# Patient Record
Sex: MALE | Race: WHITE | NOT HISPANIC OR LATINO | Employment: FULL TIME | URBAN - METROPOLITAN AREA
[De-identification: names, ages, dates, MRNs, and addresses within clinical notes are randomized per-mention and may not be internally consistent; named-entity substitution may affect disease eponyms.]

---

## 2017-01-27 ENCOUNTER — ALLSCRIPTS OFFICE VISIT (OUTPATIENT)
Dept: OTHER | Facility: OTHER | Age: 52
End: 2017-01-27

## 2017-03-03 ENCOUNTER — ALLSCRIPTS OFFICE VISIT (OUTPATIENT)
Dept: OTHER | Facility: OTHER | Age: 52
End: 2017-03-03

## 2017-03-30 ENCOUNTER — TRANSCRIBE ORDERS (OUTPATIENT)
Dept: LAB | Facility: CLINIC | Age: 52
End: 2017-03-30

## 2017-03-30 ENCOUNTER — APPOINTMENT (OUTPATIENT)
Dept: LAB | Facility: CLINIC | Age: 52
End: 2017-03-30
Payer: COMMERCIAL

## 2017-03-30 DIAGNOSIS — E03.9 HYPOTHYROIDISM: ICD-10-CM

## 2017-03-30 DIAGNOSIS — E66.9 OBESITY: ICD-10-CM

## 2017-03-30 DIAGNOSIS — G47.30 SLEEP APNEA: ICD-10-CM

## 2017-03-30 DIAGNOSIS — E78.5 HYPERLIPIDEMIA: ICD-10-CM

## 2017-03-30 DIAGNOSIS — E10.65 TYPE 1 DIABETES MELLITUS WITH HYPERGLYCEMIA (HCC): ICD-10-CM

## 2017-03-30 DIAGNOSIS — K21.9 GASTRO-ESOPHAGEAL REFLUX DISEASE WITHOUT ESOPHAGITIS: ICD-10-CM

## 2017-03-30 DIAGNOSIS — Z86.39 PERSONAL HISTORY OF OTHER ENDOCRINE, NUTRITIONAL AND METABOLIC DISEASE: ICD-10-CM

## 2017-03-30 DIAGNOSIS — E55.9 VITAMIN D DEFICIENCY: ICD-10-CM

## 2017-03-30 LAB
25(OH)D3 SERPL-MCNC: 39.9 NG/ML (ref 30–100)
ALBUMIN SERPL BCP-MCNC: 4 G/DL (ref 3.5–5)
ANION GAP SERPL CALCULATED.3IONS-SCNC: 10 MMOL/L (ref 4–13)
BUN SERPL-MCNC: 15 MG/DL (ref 5–25)
CALCIUM SERPL-MCNC: 9.2 MG/DL (ref 8.3–10.1)
CHLORIDE SERPL-SCNC: 99 MMOL/L (ref 100–108)
CO2 SERPL-SCNC: 28 MMOL/L (ref 21–32)
CREAT SERPL-MCNC: 1.16 MG/DL (ref 0.6–1.3)
EST. AVERAGE GLUCOSE BLD GHB EST-MCNC: 166 MG/DL
GFR SERPL CREATININE-BSD FRML MDRD: >60 ML/MIN/1.73SQ M
GLUCOSE SERPL-MCNC: 235 MG/DL (ref 65–140)
HBA1C MFR BLD: 7.4 % (ref 4.2–6.3)
PHOSPHATE SERPL-MCNC: 2.8 MG/DL (ref 2.7–4.5)
POTASSIUM SERPL-SCNC: 4.1 MMOL/L (ref 3.5–5.3)
PTH-INTACT SERPL-MCNC: 35.3 PG/ML (ref 14–72)
SODIUM SERPL-SCNC: 137 MMOL/L (ref 136–145)
T4 FREE SERPL-MCNC: 1.17 NG/DL (ref 0.76–1.46)
TSH SERPL DL<=0.05 MIU/L-ACNC: 4.75 UIU/ML (ref 0.36–3.74)

## 2017-03-30 PROCEDURE — 80069 RENAL FUNCTION PANEL: CPT

## 2017-03-30 PROCEDURE — 83036 HEMOGLOBIN GLYCOSYLATED A1C: CPT

## 2017-03-30 PROCEDURE — 82306 VITAMIN D 25 HYDROXY: CPT

## 2017-03-30 PROCEDURE — 83970 ASSAY OF PARATHORMONE: CPT

## 2017-03-30 PROCEDURE — 84439 ASSAY OF FREE THYROXINE: CPT

## 2017-03-30 PROCEDURE — 84443 ASSAY THYROID STIM HORMONE: CPT

## 2017-03-30 PROCEDURE — 36415 COLL VENOUS BLD VENIPUNCTURE: CPT

## 2017-03-31 ENCOUNTER — ALLSCRIPTS OFFICE VISIT (OUTPATIENT)
Dept: OTHER | Facility: OTHER | Age: 52
End: 2017-03-31

## 2017-04-28 DIAGNOSIS — E03.9 HYPOTHYROIDISM: ICD-10-CM

## 2017-04-28 DIAGNOSIS — Z96.41 PRESENCE OF INSULIN PUMP: ICD-10-CM

## 2017-04-28 DIAGNOSIS — E55.9 VITAMIN D DEFICIENCY: ICD-10-CM

## 2017-04-28 DIAGNOSIS — E10.65 TYPE 1 DIABETES MELLITUS WITH HYPERGLYCEMIA (HCC): ICD-10-CM

## 2017-07-01 DIAGNOSIS — E10.65 TYPE 1 DIABETES MELLITUS WITH HYPERGLYCEMIA (HCC): ICD-10-CM

## 2017-07-01 DIAGNOSIS — E03.9 HYPOTHYROIDISM: ICD-10-CM

## 2017-07-01 DIAGNOSIS — E55.9 VITAMIN D DEFICIENCY: ICD-10-CM

## 2017-07-01 DIAGNOSIS — Z96.41 PRESENCE OF INSULIN PUMP: ICD-10-CM

## 2017-07-04 ENCOUNTER — GENERIC CONVERSION - ENCOUNTER (OUTPATIENT)
Dept: OTHER | Facility: OTHER | Age: 52
End: 2017-07-04

## 2017-07-21 ENCOUNTER — TRANSCRIBE ORDERS (OUTPATIENT)
Dept: LAB | Facility: CLINIC | Age: 52
End: 2017-07-21

## 2017-07-21 ENCOUNTER — APPOINTMENT (OUTPATIENT)
Dept: LAB | Facility: CLINIC | Age: 52
End: 2017-07-21
Payer: COMMERCIAL

## 2017-07-21 DIAGNOSIS — E03.9 HYPOTHYROIDISM: ICD-10-CM

## 2017-07-21 DIAGNOSIS — E55.9 VITAMIN D DEFICIENCY: ICD-10-CM

## 2017-07-21 DIAGNOSIS — E10.65 TYPE 1 DIABETES MELLITUS WITH HYPERGLYCEMIA (HCC): ICD-10-CM

## 2017-07-21 DIAGNOSIS — Z96.41 PRESENCE OF INSULIN PUMP: ICD-10-CM

## 2017-07-21 LAB
ALBUMIN SERPL BCP-MCNC: 3.7 G/DL (ref 3.5–5)
ANION GAP SERPL CALCULATED.3IONS-SCNC: 7 MMOL/L (ref 4–13)
BUN SERPL-MCNC: 15 MG/DL (ref 5–25)
CALCIUM SERPL-MCNC: 9.2 MG/DL (ref 8.3–10.1)
CHLORIDE SERPL-SCNC: 101 MMOL/L (ref 100–108)
CO2 SERPL-SCNC: 29 MMOL/L (ref 21–32)
CREAT SERPL-MCNC: 1.03 MG/DL (ref 0.6–1.3)
EST. AVERAGE GLUCOSE BLD GHB EST-MCNC: 183 MG/DL
GFR SERPL CREATININE-BSD FRML MDRD: >60 ML/MIN/1.73SQ M
GLUCOSE SERPL-MCNC: 225 MG/DL (ref 65–140)
HBA1C MFR BLD: 8 % (ref 4.2–6.3)
PHOSPHATE SERPL-MCNC: 2.7 MG/DL (ref 2.7–4.5)
POTASSIUM SERPL-SCNC: 4.7 MMOL/L (ref 3.5–5.3)
PTH-INTACT SERPL-MCNC: 37.6 PG/ML (ref 14–72)
SODIUM SERPL-SCNC: 137 MMOL/L (ref 136–145)

## 2017-07-21 PROCEDURE — 83970 ASSAY OF PARATHORMONE: CPT

## 2017-07-21 PROCEDURE — 36415 COLL VENOUS BLD VENIPUNCTURE: CPT

## 2017-07-21 PROCEDURE — 80069 RENAL FUNCTION PANEL: CPT

## 2017-07-21 PROCEDURE — 83036 HEMOGLOBIN GLYCOSYLATED A1C: CPT

## 2017-07-24 ENCOUNTER — ALLSCRIPTS OFFICE VISIT (OUTPATIENT)
Dept: OTHER | Facility: OTHER | Age: 52
End: 2017-07-24

## 2017-08-07 ENCOUNTER — ALLSCRIPTS OFFICE VISIT (OUTPATIENT)
Dept: OTHER | Facility: OTHER | Age: 52
End: 2017-08-07

## 2017-09-18 ENCOUNTER — ALLSCRIPTS OFFICE VISIT (OUTPATIENT)
Dept: OTHER | Facility: OTHER | Age: 52
End: 2017-09-18

## 2017-10-24 DIAGNOSIS — E10.65 TYPE 1 DIABETES MELLITUS WITH HYPERGLYCEMIA (HCC): ICD-10-CM

## 2017-10-24 DIAGNOSIS — E78.5 HYPERLIPIDEMIA: ICD-10-CM

## 2017-10-24 DIAGNOSIS — Z96.41 PRESENCE OF INSULIN PUMP: ICD-10-CM

## 2017-10-24 DIAGNOSIS — E55.9 VITAMIN D DEFICIENCY: ICD-10-CM

## 2017-10-24 DIAGNOSIS — E03.9 HYPOTHYROIDISM: ICD-10-CM

## 2017-10-26 NOTE — PROGRESS NOTES
Assessment  1  Type 1 diabetes mellitus with hyperglycemia, with long-term current use of insulin   (250 01,790 29) (E10 65)   2  Hyperlipidemia (272 4) (E78 5)   3  Insulin pump in place (V45 85) (Z96 41)   4  Hypothyroidism (244 9) (E03 9)   5  Vitamin D deficiency (268 9) (E55 9)   6  Obesity, Class II, BMI 35 0-39 9, with comorbidity (see actual BMI) (278 00) (E66 9)    Plan  Hypothyroidism, Type 1 diabetes mellitus with hyperglycemia, with long-term current use  of insulin    · Follow-up visit in 3 months Evaluation and Treatment  Follow-up  Status: Complete   Done: 27VPR7202   Ordered; For: Hypothyroidism, Type 1 diabetes mellitus with hyperglycemia, with long-term current use of insulin; Ordered By: Domitila Prather Performed:  Due: 83Exe9599; Last Updated By: Drew Altman; 9/18/2017 8:52:03 AM    Discussion/Summary  Discussion Summary:   #1 type 1 diabetes mellitus with long-term insulin use with hyperglycemia- has insulin resistance, he is currently on T slim pump and dex com G5A1c worsened to 8%and insulin pump download data reviewed, blood sugar interpretation discussedNo major hypoglycemia requiring medical attention or 3rd party assistancechange insulin to carb ratio to 1:4 before dinner, continue insulin to carb ratio of 1: 5 for rest of the medschange the correction factor to 25 at 9:00 p m , midnight and 4:00 a m metformin 1 g twice a day,He is UTD on eye and foot exams  Insulin pump in place-discussed with patient that he is on intensive insulin regimen, important to calibrate C GMS twice a day and wear C GMS all the time  Hypothyroidism-no recent blood work, asked to do blood work now  He has slips from previous appointmentto take Synthroid 4 hours apart from vitamin-D    Vitamin-D deficiency-currently on vitamin-D 21003 International Units once a weekcurrent management  Obesity: BMI 38weight Management noteis willing to try saxenda for weight loss(GLP analogue) discussed with patient, that saxenda is not approved by FDA to be used with NovoLog insulin as well as in type 1 diabetes, however it is used off-label  is agreeable to use Saxenda at this time   the side effects with the patient  start with 0 6 mg subcu injection daily dose, discuss if he has severe nausea or vomiting he should call office since stopped the medication  after starting saxenda, he may need adjustment in his insulin regimen, he should download C GMS and call office in 2 weeks  to call if blood sugar less than 70 or more than 250 more than twice a week  Counseling Documentation With Imm: The patient was counseled regarding diagnostic results,-- instructions for management,-- risk factor reductions,-- prognosis,-- impressions,-- risks and benefits of treatment options,-- importance of compliance with treatment  Chief Complaint  Chief Complaint Free Text Note Form: Follow up of type 1 diabetes, hypothyroidism, weight gain, hyperlipidemia, hypertension   Chief Complaint Chronic Condition [facility]: Patient is here today for follow up of chronic conditions described in HPI  History of Present Illness  HPI: 52-year-old gentleman with type 1 diabetes(howard antibody positive, with undetectable C-peptide) for 7 years, on insulin pump for 7 years coming for follow-up uses T slim insulin pump, he uses NovoLog via insulin pump  pump settings reviewed, rate-midnight-2 8 units/hour, 4:00 a m  2 units/hour, 6:00 a m  2 units/hour, 8:00 a m  2 8 units/hour, 10:00 a m  to 2 5 units/hour, 12 noon- 2 4 units/hour, 3:00 p m  2 5 unit/hour, 6:00 p m  2 5 unit/hour, 9:00 p m  2 9 unit/hour  basal rate -60 7 unitshave also reviewed and interpreted the CGMSglucose-168 mg/dL with standard deviation of 57blood sugars are usually at nighttime after dinner as well as early in the morning  blood sugar-180-220bedtime-182 to 230 hypoglycemia events    severe hypoglycemic episodes causing intervention, such as ER admission on hospitalization  Obesity (Follow-Up): The patient is being seen for follow-up of obesity  The patient reports no change in the condition  He has had no significant interval events  Interval symptoms:  denies poor eating habits,-- denies anxiety,-- denies fatigue,-- denies back pain-- and-- denies joint pain  The patient is not currently on medication for this problem  Diet: He consumes a diverse and healthy diet  Additional history: Was seen at weight loss management center, was given weight loss diet program     Hyperlipidemia (Initial): The patient is being seen for a routine clinic follow-up of hyperlipidemia  Recent measurements for this patient were taken on dec 2016  Recent measurements: total cholesterol 152 mg/dL, LDL 92 mg/dL and triglycerides 125 mg/dL  No associated symptoms are reported  Current treatment includes statins  By report, there is good compliance with treatment  Hypothyroidism (Follow-Up): The patient is being seen for follow-up of hypothyroidism of undetermined etiology  The patient reports no change in the condition  He has had no significant interval events  Interval symptoms:  new onset of weight gain  The patient is currently asymptomatic  Medications include levothyroxine (Synthroid) and 200 mcg po daily  Additional history: last tsh was 4 5 in march 2017   Vitamin D Deficiency: The patient is being seen for follow-up of vitamin D deficiency  Disease type: vitamin D deficiency  Recent laboratory results: date march 2017, 25-hydroxyvitamin D 39 ng/mL  Past treatment has included vitamin D3 (cholecalciferol)  The patient is currently asymptomatic  Review of Systems  Endo Adult ROS Male Established v2 - [facility]:   Constitutional/General: recent weight gain,-- no recent weight loss,-- no poor energy/fatigue,-- no increased energy level,-- insomnia/sleep problems,-- no fever-- and-- no feeling weak     Heart: no high blood pressure,-- no chest pain/tightness,-- no rapid/racing heart rate-- and-- no palpitations  Genitourinary - Urinary no frequent urination,-- no excess urination-- and-- no urinating during the night  Eyes: excessive tearing, but-- no blurred vision,-- no double vision,-- no bulging eyes-- and-- no gritty/scratchy eyes  Mouth / Throat: no hoarseness-- and-- no difficulty swallowing  Neck: no lumps,-- no swollen glands,-- no neck pain,-- no neck stiffness-- and-- no enlarged thyroid  Respiratory: no wheezing,-- no asthma-- and-- no persistent cough  Musculoskeletal: no muscle aches/pain,-- no joint aches/pain-- and-- no muscle weakness  Skin & Hair: no dry skin,-- no acne,-- the hair texture was not oily,-- no hair loss-- and-- no excessive hair growth  Gastrointestinal: no constipation,-- no diarrhea,-- no waking at night to drink-- and-- no stomach ache  Neurological: no blackouts,-- no weakness-- and-- no tremors  Genital: no testicular pain,-- no testicular lumps/bumps/mass-- and-- does not perform monthly testicular exam    Endocrine: feeling hot frequently,-- no feeling cold frequently,-- no shifts between feeling hot and cold,-- no cold hands or feet,-- no excessive sweating,-- no thyroid problems,-- no blood sugar problems,-- no excessive thirst,-- no excessive hunger,-- no change in shoe size,-- no nausea or vomiting-- and-- no shaky hands  Active Problems  1  Abdominal bloating (787 3) (R14 0)   2  Anemia, deficiency (281 9) (D53 9)   3  Bilateral lower extremity edema (782 3) (R60 0)   4  Chronic back pain (724 5,338 29) (M54 9,G89 29)   5  Chronic maxillary sinusitis (473 0) (J32 0)   6  Collagenous colitis (558 9) (K52 831)   7  Depression screen (V79 0) (Z13 89)   8  GERD (gastroesophageal reflux disease) (530 81) (K21 9)   9  History of colon polyps (V12 72) (Z86 010)   10  Hyperlipidemia (272 4) (E78 5)   11  Hypothyroidism (244 9) (E03 9)   12  Insomnia (780 52) (G47 00)   13  Insulin pump in place (V45 85) (Z96 41)   14   Iron deficiency anemia (280 9) (D50 9)   15  Obesity, Class II, BMI 35 0-39 9, with comorbidity (see actual BMI) (278 00) (E66 9)   16  Severe obesity with body mass index (BMI) of 35 0 to 39 9 (278 01) (E66 01)   17  Shortness of breath (786 05) (R06 02)   18  Sleep apnea (780 57) (G47 30)   19  Type 1 diabetes mellitus with hyperglycemia, with long-term current use of insulin    (250 01,790 29) (E10 65)   20  Type I diabetes mellitus, uncontrolled (250 03) (E10 65)   21  Vitamin D deficiency (268 9) (E55 9)    Past Medical History  1  History of Acute maxillary sinusitis (461 0) (J01 00)   2  History of Arm pain (729 5) (M79 603)   3  History of Colon cancer screening (V76 51) (Z12 11)   4  History of diabetes mellitus (V12 29) (Z86 39)   5  History of insomnia (V13 89) (Z87 898)   6  History of Influenza vaccine needed (V04 81) (Z23)   7  History of Need for prophylactic vaccination and inoculation against influenza (V04 81)   (Z23)   8  History of Night sweats (780 8) (R61)   9  History of Palpitations (785 1) (R00 2)   10  History of Primary hypothyroidism (244 9) (E03 9)   11  History of Screening for prostate cancer (V76 44) (Z12 5)  Active Problems And Past Medical History Reviewed: The active problems and past medical history were reviewed and updated today  Surgical History  1  History of Corneal LASIK Bilateral   2  History of Rhinoplasty   3  History of Rotator Cuff Repair   4  History of Sinus Surgery   5  History of Wrist Surgery  Surgical History Reviewed: The surgical history was reviewed and updated today  Family History  Mother    1  Denied: Family history of Colon cancer   2  Denied: Family history of Crohn's disease without complication, unspecified   gastrointestinal tract location   3  Denied: Family history of liver disease   4  Family history of Heart Disease (V17 49)   5  Family history of Thyroid Disorder (V18 19)  Father    6  Family history of Cancer   7  Family history of Colon cancer   8  Denied: Family history of Crohn's disease without complication, unspecified   gastrointestinal tract location   9  Denied: Family history of liver disease   10  Family history of malignant neoplasm of prostate (V16 42) (Z80 42)   11  Family history of Heart Disease (V17 49)  Sibling    12  Family history of Colon cancer  Sister    15  Family history of Diabetes Mellitus (V18 0)   14  Family history of Thyroid Disorder (V18 19)  Brother    13  Family history of Cancer   16  Family history of Diabetes Mellitus (V18 0)   17  Family history of Diabetes Mellitus (V18 0)   18  Family history of Diabetes Mellitus (V18 0)   19  Family history of Heart Disease (V17 49)   20  Family history of Heart Disease (V17 49)   21  Family history of Heart Disease (V17 49)  Family History Reviewed: The family history was reviewed and updated today  Social History   · Denied: History of Current Every Day Smoker   · Denied: History of Current Smoker   · Denied: History of Drug Use   · Former smoker (V15 82) (T56 461)   · No drug use   · Social alcohol use (Z78 9)  Social History Reviewed: The social history was reviewed and updated today  Current Meds   1  Azelastine HCl - 0 1 % Nasal Solution; USE 1 SPRAY IN EACH NOSTRIL TWICE DAILY; Therapy: 60HVS8029 to (Last Rx:13Lxu5143)  Requested for: 97Cmk2662 Ordered   2  CeleBREX 200 MG Oral Capsule; TAKE 1 CAPSULE TWICE DAILY AS NEEDED; Therapy: 91NXY7877 to (Evaluate:30Hqk4338)  Requested for: 07Jyq8535; Last   Rx:07Xjs1037 Ordered   3  Crestor 10 MG Oral Tablet; TAKE 1 TABLET AT BEDTIME; Therapy: 35YOH3666 to (96 570773)  Requested for: 28Apr2017; Last   Rx:86Ylt8631 Ordered   4  Esomeprazole Magnesium 40 MG Oral Capsule Delayed Release; TAKE 1 CAPSULE   DAILY  Requested for: 16Snq1650; Last Rx:04Hli5060 Ordered   5  Ketostix In Vitro Strip; USE AS DIRECTED; Therapy: 14BOJ1535 to (Last Rx:64Urc2614)  Requested for: 20Sfj7488 Ordered   6   MetFORMIN HCl - 1000 MG Oral Tablet; TAKE 1 TABLET EVERY 12 HOURS DAILY; Therapy: 93Dwr4994 to (Evaluate:05Wgb9928)  Requested for: 35RPH2611; Last   Rx:31Mar2017 Ordered   7  Multiple Vitamins Oral Tablet Recorded   8  Naproxen 500 MG Oral Tablet; take 1 tablet by mouth twice daily; Therapy: (Recorded:63Ony2166) to Recorded   9  NovoLOG 100 UNIT/ML Subcutaneous Solution; use 120 in pump daily  Requested for:   81Oqu4442; Last Rx:08Wxn0634 Ordered   10  OneTouch Ultra 2 w/Device Kit; USE AS DIRECTED; Therapy: 19Muz0963 to (Last Rx:13Fua0727)  Requested for: 71Tdj1033 Ordered   11  OneTouch Ultra Blue In Vitro Strip; USE TO TEST 6 TIMES DAILY; Therapy: 94RMS9858 to (Evaluate:24Jun2017)  Requested for: 35LKZ3762; Last    Rx:68Hxg6512 Ordered   12  Synthroid 200 MCG Oral Tablet; take 1 tablet every day; Therapy: 20Mar2012 to (Evaluate:17Pwx5710)  Requested for: 53Qix3840; Last    Rx:31Mar2017; Status: ACTIVE - Renewal Denied Ordered   13  Vitamin D3 76378 UNIT Oral Capsule; take 1 tab once weekly; Therapy: 33HEB7818 to (Last Rx:59Uof4894)  Requested for: 33UDF0502 Ordered   14  Zolpidem Tartrate 10 MG Oral Tablet; TAKE 1 TABLET AT BEDTIME; Therapy: 10Tui4287 to (Evaluate:05Nov2017); Last Rx:17Jzd9090 Ordered  Medication List Reviewed: The medication list was reviewed and updated today  Allergies  1  Ibuprofen TABS  2  Seasonal    Vitals  Vital Signs    Recorded: 59Iib2867 08:05AM Recorded: 51Mpp1349 08:01AM   Heart Rate 92    Systolic 189    Diastolic 84    Height  5 ft 8 8 in   Weight  259 lb    BMI Calculated  38 47   BSA Calculated  2 3     Physical Exam    Constitutional   General appearance: Abnormal  -- obese, no cushingoid features, no moon faces  Eyes   Conjunctiva and lids: No swelling, erythema, or discharge  Pupils: Equal, round and reactive to light  The sclera are anicteric  Extraocular movements are intact      Ears, Nose, Mouth, and Throat   Oropharynx: Normal with no erythema, edema, exudate or lesions  Exam of Head: The head is atraumatic and normocephalic  Neck: The neck is supple  The thyroid is normal in size with no palpable nodules  Pulmonary   Auscultation of lungs: Clear to auscultation bilaterally with normal chest expansion  Cardiovascular   Auscultation of heart: Normal rate and rhythm with no murmurs, gallops or rubs  Abdomen   Abdomen: Abdomen is soft, non-tender with normal bowel sounds  Musculoskeletal   Gait and station: Normal     Digits and nails: Normal without clubbing or cyanosis  Skin   Skin and subcutaneous tissue: Normal skin temperature and color  Neurologic   Reflexes: 2+ and symmetric  Sensation: No sensory loss  Motor Strength: Strength is 5/5 bilaterally  Psychiatric   Orientation to person, place and time: Normal     Mood and affect: Affect and attention span are normal        Health Management  Depression screen   *VB-Depression Screening; every 1 year; Last 20Jun2016; Next Due: 56Djp9647; Active  History of colon polyps   COLONOSCOPY; every 5 years; Last 87GHA9127; Next Due: 67JEL4367;  Active    Future Appointments    Date/Time Provider Specialty Site   12/18/2017 07:45 AM Mikal Greenberg, [address]  Endocrinology [facility]     Signatures   Electronically signed by : KODAK Mccauley ; Sep 18 2017 12:15PM EST                       (Author)

## 2017-10-30 ENCOUNTER — APPOINTMENT (OUTPATIENT)
Dept: LAB | Facility: CLINIC | Age: 52
End: 2017-10-30
Payer: COMMERCIAL

## 2017-10-30 ENCOUNTER — TRANSCRIBE ORDERS (OUTPATIENT)
Dept: LAB | Facility: CLINIC | Age: 52
End: 2017-10-30

## 2017-10-30 DIAGNOSIS — E78.5 HYPERLIPIDEMIA: ICD-10-CM

## 2017-10-30 DIAGNOSIS — E10.65 TYPE 1 DIABETES MELLITUS WITH HYPERGLYCEMIA (HCC): ICD-10-CM

## 2017-10-30 DIAGNOSIS — E03.9 HYPOTHYROIDISM: ICD-10-CM

## 2017-10-30 DIAGNOSIS — E55.9 VITAMIN D DEFICIENCY: ICD-10-CM

## 2017-10-30 DIAGNOSIS — Z96.41 PRESENCE OF INSULIN PUMP: ICD-10-CM

## 2017-10-30 LAB
25(OH)D3 SERPL-MCNC: 55.6 NG/ML (ref 30–100)
ALBUMIN SERPL BCP-MCNC: 3.7 G/DL (ref 3.5–5)
ANION GAP SERPL CALCULATED.3IONS-SCNC: 8 MMOL/L (ref 4–13)
BUN SERPL-MCNC: 16 MG/DL (ref 5–25)
CALCIUM SERPL-MCNC: 8.7 MG/DL (ref 8.3–10.1)
CHLORIDE SERPL-SCNC: 104 MMOL/L (ref 100–108)
CO2 SERPL-SCNC: 28 MMOL/L (ref 21–32)
CREAT SERPL-MCNC: 0.98 MG/DL (ref 0.6–1.3)
CREAT UR-MCNC: 49.9 MG/DL
EST. AVERAGE GLUCOSE BLD GHB EST-MCNC: 154 MG/DL
GFR SERPL CREATININE-BSD FRML MDRD: 89 ML/MIN/1.73SQ M
GLUCOSE P FAST SERPL-MCNC: 115 MG/DL (ref 65–99)
HBA1C MFR BLD: 7 % (ref 4.2–6.3)
MICROALBUMIN UR-MCNC: <5 MG/L (ref 0–20)
MICROALBUMIN/CREAT 24H UR: <10 MG/G CREATININE (ref 0–30)
PHOSPHATE SERPL-MCNC: 3 MG/DL (ref 2.7–4.5)
POTASSIUM SERPL-SCNC: 4 MMOL/L (ref 3.5–5.3)
PTH-INTACT SERPL-MCNC: 58.2 PG/ML (ref 14–72)
SODIUM SERPL-SCNC: 140 MMOL/L (ref 136–145)

## 2017-10-30 PROCEDURE — 36415 COLL VENOUS BLD VENIPUNCTURE: CPT

## 2017-10-30 PROCEDURE — 83036 HEMOGLOBIN GLYCOSYLATED A1C: CPT

## 2017-10-30 PROCEDURE — 82570 ASSAY OF URINE CREATININE: CPT

## 2017-10-30 PROCEDURE — 80069 RENAL FUNCTION PANEL: CPT

## 2017-10-30 PROCEDURE — 82043 UR ALBUMIN QUANTITATIVE: CPT

## 2017-10-30 PROCEDURE — 83970 ASSAY OF PARATHORMONE: CPT

## 2017-10-30 PROCEDURE — 82306 VITAMIN D 25 HYDROXY: CPT

## 2017-11-07 ENCOUNTER — GENERIC CONVERSION - ENCOUNTER (OUTPATIENT)
Dept: OTHER | Facility: OTHER | Age: 52
End: 2017-11-07

## 2017-11-08 ENCOUNTER — GENERIC CONVERSION - ENCOUNTER (OUTPATIENT)
Dept: OTHER | Facility: OTHER | Age: 52
End: 2017-11-08

## 2017-11-09 ENCOUNTER — APPOINTMENT (EMERGENCY)
Dept: CT IMAGING | Facility: HOSPITAL | Age: 52
End: 2017-11-09
Payer: COMMERCIAL

## 2017-11-09 ENCOUNTER — HOSPITAL ENCOUNTER (EMERGENCY)
Facility: HOSPITAL | Age: 52
Discharge: HOME | End: 2017-11-09
Attending: EMERGENCY MEDICINE | Admitting: EMERGENCY MEDICINE
Payer: COMMERCIAL

## 2017-11-09 VITALS
HEIGHT: 69 IN | RESPIRATION RATE: 16 BRPM | DIASTOLIC BLOOD PRESSURE: 86 MMHG | HEART RATE: 72 BPM | OXYGEN SATURATION: 95 % | BODY MASS INDEX: 35.99 KG/M2 | TEMPERATURE: 98.1 F | WEIGHT: 243 LBS | SYSTOLIC BLOOD PRESSURE: 150 MMHG

## 2017-11-09 DIAGNOSIS — R10.9 LEFT FLANK PAIN: Primary | ICD-10-CM

## 2017-11-09 LAB
ANION GAP SERPL CALCULATED.3IONS-SCNC: 12 MMOL/L (ref 4–13)
BASOPHILS # BLD AUTO: 0.03 THOUSANDS/ΜL (ref 0–0.1)
BASOPHILS NFR BLD AUTO: 0 % (ref 0–1)
BUN SERPL-MCNC: 22 MG/DL (ref 5–25)
CALCIUM SERPL-MCNC: 9.3 MG/DL (ref 8.3–10.1)
CHLORIDE SERPL-SCNC: 100 MMOL/L (ref 100–108)
CLARITY, POC: CLEAR
CO2 SERPL-SCNC: 26 MMOL/L (ref 21–32)
COLOR, POC: YELLOW
CREAT SERPL-MCNC: 1.44 MG/DL (ref 0.6–1.3)
EOSINOPHIL # BLD AUTO: 0 THOUSAND/ΜL (ref 0–0.61)
EOSINOPHIL NFR BLD AUTO: 0 % (ref 0–6)
ERYTHROCYTE [DISTWIDTH] IN BLOOD BY AUTOMATED COUNT: 12.7 % (ref 11.6–15.1)
EXT BILIRUBIN, UA: NEGATIVE
EXT BLOOD URINE: ABNORMAL
EXT GLUCOSE, UA: ABNORMAL
EXT KETONES: ABNORMAL
EXT NITRITE, UA: NEGATIVE
EXT PH, UA: 5.5
EXT PROTEIN, UA: NEGATIVE
EXT SPECIFIC GRAVITY, UA: 1.02
EXT UROBILINOGEN: 0.2
GFR SERPL CREATININE-BSD FRML MDRD: 56 ML/MIN/1.73SQ M
GLUCOSE SERPL-MCNC: 277 MG/DL (ref 65–140)
HCT VFR BLD AUTO: 39.2 % (ref 36.5–49.3)
HGB BLD-MCNC: 13.6 G/DL (ref 12–17)
LYMPHOCYTES # BLD AUTO: 1.09 THOUSANDS/ΜL (ref 0.6–4.47)
LYMPHOCYTES NFR BLD AUTO: 7 % (ref 14–44)
MCH RBC QN AUTO: 29.4 PG (ref 26.8–34.3)
MCHC RBC AUTO-ENTMCNC: 34.7 G/DL (ref 31.4–37.4)
MCV RBC AUTO: 85 FL (ref 82–98)
MONOCYTES # BLD AUTO: 0.6 THOUSAND/ΜL (ref 0.17–1.22)
MONOCYTES NFR BLD AUTO: 4 % (ref 4–12)
NEUTROPHILS # BLD AUTO: 14.54 THOUSANDS/ΜL (ref 1.85–7.62)
NEUTS SEG NFR BLD AUTO: 89 % (ref 43–75)
PLATELET # BLD AUTO: 265 THOUSANDS/UL (ref 149–390)
PMV BLD AUTO: 9.8 FL (ref 8.9–12.7)
POTASSIUM SERPL-SCNC: 4.4 MMOL/L (ref 3.5–5.3)
RBC # BLD AUTO: 4.62 MILLION/UL (ref 3.88–5.62)
SODIUM SERPL-SCNC: 138 MMOL/L (ref 136–145)
WBC # BLD AUTO: 16.26 THOUSAND/UL (ref 4.31–10.16)
WBC # BLD EST: NEGATIVE 10*3/UL

## 2017-11-09 PROCEDURE — 80048 BASIC METABOLIC PNL TOTAL CA: CPT | Performed by: PHYSICIAN ASSISTANT

## 2017-11-09 PROCEDURE — 74176 CT ABD & PELVIS W/O CONTRAST: CPT

## 2017-11-09 PROCEDURE — 85025 COMPLETE CBC W/AUTO DIFF WBC: CPT | Performed by: PHYSICIAN ASSISTANT

## 2017-11-09 PROCEDURE — 96375 TX/PRO/DX INJ NEW DRUG ADDON: CPT

## 2017-11-09 PROCEDURE — 81002 URINALYSIS NONAUTO W/O SCOPE: CPT | Performed by: PHYSICIAN ASSISTANT

## 2017-11-09 PROCEDURE — 99284 EMERGENCY DEPT VISIT MOD MDM: CPT

## 2017-11-09 PROCEDURE — 96374 THER/PROPH/DIAG INJ IV PUSH: CPT

## 2017-11-09 PROCEDURE — 96376 TX/PRO/DX INJ SAME DRUG ADON: CPT

## 2017-11-09 PROCEDURE — 36415 COLL VENOUS BLD VENIPUNCTURE: CPT | Performed by: PHYSICIAN ASSISTANT

## 2017-11-09 RX ORDER — MORPHINE SULFATE 4 MG/ML
4 INJECTION, SOLUTION INTRAMUSCULAR; INTRAVENOUS ONCE
Status: COMPLETED | OUTPATIENT
Start: 2017-11-09 | End: 2017-11-09

## 2017-11-09 RX ORDER — MORPHINE SULFATE 10 MG/ML
6 INJECTION, SOLUTION INTRAMUSCULAR; INTRAVENOUS ONCE
Status: COMPLETED | OUTPATIENT
Start: 2017-11-09 | End: 2017-11-09

## 2017-11-09 RX ORDER — OXYCODONE HYDROCHLORIDE AND ACETAMINOPHEN 5; 325 MG/1; MG/1
1 TABLET ORAL EVERY 4 HOURS PRN
Qty: 18 TABLET | Refills: 0 | Status: SHIPPED | OUTPATIENT
Start: 2017-11-09 | End: 2017-11-27

## 2017-11-09 RX ORDER — ONDANSETRON 2 MG/ML
4 INJECTION INTRAMUSCULAR; INTRAVENOUS ONCE
Status: COMPLETED | OUTPATIENT
Start: 2017-11-09 | End: 2017-11-09

## 2017-11-09 RX ORDER — TAMSULOSIN HYDROCHLORIDE 0.4 MG/1
0.4 CAPSULE ORAL
Qty: 3 CAPSULE | Refills: 0 | Status: SHIPPED | OUTPATIENT
Start: 2017-11-09 | End: 2018-04-06 | Stop reason: ALTCHOICE

## 2017-11-09 RX ADMIN — MORPHINE SULFATE 6 MG: 10 INJECTION, SOLUTION INTRAMUSCULAR; INTRAVENOUS at 21:31

## 2017-11-09 RX ADMIN — ONDANSETRON 4 MG: 2 INJECTION INTRAMUSCULAR; INTRAVENOUS at 21:31

## 2017-11-09 RX ADMIN — MORPHINE SULFATE 4 MG: 4 INJECTION, SOLUTION INTRAMUSCULAR; INTRAVENOUS at 22:05

## 2017-11-10 NOTE — ED PROVIDER NOTES
History  Chief Complaint   Patient presents with    Flank Pain     Pt reports to ED with c/o L side flank pain  Pt states that after a scan last last year the doctor told him that there was kidney stones  History provided by:  Patient  Flank Pain   Pain location:  L flank  Pain quality: sharp    Pain radiates to:  Groin  Pain severity:  Moderate  Onset quality:  Sudden  Duration:  12 hours  Timing:  Intermittent  Chronicity:  New  Context: not alcohol use, not awakening from sleep, not diet changes, not eating, not laxative use, not medication withdrawal, not previous surgeries, not recent illness, not recent sexual activity, not recent travel, not sick contacts, not suspicious food intake and not trauma    Relieved by:  None tried  Worsened by:  Nothing  Ineffective treatments:  None tried  Associated symptoms: anorexia, nausea and vomiting    Associated symptoms: no belching, no chest pain, no chills, no constipation, no cough, no diarrhea, no dysuria, no fatigue, no fever, no flatus, no hematemesis, no hematochezia, no hematuria, no melena, no shortness of breath and no sore throat    Risk factors: no alcohol abuse, no aspirin use, not elderly, has not had multiple surgeries, no NSAID use and no recent hospitalization        None       Past Medical History:   Diagnosis Date    Diabetes mellitus ([facility] [address] )     Disease of thyroid gland        Past Surgical History:   Procedure Laterality Date    ROTATOR CUFF REPAIR Right        History reviewed  No pertinent family history  I have reviewed and agree with the history as documented  Social History   Substance Use Topics    Smoking status: Former Smoker    Smokeless tobacco: Never Used      Comment: quit in 2015    Alcohol use 1 2 oz/week     2 Glasses of wine per week        Review of Systems   Constitutional: Positive for activity change and appetite change  Negative for chills, fatigue and fever     HENT: Negative for congestion, dental problem, ear discharge, ear pain, mouth sores, rhinorrhea and sore throat  Eyes: Negative for pain, discharge, redness and itching  Respiratory: Negative for cough and shortness of breath  Cardiovascular: Negative for chest pain, palpitations and leg swelling  Gastrointestinal: Positive for abdominal pain, anorexia, nausea and vomiting  Negative for blood in stool, constipation, diarrhea, flatus, hematemesis, hematochezia and melena  Endocrine: Negative for cold intolerance, heat intolerance, polydipsia, polyphagia and polyuria  Genitourinary: Positive for flank pain  Negative for discharge, dysuria, frequency, hematuria, penile pain, penile swelling, scrotal swelling, testicular pain and urgency  Musculoskeletal: Negative for arthralgias, back pain, gait problem, myalgias and neck pain  Skin: Negative for color change, pallor, rash and wound  Neurological: Negative for weakness  Hematological: Negative for adenopathy  Does not bruise/bleed easily  Psychiatric/Behavioral: Negative for confusion  All other systems reviewed and are negative  Physical Exam  ED Triage Vitals   Temperature Pulse Respirations Blood Pressure SpO2   11/09/17 2053 11/09/17 2050 11/09/17 2050 11/09/17 2050 11/09/17 2050   98 1 °F (36 7 °C) 79 18 156/89 97 %      Temp Source Heart Rate Source Patient Position - Orthostatic VS BP Location FiO2 (%)   11/09/17 2053 11/09/17 2050 11/09/17 2050 11/09/17 2050 --   Oral Monitor Sitting Right arm       Pain Score       11/09/17 2050       5           Orthostatic Vital Signs  Vitals:    11/09/17 2050 11/09/17 2210 11/09/17 2328   BP: 156/89 168/90 150/86   Pulse: 79 72 72   Patient Position - Orthostatic VS: Sitting Sitting Sitting       Physical Exam   Constitutional: He is oriented to person, place, and time  He appears well-developed and well-nourished  No distress  HENT:   Head: Normocephalic     Right Ear: External ear normal    Left Ear: External ear normal    Nose: Nose normal    Mouth/Throat: Oropharynx is clear and moist    Eyes: Conjunctivae are normal  Pupils are equal, round, and reactive to light  Right eye exhibits no discharge  Left eye exhibits no discharge  No scleral icterus  Neck: Neck supple  No thyromegaly present  Cardiovascular: Normal rate, regular rhythm and normal heart sounds  Exam reveals no friction rub  No murmur heard  Pulmonary/Chest: Effort normal and breath sounds normal  No respiratory distress  He has no wheezes  He has no rales  Abdominal: Soft  He exhibits no distension and no mass  There is no tenderness  There is no rebound and no guarding  No hernia  Lymphadenopathy:     He has no cervical adenopathy  Neurological: He is alert and oriented to person, place, and time  Skin: Skin is warm  Capillary refill takes less than 2 seconds  He is not diaphoretic  Psychiatric: He has a normal mood and affect  His behavior is normal  Judgment and thought content normal    Nursing note and vitals reviewed        ED Medications  Medications   morphine (PF) 10 mg/mL injection 6 mg (6 mg Intravenous Given 11/9/17 2131)   ondansetron (ZOFRAN) injection 4 mg (4 mg Intravenous Given 11/9/17 2131)   morphine (PF) 4 mg/mL injection 4 mg (4 mg Intravenous Given 11/9/17 2205)       Diagnostic Studies  Results Reviewed     Procedure Component Value Units Date/Time    Basic metabolic panel [[id number]]  (Abnormal) Collected:  11/09/17 2130    Lab Status:  Final result Specimen:  Blood from Arm, Right Updated:  11/09/17 2205     Sodium 138 mmol/L      Potassium 4 4 mmol/L      Chloride 100 mmol/L      CO2 26 mmol/L      Anion Gap 12 mmol/L      BUN 22 mg/dL      Creatinine 1 44 (H) mg/dL      Glucose 277 (H) mg/dL      Calcium 9 3 mg/dL      eGFR 56 ml/min/1 73sq m     Narrative:         National Kidney Disease Education Program recommendations are as follows:  GFR calculation is accurate only with a steady state creatinine  Chronic Kidney disease less than 60 ml/min/1 73 sq  meters  Kidney failure less than 15 ml/min/1 73 sq  meters  CBC and differential [[id number]]  (Abnormal) Collected:  11/09/17 2130    Lab Status:  Final result Specimen:  Blood from Arm, Right Updated:  11/09/17 2142     WBC 16 26 (H) Thousand/uL      RBC 4 62 Million/uL      Hemoglobin 13 6 g/dL      Hematocrit 39 2 %      MCV 85 fL      MCH 29 4 pg      MCHC 34 7 g/dL      RDW 12 7 %      MPV 9 8 fL      Platelets 791 Thousands/uL      Neutrophils Relative 89 (H) %      Lymphocytes Relative 7 (L) %      Monocytes Relative 4 %      Eosinophils Relative 0 %      Basophils Relative 0 %      Neutrophils Absolute 14 54 (H) Thousands/µL      Lymphocytes Absolute 1 09 Thousands/µL      Monocytes Absolute 0 60 Thousand/µL      Eosinophils Absolute 0 00 Thousand/µL      Basophils Absolute 0 03 Thousands/µL     POCT urinalysis dipstick [[id number]]  (Abnormal) Resulted:  11/09/17 2139    Lab Status:  Final result Specimen:  Urine Updated:  11/09/17 2140     Color, UA yellow     Clarity, UA clear     EXT Glucose, UA moderate     EXT Bilirubin, UA (Ref: Negative) negative     EXT Ketones, UA (Ref: Negative) large     EXT Spec Grav, UA 1 02     EXT Blood, UA (Ref: Negative) moderate     EXT pH, UA 5 5     EXT Protein, UA (Ref: Negative) negative     EXT Urobilinogen, UA (Ref: 0 2- 1 0) 0 2     EXT Leukocytes, UA (Ref: Negative) negative     EXT Nitrite, UA (Ref: Negative) negative                 CT renal stone study abdomen pelvis wo contrast   ED Interpretation by Delmis Fields PA-C (11/09 2152)      Left ureterovesical junction calculus, 3 mm in size, resulting in left hydronephrosis with perinephric and periureteral stranding  Nonobstructing intrarenal calculi are noted of 1 to 3 mm in size noted bilaterally            Workstation performed: [id number]         Final Result by Carlotta Morrison MD (11/09 2128)      Left ureterovesical junction calculus, 3 mm in size, resulting in left hydronephrosis with perinephric and periureteral stranding  Nonobstructing intrarenal calculi are noted of 1 to 3 mm in size noted bilaterally  Workstation performed: [id number]                    Procedures  Procedures       Phone Contacts  ED Phone Contact    ED Course  ED Course                                MDM  Number of Diagnoses or Management Options  Left flank pain: new and requires workup     Amount and/or Complexity of Data Reviewed  Clinical lab tests: ordered and reviewed  Tests in the radiology section of CPT®: ordered and reviewed  Tests in the medicine section of CPT®: ordered and reviewed    Risk of Complications, Morbidity, and/or Mortality  Presenting problems: high  Diagnostic procedures: high  Management options: high  General comments: Patient presents emergency room with acute left flank pain  He has a history of having a renal stones that were found on a previous MRI for GI workup  He has never had any problems with them until today  He denies any fever chills  He denies any hematuria  A CT scan was obtained which revealed a 3 mm stone at the left ureterovesical junction  Laboratory studies were also obtained which were within normal limits  The patient was medicated for pain in the emergency room today  He was discharged home with a prescription for Percocet, 3 day supply, and he was also given a prescription for Flomax  He will follow up with his primary care provider in 2 days for repeat evaluation      Patient Progress  Patient progress: stable    CritCare Time    Disposition  Final diagnoses:   Left flank pain - Ureteral lithiasis     Time reflects when diagnosis was documented in both MDM as applicable and the Disposition within this note     Time User Action Codes Description Comment    11/9/2017 11:18 PM Ama Diaz [R10 [id number]] Left flank pain     11/9/2017 11:18 PM Ashley Ozuna [R10 9] Left flank pain Ureteral lithiasis      ED Disposition ED Disposition Condition Comment    Discharge  Osmel Trevino discharge to home/self care  Condition at discharge: Good        Follow-up Information     Follow up With Specialties Details Why [address] [address], MD Internal Medicine In 2 days  [address]  [phone]          Discharge Medication List as of 11/9/2017 11:21 PM      START taking these medications    Details   oxyCODONE-acetaminophen (PERCOCET) 5-325 mg per tablet Take 1 tablet by mouth every 4 (four) hours as needed for moderate pain for up to 18 days Max Daily Amount: 6 tablets, Starting u 11/9/2017, Until Mon 11/27/2017, Print      tamsulosin (FLOMAX) 0 4 mg Take 1 capsule by mouth daily with dinner for 3 doses, Starting u 11/9/2017, Until Sun 11/12/2017, Print           No discharge procedures on file      ED Provider  Electronically Signed by           Freddy Ferraro PA-C  11/09/17 0116

## 2017-11-10 NOTE — DISCHARGE INSTRUCTIONS
Kidney Stones   WHAT YOU NEED TO KNOW:   Kidney stones form in the urinary system when the water and waste in your urine are out of balance  When this happens, certain types of waste crystals separate from the urine  The crystals build up and form kidney stones  You may have 1 or more kidney stones  DISCHARGE INSTRUCTIONS:   Return to the emergency department if:   · You have vomiting that is not relieved by medicine  Contact your healthcare provider if:   · You have a fever  · You have trouble passing urine  · You see blood in your urine  · You have severe pain  · You have any questions or concerns about your condition or care  Medicines:   · NSAIDs , such as ibuprofen, help decrease swelling, pain, and fever  This medicine is available with or without a doctor's order  NSAIDs can cause stomach bleeding or kidney problems in certain people  If you take blood thinner medicine, always ask your healthcare provider if NSAIDs are safe for you  Always read the medicine label and follow directions  · Prescription medicine  may be given  Ask how to take this medicine safely  · Medicines  to balance your electrolytes may be needed  · Take your medicine as directed  Contact your healthcare provider if you think your medicine is not helping or if you have side effects  Tell him or her if you are allergic to any medicine  Keep a list of the medicines, vitamins, and herbs you take  Include the amounts, and when and why you take them  Bring the list or the pill bottles to follow-up visits  Carry your medicine list with you in case of an emergency  Follow up with your healthcare provider as directed: You may need to return for more tests  Write down your questions so you remember to ask them during your visits  Self-care:   · Drink plenty of liquids  Your healthcare provider may tell you to drink at least 8 to 12 (eight-ounce) cups of liquids each day   This helps flush out the kidney stones when you urinate  Water is the best liquid to drink  · Strain your urine every time you go to the bathroom  Urinate through a strainer or a piece of thin cloth to catch the stones  Take the stones to your healthcare provider so they can be sent to the lab for tests  This will help your healthcare providers plan the best treatment for you  · Eat a variety of healthy foods  Healthy foods include fruits, vegetables, whole-grain breads, low-fat dairy products, beans, and fish  You may need to limit how much sodium (salt) or protein you eat  Ask for information about the best foods for you  · Stay active  Your stones may pass more easily by if you stay active  Ask about the best activities for you  After you pass your kidney stones:  Once you have passed your kidney stones, your healthcare provider may  order a 24-hour urine test  Results from a 24-hour urine test will help your healthcare provider plan ways to prevent more stones from forming  If you are told to do a 24-hour test, your healthcare provider will give you more instructions  © 2017 [facility] Information is for End User's use only and may not be sold, redistributed or otherwise used for commercial purposes  All illustrations and images included in CareNotes® are the copyrighted property of [employer]  or Lyndon Pascual  The above information is an  only  It is not intended as medical advice for individual conditions or treatments  Talk to your doctor, nurse or pharmacist before following any medical regimen to see if it is safe and effective for you

## 2017-12-22 ENCOUNTER — GENERIC CONVERSION - ENCOUNTER (OUTPATIENT)
Dept: OTHER | Facility: OTHER | Age: 52
End: 2017-12-22

## 2018-01-10 NOTE — PROGRESS NOTES
Assessment    1  Chronic maxillary sinusitis (473 0) (J32 0)   2  Type I diabetes mellitus, uncontrolled (250 03) (E10 65)   3  Collagenous colitis (558 9) (K52 89)   4  Diabetic gastroparesis (250 60,536 3) (T26 58,W75 03)    Plan  Chronic maxillary sinusitis    · Amoxicillin 500 MG Oral Tablet; TAKE 1 TABLET EVERY 8 HOURS DAILY  Hypothyroidism    · (1) T4, FREE; Status:Canceled;    · (1) TSH; Status:Canceled; Hypothyroidism, Type I diabetes mellitus, uncontrolled    · (1) HEMOGLOBIN A1C; Status:Canceled;    · (1) VITAMIN D 25-HYDROXY; Status:Canceled; Insomnia    · Zolpidem Tartrate 10 MG Oral Tablet; TAKE 1 TABLET DAILY AT BEDTIME  Type I diabetes mellitus, uncontrolled    · *VB - Eye Exam; Status:Active; Requested ZDM:15YNS8394;     Discussion/Summary  Discussion Summary:   Patient will be prescribed amoxicillin 500 mg 3 times a day for 10 days  In addition we will renew zolpidem  He has followup scheduled with endocrinology regarding his diabetes  His bowels are relatively stable at the present time I do not believe any additional treatment would be warranted unless she should become symptomatic  Chief Complaint  Chief Complaint Free Text Note Form: Pt IS due for the A1C  Patient did not have blood work done  Patient needs medication refill  Chief Complaint Chronic Condition [facility]: Patient is here today for follow up of chronic conditions described in HPI  History of Present Illness  HPI: Patient is type I diabetic who is complaining of some chronic sinus discomfort both maxillary and frontal predominantly on the left side  There is no associated fever only facial discomfort and sensitivity to touch  Discomfort is worsened by bending over or tilting his head forward  He denies any earaches or sore throat  Is also requesting refills for his sleep medications as his insomnia is persistent  He has not had any blood work done recently   He is poorly controlled type 1 diabetes by JIMMY and undetectable C-peptide levels  He is also on metformin for insulin resistance  Is also having some issues with diabetic gastroparesis manifested by postprandial bloating no vomiting  His gastric emptying study did show some significant delayed emptying initially and still remained delayed after 4 hours but not as significant as during the first 2 hours  l His bowels have been relatively stable  A recent colonoscopy disclosed changes consistent with collagenous colitis  Review of Systems  Complete-Male:   Constitutional: No fever or chills, feels well, no tiredness, no recent weight gain or weight loss  Eyes: No complaints of eye pain, no red eyes, no discharge from eyes, no itchy eyes  Cardiovascular: No complaints of slow heart rate, no fast heart rate, no chest pain, no palpitations, no leg claudication, no lower extremity  Respiratory: No complaints of shortness of breath, no wheezing, no cough, no SOB on exertion, no orthopnea or PND  Gastrointestinal: as noted in HPI  Genitourinary: No complaints of dysuria, no incontinence, no hesitancy, no nocturia, no genital lesion, no testicular pain  Musculoskeletal: No complaints of arthralgia, no myalgias, no joint swelling or stiffness, no limb pain or swelling  Integumentary: No complaints of skin rash or skin lesions, no itching, no skin wound, no dry skin  Neurological: No compliants of headache, no confusion, no convulsions, no numbness or tingling, no dizziness or fainting, no limb weakness, no difficulty walking  Endocrine: No complaints of proptosis, no hot flashes, no muscle weakness, no erectile dysfunction, no deepening of the voice, no feelings of weakness  Hematologic/Lymphatic: No complaints of swollen glands, no swollen glands in the neck, does not bleed easily, no easy bruising  Active Problems    1  Collagenous colitis (558 9) (K52 89)   2  Diabetic gastroparesis (250 60,536 3) (G53 35,Q87 27)   3   GERD (gastroesophageal reflux disease) (530 81) (K21 9)   4  History of colon polyps (V12 72) (Z86 010)   5  Hyperlipidemia (272 4) (E78 5)   6  Hypothyroidism (244 9) (E03 9)   7  Insomnia (780 52) (G47 00)   8  Sleep apnea (780 57) (G47 30)   9  Type I diabetes mellitus, uncontrolled (250 03) (E10 65)   10  Vitamin D deficiency (268 9) (E55 9)    Past Medical History    1  History of Arm pain (729 5) (M79 603)   2  History of Colon cancer screening (V76 51) (Z12 11)   3  History of diabetes mellitus (V12 29) (Z86 39)   4  History of insomnia (V13 89) (Z87 898)   5  History of Influenza vaccine needed (V04 81) (Z23)   6  History of Need for prophylactic vaccination and inoculation against influenza (V04 81) (Z23)   7  History of Night sweats (780 8) (R61)   8  History of Palpitations (785 1) (R00 2)   9  History of Primary hypothyroidism (244 9) (E03 9)   10  History of Screening for prostate cancer (V76 44) (Z12 5)  Active Problems And Past Medical History Reviewed: The active problems and past medical history were reviewed and updated today  Surgical History    1  History of Corneal LASIK Bilateral   2  History of Rhinoplasty   3  History of Sinus Surgery   4  History of Wrist Surgery    Family History    1  Denied: Family history of Colon cancer   2  Denied: Family history of Crohn's disease without complication, unspecified gastrointestinal tract   location   3  Denied: Family history of liver disease   4  Family history of Heart Disease (V17 49)   5  Family history of Thyroid Disorder (V18 19)    6  Family history of Cancer   7  Family history of Colon cancer   8  Denied: Family history of Crohn's disease without complication, unspecified gastrointestinal tract   location   9  Denied: Family history of liver disease   10  Family history of malignant neoplasm of prostate (V16 42) (Z80 42)   11  Family history of Heart Disease (V17 49)    12  Family history of Colon cancer    15   Family history of Diabetes Mellitus (V18 0) 15  Family history of Thyroid Disorder (V18 19)    15  Family history of Cancer   16  Family history of Diabetes Mellitus (V18 0)   17  Family history of Diabetes Mellitus (V18 0)   18  Family history of Diabetes Mellitus (V18 0)   19  Family history of Heart Disease (V17 49)   20  Family history of Heart Disease (V17 49)   21  Family history of Heart Disease (V17 49)    Social History    · Denied: History of Current Every Day Smoker   · Denied: History of Current Smoker   · Denied: History of Drug Use   · Former smoker (V15 82) (O50 893)   · Social alcohol use (F10 99)  Social History Reviewed: The social history was reviewed and updated today  The social history was reviewed and is unchanged  Current Meds   1  Azelastine HCl - 0 1 % Nasal Solution; USE 1 SPRAY IN EACH NOSTRIL TWICE DAILY; Therapy: 89XDD3519 to (Evaluate:14Jan2016)  Requested for: 80PEU0352; Last Rx:00Prp1976   Ordered   2  CeleBREX 200 MG Oral Capsule; Therapy: 08EOD9690 to (Last Rx:14May2011)  Requested for: 30IUD6804 Ordered   3  Lialda 1 2 GM Oral Tablet Delayed Release; take 2 tablet twice daily; Therapy: 18SYK3026 to (Last TO:95WPF3006)  Requested for: 51KJJ4786 Ordered   4  MetFORMIN HCl - 1000 MG Oral Tablet; TAKE 1 TABLET EVERY 12 HOURS DAILY; Therapy: 40Cqr2144 to (Evaluate:57Ovu7137)  Requested for: 09DSI5253; Last Rx:04Jan2016   Ordered   5  Metoclopramide HCl - 5 MG Oral Tablet; 1 or 2 tabs 30 minutes before meals; Therapy: 23HNN5422 to (Last Rx:28Zsy9241)  Requested for: 33Nfi8318 Ordered   6  Multiple Vitamins Oral Tablet Recorded   7  NexIUM 40 MG Oral Capsule Delayed Release Recorded   8  NovoLOG SOLN; uses 120 in pump daily; Therapy: (Recorded:18Ogc3804) to Recorded   9  OneTouch Ultra Blue In Vitro Strip; USE TO TEST 6 TIMES DAILY; Therapy: 89FJF4922 to (Shon Cason)  Requested for: 28PIZ9450; Last Rx:80Uwq4099   Ordered   10  Simvastatin 40 MG Oral Tablet; TAKE 1 TABLET DAILY;     Therapy: 08HOT5049 to (Evaluate:22Ded7262)  Requested for: 23FZV9601; Last Rx:06Hrl2812    Ordered   11  Synthroid 200 MCG Oral Tablet; take 1 tablet every day; Therapy: 92QZY9850 to (Tory Rick)  Requested for: 45HZW7968; Last Rx:52Riy4416    Ordered   12  Vitamin D (Ergocalciferol) 24323 UNIT Oral Capsule; TAKE 1 CAPSULE WEEKLY; Therapy: 49MNH1424 to (Last Rx:07Wtp1396)  Requested for: 87FRF5625 Ordered   13  Zolpidem Tartrate 10 MG Oral Tablet; TAKE 1 TABLET DAILY AT BEDTIME; Therapy: 46Vki5286 to (Evaluate:14Jan2016); Last Rx:63Evh2749 Ordered  Medication List Reviewed: The medication list was reviewed and updated today  Allergies    1  Ibuprofen TABS    2  No Known Environmental Allergies   3  No Known Food Allergies    Vitals  Vital Signs [Data Includes: Current Encounter]    Recorded: 30FJA5867 02:21PM   Temperature 97 9 F, Oral   Heart Rate 70   Systolic 893, LUE, Sitting   Diastolic 76, LUE, Sitting   BP Cuff Size Large   Height 5 ft 9 in   Weight 250 lb 8 oz   BMI Calculated 36 99   BSA Calculated 2 27   O2 Saturation 97     Physical Exam    Constitutional   General appearance: No acute distress, well appearing and well nourished  Eyes   Conjunctiva and lids: No swelling, erythema, or discharge  Pupils and irises: Equal, round and reactive to light  Ears, Nose, Mouth, and Throat   External inspection of ears and nose: Normal     Otoscopic examination: Tympanic membrance translucent with normal light reflex  Canals patent without erythema  Nasal mucosa, septum, and turbinates: Abnormal   Hyperemic and moderately swollen nasal mucosa slightly worse on the left than the right  Evidence of some purulent material posteriorly in the nasal passages  Oropharynx: Normal with no erythema, edema, exudate or lesions  Pulmonary   Respiratory effort: No increased work of breathing or signs of respiratory distress      Auscultation of lungs: Clear to auscultation, equal breath sounds bilaterally, no wheezes, no rales, no rhonci  Cardiovascular   Auscultation of heart: Normal rate and rhythm, normal S1 and S2, without murmurs  Examination of extremities for edema and/or varicosities: Normal     Abdomen   Abdomen: Non-tender, no masses  Musculoskeletal   Gait and station: Normal     Inspection/palpation of joints, bones, and muscles: Normal     Neurologic Grossly no focal deficits  Psychiatric   Orientation to person, place and time: Normal     Mood and affect: Normal          Health Management  History of colon polyps   COLONOSCOPY; every 5 years; Last 28JRL8949; Next Due: 79QLR9951; Active    Future Appointments    Date/Time Provider Specialty Site   02/19/2016 08:50 AM KODAK Razo   Endocrinology [facility]     Signatures   Electronically signed by : Nola Menon MD; Jan 31 2016  9:49PM EST                       (Author)

## 2018-01-10 NOTE — CONSULTS
Chief Complaint  Chief Complaint Free Text Note Form: New patient here for MWM consult; Sydney Russ 5/8;      Past Medical History    1  History of Acute maxillary sinusitis (461 0) (J01 00)   2  History of Arm pain (729 5) (M79 603)   3  History of Colon cancer screening (V76 51) (Z12 11)   4  History of diabetes mellitus (V12 29) (Z86 39)   5  History of insomnia (V13 89) (Z87 898)   6  History of Influenza vaccine needed (V04 81) (Z23)   7  History of Need for prophylactic vaccination and inoculation against influenza (V04 81)   (Z23)   8  History of Night sweats (780 8) (R61)   9  History of Palpitations (785 1) (R00 2)   10  History of Primary hypothyroidism (244 9) (E03 9)   11  History of Screening for prostate cancer (V76 44) (Z12 5)  Active Problems And Past Medical History Reviewed: The active problems and past medical history were reviewed and updated today  Assessment    1  Severe obesity with body mass index (BMI) of 35 0 to 39 9 (278 01) (E66 01)   2  Type I diabetes mellitus, uncontrolled (250 03) (E10 65)   3  Hyperlipidemia (272 4) (E78 5)   4  Hypothyroidism (244 9) (E03 9)   5  GERD (gastroesophageal reflux disease) (530 81) (K21 9)   6  Insulin pump in place (V45 85) (Z96 41)    Plan   1  *1 - [address] Physician Referral  +STOPBANG, insulin resistance  Status:   Hold For - Scheduling  Requested for: 78JRY3833  Care Summary provided  : Yes    Discussion/Summary  Discussion Summary:   64-year-old male with type 1 diabetes on insulin pump, hyperlipidemia, hypothyroidism and morbid obesity here to pursue medical weight management to improve weight and health      Obesity class 2:  -discussed options of conservative vs FUENTES program +/- meal replacement vs bariatric surgery(completed informational session online)  -initial focus of 5-10% weight loss over 3-6 mos for improved health    Diabetes type 1/insulin resistance:  -On insulin pump and metformin  -Follows with endocrinology-  Anabella  -A1c 7 4%    Hyperlipidemia:  -On Crestor    Hypothyroidism:  -Continue with T4 as per prescribing provider  -Managed by endocrinology    +STOPBANG:  -sleep med referral    Patient unsure on how he would like to proceed  He will call the office when he decides and schedule appointment accordingly  Goals:  1  Food log [email or web address]  [email or web address]  2  No sugary beverages  At least 64oz of water daily  3  Increase physical activity by 10 minutes daily  4  1956-4662 calories, see sample menu  Follow up w/ Dr Thiago Cervantes if sugars dropping too low  Patient's Capacity to Self-Care: Patient is able to Self-Care  Bariatric Medicine Diagnostic [place]ellation:   Patient Discussion: 60 minute visit, greater than half of the time was spent on counseling  Counseling spent on benefits of modest 5-10% weight loss with regards to cardiometabolic risk  Described the role bariatric surgery has in insulin resistance, particularly with the RYGB  Although patient is a type I diabetic, discussed the results of the STAMPEDE trial-11year-old outcomes  Discussed other health benefits patient would have by undergoing RYGB particularly as it relates to his reflux, gastroparesis and diabetes improvement(not remission given he is a type I diabetic)  Counseled patient on nonsurgical interventions and possible use of weight loss meds  Understands and agrees with treatment plan: The treatment plan was reviewed with the patient/guardian  The patient/guardian understands and agrees with the treatment plan   Self Referrals:   Self Referrals: No      Active Problems    1  Abdominal bloating (787 3) (R14 0)   2  Anemia, deficiency (281 9) (D53 9)   3  Bilateral lower extremity edema (782 3) (R60 0)   4  Chronic maxillary sinusitis (473 0) (J32 0)   5  Collagenous colitis (558 9) (K52 831)   6  Depression screen (V79 0) (Z13 89)   7  GERD (gastroesophageal reflux disease) (530 81) (K21 9)   8  History of colon polyps (V12 72) (Z86 010)   9  Hyperlipidemia (272 4) (E78 5)   10  Hypothyroidism (244 9) (E03 9)   11  Insomnia (780 52) (G47 00)   12  Insulin pump in place (V45 85) (Z96 41)   13  Iron deficiency anemia (280 9) (D50 9)   14  Severe obesity with body mass index (BMI) of 35 0 to 39 9 (278 01) (E66 01)   15  Shortness of breath (786 05) (R06 02)   16  Sleep apnea (780 57) (G47 30)   17  Type I diabetes mellitus, uncontrolled (250 03) (E10 65)   18  Vitamin D deficiency (268 9) (E55 9)    Surgical History    1  History of Corneal LASIK Bilateral   2  History of Rhinoplasty   3  History of Sinus Surgery   4  History of Wrist Surgery  Surgical History Reviewed: The surgical history was reviewed and updated today  Family History  Mother    1  Denied: Family history of Colon cancer   2  Denied: Family history of Crohn's disease without complication, unspecified   gastrointestinal tract location   3  Denied: Family history of liver disease   4  Family history of Heart Disease (V17 49)   5  Family history of Thyroid Disorder (V18 19)  Father    6  Family history of Cancer   7  Family history of Colon cancer   8  Denied: Family history of Crohn's disease without complication, unspecified   gastrointestinal tract location   9  Denied: Family history of liver disease   10  Family history of malignant neoplasm of prostate (V16 42) (Z80 42)   11  Family history of Heart Disease (V17 49)  Sibling    12  Family history of Colon cancer  Sister    15  Family history of Diabetes Mellitus (V18 0)   14  Family history of Thyroid Disorder (V18 19)  Brother    13  Family history of Cancer   16  Family history of Diabetes Mellitus (V18 0)   17  Family history of Diabetes Mellitus (V18 0)   18  Family history of Diabetes Mellitus (V18 0)   19  Family history of Heart Disease (V17 49)   20  Family history of Heart Disease (V17 49)   21  Family history of Heart Disease (V17 49)  Family History Reviewed: The family history was reviewed and updated today  Social History    · Denied: History of Current Every Day Smoker   · Denied: History of Current Smoker   · Denied: History of Drug Use   · Former smoker (V15 82) (N77 510)   · No drug use   · Social alcohol use (Z78 9)  Social History Reviewed: The social history was reviewed and updated today  Current Meds   1  Azelastine HCl - 0 1 % Nasal Solution; USE 1 SPRAY IN EACH NOSTRIL TWICE DAILY; Therapy: 97LWH3650 to (Last US:55FGK3887)  Requested for: 76Amr7619 Ordered   2  CeleBREX 200 MG Oral Capsule; Therapy: 19XIK9352 to (Last Rx:32Bjq1976)  Requested for: 09BVT1399 Ordered   3  Crestor 10 MG Oral Tablet; TAKE 1 TABLET AT BEDTIME; Therapy: 72DKF4840 to (Evaluate:34Qma9277)  Requested for: 77LBT0228; Last   Rx:24Kyv1520 Ordered   4  Esomeprazole Magnesium 40 MG Oral Capsule Delayed Release; TAKE 1 CAPSULE   DAILY  Requested for: 49QQV6500; Last Rx:43Ndi6215 Ordered   5  MetFORMIN HCl - 1000 MG Oral Tablet; TAKE 1 TABLET EVERY 12 HOURS DAILY; Therapy: 78Khw5851 to (Evaluate:46Zdx2551)  Requested for: 02Zxm9509; Last   Rx:12Fse6451 Ordered   6  Multiple Vitamins Oral Tablet Recorded   7  Naproxen 500 MG Oral Tablet; take 1 tablet by mouth twice daily; Therapy: (Recorded:84Noo4931) to Recorded   8  NovoLOG 100 UNIT/ML Subcutaneous Solution; use 120 in pump daily  Requested for:   84AMR1145; Last Rx:83Loy9331 Ordered   9  OneTouch Ultra 2 w/Device Kit; USE AS DIRECTED; Therapy: 15Tqz5592 to (Last Rx:72Sjk9014)  Requested for: 94Fgn8051 Ordered   10  OneTouch Ultra Blue In Vitro Strip; USE TO TEST 6 TIMES DAILY; Therapy: 39DPZ1470 to (Evaluate:24Jun2017)  Requested for: 29UYI2647; Last    Rx:94Gyg6248 Ordered   11  Synthroid 200 MCG Oral Tablet; take 1 tablet every day; Therapy: 15Ufm4146 to (Evaluate:67Prz9015)  Requested for: 67IIP1610; Last    Rx:20Xon6256 Ordered   12  Vitamin D3 35068 UNIT Oral Capsule; take 1 tab once weekly;     Therapy: 07MXV4234 to (Last Rx:66Qst0337)  Requested for: 05EPA5209 Ordered   13  Zolpidem Tartrate 10 MG Oral Tablet; TAKE 1 TABLET AT BEDTIME; Therapy: 61Jji0324 to (Evaluate:27Apr2017); Last AN:42PVY7805 Ordered  Medication List Reviewed: The medication list was reviewed and updated today  Allergies    1   Ibuprofen TABS    2  Seasonal    Vitals  Vital Signs    Recorded: 47QLZ6148 02:00PM   Temperature 98 F    Heart Rate 76    Respiration 15    Systolic 501    Diastolic 84    Height 5 ft 8 5 in    Weight 254 lb 1 6 oz    BMI Calculated 38 07    BSA Calculated 2 27    Waist Circumference  46   Neck Circumference  17 75     Results/Data  STOP BANG Questionnaire 94VDQ7287 02:11PM User, Ahs     Test Name Result Flag Reference   STOP BANG Questionnaire Risk of FARZANA High Risk     Snoring: Yes  Tired: Yes  Observed: No  Blood Pressure: No  BMI: Yes  Age: No  Neck Circumference: Yes  Gender: Yes   STOP BANG Questionnaire FARZANA Total Score 5     Snoring: Yes  Tired: Yes  Observed: No  Blood Pressure: No  BMI: Yes  Age: No  Neck Circumference: Yes  Gender: Yes       Future Appointments    Date/Time Provider Specialty Site   03/31/2017 12:45 PM Ricardo Anne, [facility] Endocrinology [address]     Signatures   Electronically signed by : KODAK Michaels ; Mar  3 2017  4:03PM EST                       (Author)    Electronically signed by : KODAK Michaels ; Mar  3 2017  4:13PM EST                       (Author)

## 2018-01-12 VITALS
BODY MASS INDEX: 38.21 KG/M2 | SYSTOLIC BLOOD PRESSURE: 132 MMHG | DIASTOLIC BLOOD PRESSURE: 92 MMHG | WEIGHT: 258 LBS | HEART RATE: 72 BPM | HEIGHT: 69 IN

## 2018-01-12 NOTE — RESULT NOTES
Discussion/Summary   A1c 7 0 improved from 8 0  Kidney function normal  Vitamin D level normal, continue D supplements  Verified Results  (1) PTH N-TERMINAL (INTACT) 82ZVH8021 07:14AM [facility] Order Number: [id number]     Test Name Result Flag Reference   PARATHYROID HORMONE INTACT 58 2 pg/mL  14 0-72 0     (1) RENAL FUNCTION PANEL 47EGN5605 07:14AM [facility] Order Number: [id number]     Test Name Result Flag Reference   ANION GAP (CALC) 8 mmol/L  4-13   ALBUMIN 3 7 g/dL  3 5-5 0   BLOOD UREA NITROGEN 16 mg/dL  5-25   CALCIUM 8 7 mg/dL  8 3-10 1   CHLORIDE 104 mmol/L  100-108   CARBON DIOXIDE 28 mmol/L  21-32   CREATININE 0 98 mg/dL  0 60-1 30   Standardized to IDMS reference method   POTASSIUM 4 0 mmol/L  3 5-5 3   SODIUM 140 mmol/L  136-145   PHOSPHORUS 3 0 mg/dL  2 7-4 5   eGFR 89 ml/min/1 73sq m     GLUCOSE FASTING 115 mg/dL H 65-99   Specimen collection should occur prior to Sulfasalazine administration due to the potential for falsely depressed results  Specimen collection should occur prior to Sulfapyridine administration due to the potential for falsely elevated results  Specimen collection should occur prior to Sulfasalazine administration due to the potential for falsely depressed results  Specimen collection should occur prior to Sulfapyridine administration due to the potential for falsely elevated results  National Kidney Disease Education Program recommendations are as follows:  GFR calculation is accurate only with a steady state creatinine  Chronic Kidney disease less than 60 ml/min/1 73 sq  meters  Kidney failure less than 15 ml/min/1 73 sq  meters       (1) VITAMIN D 25-HYDROXY 30Oct2017 07:14AM [facility] Order Number: [id number]     Test Name Result Flag Reference   VIT D 25-HYDROX 55 6 ng/mL  30 0-100 0   This assay is a certified procedure of the CDC Vitamin D Standardization Certification Program (VDSCP)     Deficiency <20ng/ml Insufficiency 20-30ng/ml   Sufficient  ng/ml     *Patients undergoing fluorescein dye angiography may retain small amounts of fluorescein in the body for 48-72 hours post procedure  Samples containing fluorescein can produce falsely elevated Vitamin D values  If the patient had this procedure, a specimen should be resubmitted post fluorescein clearance       (1) MICROALBUMIN CREATININE RATIO, RANDOM URINE 30Oct2017 07:14AM [facility] Huff    Order Number: [id number]     Test Name Result Flag Reference   MICROALBUMIN/ CREAT R <10 mg/g creatinine  0-30   MICROALBUMIN,URINE <5 0 mg/L  0 0-20 0   CREATININE URINE 49 9 mg/dL

## 2018-01-12 NOTE — PROGRESS NOTES
Assessment    1  Chronic maxillary sinusitis (473 0) (J32 0)    Plan  Chronic maxillary sinusitis    · Amoxicillin 875 MG Oral Tablet; TAKE 1 TABLET EVERY 12 HOURS DAILY    Discussion/Summary    F/U with PCP tomorrow regarding high BS and sinus symptoms  Possible side effects of new medications were reviewed with the patient/guardian today  The treatment plan was reviewed with the patient/guardian  The patient/guardian understands and agrees with the treatment plan     Follow Up with your Primary Care Provider in 1 days  History of Present Illness  Pt presents with complain of sinus pressure L>R for 3 days  He is a type I diabetic and his BS have been high for the past two days which he states is typical when he has an infection  He does have an insulin pump and continuous glucose monitor  He denies allergy sx, cough, fever or chills  No Cp, sob, palpitations, dizziness, lightheadedness or syncope  He does have a hx of recurrent sinusitis      Review of Systems    Constitutional: no fever or chills, feels well, no tiredness, no recent weight loss or weight gain  ENT: as noted in HPI  Cardiovascular: no complaints of slow or fast heart rate, no chest pain, no palpitations, no leg claudication or lower extremity edema  Respiratory: no complaints of shortness of breath, no wheezing or cough, no dyspnea on exertion, no orthopnea or PND  Active Problems    1  Abdominal bloating (787 3) (R14 0)   2  Anemia, deficiency (281 9) (D53 9)   3  Bilateral lower extremity edema (782 3) (R60 0)   4  Chronic maxillary sinusitis (473 0) (J32 0)   5  Collagenous colitis (558 9) (K52 831)   6  Depression screen (V79 0) (Z13 89)   7  GERD (gastroesophageal reflux disease) (530 81) (K21 9)   8  History of colon polyps (V12 72) (Z86 010)   9  Hyperlipidemia (272 4) (E78 5)   10  Hypothyroidism (244 9) (E03 9)   11  Insomnia (780 52) (G47 00)   12  Insulin pump in place (V45 85) (Z96 41)   13   Iron deficiency anemia (280 9) (D50 9)   14  Severe obesity with body mass index (BMI) of 35 0 to 39 9 (278 01) (E66 01)   15  Shortness of breath (786 05) (R06 02)   16  Sleep apnea (780 57) (G47 30)   17  Type 1 diabetes mellitus with hyperglycemia, with long-term current use of insulin    (250 01,790 29) (E10 65)   18  Type I diabetes mellitus, uncontrolled (250 03) (E10 65)   19  Vitamin D deficiency (268 9) (E55 9)    Past Medical History    1  History of Acute maxillary sinusitis (461 0) (J01 00)   2  History of Arm pain (729 5) (M79 603)   3  History of Colon cancer screening (V76 51) (Z12 11)   4  History of diabetes mellitus (V12 29) (Z86 39)   5  History of insomnia (V13 89) (Z87 898)   6  History of Influenza vaccine needed (V04 81) (Z23)   7  History of Need for prophylactic vaccination and inoculation against influenza (V04 81)   (Z23)   8  History of Night sweats (780 8) (R61)   9  History of Palpitations (785 1) (R00 2)   10  History of Primary hypothyroidism (244 9) (E03 9)   11  History of Screening for prostate cancer (V76 44) (Z12 5)    Family History  Mother    1  Denied: Family history of Colon cancer   2  Denied: Family history of Crohn's disease without complication, unspecified   gastrointestinal tract location   3  Denied: Family history of liver disease   4  Family history of Heart Disease (V17 49)   5  Family history of Thyroid Disorder (V18 19)  Father    6  Family history of Cancer   7  Family history of Colon cancer   8  Denied: Family history of Crohn's disease without complication, unspecified   gastrointestinal tract location   9  Denied: Family history of liver disease   10  Family history of malignant neoplasm of prostate (V16 42) (Z80 42)   11  Family history of Heart Disease (V17 49)  Sibling    12  Family history of Colon cancer  Sister    15  Family history of Diabetes Mellitus (V18 0)   14  Family history of Thyroid Disorder (V18 19)  Brother    13  Family history of Cancer   16   Family history of Diabetes Mellitus (V18 0)   17  Family history of Diabetes Mellitus (V18 0)   18  Family history of Diabetes Mellitus (V18 0)   19  Family history of Heart Disease (V17 49)   20  Family history of Heart Disease (V17 49)   21  Family history of Heart Disease (V17 49)    Social History    · Denied: History of Current Every Day Smoker   · Denied: History of Current Smoker   · Denied: History of Drug Use   · Former smoker (V15 82) (E89 122)   · No drug use   · Social alcohol use (Z78 9)    Surgical History    1  History of Corneal LASIK Bilateral   2  History of Rhinoplasty   3  History of Rotator Cuff Repair   4  History of Sinus Surgery   5  History of Wrist Surgery    Current Meds   1  Azelastine HCl - 0 1 % Nasal Solution; USE 1 SPRAY IN EACH NOSTRIL TWICE DAILY; Therapy: 92FDA3819 to (Last IR:52PYR0244)  Requested for: 27Jan2017 Ordered   2  CeleBREX 200 MG Oral Capsule (Celecoxib); Therapy: 47PWL5858 to (Last Rx:44Edt5964)  Requested for: 50ZQO5156 Ordered   3  Crestor 10 MG Oral Tablet (Rosuvastatin Calcium); TAKE 1 TABLET AT BEDTIME; Therapy: 91QRM0257 to (96 386289)  Requested for: 81Lcp2108; Last   Rx:28Afd4567 Ordered   4  Esomeprazole Magnesium 40 MG Oral Capsule Delayed Release (NexIUM); TAKE 1   CAPSULE DAILY  Requested for: 46PDK5974; Last Rx:77Suf6003 Ordered   5  MetFORMIN HCl - 1000 MG Oral Tablet; TAKE 1 TABLET EVERY 12 HOURS DAILY; Therapy: 39Jnv0870 to (Evaluate:94Vcn7014)  Requested for: 26JFH0914; Last   Rx:57Tsg2302 Ordered   6  Multiple Vitamins Oral Tablet Recorded   7  Naproxen 500 MG Oral Tablet; take 1 tablet by mouth twice daily; Therapy: (Recorded:37Pyi4663) to Recorded   8  NovoLOG 100 UNIT/ML Subcutaneous Solution; use 120 in pump daily  Requested for:   44RHN7275; Last Rx:36Fog6204 Ordered   9  OneTouch Ultra 2 w/Device Kit; USE AS DIRECTED; Therapy: 29Qch0361 to (Last Rx:91Ebg0106)  Requested for: 45Vtu5023 Ordered   10   OneTouch Ultra Blue In [employer]; USE TO TEST 6 TIMES DAILY; Therapy: 71DQT9311 to (Evaluate:24Jun2017)  Requested for: 34FWU0712; Last    Rx:29Jun2016 Ordered   11  Synthroid 200 MCG Oral Tablet (Levothyroxine Sodium); take 1 tablet every day; Therapy: 20Mar2012 to (Evaluate:80Qpa3835)  Requested for: 23Cth0590; Last    Rx:31Mar2017; Status: ACTIVE - Renewal Denied Ordered   12  Vitamin D3 99979 UNIT Oral Capsule; take 1 tab once weekly; Therapy: 99JZZ6208 to (Last Rx:01Sxo6745)  Requested for: 60TWJ2767 Ordered   13  Zolpidem Tartrate 10 MG Oral Tablet; TAKE 1 TABLET AT BEDTIME; Therapy: 24Apr2015 to (Evaluate:27Apr2017); Last Rx:27Jan2017 Ordered    Allergies    1   Ibuprofen TABS    2  Seasonal    Observations Made  NAD , non-toxic appearing  TTP max and frontal sinuses L>R  pharynx appears clear  no self TTP A/C nodes      Future Appointments    Date/Time Provider Specialty Site   07/24/2017 07:45 AM Krystal Tan, [address] [facility]     Signatures   Electronically signed by : Ambar Maher DO; Jul 4 2017  9:24AM EST                       (Author)

## 2018-01-13 VITALS
OXYGEN SATURATION: 98 % | HEIGHT: 68 IN | RESPIRATION RATE: 16 BRPM | SYSTOLIC BLOOD PRESSURE: 132 MMHG | HEART RATE: 72 BPM | DIASTOLIC BLOOD PRESSURE: 80 MMHG | WEIGHT: 256.6 LBS | BODY MASS INDEX: 38.89 KG/M2 | TEMPERATURE: 97.9 F

## 2018-01-13 VITALS
SYSTOLIC BLOOD PRESSURE: 138 MMHG | BODY MASS INDEX: 38.36 KG/M2 | WEIGHT: 259 LBS | DIASTOLIC BLOOD PRESSURE: 84 MMHG | HEART RATE: 92 BPM | HEIGHT: 69 IN

## 2018-01-13 NOTE — RESULT NOTES
Discussion/Summary   please inform pt that a1c is 7 %, thanks      Verified Results  (1) HEMOGLOBIN A1C 30Oct2017 07:14AM Anette Bussin Order Number: [id number]     Test Name Result Flag Reference   HEMOGLOBIN A1C 7 0 % H 4 2-6 3   EST  AVG   GLUCOSE 154 mg/dl

## 2018-01-13 NOTE — RESULT NOTES
Message   Please inform patient that CAT scan was normal except for some mild diverticulosis  Small kidney stones were also seen, he should follow up with his family doctor for these  Please find out how he is doing on the current medication regimen  Keep his next office follow-up  Verified Results  * CT ABDOMEN PELVIS W CONTRAST [id number] 08:30AM Nevelyn Holstein TW Order Number: [id number]     Test Name Result Flag Reference   CT ABDOMEN PELVIS W CONTRAST (Report)     CT ABDOMEN AND PELVIS WITH IV CONTRAST     INDICATION: Upper abdominal pain  Abdominal gaseous distention  COMPARISON: None  TECHNIQUE: CT examination of the abdomen and pelvis was performed after the administration of intravenous contrast  This examination, like all CT scans performed in [facility], was performed utilizing techniques to minimize    radiation dose exposure, including the use of iterative reconstruction and automated exposure control  Axial, sagittal, and coronal reformatted images were submitted for interpretation  100 cc of intravenous Omnipaque 350 was administered for this    examination  Enteric contrast was given      FINDINGS:     ABDOMEN     LOWER CHEST: No significant abnormalities identified in the lower chest      LIVER/BILIARY TREE: Unremarkable  GALLBLADDER: No calcified gallstones  No pericholecystic inflammatory change  SPLEEN: Unremarkable  PANCREAS: Unremarkable  ADRENAL GLANDS: Unremarkable  KIDNEYS/URETERS: There are several right kidney upper pole punctate calculi, measuring less than 3 mm in size  There is a left lower pole 3 mm calculus  Subcentimeter hypodense nodule right kidney lateral cortex, too small fracture characterization  No hydronephrosis or perinephric inflammation     STOMACH AND BOWEL: Colonic diverticulosis  No acute inflammatory change  No evidence of bowel obstruction  The bowel is nondistended       APPENDIX: A normal appendix was visualized  ABDOMINOPELVIC CAVITY: No ascites or free intraperitoneal air  No lymphadenopathy  VESSELS: Unremarkable for patient's age  PELVIS     REPRODUCTIVE ORGANS: Unremarkable for patient's age  URINARY BLADDER: Unremarkable  ABDOMINAL WALL/INGUINAL REGIONS: Unremarkable  OSSEOUS STRUCTURES: No acute fracture or destructive osseous lesion  IMPRESSION:       1  Sigmoid diverticulosis without evidence of acute inflammatory change  There is no evidence of bowel distention  2  There are no acute inflammatory changes throughout the abdomen or pelvis  3  Small bilateral nonobstructing renal calculi  This patient received intravenous contrast for this examination and also takes metformin or metformin containing product  Research has shown (Ref: ACR manual of contrast media version 7, 2010) that in patients without renal dysfunction or comorbidities    (including liver dysfunction, alcohol abuse, or significant acute illness) the complication of lactic acidosis is virtually nonexistent  Because the patient's laboratory evaluation indicates the eGFR of greater than 45 and the patient denied the    comorbidities listed above, the patient was permitted to continue metformin therapy  Workstation performed: [id number]     Signed by:   Ruthy Marley MD   3/3/16   100     (1) BUN [id number] 12:29PM Deborah Velázquez Order Number: [id number]     Test Name Result Flag Reference   BLOOD UREA NITROGEN 14 mg/dL  5-25     (1) CREATININE 79MCJ3320 12:29PM Eren HANSEN Order Number: [id number]      National Kidney Disease Education Program recommendations are as follows:  GFR calculation is accurate only with a steady state creatinine  Chronic Kidney disease less than 60 ml/min/1 73 sq  meters  Kidney failure less than 15 ml/min/1 73 sq  meters       Test Name Result Flag Reference   CREATININE 0 94 mg/dL  0 60-1 30   Standardized to IDMS reference method   eGFR Non- American      >60 0 ml/min/1 73sq m       Signatures   Electronically signed by : KODAK Mccabe ; Mar  6 2016 10:24PM EST                       (Author)

## 2018-01-14 VITALS
TEMPERATURE: 98 F | BODY MASS INDEX: 37.64 KG/M2 | DIASTOLIC BLOOD PRESSURE: 84 MMHG | RESPIRATION RATE: 15 BRPM | SYSTOLIC BLOOD PRESSURE: 134 MMHG | WEIGHT: 254.1 LBS | HEART RATE: 76 BPM | HEIGHT: 69 IN

## 2018-01-14 VITALS
HEIGHT: 69 IN | DIASTOLIC BLOOD PRESSURE: 82 MMHG | SYSTOLIC BLOOD PRESSURE: 126 MMHG | HEART RATE: 76 BPM | WEIGHT: 254 LBS | BODY MASS INDEX: 37.62 KG/M2

## 2018-01-14 VITALS
BODY MASS INDEX: 37.83 KG/M2 | RESPIRATION RATE: 14 BRPM | OXYGEN SATURATION: 98 % | HEART RATE: 72 BPM | DIASTOLIC BLOOD PRESSURE: 84 MMHG | TEMPERATURE: 96.6 F | HEIGHT: 69 IN | WEIGHT: 255.4 LBS | SYSTOLIC BLOOD PRESSURE: 126 MMHG

## 2018-01-14 NOTE — PROGRESS NOTES
Assessment    1  Abdominal bloating (787 3) (R14 0)   2  Collagenous colitis (558 9) (K52 89)   3  Diabetic gastroparesis (250 60,536 3) (T11 99,G61 65)    Plan  Abdominal bloating    · * CT ABDOMEN PELVIS W CONTRAST; Status:Need Information - Financial Authorization; Requested for:62Bmj6628;    Perform:[facility]; RTJ:04QRN1586;SHYNXZB; For:Abdominal bloating; Ordered By:Gianni Xie;    Discussion/Summary  Discussion Summary: This is a pleasant 46-year-old, here for follow-up of his abdominal bloating  #1 abdominal bloating: Unclear if this is due to his gastroparesis, though he doesn't have typical nausea and vomiting  It may also be due to collagenous colitis though an atypical symptom  I recommended that he try to eat small frequent meals and he will not pack meals with him when he travels  He is continuing to take a probiotic   -We'll check a CAT scan to exclude any other significant pathology  -Recommended that he try the Lialda   -He was given a written prescription for domperidone, if in 4 weeks he is not better that he should fill this prescription    #2 gastroparesis: Recommended that he again try small frequent meals, he did not improve with metoclopramide   -As mentioned above we'll have him try domperidone in one month's time if not better    #3 collagenous colitis: Seen on multiple biopsies throughout his colon  Typically would be having diarrhea but he is not  -empirical trial of Lialda 2 tablets daily  Chief Complaint  Chief Complaint Free Text Note Form: Follow-up from recent endoscopy      History of Present Illness  HPI: As you know this is a pleasant 46-year-old gentleman with a history of diabetes, diagnosed with gastroparesis in November of this past year  Presents with abdominal bloating and some change in bowel movements, typified by constipation  He had an endoscopic evaluation, the biopsies clearly showed collagenous colitis however he does not have any diarrhea  His weight has been stable  He reports that his blood sugars are improving  He does have a very irregular diet due to frequent traveling  He continues to have significant abdominal bloating and distention  He's had difficulty eating small frequent meals and grazing as of this has affected his blood sugars so he ends up eating very irregular meals  He denies any melena, he has intermittent rectal bleeding which was related to hemorrhoids seen on his examination  Review of Systems  Complete-Male GI Adult: Other Symptoms: The remainder of the ten ROS was negative  ROS Reviewed:   ROS reviewed  Active Problems    1  Chronic maxillary sinusitis (473 0) (J32 0)   2  Collagenous colitis (558 9) (K52 89)   3  Diabetic gastroparesis (250 60,536 3) (P79 21,N60 31)   4  GERD (gastroesophageal reflux disease) (530 81) (K21 9)   5  History of colon polyps (V12 72) (Z86 010)   6  Hyperlipidemia (272 4) (E78 5)   7  Hypothyroidism (244 9) (E03 9)   8  Insomnia (780 52) (G47 00)   9  Sleep apnea (780 57) (G47 30)   10  Type I diabetes mellitus, uncontrolled (250 03) (E10 65)   11  Vitamin D deficiency (268 9) (E55 9)    Past Medical History    1  History of Arm pain (729 5) (M79 603)   2  History of Colon cancer screening (V76 51) (Z12 11)   3  History of diabetes mellitus (V12 29) (Z86 39)   4  History of insomnia (V13 89) (Z87 898)   5  History of Influenza vaccine needed (V04 81) (Z23)   6  History of Need for prophylactic vaccination and inoculation against influenza (V04 81)   (Z23)   7  History of Night sweats (780 8) (R61)   8  History of Palpitations (785 1) (R00 2)   9  History of Primary hypothyroidism (244 9) (E03 9)   10  History of Screening for prostate cancer (V76 44) (Z12 5)  Active Problems And Past Medical History Reviewed: The active problems and past medical history were reviewed and updated today  Surgical History    1  History of Corneal LASIK Bilateral   2  History of Rhinoplasty   3  History of Sinus Surgery   4  History of Wrist Surgery  Surgical History Reviewed: The surgical history was reviewed and updated today  Family History    1  Denied: Family history of Colon cancer   2  Denied: Family history of Crohn's disease without complication, unspecified   gastrointestinal tract location   3  Denied: Family history of liver disease   4  Family history of Heart Disease (V17 49)   5  Family history of Thyroid Disorder (V18 19)    6  Family history of Cancer   7  Family history of Colon cancer   8  Denied: Family history of Crohn's disease without complication, unspecified   gastrointestinal tract location   9  Denied: Family history of liver disease   10  Family history of malignant neoplasm of prostate (V16 42) (Z80 42)   11  Family history of Heart Disease (V17 49)    12  Family history of Colon cancer    15  Family history of Diabetes Mellitus (V18 0)   14  Family history of Thyroid Disorder (V18 19)    15  Family history of Cancer   16  Family history of Diabetes Mellitus (V18 0)   17  Family history of Diabetes Mellitus (V18 0)   18  Family history of Diabetes Mellitus (V18 0)   19  Family history of Heart Disease (V17 49)   20  Family history of Heart Disease (V17 49)   21  Family history of Heart Disease (V17 49)  Family History Reviewed: The family history was reviewed and updated today  Social History    · Denied: History of Current Every Day Smoker   · Denied: History of Current Smoker   · Denied: History of Drug Use   · Former smoker (V15 82) (L03 342)   · Social alcohol use (F10 99)  Social History Reviewed: The social history was reviewed and updated today  Current Meds   1  Amoxicillin 500 MG Oral Tablet; TAKE 1 TABLET EVERY 8 HOURS DAILY; Therapy: 53KIU0818 to (Evaluate:18Feb2016)  Requested for: 14FBS2345; Last   Rx:29Jan2016 Ordered   2  Azelastine HCl - 0 1 % Nasal Solution; USE 1 SPRAY IN EACH NOSTRIL TWICE DAILY;    Therapy: 54VTI8055 to (Evaluate:14Jan2016)  Requested for: 69THH3391; Last   Rx:85Ajl3405 Ordered   3  CeleBREX 200 MG Oral Capsule; Therapy: 58OZZ0307 to (Last Rx:42Puc0041)  Requested for: 96PFG4804 Ordered   4  Lialda 1 2 GM Oral Tablet Delayed Release; take 2 tablet twice daily; Therapy: 90QAO6449 to (Last JENNIFER:23MTB7535)  Requested for: 84WEP9570 Ordered   5  MetFORMIN HCl - 1000 MG Oral Tablet; TAKE 1 TABLET EVERY 12 HOURS DAILY; Therapy: 05Jqn2331 to (Evaluate:29Dtv1827)  Requested for: 41QLZ6981; Last   Rx:04Jan2016 Ordered   6  Metoclopramide HCl - 5 MG Oral Tablet; 1 or 2 tabs 30 minutes before meals; Therapy: 73AGF0824 to (Last Rx:16Ibw9690)  Requested for: 68Ylf6559 Ordered   7  Multiple Vitamins Oral Tablet Recorded   8  NexIUM 40 MG Oral Capsule Delayed Release Recorded   9  NovoLOG SOLN; uses 120 in pump daily; Therapy: (Recorded:66Abp0359) to Recorded   10  OneTouch Ultra Blue In Vitro Strip; USE TO TEST 6 TIMES DAILY; Therapy: 20CHF4657 to (Prashant Malhotra)  Requested for: 44FQV5787; Last    Rx:59Eat9833 Ordered   11  Simvastatin 40 MG Oral Tablet; TAKE 1 TABLET DAILY; Therapy: 44CGK9150 to (Evaluate:32Riz1914)  Requested for: 27Wct4326; Last    Rx:98Iyl9347 Ordered   12  Synthroid 200 MCG Oral Tablet; take 1 tablet every day; Therapy: 19HPG5042 to (Neftali Maguire)  Requested for: 60THD0437; Last    Rx:83Tru7905 Ordered   13  Vitamin D (Ergocalciferol) 16052 UNIT Oral Capsule; TAKE 1 CAPSULE WEEKLY; Therapy: 83VCT4301 to (Last Rx:53Wuq6328)  Requested for: 70GAS4040 Ordered   14  Zolpidem Tartrate 10 MG Oral Tablet; TAKE 1 TABLET DAILY AT BEDTIME; Therapy: 86Fqq9673 to (Evaluate:28Apr2016); Last Rx:29Jan2016 Ordered  Medication List Reviewed: The medication list was reviewed and updated today  Allergies    1  Ibuprofen TABS    2  No Known Environmental Allergies   3   No Known Food Allergies    Vitals  Vital Signs [Data Includes: Current Encounter]    Recorded: [id number] 08:37AM Heart Rate 72, R Radial    Pulse Quality Normal, R Radial    Respiration 18    Respiration Quality Normal    Blood Pressure 78 mm Hg, RUE, Supine 124 mm Hg, RUE, Supine   Height 5 ft 9 in    Weight 250 lb     BMI Calculated 36 92 kg/m2    BSA Calculated 2 27 m2    O2 Saturation 98      Physical Exam   Gen  : Well-nourished well-developed, no acute distress  HEENT: Anicteric, moist mucous membranes, no cervical or supraclavicular lymphadenopathy  Cardiovascular: Regular rate and rhythm, no murmurs rubs or gallops  Pulmonary: Clear to auscultation bilaterally  Abdomen: Soft, nontender, nondistended  No hepatosplenomegaly  Bowel sounds present and regular  Extremities: No cyanosis, clubbing, or edema,  Skin: No rashes  Neuro: Alert, awake, oriented x3         Health Management  History of colon polyps   COLONOSCOPY; every 5 years; Last 83GFE6502; Next Due: 60KRV0701; Active    Future Appointments    Date/Time Provider Specialty Site   02/19/2016 08:50 AM KODAK Carlos   Endocrinology [facility] ENDOCRINOLOGY [facility]     Signatures   Electronically signed by : KODAK Fowler ; Feb 2 2016  9:16AM EST                       (Author)

## 2018-01-15 NOTE — MISCELLANEOUS
Message   Recorded as Task   Date: 05/27/2016 01:43 PM, Created By: Kelli Dukes   Task Name: Call Back   Assigned To: [facility] pa,team   Regarding Patient: Priscilla Szymanski, Status: In Progress   Comment:    Diya Rosen - 27 May 2016 1:43 PM     TASK CREATED  Caller: Self; [phone] (Home); [phone] (Work)  Patient was advised by Dr Reddy Getting office to discuss lab results  Patient said his iron is low   Patty Pereira - 27 May 2016 3:43 PM     TASK IN PROGRESS   Patty Pereira - 27 May 2016 3:50 PM     TASK EDITED  Spoke to pt  Labs reviewed  Hgb 5/27/16 11 0, 5/24/16 10 9, 11/2014 15 7  Pt denies any blood or dark colored stool  Will check iron, ferritin, B12, folate, and heme occult  Hgb is stable at this time  Follow up after testing  Plan  Anemia, deficiency    · (1) FERRITIN; Status:Active; Requested for:20Aeo5725;    · (1) IRON; Status:Active; Requested for:54Ihj2185;    · (1) TIBC; Status:Active; Requested for:71Oaf5361;    · (Q) VITAMIN B12/FOLATE, SERUM PANEL; Status:Active; Requested for:46Jix1985;    · Hemoccult Screening - POC; Status:Active;  Requested for:13Fej4902;     Signatures   Electronically signed by : ANN Crum; May 27 2016  3:50PM EST                       (Author)

## 2018-01-16 NOTE — PROGRESS NOTES
Assessment   1  Anemia, deficiency (281 9) (D53 9)  2  Depression screen (V79 0) (Z13 89)    Plan  Anemia, deficiency    · (1) CBC/PLT/DIFF; Status:Active; Requested AQS:[id number];    · (1) IRON; Status:Active; Requested SHT:[id number];   Depression screen    · *VB-Depression Screening; Status:Complete;   Done: 44UWI1117 08:38AM   · *VB-Depression Screening ; every 1 year; Last 20Jun2016; Next 20Jun2017;  [address] Maintenance    · SNELLEN VISION- POC; Status:Complete;   Done: 20Jun2016 09:29AM  Need for TD vaccine    · Temporarily Stop: Tetanus-Diphtheria Toxoids Td 2-2 LF/0 5ML Intramuscular  Suspension  Need for Tdap vaccination    · Temporarily Stop: Adacel 5-2-15 5 LF-MCG/0 5 Intramuscular Suspension    Discussion/Summary  Impression: health maintenance visit  Currently, he eats a healthy diet, eats an adequate diet and eats a poor diet  Colorectal cancer screening: the risks and benefits of colorectal cancer screening were discussed, colorectal cancer screening is current and fecal occult blood testing supplies were given  He was advised to be evaluated by an ophthalmologist  Advice and education were given regarding nutrition, aerobic exercise, calcium supplements, vitamin D supplements, reproductive health, cardiovascular risk reduction, tobacco cessation, alcohol use, sunscreen use and self skin examination  Patient discussion: discussed with the patient  For anemia: Hemoccult cards given to patient, please bring to office to be tested  Recheck labs in mid July  If heme+ will need to see GI for possible re-scope  Continue iron twice per day, add Vitamin C 500mg twice per day  Physical done for boy , form filled out  Advise caution with exertional activities  Follow up in 3 months with Dr BAJWA for anemia and preventative care  Possible side effects of new medications were reviewed with the patient/guardian today     The patient was counseled regarding diagnostic results, instructions for management, risk factor reductions, prognosis, patient and family education, impressions, risks and benefits of treatment options, importance of compliance with treatment  Chief Complaint  Pt is here for a physical for boy [place]  Review stress and echo  Review labs  [place]: R 20/30, L 20/40, B 20/25  1        1 Amended By: Beatrice Man; Jun 20 2016 1:10 PM EST    History of Present Illness  HPI: Pt is here for physical for boy  camp  He still continues to have exertional SOB, none with walking but does get it with exertional activities like walking up a flight of stairs  No dizziness or syncope  Has exercise ST, no evidence of ischemia, was able to exercise to stage 3 and for 10 minutes on treadmill  Echo reviewed, LVEF 60%  Vascular eval of lower extremities for edema, showed incompetence of veins  Edema is resolved but sometimes get pain in legs with activities  Had anemia on recent labs, iron level 19, was started on PO iron BID, NO blood in stool  last colonoscopy 1/2016  Does follow with Dr Nisha Lee  DM1 managed by endo with insulin pump  Anemia (Follow-Up): The patient is being seen for follow-up of iron-deficiency anemia  The patient reports no change in the condition  There are no comorbid illnesses  He has had no significant interval events  Interval symptoms:  denies fatigue, denies weakness, stable shortness of breath, denies lightheadedness and denies pallor  Associated symptoms: no palpitations and no chest pain  Medications include oral iron supplement  Due for: hemoglobin  Review of Systems    Constitutional: no fever, not feeling poorly, no chills and not feeling tired  Eyes: no eye pain, no eyesight problems, no dryness of the eyes and no purulent discharge from the eyes  ENT: no earache, no nosebleeds, no sore throat and no nasal discharge  Cardiovascular: palpitations, but no chest pain and no intermittent leg claudication  Respiratory: shortness of breath during exertion, but no shortness of breath, no cough, no orthopnea, no wheezing and no PND  Gastrointestinal: no abdominal pain, no nausea, no vomiting, no diarrhea and no blood in stools  Genitourinary: no dysuria  Musculoskeletal: no arthralgias and no myalgias  Integumentary: no rashes  Neurological: no headache, no numbness, no dizziness and no fainting  Hematologic/Lymphatic: no swollen glands, no tendency for easy bleeding and no tendency for easy bruising  Over the past 2 weeks, how often have you been bothered by the following problems? 1 ) Little interest or pleasure in doing things? Not at all    2 ) Feeling down, depressed or hopeless? Not at all    3 ) Trouble falling asleep or sleeping too much? Several days  4 ) Feeling tired or having little energy? Several days  5 ) Poor appetite or overeating? Not at all    6 ) Feeling bad about yourself, or that you are a failure, or have let yourself or your family down? Not at all    7 ) Trouble concentrating on things, such as reading a newspaper or watching television? Not at all    8 ) Moving or speaking so slowly that other people could have noticed, or the opposite, moving or speaking faster than usual? Not at all  How difficult have these problems made it for you to do your work, take care of things at home, or get along with people? Not at all  Score 2      Active Problems   1  Abdominal bloating (787 3) (R14 0)  2  Anemia, deficiency (281 9) (D53 9)  3  Bilateral lower extremity edema (782 3) (R60 0)  4  Chronic maxillary sinusitis (473 0) (J32 0)  5  Collagenous colitis (558 9) (K52 89)  6  Diabetic gastroparesis (250 60,536 3) (H02 98,F86 91)  7  GERD (gastroesophageal reflux disease) (530 81) (K21 9)  8  History of colon polyps (V12 72) (Z86 010)  9  Hyperlipidemia (272 4) (E78 5)  10  Hypothyroidism (244 9) (E03 9)  11  Insomnia (780 52) (G47 00)  12   Iron deficiency anemia (280 9) (D50 9)  13  Shortness of breath (786 05) (R06 02)  14  Sleep apnea (780 57) (G47 30)  15  Type I diabetes mellitus, uncontrolled (250 03) (E10 65)  16   Vitamin D deficiency (268 9) (E55 9)    Past Medical History    · History of Arm pain (729 5) (M79 603)   · History of Colon cancer screening (V76 51) (Z12 11)   · History of diabetes mellitus (V12 29) (Z86 39)   · History of insomnia (V13 89) (D78 112)   · History of Influenza vaccine needed (V04 81) (Z23)   · History of Need for prophylactic vaccination and inoculation against influenza (V04 81)  (Z23)   · History of Night sweats (780 8) (R61)   · History of Palpitations (785 1) (R00 2)   · History of Primary hypothyroidism (244 9) (E03 9)   · History of Screening for prostate cancer (V76 44) (Z12 5)    Surgical History    · History of Corneal LASIK Bilateral   · History of Rhinoplasty   · History of Sinus Surgery   · History of Wrist Surgery    Family History  Mother    · Denied: Family history of Colon cancer   · Denied: Family history of Crohn's disease without complication, unspecified  gastrointestinal tract location   · Denied: Family history of liver disease   · Family history of Heart Disease (V17 49)   · Family history of Thyroid Disorder (V18 19)  Father    · Family history of Cancer   · Family history of Colon cancer   · Denied: Family history of Crohn's disease without complication, unspecified  gastrointestinal tract location   · Denied: Family history of liver disease   · Family history of malignant neoplasm of prostate (V16 42) (Z80 42)   · Family history of Heart Disease (V17 49)  Sibling    · Family history of Colon cancer  Sister    · Family history of Diabetes Mellitus (V18 0)   · Family history of Thyroid Disorder (V18 19)  Brother    · Family history of Cancer   · Family history of Diabetes Mellitus (V18 0)   · Family history of Diabetes Mellitus (V18 0)   · Family history of Diabetes Mellitus (V18 0)   · Family history of Heart Disease (V17 49)   · Family history of Heart Disease (V17 49)   · Family history of Heart Disease (V17 49)    Social History    · Denied: History of Current Every Day Smoker   · Denied: History of Current Smoker   · Denied: History of Drug Use   · Former smoker (V15 82) (M29 262)   · No drug use   · Social alcohol use (Z78 9)    Current Meds  1  Azelastine HCl - 0 1 % Nasal Solution; USE 1 SPRAY IN EACH NOSTRIL TWICE DAILY; Therapy: 25VYC4624 to (Last Rx:16May2016)  Requested for: 64FZZ0565 Ordered  2  CeleBREX 200 MG Oral Capsule; Therapy: 31XDX7806 to (Last Rx:14May2011)  Requested for: 73ICS6072 Ordered  3  Crestor 10 MG Oral Tablet; TAKE 1 TABLET AT BEDTIME; Therapy: 47BGF3923 to (Evaluate:17Aug2016)  Requested for: 78QLT5996; Last   Rx:98Xov7276 Ordered  4  Esomeprazole Magnesium 40 MG Oral Capsule Delayed Release; TAKE 1 CAPSULE   DAILY  Requested for: 47CNH9153; Last Rx:16May2016 Ordered  5  Iron 325 (65 Fe) MG Oral Tablet; TAKE 1 TABLET TWICE DAILY; Therapy: (Claudia Lechuga) to Recorded  6  Lialda 1 2 GM Oral Tablet Delayed Release; take 2 tablet twice daily; Therapy: 86ZQN4563 to (Last Rx:23Jan2016)  Requested for: 75WOT6624 Ordered  7  MetFORMIN HCl - 1000 MG Oral Tablet; TAKE 1 TABLET EVERY 12 HOURS DAILY; Therapy: 14Mcv3753 to (Adalberto Chi)  Requested for: 44GBY4387; Last   Rx:69Eya8144 Ordered  8  Multiple Vitamins Oral Tablet Recorded  9  NovoLOG SOLN; uses 120 in pump daily; Therapy: (Recorded:32Lxt7495) to Recorded  10  OneTouch Ultra Blue In Vitro Strip; USE TO TEST 6 TIMES DAILY; Therapy: 14UJF6534 to (Nidia Shrestha)  Requested for: 78BLH7583; Last    Rx:28May2015 Ordered  11  Synthroid 200 MCG Oral Tablet; take 1 tablet every day; Therapy: 55JVO5657 to (Skipper Pro)  Requested for: 28PKE0556; Last    Rx:13Nov2015 Ordered  12  Vitamin D CAPS; Therapy: (Recorded:20Jun2016) to Recorded  13   Zolpidem Tartrate 10 MG Oral Tablet; TAKE 1 TABLET AT BEDTIME; Therapy: 24Apr2015 to (Evaluate:71Spz6006); Last Rx:62Gsx8714 Ordered    Allergies   1  Ibuprofen TABS   2  Seasonal    Vitals   Recorded: 20Jun2016 08:48AM   Temperature 97 6 F, Oral   Heart Rate 64   Systolic 783, LUE, Sitting   Diastolic 72, LUE, Sitting   BP Cuff Size Large   Height 5 ft 8 5 in   Weight 251 lb 8 oz   BMI Calculated 37 68   BSA Calculated 2 26   O2 Saturation 97, RA     Physical Exam    Constitutional   General appearance: No acute distress, well appearing and well nourished  Head and Face   Head and face: Normal     Palpation of the face and sinuses: No sinus tenderness  Eyes   Conjunctiva and lids: No erythema, swelling or discharge  Ears, Nose, Mouth, and Throat   External inspection of ears and nose: Normal     Otoscopic examination: Tympanic membranes translucent with normal light reflex  Canals patent without erythema  Hearing: Normal   normal spoken word hearing intact  no pallor noted on MM  Oropharynx: Normal with no erythema, edema, exudate or lesions  MMM  Neck   Neck: Supple, symmetric, trachea midline, no masses  Pulmonary   Respiratory effort: No increased work of breathing or signs of respiratory distress  Percussion of chest: Normal     Auscultation of lungs: Clear to auscultation  Cardiovascular   Auscultation of heart: Normal rate and rhythm, normal S1 and S2, no murmurs  Carotid pulses: 2+ bilaterally  Examination of extremities for edema and/or varicosities: Normal     Abdomen   Abdomen: Non-tender, no masses  Lymphatic   Palpation of lymph nodes in neck: No lymphadenopathy  Musculoskeletal   Gait and station: Normal     Inspection/palpation of joints, bones, and muscles: Normal     Range of motion: Normal     Stability: Normal     Muscle strength/tone: Normal     Skin   Skin and subcutaneous tissue: Normal without rashes or lesions  Palpation of skin and subcutaneous tissue: Normal turgor      Neurologic no focal deficits noted on exam  Psychiatric   Judgment and insight: Normal     Orientation to person, place and time: Normal     Recent and remote memory: Intact  Mood and affect: Normal        Results/Data  SNELLEN VISION- POC 52HGJ2932 09:29AM Libby Real   Uncorrected - pt had lasix     Test Name Result Flag Reference   Right Eye 20/30     Left Eye 20/40     Bilateral Eyes 20/25       *VB-Depression Screening 20Jun2016 08:38AM Libby Real     Test Name Result Flag Reference   Depression Scale Result      Depression Screen - Negative For Symptoms       Health Management  History of colon polyps   COLONOSCOPY; every 5 years; Last 90UXI4471; Next Due: 53NKM8331; Active    Future Appointments    Date/Time Provider Specialty Site   08/26/2016 11:10 AM KODAK Wallace  Endocrinology [facility] ENDOCRINOLOGY [facility]   07/14/2016 08:00 AM KODAK Vidal  Gastroenterology Adult [facility] GASTROENTEROLOGY [facility]   10/21/2016 08:00 AM KODAK Vidal  Gastroenterology Adult [address]     Signatures   Electronically signed by : Nusrat Olson MD; Jun 20 2016 12:26PM EST                       (Author)    Electronically signed by :  ANN Sharpe; Jun 20 2016  2:11PM EST                       (Author)

## 2018-01-23 NOTE — PROGRESS NOTES
Assessment    1  Type I diabetes mellitus, uncontrolled (250 03) (E10 65)   2  Diabetic gastroparesis (250 60,536 3) (K25 25,Y97 84)   3  GERD (gastroesophageal reflux disease) (530 81) (K21 9)   4  Hyperlipidemia (272 4) (E78 5)   5  Hypothyroidism (244 9) (E03 9)   6  Vitamin D deficiency (268 9) (E55 9)   7  Obesity (BMI 30-39 9) (278 00) (E66 9)       #1 type 1 diabetes mellitus:   - Hemoglobin A1c improved to 7 3%, on Tslim pump and Dexcom G5  -CGM and insulin pump download data reviewed, blood sugar interpretation discussed  - No major hypoglycemia requiring medical attention or 3rd party assistance  -patient prefers to keep everything the same with no changes to pump settings today per his preference  -he is advised to bring the download again in 2-4 weeks for us to review  -continue metformin 1 g twice a day, will not use glitazones d/t risk of edema, CHF and bladder cancer  -He cannot meet the dietitian d/t his busy schedule issues  - He is UTD on eye and foot exams    #2 Anemia: resolved  - started iron and resolved    #3 BP is normal w/out meds:  - MALB and gfr normal    #3 hyperlipidemia:  -poorly controlled off statins  -pls restart the crestor    4  vitamin D deficiency:  continue the same regimen    5  hypothyroidism:  -Continue to take the same medication, TSH is at goal     #6 diabetic gastroparesis:  -improved, Follow with the gastroenterology    7  Obesity: BMI 37  - try Xenical 120 mg TID, explained a/e  - monitor LFT's     Plan  Diabetic gastroparesis, GERD (gastroesophageal reflux disease), Hyperlipidemia,  Hypothyroidism, Type I diabetes mellitus, uncontrolled, Vitamin D  deficiency    · Follow-up visit in 3 months Evaluation and Treatment  Follow-up  Status: Hold For -  Scheduling  Requested for: 90Pnt2474   Ordered; For: Diabetic gastroparesis, GERD (gastroesophageal reflux disease), Hyperlipidemia, Hypothyroidism, Type I diabetes mellitus, uncontrolled, Vitamin D deficiency;  Ordered By: Israel King Performed:  Due: 57Emd1954   · Follow-Up With Advanced Practitioner Evaluation and Treatment  Follow-up  Status: Hold  For - Scheduling  Requested for: 26Aug2016   Ordered; For: Diabetic gastroparesis, GERD (gastroesophageal reflux disease), Hyperlipidemia, Hypothyroidism, Type I diabetes mellitus, uncontrolled, Vitamin D deficiency; Ordered By: Israel King Performed:  Due: 15NEJ5177   · (1) HEMOGLOBIN A1C; Status:Active; Requested for:23Nov2016;    Perform:[facility]; IDB:[id number];LCMKARK; For:Diabetic gastroparesis, GERD (gastroesophageal reflux disease), Hyperlipidemia, Hypothyroidism, Type I diabetes mellitus, uncontrolled, Vitamin D deficiency; Ordered By:Kasi Kyle;   · (1) HEPATIC FUNCTION PANEL; Status:Active; Requested for:23Nov2016;    Perform:[facility]; MHZ:[id number];TGXNHIX; For:Diabetic gastroparesis, GERD (gastroesophageal reflux disease), Hyperlipidemia, Hypothyroidism, Type I diabetes mellitus, uncontrolled, Vitamin D deficiency; Ordered By:Kasi Kyle;   · (1) LIPID PANEL, FASTING; Status:Active; Requested for:23Nov2016;    Perform:[facility]; MGD:[id number];CVVVAKD; For:Diabetic gastroparesis, GERD (gastroesophageal reflux disease), Hyperlipidemia, Hypothyroidism, Type I diabetes mellitus, uncontrolled, Vitamin D deficiency; Ordered By:Kasi Kyle;   · (1) MICROALBUMIN CREATININE RATIO, RANDOM URINE; Status:Active; Requested  for:23Nov2016;    Perform:[facility]; PZN:[id number];QEDEICB; For:Diabetic gastroparesis, GERD (gastroesophageal reflux disease), Hyperlipidemia, Hypothyroidism, Type I diabetes mellitus, uncontrolled, Vitamin D deficiency; Ordered By:Kasi Kyle;   · (1) PTH N-TERMINAL (INTACT); Status:Active; Requested for:23Nov2016;    Perform:[facility]; CHH:40HNQ4481;AAVSWLR;   For:Diabetic gastroparesis, GERD (gastroesophageal reflux disease), Hyperlipidemia, Hypothyroidism, Type I diabetes mellitus, uncontrolled, Vitamin D deficiency; Ordered By:Kasi Kyle;   · (1) RENAL FUNCTION PANEL; Status:Active; Requested for:23Nov2016;    Perform:[facility]; NFO:[id number];NKBBJDY; For:Diabetic gastroparesis, GERD (gastroesophageal reflux disease), Hyperlipidemia, Hypothyroidism, Type I diabetes mellitus, uncontrolled, Vitamin D deficiency; Ordered By:Kasi Kyle;   · (1) T4, FREE; Status:Active; Requested for:23Nov2016;    Perform:[facility]; FEM:[id number];QHCWJQM; For:Diabetic gastroparesis, GERD (gastroesophageal reflux disease), Hyperlipidemia, Hypothyroidism, Type I diabetes mellitus, uncontrolled, Vitamin D deficiency; Ordered By:Kasi Kyle;   · (1) TSH; Status:Active; Requested for:23Nov2016;    Perform:[facility]; KVZ:[id number];HHGYQCY; For:Diabetic gastroparesis, GERD (gastroesophageal reflux disease), Hyperlipidemia, Hypothyroidism, Type I diabetes mellitus, uncontrolled, Vitamin D deficiency; Ordered By:Kasi Kyle;   · (1) VITAMIN D 25-HYDROXY; Status:Active; Requested for:23Nov2016;    Perform:[facility]; MDQ:61EDQ5208;EJWBIWP; For:Diabetic gastroparesis, GERD (gastroesophageal reflux disease), Hyperlipidemia, Hypothyroidism, Type I diabetes mellitus, uncontrolled, Vitamin D deficiency; Ordered By:Kasi Kyle;   · Begin a limited exercise program ; Status:Complete;   Done: 53Qxy7725   Ordered; For:Diabetic gastroparesis, GERD (gastroesophageal reflux disease), Hyperlipidemia, Hypothyroidism, Type I diabetes mellitus, uncontrolled, Vitamin D deficiency; Ordered By:Kasi Kyle;   · Eat a low fat and low cholesterol diet ; Status:Complete;   Done: 79WLT2234   Ordered; For:Diabetic gastroparesis, GERD (gastroesophageal reflux disease), Hyperlipidemia, Hypothyroidism, Type I diabetes mellitus, uncontrolled, Vitamin D deficiency;  Ordered By:Kasi Kyle;   · Keep a diary of when and what you eat ; Status:Complete;   Done: 01CMA3689   Ordered; For:Diabetic gastroparesis, GERD (gastroesophageal reflux disease), Hyperlipidemia, Hypothyroidism, Type I diabetes mellitus, uncontrolled, Vitamin D deficiency; Ordered By:Kasi Kyle;   · Some eating tips that can help you lose weight ; Status:Complete;   Done: 86HMA9814   Ordered; For:Diabetic gastroparesis, GERD (gastroesophageal reflux disease), Hyperlipidemia, Hypothyroidism, Type I diabetes mellitus, uncontrolled, Vitamin D deficiency; Ordered By:Kasi Kyle;   · We recommend that you bring your body mass index down to 26 ; Status:Complete;    Done: 62Usy2542   Ordered; For:Diabetic gastroparesis, GERD (gastroesophageal reflux disease), Hyperlipidemia, Hypothyroidism, Type I diabetes mellitus, uncontrolled, Vitamin D deficiency; Ordered By:Kasi Kyle;  Obesity (BMI 30-39  9)    · Xenical 120 MG Oral Capsule; TAKE 1 CAPSULE 3 TIMES DAILY WITH FOOD   Rx By: Bruce Parks; Dispense: 30 Days ; #:90 Capsule; Refill: 1; For: Obesity (BMI 30-39 9); GUILLERMO = N; Verified Transmission to [facility]Y #[id number] Last Updated By: System, [employer]; 8/26/2016 11:48:29 AM  Type I diabetes mellitus, uncontrolled    · Fingerstick - POC; Status:Active - Perform Order; Requested for:70Gfo7226;    Performed: In Office; NRO:08DWI2165;OFPRATN; For:Type I diabetes mellitus, uncontrolled; Ordered By:Yolanda Kyle; Discussion/Summary  Discussion Summary:   Try new medication for obesity  Counseling Documentation With Imm: The patient was counseled regarding diagnostic results, instructions for management, risk factor reductions, impressions, risks and benefits of treatment options, importance of compliance with treatment  Medication SE Review and Pt Understands Tx: Possible side effects of new medications were reviewed with the patient/guardian today  The treatment plan was reviewed with the patient/guardian   The patient/guardian understands and agrees with the treatment plan      Chief Complaint  Chief Complaint Free Text Note Form: Follow up      History of Present Illness  HPI: Blood sugar varies but is able to control them by using CGM and Prevent hypoglycemia  Has a travel job and erratic eating pattern  He is very happy with the current settings  Hemoglobin A1c 7 3%  Uses almost 100 units of insulin a day  He has a TSlim pump with 450 units reservoir  He continues to metformin without side effects  He is wondering if there are any other medications or insulin sensitizers  He was able to download the pump today  He is taking Synthroid and vitamin D but he stopped Crestor thinking about the weight gain  His lipid panels have worsened  Gastroparesis has improved  His anemia has improved on iron tablets  He is concerned about wt gain    Diabetes: The patient is being seen for routine follow-up of Diabetes Mellitus 1  The HbA1c was   Current treatment includes Metformin HCl  See Medication List for current medication(s)  Source of information reported by infromation from the patient's downloadable meter and indicates that the patient  No particular diet followed  His exercise regimen is composed of fewer than three times a week  Diabetes education performed   By report, there is fair compliance with treatment, fair tolerance of treatment and fair symptom control  Current pertinent lifestyle factors include obesity, disordered eating and inactivity  Disease Course and Complications: He has no diabetes complications   there have been no previous episodes of diabetic ketoacidosis  and He has no diabetes complications   there have been no previous hospitalizations  The patient reports hypoglycemia symptoms 1 times a day  Hypothyroidism (Follow-Up): The patient is being seen for follow-up of Hashimoto's thyroiditis  The patient reports doing well  He has had no significant interval events  The patient is currently asymptomatic  Medications:  the patient is adherent to his medication regimen, but he denies medication side effects  Disease management:  the patient is doing well with his goals  Hyperlipidemia (Follow-Up): The patient states his hyperlipidemia has been under good control since the last visit  Comorbid Illnesses: diabetes mellitus and hypertension  He has no significant interval events  Symptoms: The patient is currently asymptomatic  Medications: the patient is not adherent with his medication regimen  He denies medication side effects  The patient is not doing well with his hyperlipidemia goals  Vitamin D Deficiency: The patient is being seen for follow-up of vitamin D deficiency  Disease type: vitamin D deficiency  Recent laboratory results: date, 25-hydroxyvitamin D ng/mL  Current treatment includes vitamin D3 (cholecalciferol)  The patient is currently asymptomatic  Review of Systems  Endo Adult ROS Male Established v2 - [facility]:   Constitutional/General: recent weight gain, no recent weight loss, poor energy/fatigue, no increased energy level, insomnia/sleep problems, no fever and no feeling weak  Heart: no high blood pressure, no chest pain/tightness, no rapid/racing heart rate and no palpitations  Genitourinary - Urinary urinating during the night, but no frequent urination and no excess urination  Eyes: blurred vision, but no double vision, no bulging eyes, no gritty/scratchy eyes and no excessive tearing  Mouth / Throat: no hoarseness and no difficulty swallowing  Neck: no lumps, no swollen glands, no neck pain, neck stiffness and no enlarged thyroid  Respiratory: no wheezing, no asthma and no persistent cough  Musculoskeletal: no muscle aches/pain, no joint aches/pain and no muscle weakness  Skin & Hair: no dry skin, no acne, the hair texture was not oily, no hair loss and no excessive hair growth  Gastrointestinal: no constipation, no diarrhea, no waking at night to drink and no stomach ache  Neurological: no blackouts, no weakness and no tremors     Genital: no testicular pain, no testicular lumps/bumps/mass and does not perform monthly testicular exam    Endocrine: feeling hot frequently, no feeling cold frequently, no shifts between feeling hot and cold, no cold hands or feet, excessive sweating, thyroid problems, blood sugar problems, no excessive thirst, no excessive hunger, no change in shoe size, no nausea or vomiting and no shaky hands  ROS Reviewed:   ROS reviewed  Active Problems    1  Abdominal bloating (787 3) (R14 0)   2  Anemia, deficiency (281 9) (D53 9)   3  Bilateral lower extremity edema (782 3) (R60 0)   4  Chronic maxillary sinusitis (473 0) (J32 0)   5  Collagenous colitis (558 9) (K52 89)   6  Depression screen (V79 0) (Z13 89)   7  Diabetic gastroparesis (250 60,536 3) (F10 05,R72 63)   8  GERD (gastroesophageal reflux disease) (530 81) (K21 9)   9  History of colon polyps (V12 72) (Z86 010)   10  Hyperlipidemia (272 4) (E78 5)   11  Hypothyroidism (244 9) (E03 9)   12  Insomnia (780 52) (G47 00)   13  Iron deficiency anemia (280 9) (D50 9)   14  Need for TD vaccine (V06 5) (Z23)   15  Need for Tdap vaccination (V06 1) (Z23)   16  Shortness of breath (786 05) (R06 02)   17  Sleep apnea (780 57) (G47 30)   18  Type I diabetes mellitus, uncontrolled (250 03) (E10 65)   19  Vitamin D deficiency (268 9) (E55 9)    Past Medical History    1  History of Arm pain (729 5) (M79 603)   2  History of Colon cancer screening (V76 51) (Z12 11)   3  History of diabetes mellitus (V12 29) (Z86 39)   4  History of insomnia (V13 89) (Z87 898)   5  History of Influenza vaccine needed (V04 81) (Z23)   6  History of Need for prophylactic vaccination and inoculation against influenza (V04 81)   (Z23)   7  History of Night sweats (780 8) (R61)   8  History of Palpitations (785 1) (R00 2)   9  History of Primary hypothyroidism (244 9) (E03 9)   10  History of Screening for prostate cancer (V76 44) (Z12 5)  Active Problems And Past Medical History Reviewed:    The active problems and past medical history were reviewed and updated today  Surgical History    1  History of Corneal LASIK Bilateral   2  History of Rhinoplasty   3  History of Sinus Surgery   4  History of Wrist Surgery  Surgical History Reviewed: The surgical history was reviewed and updated today  Family History  Mother    1  Denied: Family history of Colon cancer   2  Denied: Family history of Crohn's disease without complication, unspecified   gastrointestinal tract location   3  Denied: Family history of liver disease   4  Family history of Heart Disease (V17 49)   5  Family history of Thyroid Disorder (V18 19)  Father    6  Family history of Cancer   7  Family history of Colon cancer   8  Denied: Family history of Crohn's disease without complication, unspecified   gastrointestinal tract location   9  Denied: Family history of liver disease   10  Family history of malignant neoplasm of prostate (V16 42) (Z80 42)   11  Family history of Heart Disease (V17 49)  Sibling    12  Family history of Colon cancer  Sister    15  Family history of Diabetes Mellitus (V18 0)   14  Family history of Thyroid Disorder (V18 19)  Brother    13  Family history of Cancer   16  Family history of Diabetes Mellitus (V18 0)   17  Family history of Diabetes Mellitus (V18 0)   18  Family history of Diabetes Mellitus (V18 0)   19  Family history of Heart Disease (V17 49)   20  Family history of Heart Disease (V17 49)   21  Family history of Heart Disease (V17 49)  Family History Reviewed: The family history was reviewed and updated today  Social History    · Denied: History of Current Every Day Smoker   · Denied: History of Current Smoker   · Denied: History of Drug Use   · Former smoker (V15 82) (W99 408)   · No drug use   · Social alcohol use (Z78 9)  Social History Reviewed: The social history was reviewed and updated today  Current Meds   1  Azelastine HCl - 0 1 % Nasal Solution; USE 1 SPRAY IN EACH NOSTRIL TWICE DAILY;    Therapy: 56TMS1585 to (Last Rx:16May2016)  Requested for: 23YQM7402 Ordered   2  CeleBREX 200 MG Oral Capsule; Therapy: 47KID8989 to (Last Rx:14May2011)  Requested for: 91GHN3383 Ordered   3  Crestor 10 MG Oral Tablet; TAKE 1 TABLET AT BEDTIME; Therapy: 04MQJ4354 to (Evaluate:17Aug2016)  Requested for: 94PIP6871; Last   Rx:84Xzn0021 Ordered   4  Esomeprazole Magnesium 40 MG Oral Capsule Delayed Release; TAKE 1 CAPSULE   DAILY  Requested for: 47SLB6704; Last Rx:16May2016 Ordered   5  Iron 325 (65 Fe) MG Oral Tablet; TAKE 1 TABLET TWICE DAILY; Therapy: (Scot Ragland) to Recorded   6  Lialda 1 2 GM Oral Tablet Delayed Release; take 2 tablet twice daily; Therapy: 41DMP7147 to (Last Rx:23Jan2016)  Requested for: 41UYF9156 Ordered   7  MetFORMIN HCl - 1000 MG Oral Tablet; TAKE 1 TABLET EVERY 12 HOURS DAILY; Therapy: 55Rfx3534 to (Evaluate:01Msk6202)  Requested for: 70Rwz4463; Last   Rx:66Apq6500 Ordered   8  Multiple Vitamins Oral Tablet Recorded   9  NovoLOG SOLN; uses 120 in pump daily; Therapy: (Recorded:76Amf9692) to Recorded   10  OneTouch Ultra Blue In Vitro Strip; USE TO TEST 6 TIMES DAILY; Therapy: 59LSX2343 to (Evaluate:24Jun2017)  Requested for: 84FIX5274; Last    Rx:29Jun2016 Ordered   11  Synthroid 200 MCG Oral Tablet; take 1 tablet every day; Therapy: 67KZP2645 to (Claudeen Rosales)  Requested for: 72ATH5480; Last    Rx:13Nov2015 Ordered   12  Vitamin D CAPS; Therapy: (Recorded:20Jun2016) to Recorded   13  Vitamin D3 CAPS; 1 Tab daily; Therapy: (Lee Morin) to Recorded   14  Zolpidem Tartrate 10 MG Oral Tablet; TAKE 1 TABLET AT BEDTIME; Therapy: 28Loy0636 to (Evaluate:43Pwi3902); Last Rx:19Tdc1703 Ordered  Medication List Reviewed: The medication list was reviewed and updated today  Allergies    1   Ibuprofen TABS    2  Seasonal    Vitals  Vital Signs    Recorded: 64Vmb1954 11:54AM Recorded: 95ATO0769 30:84RR   Systolic  373   Diastolic  76   Heart Rate  60   Height 5 ft 9 in    Weight 252 lb 6 oz    BMI Calculated 37 27    BSA Calculated 2 28      Physical Exam    Constitutional   General appearance: No acute distress, well appearing and well nourished  obese  Eyes   Conjunctiva and lids: No swelling, erythema, or discharge  Ears, Nose, Mouth, and Throat   Neck: Abnormal   acanthosis, thyroid 20 grams  Pulmonary   Auscultation of lungs: Clear to auscultation bilaterally with normal chest expansion  Cardiovascular   Auscultation of heart: Normal rate and rhythm with no murmurs, gallops or rubs  Examination of pulses: Dorsalis pedal pulses are +2 and equal bilaterally  Abdomen   Abdomen: Abdomen is soft, non-tender with normal bowel sounds  no lipo dystrophic areas  Musculoskeletal   Inspection/palpation of joints, bones, and muscles: Muscle bulk and tone is normal     Skin   Skin and subcutaneous tissue: Normal skin temperature and color  Neurologic   Reflexes: 2+ and symmetric  Motor Strength: Strength is 5/5 bilaterally  Psychiatric   Orientation to person, place and time: Normal     Mood and affect: Affect and attention span are normal     Additional Exam:  Pale skin color  Results/Data  Diagnostic Studies Reviewed: I personally reviewed the films/images/results in the office today  My interpretation follows  (1) HEPATIC FUNCTION PANEL 23Aug2016 06:42AM [facility] Order Number: [id number]     Test Name Result Flag Reference   ALBUMIN 3 6 g/dL  3 5-5 0   ALK PHOSPHATAS 83 U/L     ALT (SGPT) 24 U/L  12-78   AST(SGOT) 19 U/L  5-45   BILI, DIRECT 0 09 mg/dL  0 00-0 20   BILI, TOTAL 0 41 mg/dL  0 20-1 00   TOTAL PROTEIN 7 2 g/dL  6 4-8 2     (1) HEMOGLOBIN A1C 23Aug2016 06:42AM [facility]    Order Number: [id number]     Test Name Result Flag Reference   HEMOGLOBIN A1C 7 3 % H 4 2-6 3   EST  AVG   GLUCOSE 163 mg/dl       (1) LIPID PANEL, FASTING 23Aug2016 06:42AM [email or web address]    Order Number: [id number]     Test Name Result Flag Reference   CHOLESTEROL 237 mg/dL H    HDL,DIRECT 33 mg/dL L 40-60   Specimen collection should occur prior to Metamizole administration due to the potential for falsely depressed results  LDL CHOLESTEROL CALCULATED 169 mg/dL H 0-100   Triglyceride:         Normal              <150 mg/dl       Borderline High    150-199 mg/dl       High               200-499 mg/dl       Very High          >499 mg/dl  Cholesterol:         Desirable        <200 mg/dl      Borderline High  200-239 mg/dl      High             >239 mg/dl  HDL Cholesterol:        High    >59 mg/dL      Low     <41 mg/dL  LDL CALCULATED:    This screening LDL is a calculated result  It does not have the accuracy of the Direct Measured LDL in the monitoring of patients with hyperlipidemia and/or statin therapy  Direct Measure LDL (TIW294) must be ordered separately in these patients  TRIGLYCERIDES 176 mg/dL H <=150   Specimen collection should occur prior to N-Acetylcysteine or Metamizole administration due to the potential for falsely depressed results  (1) T4, FREE 23Aug2016 06:42AM Tuttle Hench Order Number: [id number]     Test Name Result Flag Reference   T4,FREE 1 19 ng/dL  0 76-1 46     (1) TSH 23Aug2016 06:42AM [facility] Number: [id number]     Test Name Result Flag Reference   TSH 2 680 uIU/mL  0 358-3 740   Patients undergoing fluorescein dye angiography may retain small amounts of fluorescein in the body for 48-72 hours post procedure  Samples containing fluorescein can produce falsely depressed TSH values  If the patient had this procedure,a specimen should be resubmitted post fluorescein clearance       (1) PTH N-TERMINAL (INTACT) 23Aug2016 06:42AM Val Figueroa    Order Number: [id number]     Test Name Result Flag Reference   PARATHYROID HORMONE INTACT 36 6 pg/mL  14 0-72 0     (1) RENAL FUNCTION PANEL 23Aug2016 06:42AM Bradley Garcia [facility] Order Number: [id number]     Test Name Result Flag Reference   ANION GAP (CALC) 8 mmol/L  4-13   BLOOD UREA NITROGEN 19 mg/dL  5-25   CALCIUM 8 8 mg/dL  8 3-10 1   CHLORIDE 101 mmol/L  100-108   CARBON DIOXIDE 28 mmol/L  21-32   CREATININE 0 97 mg/dL  0 60-1 30   Standardized to IDMS reference method   GLUCOSE,RANDM 164 mg/dL H    If the patient is fasting, the ADA then defines impaired fasting glucose as > 100 mg/dL and diabetes as > or equal to 123 mg/dL  POTASSIUM 4 1 mmol/L  3 5-5 3   eGFR Non-African American      >60 0 ml/min/1 73sq m   [facility] Disease Education Program recommendations are as follows:  GFR calculation is accurate only with a steady state creatinine  Chronic Kidney disease less than 60 ml/min/1 73 sq  meters  Kidney failure less than 15 ml/min/1 73 sq  meters  SODIUM 137 mmol/L  136-145   PHOSPHORUS 3 0 mg/dL  2 7-4 5     (1) VITAMIN D 25-HYDROXY 57Zjo0318 06:42AM Khloe Rayo   TW Order Number: [id number]     Test Name Result Flag Reference   VIT D 25-HYDROX 47 0 ng/mL  30 0-100 0   This assay is a certified procedure of the CDC Vitamin D Standardization Certification Program (VDSCP)     Deficiency <20ng/ml   Insufficiency 20-30ng/ml   Sufficient  ng/ml     *Patients undergoing fluorescein dye angiography may retain small amounts of fluorescein in the body for 48-72 hours post procedure  Samples containing fluorescein can produce falsely elevated Vitamin D values  If the patient had this procedure, a specimen should be resubmitted post fluorescein clearance       (1) CBC/PLT/DIFF 52Gjm4733 09:28AM Vielka Varghese   TW Order Number: [id number]  TW Order Number: [id number]     Test Name Result Flag Reference   WBC COUNT 9 63 Thousand/uL  4 31-10 16   RBC COUNT 5 14 Million/uL  3 88-5 62   HEMOGLOBIN 13 9 g/dL  12 0-17 0   HEMATOCRIT 41 8 %  36 5-49 3   MCV 81 fL L 82-98   MCH 27 0 pg  26 8-34 3   MCHC 33 3 g/dL 31 4-37 4   RDW 17 6 % H 11 6-15 1   MPV 9 7 fL  8 9-12 7   PLATELET COUNT 862 Thousands/uL  149-390   NEUTROPHILS RELATIVE PERCENT 64 %  43-75   LYMPHOCYTES RELATIVE PERCENT 24 %  14-44   MONOCYTES RELATIVE PERCENT 7 %  4-12   EOSINOPHILS RELATIVE PERCENT 4 %  0-6   BASOPHILS RELATIVE PERCENT 1 %  0-1   NEUTROPHILS ABSOLUTE COUNT 6 27 Thousands/?L  1 85-7 62   LYMPHOCYTES ABSOLUTE COUNT 2 32 Thousands/?L  0 60-4 47   MONOCYTES ABSOLUTE COUNT 0 65 Thousand/?L  0 17-1 22   EOSINOPHILS ABSOLUTE COUNT 0 34 Thousand/?L  0 00-0 61   BASOPHILS ABSOLUTE COUNT 0 05 Thousands/?L  0 00-0 10     (1) IRON [id number] 09:28AM Livia Bright   TW Order Number: [id number]  TW Order Number: [id number]     Test Name Result Flag Reference   IRON 92 ug/dL       Health Management  Depression screen   *VB-Depression Screening; every 1 year; Last 20Jun2016; Next Due: 20Jun2017; Active  History of colon polyps   COLONOSCOPY; every 5 years; Last [id number]; Next Due: [id number]; Active    Future Appointments    Date/Time Provider Specialty Site   12/02/2016 10:30 AM KODAK Lomas  Endocrinology [facility] ENDOCRINOLOGY [facility]   12/30/2016 08:00 AM KODAK Morin Res   Gastroenterology Adult [facility] [address] [place] [address] REHABILITATION     Signatures   Electronically signed by : KODAK Dutton ; Aug 26 2016  4:54PM EST                       (Author)

## 2018-01-24 VITALS
DIASTOLIC BLOOD PRESSURE: 68 MMHG | HEART RATE: 80 BPM | WEIGHT: 248 LBS | HEIGHT: 68 IN | SYSTOLIC BLOOD PRESSURE: 102 MMHG | BODY MASS INDEX: 37.59 KG/M2

## 2018-01-24 NOTE — PROGRESS NOTES
Assessment    1  Diabetic gastroparesis (250 60,536 3) (R94 16,Z83 36)   2  Hyperlipidemia (272 4) (E78 5)   3  Hypothyroidism (244 9) (E03 9)   4  Type I diabetes mellitus, uncontrolled (250 03) (E10 65)   5  Vitamin D deficiency (268 9) (E55 9)   6  Anemia, deficiency (281 9) (D53 9)        #1 type 1 diabetes mellitus: Hemoglobin A1c improved to 7 5%, on Tslim pump and Dexcom G5  -CGM and insulin pump download data reviewed for last 2 weeks, blood sugar was high and discussed  -hypoGlycemia is secondary to excessive amount of bolus and intermittent inappropriate carb counting  -patient prefers to keep everything the same with no changes to pump settings today  -he is advised to bring the download again in 2 weeks for us to make any changes   -continue metformin 1 g twice a day  -please meet the dietitian     #2 New anemia:  - have informed the PCP and gastroenterologist for the new changes  - also asked him to repeat a cbc and if it persists he may have to be further evaluated    #3 hypertension:  controlled on current medication    #3 hyperlipidemia:  -controlled On statins  would avoid aspirin until anemia problem resolved    4  vitamin D deficiency   continue the same regimen    5 hypothyroidism:  -Continue to take the same medication, TSH is at goal     #6 diabetic gastroparesis:  -Follow with the gastroenterology     Plan  Anemia, deficiency    · Follow-up visit in 3 months Evaluation and Treatment  Follow-up  Status: Complete   Done: 27CJM9868   Ordered; For: Anemia, deficiency; Ordered By: Curt Dang Performed:  Due: 44BWL9388; Last Updated By: Isai Rosado; 5/27/2016 11:45:12 AM   · (1) CBC/PLT/DIFF; Status:Resulted - Requires Verification;   Done: 98OTC9601 12:05PM   AHA:10BFW6534;FUYOWWC; Stat;  Cletis Marrow, deficiency; Ordered By:Kasi Kyle;   Anemia, deficiency, Diabetic gastroparesis, Hyperlipidemia, Hypothyroidism, Type I  diabetes mellitus, uncontrolled, Vitamin D deficiency    · (1) HEMOGLOBIN A1C; Status:Active; Requested for:23Aug2016;    Perform:[facility]; SJI:76HDU8181;AXNNFPS; Stat;  Kaylen Son, deficiency, Diabetic gastroparesis, Hyperlipidemia, Hypothyroidism, Type I diabetes mellitus, uncontrolled, Vitamin D deficiency; Ordered By:Kasi Kyle;   · (1) HEPATIC FUNCTION PANEL; Status:Active; Requested for:23Aug2016;    Perform:[facility]; SJL:79BMS9812;KNKWOOB; Stat;  Kaylen Son, deficiency, Diabetic gastroparesis, Hyperlipidemia, Hypothyroidism, Type I diabetes mellitus, uncontrolled, Vitamin D deficiency; Ordered By:Kasi Kyle;   · (1) LIPID PANEL, FASTING; Status:Active; Requested for:23Aug2016;    Perform:[facility]; MZD:69PUN3214;SOFSYFV; Stat;  Kaylen Son, deficiency, Diabetic gastroparesis, Hyperlipidemia, Hypothyroidism, Type I diabetes mellitus, uncontrolled, Vitamin D deficiency; Ordered By:Kasi Kyle;   · (1) PTH N-TERMINAL (INTACT); Status:Active; Requested for:23Aug2016;    Perform:[facility]; ZTV:21EON3621;ENQRAIS; Stat;  Kaylen Son, deficiency, Diabetic gastroparesis, Hyperlipidemia, Hypothyroidism, Type I diabetes mellitus, uncontrolled, Vitamin D deficiency; Ordered By:Kasi Kyle;   · (1) RENAL FUNCTION PANEL; Status:Active; Requested for:23Aug2016;    Perform:[facility]; KATELIN:53DMS5243;PLJMXRX; Stat;  Kaylen Son, deficiency, Diabetic gastroparesis, Hyperlipidemia, Hypothyroidism, Type I diabetes mellitus, uncontrolled, Vitamin D deficiency; Ordered By:Kasi Kyle;   · (1) T4, FREE; Status:Active; Requested for:23Aug2016;    Perform:[facility]; EPF:26HHQ8953;CZVGBGM; Stat;  Kaylen Son, deficiency, Diabetic gastroparesis, Hyperlipidemia, Hypothyroidism, Type I diabetes mellitus, uncontrolled, Vitamin D deficiency; Ordered By:Kasi Kyle;   · (1) TSH; Status:Active;  Requested for:23Aug2016;    Perform:[facility]; MLK:71IFR8936;IDYYKNENA; Stat;  Kaylen Son, deficiency, Diabetic gastroparesis, Hyperlipidemia, Hypothyroidism, Type I diabetes mellitus, uncontrolled, Vitamin D deficiency; Ordered By:Kasi Kyle;   · (1) VITAMIN D 25-HYDROXY; Status:Active; Requested for:68Erh8417;    Perform:[facility]; LKW:00INW5619;HAMWJXA; Stat;  Consuelo Brooms, deficiency, Diabetic gastroparesis, Hyperlipidemia, Hypothyroidism, Type I diabetes mellitus, uncontrolled, Vitamin D deficiency; Ordered By:Kasi Kyle;   · *1 - [facility] Physician Referral  Consult   Status: Need Information - Financial Authorization  Requested for: 87QHS5707   Ordered; For: Anemia, deficiency, Diabetic gastroparesis, Hyperlipidemia, Hypothyroidism, Type I diabetes mellitus, uncontrolled, Vitamin D deficiency; Ordered By: Xavi Bond Performed:  Due: 13UFC6742  Care Summary provided  : Yes  Type I diabetes mellitus, uncontrolled    · Fingerstick - POC; Status:Resulted - Requires Verification,Retrospective By Protocol  Authorization;   Done: 81SEM1540 11:22AM   Performed: In Office; QBW:44ITU3699; Last Updated Anastasia Reginaldos; 5/27/2016 11:23:44 AM;Ordered; For:Type I diabetes mellitus, uncontrolled; Ordered By:Bill Kyle; Discussion/Summary  Discussion Summary:   Pls bring your pump download every 2-4 weeks  pls call PCP and GI doctors for new anemia  pls repeat CBC now and make sure we have discussed  Counseling Documentation With Imm: The patient was counseled regarding diagnostic results, instructions for management, risk factor reductions, impressions, risks and benefits of treatment options, importance of compliance with treatment  total time of encounter was 36 minutes and 24 minutes was spent counseling  Medication SE Review and Pt Understands Tx: Possible side effects of new medications were reviewed with the patient/guardian today  The treatment plan was reviewed with the patient/guardian   The patient/guardian understands and agrees with the treatment plan Chief Complaint  Chief Complaint Free Text Note Form: FOLLOW UP   Chief Complaint Chronic Condition [facility]: Patient is here today for follow up of chronic conditions described in HPI  History of Present Illness  HPI: Has been having sinus infection for last 2 weeks and sugars have been high  Prior to that he reports having good blood sugar control on the current regimen  He does not have significant hypoglycemia except once in a while when he boluses too much for a meal  His hemoglobin A1c has improved and he feels well  He just visited his primary care physician for shortness of breath  He is undergoing stress test and had an echocardiogram  He follows with gastroenterology for gastroparesis  He is on metformin, has not mental diabetes educator nutritionist yet  He denied any dark stools or black stools or bleeding per rectum  He has some digestive issues  His weight has been steady and difficulty losing it  Diabetes: The patient is being seen for routine follow-up of Diabetes Mellitus 1  The HbA1c was   Current treatment includes Metformin HCl  See Medication List for current medication(s)  Source of information reported by infromation from the patient's downloadable meter and indicates that the patient  No particular diet followed  His exercise regimen is composed of fewer than three times a week  Diabetes education performed   By report, there is fair compliance with treatment, fair tolerance of treatment and fair symptom control  Current pertinent lifestyle factors include obesity, disordered eating and inactivity  Disease Course and Complications: He has no diabetes complications   there have been no previous episodes of diabetic ketoacidosis  and He has no diabetes complications   there have been no previous hospitalizations  The patient reports hypoglycemia symptoms 1 times a day  Hypothyroidism (Follow-Up): The patient is being seen for follow-up of Hashimoto's thyroiditis   The patient reports doing well  He has had no significant interval events  The patient is currently asymptomatic  Medications:  the patient is adherent to his medication regimen, but he denies medication side effects  Disease management:  the patient is doing well with his goals  Hyperlipidemia (Follow-Up): The patient states his hyperlipidemia has been under good control since the last visit  Comorbid Illnesses: diabetes mellitus and hypertension  He has no significant interval events  Symptoms: The patient is currently asymptomatic  Medications: the patient is adherent with his medication regimen  He denies medication side effects  Vitamin D Deficiency: The patient is being seen for follow-up of vitamin D deficiency  Disease type: vitamin D deficiency  Recent laboratory results: date, 25-hydroxyvitamin D ng/mL  Current treatment includes vitamin D3 (cholecalciferol)  The patient is currently asymptomatic  Review of Systems  Endo Adult ROS Male Established v2 - [facility]:   Constitutional/General: recent weight gain, no recent weight loss, no poor energy/fatigue, no increased energy level, insomnia/sleep problems, no fever and no feeling weak  Heart: no high blood pressure, no chest pain/tightness, no rapid/racing heart rate and no palpitations  Genitourinary - Urinary urinating during the night, but no frequent urination and no excess urination  Eyes: gritty/scratchy eyes, but no blurred vision, no double vision, no bulging eyes and no excessive tearing  Mouth / Throat: no hoarseness and no difficulty swallowing  Neck: no lumps, no swollen glands, no neck pain, no neck stiffness and no enlarged thyroid  Respiratory: no wheezing, no asthma and no persistent cough  Musculoskeletal: no muscle aches/pain, no joint aches/pain and no muscle weakness  Skin & Hair: no dry skin, no acne, the hair texture was not oily, no hair loss and no excessive hair growth     Gastrointestinal: no constipation, no diarrhea, no waking at night to drink and no stomach ache  Neurological: no blackouts, no weakness and no tremors  Genital: no testicular pain and no testicular lumps/bumps/mass  Endocrine: feeling hot frequently, no feeling cold frequently, no shifts between feeling hot and cold, no cold hands or feet, no excessive sweating, thyroid problems, blood sugar problems, no excessive thirst, no excessive hunger, no change in shoe size, no nausea or vomiting and no shaky hands  ROS Reviewed:   ROS reviewed  Active Problems    1  Abdominal bloating (787 3) (R14 0)   2  Bilateral lower extremity edema (782 3) (R60 0)   3  Chronic maxillary sinusitis (473 0) (J32 0)   4  Collagenous colitis (558 9) (K52 89)   5  Diabetic gastroparesis (250 60,536 3) (X29 24,Y18 81)   6  GERD (gastroesophageal reflux disease) (530 81) (K21 9)   7  History of colon polyps (V12 72) (Z86 010)   8  Hyperlipidemia (272 4) (E78 5)   9  Hypothyroidism (244 9) (E03 9)   10  Insomnia (780 52) (G47 00)   11  Shortness of breath (786 05) (R06 02)   12  Sleep apnea (780 57) (G47 30)   13  Type I diabetes mellitus, uncontrolled (250 03) (E10 65)   14  Vitamin D deficiency (268 9) (E55 9)    Past Medical History    1  History of Arm pain (729 5) (M79 603)   2  History of Colon cancer screening (V76 51) (Z12 11)   3  History of diabetes mellitus (V12 29) (Z86 39)   4  History of insomnia (V13 89) (Z87 898)   5  History of Influenza vaccine needed (V04 81) (Z23)   6  History of Need for prophylactic vaccination and inoculation against influenza (V04 81)   (Z23)   7  History of Night sweats (780 8) (R61)   8  History of Palpitations (785 1) (R00 2)   9  History of Primary hypothyroidism (244 9) (E03 9)   10  History of Screening for prostate cancer (V76 44) (Z12 5)  Active Problems And Past Medical History Reviewed: The active problems and past medical history were reviewed and updated today  Surgical History    1   History of Corneal LASIK Bilateral   2  History of Rhinoplasty   3  History of Sinus Surgery   4  History of Wrist Surgery  Surgical History Reviewed: The surgical history was reviewed and updated today  Family History  Mother    1  Denied: Family history of Colon cancer   2  Denied: Family history of Crohn's disease without complication, unspecified   gastrointestinal tract location   3  Denied: Family history of liver disease   4  Family history of Heart Disease (V17 49)   5  Family history of Thyroid Disorder (V18 19)  Father    6  Family history of Cancer   7  Family history of Colon cancer   8  Denied: Family history of Crohn's disease without complication, unspecified   gastrointestinal tract location   9  Denied: Family history of liver disease   10  Family history of malignant neoplasm of prostate (V16 42) (Z80 42)   11  Family history of Heart Disease (V17 49)  Sibling    12  Family history of Colon cancer  Sister    15  Family history of Diabetes Mellitus (V18 0)   14  Family history of Thyroid Disorder (V18 19)  Brother    13  Family history of Cancer   16  Family history of Diabetes Mellitus (V18 0)   17  Family history of Diabetes Mellitus (V18 0)   18  Family history of Diabetes Mellitus (V18 0)   19  Family history of Heart Disease (V17 49)   20  Family history of Heart Disease (V17 49)   21  Family history of Heart Disease (V17 49)  Family History Reviewed: The family history was reviewed and updated today  Social History    · Denied: History of Current Every Day Smoker   · Denied: History of Current Smoker   · Denied: History of Drug Use   · Former smoker (V15 82) (Z37 759)   · Social alcohol use (Z78 9)  Social History Reviewed: The social history was reviewed and updated today  Current Meds   1  Azelastine HCl - 0 1 % Nasal Solution; USE 1 SPRAY IN EACH NOSTRIL TWICE DAILY; Therapy: 38ASD9934 to (Last Rx:83Hlg9823)  Requested for: 39KEY4935 Ordered   2  CeleBREX 200 MG Oral Capsule;    Therapy: 47BGE1156 to (Last Rx:25Cpd0827)  Requested for: 99BQC2486 Ordered   3  Crestor 10 MG Oral Tablet; TAKE 1 TABLET AT BEDTIME; Therapy: 30CFH2068 to (Evaluate:24Tic5695)  Requested for: 86MDS1963; Last   Rx:68Pcc4382 Ordered   4  Esomeprazole Magnesium 40 MG Oral Capsule Delayed Release; TAKE 1 CAPSULE   DAILY  Requested for: 31YOK0738; Last Rx:16Yru9672 Ordered   5  Lialda 1 2 GM Oral Tablet Delayed Release; take 2 tablet twice daily; Therapy: 93JTI0375 to (Last Rx:05Qhp1400)  Requested for: 55VSD0968 Ordered   6  MetFORMIN HCl - 1000 MG Oral Tablet; TAKE 1 TABLET EVERY 12 HOURS DAILY; Therapy: 13Gpo0589 to (Ramone Hagen)  Requested for: 15ZDO7224; Last   Rx:05Cnd1339 Ordered   7  Multiple Vitamins Oral Tablet Recorded   8  NovoLOG SOLN; uses 120 in pump daily; Therapy: (Recorded:26Jec7416) to Recorded   9  OneTouch Ultra Blue In Vitro Strip; USE TO TEST 6 TIMES DAILY; Therapy: 90BOW4303 to (Layne Hargrove)  Requested for: 75IGV6185; Last   Rx:41Gpx2574 Ordered   10  Synthroid 200 MCG Oral Tablet; take 1 tablet every day; Therapy: 64FZX3655 to (Coty Dexter)  Requested for: 69KWR8964; Last    Rx:87Aey9108 Ordered   11  Vitamin D (Ergocalciferol) 59275 UNIT Oral Capsule; TAKE 1 CAPSULE WEEKLY; Therapy: 46NBZ8090 to (Last Rx:39Iov5014)  Requested for: 89LRB2220 Ordered   12  Zolpidem Tartrate 10 MG Oral Tablet; TAKE 1 TABLET AT BEDTIME; Therapy: 90Oek9236 to (Evaluate:73Xdi1534); Last Rx:09Oqt6461 Ordered  Medication List Reviewed: The medication list was reviewed and updated today  Allergies    1  Ibuprofen TABS    Vitals  Vital Signs [Data Includes: Current Encounter]    Recorded: [id number] 11:20AM Recorded: 04HXL3597 11:15AM   Heart Rate 56    Systolic 432    Diastolic 76    Height  5 ft 9 in   Weight  250 lb 8 0 oz   BMI Calculated  36 99   BSA Calculated  2 27     Physical Exam    Constitutional   General appearance: No acute distress, well appearing and well nourished  obese    Eyes   Conjunctiva and lids: No swelling, erythema, or discharge  Pupils: Equal, round and reactive to light  The sclera are anicteric  Extraocular movements are intact  Ears, Nose, Mouth, and Throat   External inspection of ears, nose and lips: Normal     Oropharynx: Normal with no erythema, edema, exudate or lesions  Exam of Head: The head is atraumatic and normocephalic  Neck: The neck is supple  The thyroid is normal in size with no palpable nodules  acanthosis, thyroid 20 grams  Pulmonary   Auscultation of lungs: Clear to auscultation bilaterally with normal chest expansion  Cardiovascular   Auscultation of heart: Normal rate and rhythm with no murmurs, gallops or rubs  Examination of pulses: Dorsalis pedal pulses are +2 and equal bilaterally  Abdomen   Abdomen: Abdomen is soft, non-tender with normal bowel sounds  no lipo dystrophic areas  Lymphatic   Palpation of lymph nodes: No supraclavicular or suboccipital lymphadenopathy  Musculoskeletal   Inspection/palpation of joints, bones, and muscles: Muscle bulk and tone is normal     Skin   Skin and subcutaneous tissue: Normal skin temperature and color  Neurologic   Reflexes: 2+ and symmetric  Motor Strength: Strength is 5/5 bilaterally  Psychiatric   Orientation to person, place and time: Normal     Mood and affect: Affect and attention span are normal     Additional Exam:  Pale skin color        Results/Data  Encounter Results   (1) CBC/PLT/DIFF 08WML0946 12:05PM Lukas Hanson    Order Number: [id number]   Order Number: [id number]     Test Name Result Flag Reference   WBC COUNT 8 98 Thousand/uL  4 31-10 16   RBC COUNT 4 58 Million/uL  3 88-5 62   HEMOGLOBIN 11 0 g/dL L 12 0-17 0   HEMATOCRIT 35 1 % L 36 5-49 3   MCV 77 fL L 82-98   MCH 24 0 pg L 26 8-34 3   MCHC 31 3 g/dL L 31 4-37 4   RDW 13 9 %  11 6-15 1   MPV 9 5 fL  8 9-12 7   PLATELET COUNT 457 Thousands/uL  149-390   NEUTROPHILS RELATIVE PERCENT 64 %  43-75   LYMPHOCYTES RELATIVE PERCENT 25 %  14-44   MONOCYTES RELATIVE PERCENT 7 %  4-12   EOSINOPHILS RELATIVE PERCENT 3 %  0-6   BASOPHILS RELATIVE PERCENT 1 %  0-1   NEUTROPHILS ABSOLUTE COUNT 5 77 Thousands/?L  1 85-7 62   LYMPHOCYTES ABSOLUTE COUNT 2 28 Thousands/?L  0 60-4 47   MONOCYTES ABSOLUTE COUNT 0 61 Thousand/?L  0 17-1 22   EOSINOPHILS ABSOLUTE COUNT 0 27 Thousand/?L  0 00-0 61   BASOPHILS ABSOLUTE COUNT 0 05 Thousands/?L  0 00-0 10     Fingerstick - POC 32SYV7396 11:22AM Christi Kern     Test Name Result Flag Reference   Glucose Finger Stick 187                   (1) CBC/PLT/DIFF 97UPP9236 06:17AM Brianne Lino   TW Order Number: [id number]  TW Order Number: [id number]     Test Name Result Flag Reference   WBC COUNT 8 85 Thousand/uL  4 31-10 16   RBC COUNT 4 50 Million/uL  3 88-5 62   HEMOGLOBIN 10 9 g/dL L 12 0-17 0   HEMATOCRIT 34 8 % L 36 5-49 3   MCV 77 fL L 82-98   MCH 24 2 pg L 26 8-34 3   MCHC 31 3 g/dL L 31 4-37 4   RDW 13 7 %  11 6-15 1   MPV 9 4 fL  8 9-12 7   PLATELET COUNT 323 Thousands/uL  149-390   NEUTROPHILS RELATIVE PERCENT 67 %  43-75   LYMPHOCYTES RELATIVE PERCENT 22 %  14-44   MONOCYTES RELATIVE PERCENT 7 %  4-12   EOSINOPHILS RELATIVE PERCENT 3 %  0-6   BASOPHILS RELATIVE PERCENT 1 %  0-1   NEUTROPHILS ABSOLUTE COUNT 6 05 Thousands/?L  1 85-7 62   LYMPHOCYTES ABSOLUTE COUNT 1 92 Thousands/?L  0 60-4 47   MONOCYTES ABSOLUTE COUNT 0 59 Thousand/?L  0 17-1 22   EOSINOPHILS ABSOLUTE COUNT 0 24 Thousand/?L  0 00-0 61   BASOPHILS ABSOLUTE COUNT 0 05 Thousands/?L  0 00-0 10     Diagnostic Studies Reviewed: I personally reviewed the films/images/results in the office today  My interpretation follows   Results   (1) RENAL FUNCTION PANEL 85HQF5637 06:17AM Christi Kern   TW Order Number: [id number]  TW Order Number: [id number]     Test Name Result Flag Reference   ANION GAP (CALC) 8 mmol/L  4-13   ALBUMIN 3 6 g/dL  3 5-5 0   BLOOD UREA NITROGEN 18 mg/dL  5-25 National Kidney Disease Education Program recommendations are as follows:  GFR calculation is accurate only with a steady state creatinine  Chronic Kidney disease less than 60 ml/min/1 73 sq  meters  Kidney failure less than 15 ml/min/1 73 sq  meters  CALCIUM 8 7 mg/dL  8 3-10 1   CHLORIDE 99 mmol/L L 100-108   CARBON DIOXIDE 29 mmol/L  21-32   CREATININE 1 00 mg/dL  0 60-1 30   Standardized to IDMS reference method   GLUCOSE,RANDM 219 mg/dL H    POTASSIUM 4 2 mmol/L  3 5-5 3   eGFR Non-African American      >60 0 ml/min/1 73sq m   SODIUM 136 mmol/L  136-145   PHOSPHORUS 2 8 mg/dL  2 7-4 5     (1) TSH WITH FT4 REFLEX 58KVE9881 06:17AM Lorri Aurelia   TW Order Number: [id number]  TW Order Number: [id number]     Test Name Result Flag Reference   TSH 1 909 uIU/mL  0 358-3 740     (1) CBC/PLT/DIFF 97WZQ5801 06:17AM Yesika Bland   TW Order Number: [id number]  TW Order Number: [id number]     Test Name Result Flag Reference   WBC COUNT 8 85 Thousand/uL  4 31-10 16   RBC COUNT 4 50 Million/uL  3 88-5 62   HEMOGLOBIN 10 9 g/dL L 12 0-17 0   HEMATOCRIT 34 8 % L 36 5-49 3   MCV 77 fL L 82-98   MCH 24 2 pg L 26 8-34 3   MCHC 31 3 g/dL L 31 4-37 4   RDW 13 7 %  11 6-15 1   MPV 9 4 fL  8 9-12 7   PLATELET COUNT 406 Thousands/uL  149-390   NEUTROPHILS RELATIVE PERCENT 67 %  43-75   LYMPHOCYTES RELATIVE PERCENT 22 %  14-44   MONOCYTES RELATIVE PERCENT 7 %  4-12   EOSINOPHILS RELATIVE PERCENT 3 %  0-6   BASOPHILS RELATIVE PERCENT 1 %  0-1   NEUTROPHILS ABSOLUTE COUNT 6 05 Thousands/?L  1 85-7 62   LYMPHOCYTES ABSOLUTE COUNT 1 92 Thousands/?L  0 60-4 47   MONOCYTES ABSOLUTE COUNT 0 59 Thousand/?L  0 17-1 22   EOSINOPHILS ABSOLUTE COUNT 0 24 Thousand/?L  0 00-0 61   BASOPHILS ABSOLUTE COUNT 0 05 Thousands/?L  0 00-0 10     (1) HEMOGLOBIN A1C 21Vfs4421 06:17AM Ten Ennis Order Number: [id number]  TW Order Number: [id number]     Test Name Result Flag Reference   HEMOGLOBIN A1C 7 5 % H 4 2-6 3   EST  AVG  GLUCOSE 169 mg/dl       (1) MICROALBUMIN CREATININE RATIO, RANDOM URINE 81JQE7576 06:17AM Almon Aschoff Order Number: [id number]   Order Number: [id number]     Test Name Result Flag Reference   MICROALBUMIN/ CREAT R 7 mg/g creatinine  0-30   MICROALBUMIN,URINE 5 2 mg/L  0 0-20 0   CREATININE URINE 78 6 mg/dL       (1) VITAMIN D 25-HYDROXY 07IFC4560 06:17A Almon Aschoff Order Number: [id number]  TW Order Number: [id number]     Test Name Result Flag Reference   VIT D 25-HYDROX 47 1 ng/mL  30 0-100 0     (1) PTH N-TERMINAL (INTACT) 24FAY8588 06:17AM Almon Aschoff Order Number: [id number]  TW Order Number: [id number]     Test Name Result Flag Reference   PARATHYROID HORMONE INTACT 37 9 pg/mL  14 0-72 0     Message   pls call him to inform that he still has anemia same as 3 days ago  he should call his PCP and GI docs to further address it  Health Management  History of colon polyps   COLONOSCOPY; every 5 years; Last 23DDI9386; Next Due: 60BAX0729; Active    Future Appointments    Date/Time Provider Specialty Site   06/20/2016 09:15 AM Chance Caicedo, [address] [place] [address] Internal Medicine [facility] PRIMARY CARE   08/26/2016 11:10 AM KODAK Westfall  Endocrinology [facility]   10/21/2016 08:00 AM KODAK Reeves   Gastroenterology Adult [address]     Verified Results  (1) CBC/PLT/DIFF 25CBT9698 12:05PM Xavi Bond   TW Order Number: [id number]  TW Order Number: [id number]     Test Name Result Flag Reference   WBC COUNT 8 98 Thousand/uL  4 31-10 16   RBC COUNT 4 58 Million/uL  3 88-5 62   HEMOGLOBIN 11 0 g/dL L 12 0-17 0   HEMATOCRIT 35 1 % L 36 5-49 3   MCV 77 fL L 82-98   MCH 24 0 pg L 26 8-34 3   MCHC 31 3 g/dL L 31 4-37 4   RDW 13 9 %  11 6-15 1   MPV 9 5 fL  8 9-12 7   PLATELET COUNT 425 Thousands/uL  149-390   NEUTROPHILS RELATIVE PERCENT 64 %  43-75   LYMPHOCYTES RELATIVE PERCENT 25 %  14-44   MONOCYTES RELATIVE PERCENT 7 %  4-12   EOSINOPHILS RELATIVE PERCENT 3 %  0-6   BASOPHILS RELATIVE PERCENT 1 %  0-1   NEUTROPHILS ABSOLUTE COUNT 5 77 Thousands/?L  1 85-7 62   LYMPHOCYTES ABSOLUTE COUNT 2 28 Thousands/?L  0 60-4 47   MONOCYTES ABSOLUTE COUNT 0 61 Thousand/?L  0 17-1 22   EOSINOPHILS ABSOLUTE COUNT 0 27 Thousand/?L  0 00-0 61   BASOPHILS ABSOLUTE COUNT 0 05 Thousands/?L  0 00-0 10       Signatures   Electronically signed by : KODAK Pleitez ; May 27 2016  1:00PM EST                       (Author)    Electronically signed by : KODAK Pleitez ; May 27 2016  1:03PM EST                       (Author)

## 2018-01-31 DIAGNOSIS — E66.9 OBESITY: ICD-10-CM

## 2018-01-31 DIAGNOSIS — Z96.41 PRESENCE OF INSULIN PUMP: ICD-10-CM

## 2018-01-31 DIAGNOSIS — E78.5 HYPERLIPIDEMIA: ICD-10-CM

## 2018-01-31 DIAGNOSIS — E03.9 HYPOTHYROIDISM: ICD-10-CM

## 2018-01-31 DIAGNOSIS — E10.65 TYPE 1 DIABETES MELLITUS WITH HYPERGLYCEMIA (HCC): ICD-10-CM

## 2018-02-20 DIAGNOSIS — E78.5 HYPERLIPIDEMIA, UNSPECIFIED HYPERLIPIDEMIA TYPE: Primary | ICD-10-CM

## 2018-02-20 RX ORDER — ROSUVASTATIN CALCIUM 10 MG/1
1 TABLET, COATED ORAL
COMMUNITY
Start: 2016-09-18 | End: 2018-02-20 | Stop reason: SDUPTHER

## 2018-02-20 RX ORDER — ROSUVASTATIN CALCIUM 10 MG/1
10 TABLET, COATED ORAL DAILY
Qty: 90 TABLET | Refills: 1 | Status: SHIPPED | OUTPATIENT
Start: 2018-02-20 | End: 2018-03-05 | Stop reason: SDUPTHER

## 2018-02-28 RX ORDER — BLOOD-GLUCOSE METER
EACH MISCELLANEOUS
COMMUNITY
Start: 2016-12-23

## 2018-02-28 RX ORDER — LEVOTHYROXINE SODIUM 0.2 MG/1
1 TABLET ORAL DAILY
COMMUNITY
Start: 2012-03-20 | End: 2018-04-17 | Stop reason: DRUGHIGH

## 2018-02-28 RX ORDER — AZELASTINE 1 MG/ML
1 SPRAY, METERED NASAL 2 TIMES DAILY
COMMUNITY
Start: 2011-05-02 | End: 2018-04-06 | Stop reason: SDUPTHER

## 2018-02-28 RX ORDER — ZOLPIDEM TARTRATE 10 MG/1
10 TABLET ORAL
COMMUNITY
Start: 2015-04-24 | End: 2018-04-06 | Stop reason: SDUPTHER

## 2018-02-28 RX ORDER — CELECOXIB 200 MG/1
1 CAPSULE ORAL 2 TIMES DAILY PRN
COMMUNITY
Start: 2011-05-14 | End: 2019-12-06

## 2018-02-28 RX ORDER — ESOMEPRAZOLE MAGNESIUM 40 MG/1
1 CAPSULE, DELAYED RELEASE ORAL DAILY
COMMUNITY
End: 2018-04-06 | Stop reason: SDUPTHER

## 2018-02-28 RX ORDER — NAPROXEN 500 MG/1
1 TABLET ORAL 2 TIMES DAILY PRN
COMMUNITY
End: 2018-08-06 | Stop reason: SDUPTHER

## 2018-02-28 RX ORDER — BIOTIN 1 MG
TABLET ORAL
COMMUNITY
Start: 2016-12-16 | End: 2018-03-05 | Stop reason: SDUPTHER

## 2018-03-02 ENCOUNTER — TRANSCRIBE ORDERS (OUTPATIENT)
Dept: LAB | Facility: CLINIC | Age: 53
End: 2018-03-02

## 2018-03-02 ENCOUNTER — LAB (OUTPATIENT)
Dept: LAB | Facility: CLINIC | Age: 53
End: 2018-03-02
Payer: COMMERCIAL

## 2018-03-02 DIAGNOSIS — E66.9 OBESITY: ICD-10-CM

## 2018-03-02 DIAGNOSIS — E03.9 HYPOTHYROIDISM: ICD-10-CM

## 2018-03-02 DIAGNOSIS — E78.5 HYPERLIPIDEMIA: ICD-10-CM

## 2018-03-02 DIAGNOSIS — Z96.41 PRESENCE OF INSULIN PUMP: ICD-10-CM

## 2018-03-02 DIAGNOSIS — E10.65 TYPE 1 DIABETES MELLITUS WITH HYPERGLYCEMIA (HCC): ICD-10-CM

## 2018-03-02 LAB
ALBUMIN SERPL BCP-MCNC: 3.7 G/DL (ref 3.5–5)
ALP SERPL-CCNC: 91 U/L (ref 46–116)
ALT SERPL W P-5'-P-CCNC: 28 U/L (ref 12–78)
ANION GAP SERPL CALCULATED.3IONS-SCNC: 8 MMOL/L (ref 4–13)
AST SERPL W P-5'-P-CCNC: 20 U/L (ref 5–45)
BILIRUB SERPL-MCNC: 0.4 MG/DL (ref 0.2–1)
BUN SERPL-MCNC: 19 MG/DL (ref 5–25)
CALCIUM SERPL-MCNC: 9.1 MG/DL (ref 8.3–10.1)
CHLORIDE SERPL-SCNC: 102 MMOL/L (ref 100–108)
CO2 SERPL-SCNC: 29 MMOL/L (ref 21–32)
CREAT SERPL-MCNC: 1 MG/DL (ref 0.6–1.3)
EST. AVERAGE GLUCOSE BLD GHB EST-MCNC: 174 MG/DL
GFR SERPL CREATININE-BSD FRML MDRD: 87 ML/MIN/1.73SQ M
GLUCOSE P FAST SERPL-MCNC: 159 MG/DL (ref 65–99)
HBA1C MFR BLD: 7.7 % (ref 4.2–6.3)
POTASSIUM SERPL-SCNC: 4.3 MMOL/L (ref 3.5–5.3)
PROT SERPL-MCNC: 7.4 G/DL (ref 6.4–8.2)
SODIUM SERPL-SCNC: 139 MMOL/L (ref 136–145)
T4 FREE SERPL-MCNC: 1.37 NG/DL (ref 0.76–1.46)
TSH SERPL DL<=0.05 MIU/L-ACNC: 0.43 UIU/ML (ref 0.36–3.74)

## 2018-03-02 PROCEDURE — 36415 COLL VENOUS BLD VENIPUNCTURE: CPT

## 2018-03-02 PROCEDURE — 84443 ASSAY THYROID STIM HORMONE: CPT

## 2018-03-02 PROCEDURE — 83036 HEMOGLOBIN GLYCOSYLATED A1C: CPT

## 2018-03-02 PROCEDURE — 80053 COMPREHEN METABOLIC PANEL: CPT

## 2018-03-02 PROCEDURE — 84439 ASSAY OF FREE THYROXINE: CPT

## 2018-03-02 NOTE — PROGRESS NOTES
This patient has upcoming appointment, no urgent lab results, will review with patient at that time

## 2018-03-05 ENCOUNTER — OFFICE VISIT (OUTPATIENT)
Dept: ENDOCRINOLOGY | Facility: CLINIC | Age: 53
End: 2018-03-05
Payer: COMMERCIAL

## 2018-03-05 VITALS
SYSTOLIC BLOOD PRESSURE: 118 MMHG | WEIGHT: 251.2 LBS | HEART RATE: 62 BPM | BODY MASS INDEX: 37.2 KG/M2 | DIASTOLIC BLOOD PRESSURE: 76 MMHG | HEIGHT: 69 IN

## 2018-03-05 DIAGNOSIS — E66.9 CLASS 2 OBESITY IN ADULT, UNSPECIFIED BMI, UNSPECIFIED OBESITY TYPE, UNSPECIFIED WHETHER SERIOUS COMORBIDITY PRESENT: ICD-10-CM

## 2018-03-05 DIAGNOSIS — E55.9 VITAMIN D DEFICIENCY: ICD-10-CM

## 2018-03-05 DIAGNOSIS — E10.8 TYPE 1 DIABETES MELLITUS WITH COMPLICATION (HCC): Primary | ICD-10-CM

## 2018-03-05 DIAGNOSIS — E03.9 HYPOTHYROIDISM, UNSPECIFIED TYPE: ICD-10-CM

## 2018-03-05 DIAGNOSIS — E78.5 HYPERLIPIDEMIA, UNSPECIFIED HYPERLIPIDEMIA TYPE: ICD-10-CM

## 2018-03-05 DIAGNOSIS — Z96.41 INSULIN PUMP IN PLACE: ICD-10-CM

## 2018-03-05 PROCEDURE — 99215 OFFICE O/P EST HI 40 MIN: CPT | Performed by: INTERNAL MEDICINE

## 2018-03-05 RX ORDER — ROSUVASTATIN CALCIUM 10 MG/1
10 TABLET, COATED ORAL DAILY
Qty: 90 TABLET | Refills: 1 | Status: SHIPPED | OUTPATIENT
Start: 2018-03-05 | End: 2018-09-24 | Stop reason: SDUPTHER

## 2018-03-05 RX ORDER — BLOOD-GLUCOSE METER
EACH MISCELLANEOUS
Refills: 0 | Status: CANCELLED | OUTPATIENT
Start: 2018-03-05

## 2018-03-05 RX ORDER — BIOTIN 1 MG
TABLET ORAL
Refills: 0
Start: 2018-03-05 | End: 2018-04-06 | Stop reason: ALTCHOICE

## 2018-03-05 NOTE — PROGRESS NOTES
New Patient Progress Note      No chief complaint on file  Referring Provider  Neva Fu Md  [facility], [address]     History of Present Illness:   Courtney Gitelman is a 47 y o  male with a history of type 1  diabetes with long term use of insulin since 31 yrs   Diabetes course has been stable  Reports No complications of diabetes   Denies recent illness or hospitalizations  Denies recent severe hypoglycemic or severe hyperglycemic episodes  Denies any issues with his current regimen  home glucose monitoring: are performed regularly  He has history of insulin resistance currently using T slim insulin pump and dex com G 5    Dex com C GMS interpretation done   He has pattern of nighttime high as between 10:00 p m  to 1 30 a m    Average C GM blood glucose 178 with standard deviation of 61  Average total daily dose of insulin is 91 units per day  HIS calibrate eating C GMS 3-4 times daily  Reviewed pump settings  Basal rates midnight 2 2 units/hour, 4:00 a m  2 2 units/hour, 6:00 a m  2 2 units/hour 8:00 a m  2 3 units/hour, 10:00 a m  2 3 units/hour known 2 0 units/hour 3:00 p m  2 2 units/hour 6:00 p m  2 2 units/hour 9:00 p m  2 2 units/hour total daily basal rate 52 6 units per 24 hour/    Correction factor-midnight 25, 4:00 a m  25, 6:00 a m  18, 8:00 a m  6:10 p m  a m  26, on known 18, 3:00 p m  18, 6:00 p m  18, 9:00 p m  25  Insulin to carb ratio-midnight 5, 4:00 a m  5 g 6:00 a m  5 g 8:00 a m  5 g 10:00 a m  5 g known 5 g 3:00 p m  5 g 6:00 p m  4 g 9:00 p m  4 g  Target 115 duration of insulin on board 4 hours  He was started on Victoza 6 months ago, currently taking Victoza 1 8 mg daily  He is also taking metformin 1000 mg twice a day    Home blood glucose readings:   Before breakfast:  200-300  Before lunch: 140-230   Before dinner: 100-140 mg/dl   Bedtime: 200-300 mg/dl     Changes insulin pump site every 3 days  Hypoglycemic episodes: No rare  H/o of hypoglycemia causing hospitalization or Intervention such as glucagon injection  or ambulance call :  No  Hypoglycemia symptoms: jitteriness and sweating  Treatment of hypoglycemia:  Glucose tablets     Medic alert tag: recommended: Yes    Diabetes education: Yes Date -   Diet: 2-3  meals per day, 1 snacks per day  Timing of meals is predictable  Yes  diabetic diet compliance:  compliant most of the time  Activity: Daily activity is predictable Yes   further diabetic ROS: no polyuria or polydipsia, no chest pain, dyspnea or TIAs, no numbness, tingling or pain in extremities, no unusual visual symptoms, no hypoglycemia, weight has increased      acute symptoms are weight gain     Opthamology: sees Ophthalmology regularly; no retinopathy, no macular edema  Podiatry: Follows with Podiatry      Has hyperlipidemia:  on statin - tolerating well, no myalgias  compliant most of the time  denies any side effects from medications    Thyroid disorders:  No history of thyroid nodules, he has hypothyroidism for which they taking levothyroxine 200 mcg daily  He takes it on empty stomach 1 hour before breakfast  He denies missing doses    History of pancreatitis:  No history    Patient had stress test done previously and it was normal (for CVD risk)    Component      Latest Ref Rng & Units 10/30/2017 3/2/2018   Vit D, 25-Hydroxy      30 0 - 100 0 ng/mL 55 6    Free T4      0 76 - 1 46 ng/dL  1 37   TSH 3RD GENERATON      0 358 - 3 740 uIU/mL  0 432     Component      Latest Ref Rng & Units 3/2/2018   Sodium      136 - 145 mmol/L 139   Potassium      3 5 - 5 3 mmol/L 4 3   Chloride      100 - 108 mmol/L 102   CO2      21 - 32 mmol/L 29   Anion Gap      4 - 13 mmol/L 8   BUN      5 - 25 mg/dL 19   Creatinine      0 60 - 1 30 mg/dL 1 00   GLUCOSE FASTING      65 - 99 mg/dL 159 (H)   Calcium      8 3 - 10 1 mg/dL 9 1   AST      5 - 45 U/L 20   ALT      12 - 78 U/L 28   Alkaline Phosphatase      46 - 116 U/L 91   Total Protein      6 4 - 8 2 g/dL 7 4   Albumin      3 5 - 5 0 g/dL 3 7   Total Bilirubin      0 20 - 1 00 mg/dL 0 40   eGFR      ml/min/1 73sq m 87   Hemoglobin A1C      4 2 - 6 3 % 7 7 (H)   EAG      mg/dl 174   Free T4      0 76 - 1 46 ng/dL 1 37   TSH 3RD GENERATON      0 358 - 3 740 uIU/mL 0 432       There is no problem list on file for this patient       Past Medical History:   Diagnosis Date    Diabetes mellitus ([address] )     Disease of thyroid gland       Past Surgical History:   Procedure Laterality Date    ROTATOR CUFF REPAIR Right       Family History   Problem Relation Age of Onset    Thyroid disease unspecified Mother     Osteoporosis Mother     Hypertension Father     Diabetes type II Sister     Hypertension Sister     Hyperlipidemia Sister     Thyroid disease unspecified Sister     Diabetes type II Brother     Hypertension Brother     Hyperlipidemia Brother      Social History   Substance Use Topics    Smoking status: Former Smoker    Smokeless tobacco: Never Used      Comment: quit in 2015    Alcohol use 1 2 oz/week     2 Glasses of wine per week     Allergies   Allergen Reactions    Ibuprofen GI Intolerance         Current Outpatient Prescriptions:     acetone, urine, test (KETOSTIX) strip, by In Vitro route, Disp: , Rfl:     azelastine (ASTELIN) 0 1 % nasal spray, 1 spray into each nostril 2 (two) times a day, Disp: , Rfl:     Blood Glucose Monitoring Suppl (ONE TOUCH ULTRA 2) w/Device KIT, by Does not apply route, Disp: , Rfl:     celecoxib (CELEBREX) 200 mg capsule, Take 1 capsule by mouth 2 (two) times a day as needed, Disp: , Rfl:     esomeprazole (NexIUM) 40 MG capsule, Take 1 capsule by mouth daily, Disp: , Rfl:     glucose blood (ONE TOUCH ULTRA TEST) test strip, by In Vitro route, Disp: , Rfl:     insulin aspart (NOVOLOG) 100 units/mL injection, Inject under the skin, Disp: , Rfl:     Insulin Pen Needle (BD PEN NEEDLE LYRIC U/F) 32G X 4 MM MISC, by Does not apply route daily, Disp: , Rfl:    levothyroxine (SYNTHROID) 200 mcg tablet, Take 1 tablet by mouth daily, Disp: , Rfl:     Liraglutide (VICTOZA) 18 MG/3ML SOPN, Inject 0 3 mL under the skin daily for 90 days, Disp: 27 mL, Rfl: 0    metFORMIN (GLUCOPHAGE) 1000 MG tablet, Take 1 tablet by mouth every 12 (twelve) hours, Disp: , Rfl:     MULTIPLE VITAMIN PO, Take by mouth, Disp: , Rfl:     naproxen (NAPROSYN) 500 mg tablet, Take 1 tablet by mouth 2 (two) times a day as needed  , Disp: , Rfl:     rosuvastatin (CRESTOR) 10 MG tablet, Take 1 tablet (10 mg total) by mouth daily, Disp: 90 tablet, Rfl: 1    zolpidem (AMBIEN) 10 mg tablet, Take 1 tablet by mouth, Disp: , Rfl:     Cholecalciferol (VITAMIN D3) 1000 units CAPS, Take one tablet daily, Disp: , Rfl: 0    glucagon (GLUCAGON EMERGENCY) 1 MG injection, Inject 1 mg under the skin once as needed for low blood sugar for up to 1 dose, Disp: 1 each, Rfl: 3    tamsulosin (FLOMAX) 0 4 mg, Take 1 capsule by mouth daily with dinner for 3 doses, Disp: 3 capsule, Rfl: 0  Review of Systems   Constitutional: Negative for activity change, appetite change, fatigue, fever and unexpected weight change  HENT: Negative  Eyes: Negative for visual disturbance  Respiratory: Negative  Cardiovascular: Negative  Gastrointestinal: Negative for abdominal pain, constipation, diarrhea and nausea  Endocrine: Negative for polydipsia, polyphagia and polyuria  Genitourinary: Negative  Musculoskeletal: Negative  Neurological: Negative  Psychiatric/Behavioral: Negative  Physical Exam:  Body mass index is 37 1 kg/m²  /76   Pulse 62   Ht 5' 9" (1 753 m)   Wt 114 kg (251 lb 3 2 oz)   BMI 37 10 kg/m²    Wt Readings from Last 3 Encounters:   03/05/18 114 kg (251 lb 3 2 oz)   12/22/17 112 kg (248 lb)   11/09/17 110 kg (243 lb)       Physical Exam   Constitutional: He is oriented to person, place, and time  He appears well-developed and well-nourished  No distress     HENT:   Head: Normocephalic and atraumatic  Eyes: Conjunctivae and EOM are normal  Pupils are equal, round, and reactive to light  Right eye exhibits no discharge  Left eye exhibits no discharge  Neck: Normal range of motion  Neck supple  No tracheal deviation present  No thyromegaly present  Cardiovascular: Normal rate, regular rhythm, normal heart sounds and intact distal pulses  Exam reveals no friction rub  No murmur heard  Pulmonary/Chest: Effort normal and breath sounds normal  No respiratory distress  Musculoskeletal: Normal range of motion  He exhibits no edema, tenderness or deformity  Lymphadenopathy:     He has no cervical adenopathy  Neurological: He is alert and oriented to person, place, and time  He has normal reflexes  He exhibits normal muscle tone  Coordination normal    Skin: Skin is warm and dry  No rash noted  He is not diaphoretic  No erythema  No pallor  Psychiatric: He has a normal mood and affect  His behavior is normal    Vitals reviewed  Diabetic Foot Exam    Labs:   No components found for: HA1C  No results found for: GLU    Lab Results   Component Value Date    CREATININE 1 00 03/02/2018    CREATININE 1 44 (H) 11/09/2017    CREATININE 0 98 10/30/2017    BUN 19 03/02/2018     03/02/2018    K 4 3 03/02/2018     03/02/2018    CO2 29 03/02/2018     eGFR   Date Value Ref Range Status   03/02/2018 87 ml/min/1 73sq m Final     No components found for: [facility]    Lab Results   Component Value Date    CHOL 152 12/12/2016    HDL 35 (L) 12/12/2016    TRIG 125 12/12/2016       Lab Results   Component Value Date    ALT 28 03/02/2018    AST 20 03/02/2018    ALKPHOS 91 03/02/2018    BILITOT 0 40 03/02/2018       Lab Results   Component Value Date    FREET4 1 37 03/02/2018       Impression:  1  Type 1 diabetes mellitus with complication (HCC)    2  Hypothyroidism, unspecified type    3  Hyperlipidemia, unspecified hyperlipidemia type    4  Insulin pump in place    5   Class 2 obesity in adult, unspecified BMI, unspecified obesity type, unspecified whether serious comorbidity present    6  Vitamin D deficiency         Problem List Items Addressed This Visit     None      Visit Diagnoses     Type 1 diabetes mellitus with complication ([facility] [address] )    -  Primary    Goal for Hba1c is  6 5%     , Hba1c at goal -no, HbA1c worsened   BGs have been labile ranging between 120 and 300  Check blood sugars  4  Times daily,  Changes made to regimen - yes, basal rate was increased to 2 4 at 3:00 p m , 2 4 at 6:00 p m  2 4 at 9:00 p m  2 4 at midnight and 2 6 units per hour at 4:00 a m  Change insulin to carb ratio to 1:3 at 3:00 p m  and 1:3 at 6:00 p m  Change ISF to 1-12 at 6:00 a m  and 8:00 a m  as well as at noon, 3:00 p m  6:00 p m  Raimelisa Nevilleton Change insulin correction factor  To  1:24 at 10:00 a m  Continue metformin and Victoza, at current doses  Patient understands that metformin and Victoza use in type 1 diabetes is off label, but he is agreeable to use it, as he has insulin resistance       Call office if blood sugar < 70 or >350  mg/dl   Goal for blood sugars  mg/dl   Discussed hypoglycemia symptoms and treatment   See diabetes instructions   counseled about the long term complications of uncontrolled diabetes, including, Nephropathy, Neuropathy, CVD, Retinopathy and importance  of adherence to diet, treatment plan and life style modifications   Follow up with Opthalmology and podiatry       Relevant Medications    metFORMIN (GLUCOPHAGE) 1000 MG tablet    insulin aspart (NOVOLOG) 100 units/mL injection    Liraglutide (VICTOZA) 18 MG/3ML SOPN    glucagon (GLUCAGON EMERGENCY) 1 MG injection    Other Relevant Orders    HEMOGLOBIN A1C W/ EAG ESTIMATION    Microalbumin / creatinine urine ratio    Comprehensive metabolic panel    Hypothyroidism, unspecified type      TSH is 0 4  Continue current dose of levothyroxine      Relevant Medications    levothyroxine (SYNTHROID) 200 mcg tablet    Hyperlipidemia, unspecified hyperlipidemia type        Lab Results   Component Value Date    LDLCALC 92 12/12/2016         Relevant Medications    rosuvastatin (CRESTOR) 10 MG tablet    Other Relevant Orders    Lipid panel    Insulin pump in place      See above     Class 2 obesity in adult, unspecified BMI, unspecified obesity type, unspecified whether serious comorbidity present        Vitamin D deficiency       Start vitamin D3 1000 International Units daily     Relevant Medications    Cholecalciferol (VITAMIN D3) 1000 units CAPS    Other Relevant Orders    Vitamin D 25 hydroxy        Discussed with the patient and all questioned fully answered  He will call me if any problems arise  Counseled patient on diagnostic results, prognosis, risk and benefit of treatment options, instruction for management, importance of treatment compliance, Risk  factor reduction and impressions    Patient Instructions   Diabetes instructions    1  Keep carbohydrate amounts consistent with meals  2  Increase physical activity and follow a healthy diet to lose weight  3  Try to get regular exercise most days of the week  Start slowly and increase as tolerated  Goal is 150 minutes per week spread out over at least 3 days a week  4  Blood pressure goal is less than 130/80 to help keep heart, kidneys and limbs healthy  5  Cholesterol goals are: LDL<70, Triglycerides< 150, HDL>40 (man) >50 (woman)  6  Treat low blood sugar with the rule of 15's   7  Carry rapid acting carbohydrate (glucose tablets, juice, or hard candy) at all times to treat low blood sugar  8  You should have Yearly urine microalbumin to test health of kidneys  9  Check feet every day to prevent sores and possible loss of limb  10  Wear a medic alert tag for diabetes  11  Obtain flu shot yearly  12  Obtain pneumovax through your PCP if not yet received  13  Test your blood sugar as directed during your visit   14  Call if blood sugar is less than 60 or greater than 400  15    Your target blood sugar is between 80- 160 mg/dl       Hypoglycemia instructions   Vivi Peoples  3/5/2018  [id number]    Low Blood Sugar    Steps to treat low blood sugar  1  Test blood sugar if you have symptoms of low blood sugar:   Low Blood Sugar Symptoms:  o Sweaty  o Dizzy  o Rapid heartbeat  o Shaky    o Bad mood  o Hungry      2  Treat blood sugar less than 70 with 15 grams of fast-acting carbohydrate:   Examples of 15 grams Fast-Acting Carbohydrate:  o 4 oz juice  o 4 oz regular soda  o 3-4 glucose tablets (chew)  o 3-4 hard candies (chew)              3    Wait 15 minutes and test your blood sugar again           4   If blood sugar is less than 100, repeat steps 2-3       5  When your blood sugar is 100 or more, eat a snack if it will be longer than one hour until your next meal  The snack should be 15 grams of carbohydrate and a protein:   Examples of snacks:  o ½ sandwich  o 6 crackers with cheese  o Piece of fruit with cheese or peanut butter  o 6 crackers with peanut butter          Jennifer Merritt MD

## 2018-03-05 NOTE — PATIENT INSTRUCTIONS
Diabetes instructions    1  Keep carbohydrate amounts consistent with meals  2  Increase physical activity and follow a healthy diet to lose weight  3  Try to get regular exercise most days of the week  Start slowly and increase as tolerated  Goal is 150 minutes per week spread out over at least 3 days a week  4  Blood pressure goal is less than 130/80 to help keep heart, kidneys and limbs healthy  5  Cholesterol goals are: LDL<70, Triglycerides< 150, HDL>40 (man) >50 (woman)  6  Treat low blood sugar with the rule of 15's   7  Carry rapid acting carbohydrate (glucose tablets, juice, or hard candy) at all times to treat low blood sugar  8  You should have Yearly urine microalbumin to test health of kidneys  9  Check feet every day to prevent sores and possible loss of limb  10  Wear a medic alert tag for diabetes  11  Obtain flu shot yearly  12  Obtain pneumovax through your PCP if not yet received  13  Test your blood sugar as directed during your visit   14  Call if blood sugar is less than 60 or greater than 400  15   Your target blood sugar is between 80- 160 mg/dl       Hypoglycemia instructions   German Goyal  3/5/2018  [id number]    Low Blood Sugar    Steps to treat low blood sugar  1  Test blood sugar if you have symptoms of low blood sugar:   Low Blood Sugar Symptoms:  o Sweaty  o Dizzy  o Rapid heartbeat  o Shaky    o Bad mood  o Hungry      2  Treat blood sugar less than 70 with 15 grams of fast-acting carbohydrate:   Examples of 15 grams Fast-Acting Carbohydrate:  o 4 oz juice  o 4 oz regular soda  o 3-4 glucose tablets (chew)  o 3-4 hard candies (chew)              3    Wait 15 minutes and test your blood sugar again           4   If blood sugar is less than 100, repeat steps 2-3       5  When your blood sugar is 100 or more, eat a snack if it will be longer than one hour until your next meal  The snack should be 15 grams of carbohydrate and a protein:   Examples of snacks:  o ½ sandwich  o 6 crackers with cheese  o Piece of fruit with cheese or peanut butter  o 6 crackers with peanut butter

## 2018-03-19 DIAGNOSIS — E10.8 TYPE 1 DIABETES MELLITUS WITH COMPLICATION (HCC): Primary | ICD-10-CM

## 2018-04-06 ENCOUNTER — OFFICE VISIT (OUTPATIENT)
Dept: INTERNAL MEDICINE CLINIC | Age: 53
End: 2018-04-06
Payer: COMMERCIAL

## 2018-04-06 VITALS
DIASTOLIC BLOOD PRESSURE: 70 MMHG | HEIGHT: 69 IN | HEART RATE: 75 BPM | WEIGHT: 251.8 LBS | TEMPERATURE: 97.4 F | OXYGEN SATURATION: 97 % | SYSTOLIC BLOOD PRESSURE: 112 MMHG | BODY MASS INDEX: 37.29 KG/M2

## 2018-04-06 DIAGNOSIS — M54.5 CHRONIC LOW BACK PAIN, UNSPECIFIED BACK PAIN LATERALITY, WITH SCIATICA PRESENCE UNSPECIFIED: ICD-10-CM

## 2018-04-06 DIAGNOSIS — L30.8 OTHER ECZEMA: ICD-10-CM

## 2018-04-06 DIAGNOSIS — K21.9 GASTROESOPHAGEAL REFLUX DISEASE, ESOPHAGITIS PRESENCE NOT SPECIFIED: ICD-10-CM

## 2018-04-06 DIAGNOSIS — G47.00 INSOMNIA, UNSPECIFIED TYPE: ICD-10-CM

## 2018-04-06 DIAGNOSIS — E55.9 VITAMIN D DEFICIENCY: ICD-10-CM

## 2018-04-06 DIAGNOSIS — J32.0 CHRONIC MAXILLARY SINUSITIS: Primary | ICD-10-CM

## 2018-04-06 DIAGNOSIS — G89.29 CHRONIC LOW BACK PAIN, UNSPECIFIED BACK PAIN LATERALITY, WITH SCIATICA PRESENCE UNSPECIFIED: ICD-10-CM

## 2018-04-06 DIAGNOSIS — J32.0 CHRONIC MAXILLARY SINUSITIS: ICD-10-CM

## 2018-04-06 PROBLEM — M54.9 CHRONIC BACK PAIN: Status: ACTIVE | Noted: 2017-08-07

## 2018-04-06 PROBLEM — IMO0001 OBESITY, CLASS II, BMI 35.0-39.9, WITH COMORBIDITY (SEE ACTUAL BMI): Status: ACTIVE | Noted: 2017-07-24

## 2018-04-06 PROBLEM — E10.65 TYPE 1 DIABETES MELLITUS WITH HYPERGLYCEMIA, WITH LONG-TERM CURRENT USE OF INSULIN (HCC): Status: ACTIVE | Noted: 2017-03-31

## 2018-04-06 PROCEDURE — 99213 OFFICE O/P EST LOW 20 MIN: CPT | Performed by: NURSE PRACTITIONER

## 2018-04-06 RX ORDER — AZELASTINE 1 MG/ML
1 SPRAY, METERED NASAL 2 TIMES DAILY
Qty: 30 ML | Refills: 3 | Status: SHIPPED | OUTPATIENT
Start: 2018-04-06 | End: 2018-04-06 | Stop reason: SDUPTHER

## 2018-04-06 RX ORDER — AZELASTINE 1 MG/ML
1 SPRAY, METERED NASAL 2 TIMES DAILY
Qty: 90 ML | Refills: 1 | Status: SHIPPED | OUTPATIENT
Start: 2018-04-06 | End: 2018-08-06 | Stop reason: SDUPTHER

## 2018-04-06 RX ORDER — ESOMEPRAZOLE MAGNESIUM 40 MG/1
40 CAPSULE, DELAYED RELEASE ORAL DAILY
Qty: 90 CAPSULE | Refills: 1 | Status: SHIPPED | OUTPATIENT
Start: 2018-04-06 | End: 2018-08-06 | Stop reason: SDUPTHER

## 2018-04-06 RX ORDER — ZOLPIDEM TARTRATE 10 MG/1
10 TABLET ORAL
Qty: 90 TABLET | Refills: 0 | Status: SHIPPED | OUTPATIENT
Start: 2018-04-06 | End: 2018-08-06 | Stop reason: SDUPTHER

## 2018-04-06 RX ORDER — AZELASTINE 1 MG/ML
1 SPRAY, METERED NASAL 2 TIMES DAILY
Qty: 90 ML | Refills: 1 | Status: SHIPPED | OUTPATIENT
Start: 2018-04-06 | End: 2018-04-06 | Stop reason: SDUPTHER

## 2018-04-06 RX ORDER — FLUTICASONE PROPIONATE 0.5 MG/ML
LOTION TOPICAL DAILY
Qty: 1 BOTTLE | Refills: 0 | Status: SHIPPED | OUTPATIENT
Start: 2018-04-06 | End: 2019-12-06 | Stop reason: ALTCHOICE

## 2018-04-06 NOTE — PROGRESS NOTES
Assessment/Plan:     Diagnoses and all orders for this visit:    Chronic maxillary sinusitis  Patient with chronic sinusitis  His WBCs were elevated on last CBC in November 2017  Will recheck CBC to check WBC and to keep an on on his H/H as he does have a past history of iron deficiency anemia   -     azelastine (ASTELIN) 0 1 % nasal spray; 1 spray into each nostril 2 (two) times a day  -     CBC and differential; Future    Gastroesophageal reflux disease, esophagitis presence not specified  Well controlled on current medication   -     esomeprazole (NexIUM) 40 MG capsule; Take 1 capsule (40 mg total) by mouth daily    Insomnia, unspecified type   Patient will continue on Ambien as needed for insomnia  Explained to patient that I do not feel this medication is contributing to his weight gain  I offered to switch to another agent Sujey Sung) but patient will continue on Ambien   -     zolpidem (AMBIEN) 10 mg tablet; Take 1 tablet (10 mg total) by mouth daily at bedtime as needed for sleep    Other eczema  Patient with use fluticasone for his eczema patch on left leg  Instructed patient to use for 4 weeks  If not improve, call our office  No other skin issues  -     fluticasone (CUTIVATE) 0 05 %; Apply topically daily    Vitamin D deficiency        Patient is scheduled for blood work in June 2018 which includes vitamin D level  Continue current supplementation  Chronic low back pain, unspecified back pain laterality, with sciatica presence unspecified        Well controlled at this time  Patient states he does not need to take NSAIDS on a daily basis  I did not refill his Celebrex at this time  Subjective:      Patient ID: Sarah Neff is a 47 y o  male  Patient returns for follow up of his chronic back pain, gastroesophageal reflux disease, vitamin D deficiency, and insomnia  He follows closely with endocrinology for his type 1 diabetes mellitus   He is on insulin and was started on Victoza 3 months ago which did help with weight loss and blood sugars initially  He states he has a very stressful job and isn't able to have consistency with his diet and exercise  He questions if Ambien could be causing weight gain as this was mentioned by endocrinology  He has routine blood work ordered by endocrinology with most recent being drawn in March 2018  His last lipid profile was in 2016 and I see that this is ordered to be drawn in June 2018  Patient states that endocrinology has been following his cholesterol/medication  Patient denies shortness of breath, fatigue, chest pain/pressure, leg claudication, vision changes, abdominal pain/bloating  Patient states his GERD is under good control  He sometimes misses taking his medication due to having to wait one hour after taking his thyroid medication  He denies abdominal pain, acid reflux, cough  His most recent vitamin D level was WNL  He states his back pain is relatively controlled with p r n  Celebrex or Aleve  He does not have to take any NSAIDS on a daily basis  For his insomnia, he states that Ambien does let him have a restful night sleep and he wakes up in the morning feeling refreshed  He does not have daytime somnolence and denies snoring  He did not have a sleep study performed as recommended on his last visit  The following portions of the patient's history were reviewed and updated as appropriate: allergies, current medications, past family history, past medical history, past social history, past surgical history and problem list     Review of Systems   Constitutional: Negative for activity change, appetite change, chills, fatigue, fever and unexpected weight change  HENT: Positive for tinnitus  Negative for congestion, ear discharge, ear pain, hearing loss, postnasal drip, rhinorrhea, sneezing and sore throat  Patient has chronic sinus issues and has increased "ringing" in his ears    He has followed with ENT in the past and is thinking of making a follow up appointment  Eyes: Negative  Respiratory: Negative for apnea, cough, chest tightness, shortness of breath and wheezing  Cardiovascular: Negative for chest pain, palpitations and leg swelling  Gastrointestinal: Negative for abdominal distention, abdominal pain, constipation, diarrhea, nausea and vomiting  Genitourinary: Negative for difficulty urinating, dysuria, frequency, testicular pain and urgency  Musculoskeletal: Positive for back pain  Skin: Negative for color change, pallor and rash  Patch of "eczema" on his left lower leg  Neurological: Negative for dizziness, syncope, light-headedness and headaches  Hematological: Negative for adenopathy  Does not bruise/bleed easily  Objective:  /70 (BP Location: Left arm, Patient Position: Sitting, Cuff Size: Standard)   Pulse 75   Temp (!) 97 4 °F (36 3 °C) (Tympanic)   Ht 5' 8 54" (1 741 m)   Wt 114 kg (251 lb 12 8 oz)   SpO2 97%   BMI 37 68 kg/m²      Physical Exam   Constitutional: He is oriented to person, place, and time  He appears well-developed and well-nourished  No distress  HENT:   Head: Normocephalic and atraumatic  Right Ear: External ear normal    Left Ear: External ear normal    Eyes: Conjunctivae and EOM are normal  Pupils are equal, round, and reactive to light  Neck: Normal range of motion  Neck supple  No JVD present  No thyromegaly present  Cardiovascular: Normal rate, regular rhythm and normal heart sounds  Pulmonary/Chest: Effort normal  He has no wheezes  Abdominal: Soft  Bowel sounds are normal  He exhibits no distension  There is no tenderness  Musculoskeletal: Normal range of motion  He exhibits no edema or tenderness  Lymphadenopathy:     He has no cervical adenopathy  Neurological: He is alert and oriented to person, place, and time  No cranial nerve deficit  Skin: Skin is warm and dry     Patch of dry, erythematous skin on left lower leg (dorsal side)   Psychiatric: He has a normal mood and affect   His behavior is normal  Judgment and thought content normal

## 2018-04-16 ENCOUNTER — APPOINTMENT (EMERGENCY)
Dept: RADIOLOGY | Facility: HOSPITAL | Age: 53
End: 2018-04-16
Payer: COMMERCIAL

## 2018-04-16 ENCOUNTER — HOSPITAL ENCOUNTER (EMERGENCY)
Facility: HOSPITAL | Age: 53
Discharge: HOME | End: 2018-04-16
Attending: EMERGENCY MEDICINE | Admitting: EMERGENCY MEDICINE
Payer: COMMERCIAL

## 2018-04-16 VITALS
OXYGEN SATURATION: 98 % | SYSTOLIC BLOOD PRESSURE: 130 MMHG | TEMPERATURE: 97.5 F | RESPIRATION RATE: 16 BRPM | HEART RATE: 70 BPM | DIASTOLIC BLOOD PRESSURE: 64 MMHG

## 2018-04-16 DIAGNOSIS — E03.9 HYPOTHYROID: ICD-10-CM

## 2018-04-16 DIAGNOSIS — R06.00 DYSPNEA: Primary | ICD-10-CM

## 2018-04-16 LAB
ALBUMIN SERPL BCP-MCNC: 3.9 G/DL (ref 3.5–5)
ALP SERPL-CCNC: 95 U/L (ref 46–116)
ALT SERPL W P-5'-P-CCNC: 35 U/L (ref 12–78)
ANION GAP SERPL CALCULATED.3IONS-SCNC: 12 MMOL/L (ref 4–13)
APTT PPP: 29 SECONDS (ref 23–35)
AST SERPL W P-5'-P-CCNC: 25 U/L (ref 5–45)
BASOPHILS # BLD AUTO: 0.04 THOUSANDS/ΜL (ref 0–0.1)
BASOPHILS NFR BLD AUTO: 0 % (ref 0–1)
BILIRUB SERPL-MCNC: 0.3 MG/DL (ref 0.2–1)
BUN SERPL-MCNC: 16 MG/DL (ref 5–25)
CALCIUM SERPL-MCNC: 8.7 MG/DL (ref 8.3–10.1)
CHLORIDE SERPL-SCNC: 103 MMOL/L (ref 100–108)
CO2 SERPL-SCNC: 26 MMOL/L (ref 21–32)
CREAT SERPL-MCNC: 1.09 MG/DL (ref 0.6–1.3)
EOSINOPHIL # BLD AUTO: 0.27 THOUSAND/ΜL (ref 0–0.61)
EOSINOPHIL NFR BLD AUTO: 3 % (ref 0–6)
ERYTHROCYTE [DISTWIDTH] IN BLOOD BY AUTOMATED COUNT: 13.2 % (ref 11.6–15.1)
GFR SERPL CREATININE-BSD FRML MDRD: 78 ML/MIN/1.73SQ M
GLUCOSE SERPL-MCNC: 93 MG/DL (ref 65–140)
HCT VFR BLD AUTO: 38.6 % (ref 36.5–49.3)
HGB BLD-MCNC: 12.8 G/DL (ref 12–17)
INR PPP: 0.94 (ref 0.86–1.16)
LYMPHOCYTES # BLD AUTO: 2.45 THOUSANDS/ΜL (ref 0.6–4.47)
LYMPHOCYTES NFR BLD AUTO: 23 % (ref 14–44)
MAGNESIUM SERPL-MCNC: 1.9 MG/DL (ref 1.6–2.6)
MCH RBC QN AUTO: 27.6 PG (ref 26.8–34.3)
MCHC RBC AUTO-ENTMCNC: 33.2 G/DL (ref 31.4–37.4)
MCV RBC AUTO: 83 FL (ref 82–98)
MONOCYTES # BLD AUTO: 0.62 THOUSAND/ΜL (ref 0.17–1.22)
MONOCYTES NFR BLD AUTO: 6 % (ref 4–12)
NEUTROPHILS # BLD AUTO: 7.29 THOUSANDS/ΜL (ref 1.85–7.62)
NEUTS SEG NFR BLD AUTO: 68 % (ref 43–75)
NT-PROBNP SERPL-MCNC: 28 PG/ML
PLATELET # BLD AUTO: 286 THOUSANDS/UL (ref 149–390)
PMV BLD AUTO: 9.6 FL (ref 8.9–12.7)
POTASSIUM SERPL-SCNC: 3.9 MMOL/L (ref 3.5–5.3)
PROT SERPL-MCNC: 7.6 G/DL (ref 6.4–8.2)
PROTHROMBIN TIME: 12.8 SECONDS (ref 12.1–14.4)
RBC # BLD AUTO: 4.64 MILLION/UL (ref 3.88–5.62)
SODIUM SERPL-SCNC: 141 MMOL/L (ref 136–145)
TROPONIN I SERPL-MCNC: <0.02 NG/ML
TSH SERPL DL<=0.05 MIU/L-ACNC: 0.24 UIU/ML (ref 0.36–3.74)
WBC # BLD AUTO: 10.67 THOUSAND/UL (ref 4.31–10.16)

## 2018-04-16 PROCEDURE — 83735 ASSAY OF MAGNESIUM: CPT | Performed by: EMERGENCY MEDICINE

## 2018-04-16 PROCEDURE — 93005 ELECTROCARDIOGRAM TRACING: CPT

## 2018-04-16 PROCEDURE — 83880 ASSAY OF NATRIURETIC PEPTIDE: CPT | Performed by: EMERGENCY MEDICINE

## 2018-04-16 PROCEDURE — 85730 THROMBOPLASTIN TIME PARTIAL: CPT | Performed by: EMERGENCY MEDICINE

## 2018-04-16 PROCEDURE — 84484 ASSAY OF TROPONIN QUANT: CPT | Performed by: EMERGENCY MEDICINE

## 2018-04-16 PROCEDURE — 80053 COMPREHEN METABOLIC PANEL: CPT | Performed by: EMERGENCY MEDICINE

## 2018-04-16 PROCEDURE — 71045 X-RAY EXAM CHEST 1 VIEW: CPT

## 2018-04-16 PROCEDURE — 85610 PROTHROMBIN TIME: CPT | Performed by: EMERGENCY MEDICINE

## 2018-04-16 PROCEDURE — 84443 ASSAY THYROID STIM HORMONE: CPT | Performed by: EMERGENCY MEDICINE

## 2018-04-16 PROCEDURE — 36415 COLL VENOUS BLD VENIPUNCTURE: CPT | Performed by: EMERGENCY MEDICINE

## 2018-04-16 PROCEDURE — 99285 EMERGENCY DEPT VISIT HI MDM: CPT

## 2018-04-16 PROCEDURE — 85025 COMPLETE CBC W/AUTO DIFF WBC: CPT | Performed by: EMERGENCY MEDICINE

## 2018-04-16 NOTE — DISCHARGE INSTRUCTIONS
Dyspnea   WHAT YOU NEED TO KNOW:   Dyspnea is breathing difficulty or discomfort  You may have labored, painful, or shallow breathing  You may feel breathless or short of breath  Dyspnea can occur during rest or with activity  You may have dyspnea for a short time, or it might become chronic  Dyspnea is often a symptom of a disease or condition  DISCHARGE INSTRUCTIONS:   Return to the emergency department if:   · Your signs and symptoms are the same or worse within 24 hours of treatment  · You have shaking chills or a fever over 102°F      · You have new pain, pressure, or tightness in your chest      · You have a new or worse cough or wheezing, or you cough up blood  · You feel like you cannot get enough air  · The skin over your ribs or on your neck sinks in when you breathe  · You have a severe headache with vomiting and abdominal pain  · You feel confused or dizzy  Contact your healthcare provider or specialist if:   · You have questions or concerns about your condition or care  Medicines:   · Medicines  may be used to treat the cause of your dyspnea  Medicines may reduce swelling in your airway or decrease extra fluid from around your heart or lungs  Other medicines may be used to decrease anxiety and help you feel calm and relaxed  · Take your medicine as directed  Contact your healthcare provider if you think your medicine is not helping or if you have side effects  Tell him or her if you are allergic to any medicine  Keep a list of the medicines, vitamins, and herbs you take  Include the amounts, and when and why you take them  Bring the list or the pill bottles to follow-up visits  Carry your medicine list with you in case of an emergency  Manage long-term dyspnea:   · Create an action plan  You and your healthcare provider can work together to create a plan for how to handle episodes of dyspnea   The plan can include daily activities, treatment changes, and what to do if you have severe breathing problems  · Lean forward on your elbows when you sit  This helps your lungs expand and may make it easier to breathe  · Use pursed-lip breathing any time you feel short of breath  Breathe in through your nose and then slowly breathe out through your mouth with your lips slightly puckered  It should take you twice as long to breathe out as it did to breathe in  · Do not smoke  Nicotine and other chemicals in cigarettes and cigars can cause lung damage and make it harder to breathe  Ask your healthcare provider for information if you currently smoke and need help to quit  E-cigarettes or smokeless tobacco still contain nicotine  Talk to your healthcare provider before you use these products  · Reach or maintain a healthy weight  Your healthcare provider can help you create a safe weight loss plan if you are overweight  · Exercise as directed  Exercise can help your lungs work more easily  Exercise can also help you lose weight if needed  Try to get at least 30 minutes of exercise most days of the week  Your healthcare provider can help you create an exercise plan that is safe for you  Follow up with your healthcare provider or specialist as directed:  Write down your questions so you remember to ask them during your visits  © 2017 [address] [employer]  Information is for End User's use only and may not be sold, redistributed or otherwise used for commercial purposes  All illustrations and images included in CareNotes® are the copyrighted property of [employer]  or Lyndon Pascual  The above information is an  only  It is not intended as medical advice for individual conditions or treatments  Talk to your doctor, nurse or pharmacist before following any medical regimen to see if it is safe and effective for you      Hypothyroidism   WHAT YOU NEED TO KNOW:   Hypothyroidism is a condition that develops when the thyroid gland does not make enough thyroid hormone  Thyroid hormones help control body temperature, heart rate, growth, and weight  DISCHARGE INSTRUCTIONS:   Call 911 for any of the following:   · You have sudden chest pain or shortness of breath  · You have a seizure  · You feel like you are going to faint  Return to the emergency department if:   · You have diarrhea, tremors, or trouble sleeping  · Your legs, ankles, or feet are swollen  Contact your healthcare provider if:   · You have a fever  · You have chills, a cough, or feel weak and achy  · You have pain and swelling in your muscles and joints  · Your skin is itchy, swollen, or you have a rash  · Your signs and symptoms return or get worse, even after treatment  · You have questions or concerns about your condition or care  Medicines:   · Thyroid hormone replacement medicine  helps bring your thyroid hormone level back to normal     · Take your medicine as directed  Contact your healthcare provider if you think your medicine is not helping or if you have side effects  Tell him or her if you are allergic to any medicine  Keep a list of the medicines, vitamins, and herbs you take  Include the amounts, and when and why you take them  Bring the list or the pill bottles to follow-up visits  Carry your medicine list with you in case of an emergency  Follow up with your healthcare provider as directed: You may need to return for more blood tests to check your thyroid hormone level  This will show if you are getting the right amount of thyroid medicine  Write down your questions so you remember to ask them during your visits  © 2017 [employer] [employer] [place] Information is for End User's use only and may not be sold, redistributed or otherwise used for commercial purposes  All illustrations and images included in CareNotes® are the copyrighted property of A D A M [employer]  or Lyndon Pasc[employer]  The above information is an  only   It is not intended as medical advice for individual conditions or treatments  Talk to your doctor, nurse or pharmacist before following any medical regimen to see if it is safe and effective for you

## 2018-04-17 DIAGNOSIS — E03.9 HYPOTHYROIDISM, UNSPECIFIED TYPE: Primary | ICD-10-CM

## 2018-04-17 DIAGNOSIS — E10.8 TYPE 1 DIABETES MELLITUS WITH COMPLICATION (HCC): ICD-10-CM

## 2018-04-17 LAB
ATRIAL RATE: 72 BPM
P AXIS: 57 DEGREES
PR INTERVAL: 168 MS
QRS AXIS: 13 DEGREES
QRSD INTERVAL: 86 MS
QT INTERVAL: 396 MS
QTC INTERVAL: 425 MS
T WAVE AXIS: 49 DEGREES
VENTRICULAR RATE: 69 BPM

## 2018-04-17 PROCEDURE — 93010 ELECTROCARDIOGRAM REPORT: CPT | Performed by: INTERNAL MEDICINE

## 2018-04-17 RX ORDER — LEVOTHYROXINE SODIUM 175 MCG
175 TABLET ORAL DAILY
Qty: 90 TABLET | Refills: 1 | Status: SHIPPED | OUTPATIENT
Start: 2018-04-17 | End: 2018-10-05 | Stop reason: SDUPTHER

## 2018-04-17 NOTE — TELEPHONE ENCOUNTER
----- Message from Cristi Collado, [address] sent at 4/17/2018  9:44 AM EDT -----  Pt called, was in ER yesterday  TSH was abnormal  Pt would like you to review labs and call with any adjustments  Per pt he is taking synthroid 200mcg daily

## 2018-04-17 NOTE — TELEPHONE ENCOUNTER
Please inform pt to reduce the dose of levothyroxine to 175 mcg po daily, thanks   Please put new RX in , thanks   He will also need repeat TSH and free t4 in 6 weeks for follow up     Ronda Archuleta MD

## 2018-04-18 DIAGNOSIS — E10.8 TYPE 1 DIABETES MELLITUS WITH COMPLICATION (HCC): ICD-10-CM

## 2018-04-23 DIAGNOSIS — IMO0001 INSULIN DEPENDENT DIABETES MELLITUS: Primary | ICD-10-CM

## 2018-04-30 ENCOUNTER — AMB VIDEO VISIT (OUTPATIENT)
Dept: OTHER | Facility: HOSPITAL | Age: 53
End: 2018-04-30

## 2018-05-10 DIAGNOSIS — E10.8 TYPE 1 DIABETES MELLITUS WITH COMPLICATION (HCC): ICD-10-CM

## 2018-05-29 DIAGNOSIS — IMO0002 TYPE I DIABETES MELLITUS WITH MANIFESTATIONS, UNCONTROLLED: Primary | ICD-10-CM

## 2018-06-01 NOTE — CARE ANYWHERE EVISITS
Visit Summary for ROYCE Freitas [facility] - Gender: Male - Date of Birth: [id number]  Date: [id number] - Duration: 4 minutes  Patient: ROYCE Freitas [facility]  Provider: Julienne Hopkins    Patient Contact Information  Address  [address] [facility] [address]   [facility]; [address]  [phone]    Visit Topics  Sinus Infection [Added By: Self - 2018-04-30]    Conversation Transcripts     [Notification] You are connected with Family [facility] Physician  [Notification] Senia Augustin is located in [place]  [Notification] Senia Augustin has shared health history         Diagnosis    Procedures  Value: [id number] Code: CPT-4 UNLISTED E&M SERVICE    Medications Prescribed    No prescriptions ordered    Electronically signed byJean-Paul River(NPI [id number])

## 2018-06-03 DIAGNOSIS — E03.9 HYPOTHYROIDISM, UNSPECIFIED TYPE: Primary | ICD-10-CM

## 2018-06-04 RX ORDER — LEVOTHYROXINE SODIUM 200 MCG
TABLET ORAL
Qty: 90 TABLET | Refills: 1 | Status: SHIPPED | OUTPATIENT
Start: 2018-06-04 | End: 2018-08-06 | Stop reason: ALTCHOICE

## 2018-06-08 ENCOUNTER — DOCUMENTATION (OUTPATIENT)
Dept: ENDOCRINOLOGY | Facility: CLINIC | Age: 53
End: 2018-06-08

## 2018-08-06 ENCOUNTER — OFFICE VISIT (OUTPATIENT)
Dept: INTERNAL MEDICINE CLINIC | Age: 53
End: 2018-08-06
Payer: COMMERCIAL

## 2018-08-06 VITALS
BODY MASS INDEX: 37.29 KG/M2 | TEMPERATURE: 97.9 F | DIASTOLIC BLOOD PRESSURE: 80 MMHG | WEIGHT: 251.8 LBS | SYSTOLIC BLOOD PRESSURE: 124 MMHG | HEART RATE: 66 BPM | HEIGHT: 69 IN | OXYGEN SATURATION: 99 %

## 2018-08-06 DIAGNOSIS — K21.9 GASTROESOPHAGEAL REFLUX DISEASE, ESOPHAGITIS PRESENCE NOT SPECIFIED: ICD-10-CM

## 2018-08-06 DIAGNOSIS — G44.019 EPISODIC CLUSTER HEADACHE, NOT INTRACTABLE: Primary | ICD-10-CM

## 2018-08-06 DIAGNOSIS — J32.0 CHRONIC MAXILLARY SINUSITIS: ICD-10-CM

## 2018-08-06 DIAGNOSIS — G47.00 INSOMNIA, UNSPECIFIED TYPE: ICD-10-CM

## 2018-08-06 PROBLEM — J30.1 SEASONAL ALLERGIC RHINITIS DUE TO POLLEN: Status: ACTIVE | Noted: 2018-08-06

## 2018-08-06 PROCEDURE — 99213 OFFICE O/P EST LOW 20 MIN: CPT | Performed by: NURSE PRACTITIONER

## 2018-08-06 RX ORDER — ZOLPIDEM TARTRATE 10 MG/1
10 TABLET ORAL
Qty: 90 TABLET | Refills: 1 | Status: SHIPPED | OUTPATIENT
Start: 2018-08-06 | End: 2019-06-07 | Stop reason: SDUPTHER

## 2018-08-06 RX ORDER — AZELASTINE 1 MG/ML
1 SPRAY, METERED NASAL 2 TIMES DAILY
Qty: 90 ML | Refills: 1 | Status: SHIPPED | OUTPATIENT
Start: 2018-08-06 | End: 2019-06-07 | Stop reason: SDUPTHER

## 2018-08-06 RX ORDER — NAPROXEN 500 MG/1
500 TABLET ORAL 2 TIMES DAILY WITH MEALS
Qty: 60 TABLET | Refills: 0 | Status: SHIPPED | OUTPATIENT
Start: 2018-08-06 | End: 2018-10-16 | Stop reason: SDUPTHER

## 2018-08-06 RX ORDER — TRAMADOL HYDROCHLORIDE 50 MG/1
50 TABLET ORAL EVERY 6 HOURS PRN
Qty: 30 TABLET | Refills: 0 | Status: SHIPPED | OUTPATIENT
Start: 2018-08-06 | End: 2019-04-12

## 2018-08-06 RX ORDER — ESOMEPRAZOLE MAGNESIUM 40 MG/1
40 CAPSULE, DELAYED RELEASE ORAL DAILY
Qty: 90 CAPSULE | Refills: 1 | Status: SHIPPED | OUTPATIENT
Start: 2018-08-06 | End: 2019-12-06 | Stop reason: SDUPTHER

## 2018-08-06 NOTE — PROGRESS NOTES
Assessment/Plan:     Diagnoses and all orders for this visit:    Episodic cluster headache, not intractable    I did review the patient's symptoms with Dr Balbuena Pain  Patient does not tolerate ibuprofen type drugs and therefore he did not want to chance and a Toradol injection causing any adverse reactions as he does need to leave for [facility] tomorrow morning  At he states that naproxen has helped in the past and therefore I did refill his naproxen 500 mg to take 1 tablet twice a day with meals as needed for headache  I also gave him a prescription for tramadol 50 mg tablets for which she can take if the dose of naproxen 500 mg does not help  I asked him to wait at least 4-6 hours before taking the tramadol and recommended that he only take tramadol for severe pain  He will be in a ball tomorrow the entire week and not return until Friday evening  I asked him to give me a call should his symptoms worsen or not improve with these medications  I also gave him some information on cluster headaches   -     naproxen (NAPROSYN) 500 mg tablet; Take 1 tablet (500 mg total) by mouth 2 (two) times a day with meals  -     traMADol (ULTRAM) 50 mg tablet; Take 1 tablet (50 mg total) by mouth every 6 (six) hours as needed for moderate pain    Gastroesophageal reflux disease, esophagitis presence not specified  Symptoms are stable on Nexium 40 mg daily  I have refilled this medication for him  -     esomeprazole (NexIUM) 40 MG capsule; Take 1 capsule (40 mg total) by mouth daily    Insomnia, unspecified type    I did refill patient's Ambien 10 mg tablets  As mentioned, patient does take Ambien half a tablet every other night as needed for insomnia  -     zolpidem (AMBIEN) 10 mg tablet; Take 1 tablet (10 mg total) by mouth daily at bedtime as needed for sleep    Chronic maxillary sinusitis and seasonal allergies  I have refilled the Astelin nasal spray        I have requested that he follow up the office in 6 months  He does know to call the office should his symptoms worsen or not improve  Subjective:      Patient ID: Rebecca Germain is a 47 y o  male  Patient is being seen today due to left-sided headache which she has had on and off the past 2 months  He states the most recent episode started yesterday  He states that he was unable to sleep last night due to the pain which she describes as a sharp pain behind his left eye which radiates to the back of his head  He states that Aleve use to help his headaches but no longer does  He is not able to take any form of acetaminophen due to interference with his insulin monitor  Has had tinnitus for years and it is getting louder  He has had headaches in the past which were similar to his present headache but never this often or as severe  He denies family history of headaches  He states he does get light sensitivity but does not have any other eye symptoms  The patient was seen back in April due to chronic sinusitis and for his chronic medical conditions including gastroesophageal reflux disease, insomnia, and allergies  He states that his acid reflux is controlled with the Nexium 40 mg daily  He states that he has tried in the past to decrease the Nexium 20 mg but his symptoms return  He states that he is still does use Ambien for his Insomnia  He states he seems to have to use it half tablet every other night  He travels a lot for employment and feels that sleeping in different hotels does not help his sleeping  The following portions of the patient's history were reviewed and updated as appropriate: allergies, current medications, past family history, past medical history, past social history, past surgical history and problem list     Review of Systems   Constitutional: Negative for activity change, appetite change, chills and fatigue  HENT: Positive for tinnitus (chronic)   Negative for congestion, sinus pain, sinus pressure and sore throat  Eyes: Negative  Respiratory: Negative for cough, chest tightness, shortness of breath and wheezing  Cardiovascular: Negative for chest pain, palpitations and leg swelling  Gastrointestinal: Negative  Genitourinary: Negative  Musculoskeletal: Negative  Skin: Negative  Neurological: Positive for light-headedness and headaches  Negative for dizziness and syncope  Hematological: Negative for adenopathy  Does not bruise/bleed easily  Objective:    /80 (BP Location: Left arm, Patient Position: Sitting, Cuff Size: Standard)   Pulse 66   Temp 97 9 °F (36 6 °C) (Tympanic)   Ht 5' 8 5" (1 74 m)   Wt 114 kg (251 lb 12 8 oz)   SpO2 99%   BMI 37 72 kg/m²      Physical Exam   Constitutional: He is oriented to person, place, and time  He appears well-developed and well-nourished  HENT:   Head: Normocephalic and atraumatic  Right Ear: External ear normal    Nose: Nose normal    Mouth/Throat: Oropharynx is clear and moist    Slight erythema of left ear  Eyes: Conjunctivae and EOM are normal  Pupils are equal, round, and reactive to light  Left eye exhibits no discharge  No scleral icterus  Neck: Normal range of motion  Neck supple  No JVD present  No thyromegaly present  Cardiovascular: Normal rate and regular rhythm  Pulmonary/Chest: Effort normal and breath sounds normal    Lymphadenopathy:     He has no cervical adenopathy  Neurological: He is alert and oriented to person, place, and time  No cranial nerve deficit  Coordination normal    Skin: Skin is warm and dry  Psychiatric: He has a normal mood and affect   His behavior is normal  Judgment and thought content normal

## 2018-08-06 NOTE — PATIENT INSTRUCTIONS
Take naprosyn today  If symptoms do not improve, then take Tramadol 1 tablet  Call the office if your symptoms do not improve or worsen  Cluster Headache   WHAT YOU NEED TO KNOW:   A cluster headache is a very painful headache that starts quickly, peaks within 15 minutes, and stops suddenly  The headache usually lasts 30 to 60 minutes but can last up to 3 hours  Cluster headaches follow patterns and often occur at the same time of the day or year  You may have cluster headaches once every other day, or up to 8 each day  A cluster period usually lasts for 2 to 12 weeks but can last longer than a year  Weeks or months may pass before a new cluster period begins  A cluster headache can be triggered by alcohol, medicine, stress, bright light, or heat  DISCHARGE INSTRUCTIONS:   You or someone close to you should call 911 if:   · Your pain is so bad you think about committing suicide  Return to the emergency department if:   · You feel tired or sleepy  · You cannot see clearly  · Your stomach is upset or you are vomiting  · You have a seizure  Contact your healthcare provider or neurologist if:   · You cannot get enough sleep because of your headaches  · Your headaches happen each time you are active  · Treatment does not help your symptoms  · You have questions or concerns about your condition or care  Medicines: You may need any of the following:  · Migraine medicine  may be given to relieve your pain quickly  · Steroids  may help reduce pain and swelling  They may also be used to prevent cluster headaches  · Seizure medicine  may be given to prevent cluster headaches  · Mood stabilizers  may be given to prevent cluster headaches  This medicine helps balance chemicals in your brain  · Take your medicine as directed  Contact your healthcare provider if you think your medicine is not helping or if you have side effects   Tell him of her if you are allergic to any medicine  Keep a list of the medicines, vitamins, and herbs you take  Include the amounts, and when and why you take them  Bring the list or the pill bottles to follow-up visits  Carry your medicine list with you in case of an emergency  Manage cluster headaches:   · Do not smoke  Cluster headaches are more common among smokers  Ask your healthcare provider for information if you currently smoke and need help to quit  E-cigarettes or smokeless tobacco still contain nicotine  Talk to your healthcare provider before you use these products  · Do not drink alcohol during a cluster period  Alcohol triggers more headaches during cluster periods  · Do not travel between altitudes  Altitude changes can trigger headaches  Do not fly on an airplane or travel between places with high and low altitudes  · Set a regular sleep schedule  Go to sleep and wake up at the same times each day  Changes in sleep patterns may trigger cluster headaches  · Manage stress  Stress, long hours at work, and emotional challenges can trigger cluster headaches  Find out what works for you to lower stress  · Keep a headache record  Write down when your headaches start and stop, and exactly what you were doing when they began  Record what you ate or drank and how much you slept in the 24 hours before the headache  Keep track of the things you did to treat your symptoms  Write down if they did or did not help  Do this to learn what triggers your headaches and how to make them go away  · Work with your healthcare provider to manage your pain  Both pain relievers and medicines used to treat other health conditions can trigger cluster headaches  Go over all your medicines with your healthcare provider  Work with your provider to manage your headache pain and other conditions  Follow up with your healthcare provider or neurologist as directed:  Write down your questions so you remember to ask them during your visits    © 2017 [address] [facility] Information is for End User's use only and may not be sold, redistributed or otherwise used for commercial purposes  All illustrations and images included in CareNotes® are the copyrighted property of A D A M [employer]  or Lyndon [employer]asc[employer]  The above information is an  only  It is not intended as medical advice for individual conditions or treatments  Talk to your doctor, nurse or pharmacist before following any medical regimen to see if it is safe and effective for you

## 2018-08-13 DIAGNOSIS — E10.8 TYPE 1 DIABETES MELLITUS WITH COMPLICATION (HCC): ICD-10-CM

## 2018-08-30 DIAGNOSIS — E10.8 TYPE 1 DIABETES MELLITUS WITH COMPLICATION (HCC): Primary | ICD-10-CM

## 2018-09-12 ENCOUNTER — TELEPHONE (OUTPATIENT)
Dept: ENDOCRINOLOGY | Facility: CLINIC | Age: 53
End: 2018-09-12

## 2018-09-12 NOTE — TELEPHONE ENCOUNTER
Patient has all the labs ordered from March 5, 2018   He can use same lab orders    Ronda Archuleta MD

## 2018-09-18 LAB — HBA1C MFR BLD HPLC: 7.3 %

## 2018-09-21 ENCOUNTER — TELEPHONE (OUTPATIENT)
Dept: ENDOCRINOLOGY | Facility: CLINIC | Age: 53
End: 2018-09-21

## 2018-09-21 NOTE — TELEPHONE ENCOUNTER
Patient had a change in DME suppliers and called here for his supples  Advised pt to contact new DME and have them fax over order  Later I had a call from [address] who had Mr Emilie Redman on the other line who was screaming at him  They are not the DME; they will contact [employer] for supplies for patient  Gave them our fax number for any orders that need to be signed

## 2018-09-24 ENCOUNTER — OFFICE VISIT (OUTPATIENT)
Dept: ENDOCRINOLOGY | Facility: CLINIC | Age: 53
End: 2018-09-24
Payer: COMMERCIAL

## 2018-09-24 VITALS
HEART RATE: 80 BPM | SYSTOLIC BLOOD PRESSURE: 118 MMHG | WEIGHT: 253 LBS | BODY MASS INDEX: 37.47 KG/M2 | DIASTOLIC BLOOD PRESSURE: 72 MMHG | HEIGHT: 69 IN

## 2018-09-24 DIAGNOSIS — E10.8 TYPE 1 DIABETES MELLITUS WITH COMPLICATION (HCC): Primary | ICD-10-CM

## 2018-09-24 DIAGNOSIS — E03.9 HYPOTHYROIDISM, UNSPECIFIED TYPE: ICD-10-CM

## 2018-09-24 DIAGNOSIS — E78.5 HYPERLIPIDEMIA, UNSPECIFIED HYPERLIPIDEMIA TYPE: ICD-10-CM

## 2018-09-24 DIAGNOSIS — Z96.41 INSULIN PUMP IN PLACE: ICD-10-CM

## 2018-09-24 DIAGNOSIS — E66.9 CLASS 2 OBESITY IN ADULT, UNSPECIFIED BMI, UNSPECIFIED OBESITY TYPE, UNSPECIFIED WHETHER SERIOUS COMORBIDITY PRESENT: ICD-10-CM

## 2018-09-24 DIAGNOSIS — E55.9 VITAMIN D DEFICIENCY: ICD-10-CM

## 2018-09-24 PROCEDURE — 99214 OFFICE O/P EST MOD 30 MIN: CPT | Performed by: INTERNAL MEDICINE

## 2018-09-24 PROCEDURE — 95251 CONT GLUC MNTR ANALYSIS I&R: CPT | Performed by: INTERNAL MEDICINE

## 2018-09-24 RX ORDER — ROSUVASTATIN CALCIUM 10 MG/1
10 TABLET, COATED ORAL DAILY
Qty: 90 TABLET | Refills: 1 | Status: SHIPPED | OUTPATIENT
Start: 2018-09-24 | End: 2019-09-23 | Stop reason: SDUPTHER

## 2018-09-24 NOTE — PROGRESS NOTES
Follow up  Patient Progress Note         Referring Provider  [facility] [address]         History of Present Illness:   Angle Cabrera is a 47 y o  male with a history of type 1  diabetes with long term use of insulin since 31 yrs   Diabetes course has been stable  Reports No complications of diabetes   Denies recent illness or hospitalizations  Denies recent severe hypoglycemic or severe hyperglycemic episodes  Denies any issues with his current regimen  home glucose monitoring: are performed regularly he checks blood sugars twice daily, to calibrate continues glucose sensor    He has history of insulin resistance currently using T slim insulin pump and dex com G 5  Lab Results   Component Value Date    HGBA1C 7 3 09/18/2018       Dex com C GMS interpretation done   Average glucose readings 170 mg/dL  Standard deviation of 62 mg/dL  Minimal risk of hypoglycemia  45% times blood sugars elevated above 180 mg/dL range  He has pattern of significant high blood sugars between 11:50 p m  and 6:00 a m , which could be related to insulin to carb ratio      Basal rates midnight 2 9 units/hour, 4:00 a m  2 8 units/hour, 6:00 a m  2 2 units/hour 8:00 a m  2 3 units/hour, 10:00 a m  2 3 units/hour noon-  2 0 units/hour 3:00 p m  2 2 units/hour 6:00 p m  2 2 units/hour 9:00 p m  2 4 units/hour total daily basal rate 57 2 units per 24 hour/    Correction factor-midnight 25, 4:00 a m  25, 6:00 a m  12,  8:00 a m - 12,  10  a m  24, noon- 12, 3:00 p m  12, 6:00 p m  12, 9:00 p m  25  Insulin to carb ratio-midnight 5, 4:00 a m  5 g 6:00 a m  5 g 8:00 a m  5 g 10:00 a m  5 g known 5 g 3:00 p m  3 g 6:00 p m  3 g 9:00 p m  3 g  Target 115 duration of insulin on board 4 hours  He was started on Victoza 6 months ago, currently taking Victoza 1 8 mg daily, patient is aware that it is not FDA approved to use for type 1 diabetes, but he still wants to continue to be on Victoza, as it is helping him for better glycemic control, as well as to keep weight stable  He is also taking metformin 1000 mg twice a day    Changes insulin pump site every 3 days  Denies Hypoglycemic episodes, less than 60 frequently  Denies H/o of hypoglycemia causing hospitalization or Intervention such as glucagon injection  or ambulance call :  No  Hypoglycemia symptoms: jitteriness and sweating  Treatment of hypoglycemia:  Glucose tablets     Medic alert tag: recommended: Yes  Opthamology: sees Ophthalmology regularly; no retinopathy, no macular edema  Podiatry: Follows with Podiatry      Has hyperlipidemia:  on statin , Crestor 10 mg daily- tolerating well, no myalgias  compliant most of the time  denies any side effects from medications    Thyroid disorders:  No history of thyroid nodules, he has hypothyroidism for which they taking levothyroxine 175 mcg daily  He takes it on empty stomach 1 hour before breakfast  No blood work prior to this visit, his dose was reduced to 175 from 200 2 months ago  He denies missing doses    Patient had stress test done previously and it was normal (for CVD risk)    Wt Readings from Last 3 Encounters:   09/24/18 115 kg (253 lb)   08/06/18 114 kg (251 lb 12 8 oz)   04/06/18 114 kg (251 lb 12 8 oz)       Component      Latest Ref Rng & Units 4/16/2018 9/18/2018   Sodium      136 - 145 mmol/L 141    Potassium      3 5 - 5 3 mmol/L 3 9    Chloride      100 - 108 mmol/L 103    CO2      21 - 32 mmol/L 26    Anion Gap      4 - 13 mmol/L 12    BUN      5 - 25 mg/dL 16    Creatinine      0 60 - 1 30 mg/dL 1 09    Glucose      65 - 140 mg/dL 93    Calcium      8 3 - 10 1 mg/dL 8 7    AST (SGOT)      5 - 45 U/L 25    ALT      12 - 78 U/L 35    ALK PHOS      46 - 116 U/L 95    Total Protein      6 4 - 8 2 g/dL 7 6    Albumin      3 5 - 5 0 g/dL 3 9    TOTAL BILIRUBIN      0 20 - 1 00 mg/dL 0 30    eGFR      ml/min/1 73sq m 78    Magnesium      1 6 - 2 6 mg/dL 1 9    Troponin I      <=0 04 ng/mL <0 02 NT-proBNP      <125 pg/mL 28    TSH 3RD GENERATON      0 358 - 3 740 uIU/mL 0 236 (L)    Hemoglobin A1C        7 3     Component      Latest Ref Rng & Units 10/30/2017 3/2/2018   Vit D, 25-Hydroxy      30 0 - 100 0 ng/mL 55 6    Free T4      0 76 - 1 46 ng/dL  1 37   TSH 3RD GENERATON      0 358 - 3 740 uIU/mL  0 432     Component      Latest Ref Rng & Units 3/2/2018   Sodium      136 - 145 mmol/L 139   Potassium      3 5 - 5 3 mmol/L 4 3   Chloride      100 - 108 mmol/L 102   CO2      21 - 32 mmol/L 29   Anion Gap      4 - 13 mmol/L 8   BUN      5 - 25 mg/dL 19   Creatinine      0 60 - 1 30 mg/dL 1 00   GLUCOSE FASTING      65 - 99 mg/dL 159 (H)   Calcium      8 3 - 10 1 mg/dL 9 1   AST      5 - 45 U/L 20   ALT      12 - 78 U/L 28   Alkaline Phosphatase      46 - 116 U/L 91   Total Protein      6 4 - 8 2 g/dL 7 4   Albumin      3 5 - 5 0 g/dL 3 7   Total Bilirubin      0 20 - 1 00 mg/dL 0 40   eGFR      ml/min/1 73sq m 87   Hemoglobin A1C      4 2 - 6 3 % 7 7 (H)   EAG      mg/dl 174   Free T4      0 76 - 1 46 ng/dL 1 37   TSH 3RD GENERATON      0 358 - 3 740 uIU/mL 0 432       Patient Active Problem List   Diagnosis    Chronic back pain    Bilateral lower extremity edema    Collagenous colitis    GERD (gastroesophageal reflux disease)    Hyperlipidemia    Hypothyroidism    Insomnia    Iron deficiency anemia    Obesity, Class II, BMI 35 0-39 9, with comorbidity (see actual BMI)    Type 1 diabetes mellitus with hyperglycemia, with long-term current use of insulin (HCC)    Vitamin D deficiency    Seasonal allergic rhinitis due to pollen      Past Medical History:   Diagnosis Date    Diabetes mellitus ([address] )     Disease of thyroid gland     Insomnia       Past Surgical History:   Procedure Laterality Date    ROTATOR CUFF REPAIR Right       Family History   Problem Relation Age of Onset    Thyroid disease unspecified Mother     Osteoporosis Mother     Hypertension Father     Diabetes type II Sister  Hypertension Sister     Hyperlipidemia Sister     Thyroid disease unspecified Sister     Diabetes type II Brother     Hypertension Brother     Hyperlipidemia Brother      Social History   Substance Use Topics    Smoking status: Former Smoker    Smokeless tobacco: Never Used      Comment: quit in 2015    Alcohol use 1 2 oz/week     2 Glasses of wine per week      Comment: social     Allergies   Allergen Reactions    Ibuprofen GI Intolerance    Other Other (See Comments)     Seasonal allergies- pt has congestion         Current Outpatient Prescriptions:     acetone, urine, test (KETOSTIX) strip, by In Vitro route, Disp: , Rfl:     azelastine (ASTELIN) 0 1 % nasal spray, 1 spray into each nostril 2 (two) times a day, Disp: 90 mL, Rfl: 1    Blood Glucose Monitoring Suppl (ONE TOUCH ULTRA 2) w/Device KIT, by Does not apply route, Disp: , Rfl:     celecoxib (CELEBREX) 200 mg capsule, Take 1 capsule by mouth 2 (two) times a day as needed, Disp: , Rfl:     esomeprazole (NexIUM) 40 MG capsule, Take 1 capsule (40 mg total) by mouth daily, Disp: 90 capsule, Rfl: 1    fluticasone (CUTIVATE) 0 05 %, Apply topically daily, Disp: 1 Bottle, Rfl: 0    glucagon (GLUCAGON EMERGENCY) 1 MG injection, Inject 1 mg under the skin once as needed for low blood sugar for up to 1 dose, Disp: 1 each, Rfl: 3    insulin lispro (HUMALOG) 100 units/mL injection, Use up to 120 units per day via insulin pump , Disp: 110 mL, Rfl: 0    Insulin Pen Needle (BD PEN NEEDLE LYRIC U/F) 32G X 4 MM MISC, by Does not apply route daily Use once daily, Disp: 100 each, Rfl: 2    liraglutide (VICTOZA) injection, Inject 1 8 mg under the skin daily, Disp: , Rfl:     metFORMIN (GLUCOPHAGE) 1000 MG tablet, Take 1 tablet (1,000 mg total) by mouth every 12 (twelve) hours for 180 days, Disp: 180 tablet, Rfl: 0    MULTIPLE VITAMIN PO, Take by mouth daily  , Disp: , Rfl:     naproxen (NAPROSYN) 500 mg tablet, Take 1 tablet (500 mg total) by mouth 2 (two) times a day with meals, Disp: 60 tablet, Rfl: 0    SYNTHROID 175 MCG tablet, Take 1 tablet (175 mcg total) by mouth daily for 90 days, Disp: 90 tablet, Rfl: 1    traMADol (ULTRAM) 50 mg tablet, Take 1 tablet (50 mg total) by mouth every 6 (six) hours as needed for moderate pain, Disp: 30 tablet, Rfl: 0    zolpidem (AMBIEN) 10 mg tablet, Take 1 tablet (10 mg total) by mouth daily at bedtime as needed for sleep, Disp: 90 tablet, Rfl: 1    insulin glargine (LANTUS SOLOSTAR) 100 units/mL injection pen, Inject 50 units in case of pump failure ( incase of emergency), Disp: 5 pen, Rfl: 0    rosuvastatin (CRESTOR) 10 MG tablet, Take 1 tablet (10 mg total) by mouth daily, Disp: 90 tablet, Rfl: 1  Review of Systems   Constitutional: Negative for activity change, appetite change, fatigue, fever and unexpected weight change  HENT: Negative  Eyes: Negative for visual disturbance  Respiratory: Negative  Cardiovascular: Negative  Gastrointestinal: Negative for abdominal pain, constipation, diarrhea and nausea  Endocrine: Negative for polydipsia, polyphagia and polyuria  Genitourinary: Negative  Musculoskeletal: Negative  Neurological: Negative  Psychiatric/Behavioral: Negative  Physical Exam:  Body mass index is 37 91 kg/m²  /72   Pulse 80   Ht 5' 8 5" (1 74 m)   Wt 115 kg (253 lb)   BMI 37 91 kg/m²    Wt Readings from Last 3 Encounters:   09/24/18 115 kg (253 lb)   08/06/18 114 kg (251 lb 12 8 oz)   04/06/18 114 kg (251 lb 12 8 oz)       Physical Exam   Constitutional: He is oriented to person, place, and time  He appears well-developed and well-nourished  No distress  HENT:   Head: Normocephalic and atraumatic  Eyes: Conjunctivae and EOM are normal  Pupils are equal, round, and reactive to light  Right eye exhibits no discharge  Left eye exhibits no discharge  Neck: Normal range of motion  Neck supple  No tracheal deviation present  No thyromegaly present  Cardiovascular: Normal rate, regular rhythm, normal heart sounds and intact distal pulses  Exam reveals no friction rub  No murmur heard  Pulmonary/Chest: Effort normal and breath sounds normal  No respiratory distress  Abdominal: Soft  Bowel sounds are normal  He exhibits no distension  There is no tenderness  Musculoskeletal: Normal range of motion  He exhibits no edema, tenderness or deformity  Lymphadenopathy:     He has no cervical adenopathy  Neurological: He is alert and oriented to person, place, and time  He has normal reflexes  No cranial nerve deficit  He exhibits normal muscle tone  Coordination normal    Skin: Skin is warm and dry  No rash noted  He is not diaphoretic  No erythema  No pallor  Psychiatric: He has a normal mood and affect  His behavior is normal    Vitals reviewed  Diabetic Foot Exam    Labs:   No components found for: HA1C  No results found for: GLU    Lab Results   Component Value Date    CREATININE 1 09 04/16/2018    CREATININE 1 00 03/02/2018    CREATININE 1 44 (H) 11/09/2017    BUN 16 04/16/2018     04/16/2018    K 3 9 04/16/2018     04/16/2018    CO2 26 04/16/2018     eGFR   Date Value Ref Range Status   04/16/2018 78 ml/min/1 73sq m Final     No components found for: [facility]    Lab Results   Component Value Date    CHOL 157 07/15/2015    HDL 35 (L) 12/12/2016    TRIG 125 12/12/2016       Lab Results   Component Value Date    ALT 35 04/16/2018    AST 25 04/16/2018    ALKPHOS 95 04/16/2018    BILITOT 0 28 07/15/2015       Lab Results   Component Value Date    FREET4 1 37 03/02/2018       Impression:  1  Type 1 diabetes mellitus with complication (HCC)    2  Insulin pump in place    3  Hypothyroidism, unspecified type    4  Hyperlipidemia, unspecified hyperlipidemia type    5  Vitamin D deficiency    6   Class 2 obesity in adult, unspecified BMI, unspecified obesity type, unspecified whether serious comorbidity present         Problem List Items Addressed This Visit     None      Visit Diagnoses     Type 1 diabetes mellitus with complication ([facility] [address] )    -  Primary  Lab Results   Component Value Date    HGBA1C 7 3 09/18/2018    A1c improved, still not at goal  Will change insulin to carb ratio at 6:00 p m  to 2 5 and 9:00 p m , to 3  Continue rest of the setting same  Continue metformin and Victoza, at current doses  Patient understands that metformin and Victoza use in type 1 diabetes is off label, but he is agreeable to use it  Discussed to download the pump in 2 weeks, for adjustment in pump settings if necessary    Call office if blood sugar < 70 or >350  mg/dl   Goal for blood sugars  mg/dl   Discussed hypoglycemia symptoms and treatment   See diabetes instructions   counseled about the long term complications of uncontrolled diabetes, including, Nephropathy, Neuropathy, CVD, Retinopathy and importance  of adherence to diet, treatment plan and life style modifications   Follow up with Opthalmology and podiatry   Orders Placed This Encounter   Procedures    Basic metabolic panel     This is a patient instruction: Patient fasting for 8 hours or longer recommended  Standing Status:   Future     Standing Expiration Date:   9/24/2019    Microalbumin / creatinine urine ratio     Standing Status:   Future     Standing Expiration Date:   9/24/2019    TSH, 3rd generation     This is a patient instruction: This test is non-fasting  Please drink two glasses of water morning of bloodwork  Standing Status:   Future     Standing Expiration Date:   9/24/2019    T4, free     Standing Status:   Future     Standing Expiration Date:   9/24/2019    Lipid panel     This is a patient instruction: This test requires patient fasting for 10-12 hours or longer  Drinking of black coffee or black tea is acceptable       Standing Status:   Future     Standing Expiration Date:   9/24/2019    Hemoglobin A1C     Standing Status:   Future     Standing Expiration Date:   9/24/2019    Comprehensive metabolic panel     This is a patient instruction: Patient fasting for 8 hours or longer recommended  Standing Status:   Future     Standing Expiration Date:   9/24/2019    TSH, 3rd generation     This is a patient instruction: This test is non-fasting  Please drink two glasses of water morning of bloodwork  Standing Status:   Future     Standing Expiration Date:   9/24/2019    T4, free     Standing Status:   Future     Standing Expiration Date:   9/24/2019    Vitamin D 25 hydroxy     Standing Status:   Future     Standing Expiration Date:   9/24/2019           Hypothyroidism, unspecified type        Continue current dose of levothyroxine 175 mcg daily  Discussed to do thyroid blood work today, to assess if it is in euthyroid range, as dose was changed from 200 mcg, few months ago  Will repeat TFT in 3 months      Relevant Medications    levothyroxine (SYNTHROID) 200 mcg tablet    Hyperlipidemia, unspecified hyperlipidemia type        Lab Results   Component Value Date    LDLCALC 92 12/12/2016         Relevant Medications    rosuvastatin (CRESTOR) 10 MG tablet    Other Relevant Orders    Lipid panel    Insulin pump in place      See above   No issues with insulin pump    Class 2 obesity in adult, unspecified BMI, unspecified obesity type, unspecified whether serious comorbidity present        Vitamin D deficiency       Start vitamin D3 1000 International Units daily     Relevant Medications    Cholecalciferol (VITAMIN D3) 1000 units CAPS    Other Relevant Orders    Vitamin D 25 hydroxy        Discussed with the patient and all questioned fully answered  He will call me if any problems arise      Counseled patient on diagnostic results, prognosis, risk and benefit of treatment options, instruction for management, importance of treatment compliance, Risk  factor reduction and impressions      Scotty Talbert MD

## 2018-09-27 DIAGNOSIS — E10.8 TYPE 1 DIABETES MELLITUS WITH COMPLICATION (HCC): ICD-10-CM

## 2018-10-01 ENCOUNTER — TELEPHONE (OUTPATIENT)
Dept: ENDOCRINOLOGY | Facility: CLINIC | Age: 53
End: 2018-10-01

## 2018-10-01 DIAGNOSIS — E78.5 HYPERLIPIDEMIA, UNSPECIFIED HYPERLIPIDEMIA TYPE: Primary | ICD-10-CM

## 2018-10-01 DIAGNOSIS — E66.01 CLASS 2 SEVERE OBESITY WITH SERIOUS COMORBIDITY IN ADULT, UNSPECIFIED BMI, UNSPECIFIED OBESITY TYPE (HCC): Primary | ICD-10-CM

## 2018-10-01 RX ORDER — CHLORAL HYDRATE 500 MG
1000 CAPSULE ORAL DAILY
Refills: 0
Start: 2018-10-01 | End: 2019-04-12

## 2018-10-01 NOTE — TELEPHONE ENCOUNTER
Patient is asking about weight loss medication discussed at last OV  Patient understood to start fish oil  Med list updated

## 2018-10-01 NOTE — TELEPHONE ENCOUNTER
Per pt, he states his insurance will not cover Victoza anymore  Discussed in visit that it would be changed to a weight loss drug    Please advise

## 2018-10-01 NOTE — TELEPHONE ENCOUNTER
Patient is currently on Victoza, also he is currently taking tramadol and Ambien , so weight loss medication would not be good option     We can discuss during the next visit     Patty Etienne MD

## 2018-10-01 NOTE — TELEPHONE ENCOUNTER
----- Message from Helene Recinos MD sent at 10/1/2018 12:00 PM EDT -----  Please call the patient regarding his thyroid blood work , normal results, continue levothyroxine at current dose    Lab Results       Component                Value               Date                       HGBA1C                   7 3                 09/18/2018            His LDL is slightly elevated, above goal, avoid fatty food, such as fried food, and oily food   Also he should start fish oil over-the-counter, 1000 mg daily

## 2018-10-02 RX ORDER — PHENTERMINE HYDROCHLORIDE 15 MG/1
15 CAPSULE ORAL EVERY MORNING
Qty: 30 CAPSULE | Refills: 2 | Status: SHIPPED | OUTPATIENT
Start: 2018-10-02 | End: 2019-01-14

## 2018-10-02 NOTE — TELEPHONE ENCOUNTER
Called  patient and  Discussed with pt about Option of Phentermine, as vicotoza is not covered by insurance   discussed as his currently taking Ambien for sleep at night, with phentermine, He may have problems with sleep, pt still want to try   Will start low dose phentermine with lunch 15 mg   He will need to come in 1 week for BP check   Informed it is only approved for 3 months   I have sent prescription for phentermine to pharmacy    Please inform patient, to start medication with lunch    Alvarado Botello MD

## 2018-10-05 DIAGNOSIS — E03.9 HYPOTHYROIDISM, UNSPECIFIED TYPE: ICD-10-CM

## 2018-10-05 RX ORDER — LEVOTHYROXINE SODIUM 175 MCG
175 TABLET ORAL DAILY
Qty: 90 TABLET | Refills: 1 | Status: SHIPPED | OUTPATIENT
Start: 2018-10-05 | End: 2019-01-14 | Stop reason: SDUPTHER

## 2018-10-16 DIAGNOSIS — G44.019 EPISODIC CLUSTER HEADACHE, NOT INTRACTABLE: ICD-10-CM

## 2018-10-16 RX ORDER — NAPROXEN 500 MG/1
500 TABLET ORAL 2 TIMES DAILY WITH MEALS
Qty: 60 TABLET | Refills: 0 | Status: SHIPPED | OUTPATIENT
Start: 2018-10-16 | End: 2018-12-03 | Stop reason: SDUPTHER

## 2018-10-16 NOTE — TELEPHONE ENCOUNTER
Please call an schedule an appt in the next 90 days or we wont be able to send further refills       Thank you

## 2018-10-22 DIAGNOSIS — E10.8 TYPE 1 DIABETES MELLITUS WITH COMPLICATION (HCC): ICD-10-CM

## 2018-11-05 DIAGNOSIS — E10.8 TYPE 1 DIABETES MELLITUS WITH COMPLICATION (HCC): ICD-10-CM

## 2018-11-19 DIAGNOSIS — E10.8 TYPE 1 DIABETES MELLITUS WITH COMPLICATION (HCC): ICD-10-CM

## 2018-11-30 LAB
LEFT EYE DIABETIC RETINOPATHY: NORMAL
RIGHT EYE DIABETIC RETINOPATHY: NORMAL
SEVERITY (EYE EXAM): NORMAL

## 2018-12-03 DIAGNOSIS — G44.019 EPISODIC CLUSTER HEADACHE, NOT INTRACTABLE: ICD-10-CM

## 2018-12-03 RX ORDER — NAPROXEN 500 MG/1
500 TABLET ORAL 2 TIMES DAILY WITH MEALS
Qty: 60 TABLET | Refills: 2 | Status: SHIPPED | OUTPATIENT
Start: 2018-12-03 | End: 2019-06-07 | Stop reason: SDUPTHER

## 2019-01-02 LAB — HBA1C MFR BLD HPLC: 7.4 %

## 2019-01-11 ENCOUNTER — DOCUMENTATION (OUTPATIENT)
Dept: ENDOCRINOLOGY | Facility: CLINIC | Age: 54
End: 2019-01-11

## 2019-01-14 ENCOUNTER — OFFICE VISIT (OUTPATIENT)
Dept: ENDOCRINOLOGY | Facility: CLINIC | Age: 54
End: 2019-01-14
Payer: COMMERCIAL

## 2019-01-14 VITALS
HEIGHT: 69 IN | BODY MASS INDEX: 39.1 KG/M2 | DIASTOLIC BLOOD PRESSURE: 72 MMHG | WEIGHT: 264 LBS | SYSTOLIC BLOOD PRESSURE: 130 MMHG

## 2019-01-14 DIAGNOSIS — E78.2 MIXED HYPERLIPIDEMIA: ICD-10-CM

## 2019-01-14 DIAGNOSIS — D50.9 IRON DEFICIENCY ANEMIA, UNSPECIFIED IRON DEFICIENCY ANEMIA TYPE: ICD-10-CM

## 2019-01-14 DIAGNOSIS — E03.9 HYPOTHYROIDISM, UNSPECIFIED TYPE: ICD-10-CM

## 2019-01-14 DIAGNOSIS — E55.9 VITAMIN D DEFICIENCY: ICD-10-CM

## 2019-01-14 DIAGNOSIS — E10.65 TYPE 1 DIABETES MELLITUS WITH HYPERGLYCEMIA, WITH LONG-TERM CURRENT USE OF INSULIN (HCC): Primary | ICD-10-CM

## 2019-01-14 DIAGNOSIS — E03.9 ADULT HYPOTHYROIDISM: ICD-10-CM

## 2019-01-14 PROCEDURE — 99214 OFFICE O/P EST MOD 30 MIN: CPT | Performed by: PHYSICIAN ASSISTANT

## 2019-01-14 RX ORDER — LEVOTHYROXINE SODIUM 175 MCG
175 TABLET ORAL DAILY
Qty: 90 TABLET | Refills: 0
Start: 2019-01-14 | End: 2019-07-26 | Stop reason: SDUPTHER

## 2019-01-14 RX ORDER — LEVOTHYROXINE SODIUM 175 MCG
175 TABLET ORAL DAILY
Qty: 90 TABLET | Refills: 0
Start: 2019-01-14 | End: 2019-01-14 | Stop reason: SDUPTHER

## 2019-01-14 NOTE — PROGRESS NOTES
Patient Progress Note      CC: DM1, hypothyroidism     Referring Provider  Jarrell Jensen Md  [address] [facility]     History of Present Illness:   Chinmay Fields is a 47 y o  male with a history of type 1 diabetes with long term use of insulin  Diabetes course has been stable  Complications of DM: none reported  Denies recent illness or hospitalizations  Has had hyperglycemic episodes recently  Patient reported since the switch to Humalog and stopping Victoza, its been more difficult to control blood glucose levels  Home glucose monitoring:  Using DEXCOM  Average calibrations per day 1 4  Average glucose 179, standard deviation 73 mg/dL  In range 49% of the time, below threshold 3% of the time, above threshold 47% of the time  Patient has a pattern of highs between 3:00 p m  - 6:00 p m  and 12:00 a m  - 6:00 a m  Patient reports he has not been using his carb ratio of 1:3 and 1:2 5 between the hours of 3 pm and 9 pm because with the stronger carbohydrate ratio he was experiencing hypoglycemia at night  He has been using a ratio of 1:5, however glucose levels are elevated  Current regimen: Humalog via pump, Metformin 1000 mg BID  Was on Victoza 1 8 mg weekly, that was stopped due to insurance coverage  Tried phentermine but he cannot remember to take it  Would like to try Victoza again  Patient is on a Tflex pump  He has been on this pump for 2 years  He denies any malfunctioning of the pump      Current Insulin pump settings:    Basal rate:  12:00 a m  = 2 9 units/hour  4:00 a m  = 2 8 units/hour  6:00 a m  = 2 2 units/hour  8:00 a m  2 3 units/hour  10:00 a m  = 2 3 units/hour  12:00 p m  = 2 units/hour  3:00 p m  = 2 2 units/hour  6:00 p m  = 2 2 units/hour  9:00 p m  = 2 4 units/hour  Insulin to carb ratio:  12:00 a m  = 1-5  4:00 a m  = 1-5  6:00 a m  = 1-5  8:00 a m  = 1 5  10:00 a m  = 1-5  12:00 p m  = 1-5  3:00 p m  = 1-3 (patient using 1:5)  6:00 p m  = 1-2 5 (patient using 1:5)  9:00 p m  = 1-3 (patient using 1:5)  Insulin sensitivity factor:  12:00 a m  = 25  4:00 a m  = 25  6:00 a m  = 12  8:00 a m  = 12  10:00 a m  = 24  12:00 p m  = 12  3:00 p m  = 12  6:00 p m  = 12  9:00 p m  = 25  BG target:  115 mg/dL all day except 100 mg/dL at 6:00 a m  Discussed with patient in case of malfunctioning of the pump to use basal and bolus therapy as backup which is prescribed to the patient  Also notified patient to call clinic if any issues  compliant most of the time, denies any side effects from medications  Hypoglycemic episodes: Yes, infrequent  H/o of hypoglycemia causing hospitalization or Intervention such as glucagon injection  or ambulance call :  No  Hypoglycemia symptoms: jitteriness and sweating  Treatment of hypoglycemia: glucose tablets, juice, soda    Medic alert tag: recommended: Yes    Diabetes education: Not recently  Diet: 2-3 meals per day, 1-2 snacks per day  Timing of meals is variable  Diabetic diet compliance:  noncompliant much of the time  Activity: Daily activity is predictable: Yes  No routine exercise    Further diabetic ROS: no polyuria or polydipsia, no numbness, tingling or pain in extremities        Ophthamology: sees routinely; no retinopathy  Podiatry: does not see podiatry    Not on ARB or ACE inhibitor  Has hyperlipidemia: on rosuvastatin - tolerating well, no myalgias  compliant most of the time, denies any side effects from medications  Thyroid disorders: Hypothyroidism  Patient is taking Synthroid 175 mcg daily on empty stomach 1 hour before breakfast and at least 4 hours apart from supplements  Most recent thyroid function tests show elevated TSH at 4 04 and normal free T4 at 1 15     History of pancreatitis: No    Patient Active Problem List   Diagnosis    Chronic back pain    Bilateral lower extremity edema    Collagenous colitis    GERD (gastroesophageal reflux disease)    Hyperlipidemia    Hypothyroidism    Insomnia    Iron deficiency anemia    Obesity, Class II, BMI 35 0-39 9, with comorbidity (see actual BMI)    Type 1 diabetes mellitus with hyperglycemia, with long-term current use of insulin ([facility])    Vitamin D deficiency    Seasonal allergic rhinitis due to pollen      Past Medical History:   Diagnosis Date    Diabetes mellitus ([address] )     Disease of thyroid gland     Insomnia       Past Surgical History:   Procedure Laterality Date    ROTATOR CUFF REPAIR Right       Family History   Problem Relation Age of Onset    Thyroid disease unspecified Mother     Osteoporosis Mother     Hypertension Father     Diabetes type II Sister     Hypertension Sister     Hyperlipidemia Sister     Thyroid disease unspecified Sister     Diabetes type II Brother     Hypertension Brother     Hyperlipidemia Brother      Social History   Substance Use Topics    Smoking status: Former Smoker    Smokeless tobacco: Never Used      Comment: quit in 2015    Alcohol use 1 2 oz/week     2 Glasses of wine per week      Comment: social     Allergies   Allergen Reactions    Ibuprofen GI Intolerance    Other Other (See Comments)     Seasonal allergies- pt has congestion         Current Outpatient Prescriptions:     acetone, urine, test (KETOSTIX) strip, by In Vitro route, Disp: , Rfl:     azelastine (ASTELIN) 0 1 % nasal spray, 1 spray into each nostril 2 (two) times a day, Disp: 90 mL, Rfl: 1    Blood Glucose Monitoring Suppl (ONE TOUCH ULTRA 2) w/Device KIT, by Does not apply route, Disp: , Rfl:     celecoxib (CELEBREX) 200 mg capsule, Take 1 capsule by mouth 2 (two) times a day as needed, Disp: , Rfl:     esomeprazole (NexIUM) 40 MG capsule, Take 1 capsule (40 mg total) by mouth daily, Disp: 90 capsule, Rfl: 1    fluticasone (CUTIVATE) 0 05 %, Apply topically daily, Disp: 1 Bottle, Rfl: 0    glucagon (GLUCAGON EMERGENCY) 1 MG injection, Inject 1 mg under the skin once as needed for low blood sugar for up to 1 dose, Disp: 1 each, Rfl: 3    insulin glargine (BASAGLAR KWIKPEN) 100 units/mL injection pen, Inject 50 units in case of pump failure ( incase of emergency), Disp: 15 pen, Rfl: 1    insulin lispro (HUMALOG) 100 units/mL injection, Use up to 120 units per day via insulin pump  (Patient taking differently: Use up to 130 units per day via insulin pump  ), Disp: 110 mL, Rfl: 2    Insulin Pen Needle (BD PEN NEEDLE LYRIC U/F) 32G X 4 MM MISC, by Does not apply route daily Use once daily, Disp: 100 each, Rfl: 2    metFORMIN (GLUCOPHAGE) 1000 MG tablet, Take 1 tablet (1,000 mg total) by mouth every 12 (twelve) hours for 180 days, Disp: 180 tablet, Rfl: 2    MULTIPLE VITAMIN PO, Take by mouth daily  , Disp: , Rfl:     naproxen (NAPROSYN) 500 mg tablet, Take 1 tablet (500 mg total) by mouth 2 (two) times a day with meals, Disp: 60 tablet, Rfl: 2    rosuvastatin (CRESTOR) 10 MG tablet, Take 1 tablet (10 mg total) by mouth daily, Disp: 90 tablet, Rfl: 1    SYNTHROID 175 MCG tablet, TAKE 1 TABLET (175 MCG TOTAL) BY MOUTH DAILY FOR 90 DAYS, Disp: 90 tablet, Rfl: 1    traMADol (ULTRAM) 50 mg tablet, Take 1 tablet (50 mg total) by mouth every 6 (six) hours as needed for moderate pain, Disp: 30 tablet, Rfl: 0    zolpidem (AMBIEN) 10 mg tablet, Take 1 tablet (10 mg total) by mouth daily at bedtime as needed for sleep, Disp: 90 tablet, Rfl: 1    liraglutide (VICTOZA) injection, Injection 0 6 mg daily for 1 week and then increase to 1 2 mg daily, Disp: 2 pen, Rfl: 2    Omega-3 Fatty Acids (FISH OIL) 1,000 mg, Take 1 capsule (1,000 mg total) by mouth daily (Patient not taking: Reported on 1/14/2019 ), Disp: , Rfl: 0  Review of Systems   Constitutional: Negative for activity change, appetite change, fatigue and unexpected weight change  HENT: Negative for trouble swallowing  Eyes: Negative for visual disturbance  Respiratory: Positive for shortness of breath (previously related to anemia)      Cardiovascular: Negative for chest pain and palpitations  Gastrointestinal: Positive for constipation (improves with Miralax)  Negative for diarrhea  Endocrine: Negative for cold intolerance, heat intolerance, polydipsia and polyuria  Musculoskeletal: Negative  Skin: Negative for wound  Neurological: Negative for numbness  Psychiatric/Behavioral: Negative  Physical Exam:  Body mass index is 39 56 kg/m²  /72   Ht 5' 8 5" (1 74 m)   Wt 120 kg (264 lb)   BMI 39 56 kg/m²    Wt Readings from Last 3 Encounters:   01/14/19 120 kg (264 lb)   09/24/18 115 kg (253 lb)   08/06/18 114 kg (251 lb 12 8 oz)       Physical Exam   Constitutional: He appears well-developed and well-nourished  HENT:   Head: Normocephalic  Eyes: Pupils are equal, round, and reactive to light  EOM are normal  No scleral icterus  Neck: Neck supple  No thyromegaly present  Cardiovascular: Normal rate and regular rhythm  No murmur heard  Pulses:       Radial pulses are 2+ on the right side, and 2+ on the left side  Pulmonary/Chest: Effort normal and breath sounds normal  No respiratory distress  He has no wheezes  Neurological: He is alert  He has normal reflexes  Skin: Skin is warm and dry  Psychiatric: He has a normal mood and affect  Nursing note and vitals reviewed  Diabetic Foot Exam    Labs:   01/02/2019  Glucose 77, hemoglobin A1c 7 4%  BUN 16, creatinine 0 98, GFR 88  Sodium 141, potassium 4 1, calcium 8 7  Carbon dioxide 31, chloride 103, anion gap 7  Alkaline phosphatase 96, AST 30, ALT 28  Albumin 3 6, bilirubin 0 3, protein 7 2    TSH 4 04, free T4 1 15    Vitamin-D 24      Plan:    Diagnoses and all orders for this visit:    Type 1 diabetes mellitus with hyperglycemia, with long-term current use of insulin ([facility])  HGA1C 7 4%  Treatment regimen:  Patient requested to restart Victoza as blood sugars were better controlled previously; it was stopped due to coverage issues    Patient understands that Victoza is used as off-label in a type 1, but he still is agreeable to use it given he has had no issues with the medication in the past   Change basal rate at midnight to 2 95 units/hour and 4:00 a m  to 2 85 units/hour  Change insulin to carb ratio at 3:00 p m  6:00 p m  9:00 p m  to 1:4   Discussed intensive insulin regimen does increase risk for hypoglycemia  Episodes of hypoglycemia can lead to permanent disability and death  Discussed risks/complications associated with uncontrolled diabetes  Advised to adhere to diabetic diet, and recommended staying active/exercising routinely  Keep carbohydrates consistent to limit blood glucose fluctuations  Advised to call if blood sugars less than 70 mg/dl or over 300 mg/dl  Check blood glucose 4+ times a day  Discussed symptoms and treatment of hypoglycemia  Recommended routine follow-up with podiatry and ophthalmology  Download sensor and pump in 2 weeks  Ordered blood work to complete prior to next visit  -     Hemoglobin A1C; Future  -     Microalbumin / creatinine urine ratio; Future  -     Basic metabolic panel; Future  -     liraglutide (VICTOZA) injection; Injection 0 6 mg daily for 1 week and then increase to 1 2 mg daily    Adult hypothyroidism  TSH slightly elevated at 4 04, free T4 normal at 1 15  Patient has been taking Synthroid and Nexium at the same time  Patient advised to take Nexium at least 4 hr apart from Synthroid  Recheck thyroid blood work 6 weeks  -     TSH, 3rd generation; Future  -     T4, free; Future  -     T4, free; Future  -     TSH, 3rd generation; Future    Mixed hyperlipidemia  LDL previously 143, not at goal  Continue statin therapy  Recheck labs  -     Lipid panel; Future    Vitamin D deficiency  Take vitamin D3 2000 International Units daily  -     Vitamin D 25 hydroxy; Future    Iron deficiency anemia, unspecified iron deficiency anemia type  Check CBC   Patient has reported intermittent mild shortness of breath which he previously experienced with anemia  Advised to follow-up with PCP   -     CBC and differential; Future      Discussed with the patient diagnosis and treatment and all questions fully answered  He will call me if any problems arise  Counseled patient on diagnostic results, prognosis, risk and benefit of treatment options, instruction for management, importance of treatment compliance, risk factor reduction and impressions        Alison Charles PA-C

## 2019-01-14 NOTE — PATIENT INSTRUCTIONS
Hypoglycemia instructions   Tang Fox  1/14/2019  [id number]    Low Blood Sugar    Steps to treat low blood sugar  1  Test blood sugar if you have symptoms of low blood sugar:   Low Blood Sugar Symptoms:  o Sweaty  o Dizzy  o Rapid heartbeat  o Shaky  o Bad mood  o Hungry      2  Treat blood sugar less than 70 with 15 grams of fast-acting carbohydrate:   Examples of 15 grams Fast-Acting Carbohydrate:  o 4 oz juice  o 4 oz regular soda  o 3-4 glucose tablets (chew)  o 3-4 hard candies (chew)          3  Wait 15 minutes and test your blood sugar again     4   If blood sugar is less than 100, repeat steps 2-3     5  When your blood sugar is 100 or more, eat a snack if it will be longer than one hour until your next meal  The snack should be 15 grams of carbohydrate and a protein:   Examples of snacks:  o ½ sandwich  o 6 crackers with cheese  o Piece of fruit with cheese or peanut butter  o 6 crackers with peanut butter

## 2019-01-18 ENCOUNTER — DOCUMENTATION (OUTPATIENT)
Dept: ENDOCRINOLOGY | Facility: CLINIC | Age: 54
End: 2019-01-18

## 2019-03-04 DIAGNOSIS — G44.019 EPISODIC CLUSTER HEADACHE, NOT INTRACTABLE: ICD-10-CM

## 2019-03-04 RX ORDER — NAPROXEN 500 MG/1
TABLET ORAL
Qty: 60 TABLET | Refills: 2 | OUTPATIENT
Start: 2019-03-04

## 2019-03-20 ENCOUNTER — TELEPHONE (OUTPATIENT)
Dept: ENDOCRINOLOGY | Facility: CLINIC | Age: 54
End: 2019-03-20

## 2019-03-20 NOTE — TELEPHONE ENCOUNTER
This patient has an appt with Rekha on 4/19 that I need to reschedule and he can only do Fridays  Can we move his appt to April 12th with you? We would just need to change the date of his labs but I wanted to make sure that was ok with you first before I call him      Thanks Amy Mueller!!

## 2019-04-02 DIAGNOSIS — E03.9 HYPOTHYROIDISM, UNSPECIFIED TYPE: ICD-10-CM

## 2019-04-02 RX ORDER — LEVOTHYROXINE SODIUM 175 MCG
175 TABLET ORAL DAILY
Qty: 90 TABLET | Refills: 1 | Status: SHIPPED | OUTPATIENT
Start: 2019-04-02 | End: 2019-04-12

## 2019-04-08 LAB — HBA1C MFR BLD HPLC: 7.6 %

## 2019-04-12 ENCOUNTER — OFFICE VISIT (OUTPATIENT)
Dept: ENDOCRINOLOGY | Facility: CLINIC | Age: 54
End: 2019-04-12
Payer: COMMERCIAL

## 2019-04-12 VITALS
HEIGHT: 69 IN | HEART RATE: 68 BPM | DIASTOLIC BLOOD PRESSURE: 78 MMHG | WEIGHT: 258.6 LBS | SYSTOLIC BLOOD PRESSURE: 120 MMHG | BODY MASS INDEX: 38.3 KG/M2

## 2019-04-12 DIAGNOSIS — E78.2 MIXED HYPERLIPIDEMIA: ICD-10-CM

## 2019-04-12 DIAGNOSIS — E10.65 TYPE 1 DIABETES MELLITUS WITH HYPERGLYCEMIA, WITH LONG-TERM CURRENT USE OF INSULIN (HCC): Primary | ICD-10-CM

## 2019-04-12 DIAGNOSIS — E03.9 ACQUIRED HYPOTHYROIDISM: ICD-10-CM

## 2019-04-12 PROCEDURE — 99214 OFFICE O/P EST MOD 30 MIN: CPT | Performed by: PHYSICIAN ASSISTANT

## 2019-04-12 RX ORDER — BLOOD-GLUCOSE,RECEIVER,CONT
1 EACH MISCELLANEOUS CONTINUOUS
Qty: 1 DEVICE | Refills: 0 | Status: SHIPPED | OUTPATIENT
Start: 2019-04-12 | End: 2019-06-19 | Stop reason: SDUPTHER

## 2019-04-12 RX ORDER — BLOOD-GLUCOSE TRANSMITTER
1 EACH MISCELLANEOUS CONTINUOUS
Qty: 1 EACH | Refills: 0 | Status: SHIPPED | OUTPATIENT
Start: 2019-04-12 | End: 2019-04-30 | Stop reason: SDUPTHER

## 2019-04-12 RX ORDER — BLOOD-GLUCOSE SENSOR
1 EACH MISCELLANEOUS CONTINUOUS
Qty: 90 EACH | Refills: 1 | Status: SHIPPED | OUTPATIENT
Start: 2019-04-12 | End: 2019-04-30 | Stop reason: SDUPTHER

## 2019-04-30 DIAGNOSIS — E10.65 TYPE 1 DIABETES MELLITUS WITH HYPERGLYCEMIA, WITH LONG-TERM CURRENT USE OF INSULIN (HCC): ICD-10-CM

## 2019-04-30 RX ORDER — BLOOD-GLUCOSE TRANSMITTER
1 EACH MISCELLANEOUS CONTINUOUS
Qty: 1 EACH | Refills: 0 | Status: SHIPPED | OUTPATIENT
Start: 2019-04-30 | End: 2019-05-09 | Stop reason: SDUPTHER

## 2019-04-30 RX ORDER — BLOOD-GLUCOSE SENSOR
1 EACH MISCELLANEOUS CONTINUOUS
Qty: 12 EACH | Refills: 1 | Status: SHIPPED | OUTPATIENT
Start: 2019-04-30 | End: 2019-06-21 | Stop reason: SDUPTHER

## 2019-05-09 DIAGNOSIS — IMO0001 INSULIN DEPENDENT DIABETES MELLITUS: ICD-10-CM

## 2019-05-09 DIAGNOSIS — E10.65 TYPE 1 DIABETES MELLITUS WITH HYPERGLYCEMIA, WITH LONG-TERM CURRENT USE OF INSULIN (HCC): ICD-10-CM

## 2019-05-09 RX ORDER — BLOOD-GLUCOSE TRANSMITTER
1 EACH MISCELLANEOUS CONTINUOUS
Qty: 1 EACH | Refills: 3 | Status: SHIPPED | OUTPATIENT
Start: 2019-05-09 | End: 2019-06-19 | Stop reason: SDUPTHER

## 2019-06-07 ENCOUNTER — OFFICE VISIT (OUTPATIENT)
Dept: INTERNAL MEDICINE CLINIC | Age: 54
End: 2019-06-07
Payer: COMMERCIAL

## 2019-06-07 VITALS
BODY MASS INDEX: 37.83 KG/M2 | WEIGHT: 255.4 LBS | TEMPERATURE: 98.2 F | HEART RATE: 76 BPM | DIASTOLIC BLOOD PRESSURE: 82 MMHG | OXYGEN SATURATION: 95 % | HEIGHT: 69 IN | SYSTOLIC BLOOD PRESSURE: 132 MMHG

## 2019-06-07 DIAGNOSIS — J32.0 CHRONIC MAXILLARY SINUSITIS: ICD-10-CM

## 2019-06-07 DIAGNOSIS — G89.29 CHRONIC LOW BACK PAIN, UNSPECIFIED BACK PAIN LATERALITY, WITH SCIATICA PRESENCE UNSPECIFIED: ICD-10-CM

## 2019-06-07 DIAGNOSIS — R06.83 SNORING: ICD-10-CM

## 2019-06-07 DIAGNOSIS — E55.9 VITAMIN D DEFICIENCY: ICD-10-CM

## 2019-06-07 DIAGNOSIS — E78.2 MIXED HYPERLIPIDEMIA: ICD-10-CM

## 2019-06-07 DIAGNOSIS — E03.9 ACQUIRED HYPOTHYROIDISM: ICD-10-CM

## 2019-06-07 DIAGNOSIS — G47.00 INSOMNIA, UNSPECIFIED TYPE: ICD-10-CM

## 2019-06-07 DIAGNOSIS — J30.1 SEASONAL ALLERGIC RHINITIS DUE TO POLLEN: ICD-10-CM

## 2019-06-07 DIAGNOSIS — R60.9 EDEMA, UNSPECIFIED TYPE: Primary | ICD-10-CM

## 2019-06-07 DIAGNOSIS — M54.5 CHRONIC LOW BACK PAIN, UNSPECIFIED BACK PAIN LATERALITY, WITH SCIATICA PRESENCE UNSPECIFIED: ICD-10-CM

## 2019-06-07 DIAGNOSIS — E10.65 TYPE 1 DIABETES MELLITUS WITH HYPERGLYCEMIA, WITH LONG-TERM CURRENT USE OF INSULIN (HCC): ICD-10-CM

## 2019-06-07 DIAGNOSIS — K21.9 GASTROESOPHAGEAL REFLUX DISEASE WITHOUT ESOPHAGITIS: ICD-10-CM

## 2019-06-07 DIAGNOSIS — D50.9 IRON DEFICIENCY ANEMIA, UNSPECIFIED IRON DEFICIENCY ANEMIA TYPE: ICD-10-CM

## 2019-06-07 DIAGNOSIS — G44.019 EPISODIC CLUSTER HEADACHE, NOT INTRACTABLE: ICD-10-CM

## 2019-06-07 DIAGNOSIS — L20.84 INTRINSIC ECZEMA: ICD-10-CM

## 2019-06-07 PROCEDURE — 1036F TOBACCO NON-USER: CPT | Performed by: NURSE PRACTITIONER

## 2019-06-07 PROCEDURE — 99214 OFFICE O/P EST MOD 30 MIN: CPT | Performed by: NURSE PRACTITIONER

## 2019-06-07 PROCEDURE — 3008F BODY MASS INDEX DOCD: CPT | Performed by: NURSE PRACTITIONER

## 2019-06-07 RX ORDER — AZELASTINE 1 MG/ML
1 SPRAY, METERED NASAL 2 TIMES DAILY
Qty: 90 ML | Refills: 1 | Status: SHIPPED | OUTPATIENT
Start: 2019-06-07 | End: 2019-12-06 | Stop reason: SDUPTHER

## 2019-06-07 RX ORDER — HYDROCHLOROTHIAZIDE 12.5 MG/1
12.5 TABLET ORAL DAILY
Qty: 90 TABLET | Refills: 1 | Status: SHIPPED | OUTPATIENT
Start: 2019-06-07 | End: 2019-12-06

## 2019-06-07 RX ORDER — ZOLPIDEM TARTRATE 10 MG/1
10 TABLET ORAL
Qty: 90 TABLET | Refills: 0 | Status: SHIPPED | OUTPATIENT
Start: 2019-06-07 | End: 2019-12-06 | Stop reason: SDUPTHER

## 2019-06-07 RX ORDER — NAPROXEN 500 MG/1
500 TABLET ORAL 2 TIMES DAILY WITH MEALS
Qty: 60 TABLET | Refills: 2 | Status: SHIPPED | OUTPATIENT
Start: 2019-06-07 | End: 2020-06-03 | Stop reason: SDUPTHER

## 2019-06-19 DIAGNOSIS — E10.65 TYPE 1 DIABETES MELLITUS WITH HYPERGLYCEMIA, WITH LONG-TERM CURRENT USE OF INSULIN (HCC): ICD-10-CM

## 2019-06-20 RX ORDER — BLOOD-GLUCOSE TRANSMITTER
EACH MISCELLANEOUS
Qty: 1 EACH | Refills: 3 | Status: SHIPPED | OUTPATIENT
Start: 2019-06-20 | End: 2022-03-24 | Stop reason: SDUPTHER

## 2019-06-20 RX ORDER — BLOOD-GLUCOSE,RECEIVER,CONT
EACH MISCELLANEOUS
Qty: 1 DEVICE | Refills: 0 | Status: SHIPPED | OUTPATIENT
Start: 2019-06-20

## 2019-06-21 DIAGNOSIS — E10.65 TYPE 1 DIABETES MELLITUS WITH HYPERGLYCEMIA, WITH LONG-TERM CURRENT USE OF INSULIN (HCC): ICD-10-CM

## 2019-06-21 RX ORDER — BLOOD-GLUCOSE SENSOR
1 EACH MISCELLANEOUS CONTINUOUS
Qty: 12 EACH | Refills: 1 | Status: SHIPPED | OUTPATIENT
Start: 2019-06-21 | End: 2022-03-24 | Stop reason: SDUPTHER

## 2019-06-24 ENCOUNTER — TELEPHONE (OUTPATIENT)
Dept: SLEEP CENTER | Facility: CLINIC | Age: 54
End: 2019-06-24

## 2019-07-16 DIAGNOSIS — E10.8 TYPE 1 DIABETES MELLITUS WITH COMPLICATION (HCC): ICD-10-CM

## 2019-07-16 NOTE — TELEPHONE ENCOUNTER
Patient went out of town and forgot insulin    Patient needs a refill of Humalog   Will be sent to a [facility] in [place]

## 2019-07-21 ENCOUNTER — HOSPITAL ENCOUNTER (OUTPATIENT)
Dept: SLEEP CENTER | Facility: CLINIC | Age: 54
Discharge: HOME | End: 2019-07-21
Payer: COMMERCIAL

## 2019-07-21 PROCEDURE — G0399 HOME SLEEP TEST/TYPE 3 PORTA: HCPCS

## 2019-07-22 ENCOUNTER — TELEPHONE (OUTPATIENT)
Dept: ENDOCRINOLOGY | Facility: CLINIC | Age: 54
End: 2019-07-22

## 2019-07-22 LAB
CREAT ?TM UR-SCNC: 120 UMOL/L
HBA1C MFR BLD HPLC: 7.9 %
MICROALBUMIN/CREAT UR: NORMAL MG/G{CREAT}

## 2019-07-24 DIAGNOSIS — G47.33 OSA (OBSTRUCTIVE SLEEP APNEA): Primary | ICD-10-CM

## 2019-07-25 ENCOUNTER — DOCUMENTATION (OUTPATIENT)
Dept: ENDOCRINOLOGY | Facility: CLINIC | Age: 54
End: 2019-07-25

## 2019-07-26 ENCOUNTER — OFFICE VISIT (OUTPATIENT)
Dept: ENDOCRINOLOGY | Facility: CLINIC | Age: 54
End: 2019-07-26
Payer: COMMERCIAL

## 2019-07-26 VITALS
HEART RATE: 73 BPM | WEIGHT: 255.6 LBS | SYSTOLIC BLOOD PRESSURE: 130 MMHG | DIASTOLIC BLOOD PRESSURE: 74 MMHG | HEIGHT: 69 IN | BODY MASS INDEX: 37.86 KG/M2

## 2019-07-26 DIAGNOSIS — E55.9 VITAMIN D DEFICIENCY: Primary | ICD-10-CM

## 2019-07-26 DIAGNOSIS — E10.8 TYPE 1 DIABETES MELLITUS WITH COMPLICATION (HCC): ICD-10-CM

## 2019-07-26 DIAGNOSIS — E03.9 HYPOTHYROIDISM, UNSPECIFIED TYPE: ICD-10-CM

## 2019-07-26 DIAGNOSIS — E10.65 TYPE 1 DIABETES MELLITUS WITH HYPERGLYCEMIA, WITH LONG-TERM CURRENT USE OF INSULIN (HCC): ICD-10-CM

## 2019-07-26 PROCEDURE — 95251 CONT GLUC MNTR ANALYSIS I&R: CPT | Performed by: INTERNAL MEDICINE

## 2019-07-26 PROCEDURE — 99215 OFFICE O/P EST HI 40 MIN: CPT | Performed by: INTERNAL MEDICINE

## 2019-07-26 RX ORDER — LEVOTHYROXINE SODIUM 175 MCG
175 TABLET ORAL DAILY
Qty: 90 TABLET | Refills: 0
Start: 2019-07-26 | End: 2019-09-23 | Stop reason: SDUPTHER

## 2019-07-26 NOTE — PROGRESS NOTES
Patient Progress Note      CC: DM1      Referring Provider  Nata Sullivan Md  [address] [facility] [address]     History of Present Illness:   Nupur Rae is a 49 y o  male with a history of type 1 diabetes with long term use of insulin  Diabetes course has been stable  Complications of DM:  Mild neuropathy   Denies recent illness or hospitalizations  Denies recent severe hypoglycemic or severe hyperglycemic episodes  Denies any issues with his current regimen  Home glucose monitoring: are performed regularly, using Dexcom sensor 24 hour  Reviewed dex com download  Average glucose 191, standard deviation 58 mg/dL  Blood sugars are in range 41% times  Blood sugars are elevated above the range 59% times  0% hypoglycemia  He is using sensor 13/14 days  No calibration as he is using G6  He has a pattern of elevated blood sugars after 9:00 p m  To 3:00 a m  Blood sugars are trending down from 3 a m  To 6:00 a m  From 6:00 a m  To 9:00 p m  His blood sugars are in normal range      Lab Results   Component Value Date    HGBA1C 7 9 07/22/2019         Current regimen: Metformin 1000 mg BID, Victoza 1 8 mg daily, and Humalog via pump  compliant most of the time, denies any side effects from medications  Patient is on a TFlex pump  He has been on this pump for 3 years  He denies any malfunctioning of the pump      Current Insulin pump settings:  Basal rate:  12:00 a m  = 3 05  units/hour  4:00 a m  = 2 95  units/hour  6:00 a m  = 2 35  units/hour  8:00 a m  2 3 units/hour  10:00 a m  = 2 3 units/hour  12:00 p m  = 2 units/hour  3:00 p m  = 2 2 units/hour  6:00 p m  = 2 2 units/hour  9:00 p m  = 2 4 units/hour    Insulin to carb ratio:  12:00 a m  = 1-5  4:00 a m  = 1-5  6:00 a m  = 1-5  8:00 a m  = 1 5  10:00 a m  = 1-5  12:00 p m  = 1-5  3:00 p m  = 1-4  6:00 p m  = 1-4  9:00 p m  = 1-4  Insulin sensitivity factor:  12:00 a m  = 25  4:00 a m  = 25  6:00 a m  = 12  8:00 a m  = 12  10:00 a m  = 24  12:00 p m  = 12  3:00 p m  = 12  6:00 p m  = 12  9:00 p m  = 25    BG target:  115 mg/dL all day except 100 mg/dL at 6:00 a m  Discussed with patient in case of malfunctioning of the pump to use basal and bolus therapy as backup which is prescribed to the patient  Also notified patient to call clinic if any issues  Hypoglycemic episodes: Yes, infrequent  H/o of hypoglycemia causing hospitalization or Intervention such as glucagon injection  or ambulance call :  No  Hypoglycemia symptoms: sweating  Treatment of hypoglycemia:     Medic alert tag: recommended: Yes    Diabetes education: No Date -   Diet: 2 meals per day, 2 snacks per day  Timing of meals is variable  Diabetic diet compliance:  noncompliant much of the time  Activity: Daily activity is predictable: Yes  Has a sedentary job  No routine exercise  Ophthamology: sees routinely, no retinopathy  Podiatry: does not see routinely    Not on ACE inhibitor/ARB  No history of hypertension  He was started on hydrochlorothiazide for swelling however did not tolerate  Has hyperlipidemia: on atorvastatin - tolerating well, no myalgias  compliant most of the time, denies any side effects from medications  LDL at goal  Thyroid disorders: Hypothyroidism  Patient is on Synthroid 175 mcg daily on empty stomach 1 hour before breakfast  Does not wait to take supplements  Thyroid function tests in normal range    Most recent TSH is normal    History of pancreatitis: No    Vitamin D deficiency: is not currently taking vitamin D3    BP Readings from Last 3 Encounters:   07/26/19 130/74   06/07/19 132/82   04/12/19 120/78     Patient Active Problem List   Diagnosis    Chronic back pain    Collagenous colitis    GERD (gastroesophageal reflux disease)    Hyperlipidemia    Hypothyroidism    Insomnia    Iron deficiency anemia    Obesity, Class II, BMI 35 0-39 9, with comorbidity (see actual BMI)    Type 1 diabetes mellitus with hyperglycemia, with long-term current use of insulin (HCC)    Vitamin D deficiency    Seasonal allergic rhinitis due to pollen    Edema    Snoring    BMI 37 0-37 9, adult    Intrinsic eczema    FARZANA (obstructive sleep apnea)      Past Medical History:   Diagnosis Date    Diabetes mellitus ([address] )     Disease of thyroid gland     Insomnia       Past Surgical History:   Procedure Laterality Date    ROTATOR CUFF REPAIR Right       Family History   Problem Relation Age of Onset    Thyroid disease unspecified Mother     Osteoporosis Mother     Hypertension Father     Diabetes type II Sister     Hypertension Sister     Hyperlipidemia Sister     Thyroid disease unspecified Sister     Diabetes type II Brother     Hypertension Brother     Hyperlipidemia Brother      Social History     Tobacco Use    Smoking status: Former Smoker    Smokeless tobacco: Never Used    Tobacco comment: quit in 2015   Substance Use Topics    Alcohol use:  Yes     Alcohol/week: 2 0 standard drinks     Types: 2 Glasses of wine per week     Comment: social     Allergies   Allergen Reactions    Ibuprofen GI Intolerance    Other Other (See Comments)     Seasonal allergies- pt has congestion         Current Outpatient Medications:     acetone, urine, test (KETOSTIX) strip, by In Vitro route, Disp: , Rfl:     azelastine (ASTELIN) 0 1 % nasal spray, 1 spray into each nostril 2 (two) times a day, Disp: 90 mL, Rfl: 1    Blood Glucose Monitoring Suppl (ONE TOUCH ULTRA 2) w/Device KIT, by Does not apply route, Disp: , Rfl:     Continuous Blood Gluc  (DEXCOM G6 ) KIYA, USE AS DIRECTED, Disp: 1 Device, Rfl: 0    Continuous Blood Gluc Sensor (DEXCOM G6 SENSOR) MISC, 1 Device by Does not apply route continuous Use 1 sensor every 10 days, Disp: 12 each, Rfl: 1    Continuous Blood Gluc Transmit (DEXCOM G6 TRANSMITTER) MISC, USE 1 DEVICE WITH SENSOR, Disp: 1 each, Rfl: 3    esomeprazole (NexIUM) 40 MG capsule, Take 1 capsule (40 mg total) by mouth daily, Disp: 90 capsule, Rfl: 1    fluticasone (CUTIVATE) 0 05 %, Apply topically daily, Disp: 1 Bottle, Rfl: 0    glucagon (GLUCAGON EMERGENCY) 1 MG injection, Inject 1 mg under the skin once as needed for low blood sugar for up to 1 dose, Disp: 1 each, Rfl: 3    insulin glargine (BASAGLAR KWIKPEN) 100 units/mL injection pen, Inject 50 units in case of pump failure ( incase of emergency), Disp: 15 pen, Rfl: 1    insulin lispro (HUMALOG) 100 units/mL injection, Use up to 130 units per day via insulin pump , Disp: 110 mL, Rfl: 0    Insulin Pen Needle (BD PEN NEEDLE LYRIC U/F) 32G X 4 MM MISC, by Does not apply route daily Use once daily, Disp: 100 each, Rfl: 2    liraglutide (VICTOZA) injection, Inject 0 3 mL (1 8 mg total) under the skin daily Inject 1 8 mg daily, Disp: 15 pen, Rfl: 1    metFORMIN (GLUCOPHAGE) 1000 MG tablet, Take 1 tablet (1,000 mg total) by mouth every 12 (twelve) hours for 248 days, Disp: 180 tablet, Rfl: 1    MULTIPLE VITAMIN PO, Take by mouth daily  , Disp: , Rfl:     naproxen (NAPROSYN) 500 mg tablet, Take 1 tablet (500 mg total) by mouth 2 (two) times a day with meals, Disp: 60 tablet, Rfl: 2    rosuvastatin (CRESTOR) 10 MG tablet, Take 1 tablet (10 mg total) by mouth daily, Disp: 90 tablet, Rfl: 1    SYNTHROID 175 MCG tablet, Take 1 tablet (175 mcg total) by mouth daily for 193 days Take 1 tablet daily, Disp: 90 tablet, Rfl: 0    zolpidem (AMBIEN) 10 mg tablet, Take 1 tablet (10 mg total) by mouth daily at bedtime as needed for sleep, Disp: 90 tablet, Rfl: 0    celecoxib (CELEBREX) 200 mg capsule, Take 1 capsule by mouth 2 (two) times a day as needed, Disp: , Rfl:     hydrochlorothiazide (HYDRODIURIL) 12 5 mg tablet, Take 1 tablet (12 5 mg total) by mouth daily, Disp: 90 tablet, Rfl: 1  Review of Systems   Constitutional: Negative for activity change, appetite change, fatigue and unexpected weight change  HENT: Negative for trouble swallowing      Eyes: Negative for visual disturbance  Respiratory: Negative for shortness of breath  Cardiovascular: Negative for chest pain and palpitations  Gastrointestinal: Negative for constipation and diarrhea  Endocrine: Negative for cold intolerance, heat intolerance, polydipsia and polyuria  Genitourinary: Negative  Musculoskeletal: Negative  Neurological: Positive for numbness  Psychiatric/Behavioral: Negative  Physical Exam:  Body mass index is 37 75 kg/m²  /74   Pulse 73   Ht 5' 9" (1 753 m)   Wt 116 kg (255 lb 9 6 oz)   BMI 37 75 kg/m²    Wt Readings from Last 3 Encounters:   07/26/19 116 kg (255 lb 9 6 oz)   06/07/19 116 kg (255 lb 6 4 oz)   04/12/19 117 kg (258 lb 9 6 oz)       Physical Exam   Constitutional: He appears well-developed and well-nourished  HENT:   Head: Normocephalic  Eyes: Pupils are equal, round, and reactive to light  EOM are normal  No scleral icterus  Neck: Normal range of motion  Neck supple  No thyromegaly present  Cardiovascular: Normal rate, regular rhythm and normal heart sounds  No murmur heard  Pulses:       Radial pulses are 2+ on the right side, and 2+ on the left side  Pulmonary/Chest: Effort normal and breath sounds normal  No respiratory distress  He has no wheezes  Abdominal: Soft  Bowel sounds are normal    Musculoskeletal: Normal range of motion  He exhibits no edema or deformity  Neurological: He is alert  He has normal reflexes  Skin: Skin is warm and dry  No rash noted  Psychiatric: He has a normal mood and affect  His behavior is normal    Nursing note and vitals reviewed      Diabetic Foot Exam    Labs:     Plan:    Diagnoses and all orders for this visit:    Type 1 diabetes mellitus with hyperglycemia, with long-term current use of insulin ([facility])  HGA1C 7 9 % worsened from before, suggestive of hyperglycemia  He is currently on intensive insulin regimen via insulin pump  Treatment regimen:  Continue Victoza to 1 8 mg/dl,  Will increase basal rate at 6:00 p m  To 2 4 from 2 2 and at 9:00 p m  To 2 6 from 2 4   Advised patient to use bolus wizard enter blood glucose every time he boluses as he is not always entering blood glucose at this time! Discussed intensive insulin regimen does increase risk for hypoglycemia  Episodes of hypoglycemia can lead to permanent disability and death  Discussed risks/complications associated with uncontrolled diabetes  Advised to adhere to diabetic diet, and recommended staying active/exercising routinely  Keep carbohydrates consistent to limit blood glucose fluctuations  Advised to call if blood sugars less than 70 mg/dl or over 300 mg/dl  Discussed to check blood sugar with glucometer if dex com is not working, to ensure blood sugars are okay    Discussed symptoms and treatment of hypoglycemia  Recommended routine follow-up with podiatry and ophthalmology  Ordered blood work to complete prior to next visit  -     Hemoglobin A1C; Future  -     Basic metabolic panel; Future  --     liraglutide (VICTOZA) injection; Inject 1 8 mg daily    Acquired hypothyroidism  Thyroid function tests within normal limits  Patient clinically and biochemically euthyroid  Continue current dose of levothyroxine  Repeat blood work prior to next visit  -     T4, free; Future  -     TSH, 3rd generation; Future    Mixed hyperlipidemia  LDL 84, at goal  Continue statin therapy     Vitamin-D deficiency  He is currently not taking any vitamin-D supplementation  Discussed to start vitamin-D 3 supplementation 2000 International Units daily  Repeat vitamin-D before next appointment  Discussed with the patient diagnosis and treatment and all questions fully answered  He will call me if any problems arise  Counseled patient on diagnostic results, prognosis, risk and benefit of treatment options, instruction for management, importance of treatment compliance, risk factor reduction and impressions        Neena Small MD

## 2019-07-30 ENCOUNTER — TELEPHONE (OUTPATIENT)
Dept: SLEEP CENTER | Facility: CLINIC | Age: 54
End: 2019-07-30

## 2019-07-30 NOTE — TELEPHONE ENCOUNTER
Left message for the patient that sleep study is resulted and he should keep consult with Dr Rob Hickman

## 2019-08-02 DIAGNOSIS — E10.8 TYPE 1 DIABETES MELLITUS WITH COMPLICATION (HCC): ICD-10-CM

## 2019-08-07 DIAGNOSIS — E10.8 TYPE 1 DIABETES MELLITUS WITH COMPLICATION (HCC): Primary | ICD-10-CM

## 2019-09-09 ENCOUNTER — OFFICE VISIT (OUTPATIENT)
Dept: SLEEP CENTER | Facility: CLINIC | Age: 54
End: 2019-09-09
Payer: COMMERCIAL

## 2019-09-09 VITALS
HEART RATE: 74 BPM | BODY MASS INDEX: 38.21 KG/M2 | WEIGHT: 258 LBS | SYSTOLIC BLOOD PRESSURE: 116 MMHG | HEIGHT: 69 IN | DIASTOLIC BLOOD PRESSURE: 68 MMHG

## 2019-09-09 DIAGNOSIS — J30.1 SEASONAL ALLERGIC RHINITIS DUE TO POLLEN: ICD-10-CM

## 2019-09-09 DIAGNOSIS — M54.5 CHRONIC LOW BACK PAIN, UNSPECIFIED BACK PAIN LATERALITY, WITH SCIATICA PRESENCE UNSPECIFIED: ICD-10-CM

## 2019-09-09 DIAGNOSIS — G89.29 CHRONIC LOW BACK PAIN, UNSPECIFIED BACK PAIN LATERALITY, WITH SCIATICA PRESENCE UNSPECIFIED: ICD-10-CM

## 2019-09-09 DIAGNOSIS — E10.65 TYPE 1 DIABETES MELLITUS WITH HYPERGLYCEMIA, WITH LONG-TERM CURRENT USE OF INSULIN (HCC): ICD-10-CM

## 2019-09-09 DIAGNOSIS — G47.33 OSA (OBSTRUCTIVE SLEEP APNEA): Primary | ICD-10-CM

## 2019-09-09 DIAGNOSIS — G47.09 OTHER INSOMNIA: ICD-10-CM

## 2019-09-09 PROCEDURE — 99244 OFF/OP CNSLTJ NEW/EST MOD 40: CPT | Performed by: INTERNAL MEDICINE

## 2019-09-09 NOTE — PROGRESS NOTES
Consultation - Sleep Center   Azra Lomas  49 y o  male  MICKEY:[id number]  FDY:[id number]    Physician Requesting Consult: Shasha Keyes , *     Reason for Consult : At your kind request I saw this patient for initial sleep evaluation today  A home sleep study was undertaken to evaluate for sleep disordered breathing and   patient is here to review results and further options  The study demonstrated : MICHELLE (respiratory event index of) 7 8 /hour  Minimum oxygen saturation was 77% and 4 3% of the study was spent with saturations less than 90%  The snore index was     PFSH, Problem List, Medications & Allergies were reviewed in EMR  He  has a past medical history of Diabetes mellitus ([facility] [address] ), Disease of thyroid gland, and Insomnia  He has a current medication list which includes the following prescription(s): acetone (urine) test, azelastine, one touch ultra 2, celecoxib, dexcom g6 , dexcom g6 sensor, dexcom g6 transmitter, esomeprazole, fluticasone, glucagon, hydrochlorothiazide, insulin aspart, insulin glargine, insulin lispro, insulin pen needle, liraglutide, metformin, multiple vitamin, naproxen, rosuvastatin, synthroid, and zolpidem  HPI:  Study was undertaken because of his complaints of sleep difficulties, swelling of his hands and feet  He is sleeps alone but has been told he snores  He is not aware of snoring or breathing difficulties during sleep but notes snoring is improved by avoiding sleeping in the supine position  Other Complaints: none  Restless Leg Syndrome: reports no suggestive symptoms, but has numbness of his feet due to neuropathy that disturb sleep   Parasomnia: no features reported    Sleep Routine:   Typical Bedtime:  11:00 p m  Gets OOB:  7:00 a m  TIB:8 hrs  Sleep latency:<  30 minutes but only with aid of Ambien at 5 mg Sleep Interruptions:1/nite because of nocturia  Awakens: spontaneously  Upon awakening: usually feels refreshed   He estimates getting 6-7 hrs sleep  He denied Excessive Daytime Sleepiness but is unable to fall asleep even when he tries to nap  [place] Sleepiness Scale rated at Total score: 4 /24  Habits: reports that he has quit smoking  He has never used smokeless tobacco  , reports that he drinks about 2 0 standard drinks of alcohol per week  ,  reports that he does not use drugs  ,Caffeine use:excessive until around 8:00 p m , Exercise routine: irregular   Family History: Negative for sleep disturbance  ROS: reviewed & as attached  Significant for approximately 10 lb weight gain in the past 1 year  He has back pain that is not disturbing sleep  EXAM:    Vitals /68   Pulse 74   Ht 5' 9" (1 753 m)   Wt 117 kg (258 lb)   BMI 38 10 kg/m²     General  Well appearing male; appears  stated age; no apparent distress  Psychiatric  Well groomed Speech:clear and coherent; Cooperative  Mental State appears normal   Head   Craniofacial anatomy: normalSinuses: non- tender  TMJ: Normal     Ears Externally appear normal      Eyes   EOM's intact;  conjunctiva/corneas clear         Nasal Airway  is patent and narrow nares Septum:intact; Mucosa: normal; Turbinates: normal;  No Rhinorrhea    Oral   Airway   Lips: normal; Dentition: normal ; Mucosa:moist Tongue: Modified MallampatiClass IV and Mobile;Hard Palate: normal   Oropharynx: crowded  Soft Palate:  redundant  and Uvular HypertrophyTonsils:cryptic  PND: none   Neck   Neck Circumference: 18"; is thick and excess fatty tissue; Supple; no abnormal masses; Thyroid:normal  Trachea:central      Lymph    No Cervical or Submandibular Lymhadenopathy   Heart:   S1,S2 normal;RRR; no gallop; nomurmurs     Lungs   Respiratory Effort:normal  Air entry good bilaterally  No wheezes  No rales   Abdomen   Obese, Soft & non-tender     Extremities   No pedal edema  No clubbing or cyanosis  Skin   Skin is warm and dry;  Color& Hydration good; no facial rashes or lesions    Neurologic Alert and orientated; CNII-XII intact; Motor normal; Sensation normal    Muscskeltl    Muscle bulk, tone and power WNL Gait:normal           IMPRESSION: Primary, Secondary Sleep Diagnoses (to Medical or Psych conditions) & Comorbidities   1  FARZANA (obstructive sleep apnea)  Ambulatory referral to Sleep Medicine    [employer]   2  Other insomnia     3  BMI 37 0-37 9, adult     4  Seasonal allergic rhinitis due to pollen     5  Chronic low back pain, unspecified back pain laterality, with sciatica presence unspecified     6  Type 1 diabetes mellitus with hyperglycemia, with long-term current use of insulin ([facility])          PLAN:   1  I reviewed results of the Sleep study with the patient  2  With respect to above conditions, I counseled on pathophysiology, diagnosis, treatment options, risks and benefits; inter-relationship and effects on symptoms and comorbidities; risks of no treatment; costs and insurance aspects  3  Patient declined  positive airway pressure therapy and is interested in the mandibular advancement device  4  Cognitive behavioral therapy was initiated, Sleep Hygiene and behavioral techniques to manage Insomnia were discussed  I advised weaning off Ambien  5  Nasal symptoms may improve with regular nasal saline rinse followed by topical nasal steroid if necessary  6  I also recommended weight reduction and positional therapy  7   A repeat sleep study once he has been fitted with the mandibular advancement device will be necessary to check therapeutic efficacy  I will see him for follow-up in 4 months to monitor progress and further evaluation      Sincerely,     Authenticated electronically by Fahad Donahue MD   on 47/51/89   Board Certified Specialist

## 2019-09-09 NOTE — PROGRESS NOTES
Review of Systems      Genitourinary need to urinate more than twice a night   Cardiology ankle/leg swelling   Gastrointestinal frequent heartburn/acid reflux   Neurology frequent headaches and numbness/tingling of an extremity   Constitutional claustrophobia   Integumentary rash or dry skin and itching   Psychiatry none   Musculoskeletal none   Pulmonary snoring   ENT ringing in ears   Endocrine none   Hematological none

## 2019-09-09 NOTE — PATIENT INSTRUCTIONS
What is FARZANA? Obstructive sleep apnea is a common and serious sleep disorder that causes you to stop breathing during sleep  The airway repeatedly becomes blocked, limiting the amount of air that reaches your lungs  When this happens, you may snore loudly or making choking noises as you try to breathe  Your brain and body becomes oxygen deprived and you may wake up  This may happen a few times a night, or in more severe cases, several hundred times a night  Sleep apnea can make you wake up in the morning feeling tired or unrefreshed even though you have had a full night of sleep  During the day, you may feel fatigued, have difficulty concentrating or you may even unintentionally fall asleep  This is because your body is waking up numerous times throughout the night, even though you might not be conscious of each awakening  The lack of oxygen your body receives can have negative long-term consequences for your health  This includes:  High blood pressure  Heart disease  Irregular heart rhythms  Stroke  Pre-diabetes and diabetes  Depression    Testing  An objective evaluation of your sleep may be needed before your board certified sleep physician can make a diagnosis  Options include:   In-lab overnight sleep study  This type of sleep study requires you to stay overnight at a sleep center, in a bed that may resemble a hotel room  You will sleep with sensors hooked up to various parts of your body  These sensors record your brain waves, heartbeat, breathing and movement  An overnight sleep study also provides your doctor with the most complete information about your sleep  Learn more about an overnight sleep study      Home sleep apnea test  Some patients with high risk factors for obstructive sleep apnea and no other medical disorders may be candidates for a home sleep apnea test  The testing equipment differs in that it is less complicated than what is used in an overnight sleep study   As such, does not give all the data an in-lab will and if negative, may not mean you do not have the problem  Treatment for sleep apnea  includes using a continuous positive airway pressure (CPAP) machine to keep your airway open during sleep  A mask is placed over your nose and mouth, or just your nose  The mask is hooked to the CPAP machine that blows a gentle stream of air into the mask when you breathe  This helps keep your airway open so you can breathe more regularly  Extra oxygen may be given to you through the machine  You may be given a mouth device  It looks like a mouth guard or dental retainer and stops your tongue and mouth tissues from blocking your throat while you sleep  Surgery may be needed to remove extra tissues that block your mouth, throat, or nose  Manage sleep apnea:   Do not smoke  Nicotine and other chemicals in cigarettes and cigars can cause lung damage  Ask your healthcare provider for information if you currently smoke and need help to quit  E-cigarettes or smokeless tobacco still contain nicotine  Talk to your healthcare provider before you use these products  Do not drink alcohol or take sedative medicine before you go to sleep  Alcohol and sedatives can relax the muscles and tissues around your throat  This can block the airflow to your lungs  Maintain a healthy weight  Excess tissue around your throat may restrict your breathing  Ask your healthcare provider for information if you need to lose weight  Sleep on your side or use pillows designed to prevent sleep apnea  This prevents your tongue or other tissues from blocking your throat  You can also raise the head of your bed  Driving Safety  Refrain from driving when drowsy  Follow up with your healthcare provider as directed:  Write down your questions so you remember to ask them during your visits  Go to [employer] website for more information: Sleepeducation  org     What is FARZANA?    Obstructive sleep apnea is a common and serious sleep disorder that causes you to stop breathing during sleep  The airway repeatedly becomes blocked, limiting the amount of air that reaches your lungs  When this happens, you may snore loudly or making choking noises as you try to breathe  Your brain and body becomes oxygen deprived and you may wake up  This may happen a few times a night, or in more severe cases, several hundred times a night  Sleep apnea can make you wake up in the morning feeling tired or unrefreshed even though you have had a full night of sleep  During the day, you may feel fatigued, have difficulty concentrating or you may even unintentionally fall asleep  This is because your body is waking up numerous times throughout the night, even though you might not be conscious of each awakening  The lack of oxygen your body receives can have negative long-term consequences for your health  This includes:  High blood pressure  Heart disease  Irregular heart rhythms  Stroke  Pre-diabetes and diabetes  Depression    Testing  An objective evaluation of your sleep may be needed before your board certified sleep physician can make a diagnosis  Options include:   In-lab overnight sleep study  This type of sleep study requires you to stay overnight at a sleep center, in a bed that may resemble a hotel room  You will sleep with sensors hooked up to various parts of your body  These sensors record your brain waves, heartbeat, breathing and movement  An overnight sleep study also provides your doctor with the most complete information about your sleep  Learn more about an overnight sleep study      Home sleep apnea test  Some patients with high risk factors for obstructive sleep apnea and no other medical disorders may be candidates for a home sleep apnea test  The testing equipment differs in that it is less complicated than what is used in an overnight sleep study   As such, does not give all the data an in-lab will and if negative, may not mean you do not have the problem  Treatment for sleep apnea  includes using a continuous positive airway pressure (CPAP) machine to keep your airway open during sleep  A mask is placed over your nose and mouth, or just your nose  The mask is hooked to the CPAP machine that blows a gentle stream of air into the mask when you breathe  This helps keep your airway open so you can breathe more regularly  Extra oxygen may be given to you through the machine  You may be given a mouth device  It looks like a mouth guard or dental retainer and stops your tongue and mouth tissues from blocking your throat while you sleep  Surgery may be needed to remove extra tissues that block your mouth, throat, or nose  Manage sleep apnea:   Do not smoke  Nicotine and other chemicals in cigarettes and cigars can cause lung damage  Ask your healthcare provider for information if you currently smoke and need help to quit  E-cigarettes or smokeless tobacco still contain nicotine  Talk to your healthcare provider before you use these products  Do not drink alcohol or take sedative medicine before you go to sleep  Alcohol and sedatives can relax the muscles and tissues around your throat  This can block the airflow to your lungs  Maintain a healthy weight  Excess tissue around your throat may restrict your breathing  Ask your healthcare provider for information if you need to lose weight  Sleep on your side or use pillows designed to prevent sleep apnea  This prevents your tongue or other tissues from blocking your throat  You can also raise the head of your bed  Driving Safety  Refrain from driving when drowsy  Follow up with your healthcare provider as directed:  Write down your questions so you remember to ask them during your visits  Go to AASM website for more information: Sleepeducation  org           What you can do to improve your sleep: (Sleep Hygiene) Basic rules for a good night's sleep    Create a regular sleep schedule    This will help you form a sleep routine  Keep a record of your sleep patterns, and any sleeping problems you have  Bring the record to follow-up visits with healthcare providers  Avoid prolonged use of light-emitting screens before bedtime or watching TV in bed  Avoid forcing sleep  Do not take naps  Naps could make it hard for you to fall asleep at bedtime  Deal with your worries before bedtime  Keep your bedroom cool, quiet, and dark  Turn on white noise, such as a fan, to help you relax  Do not use your bed for any activity that will keep you awake  Do not read, exercise, eat, or watch TV in your bedroom  Get up if you do not fall asleep within 20 minutes  Move to another room and do something relaxing until you become sleepy  Limit caffeine, alcohol, nicotine and food to earlier in the day  Only drink caffeine in the morning  Do not drink alcohol within 6 hours of bedtime  Do not eat a heavy meal right before you go to bed  Avoid smoking, especially in the evening  Exercise regularly  Daily exercise will help you sleep better  Do not exercise within 4 hours of bedtime  Stimulus control therapy rules  1  Go to bed only when sleepy  2  Do not watch television, read, eat, or worry while in bed  Use bed only for sleep and sex  3  Get out of bed if unable to fall asleep within 20 minutes and go to another room  Return to bed only when sleepy  Repeat this step as many times as necessary throughout the night  4  Set an alarm clock to wake up at a fixed time each morning, including weekends  5  Do not take a nap during the day  Data from: [address] [place] [address], [address] Nonpharmacologic treatments of insomnia  J Clin Psychiatry 0702; 53:37  Go to AASM website for more information: Sleepeducation  org     Recommended Reading:  Book by authors [address]   No More sleepless nights      Nursing Support:  When: Monday through Friday 7A-5PM except holidays  Where: Our direct line is [phone]  If you are having a true emergency please call 911  In the event that the line is busy or it is after hours please leave a voice message and we will return your call  Please speak clearly, leaving your full name, birth date, best number to reach you and the reason for your call  Medication refills: We will need the name of the medication, the dosage, the ordering provider, whether you get a 30 or 90 day refill, and the pharmacy name and address  Medications will be ordered by the provider only  Nurses cannot call in prescriptions  Please allow 7 days for medication refills  Physician requested updates: If your provider requested that you call with an update after starting medication, please be ready to provide us the medication and dosage, what time you take your medication, the time you attempt to fall asleep, time you fall asleep, when you wake up, and what time you get out of bed  Sleep Study Results: We will contact you with sleep study results and/or next steps after the physician has reviewed your testing

## 2019-09-10 ENCOUNTER — TELEPHONE (OUTPATIENT)
Dept: SLEEP CENTER | Facility: CLINIC | Age: 54
End: 2019-09-10

## 2019-09-23 DIAGNOSIS — E03.9 HYPOTHYROIDISM, UNSPECIFIED TYPE: ICD-10-CM

## 2019-09-23 DIAGNOSIS — E78.5 HYPERLIPIDEMIA, UNSPECIFIED HYPERLIPIDEMIA TYPE: ICD-10-CM

## 2019-09-23 DIAGNOSIS — IMO0001 INSULIN DEPENDENT DIABETES MELLITUS: ICD-10-CM

## 2019-09-23 RX ORDER — ROSUVASTATIN CALCIUM 10 MG/1
10 TABLET, COATED ORAL DAILY
Qty: 90 TABLET | Refills: 1 | Status: SHIPPED | OUTPATIENT
Start: 2019-09-23 | End: 2020-03-16

## 2019-09-23 RX ORDER — LEVOTHYROXINE SODIUM 175 MCG
175 TABLET ORAL DAILY
Qty: 90 TABLET | Refills: 1 | Status: SHIPPED | OUTPATIENT
Start: 2019-09-23 | End: 2019-11-08 | Stop reason: SDUPTHER

## 2019-09-23 NOTE — TELEPHONE ENCOUNTER
Pt requesting refills synthroid 175mcg, crestor and pen needles to [facility]

## 2019-11-01 ENCOUNTER — TELEPHONE (OUTPATIENT)
Dept: ENDOCRINOLOGY | Facility: CLINIC | Age: 54
End: 2019-11-01

## 2019-11-04 LAB — HBA1C MFR BLD HPLC: 7.3 %

## 2019-11-08 ENCOUNTER — TELEPHONE (OUTPATIENT)
Dept: ENDOCRINOLOGY | Facility: CLINIC | Age: 54
End: 2019-11-08

## 2019-11-08 ENCOUNTER — OFFICE VISIT (OUTPATIENT)
Dept: ENDOCRINOLOGY | Facility: CLINIC | Age: 54
End: 2019-11-08
Payer: COMMERCIAL

## 2019-11-08 VITALS
DIASTOLIC BLOOD PRESSURE: 84 MMHG | HEART RATE: 70 BPM | WEIGHT: 258 LBS | HEIGHT: 69 IN | BODY MASS INDEX: 38.21 KG/M2 | SYSTOLIC BLOOD PRESSURE: 122 MMHG

## 2019-11-08 DIAGNOSIS — E55.9 VITAMIN D DEFICIENCY: ICD-10-CM

## 2019-11-08 DIAGNOSIS — E03.9 HYPOTHYROIDISM, UNSPECIFIED TYPE: ICD-10-CM

## 2019-11-08 DIAGNOSIS — E10.65 TYPE 1 DIABETES MELLITUS WITH HYPERGLYCEMIA, WITH LONG-TERM CURRENT USE OF INSULIN (HCC): Primary | ICD-10-CM

## 2019-11-08 DIAGNOSIS — E03.9 ACQUIRED HYPOTHYROIDISM: ICD-10-CM

## 2019-11-08 DIAGNOSIS — G47.33 OSA (OBSTRUCTIVE SLEEP APNEA): ICD-10-CM

## 2019-11-08 DIAGNOSIS — E78.2 MIXED HYPERLIPIDEMIA: ICD-10-CM

## 2019-11-08 PROCEDURE — 99214 OFFICE O/P EST MOD 30 MIN: CPT | Performed by: INTERNAL MEDICINE

## 2019-11-08 PROCEDURE — 95251 CONT GLUC MNTR ANALYSIS I&R: CPT | Performed by: INTERNAL MEDICINE

## 2019-11-08 RX ORDER — ACETAMINOPHEN 160 MG
TABLET,DISINTEGRATING ORAL
Refills: 0
Start: 2019-11-08 | End: 2021-05-06

## 2019-11-08 RX ORDER — LEVOTHYROXINE SODIUM 175 MCG
TABLET ORAL
Qty: 90 TABLET | Refills: 1 | Status: SHIPPED | OUTPATIENT
Start: 2019-11-08 | End: 2020-05-22 | Stop reason: SDUPTHER

## 2019-11-08 NOTE — PROGRESS NOTES
[facility] 49 y o  male MRN: [id number]    Encounter: [id number]      Assessment/Plan     Assessment: This is a 49y o -year-old male with diabetes with hyperglycemia  Plan:  Diagnoses and all orders for this visit:    Type 1 diabetes mellitus with hyperglycemia, with long-term current use of insulin ([facility] [address] )    Lab Results   Component Value Date    HGBA1C 7 3 11/04/2019    A1c is controlled  Blood sugars are better controlled on current regimen on insulin pump  Will continue current insulin pump settings as documented in HPI  Discussed with patient to follow-up in 3 months  Also discussed the use continues glucose monitor sensor all the time, as he has hypoglycemia awareness  Also discussed to continue metformin and Victoza as directed  Patient is aware that Victoza  use in type 1 diabetes is off-label, and he is agreeable to continue Victoza, as his weight is stable  Continue metformin  Follow-up in 3 months    -     Hemoglobin A1C; Future  -     Basic metabolic panel; Future  -     Microalbumin / creatinine urine ratio; Future  -     Glucagon (BAQSIMI TWO PACK) 3 MG/DOSE POWD; Use one dose as needed in case of severe hypoglycemia ( nasal spray)    Hypothyroidism, unspecified type  TSH is 0 3, will reduce Synthroid to 1 tablet daily 6 days a week and take only half tablet on Sundays  Repeat TSH and free T4 in 6 weeks as well as in 3 months  Will follow-up  -     SYNTHROID 175 MCG tablet; Take 1 tablet daily 6 days a week and take 1/2 tablet on sundays  -     T4, free; Future  -     TSH, 3rd generation; Future    Vitamin D deficiency  Vitamin-D is low, take vitamin-D supplementation 4000 International Units daily  -     Cholecalciferol (VITAMIN D3) 50 MCG (2000 UT) capsule; Take 2 capsules daily    DOUGLAS (obstructive sleep apnea)  Discussed importance of getting evaluation for sleep apnea    Mixed hyperlipidemia  Continue statins  Acquired hypothyroidism  -     T4, free;  Future  -     TSH, 3rd generation; Future        CC:   Management of type 1 diabetes, hypothyroidism, hyperlipidemia    History of Present Illness     HPI:    Ricardo Ibarra is 61-year-old man with past medical history of type 1 diabetes with long-term insulin use    Is here for follow-up  Diabetes course has been stable  He denies any complication of diabetes other than mild neuropathy  He denies any sent illnesses or hospitalizations  He denies any history of DKA recently    His current insulin regimen is Humalog via insulin pump, metformin 1000 mg twice a day, Victoza 1 8 mg daily  Patient is currently using T slim insulin pump, denies any issues with insulin pump  His current insulin pump settings are  12:00 a m -3 05 units/hour  4:00 a m -2 95 units/hour  6:00 a m -2 35 units/hour  8:00 a m  2 3 units/hour  10:00 a m  -2 3 units/hour  12:00 p m  2 units/hour  3:00 p m  2 2 units/hour  6:00 p m -2 4 units/hour  9:00 p m  -2 6 units/hour    Total daily basal is 59 6 units    Insulin to carb ratio  12:00 a m -3:00 p m  1 unit for 5 g -  3:00 p m  to 9:00 p m - 1 unit for 4 g    Insulin sensitivity factor is  9:00 p m  to 6:00 a m  25  6:00 a m  to 10:00 a m  12  10:00 a m  to 12:00 p m  14  12:00 p m  to 9:00 p m  12    Patient is currently using dex com sensor, download reviewed October 26 to November 8, 2019  93% days C GM worn  Average calibration per day 0  64% time blood sugars are in half target range  35% times blood sugars are elevated  1% hypoglycemia noted     Blood sugars are sometimes dropping after lunch, and sometimes early in the morning, in 70 range  She is using bolus wizard most of the time     He also has history of hypo thyroidism, for which she is taking Synthroid 175 mcg daily on empty stomach 1 hour before breakfast  He denies missing doses  His last TSH is 0 3 which is low normal    He also has vitamin-D deficiency for which she is currently taking no medications  He was supposed to take vitamin D3 2000 International Units daily    For hyperlipidemia his currently taking Crestor 10 mg daily, denies any side effects       Wt Readings from Last 3 Encounters:   11/08/19 117 kg (258 lb)   09/09/19 117 kg (258 lb)   07/26/19 116 kg (255 lb 9 6 oz)       Lab Results   Component Value Date    HGBA1C 7 3 11/04/2019       Review of Systems   Constitutional: Negative for activity change, diaphoresis, fatigue, fever and unexpected weight change  HENT: Negative  Eyes: Negative for visual disturbance  Respiratory: Negative for cough, chest tightness and shortness of breath  Cardiovascular: Negative for chest pain, palpitations and leg swelling  Gastrointestinal: Negative for abdominal pain, blood in stool, constipation, diarrhea, nausea and vomiting  Endocrine: Negative for cold intolerance, heat intolerance, polydipsia, polyphagia and polyuria  Genitourinary: Negative for dysuria, enuresis, frequency and urgency  Musculoskeletal: Negative for arthralgias and myalgias  Skin: Negative for pallor, rash and wound  Allergic/Immunologic: Negative  Neurological: Negative for dizziness, tremors, weakness and numbness  Hematological: Negative  Psychiatric/Behavioral: Negative          Historical Information   Past Medical History:   Diagnosis Date    Diabetes mellitus ([facility] [address] )     Disease of thyroid gland     Insomnia      Past Surgical History:   Procedure Laterality Date    ROTATOR CUFF REPAIR Right      Social History   Social History     Substance and Sexual Activity   Alcohol Use Yes    Alcohol/week: 2 0 standard drinks    Types: 2 Glasses of wine per week    Comment: social     Social History     Substance and Sexual Activity   Drug Use No     Social History     Tobacco Use   Smoking Status Former Smoker   Smokeless Tobacco Never Used   Tobacco Comment    quit in 2015     Family History:   Family History   Problem Relation Age of Onset    Thyroid disease unspecified Mother     Osteoporosis Mother     Hypertension Father     Diabetes type II Sister     Hypertension Sister     Hyperlipidemia Sister     Thyroid disease unspecified Sister     Diabetes type II Brother     Hypertension Brother     Hyperlipidemia Brother        Meds/Allergies   Current Outpatient Medications   Medication Sig Dispense Refill    acetone, urine, test (KETOSTIX) strip by In Vitro route      azelastine (ASTELIN) 0 1 % nasal spray 1 spray into each nostril 2 (two) times a day 90 mL 1    Blood Glucose Monitoring Suppl (ONE TOUCH ULTRA 2) w/Device KIT by Does not apply route      Continuous Blood Gluc  (DEXCOM G6 ) KIYA USE AS DIRECTED 1 Device 0    Continuous Blood Gluc Sensor (DEXCOM G6 SENSOR) MISC 1 Device by Does not apply route continuous Use 1 sensor every 10 days 12 each 1    Continuous Blood Gluc Transmit (DEXCOM G6 TRANSMITTER) MISC USE 1 DEVICE WITH SENSOR 1 each 3    esomeprazole (NexIUM) 40 MG capsule Take 1 capsule (40 mg total) by mouth daily 90 capsule 1    fluticasone (CUTIVATE) 0 05 % Apply topically daily 1 Bottle 0    insulin aspart (NovoLOG) 100 units/mL injection Use up to 140 units per day via insulin pump 110 mL 2    insulin glargine (BASAGLAR KWIKPEN) 100 units/mL injection pen Inject 50 units in case of pump failure ( incase of emergency) 15 pen 1    Insulin Pen Needle (BD PEN NEEDLE LYRIC U/F) 32G X 4 MM MISC by Does not apply route daily Use once daily 100 each 2    liraglutide (VICTOZA) injection Inject 0 3 mL (1 8 mg total) under the skin daily Inject 1 8 mg daily 15 pen 1    metFORMIN (GLUCOPHAGE) 1000 MG tablet Take 1 tablet (1,000 mg total) by mouth every 12 (twelve) hours for 248 days 180 tablet 1    MULTIPLE VITAMIN PO Take by mouth daily        naproxen (NAPROSYN) 500 mg tablet Take 1 tablet (500 mg total) by mouth 2 (two) times a day with meals 60 tablet 2    rosuvastatin (CRESTOR) 10 MG tablet Take 1 tablet (10 mg total) by mouth daily 90 tablet 1    SYNTHROID 175 MCG tablet Take 1 tablet daily 6 days a week and take 1/2 tablet on sundays 90 tablet 1    zolpidem (AMBIEN) 10 mg tablet Take 1 tablet (10 mg total) by mouth daily at bedtime as needed for sleep 90 tablet 0    celecoxib (CELEBREX) 200 mg capsule Take 1 capsule by mouth 2 (two) times a day as needed      Cholecalciferol (VITAMIN D3) 50 MCG (2000 UT) capsule Take 2 capsules daily  0    Glucagon (BAQSIMI TWO PACK) 3 MG/DOSE POWD Use one dose as needed in case of severe hypoglycemia ( nasal spray) 1 each 0    hydrochlorothiazide (HYDRODIURIL) 12 5 mg tablet Take 1 tablet (12 5 mg total) by mouth daily 90 tablet 1     No current facility-administered medications for this visit  Allergies   Allergen Reactions    Ibuprofen GI Intolerance    Other Other (See Comments)     Seasonal allergies- pt has congestion       Objective   Vitals: Blood pressure 122/84, pulse 70, height 5' 9" (1 753 m), weight 117 kg (258 lb)  Physical Exam   Constitutional: He is oriented to person, place, and time  He appears well-developed and well-nourished  No distress  HENT:   Head: Normocephalic and atraumatic  Eyes: Pupils are equal, round, and reactive to light  Conjunctivae and EOM are normal  Right eye exhibits no discharge  Left eye exhibits no discharge  Neck: Normal range of motion  Neck supple  No tracheal deviation present  No thyromegaly present  Cardiovascular: Normal rate, regular rhythm, normal heart sounds and intact distal pulses  Exam reveals no friction rub  No murmur heard  Pulmonary/Chest: Effort normal and breath sounds normal  No respiratory distress  He has no wheezes  He has no rales  He exhibits no tenderness  Abdominal: Soft  Bowel sounds are normal  He exhibits no distension  There is no tenderness  Musculoskeletal: Normal range of motion  He exhibits no edema, tenderness or deformity  Lymphadenopathy:     He has no cervical adenopathy  Neurological: He is alert and oriented to person, place, and time  He has normal reflexes  He displays normal reflexes  Skin: Skin is warm and dry  No rash noted  He is not diaphoretic  No erythema  Psychiatric: He has a normal mood and affect  His behavior is normal    Vitals reviewed  The history was obtained from the review of the chart, patient  Lab Results:        Reviewed blood work results, from November 4, 2019, glucose 98, BUN 16 creatinine 0 9, sodium 139, potassium 4 2 calcium 8 9 bicarb 28 anion gap is 7 GFR is 90 to free T4 1 3 TSH 0 3 A1c 7 3 vitamin-D 23  Imaging Studies:        I have personally reviewed pertinent reports  Portions of the record may have been created with voice recognition software  Occasional wrong word or "sound a like" substitutions may have occurred due to the inherent limitations of voice recognition software  Read the chart carefully and recognize, using context, where substitutions have occurred

## 2019-11-08 NOTE — TELEPHONE ENCOUNTER
Informed the patient that the Glucagon powder is not covered by his insurance    Put in a script for the pen

## 2019-11-08 NOTE — PATIENT INSTRUCTIONS
Hypoglycemia instructions   Gurinder Read  11/8/2019  [id number]    Low Blood Sugar    Steps to treat low blood sugar  1  Test blood sugar if you have symptoms of low blood sugar:   Low Blood Sugar Symptoms:  o Sweaty  o Dizzy  o Rapid heartbeat  o Shaky    o Bad mood  o Hungry      2  Treat blood sugar less than 70 with 15 grams of fast-acting carbohydrate:   Examples of 15 grams Fast-Acting Carbohydrate:  o 4 oz juice  o 4 oz regular soda  o 3-4 glucose tablets (chew)  o 3-4 hard candies (chew)              3    Wait 15 minutes and test your blood sugar again           4   If blood sugar is less than 100, repeat steps 2-3       5  When your blood sugar is 100 or more, eat a snack if it will be longer than one hour until your next meal  The snack should be 15 grams of carbohydrate and a protein:   Examples of snacks:  o ½ sandwich  o 6 crackers with cheese  o Piece of fruit with cheese or peanut butter  o 6 crackers with peanut butter

## 2019-12-06 ENCOUNTER — OFFICE VISIT (OUTPATIENT)
Dept: INTERNAL MEDICINE CLINIC | Age: 54
End: 2019-12-06
Payer: COMMERCIAL

## 2019-12-06 VITALS
HEIGHT: 69 IN | TEMPERATURE: 97.7 F | WEIGHT: 262.4 LBS | OXYGEN SATURATION: 94 % | BODY MASS INDEX: 38.86 KG/M2 | HEART RATE: 82 BPM | SYSTOLIC BLOOD PRESSURE: 130 MMHG | DIASTOLIC BLOOD PRESSURE: 82 MMHG

## 2019-12-06 DIAGNOSIS — G47.00 INSOMNIA, UNSPECIFIED TYPE: ICD-10-CM

## 2019-12-06 DIAGNOSIS — Z23 NEED FOR INFLUENZA VACCINATION: Primary | ICD-10-CM

## 2019-12-06 DIAGNOSIS — D50.9 IRON DEFICIENCY ANEMIA, UNSPECIFIED IRON DEFICIENCY ANEMIA TYPE: ICD-10-CM

## 2019-12-06 DIAGNOSIS — E10.65 TYPE 1 DIABETES MELLITUS WITH HYPERGLYCEMIA, WITH LONG-TERM CURRENT USE OF INSULIN (HCC): ICD-10-CM

## 2019-12-06 DIAGNOSIS — J32.0 CHRONIC MAXILLARY SINUSITIS: ICD-10-CM

## 2019-12-06 DIAGNOSIS — E78.2 MIXED HYPERLIPIDEMIA: ICD-10-CM

## 2019-12-06 DIAGNOSIS — E03.9 ACQUIRED HYPOTHYROIDISM: ICD-10-CM

## 2019-12-06 DIAGNOSIS — E55.9 VITAMIN D DEFICIENCY: ICD-10-CM

## 2019-12-06 DIAGNOSIS — K21.9 GASTROESOPHAGEAL REFLUX DISEASE, ESOPHAGITIS PRESENCE NOT SPECIFIED: ICD-10-CM

## 2019-12-06 PROCEDURE — 99213 OFFICE O/P EST LOW 20 MIN: CPT | Performed by: NURSE PRACTITIONER

## 2019-12-06 PROCEDURE — 3008F BODY MASS INDEX DOCD: CPT | Performed by: NURSE PRACTITIONER

## 2019-12-06 PROCEDURE — 1036F TOBACCO NON-USER: CPT | Performed by: NURSE PRACTITIONER

## 2019-12-06 PROCEDURE — 90682 RIV4 VACC RECOMBINANT DNA IM: CPT

## 2019-12-06 PROCEDURE — 90471 IMMUNIZATION ADMIN: CPT

## 2019-12-06 RX ORDER — ESOMEPRAZOLE MAGNESIUM 40 MG/1
40 CAPSULE, DELAYED RELEASE ORAL DAILY
Qty: 90 CAPSULE | Refills: 1 | Status: SHIPPED | OUTPATIENT
Start: 2019-12-06 | End: 2020-06-03 | Stop reason: SDUPTHER

## 2019-12-06 RX ORDER — AZELASTINE 1 MG/ML
1 SPRAY, METERED NASAL 2 TIMES DAILY
Qty: 90 ML | Refills: 1 | Status: SHIPPED | OUTPATIENT
Start: 2019-12-06 | End: 2020-06-03 | Stop reason: SDUPTHER

## 2019-12-06 RX ORDER — ZOLPIDEM TARTRATE 10 MG/1
10 TABLET ORAL
Qty: 90 TABLET | Refills: 0 | Status: SHIPPED | OUTPATIENT
Start: 2019-12-06 | End: 2020-06-03 | Stop reason: SDUPTHER

## 2019-12-06 NOTE — ASSESSMENT & PLAN NOTE
Patient reports that endocrine is currently following patient's hyperlipidemia  Recommend healthy lifestyle choices for your cholesterol  Low fat/low cholesterol diet  Limit/avoid red meat  Eat more lean meat - chicken breast, ground turkey, fish  Exercise 30 mins at least 5 times a week as tolerated  Patient is to continue on Crestor

## 2019-12-06 NOTE — ASSESSMENT & PLAN NOTE
Patient currently states he takes iron supplementation, most recent CBC within normal limits, will continue to monitor

## 2019-12-06 NOTE — ASSESSMENT & PLAN NOTE
Lab Results   Component Value Date    HGBA1C 7 3 11/04/2019     Patient currently being follow-up endocrine  Patient up-to-date on eye exam, will be faxed today

## 2019-12-06 NOTE — ASSESSMENT & PLAN NOTE
Currently well controlled with Nexium  You may use OTC tums as needed  Recommend small frequent meals throughout the day  Avoid aggravating foods - spicy foods, acidic foods - such as tomato and citrus  Avoid alcohol  Do not lay down for at least 30 mins after eating

## 2019-12-06 NOTE — ASSESSMENT & PLAN NOTE
Patient currently well controlled on Ambien, patient does not take this medication on a daily basis  Will give refill while in office today

## 2019-12-06 NOTE — PROGRESS NOTES
Assessment/Plan:    GERD (gastroesophageal reflux disease)  Currently well controlled with Nexium  You may use OTC tums as needed  Recommend small frequent meals throughout the day  Avoid aggravating foods - spicy foods, acidic foods - such as tomato and citrus  Avoid alcohol  Do not lay down for at least 30 mins after eating  Type 1 diabetes mellitus with hyperglycemia, with long-term current use of insulin ([facility] [address] )    Lab Results   Component Value Date    HGBA1C 7 3 11/04/2019     Patient currently being follow-up endocrine  Patient up-to-date on eye exam, will be faxed today  Hypothyroidism    Patient currently being followed by Endocrine  Vitamin D deficiency    Vitamin-D monitored by Endocrine, will encourage supplementation  Iron deficiency anemia    Patient currently states he takes iron supplementation, most recent CBC within normal limits, will continue to monitor  Insomnia    Patient currently well controlled on Ambien, patient does not take this medication on a daily basis  Will give refill while in office today  Hyperlipidemia    Patient reports that endocrine is currently following patient's hyperlipidemia  Recommend healthy lifestyle choices for your cholesterol  Low fat/low cholesterol diet  Limit/avoid red meat  Eat more lean meat - chicken breast, ground turkey, fish  Exercise 30 mins at least 5 times a week as tolerated  Patient is to continue on Crestor  Diagnoses and all orders for this visit:    Need for influenza vaccination  -     influenza vaccine, 2730-4550, quadrivalent, recombinant, PF, 0 5 mL, for patients 18 yr+ (FLUBLOK)    Insomnia, unspecified type  -     zolpidem (AMBIEN) 10 mg tablet; Take 1 tablet (10 mg total) by mouth daily at bedtime as needed for sleep    Gastroesophageal reflux disease, esophagitis presence not specified  -     esomeprazole (NexIUM) 40 MG capsule;  Take 1 capsule (40 mg total) by mouth daily    Chronic maxillary sinusitis  -     azelastine (ASTELIN) 0 1 % nasal spray; 1 spray into each nostril 2 (two) times a day    Type 1 diabetes mellitus with hyperglycemia, with long-term current use of insulin (HCC)    Acquired hypothyroidism    Vitamin D deficiency    Iron deficiency anemia, unspecified iron deficiency anemia type    Mixed hyperlipidemia    Other orders  -     Ascorbic Acid (VITAMIN C PO); Take 1 tablet by mouth daily          Subjective:      Patient ID: Bharat Marcus is a 49 y o  male  Patient presents today to follow-up on chronic conditions, patient currently follows endocrine for his type 1 diabetes, hyperlipidemia vitamin-D deficiency and hypothyroidism  Patient reports no new complaints  Patient does report that he has eczema in his right ear, patient has tried nothing to help relieve this  Patient also reports that he has mild congestion for the past 2 days, denies fevers chills  Colonoscopy-2016  Dr Rossi Benson Boards[facility]  Dentist-every 6 months  Podiatrist-not regually         Diabetes   He presents for his initial diabetic visit  He has type 1 diabetes mellitus  Pertinent negatives for hypoglycemia include no dizziness or headaches  Pertinent negatives for diabetes include no chest pain, no polydipsia, no polyphagia, no polyuria and no weakness  Hyperlipidemia   This is a chronic problem  The current episode started more than 1 year ago  Pertinent negatives include no chest pain or shortness of breath  The following portions of the patient's history were reviewed and updated as appropriate: allergies, current medications, past family history, past medical history, past social history, past surgical history and problem list     Review of Systems   Constitutional: Negative for activity change, appetite change, chills, diaphoresis and fever     HENT: Negative for congestion, ear discharge, ear pain, postnasal drip, rhinorrhea, sinus pressure, sinus pain and sore throat  Eyes: Negative for pain, discharge, itching and visual disturbance  Respiratory: Positive for cough (dry )  Negative for chest tightness, shortness of breath and wheezing  Cardiovascular: Negative for chest pain, palpitations and leg swelling  Gastrointestinal: Negative for abdominal pain, blood in stool, constipation, diarrhea, nausea and vomiting  Endocrine: Negative for polydipsia, polyphagia and polyuria  Genitourinary: Negative for difficulty urinating, dysuria, hematuria and urgency  Musculoskeletal: Negative for arthralgias, back pain and neck pain  Skin: Negative for rash and wound  Neurological: Negative for dizziness, weakness, numbness and headaches           Past Medical History:   Diagnosis Date    Diabetes mellitus ([facility] [address] )     Disease of thyroid gland     Insomnia          Current Outpatient Medications:     acetone, urine, test (KETOSTIX) strip, by In Vitro route, Disp: , Rfl:     Ascorbic Acid (VITAMIN C PO), Take 1 tablet by mouth daily, Disp: , Rfl:     azelastine (ASTELIN) 0 1 % nasal spray, 1 spray into each nostril 2 (two) times a day, Disp: 90 mL, Rfl: 1    Blood Glucose Monitoring Suppl (ONE TOUCH ULTRA 2) w/Device KIT, by Does not apply route, Disp: , Rfl:     Cholecalciferol (VITAMIN D3) 50 MCG (2000 UT) capsule, Take 2 capsules daily, Disp: , Rfl: 0    Continuous Blood Gluc  (DEXCOM G6 ) KIYA, USE AS DIRECTED, Disp: 1 Device, Rfl: 0    Continuous Blood Gluc Sensor (DEXCOM G6 SENSOR) MISC, 1 Device by Does not apply route continuous Use 1 sensor every 10 days, Disp: 12 each, Rfl: 1    Continuous Blood Gluc Transmit (DEXCOM G6 TRANSMITTER) MISC, USE 1 DEVICE WITH SENSOR, Disp: 1 each, Rfl: 3    esomeprazole (NexIUM) 40 MG capsule, Take 1 capsule (40 mg total) by mouth daily, Disp: 90 capsule, Rfl: 1    Glucagon (BAQSIMI TWO PACK) 3 MG/DOSE POWD, Use one dose as needed in case of severe hypoglycemia ( nasal spray), Disp: 1 each, Rfl: 0    glucagon (GLUCAGON EMERGENCY) 1 MG injection, Inject 1 mg under the skin once as needed for low blood sugar for up to 1 dose, Disp: 1 kit, Rfl: 1    insulin aspart (NovoLOG) 100 units/mL injection, Use up to 140 units per day via insulin pump, Disp: 110 mL, Rfl: 2    insulin glargine (BASAGLAR KWIKPEN) 100 units/mL injection pen, Inject 50 units in case of pump failure ( incase of emergency), Disp: 15 pen, Rfl: 1    Insulin Pen Needle (BD PEN NEEDLE LYRIC U/F) 32G X 4 MM MISC, by Does not apply route daily Use once daily, Disp: 100 each, Rfl: 2    liraglutide (VICTOZA) injection, Inject 0 3 mL (1 8 mg total) under the skin daily Inject 1 8 mg daily, Disp: 15 pen, Rfl: 1    metFORMIN (GLUCOPHAGE) 1000 MG tablet, Take 1 tablet (1,000 mg total) by mouth every 12 (twelve) hours for 248 days, Disp: 180 tablet, Rfl: 1    MULTIPLE VITAMIN PO, Take by mouth daily  , Disp: , Rfl:     naproxen (NAPROSYN) 500 mg tablet, Take 1 tablet (500 mg total) by mouth 2 (two) times a day with meals, Disp: 60 tablet, Rfl: 2    rosuvastatin (CRESTOR) 10 MG tablet, Take 1 tablet (10 mg total) by mouth daily, Disp: 90 tablet, Rfl: 1    SYNTHROID 175 MCG tablet, Take 1 tablet daily 6 days a week and take 1/2 tablet on sundays, Disp: 90 tablet, Rfl: 1    zolpidem (AMBIEN) 10 mg tablet, Take 1 tablet (10 mg total) by mouth daily at bedtime as needed for sleep, Disp: 90 tablet, Rfl: 0    Allergies   Allergen Reactions    Ibuprofen GI Intolerance    Other Other (See Comments)     Seasonal allergies- pt has congestion       Social History   Past Surgical History:   Procedure Laterality Date    ROTATOR CUFF REPAIR Right      Family History   Problem Relation Age of Onset    Thyroid disease unspecified Mother     Osteoporosis Mother     Hypertension Father     Diabetes type II Sister     Hypertension Sister     Hyperlipidemia Sister     Thyroid disease unspecified Sister     Diabetes type II Brother     Hypertension Brother     Hyperlipidemia Brother        Objective:  /82 (BP Location: Left arm, Patient Position: Sitting, Cuff Size: Standard)   Pulse 82   Temp 97 7 °F (36 5 °C) (Tympanic)   Ht 5' 9" (1 753 m)   Wt 119 kg (262 lb 6 4 oz)   SpO2 94%   BMI 38 75 kg/m²     Recent Results (from the past 1344 hour(s))   Hemoglobin A1C    Collection Time: 11/04/19 12:00 AM   Result Value Ref Range    Hemoglobin A1C 7 3             Physical Exam   Constitutional: He is oriented to person, place, and time  He appears well-developed and well-nourished  No distress  overweight   HENT:   Head: Normocephalic and atraumatic  Right Ear: External ear normal    Left Ear: External ear normal    Nose: Nose normal    Mouth/Throat: Oropharynx is clear and moist  No oropharyngeal exudate  Eyes: Pupils are equal, round, and reactive to light  Conjunctivae and EOM are normal  Right eye exhibits no discharge  Left eye exhibits no discharge  Neck: Normal range of motion  Neck supple  No thyromegaly present  Cardiovascular: Normal rate, regular rhythm, normal heart sounds and intact distal pulses  Exam reveals no gallop and no friction rub  No murmur heard  Pulmonary/Chest: Effort normal and breath sounds normal  No stridor  No respiratory distress  He has no wheezes  He has no rales  Abdominal: Soft  Bowel sounds are normal  He exhibits no distension  There is no tenderness  Lymphadenopathy:     He has no cervical adenopathy  Neurological: He is alert and oriented to person, place, and time  Skin: Skin is warm and dry  No rash noted  He is not diaphoretic  No erythema  Psychiatric: He has a normal mood and affect   His behavior is normal  Judgment and thought content normal

## 2020-01-02 ENCOUNTER — OFFICE VISIT (OUTPATIENT)
Dept: INTERNAL MEDICINE CLINIC | Age: 55
End: 2020-01-02
Payer: COMMERCIAL

## 2020-01-02 VITALS
BODY MASS INDEX: 38.51 KG/M2 | HEART RATE: 72 BPM | WEIGHT: 260 LBS | DIASTOLIC BLOOD PRESSURE: 72 MMHG | TEMPERATURE: 98.5 F | OXYGEN SATURATION: 95 % | SYSTOLIC BLOOD PRESSURE: 128 MMHG | HEIGHT: 69 IN

## 2020-01-02 DIAGNOSIS — E10.65 TYPE 1 DIABETES MELLITUS WITH HYPERGLYCEMIA, WITH LONG-TERM CURRENT USE OF INSULIN (HCC): ICD-10-CM

## 2020-01-02 DIAGNOSIS — J30.1 SEASONAL ALLERGIC RHINITIS DUE TO POLLEN: ICD-10-CM

## 2020-01-02 DIAGNOSIS — J01.00 ACUTE NON-RECURRENT MAXILLARY SINUSITIS: Primary | ICD-10-CM

## 2020-01-02 PROCEDURE — 99214 OFFICE O/P EST MOD 30 MIN: CPT | Performed by: PHYSICIAN ASSISTANT

## 2020-01-02 PROCEDURE — 3008F BODY MASS INDEX DOCD: CPT | Performed by: PHYSICIAN ASSISTANT

## 2020-01-02 RX ORDER — AMOXICILLIN AND CLAVULANATE POTASSIUM 875; 125 MG/1; MG/1
1 TABLET, FILM COATED ORAL EVERY 12 HOURS SCHEDULED
Qty: 14 TABLET | Refills: 0 | Status: SHIPPED | OUTPATIENT
Start: 2020-01-02 | End: 2020-01-09

## 2020-01-02 NOTE — PROGRESS NOTES
Assessment/Plan:         Diagnoses and all orders for this visit:    Acute non-recurrent maxillary sinusitis  Comments:  resume flonase NS  take probiotic daily   f/u in 3-4 days if not improving   Orders:  -     amoxicillin-clavulanate (AUGMENTIN) 875-125 mg per tablet; Take 1 tablet by mouth every 12 (twelve) hours for 7 days    Type 1 diabetes mellitus with hyperglycemia, with long-term current use of insulin ([facility])  Comments:  pt will continue to monitor     Seasonal allergic rhinitis due to pollen          Subjective:      Patient ID: Isidra Fletcher is a 49 y o  male  C/o congestion and cough x 5 days or so  Pt reports he had a cold in mid December which was improving but now has worsened again  Sore throat this am   Pt felt feverish at home - chills   Afebrile currently     BS have been low which is normal for him   BS 70 at times - pt wears continuous glucose monitor     Took robutussion cough med with mild relief  Taking antihistamine daily    Wife with similar sx       The following portions of the patient's history were reviewed and updated as appropriate: allergies, current medications, past family history, past medical history, past social history, past surgical history and problem list     Review of Systems   Constitutional: Positive for activity change, appetite change, chills and fatigue  Negative for fever  HENT: Positive for congestion, postnasal drip, rhinorrhea, sinus pressure, sinus pain and sore throat  Negative for ear pain  Respiratory: Positive for cough  Negative for shortness of breath and wheezing  Cardiovascular: Negative for chest pain  Gastrointestinal: Positive for nausea (mild yesterday )  Negative for abdominal pain, constipation and diarrhea  Genitourinary: Negative for dysuria and frequency  Musculoskeletal: Positive for arthralgias and myalgias  Skin: Negative for rash  Allergic/Immunologic: Positive for environmental allergies     Neurological: Positive for dizziness and headaches  Hematological: Does not bruise/bleed easily           Past Medical History:   Diagnosis Date    Diabetes mellitus ([facility] [address] )     Disease of thyroid gland     Insomnia          Current Outpatient Medications:     acetone, urine, test (KETOSTIX) strip, by In Vitro route, Disp: , Rfl:     Ascorbic Acid (VITAMIN C PO), Take 1 tablet by mouth daily, Disp: , Rfl:     azelastine (ASTELIN) 0 1 % nasal spray, 1 spray into each nostril 2 (two) times a day, Disp: 90 mL, Rfl: 1    Blood Glucose Monitoring Suppl (ONE TOUCH ULTRA 2) w/Device KIT, by Does not apply route, Disp: , Rfl:     Cholecalciferol (VITAMIN D3) 50 MCG (2000 UT) capsule, Take 2 capsules daily, Disp: , Rfl: 0    Continuous Blood Gluc  (DEXCOM G6 ) KIYA, USE AS DIRECTED, Disp: 1 Device, Rfl: 0    Continuous Blood Gluc Transmit (DEXCOM G6 TRANSMITTER) MISC, USE 1 DEVICE WITH SENSOR, Disp: 1 each, Rfl: 3    esomeprazole (NexIUM) 40 MG capsule, Take 1 capsule (40 mg total) by mouth daily, Disp: 90 capsule, Rfl: 1    Glucagon (BAQSIMI TWO PACK) 3 MG/DOSE POWD, Use one dose as needed in case of severe hypoglycemia ( nasal spray), Disp: 1 each, Rfl: 0    insulin aspart (NovoLOG) 100 units/mL injection, Use up to 140 units per day via insulin pump, Disp: 110 mL, Rfl: 2    insulin glargine (BASAGLAR KWIKPEN) 100 units/mL injection pen, Inject 50 units in case of pump failure ( incase of emergency), Disp: 15 pen, Rfl: 1    Insulin Pen Needle (BD PEN NEEDLE LYRIC U/F) 32G X 4 MM MISC, by Does not apply route daily Use once daily, Disp: 100 each, Rfl: 2    liraglutide (VICTOZA) injection, Inject 0 3 mL (1 8 mg total) under the skin daily Inject 1 8 mg daily, Disp: 15 pen, Rfl: 1    metFORMIN (GLUCOPHAGE) 1000 MG tablet, Take 1 tablet (1,000 mg total) by mouth every 12 (twelve) hours for 248 days, Disp: 180 tablet, Rfl: 1    MULTIPLE VITAMIN PO, Take by mouth daily  , Disp: , Rfl:     naproxen (NAPROSYN) 500 mg tablet, Take 1 tablet (500 mg total) by mouth 2 (two) times a day with meals, Disp: 60 tablet, Rfl: 2    rosuvastatin (CRESTOR) 10 MG tablet, Take 1 tablet (10 mg total) by mouth daily, Disp: 90 tablet, Rfl: 1    SYNTHROID 175 MCG tablet, Take 1 tablet daily 6 days a week and take 1/2 tablet on sundays, Disp: 90 tablet, Rfl: 1    zolpidem (AMBIEN) 10 mg tablet, Take 1 tablet (10 mg total) by mouth daily at bedtime as needed for sleep, Disp: 90 tablet, Rfl: 0    amoxicillin-clavulanate (AUGMENTIN) 875-125 mg per tablet, Take 1 tablet by mouth every 12 (twelve) hours for 7 days, Disp: 14 tablet, Rfl: 0    Continuous Blood Gluc Sensor (DEXCOM G6 SENSOR) MISC, 1 Device by Does not apply route continuous Use 1 sensor every 10 days, Disp: 12 each, Rfl: 1    glucagon (GLUCAGON EMERGENCY) 1 MG injection, Inject 1 mg under the skin once as needed for low blood sugar for up to 1 dose (Patient not taking: Reported on 1/2/2020), Disp: 1 kit, Rfl: 1    Allergies   Allergen Reactions    Ibuprofen GI Intolerance    Other Other (See Comments)     Seasonal allergies- pt has congestion       Social History   Past Surgical History:   Procedure Laterality Date    ROTATOR CUFF REPAIR Right      Family History   Problem Relation Age of Onset    Thyroid disease unspecified Mother     Osteoporosis Mother     Hypertension Father     Diabetes type II Sister     Hypertension Sister     Hyperlipidemia Sister     Thyroid disease unspecified Sister     Diabetes type II Brother     Hypertension Brother     Hyperlipidemia Brother        Objective:  /72 (BP Location: Left arm, Patient Position: Sitting, Cuff Size: Standard)   Pulse 72   Temp 98 5 °F (36 9 °C) (Tympanic)   Ht 5' 9" (1 753 m) Comment: shoes on  Wt 118 kg (260 lb) Comment: shoes on  SpO2 95%   BMI 38 40 kg/m²        Physical Exam   Constitutional: He is oriented to person, place, and time  He appears well-developed and well-nourished  Non-toxic appearance   He does not appear ill  No distress  HENT:   Head: Normocephalic and atraumatic  Right Ear: Hearing, tympanic membrane and ear canal normal  No tenderness  No middle ear effusion  Left Ear: Hearing, tympanic membrane and ear canal normal  No tenderness  No middle ear effusion  Mouth/Throat: Uvula is midline  No oral lesions  No uvula swelling  Posterior oropharyngeal erythema present  No oropharyngeal exudate or posterior oropharyngeal edema  Tonsils are 0 on the right  Tonsils are 0 on the left  Eyes: Pupils are equal, round, and reactive to light  EOM are normal    Neck: Normal range of motion  Cardiovascular: Normal rate, regular rhythm and normal heart sounds  No murmur heard  Pulmonary/Chest: Effort normal and breath sounds normal  No respiratory distress  He has no wheezes  He has no rales  Abdominal: Soft  Bowel sounds are normal    Musculoskeletal: Normal range of motion  He exhibits no edema or deformity  Lymphadenopathy:     He has no cervical adenopathy  Neurological: He is alert and oriented to person, place, and time  No cranial nerve deficit  Coordination normal    Skin: Skin is warm and dry  No rash noted  No erythema  Psychiatric: He has a normal mood and affect  His behavior is normal    Vitals reviewed

## 2020-01-20 DIAGNOSIS — E10.8 TYPE 1 DIABETES MELLITUS WITH COMPLICATION (HCC): ICD-10-CM

## 2020-01-23 ENCOUNTER — TELEPHONE (OUTPATIENT)
Dept: ENDOCRINOLOGY | Facility: CLINIC | Age: 55
End: 2020-01-23

## 2020-02-07 ENCOUNTER — OFFICE VISIT (OUTPATIENT)
Dept: ENDOCRINOLOGY | Facility: CLINIC | Age: 55
End: 2020-02-07
Payer: COMMERCIAL

## 2020-02-07 VITALS
SYSTOLIC BLOOD PRESSURE: 138 MMHG | WEIGHT: 263 LBS | HEIGHT: 69 IN | DIASTOLIC BLOOD PRESSURE: 82 MMHG | HEART RATE: 78 BPM | BODY MASS INDEX: 38.95 KG/M2

## 2020-02-07 DIAGNOSIS — R20.0 NUMBNESS AND TINGLING: Primary | ICD-10-CM

## 2020-02-07 DIAGNOSIS — E78.2 MIXED HYPERLIPIDEMIA: ICD-10-CM

## 2020-02-07 DIAGNOSIS — E78.5 HYPERLIPIDEMIA, UNSPECIFIED HYPERLIPIDEMIA TYPE: ICD-10-CM

## 2020-02-07 DIAGNOSIS — IMO0001 INSULIN DEPENDENT DIABETES MELLITUS: ICD-10-CM

## 2020-02-07 DIAGNOSIS — E03.9 ACQUIRED HYPOTHYROIDISM: ICD-10-CM

## 2020-02-07 DIAGNOSIS — R20.2 NUMBNESS AND TINGLING: Primary | ICD-10-CM

## 2020-02-07 DIAGNOSIS — E10.65 TYPE 1 DIABETES MELLITUS WITH HYPERGLYCEMIA, WITH LONG-TERM CURRENT USE OF INSULIN (HCC): ICD-10-CM

## 2020-02-07 PROCEDURE — 95251 CONT GLUC MNTR ANALYSIS I&R: CPT | Performed by: INTERNAL MEDICINE

## 2020-02-07 PROCEDURE — 3008F BODY MASS INDEX DOCD: CPT | Performed by: INTERNAL MEDICINE

## 2020-02-07 PROCEDURE — 1036F TOBACCO NON-USER: CPT | Performed by: INTERNAL MEDICINE

## 2020-02-07 PROCEDURE — 99215 OFFICE O/P EST HI 40 MIN: CPT | Performed by: INTERNAL MEDICINE

## 2020-02-07 NOTE — PROGRESS NOTES
Trev Clemons 49 y o  male MRN: [id number]    Encounter: [id number]      Assessment/Plan     Assessment: This is a 49y o -year-old male with diabetes with hyperglycemia  Plan:    Diagnoses and all orders for this visit:    Numbness and tingling  Patient is complaining of tingling and numbness for last 3 months he is also taking metformin which could cause vitamin B12 deficiency  Will order vitamin B12 level  His feet exam is normal, tingling numbness does not improve Will refer him to Neurology  -     Vitamin B12; Future    Type 1 diabetes mellitus with hyperglycemia, with long-term current use of insulin ([facility])  A1c 7 0%, improved  He has a pattern of elevated blood sugars after dinner and there is the downward trend in blood sugars early in the morning, patient stated this usually happens because he has to take correction bolus at midnight  I have change insulin to carb ratio to 1 unit for 3 g at 3:00 p m  and 6:00 p m  I have change also correction factor to 20 during the day from 6:00 A m  to 9:00 p m  Discussed to call if blood sugar less than 70 or more than 300 to make further adjustment  Discussed to keep carbohydrate consistent with meals  He also has mild gastroparesis in symptoms feels bloating, discuss about stopping Victoza, as it can increase symptoms of gastroparesis, however patient wants to continue taking Victoza as it is helping him for insulin resistance as well as keeping his weight in stable range  If he has worsening symptoms he will let us know and will stop Victoza  He also understands the use of Victoza in type 1 diabetes is off-label  Discussed importance of follow up with Ophthalmology regularly  -     Hemoglobin A1C; Future  -     Basic metabolic panel;  Future    Acquired hypothyroidism  Patient has been complaining of hot flashes did not do blood work before this appointment  Discussed to do blood work as soon as possible  As he may need change in his medication  - T4, free; Future  -     TSH, 3rd generation; Future    Insulin dependent diabetes mellitus ([address] )  See above    Mixed hyperlipidemia  Continue statins  -     Lipid panel Lab Collect Lab Collect; Future    Hyperlipidemia, unspecified hyperlipidemia type        CC: Diabetes    History of Present Illness     HPI:    Jones Ramos is 60-year-old gentleman with past medical history of type 1 diabetes, class 2 obesity, vitamin-D deficiency, acquired hypothyroidism is here for follow-up  He also has history of hyperlipidemia and currently taking Crestor 10 mg daily    His diabetes is managed on insulin pump, uses T flex insulin pump   We could not download the pump today because of technical problems  He uses continues glucose monitor sensor 24 hours a day, 7 days a week  Reviewed dex com download from January 25th to February 7, 2020  Average glucose 171 mg/dL with standard deviation of 50 mg/dL  53% blood sugars are in normal range, 47% blood sugars are elevated above 180 mg/dL, 0% hypoglycemia noted  He has a pattern of significant high between 10:00 p m  and 3:10 a m  He usually has drop in blood sugars early in the morning at 6:00 a m  Patient has been complaining of tingling and numbness in his extremities for the past 3 months, he has been taking metformin for many years  He does not take any multivitamins         Last appt eye appt - nov 2019 , no retinopathy     His current insulin regimen is Humalog via insulin pump, metformin 1000 mg twice a day, Victoza 1 8 mg daily  Patient is currently using T slim insulin pump, denies any issues with insulin pump  His current insulin pump settings are, copied from last note, reviewed myself today    12:00 a m -3 05 units/hour  4:00 a m -2 95 units/hour  6:00 a m -2 35 units/hour  8:00 a m  2 3 units/hour  10:00 a m  -2 3 units/hour  12:00 p m  2 units/hour  3:00 p m  2 2 units/hour  6:00 p m -2 4 units/hour  9:00 p m  -2 6 units/hour     Total daily basal is 59 6 units     Insulin to carb ratio  12:00 a m -3:00 p m  1 unit for 5 g -  3:00 p m  to 6:00 p m - 1 unit for 4 g   6 Pm- 1 units for 4 gm     Insulin sensitivity factor is  9:00 p m  to 6:00 a m  25  6:00 a m  to 10:00 a m  12  10:00 a m  to 12:00 p m  14  12:00 p m  to 9:00 p m  12    Patient also has hypothyroidism for which she is currently taking Synthroid 175 mcg daily except on Sunday he takes half tablet     Wt Readings from Last 3 Encounters:   02/07/20 119 kg (263 lb)   01/02/20 118 kg (260 lb)   12/06/19 119 kg (262 lb 6 4 oz)       Lab Results   Component Value Date    HGBA1C 7 3 11/04/2019       Review of Systems   Constitutional: Negative for activity change, diaphoresis, fatigue, fever and unexpected weight change  HENT: Negative  Eyes: Negative for visual disturbance  Respiratory: Negative for cough, chest tightness and shortness of breath  Cardiovascular: Negative for chest pain, palpitations and leg swelling  Gastrointestinal: Negative for abdominal pain, blood in stool, constipation, diarrhea, nausea and vomiting  Endocrine: Negative for cold intolerance, heat intolerance, polydipsia, polyphagia and polyuria  Genitourinary: Negative for dysuria, enuresis, frequency and urgency  Musculoskeletal: Negative for arthralgias and myalgias  Skin: Negative for pallor, rash and wound  Allergic/Immunologic: Negative  Neurological: Positive for numbness  Negative for dizziness, tremors and weakness  Hematological: Negative  Psychiatric/Behavioral: Negative          Historical Information   Past Medical History:   Diagnosis Date    Diabetes mellitus ([facility] [address] )     Disease of thyroid gland     Insomnia      Past Surgical History:   Procedure Laterality Date    ROTATOR CUFF REPAIR Right      Social History   Social History     Substance and Sexual Activity   Alcohol Use Yes    Alcohol/week: 2 0 standard drinks    Types: 2 Glasses of wine per week    Frequency: 2-3 times a week    Drinks per session: 1 or 2    Binge frequency: Less than monthly    Comment: social     Social History     Substance and Sexual Activity   Drug Use No     Social History     Tobacco Use   Smoking Status Former Smoker    Types: Cigarettes   Smokeless Tobacco Never Used   Tobacco Comment    quit in 2015     Family History:   Family History   Problem Relation Age of Onset    Thyroid disease unspecified Mother     Osteoporosis Mother     Hypertension Father     Diabetes type II Sister     Hypertension Sister     Hyperlipidemia Sister     Thyroid disease unspecified Sister     Diabetes type II Brother     Hypertension Brother     Hyperlipidemia Brother        Meds/Allergies   Current Outpatient Medications   Medication Sig Dispense Refill    acetone, urine, test (KETOSTIX) strip by In Vitro route      Ascorbic Acid (VITAMIN C PO) Take 1 tablet by mouth daily      azelastine (ASTELIN) 0 1 % nasal spray 1 spray into each nostril 2 (two) times a day 90 mL 1    Blood Glucose Monitoring Suppl (ONE TOUCH ULTRA 2) w/Device KIT by Does not apply route      Cholecalciferol (VITAMIN D3) 50 MCG (2000 UT) capsule Take 2 capsules daily  0    Continuous Blood Gluc  (DEXCOM G6 ) KIYA USE AS DIRECTED 1 Device 0    Continuous Blood Gluc Sensor (DEXCOM G6 SENSOR) MISC 1 Device by Does not apply route continuous Use 1 sensor every 10 days 12 each 1    Continuous Blood Gluc Transmit (DEXCOM G6 TRANSMITTER) MISC USE 1 DEVICE WITH SENSOR 1 each 3    esomeprazole (NexIUM) 40 MG capsule Take 1 capsule (40 mg total) by mouth daily 90 capsule 1    Glucagon (BAQSIMI TWO PACK) 3 MG/DOSE POWD Use one dose as needed in case of severe hypoglycemia ( nasal spray) 1 each 0    insulin aspart (NovoLOG) 100 units/mL injection Use up to 140 units per day via insulin pump 110 mL 2    insulin glargine (BASAGLAR KWIKPEN) 100 units/mL injection pen Inject 50 units in case of pump failure ( incase of emergency) 15 pen 1  Insulin Pen Needle (BD PEN NEEDLE LYRIC U/F) 32G X 4 MM MISC by Does not apply route daily Use once daily 100 each 2    liraglutide (VICTOZA) injection Inject 0 3 mL (1 8 mg total) under the skin daily Inject 1 8 mg daily 15 pen 1    metFORMIN (GLUCOPHAGE) 1000 MG tablet TAKE 1 TABLET (1,000 MG TOTAL) BY MOUTH EVERY 12 (TWELVE) HOURS  DAYS 180 tablet 0    MULTIPLE VITAMIN PO Take by mouth daily        naproxen (NAPROSYN) 500 mg tablet Take 1 tablet (500 mg total) by mouth 2 (two) times a day with meals 60 tablet 2    rosuvastatin (CRESTOR) 10 MG tablet Take 1 tablet (10 mg total) by mouth daily 90 tablet 1    SYNTHROID 175 MCG tablet Take 1 tablet daily 6 days a week and take 1/2 tablet on sundays 90 tablet 1    zolpidem (AMBIEN) 10 mg tablet Take 1 tablet (10 mg total) by mouth daily at bedtime as needed for sleep 90 tablet 0    glucagon (GLUCAGON EMERGENCY) 1 MG injection Inject 1 mg under the skin once as needed for low blood sugar for up to 1 dose (Patient not taking: Reported on 1/2/2020) 1 kit 1     No current facility-administered medications for this visit  Allergies   Allergen Reactions    Ibuprofen GI Intolerance    Other Other (See Comments)     Seasonal allergies- pt has congestion       Objective   Vitals: Blood pressure 138/82, pulse 78, height 5' 9" (1 753 m), weight 119 kg (263 lb)  Physical Exam   Constitutional: He is oriented to person, place, and time  He appears well-developed and well-nourished  No distress  HENT:   Head: Normocephalic and atraumatic  Eyes: Pupils are equal, round, and reactive to light  EOM are normal    Neck: Normal range of motion  Neck supple  No thyromegaly present  Cardiovascular: Normal rate, regular rhythm and normal heart sounds  Pulses are no weak pulses  Pulses:       Dorsalis pedis pulses are 2+ on the right side, and 2+ on the left side  Posterior tibial pulses are 2+ on the right side, and 2+ on the left side  Pulmonary/Chest: Effort normal and breath sounds normal  No respiratory distress  Abdominal: Soft  Musculoskeletal: Normal range of motion  He exhibits no edema or deformity  Feet:   Right Foot:   Skin Integrity: Negative for ulcer, skin breakdown, erythema, warmth, callus or dry skin  Left Foot:   Skin Integrity: Negative for ulcer, skin breakdown, erythema, warmth, callus or dry skin  Neurological: He is alert and oriented to person, place, and time  He displays normal reflexes  Skin: Skin is warm and dry  No erythema  Psychiatric: He has a normal mood and affect  Vitals reviewed  Diabetic Foot Exam    Patient's shoes and socks removed  Right Foot/Ankle   Right Foot Inspection  Skin Exam: skin normal and skin intact no dry skin, no warmth, no callus, no erythema, no maceration, no abnormal color, no pre-ulcer, no ulcer and no callus                          Toe Exam: ROM and strength within normal limits  Sensory   Vibration: intact    Monofilament testing: intact  Vascular    The right DP pulse is 2+  The right PT pulse is 2+  Left Foot/Ankle  Left Foot Inspection  Skin Exam: skin normal and skin intactno dry skin, no warmth, no erythema, no maceration, normal color, no pre-ulcer, no ulcer and no callus                         Toe Exam: ROM and strength within normal limits                   Sensory   Vibration: intact    Monofilament: intact  Vascular    The left DP pulse is 2+  The left PT pulse is 2+  Assign Risk Category:  No deformity present; No loss of protective sensation; No weak pulses       Risk: 0    The history was obtained from the review of the chart, patient  Lab Results:   Lab Results   Component Value Date/Time    Hemoglobin A1C 7 3 11/04/2019    Hemoglobin A1C 7 9 07/22/2019    Hemoglobin A1C 7 6 04/08/2019           Imaging Studies: I have personally reviewed pertinent reports  Portions of the record may have been created with voice recognition software  Occasional wrong word or "sound a like" substitutions may have occurred due to the inherent limitations of voice recognition software  Read the chart carefully and recognize, using context, where substitutions have occurred

## 2020-02-13 ENCOUNTER — TELEPHONE (OUTPATIENT)
Dept: ENDOCRINOLOGY | Facility: CLINIC | Age: 55
End: 2020-02-13

## 2020-02-13 NOTE — RESULT ENCOUNTER NOTE
Please inform patient that thyroid blood work is normal, continue current dose of levothyroxine   His basic metabolic profile is  Normal, kidney function is normal, vitamin b 12 - normal , vitamin D - normal, fasting glucose - 165  Mg/dl

## 2020-02-13 NOTE — TELEPHONE ENCOUNTER
----- Message from Juan Francisco Esquivel MD sent at 2/13/2020  8:51 AM EST -----  Please inform patient that thyroid blood work is normal, continue current dose of levothyroxine   His basic metabolic profile is  Normal, kidney function is normal, vitamin b 12 - normal , vitamin D - normal, fasting glucose - 165  Mg/dl

## 2020-02-17 ENCOUNTER — TELEPHONE (OUTPATIENT)
Dept: ENDOCRINOLOGY | Facility: CLINIC | Age: 55
End: 2020-02-17

## 2020-02-17 NOTE — TELEPHONE ENCOUNTER
Can we do prior authorization, pt is already on metformin, can not give Sulfonylurea as he has type 1 diabetes     Colby Rogers MD

## 2020-03-13 DIAGNOSIS — E78.5 HYPERLIPIDEMIA, UNSPECIFIED HYPERLIPIDEMIA TYPE: ICD-10-CM

## 2020-03-16 RX ORDER — ROSUVASTATIN CALCIUM 10 MG/1
TABLET, COATED ORAL
Qty: 90 TABLET | Refills: 1 | Status: SHIPPED | OUTPATIENT
Start: 2020-03-16 | End: 2020-06-15 | Stop reason: SDUPTHER

## 2020-04-21 DIAGNOSIS — E10.8 TYPE 1 DIABETES MELLITUS WITH COMPLICATION (HCC): ICD-10-CM

## 2020-04-23 ENCOUNTER — HOSPITAL ENCOUNTER (OUTPATIENT)
Facility: HOSPITAL | Age: 55
Setting detail: OBSERVATION
Discharge: HOME | End: 2020-04-24
Attending: EMERGENCY MEDICINE | Admitting: EMERGENCY MEDICINE
Payer: COMMERCIAL

## 2020-04-23 ENCOUNTER — APPOINTMENT (EMERGENCY)
Dept: CT IMAGING | Facility: HOSPITAL | Age: 55
End: 2020-04-23
Payer: COMMERCIAL

## 2020-04-23 DIAGNOSIS — D72.829 LEUKOCYTOSIS (LEUCOCYTOSIS): ICD-10-CM

## 2020-04-23 DIAGNOSIS — N17.9 AKI (ACUTE KIDNEY INJURY) (HCC): ICD-10-CM

## 2020-04-23 DIAGNOSIS — N20.1 URETEROLITHIASIS: ICD-10-CM

## 2020-04-23 DIAGNOSIS — N20.0 RENAL CALCULUS, BILATERAL: Primary | ICD-10-CM

## 2020-04-23 LAB
ALBUMIN SERPL BCP-MCNC: 4.1 G/DL (ref 3.5–5)
ALP SERPL-CCNC: 91 U/L (ref 46–116)
ALT SERPL W P-5'-P-CCNC: 38 U/L (ref 12–78)
ANION GAP SERPL CALCULATED.3IONS-SCNC: 9 MMOL/L (ref 4–13)
AST SERPL W P-5'-P-CCNC: 31 U/L (ref 5–45)
BACTERIA UR QL AUTO: ABNORMAL /HPF
BASOPHILS # BLD AUTO: 0.05 THOUSANDS/ΜL (ref 0–0.1)
BASOPHILS NFR BLD AUTO: 0 % (ref 0–1)
BILIRUB SERPL-MCNC: 0.31 MG/DL (ref 0.2–1)
BILIRUB UR QL STRIP: NEGATIVE
BUN SERPL-MCNC: 17 MG/DL (ref 5–25)
CALCIUM SERPL-MCNC: 9 MG/DL (ref 8.3–10.1)
CAOX CRY URNS QL MICRO: ABNORMAL /HPF
CHLORIDE SERPL-SCNC: 102 MMOL/L (ref 100–108)
CLARITY UR: CLEAR
CO2 SERPL-SCNC: 27 MMOL/L (ref 21–32)
COLOR UR: YELLOW
CREAT SERPL-MCNC: 1.35 MG/DL (ref 0.6–1.3)
EOSINOPHIL # BLD AUTO: 0.08 THOUSAND/ΜL (ref 0–0.61)
EOSINOPHIL NFR BLD AUTO: 1 % (ref 0–6)
ERYTHROCYTE [DISTWIDTH] IN BLOOD BY AUTOMATED COUNT: 12.5 % (ref 11.6–15.1)
GFR SERPL CREATININE-BSD FRML MDRD: 60 ML/MIN/1.73SQ M
GLUCOSE SERPL-MCNC: 146 MG/DL (ref 65–140)
GLUCOSE SERPL-MCNC: 238 MG/DL (ref 65–140)
GLUCOSE UR STRIP-MCNC: NEGATIVE MG/DL
HCT VFR BLD AUTO: 43.5 % (ref 36.5–49.3)
HGB BLD-MCNC: 14.9 G/DL (ref 12–17)
HGB UR QL STRIP.AUTO: ABNORMAL
IMM GRANULOCYTES # BLD AUTO: 0.07 THOUSAND/UL (ref 0–0.2)
IMM GRANULOCYTES NFR BLD AUTO: 1 % (ref 0–2)
KETONES UR STRIP-MCNC: NEGATIVE MG/DL
LEUKOCYTE ESTERASE UR QL STRIP: NEGATIVE
LYMPHOCYTES # BLD AUTO: 1.32 THOUSANDS/ΜL (ref 0.6–4.47)
LYMPHOCYTES NFR BLD AUTO: 9 % (ref 14–44)
MCH RBC QN AUTO: 31.4 PG (ref 26.8–34.3)
MCHC RBC AUTO-ENTMCNC: 34.3 G/DL (ref 31.4–37.4)
MCV RBC AUTO: 92 FL (ref 82–98)
MONOCYTES # BLD AUTO: 0.77 THOUSAND/ΜL (ref 0.17–1.22)
MONOCYTES NFR BLD AUTO: 6 % (ref 4–12)
MUCOUS THREADS UR QL AUTO: ABNORMAL
NEUTROPHILS # BLD AUTO: 11.72 THOUSANDS/ΜL (ref 1.85–7.62)
NEUTS SEG NFR BLD AUTO: 83 % (ref 43–75)
NITRITE UR QL STRIP: NEGATIVE
NON-SQ EPI CELLS URNS QL MICRO: ABNORMAL /HPF
NRBC BLD AUTO-RTO: 0 /100 WBCS
PH UR STRIP.AUTO: 5.5 [PH] (ref 4.5–8)
PLATELET # BLD AUTO: 252 THOUSANDS/UL (ref 149–390)
PMV BLD AUTO: 9.7 FL (ref 8.9–12.7)
POTASSIUM SERPL-SCNC: 4.3 MMOL/L (ref 3.5–5.3)
PROT SERPL-MCNC: 7.9 G/DL (ref 6.4–8.2)
PROT UR STRIP-MCNC: ABNORMAL MG/DL
RBC # BLD AUTO: 4.74 MILLION/UL (ref 3.88–5.62)
RBC #/AREA URNS AUTO: ABNORMAL /HPF
SODIUM SERPL-SCNC: 138 MMOL/L (ref 136–145)
SP GR UR STRIP.AUTO: >=1.03 (ref 1–1.03)
UROBILINOGEN UR QL STRIP.AUTO: 0.2 E.U./DL
WBC # BLD AUTO: 14.01 THOUSAND/UL (ref 4.31–10.16)
WBC #/AREA URNS AUTO: ABNORMAL /HPF

## 2020-04-23 PROCEDURE — 80053 COMPREHEN METABOLIC PANEL: CPT | Performed by: EMERGENCY MEDICINE

## 2020-04-23 PROCEDURE — 99285 EMERGENCY DEPT VISIT HI MDM: CPT

## 2020-04-23 PROCEDURE — 74176 CT ABD & PELVIS W/O CONTRAST: CPT

## 2020-04-23 PROCEDURE — 96376 TX/PRO/DX INJ SAME DRUG ADON: CPT

## 2020-04-23 PROCEDURE — 99285 EMERGENCY DEPT VISIT HI MDM: CPT | Performed by: EMERGENCY MEDICINE

## 2020-04-23 PROCEDURE — 36415 COLL VENOUS BLD VENIPUNCTURE: CPT | Performed by: EMERGENCY MEDICINE

## 2020-04-23 PROCEDURE — 99222 1ST HOSP IP/OBS MODERATE 55: CPT | Performed by: INTERNAL MEDICINE

## 2020-04-23 PROCEDURE — 85025 COMPLETE CBC W/AUTO DIFF WBC: CPT | Performed by: EMERGENCY MEDICINE

## 2020-04-23 PROCEDURE — 81001 URINALYSIS AUTO W/SCOPE: CPT

## 2020-04-23 PROCEDURE — 96374 THER/PROPH/DIAG INJ IV PUSH: CPT

## 2020-04-23 PROCEDURE — 82948 REAGENT STRIP/BLOOD GLUCOSE: CPT

## 2020-04-23 PROCEDURE — 96361 HYDRATE IV INFUSION ADD-ON: CPT

## 2020-04-23 PROCEDURE — 3066F NEPHROPATHY DOC TX: CPT | Performed by: PHYSICIAN ASSISTANT

## 2020-04-23 PROCEDURE — 96375 TX/PRO/DX INJ NEW DRUG ADDON: CPT

## 2020-04-23 RX ORDER — ZOLPIDEM TARTRATE 5 MG/1
10 TABLET ORAL
Status: DISCONTINUED | OUTPATIENT
Start: 2020-04-23 | End: 2020-04-24 | Stop reason: HOSPADM

## 2020-04-23 RX ORDER — ONDANSETRON 2 MG/ML
4 INJECTION INTRAMUSCULAR; INTRAVENOUS ONCE
Status: COMPLETED | OUTPATIENT
Start: 2020-04-23 | End: 2020-04-23

## 2020-04-23 RX ORDER — HYDROMORPHONE HCL/PF 1 MG/ML
0.5 SYRINGE (ML) INJECTION EVERY 4 HOURS PRN
Status: DISCONTINUED | OUTPATIENT
Start: 2020-04-23 | End: 2020-04-24

## 2020-04-23 RX ORDER — LEVOTHYROXINE SODIUM 175 UG/1
175 TABLET ORAL
Status: DISCONTINUED | OUTPATIENT
Start: 2020-04-24 | End: 2020-04-24 | Stop reason: HOSPADM

## 2020-04-23 RX ORDER — ACETAMINOPHEN 325 MG/1
650 TABLET ORAL EVERY 6 HOURS PRN
Status: DISCONTINUED | OUTPATIENT
Start: 2020-04-23 | End: 2020-04-24 | Stop reason: HOSPADM

## 2020-04-23 RX ORDER — SODIUM CHLORIDE 9 MG/ML
100 INJECTION, SOLUTION INTRAVENOUS CONTINUOUS
Status: DISCONTINUED | OUTPATIENT
Start: 2020-04-23 | End: 2020-04-24 | Stop reason: HOSPADM

## 2020-04-23 RX ORDER — PANTOPRAZOLE SODIUM 40 MG/1
40 TABLET, DELAYED RELEASE ORAL
Status: DISCONTINUED | OUTPATIENT
Start: 2020-04-24 | End: 2020-04-24 | Stop reason: HOSPADM

## 2020-04-23 RX ORDER — TAMSULOSIN HYDROCHLORIDE 0.4 MG/1
0.4 CAPSULE ORAL
COMMUNITY
End: 2020-06-03 | Stop reason: ALTCHOICE

## 2020-04-23 RX ORDER — PRAVASTATIN SODIUM 80 MG/1
80 TABLET ORAL
Status: DISCONTINUED | OUTPATIENT
Start: 2020-04-23 | End: 2020-04-24 | Stop reason: HOSPADM

## 2020-04-23 RX ORDER — AZELASTINE 1 MG/ML
1 SPRAY, METERED NASAL 2 TIMES DAILY
Status: DISCONTINUED | OUTPATIENT
Start: 2020-04-23 | End: 2020-04-24 | Stop reason: HOSPADM

## 2020-04-23 RX ORDER — TAMSULOSIN HYDROCHLORIDE 0.4 MG/1
0.4 CAPSULE ORAL
Status: DISCONTINUED | OUTPATIENT
Start: 2020-04-23 | End: 2020-04-24 | Stop reason: HOSPADM

## 2020-04-23 RX ORDER — ASCORBIC ACID 500 MG
500 TABLET ORAL DAILY
Status: DISCONTINUED | OUTPATIENT
Start: 2020-04-24 | End: 2020-04-24 | Stop reason: HOSPADM

## 2020-04-23 RX ORDER — LEVOTHYROXINE SODIUM 175 UG/1
87.5 TABLET ORAL WEEKLY
Status: DISCONTINUED | OUTPATIENT
Start: 2020-04-26 | End: 2020-04-24 | Stop reason: HOSPADM

## 2020-04-23 RX ORDER — TAMSULOSIN HYDROCHLORIDE 0.4 MG/1
0.4 CAPSULE ORAL ONCE
Status: COMPLETED | OUTPATIENT
Start: 2020-04-23 | End: 2020-04-23

## 2020-04-23 RX ORDER — HYDROMORPHONE HCL/PF 1 MG/ML
1 SYRINGE (ML) INJECTION ONCE
Status: COMPLETED | OUTPATIENT
Start: 2020-04-23 | End: 2020-04-23

## 2020-04-23 RX ORDER — HYDROMORPHONE HCL/PF 1 MG/ML
0.5 SYRINGE (ML) INJECTION ONCE
Status: COMPLETED | OUTPATIENT
Start: 2020-04-23 | End: 2020-04-23

## 2020-04-23 RX ADMIN — SODIUM CHLORIDE 1000 ML: 0.9 INJECTION, SOLUTION INTRAVENOUS at 15:51

## 2020-04-23 RX ADMIN — TAMSULOSIN HYDROCHLORIDE 0.4 MG: 0.4 CAPSULE ORAL at 16:50

## 2020-04-23 RX ADMIN — TAMSULOSIN HYDROCHLORIDE 0.4 MG: 0.4 CAPSULE ORAL at 20:58

## 2020-04-23 RX ADMIN — PRAVASTATIN SODIUM 80 MG: 80 TABLET ORAL at 20:58

## 2020-04-23 RX ADMIN — AZELASTINE HYDROCHLORIDE 1 SPRAY: 137 SPRAY, METERED NASAL at 20:58

## 2020-04-23 RX ADMIN — HYDROMORPHONE HYDROCHLORIDE 0.5 MG: 1 INJECTION, SOLUTION INTRAMUSCULAR; INTRAVENOUS; SUBCUTANEOUS at 21:12

## 2020-04-23 RX ADMIN — HYDROMORPHONE HYDROCHLORIDE 1 MG: 1 INJECTION, SOLUTION INTRAMUSCULAR; INTRAVENOUS; SUBCUTANEOUS at 16:50

## 2020-04-23 RX ADMIN — HYDROMORPHONE HYDROCHLORIDE 0.5 MG: 1 INJECTION, SOLUTION INTRAMUSCULAR; INTRAVENOUS; SUBCUTANEOUS at 15:13

## 2020-04-23 RX ADMIN — ZOLPIDEM TARTRATE 10 MG: 5 TABLET, FILM COATED ORAL at 21:04

## 2020-04-23 RX ADMIN — INSULIN ASPART 1 UNITS: 100 INJECTION, SOLUTION INTRAVENOUS; SUBCUTANEOUS at 21:16

## 2020-04-23 RX ADMIN — SODIUM CHLORIDE 100 ML/HR: 0.9 INJECTION, SOLUTION INTRAVENOUS at 21:10

## 2020-04-23 RX ADMIN — METFORMIN HYDROCHLORIDE 1000 MG: 500 TABLET, FILM COATED ORAL at 20:58

## 2020-04-23 RX ADMIN — ONDANSETRON 4 MG: 2 INJECTION INTRAMUSCULAR; INTRAVENOUS at 15:12

## 2020-04-24 ENCOUNTER — APPOINTMENT (OUTPATIENT)
Dept: RADIOLOGY | Facility: HOSPITAL | Age: 55
End: 2020-04-24
Payer: COMMERCIAL

## 2020-04-24 ENCOUNTER — ANESTHESIA (OUTPATIENT)
Dept: PERIOP | Facility: HOSPITAL | Age: 55
End: 2020-04-24
Payer: COMMERCIAL

## 2020-04-24 ENCOUNTER — TELEPHONE (OUTPATIENT)
Dept: UROLOGY | Facility: CLINIC | Age: 55
End: 2020-04-24

## 2020-04-24 ENCOUNTER — ANESTHESIA EVENT (OUTPATIENT)
Dept: PERIOP | Facility: HOSPITAL | Age: 55
End: 2020-04-24
Payer: COMMERCIAL

## 2020-04-24 VITALS
HEART RATE: 68 BPM | DIASTOLIC BLOOD PRESSURE: 80 MMHG | WEIGHT: 255 LBS | BODY MASS INDEX: 38.65 KG/M2 | HEIGHT: 68 IN | TEMPERATURE: 97.4 F | RESPIRATION RATE: 18 BRPM | OXYGEN SATURATION: 95 % | SYSTOLIC BLOOD PRESSURE: 136 MMHG

## 2020-04-24 DIAGNOSIS — N20.0 NEPHROLITHIASIS: Primary | ICD-10-CM

## 2020-04-24 PROBLEM — N17.9 AKI (ACUTE KIDNEY INJURY) (HCC): Status: ACTIVE | Noted: 2020-04-24

## 2020-04-24 LAB
ANION GAP SERPL CALCULATED.3IONS-SCNC: 8 MMOL/L (ref 4–13)
BUN SERPL-MCNC: 19 MG/DL (ref 5–25)
CALCIUM SERPL-MCNC: 8.5 MG/DL (ref 8.3–10.1)
CHLORIDE SERPL-SCNC: 103 MMOL/L (ref 100–108)
CO2 SERPL-SCNC: 27 MMOL/L (ref 21–32)
CREAT SERPL-MCNC: 1.54 MG/DL (ref 0.6–1.3)
ERYTHROCYTE [DISTWIDTH] IN BLOOD BY AUTOMATED COUNT: 12.6 % (ref 11.6–15.1)
GFR SERPL CREATININE-BSD FRML MDRD: 51 ML/MIN/1.73SQ M
GLUCOSE SERPL-MCNC: 115 MG/DL (ref 65–140)
GLUCOSE SERPL-MCNC: 133 MG/DL (ref 65–140)
GLUCOSE SERPL-MCNC: 142 MG/DL (ref 65–140)
HCT VFR BLD AUTO: 37.2 % (ref 36.5–49.3)
HGB BLD-MCNC: 12.7 G/DL (ref 12–17)
MCH RBC QN AUTO: 31.8 PG (ref 26.8–34.3)
MCHC RBC AUTO-ENTMCNC: 34.1 G/DL (ref 31.4–37.4)
MCV RBC AUTO: 93 FL (ref 82–98)
PLATELET # BLD AUTO: 196 THOUSANDS/UL (ref 149–390)
PMV BLD AUTO: 9.4 FL (ref 8.9–12.7)
POTASSIUM SERPL-SCNC: 4.1 MMOL/L (ref 3.5–5.3)
RBC # BLD AUTO: 4 MILLION/UL (ref 3.88–5.62)
SODIUM SERPL-SCNC: 138 MMOL/L (ref 136–145)
WBC # BLD AUTO: 12.42 THOUSAND/UL (ref 4.31–10.16)

## 2020-04-24 PROCEDURE — 85027 COMPLETE CBC AUTOMATED: CPT | Performed by: INTERNAL MEDICINE

## 2020-04-24 PROCEDURE — 52356 CYSTO/URETERO W/LITHOTRIPSY: CPT | Performed by: UROLOGY

## 2020-04-24 PROCEDURE — 82360 CALCULUS ASSAY QUANT: CPT | Performed by: UROLOGY

## 2020-04-24 PROCEDURE — 99215 OFFICE O/P EST HI 40 MIN: CPT | Performed by: NURSE PRACTITIONER

## 2020-04-24 PROCEDURE — C2617 STENT, NON-COR, TEM W/O DEL: HCPCS | Performed by: UROLOGY

## 2020-04-24 PROCEDURE — 82948 REAGENT STRIP/BLOOD GLUCOSE: CPT

## 2020-04-24 PROCEDURE — C1769 GUIDE WIRE: HCPCS | Performed by: UROLOGY

## 2020-04-24 PROCEDURE — 99239 HOSP IP/OBS DSCHRG MGMT >30: CPT | Performed by: NURSE PRACTITIONER

## 2020-04-24 PROCEDURE — 74420 UROGRAPHY RTRGR +-KUB: CPT

## 2020-04-24 PROCEDURE — 80048 BASIC METABOLIC PNL TOTAL CA: CPT | Performed by: INTERNAL MEDICINE

## 2020-04-24 DEVICE — STENT URETERAL 6 FR 26CM INLAY OPTIMA: Type: IMPLANTABLE DEVICE | Site: URETER | Status: FUNCTIONAL

## 2020-04-24 RX ORDER — ONDANSETRON 2 MG/ML
INJECTION INTRAMUSCULAR; INTRAVENOUS AS NEEDED
Status: DISCONTINUED | OUTPATIENT
Start: 2020-04-24 | End: 2020-04-24 | Stop reason: SURG

## 2020-04-24 RX ORDER — SODIUM CHLORIDE, SODIUM LACTATE, POTASSIUM CHLORIDE, CALCIUM CHLORIDE 600; 310; 30; 20 MG/100ML; MG/100ML; MG/100ML; MG/100ML
INJECTION, SOLUTION INTRAVENOUS CONTINUOUS PRN
Status: DISCONTINUED | OUTPATIENT
Start: 2020-04-24 | End: 2020-04-24 | Stop reason: SURG

## 2020-04-24 RX ORDER — FENTANYL CITRATE/PF 50 MCG/ML
50 SYRINGE (ML) INJECTION
Status: DISCONTINUED | OUTPATIENT
Start: 2020-04-24 | End: 2020-04-24 | Stop reason: HOSPADM

## 2020-04-24 RX ORDER — PHENAZOPYRIDINE HYDROCHLORIDE 200 MG/1
200 TABLET, FILM COATED ORAL 3 TIMES DAILY PRN
Qty: 10 TABLET | Refills: 0 | Status: SHIPPED | OUTPATIENT
Start: 2020-04-24 | End: 2020-04-27

## 2020-04-24 RX ORDER — PROPOFOL 10 MG/ML
INJECTION, EMULSION INTRAVENOUS AS NEEDED
Status: DISCONTINUED | OUTPATIENT
Start: 2020-04-24 | End: 2020-04-24 | Stop reason: SURG

## 2020-04-24 RX ORDER — GLYCOPYRROLATE 0.2 MG/ML
INJECTION INTRAMUSCULAR; INTRAVENOUS AS NEEDED
Status: DISCONTINUED | OUTPATIENT
Start: 2020-04-24 | End: 2020-04-24 | Stop reason: SURG

## 2020-04-24 RX ORDER — DOCUSATE SODIUM 100 MG/1
100 CAPSULE, LIQUID FILLED ORAL 2 TIMES DAILY
Qty: 30 CAPSULE | Refills: 0 | Status: SHIPPED | OUTPATIENT
Start: 2020-04-24 | End: 2020-06-03 | Stop reason: ALTCHOICE

## 2020-04-24 RX ORDER — HYDROCODONE BITARTRATE AND ACETAMINOPHEN 5; 325 MG/1; MG/1
1 TABLET ORAL EVERY 6 HOURS PRN
Qty: 5 TABLET | Refills: 0 | Status: SHIPPED | OUTPATIENT
Start: 2020-04-24 | End: 2020-05-04

## 2020-04-24 RX ORDER — HYDROMORPHONE HCL/PF 1 MG/ML
0.2 SYRINGE (ML) INJECTION EVERY 4 HOURS PRN
Status: DISCONTINUED | OUTPATIENT
Start: 2020-04-24 | End: 2020-04-24

## 2020-04-24 RX ORDER — CEFAZOLIN SODIUM 2 G/50ML
2000 SOLUTION INTRAVENOUS
Status: COMPLETED | OUTPATIENT
Start: 2020-04-24 | End: 2020-04-24

## 2020-04-24 RX ORDER — TAMSULOSIN HYDROCHLORIDE 0.4 MG/1
0.4 CAPSULE ORAL
Qty: 30 CAPSULE | Refills: 0 | Status: SHIPPED | OUTPATIENT
Start: 2020-04-24 | End: 2020-06-03 | Stop reason: ALTCHOICE

## 2020-04-24 RX ORDER — HYDROMORPHONE HCL/PF 1 MG/ML
0.2 SYRINGE (ML) INJECTION ONCE
Status: COMPLETED | OUTPATIENT
Start: 2020-04-24 | End: 2020-04-24

## 2020-04-24 RX ORDER — HYDROMORPHONE HCL/PF 1 MG/ML
0.5 SYRINGE (ML) INJECTION
Status: DISCONTINUED | OUTPATIENT
Start: 2020-04-24 | End: 2020-04-24 | Stop reason: HOSPADM

## 2020-04-24 RX ORDER — LIDOCAINE HYDROCHLORIDE 10 MG/ML
INJECTION, SOLUTION EPIDURAL; INFILTRATION; INTRACAUDAL; PERINEURAL AS NEEDED
Status: DISCONTINUED | OUTPATIENT
Start: 2020-04-24 | End: 2020-04-24 | Stop reason: SURG

## 2020-04-24 RX ORDER — HYDROMORPHONE HCL/PF 1 MG/ML
1 SYRINGE (ML) INJECTION ONCE
Status: COMPLETED | OUTPATIENT
Start: 2020-04-24 | End: 2020-04-24

## 2020-04-24 RX ORDER — NAPROXEN 500 MG/1
500 TABLET ORAL 2 TIMES DAILY WITH MEALS
Qty: 10 TABLET | Refills: 0 | Status: SHIPPED | OUTPATIENT
Start: 2020-04-24 | End: 2020-06-03 | Stop reason: SDUPTHER

## 2020-04-24 RX ORDER — SUCCINYLCHOLINE/SOD CL,ISO/PF 100 MG/5ML
SYRINGE (ML) INTRAVENOUS AS NEEDED
Status: DISCONTINUED | OUTPATIENT
Start: 2020-04-24 | End: 2020-04-24 | Stop reason: SURG

## 2020-04-24 RX ORDER — FENTANYL CITRATE 50 UG/ML
INJECTION, SOLUTION INTRAMUSCULAR; INTRAVENOUS AS NEEDED
Status: DISCONTINUED | OUTPATIENT
Start: 2020-04-24 | End: 2020-04-24 | Stop reason: SURG

## 2020-04-24 RX ORDER — MIDAZOLAM HYDROCHLORIDE 2 MG/2ML
INJECTION, SOLUTION INTRAMUSCULAR; INTRAVENOUS AS NEEDED
Status: DISCONTINUED | OUTPATIENT
Start: 2020-04-24 | End: 2020-04-24 | Stop reason: SURG

## 2020-04-24 RX ORDER — CEPHALEXIN 500 MG/1
500 CAPSULE ORAL EVERY 6 HOURS SCHEDULED
Qty: 3 CAPSULE | Refills: 0 | Status: SHIPPED | OUTPATIENT
Start: 2020-04-24 | End: 2020-04-25

## 2020-04-24 RX ORDER — ROCURONIUM BROMIDE 10 MG/ML
INJECTION, SOLUTION INTRAVENOUS AS NEEDED
Status: DISCONTINUED | OUTPATIENT
Start: 2020-04-24 | End: 2020-04-24 | Stop reason: SURG

## 2020-04-24 RX ORDER — HYDROMORPHONE HCL/PF 1 MG/ML
0.2 SYRINGE (ML) INJECTION
Status: DISCONTINUED | OUTPATIENT
Start: 2020-04-24 | End: 2020-04-24 | Stop reason: HOSPADM

## 2020-04-24 RX ORDER — NEOSTIGMINE METHYLSULFATE 1 MG/ML
INJECTION INTRAVENOUS AS NEEDED
Status: DISCONTINUED | OUTPATIENT
Start: 2020-04-24 | End: 2020-04-24 | Stop reason: SURG

## 2020-04-24 RX ADMIN — GLYCOPYRROLATE 0.4 MG: 0.2 INJECTION, SOLUTION INTRAMUSCULAR; INTRAVENOUS at 14:23

## 2020-04-24 RX ADMIN — SODIUM CHLORIDE 100 ML/HR: 0.9 INJECTION, SOLUTION INTRAVENOUS at 06:01

## 2020-04-24 RX ADMIN — Medication 100 MG: at 13:58

## 2020-04-24 RX ADMIN — HYDROMORPHONE HYDROCHLORIDE 0.2 MG: 1 INJECTION, SOLUTION INTRAMUSCULAR; INTRAVENOUS; SUBCUTANEOUS at 04:52

## 2020-04-24 RX ADMIN — HYDROMORPHONE HYDROCHLORIDE 0.2 MG: 1 INJECTION, SOLUTION INTRAMUSCULAR; INTRAVENOUS; SUBCUTANEOUS at 08:13

## 2020-04-24 RX ADMIN — SODIUM CHLORIDE, SODIUM LACTATE, POTASSIUM CHLORIDE, AND CALCIUM CHLORIDE: .6; .31; .03; .02 INJECTION, SOLUTION INTRAVENOUS at 13:30

## 2020-04-24 RX ADMIN — ROCURONIUM BROMIDE 20 MG: 10 SOLUTION INTRAVENOUS at 14:03

## 2020-04-24 RX ADMIN — LIDOCAINE HYDROCHLORIDE 50 MG: 10 INJECTION, SOLUTION EPIDURAL; INFILTRATION; INTRACAUDAL; PERINEURAL at 13:57

## 2020-04-24 RX ADMIN — HYDROMORPHONE HYDROCHLORIDE 0.5 MG: 1 INJECTION, SOLUTION INTRAMUSCULAR; INTRAVENOUS; SUBCUTANEOUS at 10:06

## 2020-04-24 RX ADMIN — FENTANYL CITRATE 100 MCG: 50 INJECTION INTRAMUSCULAR; INTRAVENOUS at 13:56

## 2020-04-24 RX ADMIN — PROPOFOL 200 MG: 10 INJECTION, EMULSION INTRAVENOUS at 13:58

## 2020-04-24 RX ADMIN — MIDAZOLAM HYDROCHLORIDE 2 MG: 1 INJECTION, SOLUTION INTRAMUSCULAR; INTRAVENOUS at 13:56

## 2020-04-24 RX ADMIN — NEOSTIGMINE METHYLSULFATE 3 MG: 1 INJECTION INTRAVENOUS at 14:23

## 2020-04-24 RX ADMIN — CEFAZOLIN SODIUM 2000 MG: 2 SOLUTION INTRAVENOUS at 13:50

## 2020-04-24 RX ADMIN — HYDROMORPHONE HYDROCHLORIDE 1 MG: 1 INJECTION, SOLUTION INTRAMUSCULAR; INTRAVENOUS; SUBCUTANEOUS at 12:48

## 2020-04-24 RX ADMIN — ONDANSETRON 4 MG: 2 INJECTION INTRAMUSCULAR; INTRAVENOUS at 13:58

## 2020-04-25 DIAGNOSIS — E10.8 TYPE 1 DIABETES MELLITUS WITH COMPLICATION (HCC): ICD-10-CM

## 2020-04-27 ENCOUNTER — TRANSITIONAL CARE MANAGEMENT (OUTPATIENT)
Dept: INTERNAL MEDICINE CLINIC | Facility: CLINIC | Age: 55
End: 2020-04-27

## 2020-04-27 RX ORDER — LANOLIN ALCOHOL/MO/W.PET/CERES
1000 CREAM (GRAM) TOPICAL 3 TIMES WEEKLY
COMMUNITY

## 2020-04-29 ENCOUNTER — PROCEDURE VISIT (OUTPATIENT)
Dept: UROLOGY | Facility: CLINIC | Age: 55
End: 2020-04-29
Payer: COMMERCIAL

## 2020-04-29 ENCOUNTER — TELEPHONE (OUTPATIENT)
Dept: UROLOGY | Facility: CLINIC | Age: 55
End: 2020-04-29

## 2020-04-29 VITALS
HEART RATE: 80 BPM | WEIGHT: 271.8 LBS | BODY MASS INDEX: 41.19 KG/M2 | SYSTOLIC BLOOD PRESSURE: 140 MMHG | DIASTOLIC BLOOD PRESSURE: 80 MMHG | HEIGHT: 68 IN

## 2020-04-29 DIAGNOSIS — R33.9 URINARY RETENTION: Primary | ICD-10-CM

## 2020-04-29 LAB — POST-VOID RESIDUAL VOLUME, ML POC: 0 ML

## 2020-04-29 PROCEDURE — 3066F NEPHROPATHY DOC TX: CPT

## 2020-04-29 PROCEDURE — 3008F BODY MASS INDEX DOCD: CPT

## 2020-04-29 PROCEDURE — 1111F DSCHRG MED/CURRENT MED MERGE: CPT

## 2020-04-29 PROCEDURE — 3008F BODY MASS INDEX DOCD: CPT | Performed by: PHYSICIAN ASSISTANT

## 2020-04-29 PROCEDURE — 51798 US URINE CAPACITY MEASURE: CPT

## 2020-04-29 PROCEDURE — 99211 OFF/OP EST MAY X REQ PHY/QHP: CPT

## 2020-04-30 LAB
CALCIUM OXALATE DIHYDRATE MFR STONE IR: 40 %
COLOR STONE: NORMAL
COM MFR STONE: 60 %
COMMENT-STONE3: NORMAL
COMPOSITION: NORMAL
LABORATORY COMMENT REPORT: NORMAL
PHOTO: NORMAL
SIZE STONE: NORMAL MM
SPEC SOURCE SUBJ: NORMAL
STONE ANALYSIS-IMP: NORMAL
WT STONE: 21 MG

## 2020-05-04 DIAGNOSIS — E10.8 TYPE 1 DIABETES MELLITUS WITH COMPLICATION (HCC): ICD-10-CM

## 2020-05-06 ENCOUNTER — TELEPHONE (OUTPATIENT)
Dept: ENDOCRINOLOGY | Facility: CLINIC | Age: 55
End: 2020-05-06

## 2020-05-07 ENCOUNTER — TELEPHONE (OUTPATIENT)
Dept: INTERNAL MEDICINE CLINIC | Facility: CLINIC | Age: 55
End: 2020-05-07

## 2020-05-07 ENCOUNTER — TRANSITIONAL CARE MANAGEMENT (OUTPATIENT)
Dept: INTERNAL MEDICINE CLINIC | Facility: CLINIC | Age: 55
End: 2020-05-07

## 2020-05-18 LAB — HBA1C MFR BLD HPLC: 7.6 %

## 2020-05-22 ENCOUNTER — TELEMEDICINE (OUTPATIENT)
Dept: ENDOCRINOLOGY | Facility: CLINIC | Age: 55
End: 2020-05-22
Payer: COMMERCIAL

## 2020-05-22 DIAGNOSIS — E78.2 MIXED HYPERLIPIDEMIA: ICD-10-CM

## 2020-05-22 DIAGNOSIS — E03.9 HYPOTHYROIDISM, UNSPECIFIED TYPE: ICD-10-CM

## 2020-05-22 DIAGNOSIS — E55.9 VITAMIN D DEFICIENCY: ICD-10-CM

## 2020-05-22 DIAGNOSIS — E10.65 TYPE 1 DIABETES MELLITUS WITH HYPERGLYCEMIA, WITH LONG-TERM CURRENT USE OF INSULIN (HCC): Primary | ICD-10-CM

## 2020-05-22 PROCEDURE — 1111F DSCHRG MED/CURRENT MED MERGE: CPT | Performed by: INTERNAL MEDICINE

## 2020-05-22 PROCEDURE — 99214 OFFICE O/P EST MOD 30 MIN: CPT | Performed by: INTERNAL MEDICINE

## 2020-05-22 RX ORDER — LEVOTHYROXINE SODIUM 175 MCG
TABLET ORAL
Qty: 90 TABLET | Refills: 0
Start: 2020-05-22 | End: 2020-12-22

## 2020-06-03 ENCOUNTER — TELEMEDICINE (OUTPATIENT)
Dept: INTERNAL MEDICINE CLINIC | Age: 55
End: 2020-06-03
Payer: COMMERCIAL

## 2020-06-03 DIAGNOSIS — J32.0 CHRONIC MAXILLARY SINUSITIS: ICD-10-CM

## 2020-06-03 DIAGNOSIS — G47.00 INSOMNIA, UNSPECIFIED TYPE: ICD-10-CM

## 2020-06-03 DIAGNOSIS — G44.019 EPISODIC CLUSTER HEADACHE, NOT INTRACTABLE: ICD-10-CM

## 2020-06-03 DIAGNOSIS — Z11.4 SCREENING FOR HIV (HUMAN IMMUNODEFICIENCY VIRUS): Primary | ICD-10-CM

## 2020-06-03 DIAGNOSIS — K21.9 GASTROESOPHAGEAL REFLUX DISEASE, ESOPHAGITIS PRESENCE NOT SPECIFIED: ICD-10-CM

## 2020-06-03 DIAGNOSIS — E10.65 TYPE 1 DIABETES MELLITUS WITH HYPERGLYCEMIA, WITH LONG-TERM CURRENT USE OF INSULIN (HCC): ICD-10-CM

## 2020-06-03 PROCEDURE — 99214 OFFICE O/P EST MOD 30 MIN: CPT | Performed by: NURSE PRACTITIONER

## 2020-06-03 RX ORDER — AZELASTINE 1 MG/ML
1 SPRAY, METERED NASAL 2 TIMES DAILY
Qty: 90 ML | Refills: 1 | Status: SHIPPED | OUTPATIENT
Start: 2020-06-03 | End: 2022-07-07

## 2020-06-03 RX ORDER — ESOMEPRAZOLE MAGNESIUM 40 MG/1
40 CAPSULE, DELAYED RELEASE ORAL DAILY
Qty: 90 CAPSULE | Refills: 1 | Status: SHIPPED | OUTPATIENT
Start: 2020-06-03 | End: 2020-11-20 | Stop reason: SDUPTHER

## 2020-06-03 RX ORDER — NAPROXEN 500 MG/1
500 TABLET ORAL 2 TIMES DAILY WITH MEALS
Qty: 60 TABLET | Refills: 2 | Status: SHIPPED | OUTPATIENT
Start: 2020-06-03 | End: 2021-04-27 | Stop reason: HOSPADM

## 2020-06-03 RX ORDER — ZOLPIDEM TARTRATE 10 MG/1
10 TABLET ORAL
Qty: 90 TABLET | Refills: 0 | Status: SHIPPED | OUTPATIENT
Start: 2020-06-03 | End: 2020-10-02 | Stop reason: SDUPTHER

## 2020-06-08 ENCOUNTER — TELEPHONE (OUTPATIENT)
Dept: ENDOCRINOLOGY | Facility: CLINIC | Age: 55
End: 2020-06-08

## 2020-06-09 ENCOUNTER — TELEPHONE (OUTPATIENT)
Dept: ENDOCRINOLOGY | Facility: CLINIC | Age: 55
End: 2020-06-09

## 2020-06-15 DIAGNOSIS — E78.5 HYPERLIPIDEMIA, UNSPECIFIED HYPERLIPIDEMIA TYPE: ICD-10-CM

## 2020-06-15 RX ORDER — ROSUVASTATIN CALCIUM 10 MG/1
10 TABLET, COATED ORAL DAILY
Qty: 90 TABLET | Refills: 1 | Status: SHIPPED | OUTPATIENT
Start: 2020-06-15 | End: 2020-11-20 | Stop reason: SDUPTHER

## 2020-08-09 DIAGNOSIS — E10.8 TYPE 1 DIABETES MELLITUS WITH COMPLICATION (HCC): ICD-10-CM

## 2020-08-13 ENCOUNTER — TELEPHONE (OUTPATIENT)
Dept: ENDOCRINOLOGY | Facility: CLINIC | Age: 55
End: 2020-08-13

## 2020-08-21 LAB
CREAT ?TM UR-SCNC: 71 UMOL/L
HBA1C MFR BLD HPLC: 7 %
MICROALBUMIN/CREAT UR: NORMAL MG/G{CREAT}

## 2020-08-21 PROCEDURE — 3051F HG A1C>EQUAL 7.0%<8.0%: CPT | Performed by: PHYSICIAN ASSISTANT

## 2020-08-24 ENCOUNTER — TELEMEDICINE (OUTPATIENT)
Dept: ENDOCRINOLOGY | Facility: CLINIC | Age: 55
End: 2020-08-24
Payer: COMMERCIAL

## 2020-08-24 ENCOUNTER — TELEPHONE (OUTPATIENT)
Dept: ENDOCRINOLOGY | Facility: CLINIC | Age: 55
End: 2020-08-24

## 2020-08-24 DIAGNOSIS — E10.8 TYPE 1 DIABETES MELLITUS WITH COMPLICATION (HCC): Primary | ICD-10-CM

## 2020-08-24 DIAGNOSIS — E78.2 MIXED HYPERLIPIDEMIA: ICD-10-CM

## 2020-08-24 DIAGNOSIS — E03.9 ACQUIRED HYPOTHYROIDISM: ICD-10-CM

## 2020-08-24 PROCEDURE — 3051F HG A1C>EQUAL 7.0%<8.0%: CPT | Performed by: PHYSICIAN ASSISTANT

## 2020-08-24 PROCEDURE — 99214 OFFICE O/P EST MOD 30 MIN: CPT | Performed by: PHYSICIAN ASSISTANT

## 2020-08-24 PROCEDURE — 1036F TOBACCO NON-USER: CPT | Performed by: PHYSICIAN ASSISTANT

## 2020-08-24 PROCEDURE — 3066F NEPHROPATHY DOC TX: CPT | Performed by: PHYSICIAN ASSISTANT

## 2020-08-24 PROCEDURE — 95251 CONT GLUC MNTR ANALYSIS I&R: CPT | Performed by: PHYSICIAN ASSISTANT

## 2020-08-24 RX ORDER — INSULIN GLARGINE 100 [IU]/ML
INJECTION, SOLUTION SUBCUTANEOUS
Qty: 15 PEN | Refills: 1 | Status: SHIPPED | OUTPATIENT
Start: 2020-08-24 | End: 2022-03-28 | Stop reason: SDUPTHER

## 2020-08-24 NOTE — PATIENT INSTRUCTIONS
Hypoglycemia instructions   Trena Garrett  8/24/2020  [id number]    Low Blood Sugar    Steps to treat low blood sugar  1  Test blood sugar if you have symptoms of low blood sugar:   Low Blood Sugar Symptoms:  o Sweaty  o Dizzy  o Rapid heartbeat  o Shaky  o Bad mood  o Hungry      2  Treat blood sugar less than 70 with 15 grams of fast-acting carbohydrate:   Examples of 15 grams Fast-Acting Carbohydrate:  o 4 oz juice  o 4 oz regular soda  o 3-4 glucose tablets (chew)  o 3-4 hard candies (chew)          3  Wait 15 minutes and test your blood sugar again     4   If blood sugar is less than 100, repeat steps 2-3     5  When your blood sugar is 100 or more, eat a snack if it will be longer than one hour until your next meal  The snack should be 15 grams of carbohydrate and a protein:   Examples of snacks:  o ½ sandwich  o 6 crackers with cheese  o Piece of fruit with cheese or peanut butter  o 6 crackers with peanut butter

## 2020-08-24 NOTE — PROGRESS NOTES
Virtual Regular Visit      Assessment/Plan:    Problem List Items Addressed This Visit        Endocrine    Hypothyroidism       Other    Hyperlipidemia      Other Visit Diagnoses     Type 1 diabetes mellitus with complication ([facility] [address] )    -  Primary               Reason for visit is DM1  Chief Complaint   Patient presents with    Virtual Regular Visit        Encounter provider Avtar Gordon PA-C    Provider located at [address] [place] [id number]      Recent Visits  No visits were found meeting these conditions  Showing recent visits within past 7 days and meeting all other requirements     Today's Visits  Date Type Provider Dept   08/24/20 Telephone Avtar Gordon PA-C [facility]   08/24/20 Telemedicine Alison Charles PA-C [facility]   Showing today's visits and meeting all other requirements     Future Appointments  No visits were found meeting these conditions  Showing future appointments within next 150 days and meeting all other requirements        The patient was identified by name and date of birth  Nancy Jacob was informed that this is a telemedicine visit and that the visit is being conducted through [address] [place] [address] and patient was informed that this is not a secure, HIPAA-complaint platform  He agrees to proceed     My office door was closed  No one else was in the room  He acknowledged consent and understanding of privacy and security of the video platform  The patient has agreed to participate and understands they can discontinue the visit at any time  Patient is aware this is a billable service  Subjective  Nancy Jacob is a 48 y o  male with a history of type 1 diabetes with long term use of insulin  Diabetes course has been stable  Complications of DM: none reported  He was admitted in April 2020 for nephrolithiasis    Denies any issues with his current regimen  Home glucose monitoring: are performed regularly, has Dexcom sensor  He is using TFlex pump  He has new pump but is waiting to finish Flex pump supplies before changing to the Tslim      Average glucose 179 mg/dL with standard deviation of 62 mg/dL  Blood sugars tend to be high in the evenings and in the early mornings  In range 48%, above range 51%, below range <1%  High after dinner     Current regimen: Metformin 1000 mg BID, Jardiance 25 mg daily, Novolog via pump  compliant most of the time, denies any side effects from medications       Discussed with patient in case of malfunctioning of the pump to use basal and bolus therapy as backup which is prescribed to the patient  Also notified patient to call clinic if any issues       Hypoglycemic episodes: Yes, occasional  Usually in the morning  States it typically occurs if BG is high before bed and gives himself a correction bolus  H/o of hypoglycemia causing hospitalization or Intervention such as glucagon injection  or ambulance call :  No  Hypoglycemia symptoms: sweating  Treatment of hypoglycemia: discussed treatment      Medic alert tag: recommended: Yes     Diabetes education: No   Diet: 2-3 meals per day, 1-2 snacks per day  Timing of meals is variable  Diabetic diet compliance:  noncompliant much of the time  Does try to bolus regularly  Activity: Daily activity is predictable: Yes  No routine exercise          Ophthamology: sees routinely; thinks he is due to go back in 1-2 months  Podiatry: foot exam UTD, Feb 2020     Not on ACE inhibitor/ARB  Has hyperlipidemia: on atorvastatin - tolerating well, no myalgias  compliant most of the time, denies any side effects from medications  Thyroid disorders: Hypothyroidism  Patient is on Synthroid 175 mcg daily on empty stomach 1 hour before breakfast  Does not wait to take supplements  Thyroid function tests in normal range    History of pancreatitis: No     Vitamin D deficiency: is taking 5000 IU daily    Past Medical History:   Diagnosis Date    Diabetes mellitus ([address] )     Disease of thyroid gland     Insomnia        Past Surgical History:   Procedure Laterality Date    FL RETROGRADE PYELOGRAM  4/24/2020    CO CYSTO/URETERO W/LITHOTRIPSY &INDWELL STENT INSRT Right 4/24/2020    Procedure: CYSTOSCOPY URETEROSCOPY WITH LITHOTRIPSY HOLMIUM LASER, RETROGRADE PYELOGRAM AND INSERTION STENT URETERAL;  Surgeon: Jackelin Taylor MD;  Location: [facility];  Service: Urology    ROTATOR CUFF REPAIR Right        Current Outpatient Medications   Medication Sig Dispense Refill    acetone, urine, test (Ketostix) strip Check ketones if blood sugars > 250, more than twice or in case of nausea or vomiting and abdominal pains 100 each 0    Ascorbic Acid (VITAMIN C PO) Take 1 tablet by mouth daily      azelastine (ASTELIN) 0 1 % nasal spray 1 spray into each nostril 2 (two) times a day 90 mL 1    Blood Glucose Monitoring Suppl (ONE TOUCH ULTRA 2) w/Device KIT by Does not apply route      Cholecalciferol (VITAMIN D3) 50 MCG (2000 UT) capsule Take 2 capsules daily  0    Continuous Blood Gluc  (DEXCOM G6 ) KIYA USE AS DIRECTED 1 Device 0    Continuous Blood Gluc Sensor (DEXCOM G6 SENSOR) MISC 1 Device by Does not apply route continuous Use 1 sensor every 10 days 12 each 1    Continuous Blood Gluc Transmit (DEXCOM G6 TRANSMITTER) MISC USE 1 DEVICE WITH SENSOR 1 each 3    Empagliflozin 25 MG TABS Take 1 tablet (25 mg total) by mouth every morning 30 tablet 3    esomeprazole (NexIUM) 40 MG capsule Take 1 capsule (40 mg total) by mouth daily 90 capsule 1    Glucagon (BAQSIMI TWO PACK) 3 MG/DOSE POWD Use one dose as needed in case of severe hypoglycemia ( nasal spray) 1 each 0    glucagon (GLUCAGON EMERGENCY) 1 MG injection Inject 1 mg under the skin once as needed for low blood sugar for up to 1 dose 1 kit 1    insulin aspart (NovoLOG) 100 units/mL injection Use up to 140 units per day via insulin pump 110 mL 2    insulin glargine (BASAGLAR KWIKPEN) 100 units/mL injection pen Inject 50 units in case of pump failure ( incase of emergency) 15 pen 1    Insulin Pen Needle (BD PEN NEEDLE LYRIC U/F) 32G X 4 MM MISC by Does not apply route daily Use once daily 100 each 2    metFORMIN (GLUCOPHAGE) 1000 MG tablet take 1 tablet by mouth every 12 hours with food 180 tablet 0    MULTIPLE VITAMIN PO Take by mouth daily        naproxen (NAPROSYN) 500 mg tablet Take 1 tablet (500 mg total) by mouth 2 (two) times a day with meals 60 tablet 2    rosuvastatin (CRESTOR) 10 MG tablet Take 1 tablet (10 mg total) by mouth daily 90 tablet 1    SYNTHROID 175 MCG tablet Take 1 tablet daily 7 days on empty stomach 1 hour before breakfast 90 tablet 0    vitamin B-12 (VITAMIN B-12) 1,000 mcg tablet Take by mouth daily      zolpidem (AMBIEN) 10 mg tablet Take 1 tablet (10 mg total) by mouth daily at bedtime as needed for sleep 90 tablet 0     No current facility-administered medications for this visit  Allergies   Allergen Reactions    Ibuprofen GI Intolerance    Other Other (See Comments)     Seasonal allergies- pt has congestion       Review of Systems   Constitutional: Negative for activity change, appetite change, fatigue and unexpected weight change  HENT: Negative for trouble swallowing  Eyes: Negative for visual disturbance  Respiratory: Negative for shortness of breath  Cardiovascular: Negative for chest pain and palpitations  Gastrointestinal: Positive for constipation (improved with Miralax)  Negative for diarrhea  Endocrine: Negative for polydipsia and polyuria  Musculoskeletal: Positive for arthralgias (arthritis in hands)  Skin: Negative for wound  Neurological: Positive for numbness (tingling/numbness in feet, intermittent)  Psychiatric/Behavioral: Negative  Video Exam    There were no vitals filed for this visit      Physical Exam     Physical Exam Constitutional: He is oriented to person, place, and time  He appears well-developed and well-nourished  No distress  HENT:   Head: Normocephalic and atraumatic  Neck: Normal range of motion  Pulmonary/Chest: Effort normal    Musculoskeletal: Normal range of motion  Neurological: He is alert and oriented to person, place, and time  Skin: He is not diaphoretic  Psychiatric: He has a normal mood and affect  His behavior is normal       PLAN  Type 1 DM  HGA1C 7 0%  Improved  Treatment regimen: patient will work on dietary changes as he his hesitant to make changes at this time  For now continue current treatment  Download pump and sensor again in 2 weeks  Discussed intensive insulin regimen does increase risk for hypoglycemia  Episodes of hypoglycemia can lead to permanent disability and death  Discussed risks/complications associated with uncontrolled diabetes  Advised to adhere to diabetic diet, and recommended staying active/exercising routinely  Keep carbohydrates consistent to limit blood glucose fluctuations  Advised to call if blood sugars less than 70 mg/dl or over 300 mg/dl  Check blood glucose 3+ times a day  Discussed symptoms and treatment of hypoglycemia  Discussed use of CGM to collect additional blood glucose data to reveal trends and patterns that can be used to optimize treatment plan  Referred to diabetes/nutrition education  Recommended routine follow-up with podiatry and ophthalmology  Send log in 1-2 weeks  Ordered blood work to complete prior to next visit  Hypothyroidism  TFTs normal, TSH 2 20 and free T4 1 16  Continue current dose of levothyroxine  Hyperlipidemia  Continue statin therapy    I spent 25 minutes directly with the patient during this visit    Alison Charles PA-C      VIRTUAL VISIT DISCLAIMER    Gerardo Anderson acknowledges that he has consented to an online visit or consultation   He understands that the online visit is based solely on information provided by him, and that, in the absence of a face-to-face physical evaluation by the physician, the diagnosis he receives is both limited and provisional in terms of accuracy and completeness  This is not intended to replace a full medical face-to-face evaluation by the physician  Jonathan De La Rosa understands and accepts these terms

## 2020-08-27 ENCOUNTER — TELEPHONE (OUTPATIENT)
Dept: ENDOCRINOLOGY | Facility: CLINIC | Age: 55
End: 2020-08-27

## 2020-08-27 NOTE — TELEPHONE ENCOUNTER
----- Message from Johny Westfall MD sent at 8/27/2020 10:13 AM EDT -----  Please call the patient regarding his results, A1c is 7%

## 2020-08-28 ENCOUNTER — TELEPHONE (OUTPATIENT)
Dept: ENDOCRINOLOGY | Facility: CLINIC | Age: 55
End: 2020-08-28

## 2020-09-06 DIAGNOSIS — E10.65 TYPE 1 DIABETES MELLITUS WITH HYPERGLYCEMIA, WITH LONG-TERM CURRENT USE OF INSULIN (HCC): ICD-10-CM

## 2020-09-06 RX ORDER — EMPAGLIFLOZIN 25 MG/1
TABLET, FILM COATED ORAL
Qty: 30 TABLET | Refills: 3 | Status: SHIPPED | OUTPATIENT
Start: 2020-09-06 | End: 2020-12-22

## 2020-10-02 DIAGNOSIS — G47.00 INSOMNIA, UNSPECIFIED TYPE: ICD-10-CM

## 2020-10-02 RX ORDER — ZOLPIDEM TARTRATE 10 MG/1
10 TABLET ORAL
Qty: 30 TABLET | Refills: 0 | Status: SHIPPED | OUTPATIENT
Start: 2020-10-02 | End: 2020-11-03 | Stop reason: SDUPTHER

## 2020-10-31 DIAGNOSIS — E10.8 TYPE 1 DIABETES MELLITUS WITH COMPLICATION (HCC): ICD-10-CM

## 2020-11-03 DIAGNOSIS — G47.00 INSOMNIA, UNSPECIFIED TYPE: ICD-10-CM

## 2020-11-03 RX ORDER — ZOLPIDEM TARTRATE 10 MG/1
10 TABLET ORAL
Qty: 30 TABLET | Refills: 0 | Status: SHIPPED | OUTPATIENT
Start: 2020-11-03 | End: 2020-12-11 | Stop reason: SDUPTHER

## 2020-11-20 DIAGNOSIS — E78.5 HYPERLIPIDEMIA, UNSPECIFIED HYPERLIPIDEMIA TYPE: ICD-10-CM

## 2020-11-20 DIAGNOSIS — K21.9 GASTROESOPHAGEAL REFLUX DISEASE: ICD-10-CM

## 2020-11-20 RX ORDER — ROSUVASTATIN CALCIUM 10 MG/1
10 TABLET, COATED ORAL DAILY
Qty: 90 TABLET | Refills: 0 | Status: SHIPPED | OUTPATIENT
Start: 2020-11-20 | End: 2021-03-22

## 2020-11-20 RX ORDER — ROSUVASTATIN CALCIUM 10 MG/1
TABLET, COATED ORAL
Qty: 90 TABLET | Refills: 1 | OUTPATIENT
Start: 2020-11-20

## 2020-11-23 ENCOUNTER — TELEPHONE (OUTPATIENT)
Dept: ENDOCRINOLOGY | Facility: CLINIC | Age: 55
End: 2020-11-23

## 2020-11-23 RX ORDER — ESOMEPRAZOLE MAGNESIUM 40 MG/1
40 CAPSULE, DELAYED RELEASE ORAL DAILY
Qty: 90 CAPSULE | Refills: 1 | Status: SHIPPED | OUTPATIENT
Start: 2020-11-23 | End: 2021-04-27 | Stop reason: HOSPADM

## 2020-11-25 LAB — HBA1C MFR BLD HPLC: 6.8 %

## 2020-11-30 ENCOUNTER — OFFICE VISIT (OUTPATIENT)
Dept: ENDOCRINOLOGY | Facility: CLINIC | Age: 55
End: 2020-11-30
Payer: COMMERCIAL

## 2020-11-30 VITALS
BODY MASS INDEX: 40.29 KG/M2 | SYSTOLIC BLOOD PRESSURE: 134 MMHG | TEMPERATURE: 97.5 F | WEIGHT: 265 LBS | DIASTOLIC BLOOD PRESSURE: 86 MMHG | HEART RATE: 90 BPM

## 2020-11-30 DIAGNOSIS — E78.5 HYPERLIPIDEMIA, UNSPECIFIED HYPERLIPIDEMIA TYPE: ICD-10-CM

## 2020-11-30 DIAGNOSIS — E66.01 CLASS 3 SEVERE OBESITY DUE TO EXCESS CALORIES WITHOUT SERIOUS COMORBIDITY WITH BODY MASS INDEX (BMI) OF 40.0 TO 44.9 IN ADULT (HCC): ICD-10-CM

## 2020-11-30 DIAGNOSIS — E10.65 TYPE 1 DIABETES MELLITUS WITH HYPERGLYCEMIA, WITH LONG-TERM CURRENT USE OF INSULIN (HCC): Primary | ICD-10-CM

## 2020-11-30 DIAGNOSIS — E55.9 VITAMIN D DEFICIENCY: ICD-10-CM

## 2020-11-30 DIAGNOSIS — E03.9 ACQUIRED HYPOTHYROIDISM: ICD-10-CM

## 2020-11-30 DIAGNOSIS — G47.33 OSA (OBSTRUCTIVE SLEEP APNEA): ICD-10-CM

## 2020-11-30 PROCEDURE — 99214 OFFICE O/P EST MOD 30 MIN: CPT | Performed by: INTERNAL MEDICINE

## 2020-11-30 PROCEDURE — 95251 CONT GLUC MNTR ANALYSIS I&R: CPT | Performed by: INTERNAL MEDICINE

## 2020-12-08 PROBLEM — E10.40 TYPE 1 DIABETES MELLITUS WITH DIABETIC NEUROPATHY (HCC): Status: ACTIVE | Noted: 2017-03-31

## 2020-12-11 ENCOUNTER — TELEPHONE (OUTPATIENT)
Dept: ADMINISTRATIVE | Facility: OTHER | Age: 55
End: 2020-12-11

## 2020-12-11 ENCOUNTER — OFFICE VISIT (OUTPATIENT)
Dept: INTERNAL MEDICINE CLINIC | Age: 55
End: 2020-12-11
Payer: COMMERCIAL

## 2020-12-11 VITALS
BODY MASS INDEX: 40.42 KG/M2 | DIASTOLIC BLOOD PRESSURE: 84 MMHG | OXYGEN SATURATION: 96 % | TEMPERATURE: 97.4 F | HEIGHT: 68 IN | SYSTOLIC BLOOD PRESSURE: 122 MMHG | WEIGHT: 266.69 LBS | HEART RATE: 87 BPM

## 2020-12-11 DIAGNOSIS — D50.9 IRON DEFICIENCY ANEMIA, UNSPECIFIED IRON DEFICIENCY ANEMIA TYPE: ICD-10-CM

## 2020-12-11 DIAGNOSIS — K21.9 GASTROESOPHAGEAL REFLUX DISEASE WITHOUT ESOPHAGITIS: ICD-10-CM

## 2020-12-11 DIAGNOSIS — E55.9 VITAMIN D DEFICIENCY: ICD-10-CM

## 2020-12-11 DIAGNOSIS — E03.9 ACQUIRED HYPOTHYROIDISM: ICD-10-CM

## 2020-12-11 DIAGNOSIS — E78.2 MIXED HYPERLIPIDEMIA: ICD-10-CM

## 2020-12-11 DIAGNOSIS — N52.9 ERECTILE DYSFUNCTION, UNSPECIFIED ERECTILE DYSFUNCTION TYPE: ICD-10-CM

## 2020-12-11 DIAGNOSIS — E10.40 TYPE 1 DIABETES MELLITUS WITH DIABETIC NEUROPATHY (HCC): Primary | ICD-10-CM

## 2020-12-11 DIAGNOSIS — G47.33 OSA (OBSTRUCTIVE SLEEP APNEA): ICD-10-CM

## 2020-12-11 DIAGNOSIS — G47.00 INSOMNIA, UNSPECIFIED TYPE: ICD-10-CM

## 2020-12-11 PROCEDURE — 3725F SCREEN DEPRESSION PERFORMED: CPT | Performed by: NURSE PRACTITIONER

## 2020-12-11 PROCEDURE — 3008F BODY MASS INDEX DOCD: CPT | Performed by: NURSE PRACTITIONER

## 2020-12-11 PROCEDURE — 1036F TOBACCO NON-USER: CPT | Performed by: NURSE PRACTITIONER

## 2020-12-11 PROCEDURE — 99214 OFFICE O/P EST MOD 30 MIN: CPT | Performed by: NURSE PRACTITIONER

## 2020-12-11 RX ORDER — SILDENAFIL 50 MG/1
50 TABLET, FILM COATED ORAL DAILY PRN
Qty: 10 TABLET | Refills: 0 | Status: SHIPPED | OUTPATIENT
Start: 2020-12-11 | End: 2021-04-19

## 2020-12-11 RX ORDER — GABAPENTIN 100 MG/1
100 CAPSULE ORAL
Qty: 90 CAPSULE | Refills: 1 | Status: SHIPPED | OUTPATIENT
Start: 2020-12-11 | End: 2021-04-19 | Stop reason: SDUPTHER

## 2020-12-11 RX ORDER — ZOLPIDEM TARTRATE 10 MG/1
10 TABLET ORAL
Qty: 90 TABLET | Refills: 0 | Status: SHIPPED | OUTPATIENT
Start: 2020-12-11 | End: 2021-03-15 | Stop reason: SDUPTHER

## 2020-12-20 DIAGNOSIS — E10.65 TYPE 1 DIABETES MELLITUS WITH HYPERGLYCEMIA, WITH LONG-TERM CURRENT USE OF INSULIN (HCC): ICD-10-CM

## 2020-12-21 DIAGNOSIS — E03.9 HYPOTHYROIDISM, UNSPECIFIED TYPE: ICD-10-CM

## 2020-12-21 NOTE — TELEPHONE ENCOUNTER
Confirm with patient dose and frequency before refill    Last note by Dr Dena Ngo states 175mcg daily    Modify script as appropriate

## 2020-12-22 RX ORDER — EMPAGLIFLOZIN 25 MG/1
TABLET, FILM COATED ORAL
Qty: 30 TABLET | Refills: 3 | Status: SHIPPED | OUTPATIENT
Start: 2020-12-22 | End: 2021-04-09

## 2020-12-22 RX ORDER — LEVOTHYROXINE SODIUM 175 MCG
TABLET ORAL
Qty: 90 TABLET | Refills: 0 | Status: SHIPPED | OUTPATIENT
Start: 2020-12-22 | End: 2021-03-08

## 2021-01-25 ENCOUNTER — CLINICAL SUPPORT (OUTPATIENT)
Dept: BARIATRICS | Facility: CLINIC | Age: 56
End: 2021-01-25

## 2021-01-25 VITALS
HEART RATE: 88 BPM | SYSTOLIC BLOOD PRESSURE: 140 MMHG | BODY MASS INDEX: 39.4 KG/M2 | DIASTOLIC BLOOD PRESSURE: 90 MMHG | WEIGHT: 266 LBS | TEMPERATURE: 98 F | HEIGHT: 69 IN

## 2021-01-25 DIAGNOSIS — E66.01 MORBID OBESITY (HCC): Primary | ICD-10-CM

## 2021-01-25 PROCEDURE — 3008F BODY MASS INDEX DOCD: CPT | Performed by: NURSE PRACTITIONER

## 2021-01-25 PROCEDURE — RECHECK

## 2021-01-25 NOTE — PROGRESS NOTES
Bariatric Nutrition Assessment Note    Type of surgery    Preop  Surgery Date: TBD- no program requirement     Surgeon: Dr Grover Heading  48 y o   male     Wt with BMI of 25: 168 8 lbs   Pre-Op Excess Wt: 97 2#  /90 (BP Location: Left arm, Patient Position: Sitting)   Pulse 88   Temp 98 °F (36 7 °C) (Tympanic)   Ht 5' 9" (1 753 m)   Wt 121 kg (266 lb)   BMI 39 28 kg/m²     [facility] Equation:  2037 kcal   Estimated calories for weight loss 5276-9138 kcal ( 1-2# per wk wt loss - sedentary )  Estimated protein needs 76-92 grams (1 0-1 2 gms/kg IBW )   Estimated fluid needs 2685 ml  (35 ml/kg IBW )  76/80 ounces     Weight History   Onset of Obesity: Adult  Family history of obesity: Yes  Wt Loss Attempts: Behavioral Counseling (Hypnosis, Overeaters Anonymous, etc)  Counseling with  MD  Exercise  High Protein/Low CHO diets (Atkins, Union, etc )  Meal Replacements (Aleck Sharee Fast, etc )  Nutrition Counseling with RD  Self Created Diets (Portion Control, Healthy Food Choices, etc )  Patient has tried the above for 6 months or more with insufficient weight loss or weight regain, which is why patient has requested to be evaluated for weight loss surgery today  Maximum Wt Lost: 75 pounds lost with diet and exercise - approximately 30 years ago  Then lost weight prior to diagnosis of diabetes but once diabetes treated regain weight   Lab Results   Component Value Date    HGBA1C 6 8 (H) 11/25/2020         Review of History and Medications   Past Medical History:   Diagnosis Date    Diabetes mellitus ([address] )     Disease of thyroid gland     Insomnia     Sleep apnea     Mild sleep apnea     Past Surgical History:   Procedure Laterality Date    FL RETROGRADE PYELOGRAM  4/24/2020    LASIK      [place] CYSTO/URETERO W/LITHOTRIPSY &INDWELL STENT INSRT Right 4/24/2020    Procedure: CYSTOSCOPY URETEROSCOPY WITH LITHOTRIPSY HOLMIUM LASER, RETROGRADE PYELOGRAM AND INSERTION STENT URETERAL;  Surgeon: Thao Jordan MD;  [facility];  Service: Urology    ROTATOR CUFF REPAIR Right      Social History     Socioeconomic History    Marital status: /Civil Union     Spouse name: None    Number of children: None    Years of education: None    Highest education level: None   Occupational History    None   Social Needs    Financial resource strain: None    Food insecurity     Worry: None     Inability: None    Transportation needs     Medical: None     Non-medical: None   Tobacco Use    Smoking status: Former Smoker     Types: Cigarettes    Smokeless tobacco: Never Used    Tobacco comment: quit in 2015   Substance and Sexual Activity    Alcohol use:  Yes     Alcohol/week: 2 0 standard drinks     Types: 2 Glasses of wine per week     Frequency: 2-3 times a week     Drinks per session: 1 or 2     Binge frequency: Less than monthly     Comment: social    Drug use: No    Sexual activity: Yes     Partners: Female     Birth control/protection: None   Lifestyle    Physical activity     Days per week: None     Minutes per session: None    Stress: None   Relationships    Social connections     Talks on phone: None     Gets together: None     Attends [employer] service: None     Active member of club or organization: None     Attends meetings of clubs or organizations: None     Relationship status: None    Intimate partner violence     Fear of current or ex partner: None     Emotionally abused: None     Physically abused: None     Forced sexual activity: None   Other Topics Concern    None   Social History Narrative    None       Current Outpatient Medications:     Ascorbic Acid (VITAMIN C PO), Take 1 tablet by mouth daily, Disp: , Rfl:     azelastine (ASTELIN) 0 1 % nasal spray, 1 spray into each nostril 2 (two) times a day, Disp: 90 mL, Rfl: 1    Cholecalciferol (VITAMIN D3) 50 MCG (2000 UT) capsule, Take 2 capsules daily, Disp: , Rfl: 0   esomeprazole (NexIUM) 40 MG capsule, Take 1 capsule (40 mg total) by mouth daily, Disp: 90 capsule, Rfl: 1    gabapentin (NEURONTIN) 100 mg capsule, Take 1 capsule (100 mg total) by mouth daily at bedtime, Disp: 90 capsule, Rfl: 1    Glucagon (BAQSIMI TWO PACK) 3 MG/DOSE POWD, Use one dose as needed in case of severe hypoglycemia ( nasal spray), Disp: 1 each, Rfl: 0    insulin aspart (NovoLOG) 100 units/mL injection, Use up to 140 units per day via insulin pump, Disp: 110 mL, Rfl: 2    insulin glargine (Basaglar KwikPen) 100 units/mL injection pen, Inject 60 units in case of pump failure ( incase of emergency), Disp: 15 pen, Rfl: 1    Jardiance 25 MG TABS, take 1 tablet by mouth every morning, Disp: 30 tablet, Rfl: 3    metFORMIN (GLUCOPHAGE) 1000 MG tablet, take 1 tablet by mouth every 12 hours with food, Disp: 180 tablet, Rfl: 0    MULTIPLE VITAMIN PO, Take by mouth daily  , Disp: , Rfl:     naproxen (NAPROSYN) 500 mg tablet, Take 1 tablet (500 mg total) by mouth 2 (two) times a day with meals, Disp: 60 tablet, Rfl: 2    rosuvastatin (CRESTOR) 10 MG tablet, Take 1 tablet (10 mg total) by mouth daily, Disp: 90 tablet, Rfl: 0    Synthroid 175 MCG tablet, take 1 tablet by mouth daily, Disp: 90 tablet, Rfl: 0    vitamin B-12 (VITAMIN B-12) 1,000 mcg tablet, Take by mouth daily, Disp: , Rfl:     zolpidem (AMBIEN) 10 mg tablet, Take 1 tablet (10 mg total) by mouth daily at bedtime as needed for sleep, Disp: 90 tablet, Rfl: 0    acetone, urine, test (Ketostix) strip, Check ketones if blood sugars > 250, more than twice or in case of nausea or vomiting and abdominal pains, Disp: 100 each, Rfl: 0    Blood Glucose Monitoring Suppl (ONE TOUCH ULTRA 2) w/Device KIT, by Does not apply route, Disp: , Rfl:     Continuous Blood Gluc  (DEXCOM G6 ) KIYA, USE AS DIRECTED, Disp: 1 Device, Rfl: 0    Continuous Blood Gluc Sensor (DEXCOM G6 SENSOR) MISC, 1 Device by Does not apply route continuous Use 1 sensor every 10 days, Disp: 12 each, Rfl: 1    Continuous Blood Gluc Transmit (DEXCOM G6 TRANSMITTER) MISC, USE 1 DEVICE WITH SENSOR, Disp: 1 each, Rfl: 3    glucagon (GLUCAGON EMERGENCY) 1 MG injection, Inject 1 mg under the skin once as needed for low blood sugar for up to 1 dose (Patient not taking: Reported on 11/30/2020), Disp: 1 kit, Rfl: 1    Insulin Pen Needle (BD PEN NEEDLE LYRIC U/F) 32G X 4 MM MISC, by Does not apply route daily Use once daily, Disp: 100 each, Rfl: 2    sildenafil (VIAGRA) 50 MG tablet, Take 1 tablet (50 mg total) by mouth daily as needed for erectile dysfunction, Disp: 10 tablet, Rfl: 0  Food Intake and Lifestyle Assessment   Food Intake Assessment completed via  Estimates carb/portions   Travels for work [employer] at home or [employer] at work   Eating patterns change when you travel   Breakfast: 7 to 8 am - english muffin with margarine  or protein bar  Snack: 10/11 - piece of fruit   Lunch:  12 to 1 pm  If on the road, could be later  left overs from dinner , Fridays PB crackers  T W Th - eats out salad with chicken  to a sandwich - on a whole wheat roll, turkey   Snack: 0  Dinner: home 5 to 6 pm - will cook, protein and starch in air fryer   Or traveling, will wait until 7 pm to eat   Snack: pretzels or pb crackers   Beverage intake: coffee/tea and Propel   Protein supplement:   Estimated protein intake per day: 60 grams   Estimated fluid intake per day: 6 Propel bottles, plus 2 16 ounce mugs per day   Meals eaten away from home: three to four per week   Typical meal pattern: 3 meals per day and 2-3 snacks per day  Eating Behaviors: Large portion sizes  Food allergies or intolerances:    Allergies   Allergen Reactions    Ibuprofen GI Intolerance    Other Other (See Comments)     Seasonal allergies- pt has congestion     Cultural or Cheondoism considerations: N/A     Patient is type 1 DM with pump and sensor     Physical Assessment  Physical Activity  Types of exercise: None  Current physical limitations: neuropathy in feet  Lack of motivation     Psychosocial Assessment   Support systems: spouse sons   Socioeconomic factors: works full time , 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     Nutrition Diagnosis  Diagnosis: Overweight / Obesity (NC-3 3)  Related to: Physical inactivity, Excessive energy intake and excess insulin needs   As Evidenced by: BMI >25, Excessive energy intake and Unintentional weight gain     Nutrition Prescription: Recommend the following diet  Low fat, Low sugar, High protein and Consistent carb     Interventions and Teaching   Discussed pre-op and post-op nutrition guidelines  Patient educated and handouts provided    Surgical changes to stomach / GI  Capacity of post-surgery stomach  Diet progression  Adequate hydration  Sugar and fat restriction to decrease "dumping syndrome"  Fat restriction to decrease steatorrhea  Expected weight loss  Weight loss plateaus/ possibility of weight regain  Exercise  Suggestions for pre-op diet  Nutrition considerations after surgery  Protein supplements  Meal planning and preparation  Appropriate carbohydrate, protein, and fat intake, and food/fluid choices to maximize safe weight loss, nutrient intake, and tolerance   Dietary and lifestyle changes  Possible problems with poor eating habits  Intuitive eating  Techniques for self monitoring and keeping daily food journal  Potential for food intolerance after surgery, and ways to deal with them including: lactose intolerance, nausea, reflux, vomiting, diarrhea, food intolerance, appetite changes, gas  Vitamin / Mineral supplementation of Multivitamin with minerals, Calcium, Vitamin B12, Iron, Fat Soluble vitamins and Vitamin D        Education provided to: patient    Barriers to learning: No barriers identified    Readiness to change: action    Prior research on procedure: discussed with provider, pre-op class and friends or family    Comprehension: verbalizes understanding     Expected Compliance: good  Recommendations  Pt is an appropriate candidate for surgery   Yes    Evaluation / Monitoring  Dietitian to Monitor: Eating pattern as discussed Tolerance of nutrition prescription Body weight Lab values Physical activity Bowel pattern    Goals  Food journal, Complete lession plans 1-6, Eat 3 meals per day and plus 2-3 planned snacks    Exercise for 10 minutes 5 days per week  1800 kcal /200 grams carb - 45 grams carb per meal, 30 grams per snack     Time Spent:   1 Hour

## 2021-01-25 NOTE — PROGRESS NOTES
Bariatric Behavioral Health Evaluation    Presenting Problem:  Salome Cantu  is a 48 y o    male    :  1965   Patient presented with overall concerns of obesity  Stated that weight has impacted quality of life and concerned with lack of mobility, chronic pain, and overall health  Has attempted various weight loss plans in the past including medication and self created, hypnosis,  With limited success  Patient is Interested in exploring bariatric surgery  as an option for  weight loss goals  Is the patient seeking Bariatric Surgery Eval? Yes     Brother is post bariactric surgery and has been thinking about it for himself for four years   Realizes Post- Op Requirements? Yes     Pre-morbid level of function and history of present illness:   1DM with insulin (diagnosised 10 yrs ago)   Hopes to reduce the amount of daily insulin  Psychiatric/Psychological Treatment Diagnosis:  Ambien for sleep  No diagnosis for MH     Outpatient Counselor:  Family sessions with son when son was an adolescent  Psychiatrist :  Denied     Have you had Inpatient Treatment? Denied     Drug and/or Alcohol treatment history:   Denied      Tobacco History:  History of smoking:   Quit date: May 15, 2015  Family Constellation (include relationship with each and Psych/Med HX)    Mother  obesity and tobacco use, Father  history of addiction and tobacco use and Siblings  history of addiction, tobacco use and brother post bariatric surgery    Domestic Violence No    Abuse History:  None noted  Additional comments/stressors related to family/relationships/peer support :  Wife is primary support  Children (2) are supportive but out of the house  Wife does the cooking, and some shared shopping  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  Traveling three days a week        Physical/Psychological Assessment:     Appearance: appropriate  Sociability: average  Affect: appropriate  Mood: calm  Thought Process: coherent  Speech: normal  Content: no impairment  Orientation: person  Yes , place  Yes , time  Yes , normal attention span  Yes , normal memory  Yes   and normal judgement  Yes   Insight: emotional  good    Risk Assessment:     none    Recommendations: Recommended for surgery  yes    Risk of Harm to Self or Others:   none noted during evaluation     Observation:     Interviews :  this interview only    Access to weapons : not reported     Based on the previous information, the client presents the following risk of harm to self or others: low     Note :  Patient presented for behavioral health evaluation for the bariatric program    Negative history of Mental Health  Denied history of individual therapy and Psychiatry  Denied history of  Drug and/or Alcohol abuse or treatment  No current   tobacco use  Quit year 2015  Patient educated regarding the impact of nicotine and alcohol on the post surgery bariatric patient  Patient has a positive family history of tobacco and alcohol addiction  Patient meets criteria for surgery at this time and is referred to the bariatric surgeon  Denzil Primrose, MSW, ANILA  _____________________________      BARIATRIC SURGERY EDUCATION CHECKLIST     I have received education related to my bariatric surgery process and understand:     Patients may be required to complete a psychiatric evaluation and receive clearance for surgery from their psychiatrist      Patients who undergo weight loss surgery are at higher risk of increased mental health concerns and suicide attempts  Patients may be required to complete a full substance abuse evaluation and then complete all treatment recommendations prior to surgery  If diagnosis of abuse/dependence results, patient may be required to remain sober for one (1) year before having bariatric surgery       Patients on psychiatric medications should check with their provider to discuss psychiatric medications and the changes in absorption  Patient should discuss all time release medications with provider and take all medications as prescribed  The recommendation is that there is no use of  any tobacco products, Hookah or  vapes for the bariatric post-operation patient  Bariatric surgery patients should not consume alcohol as a post-operative patient as it may increase risk of numerous health conditions including but not limited to alcohol abuse and ulcers  There is a possibility of weight regain if patient does not follow all program guidelines and recommendations  Bariatric surgery patients should exercise thirty (30) to sixty (60) minutes per day to maintain post-surgical weight loss  Research indicates that bariatric patients are more successful when they see a therapist for up to two (2) years post-op  Patients will follow all medical and dietary recommendations provided  Patient will keep all scheduled appointments and follow up with their physician for a minimum of five (5) years  Patient will take all vitamins as recommended  Post-operative vitamins are life-long  Patient reviewed Bariatric Surgery Education Checklist and agrees they have received education on these issues

## 2021-01-31 DIAGNOSIS — E10.8 TYPE 1 DIABETES MELLITUS WITH COMPLICATION (HCC): ICD-10-CM

## 2021-02-11 ENCOUNTER — OFFICE VISIT (OUTPATIENT)
Dept: BARIATRICS | Facility: CLINIC | Age: 56
End: 2021-02-11
Payer: COMMERCIAL

## 2021-02-11 VITALS
DIASTOLIC BLOOD PRESSURE: 90 MMHG | WEIGHT: 270 LBS | TEMPERATURE: 97.5 F | SYSTOLIC BLOOD PRESSURE: 150 MMHG | RESPIRATION RATE: 20 BRPM | HEIGHT: 69 IN | HEART RATE: 83 BPM | BODY MASS INDEX: 39.99 KG/M2

## 2021-02-11 DIAGNOSIS — E66.01 MORBID OBESITY (HCC): Primary | ICD-10-CM

## 2021-02-11 PROCEDURE — 99204 OFFICE O/P NEW MOD 45 MIN: CPT | Performed by: SURGERY

## 2021-02-11 NOTE — H&P (VIEW-ONLY)
BARIATRIC CONSULT-INITIAL - BARIATRIC SURGERY  Tavo Cardoso 48 y o  male MRN: [id number]  Unit/Bed#:  Encounter: [id number]      HPI:  Tavo Cardoso is a 48 y o  male who presents with morbid obesity to discuss weight loss options  Review of Systems    Historical Information   Past Medical History:   Diagnosis Date    Diabetes mellitus ([address] )     Disease of thyroid gland     Insomnia     Sleep apnea     Mild sleep apnea     Past Surgical History:   Procedure Laterality Date    FL RETROGRADE PYELOGRAM  4/24/2020    LASIK      [place] CYSTO/URETERO W/LITHOTRIPSY &INDWELL STENT INSRT Right 4/24/2020    Procedure: CYSTOSCOPY URETEROSCOPY WITH LITHOTRIPSY HOLMIUM LASER, RETROGRADE PYELOGRAM AND INSERTION STENT URETERAL;  Surgeon: Clarice Campos MD;  [facility];  Service: Urology    [facility] [address] Right      Social History   Social History     Substance and Sexual Activity   Alcohol Use Yes    Alcohol/week: 2 0 standard drinks    Types: 2 Glasses of wine per week    Frequency: 2-3 times a week    Drinks per session: 1 or 2    Binge frequency: Less than monthly    Comment: social     Social History     Substance and Sexual Activity   Drug Use No     Social History     Tobacco Use   Smoking Status Former Smoker    Types: Cigarettes   Smokeless Tobacco Never Used   Tobacco Comment    quit in 2015     Family History: non-contributory    Meds/Allergies   all medications and allergies reviewed  Allergies   Allergen Reactions    Ibuprofen GI Intolerance    Other Other (See Comments)     Seasonal allergies- pt has congestion       Objective       Current Vitals:   Blood Pressure: 150/90 (02/11/21 1541)  Pulse: 83 (02/11/21 1541)  Temperature: 97 5 °F (36 4 °C) (02/11/21 1541)  Temp Source: Tympanic (02/11/21 1541)  Respirations: 20 (02/11/21 1541)  Height: 5' 9" (175 3 cm) (02/11/21 1541)  Weight - Scale: 122 kg (270 lb) (02/11/21 1541)  Body mass index is 39 87 kg/m²        Invasive Devices     None                 Physical Exam    Lab Results: I have personally reviewed pertinent lab results  Imaging: I have personally reviewed pertinent reports  EKG, Pathology, and Other Studies: I have personally reviewed pertinent reports  Code Status: [unfilled]  Advance Directive and Living Will:      Power of :    POLST:      Assessment/PLAN:            Patient has a long history of morbid obesity and is presenting to discuss the surgical weight loss options  Despite the patient best efforts patient was unable to lose any meaningful or sustainable weight using nonsurgical means  We had a long discussion regarding all the surgical weight-loss options at our disposal at this point and reviewed the risks and benefits of each procedure in details as it relates to her age, BMI and medical conditions  Patient elected to undergo a RYGB    Risks and benefits were explained to the patient  We also discussed the importance and need of a preoperative workup to make sure that the patient can undergo the procedure safely  Preoperative workup includes sleep apnea screening, cardiac evaluation, nutrition/psych and preoperative EGD  Risks and benefits of all the preoperative diagnostic tests were discussed with the patient including but not limited to the upper endoscopy  Alternatives to surgery and alternative forms of surgery were also explained  Postsurgical commitment and aftercare programs were discussed and explained to the patient in details   In terms of comorbidities patient suffers mostly of   Past Medical History:   Diagnosis Date    [address] mellitus [address] [address]     Disease of thyroid gland     Insomnia     Sleep apnea     Mild sleep apnea       I informed the patient that the rate of resolution of comorbid conditions following weight loss surgery is between 60 and 90% depending on the severity of the specific medical condition  I discussed and educated the patient regarding the different components of our multidisciplinary program and the importance of compliance and follow-up in the postoperative period  All questions answered  Patient understands risks and benefits  A videotape of the procedure was also shown to the patient  After showing the video we discussed all the technical aspects of the procedure and also the potential complications including but not limited to gastrointestinal perforation, leak, obstruction, stricture and hemorrhage  I spent 45 min with the patient more than 50% of the time was spent educating the patient and coordinating care

## 2021-02-11 NOTE — PROGRESS NOTES
BARIATRIC CONSULT-INITIAL - BARIATRIC SURGERY  Yu Quintero 48 y o  male MRN: [id number]  Unit/Bed#:  Encounter: [id number]      HPI:  Yu Quintero is a 48 y o  male who presents with morbid obesity to discuss weight loss options  Review of Systems    Historical Information   Past Medical History:   Diagnosis Date    Diabetes mellitus ([address] )     Disease of thyroid gland     Insomnia     Sleep apnea     Mild sleep apnea     Past Surgical History:   Procedure Laterality Date    FL RETROGRADE PYELOGRAM  4/24/2020    LASIK      [place] CYSTO/URETERO W/LITHOTRIPSY &INDWELL STENT INSRT Right 4/24/2020    Procedure: CYSTOSCOPY URETEROSCOPY WITH LITHOTRIPSY HOLMIUM LASER, RETROGRADE PYELOGRAM AND INSERTION STENT URETERAL;  Surgeon: Elaina Gregory MD;  [facility]: [facility];  Service: Urology    [facility] [address] Right      Social History   Social History     Substance and Sexual Activity   Alcohol Use Yes    Alcohol/week: 2 0 standard drinks    Types: 2 Glasses of wine per week    Frequency: 2-3 times a week    Drinks per session: 1 or 2    Binge frequency: Less than monthly    Comment: social     Social History     Substance and Sexual Activity   Drug Use No     Social History     Tobacco Use   Smoking Status Former Smoker    Types: Cigarettes   Smokeless Tobacco Never Used   Tobacco Comment    quit in 2015     Family History: non-contributory    Meds/Allergies   all medications and allergies reviewed  Allergies   Allergen Reactions    Ibuprofen GI Intolerance    Other Other (See Comments)     Seasonal allergies- pt has congestion       Objective       Current Vitals:   Blood Pressure: 150/90 (02/11/21 1541)  Pulse: 83 (02/11/21 1541)  Temperature: 97 5 °F (36 4 °C) (02/11/21 1541)  Temp Source: Tympanic (02/11/21 1541)  Respirations: 20 (02/11/21 1541)  Height: 5' 9" (175 3 cm) (02/11/21 1541)  Weight - Scale: 122 kg (270 lb) (02/11/21 1541)  Body mass index is 39 87 kg/m²        Invasive Devices     None                 Physical Exam    Lab Results: I have personally reviewed pertinent lab results  Imaging: I have personally reviewed pertinent reports  EKG, Pathology, and Other Studies: I have personally reviewed pertinent reports  Code Status: [unfilled]  Advance Directive and Living Will:      Power of :    POLST:      Assessment/PLAN:            Patient has a long history of morbid obesity and is presenting to discuss the surgical weight loss options  Despite the patient best efforts patient was unable to lose any meaningful or sustainable weight using nonsurgical means  We had a long discussion regarding all the surgical weight-loss options at our disposal at this point and reviewed the risks and benefits of each procedure in details as it relates to her age, BMI and medical conditions  Patient elected to undergo a RYGB    Risks and benefits were explained to the patient  We also discussed the importance and need of a preoperative workup to make sure that the patient can undergo the procedure safely  Preoperative workup includes sleep apnea screening, cardiac evaluation, nutrition/psych and preoperative EGD  Risks and benefits of all the preoperative diagnostic tests were discussed with the patient including but not limited to the upper endoscopy  Alternatives to surgery and alternative forms of surgery were also explained  Postsurgical commitment and aftercare programs were discussed and explained to the patient in details   In terms of comorbidities patient suffers mostly of   Past Medical History:   Diagnosis Date    [address] mellitus [address] [address]     Disease of thyroid gland     Insomnia     Sleep apnea     Mild sleep apnea       I informed the patient that the rate of resolution of comorbid conditions following weight loss surgery is between 60 and 90% depending on the severity of the specific medical condition  I discussed and educated the patient regarding the different components of our multidisciplinary program and the importance of compliance and follow-up in the postoperative period  All questions answered  Patient understands risks and benefits  A videotape of the procedure was also shown to the patient  After showing the video we discussed all the technical aspects of the procedure and also the potential complications including but not limited to gastrointestinal perforation, leak, obstruction, stricture and hemorrhage  I spent 45 min with the patient more than 50% of the time was spent educating the patient and coordinating care

## 2021-02-25 ENCOUNTER — TELEPHONE (OUTPATIENT)
Dept: BARIATRICS | Facility: CLINIC | Age: 56
End: 2021-02-25

## 2021-02-25 ENCOUNTER — OFFICE VISIT (OUTPATIENT)
Dept: URGENT CARE | Age: 56
End: 2021-02-25
Payer: COMMERCIAL

## 2021-02-25 VITALS
OXYGEN SATURATION: 99 % | TEMPERATURE: 98.3 F | HEART RATE: 77 BPM | SYSTOLIC BLOOD PRESSURE: 143 MMHG | HEIGHT: 69 IN | WEIGHT: 270 LBS | BODY MASS INDEX: 39.99 KG/M2 | RESPIRATION RATE: 16 BRPM | DIASTOLIC BLOOD PRESSURE: 72 MMHG

## 2021-02-25 DIAGNOSIS — L03.011 PARONYCHIA OF FINGER OF RIGHT HAND: Primary | ICD-10-CM

## 2021-02-25 PROCEDURE — 99213 OFFICE O/P EST LOW 20 MIN: CPT | Performed by: NURSE PRACTITIONER

## 2021-02-25 RX ORDER — SULFAMETHOXAZOLE AND TRIMETHOPRIM 800; 160 MG/1; MG/1
1 TABLET ORAL EVERY 12 HOURS SCHEDULED
Qty: 14 TABLET | Refills: 0 | Status: SHIPPED | OUTPATIENT
Start: 2021-02-25 | End: 2021-03-04

## 2021-02-25 NOTE — PROGRESS NOTES
[facility] [employer] [facility]        NAME: Carlos Curran is a 48 y o  male  : 1965    MRN: [id number]  DATE: 2021  TIME: 3:46 PM    Assessment and Plan   Paronychia of finger of right hand [L03 011]  1  Paronychia of finger of right hand  sulfamethoxazole-trimethoprim (BACTRIM DS) 800-160 mg per tablet         Patient Instructions     Take med as directed  F/u with PCP or general surgeon or ED for trimming of the nail  OTC med for pain prn  Follow up with PCP in 3-5 days  Proceed to  ER if symptoms worsen  Chief Complaint     Chief Complaint   Patient presents with    Finger Swelling     Pt complaining of a hang nail on his R ring finger x3 days  Tried to get it out himself and applied neosporin  States he's concerned of infection because he's a type 1 diabetic  History of Present Illness       HPI   Reports pain and swelling on the right ring finger x 3 days  Says he applied neosporin to it and did not help  Hx of DM type 1  Review of Systems   Review of Systems   Constitutional: Negative for fever  Musculoskeletal: Positive for myalgias (right finger)  Skin: Positive for wound  Neurological: Negative for weakness and numbness           Current Medications       Current Outpatient Medications:     acetone, urine, test (Ketostix) strip, Check ketones if blood sugars > 250, more than twice or in case of nausea or vomiting and abdominal pains, Disp: 100 each, Rfl: 0    Ascorbic Acid (VITAMIN C PO), Take 1 tablet by mouth daily, Disp: , Rfl:     azelastine (ASTELIN) 0 1 % nasal spray, 1 spray into each nostril 2 (two) times a day, Disp: 90 mL, Rfl: 1    Blood Glucose Monitoring Suppl (ONE TOUCH ULTRA 2) w/Device KIT, by Does not apply route, Disp: , Rfl:     Cholecalciferol (VITAMIN D3) 50 MCG (2000) capsule, Take 2 capsules daily, Disp: , Rfl: 0    Continuous Blood Gluc  (DEXCOM G6 ) KIYA, USE AS DIRECTED, Disp: 1 Device, Rfl: 0    Continuous Blood Gluc Sensor (DEXCOM G6 SENSOR) MISC, 1 Device by Does not apply route continuous Use 1 sensor every 10 days, Disp: 12 each, Rfl: 1    Continuous Blood Gluc Transmit (DEXCOM G6 TRANSMITTER) MISC, USE 1 DEVICE WITH SENSOR, Disp: 1 each, Rfl: 3    esomeprazole (NexIUM) 40 MG capsule, Take 1 capsule (40 mg total) by mouth daily, Disp: 90 capsule, Rfl: 1    gabapentin (NEURONTIN) 100 mg capsule, Take 1 capsule (100 mg total) by mouth daily at bedtime, Disp: 90 capsule, Rfl: 1    Glucagon (BAQSIMI TWO PACK) 3 MG/DOSE POWD, Use one dose as needed in case of severe hypoglycemia ( nasal spray), Disp: 1 each, Rfl: 0    glucagon (GLUCAGON EMERGENCY) 1 MG injection, Inject 1 mg under the skin once as needed for low blood sugar for up to 1 dose, Disp: 1 kit, Rfl: 1    insulin aspart (NovoLOG) 100 units/mL injection, Use up to 140 units per day via insulin pump, Disp: 110 mL, Rfl: 2    insulin glargine (Basaglar KwikPen) 100 units/mL injection pen, Inject 60 units in case of pump failure ( incase of emergency), Disp: 15 pen, Rfl: 1    Insulin Pen Needle (BD PEN NEEDLE LYRIC U/F) 32G X 4 MM MISC, by Does not apply route daily Use once daily, Disp: 100 each, Rfl: 2    Jardiance 25 MG TABS, take 1 tablet by mouth every morning, Disp: 30 tablet, Rfl: 3    metFORMIN (GLUCOPHAGE) 1000 MG tablet, take 1 tablet by mouth every 12 hours with food, Disp: 180 tablet, Rfl: 0    MULTIPLE VITAMIN PO, Take by mouth daily  , Disp: , Rfl:     naproxen (NAPROSYN) 500 mg tablet, Take 1 tablet (500 mg total) by mouth 2 (two) times a day with meals, Disp: 60 tablet, Rfl: 2    rosuvastatin (CRESTOR) 10 MG tablet, Take 1 tablet (10 mg total) by mouth daily, Disp: 90 tablet, Rfl: 0    sildenafil (VIAGRA) 50 MG tablet, Take 1 tablet (50 mg total) by mouth daily as needed for erectile dysfunction, Disp: 10 tablet, Rfl: 0    Synthroid 175 MCG tablet, take 1 tablet by mouth daily, Disp: 90 tablet, Rfl: 0    vitamin B-12 (VITAMIN B-12) 1,000 mcg tablet, Take by mouth daily, Disp: , Rfl:     zolpidem (AMBIEN) 10 mg tablet, Take 1 tablet (10 mg total) by mouth daily at bedtime as needed for sleep, Disp: 90 tablet, Rfl: 0    sulfamethoxazole-trimethoprim (BACTRIM DS) 800-160 mg per tablet, Take 1 tablet by mouth every 12 (twelve) hours for 7 days, Disp: 14 tablet, Rfl: 0    Current Allergies     Allergies as of 02/25/2021 - Reviewed 02/25/2021   Allergen Reaction Noted    Ibuprofen GI Intolerance 11/09/2017    Other Other (See Comments) 05/27/2016            The following portions of the patient's history were reviewed and updated as appropriate: allergies, current medications, past family history, past medical history, past social history, past surgical history and problem list      Past Medical History:   Diagnosis Date    Diabetes mellitus ([address] )     Disease of thyroid gland     Insomnia     Sleep apnea     Mild sleep apnea       Past Surgical History:   Procedure Laterality Date    FL RETROGRADE PYELOGRAM  4/24/2020    LASIK      [place] CYSTO/URETERO W/LITHOTRIPSY &INDWELL STENT INSRT Right 4/24/2020    Procedure: CYSTOSCOPY URETEROSCOPY WITH LITHOTRIPSY HOLMIUM LASER, RETROGRADE PYELOGRAM AND INSERTION STENT URETERAL;  Surgeon: Kaden Yang MD;  [facility];  Service: Urology    ROTATOR CUFF REPAIR Right        Family History   Problem Relation Age of Onset    Thyroid disease unspecified Mother     Osteoporosis Mother     Hypertension Father     Diabetes type II Sister     Hypertension Sister     Hyperlipidemia Sister     Thyroid disease unspecified Sister     Diabetes type II Brother     Hypertension Brother     Hyperlipidemia Brother          Medications have been verified  Objective   /72 (BP Location: Left arm, Patient Position: Sitting)   Pulse 77   Temp 98 3 °F (36 8 °C) (Temporal)   Resp 16   Ht 5' 9" (1 753 m)   Wt 122 kg (270 lb)   SpO2 99%   BMI 39 87 kg/m²   No LMP for male patient  Physical Exam     Physical Exam  Constitutional:       Appearance: He is not ill-appearing or diaphoretic  Musculoskeletal:         General: Tenderness (with palpation of he lateral edge of the R ring finger) present  Skin:     Capillary Refill: Capillary refill takes less than 2 seconds  Coloration: Skin is not jaundiced  Findings: Erythema (to the edge of the nail, on the R ring finger) present  No bruising

## 2021-02-25 NOTE — TELEPHONE ENCOUNTER
Pt  Is on continuous insulin pump  fyi does the patient have to stop his insulin pump due to the EGD appointment? Pt stated he had EGD before and did not have to stop since he is a Type 1 diabetic

## 2021-02-25 NOTE — PATIENT INSTRUCTIONS
Paronychia   WHAT YOU NEED TO KNOW:   Paronychia is an infection of your nail fold caused by bacteria or a fungus  The nail fold is the skin around your nail  Paronychia may happen suddenly and last for 6 weeks or longer  You may have paronychia on more than 1 finger or toe  DISCHARGE INSTRUCTIONS:   Medicines:   · Td vaccine  is a booster shot used to help prevent tetanus and diphtheria  The Td booster may be given to adolescents and adults every 10 years or for certain wounds and injuries  · Antibiotics: This medicine will help fight or prevent an infection  It may be given as a pill, cream, or ointment  · Steroids: This medicine will help decrease inflammation  It may be given as a pill, cream, or ointment  · Antifungal medicine: This medicine helps kill fungus that may be causing your infection  It may be given as a cream or ointment  · NSAIDs:  These medicines decrease pain and swelling  NSAIDs are available without a doctor's order  Ask your healthcare provider which medicine is right for you  Ask how much to take and when to take it  Take as directed  NSAIDs can cause stomach bleeding and kidney problems if not taken correctly  · Take your medicine as directed  Contact your healthcare provider if you think your medicine is not helping or if you have side effects  Tell him of her if you are allergic to any medicine  Keep a list of the medicines, vitamins, and herbs you take  Include the amounts, and when and why you take them  Bring the list or the pill bottles to follow-up visits  Carry your medicine list with you in case of an emergency  Follow up with your healthcare provider as directed:  Write down your questions so you remember to ask them during your visits  Self-care:   · Soak your nail:  Soak your nail in a mixture of equal parts vinegar and water 3 or 4 times each day  This will help decrease inflammation      · Apply a warm compress:  Soak a washcloth in warm water and place it on your nail  This will help decrease inflammation  · Elevate:  Raise your nail above the level of your heart as often as you can  This will help decrease swelling and pain  Prop your nail on pillows or blankets to keep it elevated comfortably  · Use lotion:  Apply lotion after you wash your hands  This will prevent your skin from becoming too dry  Prevent paronychia:   · Avoid chemicals and allergens that may harm your skin and nails  This includes soaps, laundry detergents, and nail products  · Keep your nails clean and dry  Avoid soaking your nails in water  Use cotton-lined rubber gloves or wear 2 rubber gloves if you work with food or water  The gloves will help protect your nail folds  · Keep your nails short  Do not bite your nails, pick at your hangnails, suck your fingers, or wear fake nails  Bring your own nail tools when you go to the nail salon  Contact your healthcare provider if:   · Your nail becomes loose, deformed, or falls off  · You have a large abscess on your nail  · You have questions or concerns about your condition or care  Return to the emergency department if:   · You have severe nail pain  · The inflammation spreads to your hand or arm  © Copyright [address] Information is for End User's use only and may not be sold, redistributed or otherwise used for commercial purposes  All illustrations and images included in CareNotes® are the copyrighted property of [employer]  or Osvaldo Kenyon  The above information is an  only  It is not intended as medical advice for individual conditions or treatments  Talk to your doctor, nurse or pharmacist before following any medical regimen to see if it is safe and effective for you

## 2021-03-01 ENCOUNTER — CONSULT (OUTPATIENT)
Dept: CARDIOLOGY CLINIC | Facility: CLINIC | Age: 56
End: 2021-03-01
Payer: COMMERCIAL

## 2021-03-01 ENCOUNTER — ANESTHESIA EVENT (OUTPATIENT)
Dept: GASTROENTEROLOGY | Facility: HOSPITAL | Age: 56
End: 2021-03-01

## 2021-03-01 VITALS
HEART RATE: 71 BPM | TEMPERATURE: 97.7 F | WEIGHT: 273.4 LBS | DIASTOLIC BLOOD PRESSURE: 80 MMHG | BODY MASS INDEX: 40.49 KG/M2 | HEIGHT: 69 IN | OXYGEN SATURATION: 98 % | SYSTOLIC BLOOD PRESSURE: 124 MMHG

## 2021-03-01 DIAGNOSIS — Z01.810 PREOPERATIVE CARDIOVASCULAR EXAMINATION: ICD-10-CM

## 2021-03-01 DIAGNOSIS — E10.40 TYPE 1 DIABETES MELLITUS WITH DIABETIC NEUROPATHY (HCC): Primary | ICD-10-CM

## 2021-03-01 DIAGNOSIS — E66.01 MORBID OBESITY (HCC): ICD-10-CM

## 2021-03-01 DIAGNOSIS — E78.2 MIXED HYPERLIPIDEMIA: ICD-10-CM

## 2021-03-01 PROCEDURE — 93000 ELECTROCARDIOGRAM COMPLETE: CPT | Performed by: INTERNAL MEDICINE

## 2021-03-01 PROCEDURE — 99202 OFFICE O/P NEW SF 15 MIN: CPT | Performed by: INTERNAL MEDICINE

## 2021-03-01 NOTE — PROGRESS NOTES
Cardiology Consultation     Trev Clemons  [id number]  1965  [facility] [place]  [address] [id number]      Diagnoses and all orders for this visit:    Type 1 diabetes mellitus with diabetic neuropathy ([facility] [place] [address] )    Morbid obesity ([facility] [place] [address] )  -     Ambulatory referral to Cardiology  -     POCT ECG    Mixed hyperlipidemia      I had the pleasure of seeing Trev Clemons for a consultation regarding preop eval prior to bariatric surgery requested by Dr Bam Antonio    History of the Presenting Illness, Discussion/Summary and my Plan are as follows:::    Jah Hoffman is a pleasant 50-year-old gentleman with a history of diabetes-diagnosed about 11 years ago-type 1 5, dyslipidemia-on a statin, no hypertension, prior history of tobacco use-about 30 pack years-quit in 2015, family history of premature vascular disease in his father at the age of 48 needing bypass as well as in his mother at the age of 61, both of them were smokers  He underwent an exercise treadmill stress test and an echocardiogram in 2016 both of which were unremarkable  He is in the process of being evaluated for Yeimi-en-Y gastric bypass surgery and hence is here for preoperative cardiac evaluation  ECG shows normal sinus rhythm, cardiac exam is normal     Plan:    Preoperative cardiovascular evaluation:  Risk factors include diabetes, dyslipidemia, prior tobacco use and a family history  Will check an exercise treadmill stress test   He is asymptomatic from a cardiac standpoint-exercises about twice a week-about 30 minutes on a treadmill with no recent change in physical capacity  Diabetes and dyslipidemia:  Controlled on medications at this time  Blood pressures are controlled  Follow-up as needed provided stress test is unremarkable    November 2020  A1c 6 8    May 2020:   Total cholesterol 158, triglycerides 321, HDL 33, LDL 61    1  Type 1 diabetes mellitus with diabetic neuropathy ([place] [address] )     2  Morbid obesity ([place] [address] )  Ambulatory referral to Cardiology    POCT ECG   3  Mixed hyperlipidemia       Patient Active Problem List   Diagnosis    Chronic back pain    Collagenous colitis    GERD (gastroesophageal reflux disease)    Hyperlipidemia    Hypothyroidism    Insomnia    Iron deficiency anemia    BMI 40 0-44 9, adult (HCC)    Type [facility] diabetes mellitus with diabetic [facility]    Vitamin D deficiency    Seasonal allergic rhinitis due to pollen    Edema    Snoring    BMI 37 0-37 9, adult    Intrinsic eczema    FARZANA (obstructive sleep apnea)    Renal calculus, bilateral    Leukocytosis (leucocytosis)    LILA (acute kidney injury) ([place] [address] )    Erectile dysfunction     Past Medical History:   Diagnosis Date    Diabetes mellitus ([place] [address] )     Disease of thyroid gland     Insomnia     Sleep apnea     Mild sleep apnea     Social History     Socioeconomic History    Marital status: /Civil Union     Spouse name: Not on file    Number of children: Not on file    Years of education: Not on file    Highest education level: Not on file   Occupational History    Not on file   Social Needs    Financial resource strain: Not on file    Food insecurity     Worry: Not on file     Inability: Not on file   [employer] needs     Medical: Not on file     Non-medical: Not on file   Tobacco Use    Smoking status: Former Smoker     Years: 30      Types: Cigarettes     Quit date:      Years since quittin 1    Smokeless tobacco: Never Used    Tobacco comment: quit in    Substance and Sexual Activity    Alcohol use:  Yes     Alcohol/week: 2 0 standard drinks     Types: 2 Glasses of wine per week     Frequency: 2-3 times a week     Drinks per session: 1 or 2     Binge frequency: Less than monthly     Comment: social    Drug use: No    Sexual activity: Yes     Partners: Female     Birth control/protection: None Lifestyle    Physical activity     Days per week: Not on file     Minutes per session: Not on file    Stress: Not on file   Relationships    Social connections     Talks on phone: Not on file     Gets together: Not on file     Attends [employer] service: Not on file     Active member of club or organization: Not on file     Attends meetings of clubs or organizations: Not on file     Relationship status: Not on file    Intimate partner violence     Fear of current or ex partner: Not on file     Emotionally abused: Not on file     Physically abused: Not on file     Forced sexual activity: Not on file   Other Topics Concern    Not on file   Social History Narrative    Not on file      Family History   Problem Relation Age of Onset    Thyroid disease unspecified Mother     Osteoporosis Mother     Hypertension Father     Diabetes type II Sister     Hypertension Sister     Hyperlipidemia Sister     Thyroid disease unspecified Sister     Diabetes type II Brother     Hypertension Brother     Hyperlipidemia Brother      Past Surgical History:   Procedure Laterality Date    FL RETROGRADE PYELOGRAM  4/24/2020    LASIK      OR CYSTO/URETERO W/LITHOTRIPSY &INDWELL STENT INSRT Right 4/24/2020    Procedure: CYSTOSCOPY URETEROSCOPY WITH LITHOTRIPSY HOLMIUM LASER, RETROGRADE PYELOGRAM AND INSERTION STENT URETERAL;  Surgeon: Elsa Mukherjee MD;  Location: [facility];  Service: Urology    ROTATOR CUFF REPAIR Right        Current Outpatient Medications:     acetone, urine, test (Ketostix) strip, Check ketones if blood sugars > 250, more than twice or in case of nausea or vomiting and abdominal pains, Disp: 100 each, Rfl: 0    Ascorbic Acid (VITAMIN C PO), Take 1 tablet by mouth daily, Disp: , Rfl:     azelastine (ASTELIN) 0 1 % nasal spray, 1 spray into each nostril 2 (two) times a day, Disp: 90 mL, Rfl: 1    Blood Glucose Monitoring Suppl (ONE TOUCH ULTRA 2) w/Device KIT, by Does not apply route, Disp: , Rfl:     Cholecalciferol (VITAMIN D3) 50 MCG (2000 UT) capsule, Take 2 capsules daily, Disp: , Rfl: 0    Continuous Blood Gluc  (DEXCOM G6 ) KIYA, USE AS DIRECTED, Disp: 1 Device, Rfl: 0    Continuous Blood Gluc Sensor (DEXCOM G6 SENSOR) MISC, 1 Device by Does not apply route continuous Use 1 sensor every 10 days, Disp: 12 each, Rfl: 1    Continuous Blood Gluc Transmit (DEXCOM G6 TRANSMITTER) MISC, USE 1 DEVICE WITH SENSOR, Disp: 1 each, Rfl: 3    esomeprazole (NexIUM) 40 MG capsule, Take 1 capsule (40 mg total) by mouth daily, Disp: 90 capsule, Rfl: 1    gabapentin (NEURONTIN) 100 mg capsule, Take 1 capsule (100 mg total) by mouth daily at bedtime, Disp: 90 capsule, Rfl: 1    Glucagon (BAQSIMI TWO PACK) 3 MG/DOSE POWD, Use one dose as needed in case of severe hypoglycemia ( nasal spray), Disp: 1 each, Rfl: 0    glucagon (GLUCAGON EMERGENCY) 1 MG injection, Inject 1 mg under the skin once as needed for low blood sugar for up to 1 dose, Disp: 1 kit, Rfl: 1    insulin aspart (NovoLOG) 100 units/mL injection, Use up to 140 units per day via insulin pump, Disp: 110 mL, Rfl: 2    insulin glargine (Basaglar KwikPen) 100 units/mL injection pen, Inject 60 units in case of pump failure ( incase of emergency), Disp: 15 pen, Rfl: 1    Insulin Pen Needle (BD PEN NEEDLE LYRIC U/F) 32G X 4 MM MISC, by Does not apply route daily Use once daily, Disp: 100 each, Rfl: 2    Jardiance 25 MG TABS, take 1 tablet by mouth every morning, Disp: 30 tablet, Rfl: 3    metFORMIN (GLUCOPHAGE) 1000 MG tablet, take 1 tablet by mouth every 12 hours with food, Disp: 180 tablet, Rfl: 0    MULTIPLE VITAMIN PO, Take by mouth daily  , Disp: , Rfl:     naproxen (NAPROSYN) 500 mg tablet, Take 1 tablet (500 mg total) by mouth 2 (two) times a day with meals (Patient taking differently: Take 500 mg by mouth as needed ), Disp: 60 tablet, Rfl: 2    rosuvastatin (CRESTOR) 10 MG tablet, Take 1 tablet (10 mg total) by mouth daily, Disp: 90 tablet, Rfl: 0    sildenafil (VIAGRA) 50 MG tablet, Take 1 tablet (50 mg total) by mouth daily as needed for erectile dysfunction, Disp: 10 tablet, Rfl: 0    sulfamethoxazole-trimethoprim (BACTRIM DS) 800-160 mg per tablet, Take 1 tablet by mouth every 12 (twelve) hours for 7 days, Disp: 14 tablet, Rfl: 0    Synthroid 175 MCG tablet, take 1 tablet by mouth daily, Disp: 90 tablet, Rfl: 0    vitamin B-12 (VITAMIN B-12) 1,000 mcg tablet, Take by mouth daily, Disp: , Rfl:     zolpidem (AMBIEN) 10 mg tablet, Take 1 tablet (10 mg total) by mouth daily at bedtime as needed for sleep, Disp: 90 tablet, Rfl: 0  Allergies   Allergen Reactions    Ibuprofen GI Intolerance    Other Other (See Comments)     Seasonal allergies- pt has congestion     Vitals:    03/01/21 1402   BP: 124/80   BP Location: Left arm   Patient Position: Sitting   Cuff Size: Standard   Pulse: 71   Temp: 97 7 °F (36 5 °C)   TempSrc: Temporal   SpO2: 98%   Weight: 124 kg (273 lb 6 4 oz)   Height: 5' 9" (1 753 m)         Imaging: No results found  Review of Systems:  Review of Systems   Constitutional: Negative  HENT: Negative  Eyes: Negative  Respiratory: Negative  Cardiovascular: Negative  Endocrine: Negative  Musculoskeletal: Negative  Neurological: Negative  Physical Exam:    /80 (BP Location: Left arm, Patient Position: Sitting, Cuff Size: Standard)   Pulse 71   Temp 97 7 °F (36 5 °C) (Temporal)   Ht 5' 9" (1 753 m)   Wt 124 kg (273 lb 6 4 oz)   SpO2 98%   BMI 40 37 kg/m²   Physical Exam  Constitutional:       General: He is not in acute distress  Appearance: Normal appearance  He is not ill-appearing  HENT:      Nose: Nose normal       Mouth/Throat:      Pharynx: No oropharyngeal exudate or posterior oropharyngeal erythema  Eyes:      Pupils: Pupils are equal, round, and reactive to light  Cardiovascular:      Rate and Rhythm: Normal rate and regular rhythm  Heart sounds:  No murmur  No friction rub  No gallop  Pulmonary:      Effort: Pulmonary effort is normal  No respiratory distress  Breath sounds: No stridor  No wheezing or rhonchi  Abdominal:      General: Abdomen is flat  There is no distension  Palpations: There is no mass  Tenderness: There is no abdominal tenderness  Hernia: No hernia is present  Musculoskeletal:         General: No swelling, tenderness, deformity or signs of injury  Skin:     General: Skin is warm  Coloration: Skin is not jaundiced or pale  Findings: No bruising or erythema  Neurological:      Mental Status: He is alert  This note was completed in part utilizing [employer] direct voice recognition software  Grammatical errors, random word insertion, spelling mistakes, occasional wrong word or "sound-alike" substitutions and incomplete sentences may be an occasional consequence of the system secondary to software limitations, ambient noise and hardware issues  At the time of dictation, efforts were made to edit, clarify and /or correct errors  Please read the chart carefully and recognize, using context, where substitutions have occurred  If you have any questions or concerns about the context, text or information contained within the body of this dictation, please contact myself, the provider, for further clarification

## 2021-03-02 DIAGNOSIS — D50.9 IRON DEFICIENCY ANEMIA: ICD-10-CM

## 2021-03-02 DIAGNOSIS — Z01.818 PRE-OPERATIVE CLEARANCE: Primary | ICD-10-CM

## 2021-03-02 DIAGNOSIS — E10.40 TYPE 1 DIABETES MELLITUS WITH DIABETIC NEUROPATHY (HCC): ICD-10-CM

## 2021-03-02 DIAGNOSIS — K21.9 GERD (GASTROESOPHAGEAL REFLUX DISEASE): ICD-10-CM

## 2021-03-02 DIAGNOSIS — D72.829 LEUKOCYTOSIS (LEUCOCYTOSIS): ICD-10-CM

## 2021-03-02 DIAGNOSIS — G47.00 INSOMNIA: ICD-10-CM

## 2021-03-02 DIAGNOSIS — G47.33 OSA (OBSTRUCTIVE SLEEP APNEA): ICD-10-CM

## 2021-03-02 DIAGNOSIS — E03.9 HYPOTHYROIDISM: ICD-10-CM

## 2021-03-03 ENCOUNTER — TELEPHONE (OUTPATIENT)
Dept: ENDOCRINOLOGY | Facility: CLINIC | Age: 56
End: 2021-03-03

## 2021-03-03 ENCOUNTER — ANESTHESIA (OUTPATIENT)
Dept: GASTROENTEROLOGY | Facility: HOSPITAL | Age: 56
End: 2021-03-03

## 2021-03-03 ENCOUNTER — HOSPITAL ENCOUNTER (OUTPATIENT)
Dept: GASTROENTEROLOGY | Facility: HOSPITAL | Age: 56
Setting detail: OUTPATIENT SURGERY
Discharge: HOME | End: 2021-03-03
Attending: SURGERY
Payer: COMMERCIAL

## 2021-03-03 VITALS
OXYGEN SATURATION: 93 % | HEART RATE: 66 BPM | SYSTOLIC BLOOD PRESSURE: 131 MMHG | RESPIRATION RATE: 20 BRPM | TEMPERATURE: 98.1 F | DIASTOLIC BLOOD PRESSURE: 75 MMHG

## 2021-03-03 PROBLEM — Z96.41 INSULIN PUMP IN PLACE: Status: ACTIVE | Noted: 2021-03-03

## 2021-03-03 LAB
GLUCOSE SERPL-MCNC: 135 MG/DL (ref 65–140)
GLUCOSE SERPL-MCNC: 135 MG/DL (ref 65–140)

## 2021-03-03 PROCEDURE — 88305 TISSUE EXAM BY PATHOLOGIST: CPT | Performed by: PATHOLOGY

## 2021-03-03 PROCEDURE — 43239 EGD BIOPSY SINGLE/MULTIPLE: CPT | Performed by: SURGERY

## 2021-03-03 PROCEDURE — 82948 REAGENT STRIP/BLOOD GLUCOSE: CPT

## 2021-03-03 RX ORDER — SODIUM CHLORIDE 9 MG/ML
125 INJECTION, SOLUTION INTRAVENOUS CONTINUOUS
Status: DISCONTINUED | OUTPATIENT
Start: 2021-03-03 | End: 2021-03-07 | Stop reason: HOSPADM

## 2021-03-03 RX ORDER — PROPOFOL 10 MG/ML
INJECTION, EMULSION INTRAVENOUS AS NEEDED
Status: DISCONTINUED | OUTPATIENT
Start: 2021-03-03 | End: 2021-03-03

## 2021-03-03 RX ADMIN — PROPOFOL 100 MG: 10 INJECTION, EMULSION INTRAVENOUS at 11:34

## 2021-03-03 RX ADMIN — SODIUM CHLORIDE: 0.9 INJECTION, SOLUTION INTRAVENOUS at 11:30

## 2021-03-03 RX ADMIN — PROPOFOL 200 MG: 10 INJECTION, EMULSION INTRAVENOUS at 11:31

## 2021-03-03 NOTE — ANESTHESIA PREPROCEDURE EVALUATION
Procedure:  EGD    Relevant Problems   CARDIO   (+) Hyperlipidemia      ENDO   (+) Hypothyroidism   (+) Type 1 diabetes mellitus with diabetic neuropathy (HCC)      GI/HEPATIC   (+) GERD (gastroesophageal reflux disease)      /RENAL   (+) LILA (acute kidney injury) ([facility] [address] )   (+) Renal calculus, bilateral      HEMATOLOGY   (+) Iron deficiency anemia      MUSCULOSKELETAL   (+) Chronic back pain      NEURO/PSYCH   (+) Chronic back pain      PULMONARY   (+) FARZANA (obstructive sleep apnea)      Other   (+) BMI 40 0-44 9, adult (HCC)   (+) Insulin pump in place        Physical Exam    Airway    Mallampati score: III  TM Distance: >3 FB  Neck ROM: full     Dental   No notable dental hx     Cardiovascular  Rhythm: regular, Rate: normal, Cardiovascular exam normal    Pulmonary  Pulmonary exam normal Breath sounds clear to auscultation,     Other Findings        Anesthesia Plan  ASA Score- 3     Anesthesia Type- general and IV sedation with anesthesia with ASA Monitors  Additional Monitors:   Airway Plan:     Comment: Insulin pump in place  Current BS in 140's   Check POCT glucose from IV start, leave insulin pump on          Plan Factors-    Chart reviewed  Patient summary reviewed  Patient is not a current smoker  Patient instructed to abstain from smoking on day of procedure  Patient did not smoke on day of surgery  Induction- intravenous  Postoperative Plan-     Informed Consent- Anesthetic plan and risks discussed with patient and spouse

## 2021-03-03 NOTE — INTERVAL H&P NOTE
H&P reviewed  After examining the patient I find no changes in the patients condition since the H&P had been written      Vitals:    03/03/21 1039   BP: 138/78   Pulse: 77   Resp: 20   Temp: 98 1 °F (36 7 °C)   SpO2: 95%

## 2021-03-03 NOTE — PROGRESS NOTES
Pt checked  on his pain pump and managed accordingly as has done for past 11 years, gave 3 units insulin via pump to himself

## 2021-03-03 NOTE — DISCHARGE INSTRUCTIONS
Hiatal Hernia   WHAT YOU NEED TO KNOW:   A hiatal hernia is a condition that causes part of your stomach to bulge through the hiatus (small opening) in your diaphragm  The part of the stomach may move up and down, or it may get trapped above the diaphragm  DISCHARGE INSTRUCTIONS:   Seek care immediately if:   · You have severe abdominal pain  · You try to vomit but nothing comes out (retching)  · You have severe chest pain and sudden trouble breathing  · Your bowel movements are black or bloody  · Your vomit looks like coffee grounds or has blood in it  Contact your healthcare provider if:   · Your symptoms are getting worse  · You have nausea, and you are vomiting  · You are losing weight without trying  · You have questions or concerns about your condition or care  Medicines:   · Medicines  may be given to relieve heartburn symptoms  These medicines help to decrease or block stomach acid  You may also be given medicines that help to tighten the esophageal sphincter  · Take your medicine as directed  Contact your healthcare provider if you think your medicine is not helping or if you have side effects  Tell him or her if you are allergic to any medicine  Keep a list of the medicines, vitamins, and herbs you take  Include the amounts, and when and why you take them  Bring the list or the pill bottles to follow-up visits  Carry your medicine list with you in case of an emergency  Follow up with your healthcare provider as directed:  Write down your questions so you remember to ask them during your visits  Self care:   · Avoid foods that make your symptoms worse  These may include spicy foods, fruit juices, alcohol, caffeine, chocolate, and mint  · Eat several small meals during the day  Small meals give your stomach less food to digest     · Avoid lying down and bending forward after you eat  Do not eat meals 2 to 3 hours before bedtime   This decreases your risk for reflux  · Maintain a healthy weight  If you are overweight, weight loss may help relieve your symptoms  · Sleep with your head elevated  at least 6 inches  · Do not smoke  Smoking can increase your symptoms of heartburn  © Copyright [address] Information is for End User's use only and may not be sold, redistributed or otherwise used for commercial purposes  All illustrations and images included in CareNotes® are the copyrighted property of A D A M , Inc  or Osvaldo Frazier   The above information is an  only  It is not intended as medical advice for individual conditions or treatments  Talk to your doctor, nurse or pharmacist before following any medical regimen to see if it is safe and effective for you  Upper Endoscopy   WHAT YOU NEED TO KNOW:   An upper endoscopy is also called an upper gastrointestinal (GI) endoscopy, or an esophagogastroduodenoscopy (EGD)  You may feel bloated, gassy, or have some abdominal discomfort after your procedure  Your throat may be sore for 24 to 36 hours  You may burp or pass gas from air that is still inside your body  DISCHARGE INSTRUCTIONS:   Call 911 if:   · You have sudden chest pain or trouble breathing  Seek care immediately if:   · You feel dizzy or faint  · You have trouble swallowing  · You have severe throat pain  · Your bowel movements are very dark or black  · Your abdomen is hard and firm and you have severe pain  · You vomit blood  Contact your healthcare provider if:   · You feel full or bloated and cannot burp or pass gas  · You have not had a bowel movement for 3 days after your procedure  · You have neck pain  · You have a fever or chills  · You have nausea or are vomiting  · You have a rash or hives  · You have questions or concerns about your endoscopy  Relieve a sore throat:  Suck on throat lozenges or crushed ice  Gargle with a small amount of warm salt water  Mix 1 teaspoon of salt and 1 cup of warm water to make salt water  Relieve gas and discomfort from bloating:  Lie on your right side with a heating pad on your abdomen  Take short walks to help pass gas  Eat small meals until bloating is relieved  Rest after your procedure:  Do not drive or make important decisions until the day after your procedure  Return to your normal activity as directed  You can usually return to work the day after your procedure  Follow up with your healthcare provider as directed:  Write down your questions so you remember to ask them during your visits  © Copyright [address] [place] [address] Drive Information is for End User's use only and may not be sold, redistributed or otherwise used for commercial purposes  All illustrations and images included in CareNotes® are the copyrighted property of A [employer]  or [facility] Marcos Frazier   The above information is an  only  It is not intended as medical advice for individual conditions or treatments  Talk to your doctor, nurse or pharmacist before following any medical regimen to see if it is safe and effective for you

## 2021-03-04 DIAGNOSIS — E03.9 HYPOTHYROIDISM, UNSPECIFIED TYPE: ICD-10-CM

## 2021-03-04 LAB
CREAT ?TM UR-SCNC: 58.7 UMOL/L
HBA1C MFR BLD HPLC: 6.7 %

## 2021-03-04 PROCEDURE — 3044F HG A1C LEVEL LT 7.0%: CPT | Performed by: PHYSICIAN ASSISTANT

## 2021-03-08 ENCOUNTER — OFFICE VISIT (OUTPATIENT)
Dept: ENDOCRINOLOGY | Facility: CLINIC | Age: 56
End: 2021-03-08
Payer: COMMERCIAL

## 2021-03-08 VITALS
SYSTOLIC BLOOD PRESSURE: 132 MMHG | HEART RATE: 79 BPM | DIASTOLIC BLOOD PRESSURE: 84 MMHG | TEMPERATURE: 97.5 F | BODY MASS INDEX: 40.17 KG/M2 | WEIGHT: 272 LBS

## 2021-03-08 DIAGNOSIS — E10.8 TYPE 1 DIABETES MELLITUS WITH COMPLICATION (HCC): ICD-10-CM

## 2021-03-08 DIAGNOSIS — E10.65 TYPE 1 DIABETES MELLITUS WITH HYPERGLYCEMIA, WITH LONG-TERM CURRENT USE OF INSULIN (HCC): Primary | ICD-10-CM

## 2021-03-08 DIAGNOSIS — E55.9 VITAMIN D DEFICIENCY: ICD-10-CM

## 2021-03-08 DIAGNOSIS — E03.9 ACQUIRED HYPOTHYROIDISM: ICD-10-CM

## 2021-03-08 DIAGNOSIS — E78.2 MIXED HYPERLIPIDEMIA: ICD-10-CM

## 2021-03-08 PROCEDURE — 99214 OFFICE O/P EST MOD 30 MIN: CPT | Performed by: PHYSICIAN ASSISTANT

## 2021-03-08 PROCEDURE — 1036F TOBACCO NON-USER: CPT | Performed by: PHYSICIAN ASSISTANT

## 2021-03-08 PROCEDURE — 95251 CONT GLUC MNTR ANALYSIS I&R: CPT | Performed by: PHYSICIAN ASSISTANT

## 2021-03-08 RX ORDER — LEVOTHYROXINE SODIUM 175 MCG
TABLET ORAL
Qty: 90 TABLET | Refills: 1 | Status: SHIPPED | OUTPATIENT
Start: 2021-03-08 | End: 2021-07-14 | Stop reason: SDUPTHER

## 2021-03-08 NOTE — PROGRESS NOTES
Patient Progress Note      CC: DM      Referring Provider  Yeni Tobar Do  [phone]  [facility],  [address] [facility]     History of Present Illness:   Raleigh Gaspar is a 48 y o  male with a history of type 1 diabetes with long term use of insulin  Diabetes course has been stable  Complications of DM: none reported  Denies recent illness or hospitalizations  Denies recent severe hypoglycemic or severe hyperglycemic episodes  Denies any issues with his current regimen  Home glucose monitoring: are performed regularly, has Dexcom sensor  Average glucose 147 mg/dL with standard deviation of 45 mg/dL  In range 69%, above range 27%, below range <4%     Current regimen: Metformin 1000 mg BID, Jardiance 25 mg daily, and Humalog via pump  compliant most of the time, denies any side effects from medications  Patient is on a TSlim pump  He denies any malfunctioning of the pump  Current Insulin pump settings:  Basal rate:  12:00 a m  = 2 850 units/hour  4:00 a m  = 2 850 units/hour  6:00 a m  = 2 350 units/hour  8:00 a m  2 300 units/hour  12:00 p m  = 2 400 units/hour  6:00 p m  = 2 400 units/hour  9:00 p m  = 2 800 units/hour  Insulin to carb ratio:  12:00 a m  = 3  4:00 a m  = 3  6:00 a m  = 3  8:00 a m  = 3  12:00 p m  = 3  6:00 p m  = 3  9:00 p m  = 3  Insulin sensitivity factor:  12:00 a m  = 25  4:00 a m  = 25  6:00 a m  = 15  8:00 a m  = 15  12:00 p m  = 15  6:00 p m  = 15  9:00 p m  = 25  BG target:  115 mg/dL      Discussed with patient in case of malfunctioning of the pump to use basal and bolus therapy as backup which is prescribed to the patient  Also notified patient to call clinic if any issues        Hypoglycemic episodes: Yes, infrequent  H/o of hypoglycemia causing hospitalization or Intervention such as glucagon injection  or ambulance call :  No  Hypoglycemia symptoms: sweating  Treatment of hypoglycemia: discussed treatment      Medic alert tag: recommended: Yes     Diabetes education: Not recently   Diet: 3 meals per day, 2 snacks per day  Timing of meals is variable  Diabetic diet compliance:  is complaint with bolus   Activity: Daily activity is predictable: Yes  No routine exercise  Ophthamology: sees routinely; Dec 2020 (Dr Lloyd Barajas), no retinopathy  Podiatry: Nov 2020     Not on ACE inhibitor/ARB  Has hyperlipidemia: on atorvastatin - tolerating well, no myalgias  compliant most of the time, denies any side effects from medications  Thyroid disorders: Hypothyroidism  Patient is on Synthroid 175 mcg daily on empty stomach 1 hour before breakfast  Thyroid function tests in normal range    History of pancreatitis: No     Vitamin D deficiency: is currently taking vitamin D3 2000 IU dialy    Patient Active Problem List   Diagnosis    Chronic back pain    Collagenous colitis    GERD (gastroesophageal reflux disease)    Hyperlipidemia    Hypothyroidism    Insomnia    Iron deficiency anemia    BMI 40 0-44 9, adult ([facility])    Type 1 diabetes mellitus with hyperglycemia, with long-term current use of insulin ([facility])    Vitamin D deficiency    Seasonal allergic rhinitis due to pollen    Edema    Snoring    Intrinsic eczema    FARZANA (obstructive sleep apnea)    Renal calculus, bilateral    Leukocytosis (leucocytosis)    LILA [facility] kidney [facility]) [facility] [address] [facility]    Erectile dysfunction    Insulin pump in place      Past Medical History:   Diagnosis Date    Diabetes mellitus ([facility] [address] [facility]     Disease of thyroid gland     Insomnia     Sleep apnea     Mild sleep apnea      Past Surgical History:   Procedure Laterality Date    FL RETROGRADE PYELOGRAM  4/24/2020    LASIK      NM CYSTO/URETERO W/LITHOTRIPSY &INDWELL STENT INSRT Right 4/24/2020    Procedure: CYSTOSCOPY URETEROSCOPY WITH LITHOTRIPSY HOLMIUM LASER, RETROGRADE PYELOGRAM AND INSERTION STENT URETERAL;  Surgeon: Chapito Walker MD;  [facility];  Service: Urology    ROTATOR CUFF REPAIR Right       Family History   Problem Relation Age of Onset    Thyroid disease unspecified Mother     Osteoporosis Mother     Hypertension Father     Diabetes type II Sister     Hypertension Sister     Hyperlipidemia Sister     Thyroid disease unspecified Sister     Diabetes type II Brother     Hypertension Brother     Hyperlipidemia Brother      Social History     Tobacco Use    Smoking status: Former Smoker     Years: 30      Types: Cigarettes     Quit date:      Years since quittin 1    Smokeless tobacco: Never Used    Tobacco comment: quit in    Substance Use Topics    Alcohol use:  Yes     Alcohol/week: 2 0 standard drinks     Types: 2 Glasses of wine per week     Frequency: 2-3 times a week     Drinks per session: 1 or 2     Binge frequency: Less than monthly     Comment: social     Allergies   Allergen Reactions    Ibuprofen GI Intolerance    Other Other (See Comments)     Seasonal allergies- pt has congestion         Current Outpatient Medications:     acetone, urine, test (Ketostix) strip, Check ketones if blood sugars > 250, more than twice or in case of nausea or vomiting and abdominal pains, Disp: 100 each, Rfl: 0    Ascorbic Acid (VITAMIN C PO), Take 1 tablet by mouth daily, Disp: , Rfl:     azelastine (ASTELIN) 0 1 % nasal spray, 1 spray into each nostril 2 (two) times a day, Disp: 90 mL, Rfl: 1    Blood Glucose Monitoring Suppl (ONE TOUCH ULTRA 2) w/Device KIT, by Does not apply route, Disp: , Rfl:     Cholecalciferol (VITAMIN D3) 50 MCG (2000 UT) capsule, Take 2 capsules daily, Disp: , Rfl: 0    Continuous Blood Gluc  (DEXCOM G6 ) KIYA, USE AS DIRECTED, Disp: 1 Device, Rfl: 0    Continuous Blood Gluc Sensor (DEXCOM G6 SENSOR) MISC, 1 Device by Does not apply route continuous Use 1 sensor every 10 days, Disp: 12 each, Rfl: 1    Continuous Blood Gluc Transmit (DEXCOM G6 TRANSMITTER) MISC, USE 1 DEVICE WITH SENSOR, Disp: 1 each, Rfl: 3    esomeprazole (NexIUM) 40 MG capsule, Take 1 capsule (40 mg total) by mouth daily, Disp: 90 capsule, Rfl: 1    gabapentin (NEURONTIN) 100 mg capsule, Take 1 capsule (100 mg total) by mouth daily at bedtime, Disp: 90 capsule, Rfl: 1    Glucagon (BAQSIMI TWO PACK) 3 MG/DOSE POWD, Use one dose as needed in case of severe hypoglycemia ( nasal spray), Disp: 1 each, Rfl: 0    glucagon (GLUCAGON EMERGENCY) 1 MG injection, Inject 1 mg under the skin once as needed for low blood sugar for up to 1 dose, Disp: 1 kit, Rfl: 1    insulin aspart (NovoLOG) 100 units/mL injection, Use up to 140 units per day via insulin pump, Disp: 110 mL, Rfl: 2    insulin glargine (Basaglar KwikPen) 100 units/mL injection pen, Inject 60 units in case of pump failure ( incase of emergency), Disp: 15 pen, Rfl: 1    Insulin Pen Needle (BD PEN NEEDLE LYRIC U/F) 32G X 4 MM MISC, by Does not apply route daily Use once daily, Disp: 100 each, Rfl: 2    Jardiance 25 MG TABS, take 1 tablet by mouth every morning, Disp: 30 tablet, Rfl: 3    metFORMIN (GLUCOPHAGE) 1000 MG tablet, take 1 tablet by mouth every 12 hours with food, Disp: 180 tablet, Rfl: 0    MULTIPLE VITAMIN PO, Take by mouth daily  , Disp: , Rfl:     naproxen (NAPROSYN) 500 mg tablet, Take 1 tablet (500 mg total) by mouth 2 (two) times a day with meals (Patient taking differently: Take 500 mg by mouth as needed ), Disp: 60 tablet, Rfl: 2    rosuvastatin (CRESTOR) 10 MG tablet, Take 1 tablet (10 mg total) by mouth daily, Disp: 90 tablet, Rfl: 0    sildenafil (VIAGRA) 50 MG tablet, Take 1 tablet (50 mg total) by mouth daily as needed for erectile dysfunction, Disp: 10 tablet, Rfl: 0    Synthroid 175 MCG tablet, take 1 tablet by mouth daily, Disp: 90 tablet, Rfl: 0    vitamin B-12 (VITAMIN B-12) 1,000 mcg tablet, Take by mouth daily, Disp: , Rfl:     zolpidem (AMBIEN) 10 mg tablet, Take 1 tablet (10 mg total) by mouth daily at bedtime as needed for sleep, Disp: 90 tablet, Rfl: 0  Review of Systems   Constitutional: Negative for activity change, appetite change, fatigue and unexpected weight change  HENT: Negative for trouble swallowing  Eyes: Negative for visual disturbance  Respiratory: Negative for shortness of breath  Cardiovascular: Negative for chest pain and palpitations  Gastrointestinal: Negative for constipation and diarrhea  Endocrine: Negative for polydipsia and polyuria  Musculoskeletal: Negative  Skin: Negative for wound  Neurological: Positive for numbness  Psychiatric/Behavioral: Negative  Physical Exam:  Body mass index is 40 17 kg/m²  /84   Pulse 79   Temp 97 5 °F (36 4 °C)   Wt 123 kg (272 lb)   BMI 40 17 kg/m²    Wt Readings from Last 3 Encounters:   03/08/21 123 kg (272 lb)   03/01/21 124 kg (273 lb 6 4 oz)   02/25/21 122 kg (270 lb)       Physical Exam  Vitals signs and nursing note reviewed  Constitutional:       Appearance: He is well-developed  HENT:      Head: Normocephalic  Eyes:      General: No scleral icterus  Pupils: Pupils are equal, round, and reactive to light  Neck:      Musculoskeletal: Neck supple  Thyroid: No thyromegaly  Cardiovascular:      Rate and Rhythm: Normal rate and regular rhythm  Pulses:           Radial pulses are 2+ on the right side and 2+ on the left side  Heart sounds: No murmur  Pulmonary:      Effort: Pulmonary effort is normal  No respiratory distress  Breath sounds: Normal breath sounds  No wheezing  Skin:     General: Skin is warm and dry  Neurological:      Mental Status: He is alert  Patient's shoes and socks were not removed        Labs:   Lab Results   Component Value Date    HGBA1C 6 8 (H) 11/25/2020     Lab Results   Component Value Date    GLUCOSE 181 (H) 11/03/2015    CALCIUM 8 5 04/24/2020     11/03/2015    K 4 1 04/24/2020    CO2 27 04/24/2020     04/24/2020    BUN 19 04/24/2020    CREATININE 1 54 (H) 04/24/2020     No results found for: Shanna Lau  eGFR   Date Value Ref Range Status   04/24/2020 51 ml/min/1 73sq m Final     No components found for: [facility]  Lab Results   Component Value Date    CHOL 157 07/15/2015    HDL 35 (L) 12/12/2016    TRIG 125 12/12/2016     Lab Results   Component Value Date    ALT 38 04/23/2020    AST 31 04/23/2020    ALKPHOS 91 04/23/2020    BILITOT 0 28 07/15/2015     Lab Results   Component Value Date    FDZ0YZRMGWBE 0 236 (L) 04/16/2018     Reviewed recent labs on patients phone  HNL was asked to fax labs, but I have not yet received results  Plan:    Diagnoses and all orders for this visit:    Type 1 diabetes mellitus with hyperglycemia, with long-term current use of insulin ([facility] [address] )  HGA1C 6 7%  Improved  Treatment regimen: decreased basal rate at 12 am to 2 750 and 4 am to 2 650 and 9 pm ISF to 30 due to occasional low BG overnight / early morning  Change ICR at 6 pm to 2 8 due to elevated BG level after dinner  Discussed intensive insulin regimen does increase risk for hypoglycemia  Episodes of hypoglycemia can lead to permanent disability and death  Discussed risks/complications associated with uncontrolled diabetes  Advised to adhere to diabetic diet, and recommended staying active/exercising routinely as tolerated  Keep carbohydrates consistent to limit blood glucose fluctuations  Advised to call if blood sugars less than 70 mg/dl or over 300 mg/dl  Check blood glucose 4+ times a day  Discussed symptoms and treatment of hypoglycemia  Discussed use of CGM to collect additional blood glucose data to reveal trends and patterns that can be used to optimize treatment plan  Recommended routine follow-up with podiatry and ophthalmology  Download CGM in 2 weeks  Ordered blood work to complete prior to next visit  -     Hemoglobin A1C; Future  -     Basic metabolic panel;  Future    Acquired hypothyroidism  TFTs: TSH 1 99 and free T4 1 4  Continue current dose of Synthroid  Monitor labs    Vitamin D deficiency  Vitamin D 48  Continue current supplementation    Mixed hyperlipidemia  Continue statin therapy  Lipid panel not completed as patient was not fasting      Discussed with the patient diagnosis and treatment and all questions fully answered  He will call me if any problems arise  Counseled patient on diagnostic results, prognosis, risk and benefit of treatment options, instruction for management, importance of treatment compliance, risk factor reduction and impressions        Alison Charles PA-C

## 2021-03-08 NOTE — PATIENT INSTRUCTIONS
Hypoglycemia instructions   Ban Fuad  3/8/2021  [id number]    Low Blood Sugar    Steps to treat low blood sugar  1  Test blood sugar if you have symptoms of low blood sugar:   Low Blood Sugar Symptoms:  o Sweaty  o Dizzy  o Rapid heartbeat  o Shaky  o Bad mood  o Hungry      2  Treat blood sugar less than 70 with 15 grams of fast-acting carbohydrate:   Examples of 15 grams Fast-Acting Carbohydrate:  o 4 oz juice  o 4 oz regular soda  o 3-4 glucose tablets (chew)  o 3-4 hard candies (chew)          3  Wait 15 minutes and test your blood sugar again     4   If blood sugar is less than 100, repeat steps 2-3     5  When your blood sugar is 100 or more, eat a snack if it will be longer than one hour until your next meal  The snack should be 15 grams of carbohydrate and a protein:   Examples of snacks:  o ½ sandwich  o 6 crackers with cheese  o Piece of fruit with cheese or peanut butter  o 6 crackers with peanut butter

## 2021-03-09 ENCOUNTER — TELEPHONE (OUTPATIENT)
Dept: ENDOCRINOLOGY | Facility: CLINIC | Age: 56
End: 2021-03-09

## 2021-03-10 DIAGNOSIS — Z23 ENCOUNTER FOR IMMUNIZATION: ICD-10-CM

## 2021-03-12 ENCOUNTER — HOSPITAL ENCOUNTER (OUTPATIENT)
Dept: NON INVASIVE DIAGNOSTICS | Facility: CLINIC | Age: 56
Discharge: HOME | End: 2021-03-12
Payer: COMMERCIAL

## 2021-03-12 DIAGNOSIS — E78.2 MIXED HYPERLIPIDEMIA: ICD-10-CM

## 2021-03-12 DIAGNOSIS — Z01.810 PREOPERATIVE CARDIOVASCULAR EXAMINATION: ICD-10-CM

## 2021-03-12 PROCEDURE — 93016 CV STRESS TEST SUPVJ ONLY: CPT | Performed by: INTERNAL MEDICINE

## 2021-03-12 PROCEDURE — 93018 CV STRESS TEST I&R ONLY: CPT | Performed by: INTERNAL MEDICINE

## 2021-03-12 PROCEDURE — 93017 CV STRESS TEST TRACING ONLY: CPT

## 2021-03-15 ENCOUNTER — TRANSCRIBE ORDERS (OUTPATIENT)
Dept: LAB | Facility: CLINIC | Age: 56
End: 2021-03-15

## 2021-03-15 ENCOUNTER — APPOINTMENT (OUTPATIENT)
Dept: LAB | Facility: CLINIC | Age: 56
End: 2021-03-15
Payer: COMMERCIAL

## 2021-03-15 DIAGNOSIS — E03.9 HYPOTHYROIDISM: ICD-10-CM

## 2021-03-15 DIAGNOSIS — D50.9 IRON DEFICIENCY ANEMIA: ICD-10-CM

## 2021-03-15 DIAGNOSIS — G47.00 INSOMNIA, UNSPECIFIED TYPE: ICD-10-CM

## 2021-03-15 DIAGNOSIS — K21.9 GERD (GASTROESOPHAGEAL REFLUX DISEASE): ICD-10-CM

## 2021-03-15 DIAGNOSIS — G47.00 INSOMNIA: ICD-10-CM

## 2021-03-15 DIAGNOSIS — E10.40 TYPE 1 DIABETES MELLITUS WITH DIABETIC NEUROPATHY (HCC): ICD-10-CM

## 2021-03-15 DIAGNOSIS — Z01.818 PRE-OPERATIVE CLEARANCE: ICD-10-CM

## 2021-03-15 DIAGNOSIS — E10.65 TYPE 1 DIABETES MELLITUS WITH HYPERGLYCEMIA, WITH LONG-TERM CURRENT USE OF INSULIN (HCC): ICD-10-CM

## 2021-03-15 DIAGNOSIS — G47.33 OSA (OBSTRUCTIVE SLEEP APNEA): ICD-10-CM

## 2021-03-15 DIAGNOSIS — D72.829 LEUKOCYTOSIS (LEUCOCYTOSIS): ICD-10-CM

## 2021-03-15 LAB
ALBUMIN SERPL BCP-MCNC: 4 G/DL (ref 3.5–5)
ALP SERPL-CCNC: 95 U/L (ref 46–116)
ALT SERPL W P-5'-P-CCNC: 31 U/L (ref 12–78)
ANION GAP SERPL CALCULATED.3IONS-SCNC: 10 MMOL/L (ref 4–13)
AST SERPL W P-5'-P-CCNC: 28 U/L (ref 5–45)
BILIRUB SERPL-MCNC: 0.37 MG/DL (ref 0.2–1)
BUN SERPL-MCNC: 19 MG/DL (ref 5–25)
CALCIUM SERPL-MCNC: 9.3 MG/DL (ref 8.3–10.1)
CHLORIDE SERPL-SCNC: 103 MMOL/L (ref 100–108)
CHOLEST SERPL-MCNC: 187 MG/DL (ref 50–200)
CO2 SERPL-SCNC: 29 MMOL/L (ref 21–32)
CREAT SERPL-MCNC: 1.19 MG/DL (ref 0.6–1.3)
ERYTHROCYTE [DISTWIDTH] IN BLOOD BY AUTOMATED COUNT: 12.6 % (ref 11.6–15.1)
GFR SERPL CREATININE-BSD FRML MDRD: 69 ML/MIN/1.73SQ M
GLUCOSE P FAST SERPL-MCNC: 105 MG/DL (ref 65–99)
HCT VFR BLD AUTO: 47.6 % (ref 36.5–49.3)
HDLC SERPL-MCNC: 41 MG/DL
HGB BLD-MCNC: 15.4 G/DL (ref 12–17)
LDLC SERPL CALC-MCNC: 104 MG/DL (ref 0–100)
MCH RBC QN AUTO: 30.6 PG (ref 26.8–34.3)
MCHC RBC AUTO-ENTMCNC: 32.4 G/DL (ref 31.4–37.4)
MCV RBC AUTO: 95 FL (ref 82–98)
NONHDLC SERPL-MCNC: 146 MG/DL
PLATELET # BLD AUTO: 273 THOUSANDS/UL (ref 149–390)
PMV BLD AUTO: 9.3 FL (ref 8.9–12.7)
POTASSIUM SERPL-SCNC: 4 MMOL/L (ref 3.5–5.3)
PROT SERPL-MCNC: 7.9 G/DL (ref 6.4–8.2)
RBC # BLD AUTO: 5.03 MILLION/UL (ref 3.88–5.62)
SODIUM SERPL-SCNC: 142 MMOL/L (ref 136–145)
TRIGL SERPL-MCNC: 209 MG/DL
WBC # BLD AUTO: 7.73 THOUSAND/UL (ref 4.31–10.16)

## 2021-03-15 PROCEDURE — 36415 COLL VENOUS BLD VENIPUNCTURE: CPT

## 2021-03-15 PROCEDURE — 85027 COMPLETE CBC AUTOMATED: CPT

## 2021-03-15 PROCEDURE — 80061 LIPID PANEL: CPT

## 2021-03-15 PROCEDURE — 80053 COMPREHEN METABOLIC PANEL: CPT

## 2021-03-16 ENCOUNTER — IMMUNIZATIONS (OUTPATIENT)
Dept: FAMILY MEDICINE CLINIC | Facility: HOSPITAL | Age: 56
End: 2021-03-16

## 2021-03-16 DIAGNOSIS — Z23 ENCOUNTER FOR IMMUNIZATION: Primary | ICD-10-CM

## 2021-03-16 LAB
CHEST PAIN STATEMENT: NORMAL
MAX DIASTOLIC BP: 100 MMHG
MAX HEART RATE: 151 BPM
MAX PREDICTED HEART RATE: 166 BPM
MAX. SYSTOLIC BP: 180 MMHG
PROTOCOL NAME: NORMAL
REASON FOR TERMINATION: NORMAL
TARGET HR FORMULA: NORMAL
TEST INDICATION: NORMAL
TIME IN EXERCISE PHASE: NORMAL

## 2021-03-16 PROCEDURE — 0001A SARS-COV-2 / COVID-19 MRNA VACCINE (PFIZER-BIONTECH) 30 MCG: CPT

## 2021-03-16 PROCEDURE — 91300 SARS-COV-2 / COVID-19 MRNA VACCINE (PFIZER-BIONTECH) 30 MCG: CPT

## 2021-03-16 RX ORDER — ZOLPIDEM TARTRATE 10 MG/1
10 TABLET ORAL
Qty: 30 TABLET | Refills: 0 | Status: SHIPPED | OUTPATIENT
Start: 2021-03-16 | End: 2021-04-19 | Stop reason: SDUPTHER

## 2021-03-18 ENCOUNTER — DOCUMENTATION (OUTPATIENT)
Dept: NON INVASIVE DIAGNOSTICS | Facility: HOSPITAL | Age: 56
End: 2021-03-18

## 2021-03-18 ENCOUNTER — TELEPHONE (OUTPATIENT)
Dept: CARDIOLOGY CLINIC | Facility: CLINIC | Age: 56
End: 2021-03-18

## 2021-03-18 NOTE — PROGRESS NOTES
[address] [facility] [address]      PATIENT NAME: Jaquan Underwood; [id number]    YOB: 1965    DATE LAST EVALUATED:  3/1/2021    DETAILS OF SURGERY:  Yeimi-en-Y gastric bypass surgery    DATE OF SURGERY:  To be decided    SURGEON: Dr Bam Antonio    ANESTHESIA: Choice     CARDIAC RISK ASSESSMENT:    Can proceed at Low risk without further testing which is unlikely to change the management  ANTIPLATELET/ANTICOAGULANT AGENTS:  Not applicable    Please call us with any questions or concerns regarding his management

## 2021-03-18 NOTE — TELEPHONE ENCOUNTER
Spoke with pt, advised pt of stress test results and preop clearance note was done per Dr Eleazar Kussmaul

## 2021-03-18 NOTE — LETTER
March 18, 2021     Helene Yarbrough, [address] [place] [address] [address] [facility]    Patient: Terrance Durham   YOB: 1965   Date of Visit: 3/18/2021       Dear Dr Iveth Aaron: Thank you for referring Niya Toro to me for evaluation  Below are my notes for this consultation  If you have questions, please do not hesitate to call me  I look forward to following your patient along with you  Sincerely,        Chata Sinha MD        CC: No Recipients  Chata Sinha MD  3/18/2021  8:52 AM  Sign when Signing Visit      [address] [facility] [id number]      PATIENT NAME: Terrance Durham; [id number]    YOB: 1965    DATE LAST EVALUATED:  3/1/2021    DETAILS OF SURGERY:  Yeimi-en-Y gastric bypass surgery    DATE OF SURGERY:  To be decided    SURGEON: Dr Bam Antonio    ANESTHESIA: Choice     CARDIAC RISK ASSESSMENT:    Can proceed at Low risk without further testing which is unlikely to change the management  ANTIPLATELET/ANTICOAGULANT AGENTS:  Not applicable    Please call us with any questions or concerns regarding his management

## 2021-03-18 NOTE — TELEPHONE ENCOUNTER
----- Message from Marilia Lassiter MD sent at 3/18/2021  8:53 AM EDT -----  Stress test was good, I did his preoperative clearance note and sent to his physician    Please let him know

## 2021-03-19 ENCOUNTER — OFFICE VISIT (OUTPATIENT)
Dept: BARIATRICS | Facility: CLINIC | Age: 56
End: 2021-03-19

## 2021-03-19 VITALS — BODY MASS INDEX: 39.1 KG/M2 | WEIGHT: 264 LBS | HEIGHT: 69 IN

## 2021-03-19 DIAGNOSIS — E10.65 TYPE 1 DIABETES MELLITUS WITH HYPERGLYCEMIA, WITH LONG-TERM CURRENT USE OF INSULIN (HCC): ICD-10-CM

## 2021-03-19 DIAGNOSIS — E66.9 OBESITY, CLASS II, BMI 35-39.9: Primary | ICD-10-CM

## 2021-03-19 PROCEDURE — 3008F BODY MASS INDEX DOCD: CPT | Performed by: PHYSICIAN ASSISTANT

## 2021-03-19 PROCEDURE — RECHECK: Performed by: DIETITIAN, REGISTERED

## 2021-03-19 NOTE — PROGRESS NOTES
Bariatric Nutrition Assessment Note    i  Name was verified by patient stating name? yes  ii   verified by patient stating? yes  iii  You verified the patient is alone? yes  iv  I would like to verify that you were offered a live visit but are now consenting to this telephone visit?  yes  v  This visit is free yes     Type of surgery    Preop  Surgery Date: TBD- no program requirement     Phone check in today for dietary counseling and follow up     Surgeon: Dr Concepcion Dobson  48 y o   male     Wt with BMI of 25: 168 8 lbs   Pre-Op Excess Wt: 97 2#  Ht 5' 9" (1 753 m)   Wt 120 kg (264 lb) Comment: self reported weight  BMI 38 99 kg/m²      Patient has lost 2 pounds since starting the program     Wt Readings from Last 3 Encounters:   21 120 kg (264 lb)   21 123 kg (272 lb)   21 124 kg (273 lb 6 4 oz)       [place] [facility] Equation:  2037 kcal   Estimated calories for weight loss 7857-3800 kcal ( 1-2# per wk wt loss - sedentary )  Estimated protein needs 76-92 grams (1 0-1 2 gms/kg IBW )   Estimated fluid needs 2685 ml  (35 ml/kg IBW )  76/80 ounces     Weight History   Onset of Obesity: Adult  Family history of obesity: Yes  Wt Loss Attempts: Behavioral Counseling (Hypnosis, Overeaters Anonymous, etc)  Counseling with  MD  Exercise  High Protein/Low CHO diets (Atkins, Union, etc )  Meal Replacements (Fausto Ahumada Fast, etc )  Nutrition Counseling with RD  Self Created Diets (Portion Control, Healthy Food Choices, etc )  Patient has tried the above for 6 months or more with insufficient weight loss or weight regain, which is why patient has requested to be evaluated for weight loss surgery today  Maximum Wt Lost: 75 pounds lost with diet and exercise - approximately 30 years ago  Then lost weight prior to diagnosis of diabetes but once diabetes treated regain weight   Lab Results   Component Value Date    HGBA1C 6 7 2021         Review of History and Medications   Past Medical History:   Diagnosis Date    Diabetes mellitus ([address] )     Disease of thyroid gland     Insomnia     Sleep apnea     Mild sleep apnea     Past Surgical History:   Procedure Laterality Date    FL RETROGRADE PYELOGRAM  2020    LASIK      CT CYSTO/URETERO W/LITHOTRIPSY &INDWELL STENT INSRT Right 2020    Procedure: CYSTOSCOPY URETEROSCOPY WITH LITHOTRIPSY HOLMIUM LASER, RETROGRADE PYELOGRAM AND INSERTION STENT URETERAL;  Surgeon: Risa Green MD;  [facility];  Service: Urology    ROTATOR CUFF REPAIR Right      Social History     Socioeconomic History    Marital status: /Civil Union     Spouse name: Not on file    Number of children: Not on file    Years of education: Not on file    Highest education level: Not on file   Occupational History    Not on file   Social Needs    Financial resource strain: Not on file    Food insecurity     Worry: Not on file     Inability: Not on file    Transportation needs     Medical: Not on file     Non-medical: Not on file   Tobacco Use    Smoking status: Former Smoker     Years:      Types: Cigarettes     Quit date:      Years since quittin 2    Smokeless tobacco: Never Used    Tobacco comment: quit in    Substance and Sexual Activity    Alcohol use:  Yes     Alcohol/week: 2 0 standard drinks     Types: 2 Glasses of wine per week     Frequency: 2-3 times a week     Drinks per session: 1 or 2     Binge frequency: Less than monthly     Comment: social    Drug use: No    Sexual activity: Yes     Partners: Female     Birth control/protection: None   Lifestyle    Physical activity     Days per week: Not on file     Minutes per session: Not on file    Stress: Not on file   Relationships    Social connections     Talks on phone: Not on file     Gets together: Not on file     Attends Sabianist service: Not on file     Active member of club or organization: Not on file     Attends meetings of clubs or organizations: Not on file     Relationship status: Not on file    Intimate partner violence     Fear of current or ex partner: Not on file     Emotionally abused: Not on file     Physically abused: Not on file     Forced sexual activity: Not on file   Other Topics Concern    Not on file   Social History Narrative    Not on file       Current Outpatient Medications:     acetone, urine, test (Ketostix) strip, Check ketones if blood sugars > 250, more than twice or in case of nausea or vomiting and abdominal pains, Disp: 100 each, Rfl: 0    Ascorbic Acid (VITAMIN C PO), Take 1 tablet by mouth daily, Disp: , Rfl:     azelastine (ASTELIN) 0 1 % nasal spray, 1 spray into each nostril 2 (two) times a day, Disp: 90 mL, Rfl: 1    Blood Glucose Monitoring Suppl (ONE TOUCH ULTRA 2) w/Device KIT, by Does not apply route, Disp: , Rfl:     Cholecalciferol (VITAMIN D3) 50 MCG (2000 UT) capsule, Take 2 capsules daily, Disp: , Rfl: 0    Continuous Blood Gluc  (DEXCOM G6 ) KIYA, USE AS DIRECTED, Disp: 1 Device, Rfl: 0    Continuous Blood Gluc Sensor (DEXCOM G6 SENSOR) MISC, 1 Device by Does not apply route continuous Use 1 sensor every 10 days, Disp: 12 each, Rfl: 1    Continuous Blood Gluc Transmit (DEXCOM G6 TRANSMITTER) MISC, USE 1 DEVICE WITH SENSOR, Disp: 1 each, Rfl: 3    esomeprazole (NexIUM) 40 MG capsule, Take 1 capsule (40 mg total) by mouth daily, Disp: 90 capsule, Rfl: 1    gabapentin (NEURONTIN) 100 mg capsule, Take 1 capsule (100 mg total) by mouth daily at bedtime, Disp: 90 capsule, Rfl: 1    Glucagon (BAQSIMI TWO PACK) 3 MG/DOSE POWD, Use one dose as needed in case of severe hypoglycemia ( nasal spray), Disp: 1 each, Rfl: 0    glucagon (GLUCAGON EMERGENCY) 1 MG injection, Inject 1 mg under the skin once as needed for low blood sugar for up to 1 dose, Disp: 1 kit, Rfl: 1    insulin aspart (NovoLOG) 100 units/mL injection, Use up to 140 units per day via insulin pump, Disp: 110 mL, Rfl: 2    insulin glargine (Basaglar KwikPen) 100 units/mL injection pen, Inject 60 units in case of pump failure ( incase of emergency), Disp: 15 pen, Rfl: 1    Insulin Pen Needle (BD PEN NEEDLE LYRIC U/F) 32G X 4 MM MISC, by Does not apply route daily Use once daily, Disp: 100 each, Rfl: 2    Jardiance 25 MG TABS, take 1 tablet by mouth every morning, Disp: 30 tablet, Rfl: 3    metFORMIN (GLUCOPHAGE) 1000 MG tablet, Take 1 tablet (1,000 mg total) by mouth every 12 (twelve) hours With food, Disp: 180 tablet, Rfl: 1    MULTIPLE VITAMIN PO, Take by mouth daily  , Disp: , Rfl:     naproxen (NAPROSYN) 500 mg tablet, Take 1 tablet (500 mg total) by mouth 2 (two) times a day with meals (Patient taking differently: Take 500 mg by mouth as needed ), Disp: 60 tablet, Rfl: 2    rosuvastatin (CRESTOR) 10 MG tablet, Take 1 tablet (10 mg total) by mouth daily, Disp: 90 tablet, Rfl: 0    sildenafil (VIAGRA) 50 MG tablet, Take 1 tablet (50 mg total) by mouth daily as needed for erectile dysfunction, Disp: 10 tablet, Rfl: 0    Synthroid 175 MCG tablet, take 1 tablet by mouth daily, Disp: 90 tablet, Rfl: 1    vitamin B-12 (VITAMIN B-12) 1,000 mcg tablet, Take by mouth daily, Disp: , Rfl:     zolpidem (AMBIEN) 10 mg tablet, Take 1 tablet (10 mg total) by mouth daily at bedtime as needed for sleep, Disp: 30 tablet, Rfl: 0  Food Intake and Lifestyle Assessment   Food Intake Assessment completed via usual intake   Estimates carb/portions - generally about 75 grams of carb per day   Burgess Minor for work [facility] [employer] at home or [employer] at work   Eating patterns change when you travel   Breakfast: 7 to 8 am - english muffin with margarine  or protein bar  Snack: 10/11 - piece of fruit   Lunch:  12 to 1 pm  If on the road, could be later  left overs from dinner , Fridays PB silvano  [employer] - eats out salad with chicken  to a sandwich - on a whole wheat roll, turkey   Snack: 0  Dinner: home 5 to 6 pm - will cook, protein and starch in air fryer   Or traveling, will wait until 7 pm to eat   Snack: pretzels or pb crackers   Beverage intake: coffee/tea and Propel   Protein supplement:   Estimated protein intake per day: 60 grams   Estimated fluid intake per day: 6 Propel bottles, plus 2 16 ounce mugs per day   Meals eaten away from home: three to four per week   Typical meal pattern: 3 meals per day and 2-3 snacks per day  Eating Behaviors: Large portion sizes  Food allergies or intolerances: Allergies   Allergen Reactions    Ibuprofen GI Intolerance    Other Other (See Comments)     Seasonal allergies- pt has congestion     Cultural or Rastafari considerations: N/A     Patient is type 1 DM with pump and sensor     Physical Assessment  Physical Activity  Types of exercise:Treadmill for 30 minutes 4 days per week   Current physical limitations: neuropathy in feet      Psychosocial Assessment   Support systems: spouse sons   Socioeconomic factors: works full time , 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     Nutrition Diagnosis( continued )   Diagnosis: Overweight / Obesity (NC-3 3)  Related to: Physical inactivity, Excessive energy intake and excess insulin needs   As Evidenced by: BMI >25, Excessive energy intake and Unintentional weight gain     Nutrition Prescription: Recommend the following diet  Low fat, Low sugar, High protein and Consistent carb     Interventions and Teaching   Discussed pre-op and post-op nutrition guidelines  Patient educated and handouts provided    Surgical changes to stomach / GI  Capacity of post-surgery stomach  Diet progression  Adequate hydration  Sugar and fat restriction to decrease "dumping syndrome"  Fat restriction to decrease steatorrhea  Expected weight loss  Weight loss plateaus/ possibility of weight regain  Exercise  Suggestions for pre-op diet  Nutrition considerations after surgery  Protein supplements  Meal planning and preparation  Appropriate carbohydrate, protein, and fat intake, and food/fluid choices to maximize safe weight loss, nutrient intake, and tolerance   Dietary and lifestyle changes  Possible problems with poor eating habits  Intuitive eating  Techniques for self monitoring and keeping daily food journal  Potential for food intolerance after surgery, and ways to deal with them including: lactose intolerance, nausea, reflux, vomiting, diarrhea, food intolerance, appetite changes, gas  Vitamin / Mineral supplementation of Multivitamin with minerals, Calcium, Vitamin B12, Iron, Fat Soluble vitamins and Vitamin D    Reviewed pre op and post op care - patient is type 1 DM so will not follow pre op liver shrinking diet and will not drink ERAS drinks     Education provided to: patient    Barriers to learning: No barriers identified    Readiness to change: action    Prior research on procedure: discussed with provider, pre-op class and friends or family    Comprehension: verbalizes understanding     Expected Compliance: good  Recommendations  Pt is an appropriate candidate for surgery  Yes    Evaluation / Monitoring  Dietitian to Monitor: Eating pattern as discussed Tolerance of nutrition prescription Body weight Lab values Physical activity Bowel pattern  Does not use food log, because  he tracks his carbohydrate intake for closed loop insulin pump Averages about 75 grams per day of carbohydrate  Drinks 100 ounces or more per day of fluids Has increased his physical activity by walking on  treadmill for 30 minutes  4 times per week  Patient takes multivitamin and mineral B12, vitamin C and vitamin D daily  He is practicing the 30/30 rule, but struggling with following   Patient is interested in using protein water for post op supplementation     Goals  Continue to track carbohydrate intake for insulin pump  grams per day  Treadmill 30 minutes 4 days per week   Practice 30/30 rule and progress to 30/ 60 rule   Emailed list of protein razo to drink post op       Time Spent:   30 minutes

## 2021-03-21 DIAGNOSIS — E78.5 HYPERLIPIDEMIA, UNSPECIFIED HYPERLIPIDEMIA TYPE: ICD-10-CM

## 2021-03-22 RX ORDER — ROSUVASTATIN CALCIUM 10 MG/1
TABLET, COATED ORAL
Qty: 90 TABLET | Refills: 0 | Status: SHIPPED | OUTPATIENT
Start: 2021-03-22 | End: 2021-05-06 | Stop reason: SDUPTHER

## 2021-03-24 ENCOUNTER — TELEPHONE (OUTPATIENT)
Dept: BARIATRICS | Facility: CLINIC | Age: 56
End: 2021-03-24

## 2021-03-24 NOTE — TELEPHONE ENCOUNTER
Patient had contacted us to inform us he received a bill for EGD that was done on 3/3/21, when patient contacted insurance he was advised prior Roselee Fong was needed for egd and should have been done through [employer]  Spoke with German Espino from [facility]] Luis Wiggins [facility] department from patient's insurance to obtain prior auth for EGD that was done on 3/3/2021  Pending Auth # [id number]   Clinicals were faxed to the fax provided by Teto Amezcua S fax #  [phone]       For any updates on prior auth call - [phone]     [facility]

## 2021-03-31 NOTE — TELEPHONE ENCOUNTER
Called to check the status of the prior auth Pending Auth # [id number]  , spoke with heidy who confirmed prior auth was approved

## 2021-04-09 ENCOUNTER — IMMUNIZATIONS (OUTPATIENT)
Dept: FAMILY MEDICINE CLINIC | Facility: HOSPITAL | Age: 56
End: 2021-04-09

## 2021-04-09 DIAGNOSIS — Z23 ENCOUNTER FOR IMMUNIZATION: Primary | ICD-10-CM

## 2021-04-09 DIAGNOSIS — E10.65 TYPE 1 DIABETES MELLITUS WITH HYPERGLYCEMIA, WITH LONG-TERM CURRENT USE OF INSULIN (HCC): ICD-10-CM

## 2021-04-09 PROCEDURE — 91300 SARS-COV-2 / COVID-19 MRNA VACCINE (PFIZER-BIONTECH) 30 MCG: CPT

## 2021-04-09 PROCEDURE — 0002A SARS-COV-2 / COVID-19 MRNA VACCINE (PFIZER-BIONTECH) 30 MCG: CPT

## 2021-04-09 RX ORDER — EMPAGLIFLOZIN 25 MG/1
TABLET, FILM COATED ORAL
Qty: 30 TABLET | Refills: 3 | Status: SHIPPED | OUTPATIENT
Start: 2021-04-09 | End: 2021-04-27 | Stop reason: HOSPADM

## 2021-04-15 ENCOUNTER — OFFICE VISIT (OUTPATIENT)
Dept: BARIATRICS | Facility: CLINIC | Age: 56
End: 2021-04-15
Payer: COMMERCIAL

## 2021-04-15 ENCOUNTER — CLINICAL SUPPORT (OUTPATIENT)
Dept: BARIATRICS | Facility: CLINIC | Age: 56
End: 2021-04-15

## 2021-04-15 VITALS
TEMPERATURE: 97.3 F | DIASTOLIC BLOOD PRESSURE: 90 MMHG | HEIGHT: 69 IN | SYSTOLIC BLOOD PRESSURE: 160 MMHG | BODY MASS INDEX: 39.47 KG/M2 | RESPIRATION RATE: 20 BRPM | HEART RATE: 95 BPM | WEIGHT: 266.5 LBS

## 2021-04-15 DIAGNOSIS — K21.9 GASTROESOPHAGEAL REFLUX DISEASE WITHOUT ESOPHAGITIS: ICD-10-CM

## 2021-04-15 DIAGNOSIS — E66.9 OBESITY, CLASS II, BMI 35-39.9: Primary | ICD-10-CM

## 2021-04-15 DIAGNOSIS — E66.01 MORBID OBESITY (HCC): ICD-10-CM

## 2021-04-15 PROCEDURE — 99214 OFFICE O/P EST MOD 30 MIN: CPT | Performed by: SURGERY

## 2021-04-15 PROCEDURE — RECHECK: Performed by: DIETITIAN, REGISTERED

## 2021-04-15 RX ORDER — SCOLOPAMINE TRANSDERMAL SYSTEM 1 MG/1
1 PATCH, EXTENDED RELEASE TRANSDERMAL ONCE
Status: CANCELLED | OUTPATIENT
Start: 2021-04-26 | End: 2021-04-15

## 2021-04-15 RX ORDER — ACETAMINOPHEN 325 MG/1
975 TABLET ORAL ONCE
Status: CANCELLED | OUTPATIENT
Start: 2021-04-26 | End: 2021-04-15

## 2021-04-15 RX ORDER — GABAPENTIN 300 MG/1
300 CAPSULE ORAL ONCE
Status: CANCELLED | OUTPATIENT
Start: 2021-04-26 | End: 2021-04-15

## 2021-04-15 RX ORDER — CEFAZOLIN SODIUM 2 G/50ML
2000 SOLUTION INTRAVENOUS ONCE
Status: CANCELLED | OUTPATIENT
Start: 2021-04-26 | End: 2021-04-15

## 2021-04-15 RX ORDER — HEPARIN SODIUM 5000 [USP'U]/ML
5000 INJECTION, SOLUTION INTRAVENOUS; SUBCUTANEOUS
Status: CANCELLED | OUTPATIENT
Start: 2021-04-26 | End: 2021-04-27

## 2021-04-15 NOTE — PROGRESS NOTES
BARIATRIC HISTORY AND PHYSICAL - BARIATRIC SURGERY  Paul Issa 48 y o  male MRN: [id number]  Unit/Bed#:  Encounter: [id number]      HPI:  Paul Issa is a 48 y o  male who presents to review her preoperative workup and see if she is a good candidate to undergo a bariatric procedure  Review of Systems   Constitutional: Negative  HENT: Negative  Eyes: Negative  Respiratory: Negative  Cardiovascular: Negative  Gastrointestinal: Negative  Endocrine: Negative  Genitourinary: Negative  Musculoskeletal: Negative  Skin: Negative  Neurological: Negative  Hematological: Negative  Psychiatric/Behavioral: Negative          Historical Information   Past Medical History:   Diagnosis Date    Diabetes mellitus ([address] )     Disease of thyroid gland     Insomnia     Sleep apnea     Mild sleep apnea     Past Surgical History:   Procedure Laterality Date    FL RETROGRADE PYELOGRAM  2020    LASIK      DC CYSTO/URETERO W/LITHOTRIPSY &INDWELL STENT INSRT Right 2020    Procedure: CYSTOSCOPY URETEROSCOPY WITH LITHOTRIPSY HOLMIUM LASER, RETROGRADE PYELOGRAM AND INSERTION STENT URETERAL;  Surgeon: Verner Squires, MD;  [facility];  Service: Urology    ROTATOR CUFF REPAIR Right      Social History   Social History     Substance and Sexual Activity   Alcohol Use Yes    Alcohol/week: 2 0 standard drinks    Types: 2 Glasses of wine per week    Frequency: 2-3 times a week    Drinks per session: 1 or 2    Binge frequency: Less than monthly    Comment: social     Social History     Substance and Sexual Activity   Drug Use No     Social History     Tobacco Use   Smoking Status Former Smoker    Years: 30 00    Types: Cigarettes    Quit date:     Years since quittin 2   Smokeless Tobacco Never Used   Tobacco Comment    quit in      Family History: non-contributory    Meds/Allergies   all medications and allergies reviewed  Allergies   Allergen Reactions  Ibuprofen GI Intolerance    Other Other (See Comments)     Seasonal allergies- pt has congestion       Objective     Current Vitals:   Blood Pressure: 160/90 (04/15/21 1340)  Pulse: 95 (04/15/21 1340)  Temperature: (!) 97 3 °F (36 3 °C) (04/15/21 1340)  Temp Source: Tympanic (04/15/21 1340)  Respirations: 20 (04/15/21 1340)  Height: 5' 9" (175 3 cm) (04/15/21 1340)  Weight - Scale: 121 kg (266 lb 8 oz) (04/15/21 1340)  Body mass index is 39 36 kg/m²  [unfilled]    Invasive Devices     None                 Physical Exam  Constitutional:       Appearance: He is well-developed  HENT:      Head: Normocephalic and atraumatic  Eyes:      Conjunctiva/sclera: Conjunctivae normal    Neck:      Musculoskeletal: Normal range of motion and neck supple  Cardiovascular:      Rate and Rhythm: Normal rate and regular rhythm  Heart sounds: Normal heart sounds  Pulmonary:      Effort: Pulmonary effort is normal       Breath sounds: Normal breath sounds  Abdominal:      General: Bowel sounds are normal       Palpations: Abdomen is soft  Musculoskeletal: Normal range of motion  Skin:     General: Skin is warm and dry  Neurological:      Mental Status: He is alert and oriented to person, place, and time  Deep Tendon Reflexes: Reflexes are normal and symmetric  Lab Results: I have personally reviewed pertinent lab results  Imaging: I have personally reviewed pertinent reports  EKG, Pathology, and Other Studies: I have personally reviewed pertinent reports  Code Status: [unfilled]  Advance Directive and Living Will:      Power of :    POLST:      Assessment/Plan:      The patient presented to review the preoperative workup and see if bariatric surgery is appropriate and indicated following the extensive preoperative workup and the enrollment in our weight loss program    Preoperative workup was complete   Results were reviewed with the patient including the blood work results and the endoscopy findings and the biopsy results  We also reviewed the cardiology evaluation  I believe that the patient will be a good candidate for laparoscopic GABBY-EN-Y GASTRIC BYPASS POSSIBLE PARAESOPHAGEAL HERNIA REPAIR    Patient will need to start the 2 week liquid diet prior to surgery  Risks and benefits explained one more time to the patient  Alternatives to surgery and alternative forms of surgery were also explained  Post-surgical commitment and after care programs were explained  We also discussed that the [employer]amed [employer] robot may be available to us to use on the day of the patient procedure  and that the procedure may be performed robotically  I discussed in details the advantages and disadvantages of this approach including the potential decrease in postoperative pain because of the remote center technology  I also mentioned the lack of strong evidence for  the use of robot in bariatric patients and the potential disadvantage to patients because of the prolonged operative time  Consent was signed  Questions were answered and concerns were addressed  Patient wishes to proceed  As per  [facility] guidelines, I had a discussion with the patient regarding his CODE STATUS in the perioperative period and the patient is level 1 or FULL CODE STATUS

## 2021-04-15 NOTE — PROGRESS NOTES
Weight Management Nutrition Class     Diagnosis: Type 1 DM with insulin pump    Bariatric Surgeon: Dr Gabriel Wang    Surgery: pre op sleeve     Class: Pre Op Class     Topics discussed today include:     Pt attended VIRTUAL pre-op education session  Standardized packet of information for bariatric surgery was sent via email and was reviewed with pt  Importance of lifestyle change and development of regular exercise routine stressed  Pt given the opportunity to ask questions  Ensure pre-surgery drink instructions were given  Questions were answered  Pt verbalized understanding of all information provided  Pt appeared prepared for upcoming surgery  Pt  educated on two-week pre operative liver shrinking diet  Pt understands that the diet needs to be followed for 2 weeks prior to surgery  Handout reviewed  Emphasized the need to drink 80 ounces of fluid per day while on the diet  Reviewed pre-op ERAS drink, post-operative clear liquid, full liquid, and pureed diet, post-operative nutrition rules and facts, and post-operative bariatric multivitamin/mineral recommendations and brand comparison  Contact information provided for any questions/concerns  Patient was able to verbalize basic diet (protein, fluid, vitamin and mineral) recommendations and possible nutrition-related complications   Yes     ** Patient with type 1 DM and BMI below 40  Patient will not drink pre op surgery drinks or follow pre op liver shrinking diet   Discussed with patient and he verbalized understanding

## 2021-04-15 NOTE — H&P (VIEW-ONLY)
BARIATRIC HISTORY AND PHYSICAL - BARIATRIC SURGERY  Charlee Garvin 48 y o  male MRN: [id number]  Unit/Bed#:  Encounter: [id number]      HPI:  Charlee Garvin is a 48 y o  male who presents to review her preoperative workup and see if she is a good candidate to undergo a bariatric procedure  Review of Systems   Constitutional: Negative  HENT: Negative  Eyes: Negative  Respiratory: Negative  Cardiovascular: Negative  Gastrointestinal: Negative  Endocrine: Negative  Genitourinary: Negative  Musculoskeletal: Negative  Skin: Negative  Neurological: Negative  Hematological: Negative  Psychiatric/Behavioral: Negative          Historical Information   Past Medical History:   Diagnosis Date    Diabetes mellitus ([facility] [address] )     Disease of thyroid gland     Insomnia     Sleep apnea     Mild sleep apnea     Past Surgical History:   Procedure Laterality Date    FL RETROGRADE PYELOGRAM  2020    LASIK      MO CYSTO/URETERO W/LITHOTRIPSY &INDWELL STENT INSRT Right 2020    Procedure: CYSTOSCOPY URETEROSCOPY WITH LITHOTRIPSY HOLMIUM LASER, RETROGRADE PYELOGRAM AND INSERTION STENT URETERAL;  Surgeon: Erick Sweet MD;  [facility];  Service: Urology    ROTATOR CUFF REPAIR Right      Social History   Social History     Substance and Sexual Activity   Alcohol Use Yes    Alcohol/week: 2 0 standard drinks    Types: 2 Glasses of wine per week    Frequency: 2-3 times a week    Drinks per session: 1 or 2    Binge frequency: Less than monthly    Comment: social     Social History     Substance and Sexual Activity   Drug Use No     Social History     Tobacco Use   Smoking Status Former Smoker    Years: 30 00    Types: Cigarettes    Quit date:     Years since quittin 2   Smokeless Tobacco Never Used   Tobacco Comment    quit in      Family History: non-contributory    Meds/Allergies   all medications and allergies reviewed  Allergies   Allergen Reactions  Ibuprofen GI Intolerance    Other Other (See Comments)     Seasonal allergies- pt has congestion       Objective     Current Vitals:   Blood Pressure: 160/90 (04/15/21 1340)  Pulse: 95 (04/15/21 1340)  Temperature: (!) 97 3 °F (36 3 °C) (04/15/21 1340)  Temp Source: Tympanic (04/15/21 1340)  Respirations: 20 (04/15/21 1340)  Height: 5' 9" (175 3 cm) (04/15/21 1340)  Weight - Scale: 121 kg (266 lb 8 oz) (04/15/21 1340)  Body mass index is 39 36 kg/m²  [unfilled]    Invasive Devices     None                 Physical Exam  Constitutional:       Appearance: He is well-developed  HENT:      Head: Normocephalic and atraumatic  Eyes:      Conjunctiva/sclera: Conjunctivae normal    Neck:      Musculoskeletal: Normal range of motion and neck supple  Cardiovascular:      Rate and Rhythm: Normal rate and regular rhythm  Heart sounds: Normal heart sounds  Pulmonary:      Effort: Pulmonary effort is normal       Breath sounds: Normal breath sounds  Abdominal:      General: Bowel sounds are normal       Palpations: Abdomen is soft  Musculoskeletal: Normal range of motion  Skin:     General: Skin is warm and dry  Neurological:      Mental Status: He is alert and oriented to person, place, and time  Deep Tendon Reflexes: Reflexes are normal and symmetric  Lab Results: I have personally reviewed pertinent lab results  Imaging: I have personally reviewed pertinent reports  EKG, Pathology, and Other Studies: I have personally reviewed pertinent reports  Code Status: [unfilled]  Advance Directive and Living Will:      Power of :    POLST:      Assessment/Plan:      The patient presented to review the preoperative workup and see if bariatric surgery is appropriate and indicated following the extensive preoperative workup and the enrollment in our weight loss program    Preoperative workup was complete   Results were reviewed with the patient including the blood work results and the endoscopy findings and the biopsy results  We also reviewed the cardiology evaluation  I believe that the patient will be a good candidate for laparoscopic GABBY-EN-Y GASTRIC BYPASS POSSIBLE PARAESOPHAGEAL HERNIA REPAIR    Patient will need to start the 2 week liquid diet prior to surgery  Risks and benefits explained one more time to the patient  Alternatives to surgery and alternative forms of surgery were also explained  Post-surgical commitment and after care programs were explained  We also discussed that the [employer] robot may be available to us to use on the day of the patient procedure  and that the procedure may be performed robotically  I discussed in details the advantages and disadvantages of this approach including the potential decrease in postoperative pain because of the remote center technology  I also mentioned the lack of strong evidence for  the use of robot in bariatric patients and the potential disadvantage to patients because of the prolonged operative time  Consent was signed  Questions were answered and concerns were addressed  Patient wishes to proceed  As per  [facility] guidelines, I had a discussion with the patient regarding his CODE STATUS in the perioperative period and the patient is level 1 or FULL CODE STATUS

## 2021-04-16 RX ORDER — OXYCODONE HYDROCHLORIDE 5 MG/1
5 TABLET ORAL EVERY 4 HOURS PRN
Qty: 10 TABLET | Refills: 0 | Status: SHIPPED | OUTPATIENT
Start: 2021-04-26 | End: 2021-04-19

## 2021-04-16 RX ORDER — OMEPRAZOLE 20 MG/1
20 CAPSULE, DELAYED RELEASE ORAL DAILY
Qty: 30 CAPSULE | Refills: 3 | Status: SHIPPED | OUTPATIENT
Start: 2021-04-16 | End: 2021-04-19

## 2021-04-19 ENCOUNTER — TELEPHONE (OUTPATIENT)
Dept: ADMINISTRATIVE | Facility: OTHER | Age: 56
End: 2021-04-19

## 2021-04-19 ENCOUNTER — OFFICE VISIT (OUTPATIENT)
Dept: ENDOCRINOLOGY | Facility: CLINIC | Age: 56
End: 2021-04-19
Payer: COMMERCIAL

## 2021-04-19 ENCOUNTER — OFFICE VISIT (OUTPATIENT)
Dept: INTERNAL MEDICINE CLINIC | Facility: CLINIC | Age: 56
End: 2021-04-19
Payer: COMMERCIAL

## 2021-04-19 ENCOUNTER — TELEPHONE (OUTPATIENT)
Dept: BARIATRICS | Facility: CLINIC | Age: 56
End: 2021-04-19

## 2021-04-19 VITALS
DIASTOLIC BLOOD PRESSURE: 90 MMHG | WEIGHT: 270 LBS | TEMPERATURE: 96.8 F | SYSTOLIC BLOOD PRESSURE: 140 MMHG | BODY MASS INDEX: 39.99 KG/M2 | HEART RATE: 80 BPM | HEIGHT: 69 IN

## 2021-04-19 VITALS
HEART RATE: 80 BPM | RESPIRATION RATE: 20 BRPM | HEIGHT: 69 IN | SYSTOLIC BLOOD PRESSURE: 142 MMHG | OXYGEN SATURATION: 97 % | TEMPERATURE: 97.5 F | BODY MASS INDEX: 40.26 KG/M2 | DIASTOLIC BLOOD PRESSURE: 90 MMHG | WEIGHT: 271.8 LBS

## 2021-04-19 DIAGNOSIS — G47.00 INSOMNIA, UNSPECIFIED TYPE: ICD-10-CM

## 2021-04-19 DIAGNOSIS — K21.9 GASTROESOPHAGEAL REFLUX DISEASE WITHOUT ESOPHAGITIS: ICD-10-CM

## 2021-04-19 DIAGNOSIS — G47.33 OSA (OBSTRUCTIVE SLEEP APNEA): ICD-10-CM

## 2021-04-19 DIAGNOSIS — R03.0 ELEVATED BP WITHOUT DIAGNOSIS OF HYPERTENSION: ICD-10-CM

## 2021-04-19 DIAGNOSIS — E10.40 TYPE 1 DIABETES MELLITUS WITH DIABETIC NEUROPATHY (HCC): ICD-10-CM

## 2021-04-19 DIAGNOSIS — E10.65 TYPE 1 DIABETES MELLITUS WITH HYPERGLYCEMIA, WITH LONG-TERM CURRENT USE OF INSULIN (HCC): Primary | ICD-10-CM

## 2021-04-19 DIAGNOSIS — E03.9 ACQUIRED HYPOTHYROIDISM: ICD-10-CM

## 2021-04-19 DIAGNOSIS — J30.2 SEASONAL ALLERGIES: ICD-10-CM

## 2021-04-19 DIAGNOSIS — E78.2 MIXED HYPERLIPIDEMIA: ICD-10-CM

## 2021-04-19 DIAGNOSIS — D50.9 IRON DEFICIENCY ANEMIA, UNSPECIFIED IRON DEFICIENCY ANEMIA TYPE: ICD-10-CM

## 2021-04-19 DIAGNOSIS — E55.9 VITAMIN D DEFICIENCY: ICD-10-CM

## 2021-04-19 DIAGNOSIS — J30.1 SEASONAL ALLERGIC RHINITIS DUE TO POLLEN: ICD-10-CM

## 2021-04-19 PROCEDURE — 1036F TOBACCO NON-USER: CPT | Performed by: PHYSICIAN ASSISTANT

## 2021-04-19 PROCEDURE — 99214 OFFICE O/P EST MOD 30 MIN: CPT | Performed by: PHYSICIAN ASSISTANT

## 2021-04-19 PROCEDURE — 99214 OFFICE O/P EST MOD 30 MIN: CPT | Performed by: NURSE PRACTITIONER

## 2021-04-19 PROCEDURE — 95251 CONT GLUC MNTR ANALYSIS I&R: CPT | Performed by: PHYSICIAN ASSISTANT

## 2021-04-19 PROCEDURE — 3008F BODY MASS INDEX DOCD: CPT | Performed by: PHYSICIAN ASSISTANT

## 2021-04-19 RX ORDER — PSEUDOEPHEDRINE HYDROCHLORIDE 30 MG/1
60 TABLET ORAL EVERY 4 HOURS PRN
Qty: 90 TABLET | Refills: 1 | Status: SHIPPED | OUTPATIENT
Start: 2021-04-19 | End: 2021-05-06

## 2021-04-19 RX ORDER — LISINOPRIL 2.5 MG/1
2.5 TABLET ORAL DAILY
Qty: 90 TABLET | Refills: 1 | Status: SHIPPED | OUTPATIENT
Start: 2021-04-19 | End: 2021-04-27 | Stop reason: HOSPADM

## 2021-04-19 RX ORDER — ZOLPIDEM TARTRATE 10 MG/1
10 TABLET ORAL
Qty: 30 TABLET | Refills: 0 | Status: SHIPPED | OUTPATIENT
Start: 2021-04-19 | End: 2021-05-18 | Stop reason: SDUPTHER

## 2021-04-19 RX ORDER — GABAPENTIN 300 MG/1
300 CAPSULE ORAL
Qty: 90 CAPSULE | Refills: 1 | Status: SHIPPED | OUTPATIENT
Start: 2021-04-19 | End: 2022-01-17 | Stop reason: ALTCHOICE

## 2021-04-19 RX ORDER — LORATADINE 10 MG/1
10 TABLET ORAL DAILY
Qty: 90 TABLET | Refills: 1 | Status: SHIPPED | OUTPATIENT
Start: 2021-04-19

## 2021-04-19 NOTE — PROGRESS NOTES
Patient Progress Note      CC: DM      Referring Provider  Winsome Jeffers Fiona, [address],  [address]     History of Present Illness:   Fidel Doan is a 48 y o  male with a history of type 1 diabetes with long term use of insulin  Diabetes course has been stable  Complications of DM: none reported  Denies recent illness or hospitalizations  Denies recent severe hypoglycemic or severe hyperglycemic episodes  Denies any issues with his current regimen  Home glucose monitoring: are performed regularly, has Dexcom sensor  Average glucose 143 mg/dL with standard deviation of 35 mg/dL  In range 76%, above range 24%, below range <2%     Current regimen: Metformin 1000 mg BID, Jardiance 25 mg daily, and Humalog via pump  compliant most of the time, denies any side effects from medications  Patient is on a TSlim pump  He denies any malfunctioning of the pump  Current Insulin pump settings:  Basal rate:  12:00 a m  = 2 750 units/hour  4:00 a m  = 2 650 units/hour  6:00 a m  = 2 350 units/hour  8:00 a m  2 300 units/hour  12:00 p m  = 2 400 units/hour  6:00 p m  = 2 400 units/hour  9:00 p m  = 2 800 units/hour  Insulin to carb ratio:  12:00 a m  = 3  4:00 a m  = 3  6:00 a m  = 3  8:00 a m  = 3  12:00 p m  = 3  6:00 p m  = 2 8  9:00 p m  = 3  Insulin sensitivity factor:  12:00 a m  = 25  4:00 a m  = 25  6:00 a m  = 15  8:00 a m  = 15  12:00 p m  = 15  6:00 p m  = 15  9:00 p m  = 30  BG target:  115 mg/dL      Discussed with patient in case of malfunctioning of the pump to use basal and bolus therapy as backup which is prescribed to the patient  Also notified patient to call clinic if any issues        Hypoglycemic episodes: Yes, rare  H/o of hypoglycemia causing hospitalization or Intervention such as glucagon injection  or ambulance call :  No  Hypoglycemia symptoms: sweating but does not always experience symptoms  Treatment of hypoglycemia: discussed treatment Medic alert tag: recommended: Yes     Diabetes education: Not recently   Diet: 3 meals per day, 1 snack per day  Timing of meals is variable  Diabetic diet compliance:  is complaint with bolus   Activity: Daily activity is predictable: Yes  No routine exercise  Ophthamology: sees routinely; Dec 2020 (Dr Gia Garay), no retinopathy  Podiatry: Nov 2020     Not on ACE inhibitor/ARB  Has hyperlipidemia: on Crestor - tolerating well, no myalgias  compliant most of the time, denies any side effects from medications  Thyroid disorders: Hypothyroidism  Patient is on Synthroid 175 mcg daily on empty stomach 1 hour before breakfast  Thyroid function tests in normal range    History of pancreatitis: No     Vitamin D deficiency: is currently taking vitamin D3 2000 IU dialy    Patient Active Problem List   Diagnosis    Chronic back pain    Collagenous colitis    GERD (gastroesophageal reflux disease)    Hyperlipidemia    Hypothyroidism    Insomnia    Iron deficiency anemia    BMI 40 0-44 9, adult ([facility])    Type 1 diabetes mellitus with hyperglycemia, with long-term current use of insulin ([facility])    Vitamin D deficiency    Seasonal allergic rhinitis due to pollen    Edema    Snoring    Intrinsic eczema    FARZANA (obstructive sleep apnea)    Renal calculus, bilateral    Leukocytosis (leucocytosis)    LILA (acute kidney injury) ([address]    Erectile dysfunction    Insulin pump in place    Elevated BP without diagnosis of hypertension      Past Medical History:   Diagnosis Date    Diabetes mellitus ([address] )     Disease of thyroid gland     Insomnia     Sleep apnea     Mild sleep apnea      Past Surgical History:   Procedure Laterality Date    FL RETROGRADE PYELOGRAM  4/24/2020    LASIK      ID CYSTO/URETERO W/LITHOTRIPSY &INDWELL STENT INSRT Right 4/24/2020    Procedure: CYSTOSCOPY URETEROSCOPY WITH LITHOTRIPSY HOLMIUM LASER, RETROGRADE PYELOGRAM AND INSERTION STENT URETERAL;  Surgeon: Frantz Timmons MD; [facility];  Service: Urology    ROTATOR CUFF REPAIR Right       Family History   Problem Relation Age of Onset    Thyroid disease unspecified Mother     Osteoporosis Mother     Hypertension Father     Diabetes type II Sister     Hypertension Sister     Hyperlipidemia Sister     Thyroid disease unspecified Sister     Diabetes type II Brother     Hypertension Brother     Hyperlipidemia Brother      Social History     Tobacco Use    Smoking status: Former Smoker     Years: 30 00     Types: Cigarettes     Quit date:      Years since quittin 3    Smokeless tobacco: Never Used    Tobacco comment: quit in    Substance Use Topics    Alcohol use:  Yes     Alcohol/week: 2 0 standard drinks     Types: 2 Glasses of wine per week     Frequency: 2-3 times a week     Drinks per session: 1 or 2     Binge frequency: Less than monthly     Comment: social     Allergies   Allergen Reactions    Ibuprofen GI Intolerance    Other Other (See Comments)     Seasonal allergies- pt has congestion         Current Outpatient Medications:     acetone, urine, test (Ketostix) strip, Check ketones if blood sugars > 250, more than twice or in case of nausea or vomiting and abdominal pains, Disp: 100 each, Rfl: 0    Ascorbic Acid (VITAMIN C PO), Take 1 tablet by mouth daily, Disp: , Rfl:     azelastine (ASTELIN) 0 1 % nasal spray, 1 spray into each nostril 2 (two) times a day, Disp: 90 mL, Rfl: 1    Blood Glucose Monitoring Suppl (ONE TOUCH ULTRA 2) w/Device KIT, by Does not apply route, Disp: , Rfl:     Cholecalciferol (VITAMIN D3) 50 MCG ( UT) capsule, Take 2 capsules daily, Disp: , Rfl: 0    Continuous Blood Gluc  (DEXCOM G6 ) KIYA, USE AS DIRECTED, Disp: 1 Device, Rfl: 0    Continuous Blood Gluc Sensor (DEXCOM G6 SENSOR) MISC, 1 Device by Does not apply route continuous Use 1 sensor every 10 days, Disp: 12 each, Rfl: 1    Continuous Blood Gluc Transmit (DEXCOM G6 TRANSMITTER) MISC, USE 1 DEVICE WITH SENSOR, Disp: 1 each, Rfl: 3    esomeprazole (NexIUM) 40 MG capsule, Take 1 capsule (40 mg total) by mouth daily, Disp: 90 capsule, Rfl: 1    gabapentin (NEURONTIN) 300 mg capsule, Take 1 capsule (300 mg total) by mouth daily at bedtime, Disp: 90 capsule, Rfl: 1    Glucagon (BAQSIMI TWO PACK) 3 MG/DOSE POWD, Use one dose as needed in case of severe hypoglycemia ( nasal spray) (Patient taking differently: Use one dose as needed in case of severe hypoglycemia), Disp: 1 each, Rfl: 0    glucagon (GLUCAGON EMERGENCY) 1 MG injection, Inject 1 mg under the skin once as needed for low blood sugar for up to 1 dose, Disp: 1 kit, Rfl: 1    insulin aspart (NovoLOG) 100 units/mL injection, Use up to 140 units per day via insulin pump, Disp: 110 mL, Rfl: 2    insulin glargine (Basaglar KwikPen) 100 units/mL injection pen, Inject 60 units in case of pump failure ( incase of emergency), Disp: 15 pen, Rfl: 1    Insulin Pen Needle (BD PEN NEEDLE LYRIC U/F) 32G X 4 MM MISC, by Does not apply route daily Use once daily, Disp: 100 each, Rfl: 2    Jardiance 25 MG TABS, take 1 tablet by mouth every morning, Disp: 30 tablet, Rfl: 3    lisinopril (ZESTRIL) 2 5 mg tablet, Take 1 tablet (2 5 mg total) by mouth daily, Disp: 90 tablet, Rfl: 1    loratadine (CLARITIN) 10 mg tablet, Take 1 tablet (10 mg total) by mouth daily, Disp: 90 tablet, Rfl: 1    metFORMIN (GLUCOPHAGE) 1000 MG tablet, Take 1 tablet (1,000 mg total) by mouth every 12 (twelve) hours With food, Disp: 180 tablet, Rfl: 1    MULTIPLE VITAMIN PO, Take by mouth daily  , Disp: , Rfl:     naproxen (NAPROSYN) 500 mg tablet, Take 1 tablet (500 mg total) by mouth 2 (two) times a day with meals (Patient taking differently: Take 500 mg by mouth as needed ), Disp: 60 tablet, Rfl: 2    pseudoephedrine (SUDAFED) 30 mg tablet, Take 2 tablets (60 mg total) by mouth every 4 (four) hours as needed for congestion, Disp: 90 tablet, Rfl: 1    rosuvastatin (CRESTOR) 10 MG tablet, take 1 tablet by mouth daily, Disp: 90 tablet, Rfl: 0    Synthroid 175 MCG tablet, take 1 tablet by mouth daily, Disp: 90 tablet, Rfl: 1    vitamin B-12 (VITAMIN B-12) 1,000 mcg tablet, Take by mouth daily, Disp: , Rfl:     zolpidem (AMBIEN) 10 mg tablet, Take 1 tablet (10 mg total) by mouth daily at bedtime as needed for sleep, Disp: 30 tablet, Rfl: 0  Review of Systems   Constitutional: Negative for activity change, appetite change, fatigue and unexpected weight change  HENT: Negative for trouble swallowing  Eyes: Negative for visual disturbance  Respiratory: Negative for shortness of breath  Cardiovascular: Negative for chest pain and palpitations  Gastrointestinal: Negative for constipation and diarrhea  Endocrine: Negative for polydipsia and polyuria  Musculoskeletal: Negative  Skin: Negative for wound  Neurological: Positive for numbness  Psychiatric/Behavioral: Negative  Physical Exam:  Body mass index is 39 87 kg/m²  /90   Pulse 80   Temp (!) 96 8 °F (36 °C) (Tympanic)   Ht 5' 9" (1 753 m)   Wt 122 kg (270 lb)   BMI 39 87 kg/m²    Wt Readings from Last 3 Encounters:   04/19/21 122 kg (270 lb)   04/19/21 123 kg (271 lb 12 8 oz)   04/15/21 121 kg (266 lb 8 oz)       Physical Exam  Vitals signs and nursing note reviewed  Constitutional:       Appearance: He is well-developed  HENT:      Head: Normocephalic  Eyes:      General: No scleral icterus  Pupils: Pupils are equal, round, and reactive to light  Neck:      Musculoskeletal: Neck supple  Thyroid: No thyromegaly  Cardiovascular:      Rate and Rhythm: Normal rate and regular rhythm  Pulses:           Radial pulses are 2+ on the right side and 2+ on the left side  Heart sounds: No murmur  Pulmonary:      Effort: Pulmonary effort is normal  No respiratory distress  Breath sounds: Normal breath sounds  No wheezing  Skin:     General: Skin is warm and dry  Neurological:      Mental Status: He is alert  Patient's shoes and socks were not removed  Labs:   Component      Latest Ref Rng & Units 3/4/2021 3/15/2021   Sodium      136 - 145 mmol/L  142   Potassium      3 5 - 5 3 mmol/L  4 0   Chloride      100 - 108 mmol/L  103   CO2      21 - 32 mmol/L  29   Anion Gap      4 - 13 mmol/L  10   BUN      5 - 25 mg/dL  19   Creatinine      0 60 - 1 30 mg/dL  1 19   GLUCOSE FASTING      65 - 99 mg/dL  105 (H)   Calcium      8 3 - 10 1 mg/dL  9 3   AST      5 - 45 U/L  28   ALT      12 - 78 U/L  31   Alkaline Phosphatase      46 - 116 U/L  95   Total Protein      6 4 - 8 2 g/dL  7 9   Albumin      3 5 - 5 0 g/dL  4 0   TOTAL BILIRUBIN      0 20 - 1 00 mg/dL  0 37   eGFR      ml/min/1 73sq m  69   WBC      4 31 - 10 16 Thousand/uL  7 73   Red Blood Cell Count      3 88 - 5 62 Million/uL  5 03   Hemoglobin      12 0 - 17 0 g/dL  15 4   HCT      36 5 - 49 3 %  47 6   MCV      82 - 98 fL  95   MCH      26 8 - 34 3 pg  30 6   MCHC      31 4 - 37 4 g/dL  32 4   RDW      11 6 - 15 1 %  12 6   Platelet Count      853 - 390 Thousands/uL  273   MPV      8 9 - 12 7 fL  9 3   Cholesterol      50 - 200 mg/dL  187   Triglycerides      <=150 mg/dL  209 (H)   HDL      >=40 mg/dL  41   LDL Calculated      0 - 100 mg/dL  104 (H)   Non-HDL Cholesterol      mg/dl  146   EXT Creatinine Urine       58 7    MICROALBUM ,U,RANDOM           MICROALBUMIN/CREATININE RATIO           Hemoglobin A1C       6 7          Plan:    Diagnoses and all orders for this visit:    Type 1 diabetes mellitus with hyperglycemia, with long-term current use of insulin (HCC)  HGA1C 6 7%  Improved  Treatment regimen: continue current treatment  Is going for gastric bypass surgery next week  Continue to use control IQ  Metformin and Jardiance will be stopped prior to surgery  Discussed intensive insulin regimen does increase risk for hypoglycemia   Episodes of hypoglycemia can lead to permanent disability and death   Discussed risks/complications associated with uncontrolled diabetes  Advised to adhere to diabetic diet, and recommended staying active/exercising routinely as tolerated  Keep carbohydrates consistent to limit blood glucose fluctuations  Advised to call if blood sugars less than 70 mg/dl or over 300 mg/dl  Check blood glucose 3+ times a day  Discussed symptoms and treatment of hypoglycemia  Discussed use of CGM to collect additional blood glucose data to reveal trends and patterns that can be used to optimize treatment plan  Recommended routine follow-up with podiatry and ophthalmology  Send log in 1-2 weeks  Ordered blood work to complete prior to next visit  Acquired hypothyroidism  TSH 1 99 and free T4 1 4  Continue current dose of levothyroxine     Mixed hyperlipidemia    Continue statin therapy        Discussed with the patient diagnosis and treatment and all questions fully answered  He will call me if any problems arise  Counseled patient on diagnostic results, prognosis, risk and benefit of treatment options, instruction for management, importance of treatment compliance, risk factor reduction and impressions        Alison Charles PA-C

## 2021-04-19 NOTE — ASSESSMENT & PLAN NOTE
Patient will continue on Nexium  You may use OTC tums as needed  Recommend small frequent meals throughout the day  Avoid aggravating foods - spicy foods, acidic foods - such as tomato and citrus  Avoid alcohol  Do not lay down for at least 30 mins after eating  Patient currently following GI

## 2021-04-19 NOTE — ASSESSMENT & PLAN NOTE
Will start lisinopirl  Continue to monitor blood pressure at home  Goal BP is < 130/80  Contact our office for consistent elevations  Recommend low sodium diet  Exercise 30 minutes 5 times a week as tolerated    Recommend yearly eye exam

## 2021-04-19 NOTE — TELEPHONE ENCOUNTER
----- Message from Jennifer Johns MA sent at 4/19/2021 10:04 AM EDT -----  Regarding: dm eye exam  04/19/21 10:05 AM    Hello, our patient Elizabeth Benitez has had Diabetic Eye Exam completed/performed  Please assist in updating the patient chart by making an External outreach to Dr Danette Olivera facility located at [address] [place] [address]    The date of service is exact date unknown    Thank you,  Jennifer Johns, [facility]

## 2021-04-19 NOTE — PATIENT INSTRUCTIONS
Hypoglycemia instructions   Carmina Sen  4/19/2021  [id number]    Low Blood Sugar    Steps to treat low blood sugar  1  Test blood sugar if you have symptoms of low blood sugar:   Low Blood Sugar Symptoms:  o Sweaty  o Dizzy  o Rapid heartbeat  o Shaky  o Bad mood  o Hungry      2  Treat blood sugar less than 70 with 15 grams of fast-acting carbohydrate:   Examples of 15 grams Fast-Acting Carbohydrate:  o 4 oz juice  o 4 oz regular soda  o 3-4 glucose tablets (chew)  o 3-4 hard candies (chew)          3  Wait 15 minutes and test your blood sugar again     4   If blood sugar is less than 100, repeat steps 2-3     5  When your blood sugar is 100 or more, eat a snack if it will be longer than one hour until your next meal  The snack should be 15 grams of carbohydrate and a protein:   Examples of snacks:  o ½ sandwich  o 6 crackers with cheese  o Piece of fruit with cheese or peanut butter  o 6 crackers with peanut butter
No

## 2021-04-19 NOTE — ASSESSMENT & PLAN NOTE
Lab Results   Component Value Date    HGBA1C 6 7 03/04/2021     Patient currently follows endocrine  Patient has been experiencing lower extremity neuropathy, will increase gabapentin

## 2021-04-19 NOTE — ASSESSMENT & PLAN NOTE
Patient will continue pravastatin  Recommend healthy lifestyle choices for your cholesterol  Low fat/low cholesterol diet  Limit/avoid red meat  Eat more lean meat - chicken breast, ground turkey, fish  Exercise 30 mins at least 5 times a week as tolerated

## 2021-04-19 NOTE — TELEPHONE ENCOUNTER
Pre-op call was made to patient (left message)  to follow up on how they are doing and to remind them to continue with all medical and dietary directions that were given at pre-op class regarding liver shrinking diet and hydration  They were encouraged to purchase all vitamins and protein shakes for post op use as well as to begin Miralax three days prior to surgery as directed in Section 6 of their manual   They were reminded of the Ensure Pre-surgery drinks protocol and to bring their completed yellow form with them to surgery as well as their CPAP-BiPAP machine if they use one  Lastly, they were informed that they would be weighed the morning of surgery and to give the office a call if they had any further questions or concerns

## 2021-04-19 NOTE — TELEPHONE ENCOUNTER
Upon review of the In Basket request and the patient's chart, initial outreach has been made via fax, please see Contacts section for details       Thank you  Tracy Mckeon MA

## 2021-04-19 NOTE — LETTER
Diabetic Eye Exam Form    Date Requested: 21  Patient: Charna Boas  Patient : [id number]   Referring Provider: ANN Noe    Dilated Retinal Exam, Optomap-Iris Exam, or Fundus Photography Done         Yes ([place] one above)         No     Date of Diabetic Eye Exam ______________________________  Left Eye      Exam did show retinopathy    Exam did not show retinopathy         Mild       Moderate       None       Proliferative       Severe     Right Eye     Exam did show retinopathy    Exam did not show retinopathy         Mild       Moderate       None       Proliferative       Severe     Comments __________________________________________________________    Practice Providing Exam ______________________________________________    Exam Performed By (print name) _______________________________________      Provider Signature ___________________________________________________      These reports are needed for  compliance    Please fax this completed form and a copy of the Diabetic Eye Exam report to our office located at [place] [address] as soon as possible to [phone] [facility]: Phone [phone]    We thank you for your assistance in treating our mutual patient

## 2021-04-19 NOTE — PROGRESS NOTES
Assessment/Plan:    GERD (gastroesophageal reflux disease)  Patient will continue on Nexium  You may use OTC tums as needed  Recommend small frequent meals throughout the day  Avoid aggravating foods - spicy foods, acidic foods - such as tomato and citrus  Avoid alcohol  Do not lay down for at least 30 mins after eating  Patient currently following GI  Hypothyroidism   Patient currently follows endocrine patient will continue with levothyroxine  Type 1 diabetes mellitus with hyperglycemia, with long-term current use of insulin [facility])    Lab Results   Component Value Date    HGBA1C 6 7 03/04/2021     Patient currently follows endocrine  Patient has been experiencing lower extremity neuropathy, will increase gabapentin  Seasonal allergic rhinitis due to pollen  Continue with antihistamine and nasal spray  Hyperlipidemia    Patient will continue pravastatin  Recommend healthy lifestyle choices for your cholesterol  Low fat/low cholesterol diet  Limit/avoid red meat  Eat more lean meat - chicken breast, ground turkey, fish  Exercise 30 mins at least 5 times a week as tolerated  Insomnia    Patient currently well controlled on Ambien, patient does not take this medication on a daily basis  Will give refill while in office today  Iron deficiency anemia    Patient currently states he takes iron supplementation, most recent CBC within normal limits, will continue to monitor  BMI 40 0-44 9, adult [facility])  Patient has planned bypass surgery  Vitamin D deficiency    Vitamin-D monitored by Endocrine, will encourage supplementation  Elevated BP without diagnosis of hypertension  Will start lisinopirl  Continue to monitor blood pressure at home  Goal BP is < 130/80  Contact our office for consistent elevations  Recommend low sodium diet  Exercise 30 minutes 5 times a week as tolerated  Recommend yearly eye exam         BMI Counseling: Body mass index is 40 14 kg/m²   The BMI is above normal  Nutrition recommendations include decreasing portion sizes, encouraging healthy choices of fruits and vegetables, decreasing fast food intake, consuming healthier snacks, limiting drinks that contain sugar, moderation in carbohydrate intake, increasing intake of lean protein, reducing intake of saturated and trans fat and reducing intake of cholesterol  Exercise recommendations include moderate physical activity 150 minutes/week and exercising 3-5 times per week  Diagnoses and all orders for this visit:    Type 1 diabetes mellitus with hyperglycemia, with long-term current use of insulin ([facility])    Insomnia, unspecified type  -     zolpidem (AMBIEN) 10 mg tablet; Take 1 tablet (10 mg total) by mouth daily at bedtime as needed for sleep    Seasonal allergies  -     loratadine (CLARITIN) 10 mg tablet; Take 1 tablet (10 mg total) by mouth daily  -     pseudoephedrine (SUDAFED) 30 mg tablet; Take 2 tablets (60 mg total) by mouth every 4 (four) hours as needed for congestion    Elevated BP without diagnosis of hypertension  -     lisinopril (ZESTRIL) 2 5 mg tablet; Take 1 tablet (2 5 mg total) by mouth daily    Type 1 diabetes mellitus with diabetic neuropathy ([facility])  -     gabapentin (NEURONTIN) 300 mg capsule; Take 1 capsule (300 mg total) by mouth daily at bedtime    Gastroesophageal reflux disease without esophagitis    Acquired hypothyroidism    FARZANA (obstructive sleep apnea)    Seasonal allergic rhinitis due to pollen    Mixed hyperlipidemia    Iron deficiency anemia, unspecified iron deficiency anemia type    BMI 40 0-44 9, adult ([facility])    Vitamin D deficiency          Subjective:      Patient ID: Courtney Trimble is a 48 y o  male  Patient presents today to follow-up on chronic conditions, patient currently follows endocrine for his type 1 diabetes, hyperlipidemia vitamin-D deficiency and hypothyroidism        Patient reports neuropathy at night, he states it "burns at night" to bilateral lower extremities, was started on gabapentin at last office visit with no relief  Patient has planned bypass surgery    Hyperlipidemia-The 10-year ASCVD risk score (Hong Spencer et al , 2013) is: 12 6%    Values used to calculate the score:      Age: 48 years      Sex: Male      Is Non- : No      Diabetic: Yes      Tobacco smoker: No      Systolic Blood Pressure: 187 mmHg      Is BP treated: No      HDL Cholesterol: 41 mg/dL      Total Cholesterol: 187 mg/dL        Colonoscopy-2016  Eye Doctor-yearly, Dr Glen Esteban, report gets [facility]  Dentist-every 6 months  Podiatrist-not regually         Diabetes  He presents for his initial diabetic visit  He has type 1 diabetes mellitus  His disease course has been improving  There are no hypoglycemic associated symptoms  Pertinent negatives for hypoglycemia include no dizziness or headaches  Pertinent negatives for diabetes include no chest pain, no polydipsia, no polyphagia, no polyuria and no weakness  There are no hypoglycemic complications  (Patients sugars in the 140s) Eye exam is current  Hyperlipidemia  This is a chronic problem  The current episode started more than 1 year ago  Pertinent negatives include no chest pain or shortness of breath  The following portions of the patient's history were reviewed and updated as appropriate: allergies, current medications, past family history, past medical history, past social history, past surgical history and problem list     Review of Systems   Constitutional: Negative for activity change, appetite change, chills, diaphoresis and fever  HENT: Negative for congestion, ear discharge, ear pain, postnasal drip, rhinorrhea, sinus pressure, sinus pain and sore throat  Eyes: Negative for pain, discharge, itching and visual disturbance  Respiratory: Negative for cough, chest tightness, shortness of breath and wheezing      Cardiovascular: Negative for chest pain, palpitations and leg swelling  Gastrointestinal: Negative for abdominal pain, constipation, diarrhea, nausea and vomiting  Endocrine: Negative for polydipsia, polyphagia and polyuria  Genitourinary: Negative for difficulty urinating, dysuria and urgency  Musculoskeletal: Negative for arthralgias, back pain and neck pain  Skin: Negative for rash and wound  Neurological: Negative for dizziness, weakness, numbness and headaches           Past Medical History:   Diagnosis Date    Diabetes mellitus ([facility] [address] )     Disease of thyroid gland     Insomnia     Sleep apnea     Mild sleep apnea         Current Outpatient Medications:     acetone, urine, test (Ketostix) strip, Check ketones if blood sugars > 250, more than twice or in case of nausea or vomiting and abdominal pains, Disp: 100 each, Rfl: 0    Ascorbic Acid (VITAMIN C PO), Take 1 tablet by mouth daily, Disp: , Rfl:     azelastine (ASTELIN) 0 1 % nasal spray, 1 spray into each nostril 2 (two) times a day, Disp: 90 mL, Rfl: 1    Blood Glucose Monitoring Suppl (ONE TOUCH ULTRA 2) w/Device KIT, by Does not apply route, Disp: , Rfl:     Cholecalciferol (VITAMIN D3) 50 MCG (2000 UT) capsule, Take 2 capsules daily, Disp: , Rfl: 0    Continuous Blood Gluc  (DEXCOM G6 ) KIYA, USE AS DIRECTED, Disp: 1 Device, Rfl: 0    Continuous Blood Gluc Sensor (DEXCOM G6 SENSOR) MISC, 1 Device by Does not apply route continuous Use 1 sensor every 10 days, Disp: 12 each, Rfl: 1    Continuous Blood Gluc Transmit (DEXCOM G6 TRANSMITTER) MISC, USE 1 DEVICE WITH SENSOR, Disp: 1 each, Rfl: 3    esomeprazole (NexIUM) 40 MG capsule, Take 1 capsule (40 mg total) by mouth daily, Disp: 90 capsule, Rfl: 1    gabapentin (NEURONTIN) 300 mg capsule, Take 1 capsule (300 mg total) by mouth daily at bedtime, Disp: 90 capsule, Rfl: 1    Glucagon (BAQSIMI TWO PACK) 3 MG/DOSE POWD, Use one dose as needed in case of severe hypoglycemia ( nasal spray) (Patient taking differently: Use one dose as needed in case of severe hypoglycemia), Disp: 1 each, Rfl: 0    glucagon (GLUCAGON EMERGENCY) 1 MG injection, Inject 1 mg under the skin once as needed for low blood sugar for up to 1 dose, Disp: 1 kit, Rfl: 1    insulin aspart (NovoLOG) 100 units/mL injection, Use up to 140 units per day via insulin pump, Disp: 110 mL, Rfl: 2    insulin glargine (Basaglar KwikPen) 100 units/mL injection pen, Inject 60 units in case of pump failure ( incase of emergency), Disp: 15 pen, Rfl: 1    Insulin Pen Needle (BD PEN NEEDLE LYRIC U/F) 32G X 4 MM MISC, by Does not apply route daily Use once daily, Disp: 100 each, Rfl: 2    Jardiance 25 MG TABS, take 1 tablet by mouth every morning, Disp: 30 tablet, Rfl: 3    metFORMIN (GLUCOPHAGE) 1000 MG tablet, Take 1 tablet (1,000 mg total) by mouth every 12 (twelve) hours With food, Disp: 180 tablet, Rfl: 1    MULTIPLE VITAMIN PO, Take by mouth daily  , Disp: , Rfl:     naproxen (NAPROSYN) 500 mg tablet, Take 1 tablet (500 mg total) by mouth 2 (two) times a day with meals (Patient taking differently: Take 500 mg by mouth as needed ), Disp: 60 tablet, Rfl: 2    rosuvastatin (CRESTOR) 10 MG tablet, take 1 tablet by mouth daily, Disp: 90 tablet, Rfl: 0    Synthroid 175 MCG tablet, take 1 tablet by mouth daily, Disp: 90 tablet, Rfl: 1    vitamin B-12 (VITAMIN B-12) 1,000 mcg tablet, Take by mouth daily, Disp: , Rfl:     zolpidem (AMBIEN) 10 mg tablet, Take 1 tablet (10 mg total) by mouth daily at bedtime as needed for sleep, Disp: 30 tablet, Rfl: 0    lisinopril (ZESTRIL) 2 5 mg tablet, Take 1 tablet (2 5 mg total) by mouth daily, Disp: 90 tablet, Rfl: 1    loratadine (CLARITIN) 10 mg tablet, Take 1 tablet (10 mg total) by mouth daily, Disp: 90 tablet, Rfl: 1    pseudoephedrine (SUDAFED) 30 mg tablet, Take 2 tablets (60 mg total) by mouth every 4 (four) hours as needed for congestion, Disp: 90 tablet, Rfl: 1    Allergies   Allergen Reactions    Ibuprofen GI Intolerance    Other Other (See Comments)     Seasonal allergies- pt has congestion       Social History   Past Surgical History:   Procedure Laterality Date    FL RETROGRADE PYELOGRAM  4/24/2020    LASIK      RI CYSTO/URETERO W/LITHOTRIPSY &INDWELL STENT INSRT Right 4/24/2020    Procedure: CYSTOSCOPY URETEROSCOPY WITH LITHOTRIPSY HOLMIUM LASER, RETROGRADE PYELOGRAM AND INSERTION STENT URETERAL;  Surgeon: Hannah Patino MD;  [facility]: [facility];  Service: Urology    ROTATOR CUFF REPAIR Right      Family History   Problem Relation Age of Onset    Thyroid disease unspecified Mother     Osteoporosis Mother     Hypertension Father     Diabetes type II Sister     Hypertension Sister     Hyperlipidemia Sister     Thyroid disease unspecified Sister     Diabetes type II Brother     Hypertension Brother     Hyperlipidemia Brother        Objective:  /90 (BP Location: Left arm, Patient Position: Sitting, Cuff Size: Large)   Pulse 80   Temp 97 5 °F (36 4 °C) (Tympanic)   Resp 20   Ht 5' 9" (1 753 m)   Wt 123 kg (271 lb 12 8 oz)   SpO2 97%   BMI 40 14 kg/m²     Recent Results (from the past 1344 hour(s))   Fingerstick Glucose (POCT)    Collection Time: 03/03/21 10:53 AM   Result Value Ref Range    POC Glucose 135 65 - 140 mg/dl   Check fingerstick glucose on all diabetic patients  Call if greater than 180      Collection Time: 03/03/21 10:56 AM   Result Value Ref Range    POC Glucose 135 65 - 140 mg/dl   Tissue Exam    Collection Time: 03/03/21 11:34 AM   Result Value Ref Range    Case Report       Surgical Pathology Report                         Case: [id number]                                   Authorizing Provider:  Lito Tobin MD         Collected:           03/03/2021 1134              Ordering Location:     [facility]        Received:            03/03/2021 [address] Endoscopy                                                          Pathologist: [id number] Thai Bruno MD                                                        Specimen:    Stomach, r/o h pylori                                                                      Final Diagnosis       A  Stomach, biopsy:  - Benign gastric antral mucosa with chronic inactive gastritis  - No Helicobacter pylori organisms are identified with routine H&E stain  Additional Information       All reported additional testing was performed with appropriately reactive controls  These tests were developed and their performance characteristics determined by [facility] or appropriate performing facility, though some tests may be performed on tissues which have not been validated for performance characteristics (such as staining performed on alcohol exposed cell blocks and decalcified tissues)  Results should be interpreted with caution and in the context of the patients clinical condition  These tests may not be cleared or approved by the [place]  Food and Drug Administration, though the FDA has determined that such clearance or approval is not necessary  These tests are used for clinical purposes and they should not be regarded as investigational or for research  This laboratory has been approved by CLIA 88, designated as a high-complexity laboratory and is qualified to perform these tests  Interpretation performed at [facility], [address] [place] [address], [facility]      Gross Description           A  The specimen is received in formalin, labeled with the patient's name and hospital number, and is designated "  stomach, rule out H pylori"  The specimen consists of 1 tan-white rubbery soft tissue fragment measuring 0 5 cm in greatest dimension  Entirely submitted  Screened cassette  Note: The estimated total formalin fixation time based upon information provided by the submitting clinician and the standard processing schedule is under 72 hours    CHunter        Hemoglobin A1C    Collection Time: 03/04/21 12:00 AM   Result Value Ref Range    Hemoglobin A1C 6 7    Microalbumin / creatinine urine ratio    Collection Time: 03/04/21 12:00 AM   Result Value Ref Range    EXT Creatinine Urine 58 7     Microalbum  ,U,Random      EXTERNAL Microalb/Creat Ratio     Stress strip    Collection Time: 03/12/21  2:18 PM   Result Value Ref Range    Protocol Name GAVI     Time In Exercise Phase 00:08:00     MAX   SYSTOLIC  mmHg    Max Diastolic Bp 479 mmHg    Max Heart Rate 151 BPM    Max Predicted Heart Rate 166 BPM    Reason for Termination Dyspnea     Test Indication Pre-Op Evaluation     Target Hr Formular (220 - Age)*100%     Arrhy During Ex      ECG Interp Before Ex      ECG Interp during Ex      Ex Summary Comment      Chest Pain Statement none     Overall Hr Response To Exercise      Overall BP Response To Exercise     Comprehensive metabolic panel Lab Collect    Collection Time: 03/15/21  8:07 AM   Result Value Ref Range    Sodium 142 136 - 145 mmol/L    Potassium 4 0 3 5 - 5 3 mmol/L    Chloride 103 100 - 108 mmol/L    CO2 29 21 - 32 mmol/L    ANION GAP 10 4 - 13 mmol/L    BUN 19 5 - 25 mg/dL    Creatinine 1 19 0 60 - 1 30 mg/dL    Glucose, Fasting 105 (H) 65 - 99 mg/dL    Calcium 9 3 8 3 - 10 1 mg/dL    AST 28 5 - 45 U/L    ALT 31 12 - 78 U/L    Alkaline Phosphatase 95 46 - 116 U/L    Total Protein 7 9 6 4 - 8 2 g/dL    Albumin 4 0 3 5 - 5 0 g/dL    Total Bilirubin 0 37 0 20 - 1 00 mg/dL    eGFR 69 ml/min/1 73sq m   Lipid panel Lab Collect Lab Collect    Collection Time: 03/15/21  8:07 AM   Result Value Ref Range    Cholesterol 187 50 - 200 mg/dL    Triglycerides 209 (H) <=150 mg/dL    HDL, Direct 41 >=40 mg/dL    LDL Calculated 104 (H) 0 - 100 mg/dL    Non-HDL-Chol (CHOL-HDL) 146 mg/dl   CBC and Platelet    Collection Time: 03/15/21  8:07 AM   Result Value Ref Range    WBC 7 73 4 31 - 10 16 Thousand/uL    RBC 5 03 3 88 - 5 62 Million/uL    Hemoglobin 15 4 12 0 - 17 0 g/dL    Hematocrit 47 6 36 5 - 49 3 %    MCV 95 82 - 98 fL    MCH 30 6 26 8 - 34 3 pg    MCHC 32 4 31 4 - 37 4 g/dL    RDW 12 6 11 6 - 15 1 %    Platelets 820 468 - 908 Thousands/uL    MPV 9 3 8 9 - 12 7 fL            Physical Exam  Constitutional:       General: He is not in acute distress  Appearance: He is well-developed  He is obese  He is not diaphoretic  HENT:      Head: Normocephalic and atraumatic  Right Ear: External ear normal       Left Ear: External ear normal       Nose: Nose normal       Mouth/Throat:      Pharynx: No oropharyngeal exudate  Eyes:      General:         Right eye: No discharge  Left eye: No discharge  Conjunctiva/sclera: Conjunctivae normal       Pupils: Pupils are equal, round, and reactive to light  Neck:      Musculoskeletal: Normal range of motion and neck supple  Thyroid: No thyromegaly  Cardiovascular:      Rate and Rhythm: Normal rate and regular rhythm  Heart sounds: Normal heart sounds  No murmur  No friction rub  No gallop  Pulmonary:      Effort: Pulmonary effort is normal  No respiratory distress  Breath sounds: Normal breath sounds  No stridor  No wheezing or rales  Abdominal:      General: Bowel sounds are normal  There is no distension  Palpations: Abdomen is soft  Tenderness: There is no abdominal tenderness  Lymphadenopathy:      Cervical: No cervical adenopathy  Skin:     General: Skin is warm and dry  Findings: No erythema or rash  Neurological:      Mental Status: He is alert and oriented to person, place, and time  Psychiatric:         Behavior: Behavior normal          Thought Content:  Thought content normal          Judgment: Judgment normal

## 2021-04-21 NOTE — PRE-PROCEDURE INSTRUCTIONS
Pre-Surgery Instructions:   Medication Instructions    Ascorbic Acid (VITAMIN C PO) Instructed patient per Anesthesia Guidelines   azelastine (ASTELIN) 0 1 % nasal spray Instructed to take per normal schedule including DOS    Cholecalciferol (VITAMIN D3) 50 MCG (2000 UT) capsule Instructed patient per Anesthesia Guidelines   esomeprazole (NexIUM) 40 MG capsule Instructed to take per normal schedule including DOS with sips water    gabapentin (NEURONTIN) 300 mg capsule Instructed to take per normal schedule @ Bedtime    Glucagon (BAQSIMI TWO PACK) 3 MG/DOSE POWD Instructed to take per normal schedule including DOS with sips water    glucagon (GLUCAGON EMERGENCY) 1 MG injection Instructed to take per normal schedule including DOS     insulin aspart (NovoLOG) 100 units/mL injection Insulin Pump per Endocrinologist -Will continue basal    Jardiance 25 MG TABS Instructed to take per normal schedule except DOS    lisinopril (ZESTRIL) 2 5 mg tablet Instructed to take per normal schedule except DOS    loratadine (CLARITIN) 10 mg tablet Instructed to take per normal schedule including DOS with sips water    metFORMIN (GLUCOPHAGE) 1000 MG tablet Instructed to take per normal schedule except DOS    MULTIPLE VITAMIN PO Instructed patient per Anesthesia Guidelines   naproxen (NAPROSYN) 500 mg tablet Instructed patient per Anesthesia Guidelines   Probiotic Product (PROBIOTIC DAILY PO) Instructed patient per Anesthesia Guidelines   pseudoephedrine (SUDAFED) 30 mg tablet Instructed to take per normal schedule except DOS    rosuvastatin (CRESTOR) 10 MG tablet Instructed to take per normal schedule @ Bedtime    Synthroid 175 MCG tablet Instructed to take per normal schedule including DOS with sips water    vitamin B-12 (VITAMIN B-12) 1,000 mcg tablet Instructed patient per Anesthesia Guidelines      zolpidem (AMBIEN) 10 mg tablet Instructed to take per normal schedule @ Bedtime    Pre op instructions per My Surgical Experience booklet,medications per anesthesia guidelines and showering instructions per [facility] Zavaleta protocol reviewed-Patient has CHG  Pt  Verbalized understanding of current visitor restrictions  No Carb drink per surgeon's office (patient Type 1 diabetic)  Will bring Insulin pump supplies DOS  Instructed to avoid all ASA/NSAIDs and OTC Vit/Supp from now until after surgery  Tylenol ok prn  Pt  Verbalized an understanding of all instructions reviewed and offers no concerns at this time

## 2021-04-22 NOTE — TELEPHONE ENCOUNTER
Upon review of the In Basket request we were able to locate, review, and update the patient chart as requested for CRC: Colonoscopy  Any additional questions or concerns should be emailed to the Practice Liaisons via [email or web address] email, please do not reply via In Basket      Thank you  Juli Harman MA

## 2021-04-23 ENCOUNTER — ANESTHESIA EVENT (OUTPATIENT)
Dept: PERIOP | Facility: HOSPITAL | Age: 56
DRG: 621 | End: 2021-04-23
Payer: COMMERCIAL

## 2021-04-26 ENCOUNTER — ANESTHESIA (OUTPATIENT)
Dept: PERIOP | Facility: HOSPITAL | Age: 56
DRG: 621 | End: 2021-04-26
Payer: COMMERCIAL

## 2021-04-26 ENCOUNTER — HOSPITAL ENCOUNTER (INPATIENT)
Facility: HOSPITAL | Age: 56
LOS: 1 days | Discharge: HOME | DRG: 621 | End: 2021-04-27
Attending: SURGERY | Admitting: SURGERY
Payer: COMMERCIAL

## 2021-04-26 DIAGNOSIS — Z98.84 GASTRIC BYPASS STATUS FOR OBESITY: ICD-10-CM

## 2021-04-26 DIAGNOSIS — E03.9 ACQUIRED HYPOTHYROIDISM: ICD-10-CM

## 2021-04-26 DIAGNOSIS — R03.0 ELEVATED BP WITHOUT DIAGNOSIS OF HYPERTENSION: ICD-10-CM

## 2021-04-26 DIAGNOSIS — E78.2 MIXED HYPERLIPIDEMIA: ICD-10-CM

## 2021-04-26 DIAGNOSIS — Z96.41 INSULIN PUMP IN PLACE: Primary | ICD-10-CM

## 2021-04-26 DIAGNOSIS — E10.65 TYPE 1 DIABETES MELLITUS WITH HYPERGLYCEMIA, WITH LONG-TERM CURRENT USE OF INSULIN (HCC): ICD-10-CM

## 2021-04-26 LAB
GLUCOSE SERPL-MCNC: 130 MG/DL (ref 65–140)
GLUCOSE SERPL-MCNC: 149 MG/DL (ref 65–140)
GLUCOSE SERPL-MCNC: 166 MG/DL (ref 65–140)

## 2021-04-26 PROCEDURE — 43644 LAP GASTRIC BYPASS/ROUX-EN-Y: CPT | Performed by: STUDENT IN AN ORGANIZED HEALTH CARE EDUCATION/TRAINING PROGRAM

## 2021-04-26 PROCEDURE — 99222 1ST HOSP IP/OBS MODERATE 55: CPT | Performed by: INTERNAL MEDICINE

## 2021-04-26 PROCEDURE — C1781 MESH (IMPLANTABLE): HCPCS | Performed by: SURGERY

## 2021-04-26 PROCEDURE — 82948 REAGENT STRIP/BLOOD GLUCOSE: CPT

## 2021-04-26 PROCEDURE — 43644 LAP GASTRIC BYPASS/ROUX-EN-Y: CPT | Performed by: SURGERY

## 2021-04-26 PROCEDURE — C9290 INJ, BUPIVACAINE LIPOSOME: HCPCS | Performed by: SURGERY

## 2021-04-26 PROCEDURE — 0D164ZA BYPASS STOMACH TO JEJUNUM, PERCUTANEOUS ENDOSCOPIC APPROACH: ICD-10-PCS | Performed by: SURGERY

## 2021-04-26 PROCEDURE — 0DJ08ZZ INSPECTION OF UPPER INTESTINAL TRACT, VIA NATURAL OR ARTIFICIAL OPENING ENDOSCOPIC: ICD-10-PCS | Performed by: SURGERY

## 2021-04-26 PROCEDURE — 8E0W4CZ ROBOTIC ASSISTED PROCEDURE OF TRUNK REGION, PERCUTANEOUS ENDOSCOPIC APPROACH: ICD-10-PCS | Performed by: SURGERY

## 2021-04-26 DEVICE — SEAMGUARD STPL REINF INTUITIVE SUREFORM 60 GREEN: Type: IMPLANTABLE DEVICE | Site: ABDOMEN | Status: FUNCTIONAL

## 2021-04-26 RX ORDER — HYDROMORPHONE HCL/PF 1 MG/ML
SYRINGE (ML) INJECTION AS NEEDED
Status: DISCONTINUED | OUTPATIENT
Start: 2021-04-26 | End: 2021-04-26

## 2021-04-26 RX ORDER — FENTANYL CITRATE/PF 50 MCG/ML
25 SYRINGE (ML) INJECTION
Status: DISCONTINUED | OUTPATIENT
Start: 2021-04-26 | End: 2021-04-26 | Stop reason: HOSPADM

## 2021-04-26 RX ORDER — SCOLOPAMINE TRANSDERMAL SYSTEM 1 MG/1
1 PATCH, EXTENDED RELEASE TRANSDERMAL ONCE
Status: DISCONTINUED | OUTPATIENT
Start: 2021-04-26 | End: 2021-04-27 | Stop reason: HOSPADM

## 2021-04-26 RX ORDER — PROMETHAZINE HYDROCHLORIDE 25 MG/ML
25 INJECTION, SOLUTION INTRAMUSCULAR; INTRAVENOUS EVERY 6 HOURS PRN
Status: DISCONTINUED | OUTPATIENT
Start: 2021-04-26 | End: 2021-04-26

## 2021-04-26 RX ORDER — METOCLOPRAMIDE HYDROCHLORIDE 5 MG/ML
10 INJECTION INTRAMUSCULAR; INTRAVENOUS EVERY 6 HOURS PRN
Status: DISCONTINUED | OUTPATIENT
Start: 2021-04-26 | End: 2021-04-27 | Stop reason: HOSPADM

## 2021-04-26 RX ORDER — FENTANYL CITRATE 50 UG/ML
INJECTION, SOLUTION INTRAMUSCULAR; INTRAVENOUS AS NEEDED
Status: DISCONTINUED | OUTPATIENT
Start: 2021-04-26 | End: 2021-04-26

## 2021-04-26 RX ORDER — SIMETHICONE 80 MG
80 TABLET,CHEWABLE ORAL 4 TIMES DAILY PRN
Status: DISCONTINUED | OUTPATIENT
Start: 2021-04-26 | End: 2021-04-27 | Stop reason: HOSPADM

## 2021-04-26 RX ORDER — CEFAZOLIN SODIUM 2 G/50ML
2000 SOLUTION INTRAVENOUS ONCE
Status: COMPLETED | OUTPATIENT
Start: 2021-04-26 | End: 2021-04-26

## 2021-04-26 RX ORDER — BUPIVACAINE HYDROCHLORIDE 5 MG/ML
INJECTION, SOLUTION PERINEURAL AS NEEDED
Status: DISCONTINUED | OUTPATIENT
Start: 2021-04-26 | End: 2021-04-26 | Stop reason: HOSPADM

## 2021-04-26 RX ORDER — PROMETHAZINE HYDROCHLORIDE 25 MG/ML
25 INJECTION, SOLUTION INTRAMUSCULAR; INTRAVENOUS EVERY 6 HOURS PRN
Status: DISCONTINUED | OUTPATIENT
Start: 2021-04-26 | End: 2021-04-27 | Stop reason: HOSPADM

## 2021-04-26 RX ORDER — GABAPENTIN 300 MG/1
300 CAPSULE ORAL ONCE
Status: COMPLETED | OUTPATIENT
Start: 2021-04-26 | End: 2021-04-26

## 2021-04-26 RX ORDER — MAGNESIUM HYDROXIDE 1200 MG/15ML
LIQUID ORAL AS NEEDED
Status: DISCONTINUED | OUTPATIENT
Start: 2021-04-26 | End: 2021-04-26 | Stop reason: HOSPADM

## 2021-04-26 RX ORDER — SODIUM CHLORIDE 9 MG/ML
125 INJECTION, SOLUTION INTRAVENOUS CONTINUOUS
Status: DISCONTINUED | OUTPATIENT
Start: 2021-04-26 | End: 2021-04-26

## 2021-04-26 RX ORDER — METOCLOPRAMIDE HYDROCHLORIDE 5 MG/ML
10 INJECTION INTRAMUSCULAR; INTRAVENOUS EVERY 6 HOURS PRN
Status: DISCONTINUED | OUTPATIENT
Start: 2021-04-26 | End: 2021-04-26

## 2021-04-26 RX ORDER — ZOLPIDEM TARTRATE 5 MG/1
5 TABLET ORAL
Status: DISCONTINUED | OUTPATIENT
Start: 2021-04-26 | End: 2021-04-27 | Stop reason: HOSPADM

## 2021-04-26 RX ORDER — FLUTICASONE PROPIONATE 50 MCG
1 SPRAY, SUSPENSION (ML) NASAL DAILY
Status: DISCONTINUED | OUTPATIENT
Start: 2021-04-26 | End: 2021-04-27 | Stop reason: HOSPADM

## 2021-04-26 RX ORDER — PANTOPRAZOLE SODIUM 40 MG/1
40 TABLET, DELAYED RELEASE ORAL
Status: DISCONTINUED | OUTPATIENT
Start: 2021-04-27 | End: 2021-04-27 | Stop reason: HOSPADM

## 2021-04-26 RX ORDER — CEFAZOLIN SODIUM 2 G/50ML
2000 SOLUTION INTRAVENOUS EVERY 8 HOURS
Status: COMPLETED | OUTPATIENT
Start: 2021-04-26 | End: 2021-04-27

## 2021-04-26 RX ORDER — SODIUM CHLORIDE, SODIUM LACTATE, POTASSIUM CHLORIDE, CALCIUM CHLORIDE 600; 310; 30; 20 MG/100ML; MG/100ML; MG/100ML; MG/100ML
75 INJECTION, SOLUTION INTRAVENOUS CONTINUOUS
Status: DISCONTINUED | OUTPATIENT
Start: 2021-04-26 | End: 2021-04-27 | Stop reason: HOSPADM

## 2021-04-26 RX ORDER — LIDOCAINE HYDROCHLORIDE 10 MG/ML
INJECTION, SOLUTION EPIDURAL; INFILTRATION; INTRACAUDAL; PERINEURAL AS NEEDED
Status: DISCONTINUED | OUTPATIENT
Start: 2021-04-26 | End: 2021-04-26

## 2021-04-26 RX ORDER — ROCURONIUM BROMIDE 10 MG/ML
INJECTION, SOLUTION INTRAVENOUS AS NEEDED
Status: DISCONTINUED | OUTPATIENT
Start: 2021-04-26 | End: 2021-04-26

## 2021-04-26 RX ORDER — ACETAMINOPHEN 325 MG/1
975 TABLET ORAL EVERY 6 HOURS
Status: DISCONTINUED | OUTPATIENT
Start: 2021-04-26 | End: 2021-04-27 | Stop reason: HOSPADM

## 2021-04-26 RX ORDER — HYDROMORPHONE HCL/PF 1 MG/ML
0.5 SYRINGE (ML) INJECTION
Status: DISCONTINUED | OUTPATIENT
Start: 2021-04-26 | End: 2021-04-26 | Stop reason: HOSPADM

## 2021-04-26 RX ORDER — OXYCODONE HCL 5 MG/5 ML
10 SOLUTION, ORAL ORAL EVERY 4 HOURS PRN
Status: DISCONTINUED | OUTPATIENT
Start: 2021-04-26 | End: 2021-04-27 | Stop reason: HOSPADM

## 2021-04-26 RX ORDER — ALBUTEROL SULFATE 2.5 MG/3ML
2.5 SOLUTION RESPIRATORY (INHALATION) ONCE AS NEEDED
Status: DISCONTINUED | OUTPATIENT
Start: 2021-04-26 | End: 2021-04-26 | Stop reason: HOSPADM

## 2021-04-26 RX ORDER — MORPHINE SULFATE 4 MG/ML
4 INJECTION, SOLUTION INTRAMUSCULAR; INTRAVENOUS EVERY 4 HOURS PRN
Status: DISCONTINUED | OUTPATIENT
Start: 2021-04-26 | End: 2021-04-27 | Stop reason: HOSPADM

## 2021-04-26 RX ORDER — ACETAMINOPHEN 325 MG/1
975 TABLET ORAL ONCE
Status: COMPLETED | OUTPATIENT
Start: 2021-04-26 | End: 2021-04-26

## 2021-04-26 RX ORDER — MIDAZOLAM HYDROCHLORIDE 2 MG/2ML
INJECTION, SOLUTION INTRAMUSCULAR; INTRAVENOUS AS NEEDED
Status: DISCONTINUED | OUTPATIENT
Start: 2021-04-26 | End: 2021-04-26

## 2021-04-26 RX ORDER — ONDANSETRON 2 MG/ML
INJECTION INTRAMUSCULAR; INTRAVENOUS AS NEEDED
Status: DISCONTINUED | OUTPATIENT
Start: 2021-04-26 | End: 2021-04-26

## 2021-04-26 RX ORDER — OXYCODONE HCL 5 MG/5 ML
5 SOLUTION, ORAL ORAL EVERY 4 HOURS PRN
Status: DISCONTINUED | OUTPATIENT
Start: 2021-04-26 | End: 2021-04-27 | Stop reason: HOSPADM

## 2021-04-26 RX ORDER — FLUTICASONE PROPIONATE 50 MCG
1 SPRAY, SUSPENSION (ML) NASAL DAILY
Status: DISCONTINUED | OUTPATIENT
Start: 2021-04-27 | End: 2021-04-26

## 2021-04-26 RX ORDER — ONDANSETRON 2 MG/ML
4 INJECTION INTRAMUSCULAR; INTRAVENOUS EVERY 6 HOURS PRN
Status: DISCONTINUED | OUTPATIENT
Start: 2021-04-26 | End: 2021-04-26

## 2021-04-26 RX ORDER — KETAMINE HYDROCHLORIDE 50 MG/ML
INJECTION, SOLUTION, CONCENTRATE INTRAMUSCULAR; INTRAVENOUS AS NEEDED
Status: DISCONTINUED | OUTPATIENT
Start: 2021-04-26 | End: 2021-04-26

## 2021-04-26 RX ORDER — HEPARIN SODIUM 5000 [USP'U]/ML
5000 INJECTION, SOLUTION INTRAVENOUS; SUBCUTANEOUS
Status: COMPLETED | OUTPATIENT
Start: 2021-04-26 | End: 2021-04-26

## 2021-04-26 RX ORDER — ONDANSETRON 2 MG/ML
4 INJECTION INTRAMUSCULAR; INTRAVENOUS EVERY 6 HOURS PRN
Status: DISCONTINUED | OUTPATIENT
Start: 2021-04-26 | End: 2021-04-27 | Stop reason: HOSPADM

## 2021-04-26 RX ORDER — SODIUM CHLORIDE 9 MG/ML
INJECTION, SOLUTION INTRAVENOUS AS NEEDED
Status: DISCONTINUED | OUTPATIENT
Start: 2021-04-26 | End: 2021-04-26 | Stop reason: HOSPADM

## 2021-04-26 RX ORDER — PROPOFOL 10 MG/ML
INJECTION, EMULSION INTRAVENOUS AS NEEDED
Status: DISCONTINUED | OUTPATIENT
Start: 2021-04-26 | End: 2021-04-26

## 2021-04-26 RX ORDER — ACETAMINOPHEN 160 MG/5ML
975 SUSPENSION, ORAL (FINAL DOSE FORM) ORAL EVERY 8 HOURS
Status: DISCONTINUED | OUTPATIENT
Start: 2021-04-26 | End: 2021-04-26

## 2021-04-26 RX ADMIN — MORPHINE SULFATE 4 MG: 4 INJECTION INTRAVENOUS at 14:04

## 2021-04-26 RX ADMIN — KETAMINE HYDROCHLORIDE 50 MG: 50 INJECTION INTRAMUSCULAR; INTRAVENOUS at 07:42

## 2021-04-26 RX ADMIN — PROPOFOL 200 MG: 10 INJECTION, EMULSION INTRAVENOUS at 07:42

## 2021-04-26 RX ADMIN — SUGAMMADEX 400 MG: 100 INJECTION, SOLUTION INTRAVENOUS at 10:15

## 2021-04-26 RX ADMIN — HYDROMORPHONE HYDROCHLORIDE 0.5 MG: 1 INJECTION, SOLUTION INTRAMUSCULAR; INTRAVENOUS; SUBCUTANEOUS at 11:07

## 2021-04-26 RX ADMIN — FENTANYL CITRATE 100 MCG: 50 INJECTION INTRAMUSCULAR; INTRAVENOUS at 07:42

## 2021-04-26 RX ADMIN — ZOLPIDEM TARTRATE 5 MG: 5 TABLET, FILM COATED ORAL at 22:05

## 2021-04-26 RX ADMIN — SODIUM CHLORIDE, SODIUM LACTATE, POTASSIUM CHLORIDE, AND CALCIUM CHLORIDE 75 ML/HR: .6; .31; .03; .02 INJECTION, SOLUTION INTRAVENOUS at 11:16

## 2021-04-26 RX ADMIN — ONDANSETRON 4 MG: 2 INJECTION INTRAMUSCULAR; INTRAVENOUS at 08:00

## 2021-04-26 RX ADMIN — CEFAZOLIN SODIUM 2000 MG: 2 SOLUTION INTRAVENOUS at 17:34

## 2021-04-26 RX ADMIN — GABAPENTIN 300 MG: 300 CAPSULE ORAL at 05:55

## 2021-04-26 RX ADMIN — ACETAMINOPHEN 975 MG: 325 TABLET ORAL at 05:55

## 2021-04-26 RX ADMIN — ROCURONIUM BROMIDE 30 MG: 10 INJECTION, SOLUTION INTRAVENOUS at 09:20

## 2021-04-26 RX ADMIN — LIDOCAINE HYDROCHLORIDE 60 MG: 10 INJECTION, SOLUTION EPIDURAL; INFILTRATION; INTRACAUDAL; PERINEURAL at 07:42

## 2021-04-26 RX ADMIN — FAMOTIDINE 20 MG: 10 INJECTION INTRAVENOUS at 22:07

## 2021-04-26 RX ADMIN — OXYCODONE HYDROCHLORIDE 10 MG: 5 SOLUTION ORAL at 17:05

## 2021-04-26 RX ADMIN — OXYCODONE HYDROCHLORIDE 5 MG: 5 SOLUTION ORAL at 22:02

## 2021-04-26 RX ADMIN — MIDAZOLAM 2 MG: 1 INJECTION INTRAMUSCULAR; INTRAVENOUS at 07:34

## 2021-04-26 RX ADMIN — SCOPALAMINE 1 PATCH: 1 PATCH, EXTENDED RELEASE TRANSDERMAL at 06:00

## 2021-04-26 RX ADMIN — FENTANYL CITRATE 25 MCG: 50 INJECTION, SOLUTION INTRAMUSCULAR; INTRAVENOUS at 10:48

## 2021-04-26 RX ADMIN — FENTANYL CITRATE 100 MCG: 50 INJECTION INTRAMUSCULAR; INTRAVENOUS at 08:04

## 2021-04-26 RX ADMIN — METRONIDAZOLE 500 MG: 500 INJECTION, SOLUTION INTRAVENOUS at 07:49

## 2021-04-26 RX ADMIN — FENTANYL CITRATE 25 MCG: 50 INJECTION, SOLUTION INTRAMUSCULAR; INTRAVENOUS at 10:58

## 2021-04-26 RX ADMIN — SODIUM CHLORIDE 125 ML/HR: 0.9 INJECTION, SOLUTION INTRAVENOUS at 06:14

## 2021-04-26 RX ADMIN — HYDROMORPHONE HYDROCHLORIDE 0.5 MG: 1 INJECTION, SOLUTION INTRAMUSCULAR; INTRAVENOUS; SUBCUTANEOUS at 08:11

## 2021-04-26 RX ADMIN — ROCURONIUM BROMIDE 70 MG: 10 INJECTION, SOLUTION INTRAVENOUS at 07:42

## 2021-04-26 RX ADMIN — HEPARIN SODIUM 5000 UNITS: 5000 INJECTION INTRAVENOUS; SUBCUTANEOUS at 06:09

## 2021-04-26 RX ADMIN — CEFAZOLIN SODIUM 2000 MG: 2 SOLUTION INTRAVENOUS at 07:49

## 2021-04-26 RX ADMIN — ACETAMINOPHEN 975 MG: 325 TABLET ORAL at 22:04

## 2021-04-26 RX ADMIN — FAMOTIDINE 20 MG: 10 INJECTION INTRAVENOUS at 08:00

## 2021-04-26 RX ADMIN — METRONIDAZOLE 500 MG: 500 INJECTION, SOLUTION INTRAVENOUS at 16:45

## 2021-04-26 RX ADMIN — FLUTICASONE PROPIONATE 1 SPRAY: 50 SPRAY, METERED NASAL at 19:41

## 2021-04-26 NOTE — INTERVAL H&P NOTE
H&P reviewed  After examining the patient I find no changes in the patients condition since the H&P had been written      Vitals:    04/26/21 0606   BP: 143/78   Pulse: 75   Resp: 16   Temp: 98 1 °F (36 7 °C)   SpO2: 95%

## 2021-04-26 NOTE — ASSESSMENT & PLAN NOTE
· Levothyroxine 175mcg in AM  · Follows with outpatient endocrinology, [facility]  · Endocrinology consulted

## 2021-04-26 NOTE — CONSULTS
discomfort  Consults    Review of Systems   Constitutional: Negative for chills  HENT: Negative for congestion and trouble swallowing  Eyes: Negative for visual disturbance  Respiratory: Negative for shortness of breath  Cardiovascular: Negative for palpitations and leg swelling  Gastrointestinal: Positive for abdominal pain  Negative for nausea and vomiting  Endocrine: Negative for polydipsia and polyuria  Genitourinary: Negative for difficulty urinating and frequency  Musculoskeletal: Negative for arthralgias  Skin: Negative for rash  Neurological: Negative for dizziness and weakness  Psychiatric/Behavioral: Negative for agitation and confusion         Historical Information   Past Medical History:   Diagnosis Date    Anemia     Diabetes mellitus ([address] )     Disease of thyroid gland     GERD (gastroesophageal reflux disease)     Hyperlipidemia     Hypertension     Insomnia     Kidney stone     Sleep apnea     Mild sleep apnea    Type 1 diabetes mellitus (HCC)     Uses Insulin Pump     Past Surgical History:   Procedure Laterality Date    CARPAL TUNNEL RELEASE Right     COLONOSCOPY      EGD      FL RETROGRADE PYELOGRAM  4/24/2020    LASIK      DE CYSTO/URETERO W/LITHOTRIPSY &INDWELL STENT INSRT Right 4/24/2020    Procedure: CYSTOSCOPY URETEROSCOPY WITH LITHOTRIPSY HOLMIUM LASER, RETROGRADE PYELOGRAM AND INSERTION STENT URETERAL;  Surgeon: Yulisa Navarro MD;  Location: [facility];  Service: Urology    ROTATOR CUFF REPAIR Right      Social History   Social History     Substance and Sexual Activity   Alcohol Use Yes    Alcohol/week: 2 0 standard drinks    Types: 2 Glasses of wine per week    Frequency: 2-3 times a week    Drinks per session: 1 or 2    Binge frequency: Less than monthly    Comment: social     Social History     Substance and Sexual Activity   Drug Use No     Social History     Tobacco Use   Smoking Status Former Smoker    Years: 30 00    Types: Cigarettes    Quit date: 1    Years since quittin 3   Smokeless Tobacco Never Used   Tobacco Comment    quit in      Family History:   Family History   Problem Relation Age of Onset    Thyroid disease unspecified Mother     Osteoporosis Mother     Hypertension Father     Diabetes type II Sister     Hypertension Sister     Hyperlipidemia Sister     Thyroid disease unspecified Sister     Diabetes type II Brother     Hypertension Brother     Hyperlipidemia Brother        Meds/Allergies   Current Facility-Administered Medications   Medication Dose Route Frequency Provider Last Rate Last Admin    acetaminophen (TYLENOL) oral suspension 975 mg  975 mg Oral Q8H Babs ANA Belcher PA-C        ceFAZolin (ANCEF) IVPB (premix in dextrose) 2,000 mg 50 mL  2,000 mg Intravenous Q8H Babs ANA Belcher PA-C        famotidine (PEPCID) injection 20 mg  20 mg Intravenous Q12H [facility] Babs ANA Belcher PA-C        insulin regular (HumuLIN R,NovoLIN R) 1 Units/mL in sodium chloride 0 9 % 100 mL infusion  0 3-21 Units/hr Intravenous Titrated Ferrell Olszewski, PA-C        lactated ringers infusion  75 mL/hr Intravenous Continuous Babs ANA Belcher PA-C 75 mL/hr at 21 1116 75 mL/hr at 21 1116    levothyroxine tablet 175 mcg  175 mcg Oral Daily Babs ANA Belcher PA-C        metoclopramide (REGLAN) injection 10 mg  10 mg Intravenous Q6H PRN Babs ANA Belcher PA-C        metroNIDAZOLE (FLAGYL) IVPB (premix) 500 mg 100 mL  500 mg Intravenous Q8H Babs ANA Belcher PA-C        morphine (PF) 4 mg/mL injection 4 mg  4 mg Intravenous Q4H PRN Babs ANA Belcher PA-C        ondansetron (ZOFRAN) injection 4 mg  4 mg Intravenous Q6H PRN Babs ANA Belcher PA-C        oxyCODONE (ROXICODONE) oral solution 10 mg  10 mg Oral Q4H PRN Babs ANA Belcher PA-C        oxyCODONE (ROXICODONE) oral solution 5 mg  5 mg Oral Q4H PRN Babs ANA Belcher PA-C        phenol (CHLORASEPTIC) 1 4 % mucosal liquid 2 spray  2 spray Mouth/Throat Q2H PRN Babsmauricio Belcher PA-C  promethazine (PHENERGAN) injection 25 mg  25 mg Intravenous Q6H PRN Babs ANA Belcher PA-C        scopolamine (TRANSDERM-SCOP) 1 5 mg/3 days TD 72 hr patch 1 patch  1 patch Transdermal Once Gregory Castaneda MD   1 patch at 04/26/21 0600    simethicone (MYLICON) chewable tablet 80 mg  80 mg Oral 4x Daily PRN Babs N DIANA Belcher        sodium chloride 0 9 % infusion  125 mL/hr Intravenous Continuous Curvin Arrant, DO   Stopped at 04/26/21 1041     Allergies   Allergen Reactions    Ibuprofen GI Intolerance    Other Other (See Comments)     Seasonal allergies- pt has congestion       Objective   Vitals: Blood pressure 153/84, pulse 87, temperature 97 9 °F (36 6 °C), resp  rate 19, height 5' 9" (1 753 m), weight 122 kg (268 lb), SpO2 96 %  Intake/Output Summary (Last 24 hours) at 4/26/2021 1345  Last data filed at 4/26/2021 1116  Gross per 24 hour   Intake 2000 ml   Output --   Net 2000 ml     Invasive Devices     Peripheral Intravenous Line            Peripheral IV 04/26/21 Left Hand less than 1 day                Physical Exam  Vitals signs reviewed  Constitutional:       General: He is not in acute distress  Appearance: He is well-developed  He is not diaphoretic  HENT:      Head: Normocephalic and atraumatic  Eyes:      Conjunctiva/sclera: Conjunctivae normal       Pupils: Pupils are equal, round, and reactive to light  Neck:      Musculoskeletal: Normal range of motion and neck supple  Thyroid: No thyromegaly  Cardiovascular:      Rate and Rhythm: Normal rate and regular rhythm  Pulmonary:      Effort: Pulmonary effort is normal  No respiratory distress  Breath sounds: Normal breath sounds  Abdominal:      General: Bowel sounds are normal       Palpations: Abdomen is soft  Musculoskeletal: Normal range of motion  Lymphadenopathy:      Cervical: No cervical adenopathy  Skin:     General: Skin is warm and dry  Findings: No rash     Neurological:      Mental Status: He is alert and oriented to person, place, and time  Motor: No abnormal muscle tone  Psychiatric:         Behavior: Behavior normal          The history was obtained from the review of the chart, patient  Lab Results:       Lab Results   Component Value Date    WBC 7 73 03/15/2021    HGB 15 4 03/15/2021    HCT 47 6 03/15/2021    MCV 95 03/15/2021     03/15/2021     Lab Results   Component Value Date/Time    BUN 19 03/15/2021 08:07 AM    BUN 13 11/03/2015 06:46 AM     11/03/2015 06:46 AM    K 4 0 03/15/2021 08:07 AM    K 4 0 11/03/2015 06:46 AM     03/15/2021 08:07 AM     11/03/2015 06:46 AM    CO2 29 03/15/2021 08:07 AM    CO2 27 8 11/03/2015 06:46 AM    CREATININE 1 19 03/15/2021 08:07 AM    CREATININE 0 96 11/03/2015 06:46 AM    AST 28 03/15/2021 08:07 AM    AST 25 07/15/2015 07:19 AM    ALT 31 03/15/2021 08:07 AM    ALT 30 07/15/2015 07:19 AM    ALB 4 0 03/15/2021 08:07 AM    ALB 3 6 11/03/2015 06:46 AM     No results for input(s): CHOL, HDL, LDL, TRIG, VLDL in the last 72 hours  No results found for: Luis A Baer  POC Glucose (mg/dl)   Date Value   04/26/2021 166 (H)   04/26/2021 149 (H)   03/03/2021 135   03/03/2021 135   04/24/2020 142 (H)   04/24/2020 115   04/23/2020 238 (H)       Imaging Studies: No imaging to review  Portions of the record may have been created with voice recognition software  Occasional wrong word or "sound a like" substitutions may have occurred due to the inherent limitations of voice recognition software  Read the chart carefully and recognize, using context, where substitutions have occurred

## 2021-04-26 NOTE — PLAN OF CARE
Problem: PAIN - ADULT  Goal: Verbalizes/displays adequate comfort level or baseline comfort level  Description: Interventions:  - Encourage patient to monitor pain and request assistance  - Assess pain using appropriate pain scale  - Administer analgesics based on type and severity of pain and evaluate response  - Implement non-pharmacological measures as appropriate and evaluate response  - Consider cultural and social influences on pain and pain management  - Notify physician/advanced practitioner if interventions unsuccessful or patient reports new pain  Outcome: Progressing     Problem: INFECTION - ADULT  Goal: Absence or prevention of progression during hospitalization  Description: INTERVENTIONS:  - Assess and monitor for signs and symptoms of infection  - Monitor lab/diagnostic results  - Monitor all insertion sites, i e  indwelling lines, tubes, and drains  - Monitor endotracheal if appropriate and nasal secretions for changes in amount and color  - [place] appropriate cooling/warming therapies per order  - Administer medications as ordered  - Instruct and encourage patient and family to use good hand hygiene technique  - Identify and instruct in appropriate isolation precautions for identified infection/condition  Outcome: Progressing  Goal: Absence of fever/infection during neutropenic period  Description: INTERVENTIONS:  - Monitor WBC    Outcome: Progressing     Problem: SAFETY ADULT  Goal: Patient will remain free of falls  Description: INTERVENTIONS:  - Assess patient frequently for physical needs  -  Identify cognitive and physical deficits and behaviors that affect risk of falls    -  [place] fall precautions as indicated by assessment   - Educate patient/family on patient safety including physical limitations  - Instruct patient to call for assistance with activity based on assessment  - Modify environment to reduce risk of injury  - Consider OT/PT consult to assist with strengthening/mobility  Outcome: Progressing  Goal: Maintain or return to baseline ADL function  Description: INTERVENTIONS:  -  Assess patient's ability to carry out ADLs; assess patient's baseline for ADL function and identify physical deficits which impact ability to perform ADLs (bathing, care of mouth/teeth, toileting, grooming, dressing, etc )  - Assess/evaluate cause of self-care deficits   - Assess range of motion  - Assess patient's mobility; develop plan if impaired  - Assess patient's need for assistive devices and provide as appropriate  - Encourage maximum independence but intervene and supervise when necessary  - Involve family in performance of ADLs  - Assess for home care needs following discharge   - Consider OT consult to assist with ADL evaluation and planning for discharge  - Provide patient education as appropriate  Outcome: Progressing  Goal: Maintain or return mobility status to optimal level  Description: INTERVENTIONS:  - Assess patient's baseline mobility status (ambulation, transfers, stairs, etc )    - Identify cognitive and physical deficits and behaviors that affect mobility  - Identify mobility aids required to assist with transfers and/or ambulation (gait belt, sit-to-stand, lift, walker, cane, etc )  - [place] fall precautions as indicated by assessment  - Record patient progress and toleration of activity level on Mobility SBAR; progress patient to next Phase/Stage  - Instruct patient to call for assistance with activity based on assessment  - Consider rehabilitation consult to assist with strengthening/weightbearing, etc   Outcome: Progressing     Problem: DISCHARGE PLANNING  Goal: Discharge to home or other facility with appropriate resources  Description: INTERVENTIONS:  - Identify barriers to discharge w/patient and caregiver  - Arrange for needed discharge resources and transportation as appropriate  - Identify discharge learning needs (meds, wound care, etc )  - Arrange for interpretive services to assist at discharge as needed  - Refer to Case Management Department for coordinating discharge planning if the patient needs post-hospital services based on physician/advanced practitioner order or complex needs related to functional status, cognitive ability, or social support system  Outcome: Progressing     Problem: Knowledge Deficit  Goal: Patient/family/caregiver demonstrates understanding of disease process, treatment plan, medications, and discharge instructions  Description: Complete learning assessment and assess knowledge base    Interventions:  - Provide teaching at level of understanding  - Provide teaching via preferred learning methods  Outcome: Progressing     Problem: GASTROINTESTINAL - ADULT  Goal: Minimal or absence of nausea and/or vomiting  Description: INTERVENTIONS:  - Administer IV fluids if ordered to ensure adequate hydration  - Maintain NPO status until nausea and vomiting are resolved  - Nasogastric tube if ordered  - Administer ordered antiemetic medications as needed  - Provide nonpharmacologic comfort measures as appropriate  - Advance diet as tolerated, if ordered  - Consider nutrition services referral to assist patient with adequate nutrition and appropriate food choices  Outcome: Progressing

## 2021-04-26 NOTE — OP NOTE
OPERATIVE REPORT  PATIENT NAME: Nupur Rae    :  1965  MRN: [id number]  Pt Location: [facility] 08    SURGERY DATE: 2021    Surgeon(s) and Role:     * Serina Robertson MD - Primary     * Maricruz Overton MD - Fellow    Preop Diagnosis:  Lifelong obesity [G66  9]Body mass index is 39 58 kg/m²  Diabetic neuropathy, type I diabetes mellitus ([address] ) [E10 40]  FARZANA on CPAP [G47 33, Z99 89]  Mixed hyperlipidemia [E78 2]    Post-Op Diagnosis Codes:     * Lifelong obesity [E66 9]     * Diabetic neuropathy, type I diabetes mellitus ([address] ) [E10 40]     * FARZANA on CPAP [G47 33, Z99 89]     * Mixed hyperlipidemia [E78 2]    Procedure(s) (LRB):  BYPASS GASTRIC YEIMI-EN-Y LAPAROSCOPIC W ROBOTICS AND INTRAOPERATIVE EGD (N/A)    Specimen(s):  * No specimens in log *    Estimated Blood Loss:   20 ml    Drains:  * No LDAs found *    Anesthesia Type:   General    Operative Indications:  Lifelong obesity [E66 9]  Diabetic neuropathy, type I diabetes mellitus ([address] ) [E10 40]  FARZANA on CPAP [G47 33, Z99 89]  Mixed hyperlipidemia [E78 2]      Operative Findings:  Normal Findings    Complications:   None    Procedure and Technique:  Robotic Yeimi-en-Y gastric bypass with intraop endoscopy   I was present for the entire procedure    Patient Disposition:  hemodynamically stable     No qualified resident was available  Assistant was necessary for instrument exchange and counter traction and assistance with stapling and intraop endoscopy    Indication:   Patient suffers from morbid obesity and associated co-morbidities  and failed to achieve any meaningful or sustainable weight loss and was therefore consented to undergo a laparoscopic Yeimi en Y Gastric Bypass  Risks and benefits were explained to the patient, patient consented for the procedure  The patient was brought to the operating room and placed in a supine position  The patient received a dose of IV antibiotics and a dose of subcutaneous Heparin prior to the procedure   The patient was induced under general endotracheal anesthesia  The abdominal wall was prepped and draped under sterile conditions in the usual fashion  The procedure was started by obtaining access to the abdominal cavity using a Veress needle to the left side of the midline around 6 inches from the xiphoid the abdominal cavity was insufflated with CO2 to a pressure of 15 mmHg  After that, the abdomen was entered with an 8 mm trocar using an Optiview trocar under direct visualization  At that point, an 8 and 12 mm robotic trocars were placed on the right side of the abdominal wall, under direct visualization, and another 8 mm robotic trocar and 12 mm assistant trocar were placed on the left side of the abdominal wall, also under direct visualization  A TAP block was performed using 20 ml of Exparel mixed with saline and 0 5 % Marcaine for a total of 100 ml  The patient was then placed in a reverse Trendelenburg position  A Duc retractor was placed through a small stab incision below the xiphoid and was used to retract the left lobe of the liver in a medial fashion, the robot was then docked and locked in place  An OG tube was placed to decompress the stomach and then removed immediately  The procedure was started by lifting the omentum in a cephalad fashion  The omentum was then split in half using the vessel sealer  The ligament of Treitz was identified and then the small bowel was transected with a 60 mm stapler using a white load  The mesentery of the small bowel was then transected using the vessel sealer,   After that, the distal small bowel was run for 150 cm in a way to obtain a 150 cm long Yeimi limb  At that point, two enterotomies were made in the BP limb and the Yeimi limb with hot scissors l, and the jejunojejunostomy was fashioned using a 60 mm stapler with a white load  After firing the stapler, the staple line was inspected and there was no evidence of bleeding and the staple line was well formed  The common enterotomy of the jejunojejunostomy was closed in two layers using 2-0 Vicryl running suture for the first layer and  2-0 V LOC in a running fashion for the second layer  The mesenteric defect of the small bowel was approximated using nonabsorbable suture in a running fashion  At that point, we turned our attention to the upper portion of the abdomen  The pars flaccida was opened and a gastric pouch was created with serial firings of the Sureform 60 mm intuitive  stapler with a green cartridge reinforced with Seamguard  After the formation of the gastric pouch, the staple lines of the pouch and the gastric remnant were inspected and appeared to be well formed and also appeared to be hemostatic  A new gastrojejunostomy anastomosis was then fashioned in an antecolic antegastric fashion in 2 layers  Outer layer was a running layer of 2-0 V lock suture and the inner  layer was a running 2-0 Vicryl suture  The Elisha Ashanti defect was closed with a simple nonabsorbable 2 0 Ethibond suture in a figure-of-eight  Fashion  Upper endoscopy was then performed and showed no evidence of bubbles or bleeding at the suture line of the anastomosis or the staple line of the gastric pouch  The gastrojejunostomy was covered with an omental flap that was secured in place with an absorbable suture in a simple fashion  The [facility] liver retractor was removed  The 12 mm port was closed  with 1-0 Vicryl in a simple fashion  All the skin edges of the trocar sites were approximated with 4-0 Monocryl in a subcuticular inverted fashion  The patient was extubated and transferred to the PACU in stable condition      SIGNATURE: Lissette Castillo MD  DATE: April 26, 2021  TIME: 11:21 AM

## 2021-04-26 NOTE — ASSESSMENT & PLAN NOTE
· pepcid 20mg q12 IV  · Nexium 40mg qd as outpatient, will stop while inpatient  · Pantoprazole in a m

## 2021-04-26 NOTE — CONSULTS
[address] [place] [address] 1965, 48 y o  male MRN: [id number]  Unit/Bed#: E5 -01 Encounter: [id number]  Primary Care Provider: Mendel Bustard, CRNP   Date and time admitted to hospital: 4/26/2021  5:06 AM    Inpatient consult to Internal Medicine  Consult performed by: Jeannie Castellon PA-C  Consult ordered by: Mohsen Palacio PA-C        * Gastric bypass status for obesity  Assessment & Plan  · S/P gastric bypass rima-en-y with intraoperative EGD  · Management per primary    Elevated BP without diagnosis of hypertension  Assessment & Plan  · Continue lisinopril 2 5mg qd  · Monitor Bps  · Most recent 124/56    Insulin pump in place  Assessment & Plan  · T1DM  · Endocrinology consulted  · Will stop pump as patient is NPO and start insulin gtt- algorithm 2  · Blood sugar checks q2    Seasonal allergic rhinitis due to pollen  Assessment & Plan  · loratidine 10mg qd    Type 1 diabetes mellitus with hyperglycemia, with long-term current use of insulin [facility])  Assessment & Plan  Lab Results   Component Value Date    HGBA1C 6 7 03/04/2021       Recent Labs     04/26/21  0608 04/26/21  1053   POCGLU 149* 166*       Blood Sugar Average: Last 72 hrs:  (P) 157 5   · Patient follows closely with outpatient endocrinology, most recently seen on 4/19   · Patient maintained on metformin 1000 mg b i d , Jardiance 25 mg q d  and Humalog via TSlim pump  · Pump currently turned off  · Patient currently NPO secondary to recent RYGB- will initiate insulin gtt at algorithm 2  · Blood sugar checks q2   · Hypoglycemia protocol  · Stop metformin and jardiance while inpatient  · Continue gabapentin 300mg HS for neuropathic pain  · Endocrinology consulted for further management while inpatient    Hypothyroidism  Assessment & Plan  · Levothyroxine 175mcg in AM  · Follows with outpatient endocrinology, [facility]  · Endocrinology consulted    Hyperlipidemia  Assessment & Plan  · crestor 10mg HS  · Managed by PCP    GERD (gastroesophageal reflux disease)  Assessment & Plan  · pepcid 20mg q12 IV  · Nexium 40mg qd as outpatient, will stop while inpatient        VTE Prophylaxis: Reason for no pharmacologic prophylaxis Low risk  / sequential compression device     Recommendations for Discharge:  · Per primary    Counseling / Coordination of Care Time: 30 minutes  Greater than 50% of total time spent on patient counseling and coordination of care  Collaboration of Care: Were Recommendations Directly Discussed with Primary Treatment Team? - No     History of Present Illness:    Tatyana Angeles is a 48 y o  male who is originally admitted to the bariatric service due to Yeimi-en-Y gastric bypass  We are consulted for medical management  Patient states that he is having some abdominal pain at this time, however states that it is relatively controlled  Is requesting Ambien for his insomnia, states that he takes at home  Denies any chest pain, shortness of breath, nausea, vomiting, constipation  No fevers or chills  Review of Systems:    Review of Systems   Constitutional: Negative for chills and fever  HENT: Negative for ear pain and sore throat  Eyes: Negative for pain and visual disturbance  Respiratory: Negative for cough and shortness of breath  Cardiovascular: Negative for chest pain, palpitations and leg swelling  Gastrointestinal: Positive for abdominal pain  Negative for constipation, diarrhea, nausea and vomiting  Genitourinary: Negative for dysuria and hematuria  Musculoskeletal: Negative for arthralgias and back pain  Skin: Negative for color change and rash  Neurological: Negative for dizziness, seizures, syncope, weakness and headaches  All other systems reviewed and are negative      Past Medical and Surgical History:     Past Medical History:   Diagnosis Date    Anemia     Diabetes mellitus ([address] )     Disease of thyroid gland     GERD (gastroesophageal reflux disease)     Hyperlipidemia     Hypertension     Insomnia     Kidney stone     Sleep apnea     Mild sleep apnea    Type 1 diabetes mellitus (HCC)     Uses Insulin Pump       Past Surgical History:   Procedure Laterality Date    CARPAL TUNNEL RELEASE Right     COLONOSCOPY      EGD      FL RETROGRADE PYELOGRAM  2020    LASIK      KS CYSTO/URETERO W/LITHOTRIPSY &INDWELL STENT INSRT Right 2020    Procedure: CYSTOSCOPY URETEROSCOPY WITH LITHOTRIPSY HOLMIUM LASER, RETROGRADE PYELOGRAM AND INSERTION STENT URETERAL;  Surgeon: Osmany Martinez MD;  Location: [facility];  Service: Urology    ROTATOR CUFF REPAIR Right        Meds/Allergies:    PTA meds:   Prior to Admission Medications   Prescriptions Last Dose Informant Patient Reported? Taking?    Ascorbic Acid (VITAMIN C PO) 2021 Self Yes Yes   Sig: Take 2,000 mg by mouth daily    Blood Glucose Monitoring Suppl (ONE TOUCH ULTRA 2) w/Device KIT  Self Yes No   Sig: by Does not apply route   Cholecalciferol (VITAMIN D3) 50 MCG ( UT) capsule 2021 Self No Yes   Sig: Take 2 capsules daily   Patient taking differently: Take 2,000 Units by mouth daily Take 2 capsules daily   Continuous Blood Gluc  (DEXCOM G6 ) KIYA  Self No No   Sig: USE AS DIRECTED   Continuous Blood Gluc Sensor (DEXCOM G6 SENSOR) MISC  Self No No   Si Device by Does not apply route continuous Use 1 sensor every 10 days   Continuous Blood Gluc Transmit (DEXCOM G6 TRANSMITTER) MISC  Self No No   Sig: USE 1 DEVICE WITH SENSOR   Glucagon (BAQSIMI TWO PACK) 3 MG/DOSE POWD  Self No Yes   Sig: Use one dose as needed in case of severe hypoglycemia ( nasal spray)   Patient taking differently: Use one dose as needed in case of severe hypoglycemia   Insulin Pen Needle (BD PEN NEEDLE LYRIC U/F) 32G X 4 MM MISC  Self No No   Sig: by Does not apply route daily Use once daily   Jardiance 25 MG TABS 2021 at 0900  No Yes   Sig: take 1 tablet by mouth every morning Patient taking differently: Take 25 mg by mouth daily in the early morning    MULTIPLE VITAMIN PO 2021 Self Yes Yes   Sig: Take 1 tablet by mouth daily    Probiotic Product (PROBIOTIC DAILY PO) 2021  Yes Yes   Sig: Take 1 tablet by mouth daily    Synthroid 175 MCG tablet 2021 at 0400  No Yes   Sig: take 1 tablet by mouth daily   Patient taking differently: Take 175 mcg by mouth daily in the early morning take 1 tablet by mouth daily   acetone, urine, test (Ketostix) strip  Self No No   Sig: Check ketones if blood sugars > 250, more than twice or in case of nausea or vomiting and abdominal pains   azelastine (ASTELIN) 0 1 % nasal spray 2021 Self No Yes   Si spray into each nostril 2 (two) times a day   esomeprazole (NexIUM) 40 MG capsule 2021 at 0400 Self No Yes   Sig: Take 1 capsule (40 mg total) by mouth daily   Patient taking differently: Take 40 mg by mouth daily in the early morning    gabapentin (NEURONTIN) 300 mg capsule 2021  No Yes   Sig: Take 1 capsule (300 mg total) by mouth daily at bedtime   glucagon (GLUCAGON EMERGENCY) 1 MG injection  Self No Yes   Sig: Inject 1 mg under the skin once as needed for low blood sugar for up to 1 dose   insulin aspart (NovoLOG) 100 units/mL injection  Self No Yes   Sig: Use up to 140 units per day via insulin pump   insulin glargine (Basaglar KwikPen) 100 units/mL injection pen  Self No No   Sig: Inject 60 units in case of pump failure ( incase of emergency)   lisinopril (ZESTRIL) 2 5 mg tablet 2021 at 0900  No Yes   Sig: Take 1 tablet (2 5 mg total) by mouth daily   Patient taking differently: Take 2 5 mg by mouth daily in the early morning    loratadine (CLARITIN) 10 mg tablet 2021 at 0400  No Yes   Sig: Take 1 tablet (10 mg total) by mouth daily   Patient taking differently: Take 10 mg by mouth daily in the early morning    metFORMIN (GLUCOPHAGE) 1000 MG tablet 2021 at 1800  No Yes   Sig: Take 1 tablet (1,000 mg total) by mouth every 12 (twelve) hours With food   naproxen (NAPROSYN) 500 mg tablet 2021 Self No Yes   Sig: Take 1 tablet (500 mg total) by mouth 2 (two) times a day with meals   Patient taking differently: Take 500 mg by mouth as needed    pseudoephedrine (SUDAFED) 30 mg tablet   No Yes   Sig: Take 2 tablets (60 mg total) by mouth every 4 (four) hours as needed for congestion   rosuvastatin (CRESTOR) 10 MG tablet 2021 at 2100  No Yes   Sig: take 1 tablet by mouth daily   Patient taking differently: Take 10 mg by mouth daily at bedtime    vitamin B-12 (VITAMIN B-12) 1,000 mcg tablet 2021 Self Yes Yes   Sig: Take 1,000 mcg by mouth daily    zolpidem (AMBIEN) 10 mg tablet 2021 at 2100  No Yes   Sig: Take 1 tablet (10 mg total) by mouth daily at bedtime as needed for sleep      Facility-Administered Medications: None       Allergies:    Allergies   Allergen Reactions    Ibuprofen GI Intolerance    Other Other (See Comments)     Seasonal allergies- pt has congestion       Social History:     Marital Status: /Civil Union    Substance Use History:   Social History     Substance and Sexual Activity   Alcohol Use Yes    Alcohol/week: 2 0 standard drinks    Types: 2 Glasses of wine per week    Frequency: 2-3 times a week    Drinks per session: 1 or 2    Binge frequency: Less than monthly    Comment: social     Social History     Tobacco Use   Smoking Status Former Smoker    Years: 30     Types: Cigarettes    Quit date:     Years since quittin 3   Smokeless Tobacco Never Used   Tobacco Comment    quit in      Social History     Substance and Sexual Activity   Drug Use No       Family History:    Family History   Problem Relation Age of Onset    Thyroid disease unspecified Mother     Osteoporosis Mother     Hypertension Father     Diabetes type II Sister     Hypertension Sister     Hyperlipidemia Sister     Thyroid disease unspecified Sister     Diabetes type II Brother  Hypertension Brother     Hyperlipidemia Brother        Physical Exam:     Vitals:   Blood Pressure: 140/84 (04/26/21 1515)  Pulse: 77 (04/26/21 1515)  Temperature: 98 5 °F (36 9 °C) (04/26/21 1515)  Temp Source: Temporal (04/26/21 1039)  Respirations: 20 (04/26/21 1515)  Height: 5' 9" (175 3 cm) (04/26/21 0606)  Weight - Scale: 122 kg (268 lb) (04/26/21 0606)  SpO2: 98 % (04/26/21 1515)    Physical Exam  Vitals signs and nursing note reviewed  Constitutional:       General: He is not in acute distress  Appearance: He is well-developed  He is obese  He is not ill-appearing, toxic-appearing or diaphoretic  HENT:      Head: Normocephalic and atraumatic  Eyes:      Conjunctiva/sclera: Conjunctivae normal       Pupils: Pupils are equal, round, and reactive to light  Neck:      Musculoskeletal: Neck supple  Cardiovascular:      Rate and Rhythm: Normal rate and regular rhythm  Heart sounds: No murmur  No friction rub  No gallop  Pulmonary:      Effort: Pulmonary effort is normal  No respiratory distress  Breath sounds: Normal breath sounds  No stridor  No wheezing, rhonchi or rales  Chest:      Chest wall: No tenderness  Abdominal:      General: Abdomen is flat  Bowel sounds are normal  There is no distension  Palpations: Abdomen is soft  Tenderness: There is abdominal tenderness  Musculoskeletal:      Right lower leg: No edema  Left lower leg: No edema  Skin:     General: Skin is warm and dry  Capillary Refill: Capillary refill takes less than 2 seconds  Neurological:      General: No focal deficit present  Mental Status: He is alert and oriented to person, place, and time  Mental status is at baseline  Additional Data:     Lab Results: I have personally reviewed pertinent reports     and I have personally reviewed pertinent films in PACS                    Lab Results   Component Value Date/Time    HGBA1C 6 7 03/04/2021    HGBA1C 6 8 (H) 11/25/2020 08:03 AM    HGBA1C 6 8 11/25/2020    HGBA1C 7 0 08/21/2020    HGBA1C 7 6 (H) 05/18/2020 07:33 AM    HGBA1C 7 9 (H) 07/22/2019 07:10 AM     Results from last 7 days   Lab Units 04/26/21  1053 04/26/21  0608   POC GLUCOSE mg/dl 166* 149*           Imaging: I have personally reviewed pertinent reports  and I have personally reviewed pertinent films in PACS    No orders to display     ** Please Note: This note has been constructed using a voice recognition system   **

## 2021-04-26 NOTE — ASSESSMENT & PLAN NOTE
Lab Results   Component Value Date    HGBA1C 6 7 03/04/2021       Recent Labs     04/26/21  0608 04/26/21  1053   POCGLU 149* 166*       Blood Sugar Average: Last 72 hrs:  (P) 157 5   · Patient follows closely with outpatient endocrinology, most recently seen on 4/19   · Patient maintained on metformin 1000 mg b i d , Jardiance 25 mg q d  and Humalog via TSlim pump  · Patient will continue to use insulin pump with endocrinology managing while inpatient  · Hypoglycemia protocol  · Stop metformin and jardiance while inpatient  · Continue gabapentin 300mg HS for neuropathic pain  · Endocrinology consulted for further management while inpatient

## 2021-04-26 NOTE — ANESTHESIA PREPROCEDURE EVALUATION
Procedure:  BYPASS GASTRIC GABBY-EN-Y LAPAROSCOPIC W ROBOTICS AND INTRAOPERATIVE EGD (N/A Abdomen)    Relevant Problems   CARDIO   (+) Hyperlipidemia      ENDO   (+) Hypothyroidism   (+) Type 1 diabetes mellitus with hyperglycemia, with long-term current use of insulin (HCC)      GI/HEPATIC   (+) GERD (gastroesophageal reflux disease)      /RENAL   (+) LILA (acute kidney injury) ([facility] [address] )   (+) Renal calculus, bilateral      HEMATOLOGY   (+) Iron deficiency anemia      MUSCULOSKELETAL   (+) Chronic back pain      NEURO/PSYCH   (+) Chronic back pain      PULMONARY   (+) FARZANA (obstructive sleep apnea)        Physical Exam    Airway    Mallampati score: II  TM Distance: <3 FB  Neck ROM: full     Dental       Cardiovascular  Rhythm: regular, Rate: normal,     Pulmonary  Breath sounds clear to auscultation,     Other Findings        Anesthesia Plan  ASA Score- 2     Anesthesia Type- general with ASA Monitors  Additional Monitors:   Airway Plan: ETT  Comment: Type 1 diabetic/smart pump will try to use  Plan Factors-Exercise tolerance (METS): >4 METS  Existing labs reviewed  Patient summary reviewed  Patient is not a current smoker  Patient not instructed to abstain from smoking on day of procedure  Patient did not smoke on day of surgery  Obstructive sleep apnea risk education given perioperatively  Induction- intravenous  Postoperative Plan- Plan for postoperative opioid use  Informed Consent- Anesthetic plan and risks discussed with patient

## 2021-04-26 NOTE — NURSING NOTE
Physician made aware that patient does not want an Insulin infusion and would prefer to use his own insulin pump and glucose monitoring system

## 2021-04-27 LAB
ANION GAP SERPL CALCULATED.3IONS-SCNC: 10 MMOL/L (ref 4–13)
BUN SERPL-MCNC: 12 MG/DL (ref 5–25)
CALCIUM SERPL-MCNC: 8.8 MG/DL (ref 8.3–10.1)
CHLORIDE SERPL-SCNC: 103 MMOL/L (ref 100–108)
CO2 SERPL-SCNC: 26 MMOL/L (ref 21–32)
CREAT SERPL-MCNC: 1.02 MG/DL (ref 0.6–1.3)
ERYTHROCYTE [DISTWIDTH] IN BLOOD BY AUTOMATED COUNT: 12.3 % (ref 11.6–15.1)
GFR SERPL CREATININE-BSD FRML MDRD: 83 ML/MIN/1.73SQ M
GLUCOSE SERPL-MCNC: 119 MG/DL (ref 65–140)
GLUCOSE SERPL-MCNC: 169 MG/DL (ref 65–140)
HCT VFR BLD AUTO: 42.5 % (ref 36.5–49.3)
HGB BLD-MCNC: 14 G/DL (ref 12–17)
MCH RBC QN AUTO: 31.3 PG (ref 26.8–34.3)
MCHC RBC AUTO-ENTMCNC: 32.9 G/DL (ref 31.4–37.4)
MCV RBC AUTO: 95 FL (ref 82–98)
PLATELET # BLD AUTO: 217 THOUSANDS/UL (ref 149–390)
PMV BLD AUTO: 9.7 FL (ref 8.9–12.7)
POTASSIUM SERPL-SCNC: 4 MMOL/L (ref 3.5–5.3)
RBC # BLD AUTO: 4.47 MILLION/UL (ref 3.88–5.62)
SODIUM SERPL-SCNC: 139 MMOL/L (ref 136–145)
WBC # BLD AUTO: 12.48 THOUSAND/UL (ref 4.31–10.16)

## 2021-04-27 PROCEDURE — 80048 BASIC METABOLIC PNL TOTAL CA: CPT | Performed by: PHYSICIAN ASSISTANT

## 2021-04-27 PROCEDURE — NC001 PR NO CHARGE: Performed by: SURGERY

## 2021-04-27 PROCEDURE — 99232 SBSQ HOSP IP/OBS MODERATE 35: CPT | Performed by: PHYSICIAN ASSISTANT

## 2021-04-27 PROCEDURE — 99024 POSTOP FOLLOW-UP VISIT: CPT | Performed by: SURGERY

## 2021-04-27 PROCEDURE — 85027 COMPLETE CBC AUTOMATED: CPT | Performed by: PHYSICIAN ASSISTANT

## 2021-04-27 RX ORDER — ACETAMINOPHEN 325 MG/1
975 TABLET ORAL EVERY 8 HOURS
Qty: 63 TABLET | Refills: 0 | Status: SHIPPED | OUTPATIENT
Start: 2021-04-27 | End: 2021-05-04

## 2021-04-27 RX ORDER — OXYCODONE HYDROCHLORIDE 5 MG/1
5 TABLET ORAL EVERY 4 HOURS PRN
Qty: 10 TABLET | Refills: 0 | Status: SHIPPED | OUTPATIENT
Start: 2021-04-27 | End: 2021-05-06

## 2021-04-27 RX ORDER — OMEPRAZOLE 20 MG/1
20 CAPSULE, DELAYED RELEASE ORAL DAILY
Qty: 30 CAPSULE | Refills: 3 | Status: SHIPPED | OUTPATIENT
Start: 2021-04-27 | End: 2021-05-18 | Stop reason: SDUPTHER

## 2021-04-27 RX ADMIN — OXYCODONE HYDROCHLORIDE 10 MG: 5 SOLUTION ORAL at 03:36

## 2021-04-27 RX ADMIN — MORPHINE SULFATE 4 MG: 4 INJECTION INTRAVENOUS at 00:45

## 2021-04-27 RX ADMIN — METRONIDAZOLE 500 MG: 500 INJECTION, SOLUTION INTRAVENOUS at 01:36

## 2021-04-27 RX ADMIN — CEFAZOLIN SODIUM 2000 MG: 2 SOLUTION INTRAVENOUS at 00:41

## 2021-04-27 RX ADMIN — FAMOTIDINE 20 MG: 10 INJECTION INTRAVENOUS at 09:38

## 2021-04-27 RX ADMIN — OXYCODONE HYDROCHLORIDE 5 MG: 5 SOLUTION ORAL at 13:45

## 2021-04-27 RX ADMIN — SODIUM CHLORIDE, SODIUM LACTATE, POTASSIUM CHLORIDE, AND CALCIUM CHLORIDE 75 ML/HR: .6; .31; .03; .02 INJECTION, SOLUTION INTRAVENOUS at 01:37

## 2021-04-27 RX ADMIN — ACETAMINOPHEN 975 MG: 325 TABLET ORAL at 09:37

## 2021-04-27 RX ADMIN — OXYCODONE HYDROCHLORIDE 5 MG: 5 SOLUTION ORAL at 09:41

## 2021-04-27 RX ADMIN — OXYCODONE HYDROCHLORIDE 5 MG: 5 SOLUTION ORAL at 06:23

## 2021-04-27 RX ADMIN — LEVOTHYROXINE SODIUM 175 MCG: 125 TABLET ORAL at 09:38

## 2021-04-27 RX ADMIN — PANTOPRAZOLE SODIUM 40 MG: 40 TABLET, DELAYED RELEASE ORAL at 06:26

## 2021-04-27 RX ADMIN — FLUTICASONE PROPIONATE 1 SPRAY: 50 SPRAY, METERED NASAL at 09:42

## 2021-04-27 RX ADMIN — ACETAMINOPHEN 975 MG: 325 TABLET ORAL at 03:38

## 2021-04-27 NOTE — DISCHARGE INSTR - AVS FIRST PAGE
your pain medications from [address] [place], [place] [address]rosaura Suresh in [address]   Take Tylenol as instructed  Stay hydrated and follow your discharge diet progression   Mild nausea is ok as long as you can drink fluids  Take your omeprazole daily  Crush or cut your pills and open capsules, mix with liquid to drink    Follow up with Endocrinologist within one week  Follow up with Dr Ramandeep Acosta and your PCP within the next week

## 2021-04-27 NOTE — PROGRESS NOTES
[address] [facility]  Progress Note - Rebecca Germain 1965, 48 y o  male MRN: [id number]  Unit/Bed#: E5 -01 Encounter: [id number]  Primary Care Provider: Woodrow Meckel, CRNP   Date and time admitted to hospital: 4/26/2021  5:06 AM    * Gastric bypass status for obesity  Assessment & Plan  · S/P gastric bypass rima-en-y with intraoperative EGD  · Management per primary    Elevated BP without diagnosis of hypertension  Assessment & Plan  · Continue to hold lisinopril until evaluated by outpatient PCP  · Blood pressures have been controlled since admission    Insulin pump in place  Assessment & Plan  · T1DM  · Endocrinology consulted  · Follow-up with outpatient Endocrinology    Seasonal allergic rhinitis due to pollen  Assessment & Plan  · Flonase    Type 1 diabetes mellitus with hyperglycemia, with long-term current use of insulin [facility])  Assessment & Plan  Lab Results   Component Value Date    HGBA1C 6 7 03/04/2021       Recent Labs     04/26/21  0608 04/26/21  1053 04/26/21  2107 04/27/21  0200   POCGLU 149* 166* 130 169*       Blood Sugar Average: Last 72 hrs:  (P) 153 5   · Patient follows closely with outpatient endocrinology, most recently seen on 4/19   · Patient maintained on metformin 1000 mg b i d , Jardiance 25 mg q d  and Humalog via TSlim pump prior to admission  · Patient continued to use insulin pump while inpatient  · Hypoglycemia protocol  · Continue gabapentin 300mg HS for neuropathic pain  · Endocrinology consulted- recommending stopping oral diabetic medications which include metformin Jardiance while outpatient    Just continue with insulin pump until seen by outpatient endocrinologist  · Follow-up with endocrinologist within 2 weeks    Insomnia  Assessment & Plan  · Ambien 5 mg HS    Hypothyroidism  Assessment & Plan  · Levothyroxine 175mcg in AM  · Follows with outpatient endocr[facility]ology, [facility]  · Follow-up with outpatient Endocrinology    Hyperlipidemia  Assessment & Plan  · crestor 10mg HS  · Managed by PCP    GERD (gastroesophageal reflux disease)  Assessment & Plan  · pepcid 20mg q12 IV  · Nexium 40mg qd as outpatient, will continue      VTE Pharmacologic Prophylaxis:   Pharmacologic: Per primary  Mechanical VTE Prophylaxis in Place: Yes    Patient Centered Rounds: I have performed bedside rounds with nursing staff today  Discussions with Specialists or Other Care Team Provider:  Endocrinology    Education and Discussions with Family / Patient:  Discussed plan of care with patient, answered any questions  Time Spent for Care: 20 minutes  More than 50% of total time spent on counseling and coordination of care as described above  Current Length of Stay: 1 day(s)    Current Patient Status: Inpatient   Certification Statement: Patient is being discharged today    Discharge Plan:  Per primary    Code Status: Prior      Subjective:   Patient states that he is feeling okay today  Denies any significant pain, states that the pain is manageable  Denies any nausea, vomiting  Denies passing flatus  States that he has been burping  Denies any calf pain, shortness of breath, chest pain  Objective:     Vitals:   Temp (24hrs), Av 6 °F (37 °C), Min:98 2 °F (36 8 °C), Max:99 2 °F (37 3 °C)    Temp:  [98 2 °F (36 8 °C)-99 2 °F (37 3 °C)] 98 5 °F (36 9 °C)  HR:  [73-83] 78  Resp:  [18-20] 19  BP: (131-156)/(64-84) 138/78  SpO2:  [92 %-98 %] 94 %  Body mass index is 39 58 kg/m²  Input and Output Summary (last 24 hours): Intake/Output Summary (Last 24 hours) at 2021 1351  Last data filed at 2021 1101  Gross per 24 hour   Intake 902 5 ml   Output 2100 ml   Net -1197 5 ml       Physical Exam:     Physical Exam  Vitals signs and nursing note reviewed  Constitutional:       General: He is not in acute distress  Appearance: He is well-developed  He is obese   He is not ill-appearing, toxic-appearing or diaphoretic  Comments: Resting comfortably in chair, very pleasant  HENT:      Head: Normocephalic and atraumatic  Eyes:      Conjunctiva/sclera: Conjunctivae normal       Pupils: Pupils are equal, round, and reactive to light  Neck:      Musculoskeletal: Neck supple  Cardiovascular:      Rate and Rhythm: Normal rate and regular rhythm  Heart sounds: Normal heart sounds  No murmur  No friction rub  No gallop  Pulmonary:      Effort: Pulmonary effort is normal  No respiratory distress  Breath sounds: Normal breath sounds  No stridor  No wheezing, rhonchi or rales  Chest:      Chest wall: No tenderness  Abdominal:      General: Bowel sounds are normal       Palpations: Abdomen is soft  Tenderness: There is abdominal tenderness  Comments: Mild tenderness to palpation   Musculoskeletal:      Right lower leg: No edema  Left lower leg: No edema  Skin:     General: Skin is warm and dry  Capillary Refill: Capillary refill takes less than 2 seconds  Neurological:      General: No focal deficit present  Mental Status: He is alert and oriented to person, place, and time  Mental status is at baseline  Additional Data:     Labs:    Results from last 7 days   Lab Units 04/27/21  0514   WBC Thousand/uL 12 48*   HEMOGLOBIN g/dL 14 0   HEMATOCRIT % 42 5   PLATELETS Thousands/uL 217     Results from last 7 days   Lab Units 04/27/21  0514   SODIUM mmol/L 139   POTASSIUM mmol/L 4 0   CHLORIDE mmol/L 103   CO2 mmol/L 26   BUN mg/dL 12   CREATININE mg/dL 1 02   ANION GAP mmol/L 10   CALCIUM mg/dL 8 8   GLUCOSE RANDOM mg/dL 119         Results from last 7 days   Lab Units 04/27/21  0200 04/26/21  2107 04/26/21  1053 04/26/21  0608   POC GLUCOSE mg/dl 169* 130 166* 149*                   * I Have Reviewed All Lab Data Listed Above  * Additional Pertinent Lab Tests Reviewed:  [address] 67 Admission Reviewed    Imaging:    Imaging Reports Reviewed Today Include:  None  Imaging Personally Reviewed by Myself Includes:  None    Recent Cultures (last 7 days):           Last 24 Hours Medication List:   Current Facility-Administered Medications   Medication Dose Route Frequency Provider Last Rate    acetaminophen  975 mg Oral Q6H Be Cadena MD      famotidine  20 mg Intravenous Q12H [facility] Babs Belcher PA-C      fluticasone  1 spray Each Nare Daily Gina Argueta MD      insulin aspart  300 Units Subcutaneous Insulin Pump Once PRN Jennifer Formosa, DO      lactated ringers  75 mL/hr Intravenous Continuous Babs Belcher PA-C 75 mL/hr (04/27/21 0137)    levothyroxine  175 mcg Oral Daily Babs Belcher PA-C      metoclopramide  10 mg Intravenous Q6H PRN Anthony Encinas MD      morphine injection  4 mg Intravenous Q4H PRN Babs Belcher PA-C      ondansetron  4 mg Intravenous Q6H PRN Anthony Encinas MD      oxyCODONE  10 mg Oral Q4H PRN Babs Belcher PA-C      oxyCODONE  5 mg Oral Q4H PRN Babs Belcher PA-C      pantoprazole  40 mg Oral Early Morning Mike Dockery [place]      patient maintained insulin pump  1 each Subcutaneous Q8H Jennifer Formosa, DO      phenol  2 spray Mouth/Throat Q2H PRN Babs Belcher PA-C      promethazine  25 mg Intravenous Q6H PRN Anthony Encinas MD      scopolamine  1 patch Transdermal Once Anthony Encinas MD      simethicone  80 mg Oral 4x Daily PRN Regan Belcher PA-C      zolpidem  5 mg Oral HS PRN Mike Dockery PA-C          Today, Patient Was Seen By: Mike Dockery PA-C    ** Please Note: Dictation voice to text software may have been used in the creation of this document   **

## 2021-04-27 NOTE — ASSESSMENT & PLAN NOTE
· Continue to hold lisinopril until evaluated by outpatient PCP  · Blood pressures have been controlled since admission

## 2021-04-27 NOTE — DISCHARGE INSTRUCTIONS
Bariatric/Weight Loss Surgery  Hospital Discharge Instructions  1  ACTIVITY:  a  Progress as feels comfortable - a good rule is:  if you are doing something and it begins to hurt, stop doing the activity  Walk every hour while at home  b  Markmiri Juarez may walk stairs if you do so slowly  c  You may shower 48 hours after surgery  d  Use your incentive spirometer 10 times per hour while awake for 1 week  e  Do NOT drive for 48 hours after surgery  No driving 24 hours after taking certain prescription pain medications   Examples of such medication are Percocet, Darvocet, Oxycodone, Tylenol #3, and Tylenol with Codeine  2  DIET  a  Stay on a liquid diet for 7 days after your surgery date, sipping slowly  Refer to your manual for examples of choices  Remember to keep your liquids sugar free or low calorie  You may have protein drinks  Make sure to drink 48 to 64 ounces per day of fluids  b  Diego Pizarro may advance to a pureed diet one week after surgery as instructed by your diet progression pamphlet  Once you get approval from your surgeon at your first post operative visit you may advance to the soft diet  3  MEDICATIONS:  a  The abdominal nerve block will wear off during the first 1-2 days that you are home, and you may become sore  Continue to take your Tylenol and your pain medication as instructed  b  Start vitamins and minerals when you get home  c  Anti-acid Medication as per prescription  d  Other medications as indicated on the Physician Patient Discharge Instructions form given to you at the time of discharge  e  Make sure that you are splitting your pill or tablet medications in halves or fourths or even crushing them before you take them  Capsules should be opened and mixed with water or jello  You need to do this for at least 4 weeks after surgery  Eventually you will be able to take your medications the regular way as they were prescribed     f  Diego Pizarro will need to consult with your Family Doctor in regards to all your prescribed medication, particularly those for blood pressure and diabetes  As you lose weight, medical conditions may change, requiring an alteration or elimination of the drug dose  g  DO NOT TAKE BIRTH CONTROL(BC) MEDICATIONS, INSERT BC VAGINAL RINGS, OR PLACE IUD OR ANY OTHER BC METHODS UNTIL 31 DAYS FROM DAY OF DISCHARGE FROM HOSPITAL  THIS PLACES YOU AT HIGH RISK FOR A POTENTIALLY LIFE THREATENING BLOOD CLOT  Remember to always use barrier methods for birth control and speak to your GYN about using two forms of birth control to start 31 days after surgery  It is very important to avoid pregnancy until at least 18-24 months after surgery  4  INCISION CARE  a  You may shower and get incisions wet 2 days after surgery  No soaking tub baths or swimming for 30 days after surgery  Keep abdominal area and incisions clean  Use soap and water to create a good lather and rinse off  Do not scrub incisions  b  If you have a drain, empty the drain as the nurses instructed  5  FOLLOW-UP APPOINTMENT should be made for one week after discharge  Call surgeons office at [phone] to schedule an appointment      6  CALL YOUR DOCTOR FOR:  pain not controlled by pain medications, a temperature greater than 101 5° F, any increase or change in drainage or redness from any incision, any vomiting or inability to keep liquids down, shortness of breath, shoulder pain, or bleeding

## 2021-04-27 NOTE — DISCHARGE SUMMARY
Discharge Summary - Sebas Charles 48 y o  male MRN: [id number]    Unit/Bed#: E5 -01 Encounter: [id number]      Pre-Operative Diagnosis: Pre-Op Diagnosis Codes:     * Lifelong obesity [E66 9]     * Diabetic neuropathy, type I diabetes mellitus ([facility] [address] ) [E10 40]     * FARZANA on CPAP [G47 33, Z99 89]     * Mixed hyperlipidemia [E78 2]    Post-Operative Diagnosis: Post-Op Diagnosis Codes:     * Lifelong obesity [E66 9]     * Diabetic neuropathy, type I diabetes mellitus ([facility] [address] ) [E10 40]     * FARZANA on CPAP [G47 33, Z99 89]     * Mixed hyperlipidemia [E78 2]    Procedures Performed:  Procedure(s):  BYPASS GASTRIC GABBY-EN-Y LAPAROSCOPIC W ROBOTICS AND INTRAOPERATIVE EGD    Surgeon: Lissette Castillo MD    See H & P for full details of admission and Operative Note for full details of operations performed  Patient tolerated surgery well without complications  In the morning postoperative Day 1, the patient had mild nausea and abdominal pain  Tolerated a clear liquid diet without vomiting  Able to ambulate and voiding independently  Patient was deemed ready for discharge home  SLIM consulted for home medication management  Patient was seen and examined prior to discharge  Provisions for Follow-Up Care:  See After Visit Summary/Discharge Instructions for information related to follow-up care and home orders  Disposition: Home, in stable condition  Planned Readmission: No    Discharge Medications:  See After Visit Summary/Discharge Instructions for reconciled discharge medications provided to patient and family  Post Operative instructions: Reviewed with patient and/or family      Signature:   Tri Bang PA-C  Date: 4/27/2021 Time: 11:01 AM

## 2021-04-27 NOTE — ASSESSMENT & PLAN NOTE
Lab Results   Component Value Date    HGBA1C 6 7 03/04/2021       Recent Labs     04/26/21  0608 04/26/21  1053 04/26/21  2107 04/27/21  0200   POCGLU 149* 166* 130 169*       Blood Sugar Average: Last 72 hrs:  (P) 153 5   · Patient follows closely with outpatient endocrinology, most recently seen on 4/19   · Patient maintained on metformin 1000 mg b i d , Jardiance 25 mg q d  and Humalog via TSlim pump prior to admission  · Patient continued to use insulin pump while inpatient  · Hypoglycemia protocol  · Continue gabapentin 300mg HS for neuropathic pain  · Endocrinology consulted- recommending stopping oral diabetic medications which include metformin Jardiance while outpatient    Just continue with insulin pump until seen by outpatient endocrinologist  · Follow-up with endocrinologist within 2 weeks

## 2021-04-27 NOTE — UTILIZATION REVIEW
Initial Clinical Review    Elective    IP       surgical procedure    Age/Sex: 48 y o  male     Surgery Date:     4/26/21    Procedure: BYPASS GASTRIC GABBY-EN-Y LAPAROSCOPIC W ROBOTICS AND INTRAOPERATIVE EGD (N/A)    Anesthesia:    general    Operative Findings:    normal    POD#1 Progress Note:   4/27    Continue post op care  Continue pain control/anti emetics  As  Needed  Encourage ambulation/incentive spirometry  Continue  CPAP  At  [facility]  Monitor labs/vital signs      Admission Orders: Date/Time/Statement:   Admission Orders (From admission, onward)     Ordered        04/26/21 1046  Inpatient Admission  Once                   Orders Placed This Encounter   Procedures    Inpatient Admission     Standing Status:   Standing     Number of Occurrences:   1     Order Specific Question:   Level of Care     Answer:   Med Surg [16]     Order Specific Question:   Estimated length of stay     Answer:   Inpatient Only Surgery     Vital Signs: /78   Pulse 78   Temp 98 5 °F (36 9 °C)   Resp 19   Ht 5' 9" (1 753 m)   Wt 122 kg (268 lb)   SpO2 94%   BMI 39 58 kg/m²     Pertinent Labs/Diagnostic Test Results:       Results from last 7 days   Lab Units 04/27/21  0514   WBC Thousand/uL 12 48*   HEMOGLOBIN g/dL 14 0   HEMATOCRIT % 42 5   PLATELETS Thousands/uL 217         Results from last 7 days   Lab Units 04/27/21  0514   SODIUM mmol/L 139   POTASSIUM mmol/L 4 0   CHLORIDE mmol/L 103   CO2 mmol/L 26   ANION GAP mmol/L 10   BUN mg/dL 12   CREATININE mg/dL 1 02   EGFR ml/min/1 73sq m 83   CALCIUM mg/dL 8 8         Results from last 7 days   Lab Units 04/27/21  0200 04/26/21  2107 04/26/21  1053 04/26/21  0608   POC GLUCOSE mg/dl 169* 130 166* 149*     Results from last 7 days   Lab Units 04/27/21  0514   GLUCOSE RANDOM mg/dL 119         Diet:    Bariatric cl liq    Mobility:    Up as tolerated    DVT Prophylaxis:    SCD'S    Medications/Pain Control:   Scheduled Medications:  acetaminophen, 975 mg, Oral, Q6H  famotidine, 20 mg, Intravenous, Q12H ONUR  fluticasone, 1 spray, Each Nare, Daily  levothyroxine, 175 mcg, Oral, Daily  pantoprazole, 40 mg, Oral, Early Morning  patient maintained insulin pump, 1 each, Subcutaneous, Q8H  scopolamine, 1 patch, Transdermal, Once      Continuous IV Infusions:  lactated ringers, 75 mL/hr, Intravenous, Continuous      PRN Meds:  insulin aspart, 300 Units, Subcutaneous Insulin Pump, Once PRN  metoclopramide, 10 mg, Intravenous, Q6H PRN  morphine injection, 4 mg, Intravenous, Q4H PRN  ( x 1  4/26 and X 1  4/27 thus far)  ondansetron, 4 mg, Intravenous, Q6H PRN  oxyCODONE, 10 mg, Oral, Q4H PRN  oxyCODONE, 5 mg, Oral, Q4H PRN  phenol, 2 spray, Mouth/Throat, Q2H PRN  promethazine, 25 mg, Intravenous, Q6H PRN  simethicone, 80 mg, Oral, 4x Daily PRN  zolpidem, 5 mg, Oral, HS PRN    24 hr tele    Network Utilization Review Department  ATTENTION: Please call with any questions or concerns to [phone] and carefully listen to the prompts so that you are directed to the right person  All voicemails are confidential   [place] Patient all requests for admission clinical reviews, approved or denied determinations and any other requests to dedicated fax number below belonging to the campus where the patient is receiving treatment   List of dedicated fax numbers for the Facilities:  [address] DENIALS (Administrative/Medical Necessity) [phone]   [address] (Maternity/NICU/Pediatrics) [phone]   [address] [place] [address] Dr Rick Sosael Irina [phone] [address]   [address]  [place] [address] [address] [place] [address] [phone]

## 2021-04-27 NOTE — PLAN OF CARE
Problem: PAIN - ADULT  Goal: Verbalizes/displays adequate comfort level or baseline comfort level  Description: Interventions:  - Encourage patient to monitor pain and request assistance  - Assess pain using appropriate pain scale  - Administer analgesics based on type and severity of pain and evaluate response  - Implement non-pharmacological measures as appropriate and evaluate response  - Consider cultural and social influences on pain and pain management  - Notify physician/advanced practitioner if interventions unsuccessful or patient reports new pain  Outcome: Progressing     Problem: INFECTION - ADULT  Goal: Absence or prevention of progression during hospitalization  Description: INTERVENTIONS:  - Assess and monitor for signs and symptoms of infection  - Monitor lab/diagnostic results  - Monitor all insertion sites, i e  indwelling lines, tubes, and drains  - Monitor endotracheal if appropriate and nasal secretions for changes in amount and color  - [place] appropriate cooling/warming therapies per order  - Administer medications as ordered  - Instruct and encourage patient and family to use good hand hygiene technique  - Identify and instruct in appropriate isolation precautions for identified infection/condition  Outcome: Progressing  Goal: Absence of fever/infection during neutropenic period  Description: INTERVENTIONS:  - Monitor WBC    Outcome: Progressing     Problem: SAFETY ADULT  Goal: Patient will remain free of falls  Description: INTERVENTIONS:  - Assess patient frequently for physical needs  -  Identify cognitive and physical deficits and behaviors that affect risk of falls    -  [place] fall precautions as indicated by assessment   - Educate patient/family on patient safety including physical limitations  - Instruct patient to call for assistance with activity based on assessment  - Modify environment to reduce risk of injury  - Consider OT/PT consult to assist with strengthening/mobility  Outcome: Progressing  Goal: Maintain or return to baseline ADL function  Description: INTERVENTIONS:  -  Assess patient's ability to carry out ADLs; assess patient's baseline for ADL function and identify physical deficits which impact ability to perform ADLs (bathing, care of mouth/teeth, toileting, grooming, dressing, etc )  - Assess/evaluate cause of self-care deficits   - Assess range of motion  - Assess patient's mobility; develop plan if impaired  - Assess patient's need for assistive devices and provide as appropriate  - Encourage maximum independence but intervene and supervise when necessary  - Involve family in performance of ADLs  - Assess for home care needs following discharge   - Consider OT consult to assist with ADL evaluation and planning for discharge  - Provide patient education as appropriate  Outcome: Progressing  Goal: Maintain or return mobility status to optimal level  Description: INTERVENTIONS:  - Assess patient's baseline mobility status (ambulation, transfers, stairs, etc )    - Identify cognitive and physical deficits and behaviors that affect mobility  - Identify mobility aids required to assist with transfers and/or ambulation (gait belt, sit-to-stand, lift, walker, cane, etc )  - [place] fall precautions as indicated by assessment  - Record patient progress and toleration of activity level on Mobility SBAR; progress patient to next Phase/Stage  - Instruct patient to call for assistance with activity based on assessment  - Consider rehabilitation consult to assist with strengthening/weightbearing, etc   Outcome: Progressing     Problem: DISCHARGE PLANNING  Goal: Discharge to home or other facility with appropriate resources  Description: INTERVENTIONS:  - Identify barriers to discharge w/patient and caregiver  - Arrange for needed discharge resources and transportation as appropriate  - Identify discharge learning needs (meds, wound care, etc )  - Arrange for interpretive services to assist at discharge as needed  - Refer to Case Management Department for coordinating discharge planning if the patient needs post-hospital services based on physician/advanced practitioner order or complex needs related to functional status, cognitive ability, or social support system  Outcome: Progressing     Problem: Knowledge Deficit  Goal: Patient/family/caregiver demonstrates understanding of disease process, treatment plan, medications, and discharge instructions  Description: Complete learning assessment and assess knowledge base    Interventions:  - Provide teaching at level of understanding  - Provide teaching via preferred learning methods  Outcome: Progressing     Problem: GASTROINTESTINAL - ADULT  Goal: Minimal or absence of nausea and/or vomiting  Description: INTERVENTIONS:  - Administer IV fluids if ordered to ensure adequate hydration  - Maintain NPO status until nausea and vomiting are resolved  - Nasogastric tube if ordered  - Administer ordered antiemetic medications as needed  - Provide nonpharmacologic comfort measures as appropriate  - Advance diet as tolerated, if ordered  - Consider nutrition services referral to assist patient with adequate nutrition and appropriate food choices  Outcome: Progressing PRE-OP DIAGNOSIS:  Endometrioma 22-Feb-2021 20:51:02  Celia Lopez

## 2021-04-27 NOTE — ASSESSMENT & PLAN NOTE
· Levothyroxine 175mcg in AM  · Follows with outpatient endocrinology, [facility]  · Follow-up with outpatient Endocrinology

## 2021-04-27 NOTE — PROGRESS NOTES
Progress Note - Bariatric Surgery   Aydin Kraus 48 y o  male MRN: [id number]  Unit/Bed#: E5 -01 Encounter: [id number]      Subjective/Objective     Subjective:  Patient with morbid obesity POD1 s/p Robotic RNYGB  Patient denies fevers, chills, sweats, SOB, CP, calf pain  Pain adequately controlled on oral pain medication  Ambulating without assistance, voiding well, and using incentive spirometer  Tolerating liquid diet without nausea or vomiting today  Vital signs stable  CBC today shows Hgb stable at 14 0 from 15 4 previously  BMP obtained today WNL  Hx CPAP  SLIM Consulted yesterday for med management and saw patient POD0  Endocrinology also saw patient POD0  Using insulin pump  Objective:    /64   Pulse 73   Temp 99 2 °F (37 3 °C)   Resp 18   Ht 5' 9" (1 753 m)   Wt 122 kg (268 lb)   SpO2 94%   BMI 39 58 kg/m²       Intake/Output Summary (Last 24 hours) at 4/27/2021 0741  Last data filed at 4/27/2021 8044  Gross per 24 hour   Intake 2542 5 ml   Output 1730 ml   Net 812 5 ml       Invasive Devices     Peripheral Intravenous Line            Peripheral IV 04/26/21 Left Hand 1 day                ROS: 10-point system completed  All negative except see HPI      Physical Exam    General Appearance:    Alert, cooperative, no distress, appears stated age   Head:    Normocephalic, without obvious abnormality, atraumatic   Lungs:     Respirations unlabored   Heart:    Regular rate and rhythm   Abdomen:     Soft, appropriate tenderness, no masses, no organomegaly, non-distended   Extremities:   Extremities normal, atraumatic, no cyanosis or edema   Neurologic:  Incision:  Psych:   Normal strength and sensation    Clean, dry, and intact, no bleeding    Normal mood and affect       Lab, Imaging and other studies:  CBC:   Lab Results   Component Value Date    WBC 12 48 (H) 04/27/2021    HGB 14 0 04/27/2021    HCT 42 5 04/27/2021    MCV 95 04/27/2021     04/27/2021    MCH 31 3 04/27/2021    MCHC 32 9 04/27/2021    RDW 12 3 04/27/2021    MPV 9 7 04/27/2021   , CMP:   Lab Results   Component Value Date    SODIUM 139 04/27/2021    K 4 0 04/27/2021     04/27/2021    CO2 26 04/27/2021    BUN 12 04/27/2021    CREATININE 1 02 04/27/2021    CALCIUM 8 8 04/27/2021    EGFR 83 04/27/2021        VTE Mechanical Prophylaxis: sequential compression device    Assessment/Plan  1)  Patient with morbid Obesity s/p Robotic RNYGB with stable post op course  Patient afebrile and hemodynamically stable  - Encourage PO fluids  - Recommend ambulation, use of SCDs when not ambulating, and incentive spirometry  - Complete antibiotics  - Continue CPAP QHS  - Continue insulin pump per endo (slim and endo saw patient POD0), glucose WNL  - No need to repeat labs  - Plan to D/C patient home today pending anticipated progression    Plan of care was discussed with patient  Care plan discussed with Dr Ariella Belcher PA-C  Bariatric Surgery  4/27/2021  7:41 AM

## 2021-04-28 ENCOUNTER — TELEPHONE (OUTPATIENT)
Dept: MEDSURG UNIT | Facility: HOSPITAL | Age: 56
End: 2021-04-28

## 2021-04-28 NOTE — TELEPHONE ENCOUNTER
Post op follow up phone call completed  Pt is sipping liquids, using IS as instructed, reinforced importance of using IS to help prevent pneumonia  Ambulating about home without difficulty  Pain controlled with analgesia  Reaffirmed examples of liquid diet over the next week  Pt's blood sugars running in range 130-140 which he states is excellent for him  Pt stated understanding about discharge instructions and medication adjustments  Follow up appt with surgeon scheduled for next week  Instructed to call with any additional questions or concerns

## 2021-04-29 ENCOUNTER — TRANSITIONAL CARE MANAGEMENT (OUTPATIENT)
Dept: INTERNAL MEDICINE CLINIC | Age: 56
End: 2021-04-29

## 2021-04-29 VITALS
HEIGHT: 69 IN | SYSTOLIC BLOOD PRESSURE: 145 MMHG | WEIGHT: 268 LBS | RESPIRATION RATE: 19 BRPM | DIASTOLIC BLOOD PRESSURE: 92 MMHG | BODY MASS INDEX: 39.69 KG/M2 | TEMPERATURE: 97.8 F | HEART RATE: 72 BPM | OXYGEN SATURATION: 94 %

## 2021-05-06 ENCOUNTER — OFFICE VISIT (OUTPATIENT)
Dept: INTERNAL MEDICINE CLINIC | Age: 56
End: 2021-05-06
Payer: COMMERCIAL

## 2021-05-06 VITALS — WEIGHT: 246.2 LBS | TEMPERATURE: 97 F | BODY MASS INDEX: 36.36 KG/M2

## 2021-05-06 DIAGNOSIS — E03.9 ACQUIRED HYPOTHYROIDISM: ICD-10-CM

## 2021-05-06 DIAGNOSIS — E10.65 TYPE 1 DIABETES MELLITUS WITH HYPERGLYCEMIA, WITH LONG-TERM CURRENT USE OF INSULIN (HCC): Primary | ICD-10-CM

## 2021-05-06 DIAGNOSIS — R03.0 ELEVATED BP WITHOUT DIAGNOSIS OF HYPERTENSION: ICD-10-CM

## 2021-05-06 DIAGNOSIS — E78.5 HYPERLIPIDEMIA, UNSPECIFIED HYPERLIPIDEMIA TYPE: ICD-10-CM

## 2021-05-06 DIAGNOSIS — Z98.84 S/P GASTRIC BYPASS: ICD-10-CM

## 2021-05-06 PROCEDURE — 1111F DSCHRG MED/CURRENT MED MERGE: CPT | Performed by: PHYSICIAN ASSISTANT

## 2021-05-06 PROCEDURE — 99495 TRANSJ CARE MGMT MOD F2F 14D: CPT | Performed by: PHYSICIAN ASSISTANT

## 2021-05-06 RX ORDER — ROSUVASTATIN CALCIUM 10 MG/1
10 TABLET, COATED ORAL
Qty: 90 TABLET | Refills: 1
Start: 2021-05-06 | End: 2021-07-08 | Stop reason: SDUPTHER

## 2021-05-06 NOTE — PROGRESS NOTES
Assessment/Plan:        Problem List Items Addressed This Visit        Endocrine    Hypothyroidism    Type 1 diabetes mellitus with hyperglycemia, with long-term current use of insulin ([address] ) - Primary       Other    Hyperlipidemia    Relevant Medications    rosuvastatin (CRESTOR) 10 MG tablet    Elevated BP without diagnosis of hypertension      Other Visit Diagnoses     S/P gastric bypass        pt tolerated well, denies pain, f/u with surgery 5/7/21             Reason for visit is tcm - s/p rima-en-y gastric bypass     Encounter provider Anna Cruz PA-C       Provider located at [address] [place] [phone]      Recent Visits  No visits were found meeting these conditions  Showing recent visits within past 7 days and meeting all other requirements     Today's Visits  Date Type Provider Dept   05/06/21 Office Visit Anna Cruz PA-C [facility]   Showing today's visits and meeting all other requirements     Future Appointments  No visits were found meeting these conditions  Showing future appointments within next 150 days and meeting all other requirements        After connecting through [employer], the patient was identified by name and date of birth  Mihir Camarillo was informed that this is a telemedicine visit and that the visit is being conducted through [employer] and patient was informed that this is a secure, HIPAA-compliant platform  He agrees to proceed     My office door was closed  No one else was in the room  He acknowledged consent and understanding of privacy and security of the video platform  The patient has agreed to participate and understands they can discontinue the visit at any time  Patient is aware this is a billable service  Subjective:     Patient ID: Mihir Camarillo is a 48 y o  male      Pt presents for virtual tcm following admission to [facility] under Dr Earle Suarez service 4/26-4/27 for rima -en -y gastric bypass  Pt with hx of dm type 1 with insulin pump, hypothyroid, dyslipidemia, htn  Pt hospital course reviewed, uncomplicated  Metformin and jardiance d/c'd  Pt reports insulin needs have declined nicely  Following full liquid diet, denies ab pain, n/v  Pt denies use of pain meds, takes tylenol only prn  Reports normal bm, denies constipation  Pt has f/u with bariatric sx 5/7 and endocrine 6/14  Lisinopril has been on hold since d/c from hospital  Pt does not have home bp cuff, will have done tomorrow at surgery f/u  TCM Call (since 4/5/2021)     Date and time call was made  4/29/2021 11:14 AM    Hospital care reviewed  Records reviewed    Patient was hospitialized at  [place] [place] [address]        Date of Admission  04/26/21    Date of discharge  04/27/21    Diagnosis  s/p gastric bypass    Disposition  Home    Were the patients medications reviewed and updated  Yes    Current Symptoms  None      TCM Call (since 4/5/2021)     Post hospital issues  Reduced activity    Should patient be enrolled in anticoag monitoring? Yes    Scheduled for follow up? Patient Refused    Patient refusal reason  pt states he discussed with Darshana Ken already and   only wants to follow up with endo            Review of Systems   Constitutional: Negative for appetite change, fatigue and fever  HENT: Negative for congestion and sore throat  Respiratory: Negative for cough and shortness of breath  Cardiovascular: Negative for chest pain and leg swelling  Gastrointestinal: Negative for abdominal pain, constipation, diarrhea, nausea and vomiting  Skin: Negative for rash  Neurological: Negative for dizziness and headaches  Psychiatric/Behavioral: Negative for sleep disturbance  The patient is not nervous/anxious            Objective:    Vitals:    05/06/21 1423   Temp: (!) 97 °F (36 1 °C)   TempSrc: Temporal   Weight: 112 kg (246 lb 3 2 oz)       Physical Exam  Vitals signs reviewed  HENT:      Head: Normocephalic  Pulmonary:      Effort: Pulmonary effort is normal  No respiratory distress  Neurological:      General: No focal deficit present  Mental Status: He is alert  Psychiatric:         Mood and Affect: Mood normal              Transitional Care Management Review:  Courtney Trimble is a 48 y o  male here for TCM follow up  During the TCM phone call patient stated:    TCM Call (since 4/5/2021)     Date and time call was made  4/29/2021 11:14 AM    Hospital care reviewed  Records reviewed    Patient was hospitialized at  [place] [address]        Date of Admission  04/26/21    Date of discharge  04/27/21    Diagnosis  s/p gastric bypass    Disposition  Home    Were the patients medications reviewed and updated  Yes    Current Symptoms  None      TCM Call (since 4/5/2021)     Post hospital issues  Reduced activity    Should patient be enrolled in anticoag monitoring? Yes    Scheduled for follow up? Patient Refused    Patient refusal reason  pt states he discussed with Marisa Ferguson already and only wants to follow up with endo          I spent 20 minutes with the patient during this visit      Jessica Sommers PA-C

## 2021-05-07 ENCOUNTER — OFFICE VISIT (OUTPATIENT)
Dept: BARIATRICS | Facility: CLINIC | Age: 56
End: 2021-05-07

## 2021-05-07 VITALS
TEMPERATURE: 97 F | HEART RATE: 73 BPM | DIASTOLIC BLOOD PRESSURE: 80 MMHG | SYSTOLIC BLOOD PRESSURE: 130 MMHG | WEIGHT: 248 LBS | HEIGHT: 69 IN | BODY MASS INDEX: 36.73 KG/M2

## 2021-05-07 DIAGNOSIS — Z98.84 BARIATRIC SURGERY STATUS: Primary | ICD-10-CM

## 2021-05-07 DIAGNOSIS — Z98.84 GASTRIC BYPASS STATUS FOR OBESITY: Primary | ICD-10-CM

## 2021-05-07 DIAGNOSIS — E10.40 TYPE 1 DIABETES MELLITUS WITH DIABETIC NEUROPATHY (HCC): ICD-10-CM

## 2021-05-07 PROCEDURE — 3008F BODY MASS INDEX DOCD: CPT | Performed by: PHYSICIAN ASSISTANT

## 2021-05-07 PROCEDURE — 99024 POSTOP FOLLOW-UP VISIT: CPT | Performed by: SURGERY

## 2021-05-07 PROCEDURE — RECHECK: Performed by: DIETITIAN, REGISTERED

## 2021-05-07 NOTE — PROGRESS NOTES
POST OP UP VISIT - BARIATRIC SURGERY  Nithin Sample 48 y o  male MRN: [id number]  Unit/Bed#:  Encounter: [id number]      HPI:  Corinth Sample is a 48 y o  male status post   Robotic RYGB        Review of Systems    Historical Information   Past Medical History:   Diagnosis Date    Anemia     Diabetes mellitus ([address] )     Disease of thyroid gland     GERD (gastroesophageal reflux disease)     Hyperlipidemia     Hypertension     Insomnia     Kidney stone     Sleep apnea     Mild sleep apnea    Type 1 diabetes mellitus (HCC)     Uses Insulin Pump    Vitamin D deficiency      Past Surgical History:   Procedure Laterality Date    CARPAL TUNNEL RELEASE Right     COLONOSCOPY      EGD      FL RETROGRADE PYELOGRAM  2020    LASIK      GA CYSTO/URETERO W/LITHOTRIPSY &INDWELL STENT INSRT Right 2020    Procedure: CYSTOSCOPY URETEROSCOPY WITH LITHOTRIPSY HOLMIUM LASER, RETROGRADE PYELOGRAM AND INSERTION STENT URETERAL;  Surgeon: Jackelin Taylor MD;  [facility];  Service: Urology    GA LAP GASTRIC BYPASS/GABBY-EN-Y N/A 2021    Procedure: BYPASS GASTRIC GABBY-EN-Y LAPAROSCOPIC W ROBOTICS AND INTRAOPERATIVE EGD;  Surgeon: Ketan Cunha MD;  [facility];  Service: Bariatrics    ROTATOR CUFF REPAIR Right      Social History   Social History     Substance and Sexual Activity   Alcohol Use Yes    Alcohol/week: 2 0 standard drinks    Types: 2 Glasses of wine per week    Frequency: 2-3 times a week    Drinks per session: 1 or 2    Binge frequency: Less than monthly    Comment: social     Social History     Substance and Sexual Activity   Drug Use No     Social History     Tobacco Use   Smoking Status Former Smoker    Years: 30 00    Types: Cigarettes    Quit date:     Years since quittin 3   Smokeless Tobacco Never Used   Tobacco Comment    quit in      Family History: non-contributory    Meds/Allergies   all medications and allergies reviewed  Allergies Allergen Reactions    Ibuprofen GI Intolerance    Other Other (See Comments)     Seasonal allergies- pt has congestion       Objective       Current Vitals:   Blood Pressure: 130/80 (05/07/21 1007)  Pulse: 73 (05/07/21 1007)  Temperature: (!) 97 °F (36 1 °C) (05/07/21 1007)  Temp Source: Tympanic (05/07/21 1007)  Height: 5' 9" (175 3 cm) (05/07/21 1007)  Weight - Scale: 112 kg (248 lb) (05/07/21 1007)      Invasive Devices     None                 Physical Exam  Constitutional:       Appearance: Normal appearance  He is obese  HENT:      Mouth/Throat:      Mouth: Mucous membranes are moist    Neck:      Musculoskeletal: Normal range of motion  Cardiovascular:      Rate and Rhythm: Normal rate and regular rhythm  Pulmonary:      Effort: Pulmonary effort is normal       Breath sounds: Normal breath sounds  Abdominal:      General: Abdomen is flat  Palpations: Abdomen is soft  Tenderness: There is no abdominal tenderness  There is no guarding or rebound  Hernia: No hernia is present  Comments: Incisions are healing well  Musculoskeletal: Normal range of motion  Skin:     General: Skin is warm  Neurological:      General: No focal deficit present  Mental Status: He is alert and oriented to person, place, and time  Psychiatric:         Mood and Affect: Mood normal                Assessment/PLAN:    48 y o  male status post  roboticRYGB done on  04/26/2021  Doing well post op  No major issues  Tolerating his diet  Denies nausea or vomiting  He is taking PPI  Increase physical activity as tolerated and as instructed  Advance diet as instructed by our dietitians today and as indicated in the binder  Follow up in one month as scheduled            Tammi Diaz MD  5/7/2021  10:23 AM

## 2021-05-09 DIAGNOSIS — E10.8 TYPE 1 DIABETES MELLITUS WITH COMPLICATION (HCC): ICD-10-CM

## 2021-05-18 DIAGNOSIS — G47.00 INSOMNIA, UNSPECIFIED TYPE: ICD-10-CM

## 2021-05-18 DIAGNOSIS — Z98.84 GASTRIC BYPASS STATUS FOR OBESITY: ICD-10-CM

## 2021-05-18 RX ORDER — ZOLPIDEM TARTRATE 10 MG/1
10 TABLET ORAL
Qty: 30 TABLET | Refills: 0 | Status: SHIPPED | OUTPATIENT
Start: 2021-05-18 | End: 2021-06-21 | Stop reason: SDUPTHER

## 2021-05-18 RX ORDER — OMEPRAZOLE 20 MG/1
20 CAPSULE, DELAYED RELEASE ORAL DAILY
Qty: 30 CAPSULE | Refills: 3 | Status: SHIPPED | OUTPATIENT
Start: 2021-05-18 | End: 2021-07-14 | Stop reason: SDUPTHER

## 2021-05-18 NOTE — TELEPHONE ENCOUNTER
LOV: 5/6/21  NOV: 9/17/21      PDMP checked      Please advise while [facility] is out on leave

## 2021-06-08 ENCOUNTER — CLINICAL SUPPORT (OUTPATIENT)
Dept: BARIATRICS | Facility: CLINIC | Age: 56
End: 2021-06-08

## 2021-06-08 DIAGNOSIS — Z98.84 BARIATRIC SURGERY STATUS: Primary | ICD-10-CM

## 2021-06-08 PROCEDURE — RECHECK: Performed by: DIETITIAN, REGISTERED

## 2021-06-08 NOTE — PROGRESS NOTES
Weight Management Nutrition Class   i  Name was verified by patient stating name? yes  ii   verified by patient stating? yes  iii  You verified the patient is alone? yes  iv  I would like to verify that you were offered a live visit but are now consenting to this telephone visit? yes  v  This visit is free yes    Diagnosis: Obesity    Bariatric Surgeon: Dr Jah Brown    Surgery: Gastric Bypass Laparoscopic    Class: 5 week post op     Topics discussed today include:     fluid goals post op, protein goals post op, constipation, chew food well, exercise, avoidance of alcohol, PPI use, diet progression, hypoglycemia, dumping syndrome, protein supplems, vitamin/mineral supplements and calcium supplements    Patient was able to verbalize basic diet (protein, fluid, vitamin and mineral) recommendations and possible nutrition-related complications   Yes

## 2021-06-21 ENCOUNTER — OFFICE VISIT (OUTPATIENT)
Dept: ENDOCRINOLOGY | Facility: CLINIC | Age: 56
End: 2021-06-21
Payer: COMMERCIAL

## 2021-06-21 VITALS
BODY MASS INDEX: 35.1 KG/M2 | HEIGHT: 69 IN | WEIGHT: 237 LBS | SYSTOLIC BLOOD PRESSURE: 130 MMHG | DIASTOLIC BLOOD PRESSURE: 90 MMHG | HEART RATE: 64 BPM

## 2021-06-21 DIAGNOSIS — E78.2 MIXED HYPERLIPIDEMIA: ICD-10-CM

## 2021-06-21 DIAGNOSIS — G47.00 INSOMNIA, UNSPECIFIED TYPE: ICD-10-CM

## 2021-06-21 DIAGNOSIS — E10.65 TYPE 1 DIABETES MELLITUS WITH HYPERGLYCEMIA, WITH LONG-TERM CURRENT USE OF INSULIN (HCC): Primary | ICD-10-CM

## 2021-06-21 DIAGNOSIS — E03.9 ACQUIRED HYPOTHYROIDISM: ICD-10-CM

## 2021-06-21 PROCEDURE — 95251 CONT GLUC MNTR ANALYSIS I&R: CPT | Performed by: PHYSICIAN ASSISTANT

## 2021-06-21 PROCEDURE — 99214 OFFICE O/P EST MOD 30 MIN: CPT | Performed by: PHYSICIAN ASSISTANT

## 2021-06-21 RX ORDER — ZOLPIDEM TARTRATE 10 MG/1
10 TABLET ORAL
Qty: 30 TABLET | Refills: 0 | Status: SHIPPED | OUTPATIENT
Start: 2021-06-21 | End: 2021-07-14 | Stop reason: SDUPTHER

## 2021-06-21 NOTE — PATIENT INSTRUCTIONS
Hypoglycemia instructions   Carmina Sen  6/21/2021  [id number]    Low Blood Sugar    Steps to treat low blood sugar  1  Test blood sugar if you have symptoms of low blood sugar:   Low Blood Sugar Symptoms:  o Sweaty  o Dizzy  o Rapid heartbeat  o Shaky  o Bad mood  o Hungry      2  Treat blood sugar less than 70 with 15 grams of fast-acting carbohydrate:   Examples of 15 grams Fast-Acting Carbohydrate:  o 4 oz juice  o 4 oz regular soda  o 3-4 glucose tablets (chew)  o 3-4 hard candies (chew)          3  Wait 15 minutes and test your blood sugar again     4   If blood sugar is less than 100, repeat steps 2-3     5  When your blood sugar is 100 or more, eat a snack if it will be longer than one hour until your next meal  The snack should be 15 grams of carbohydrate and a protein:   Examples of snacks:  o ½ sandwich  o 6 crackers with cheese  o Piece of fruit with cheese or peanut butter  o 6 crackers with peanut butter

## 2021-06-21 NOTE — PROGRESS NOTES
Patient Progress Note      CC: Dm      Referring Provider  No referring provider defined for this encounter  History of Present Illness:   Vivi Escamilla is a 55 y o  male with a history of type 1 diabetes with long term use of insulin  Diabetes course has been stable  Complications of DM: none reported  He was admitted in April 2021 for bariatric surgery  Denies any issues with his current regimen  Home glucose monitoring: are performed regularly, has Dexcom sensor  Average glucose 144 mg/dL with standard deviation of 35 mg/dL  In range 73%, above range 22%, below range <5%  Notices low BG after carb bolus and corrections     Current regimen: Novolog via pump  Metformin and Jardiance were stopped after bariatric surgery  compliant most of the time, denies any side effects from medications  Patient is on a TSlim pump  He denies any malfunctioning of the pump  Current Insulin pump settings:  Basal rate:  12:00 a m  = 2 250 units/hour  4:00 a m  = 2 150 units/hour  6:00 a m  = 2 000 units/hour  8:00 a m  2 300 units/hour  12:00 p m  = 2 000 units/hour  6:00 p m  = 1 900 units/hour  9:00 p m  = 1 900 units/hour  Insulin to carb ratio:  12:00 a m  = 3  4:00 a m  = 3  6:00 a m  = 3  8:00 a m  = 3  12:00 p m  = 3  6:00 p m  = 2 8  9:00 p m  = 3  Insulin sensitivity factor:  12:00 a m  = 25  4:00 a m  = 25  6:00 a m  = 15  8:00 a m  = 15  12:00 p m  = 15  6:00 p m  = 15  9:00 p m  = 30  BG target:  115 mg/dL      Discussed with patient in case of malfunctioning of the pump to use basal and bolus therapy as backup which is prescribed to the patient  Also notified patient to call clinic if any issues        Hypoglycemic episodes: Yes, occasional  H/o of hypoglycemia causing hospitalization or Intervention such as glucagon injection  or ambulance call :  No  Hypoglycemia symptoms: weakness/sweating but does not always experience symptoms  Treatment of hypoglycemia: discussed treatment      Medic alert tag: recommended: Yes     Diabetes education: Not recently   Diet: 3 meals per day (very small meals), 0-1 snack per day  Timing of meals is variable  Diabetic diet compliance:  is complaint with bolus   Activity: Daily activity is predictable: Yes  He is trying to walk 2 miles at least 5 days a week  Ophthamology: sees routinely; Dec 2020 (Dr Kasey Jauregui), no retinopathy  Podiatry: Nov 2020     Not on ACE inhibitor/ARB  Has hyperlipidemia: on Crestor - tolerating well, no myalgias  compliant most of the time, denies any side effects from medications  Thyroid disorders: Hypothyroidism  Patient is on Synthroid 175 mcg daily on empty stomach 1 hour before breakfast  Thyroid function tests in normal range    History of pancreatitis: No     Vitamin D deficiency: is currently taking a bariatric multivitamin    Patient Active Problem List   Diagnosis    Chronic back pain    Collagenous colitis    GERD (gastroesophageal reflux disease)    Hyperlipidemia    Hypothyroidism    Insomnia    Iron deficiency anemia    BMI 40 0-44 9, adult ([facility])    Type 1 diabetes mellitus with hyperglycemia, with long-term current use of insulin ([facility])    Vitamin D deficiency    Seasonal allergic rhinitis due to pollen    Edema    Snoring    Intrinsic eczema    FARZANA (obstructive sleep apnea)    Renal calculus, bilateral    Leukocytosis (leucocytosis)    [facility]A [facility]) [facility]    Erectile dysfunction    Insulin pump in place    Elevated BP without diagnosis of hypertension    Gastric bypass status for obesity      Past Medical History:   Diagnosis Date    Anemia     Diabetes mellitus ([address] )     Disease of thyroid gland     GERD (gastroesophageal reflux disease)     Hyperlipidemia     Hypertension     Insomnia     Kidney stone     Sleep apnea     Mild sleep apnea    Type 1 diabetes mellitus ([facility])     Uses Insulin Pump    Vitamin D deficiency       Past Surgical History:   Procedure Laterality Date    CARPAL TUNNEL RELEASE Right     COLONOSCOPY      EGD      FL RETROGRADE PYELOGRAM  2020    LASIK      VA CYSTO/URETERO W/LITHOTRIPSY &INDWELL STENT INSRT Right 2020    Procedure: CYSTOSCOPY URETEROSCOPY WITH LITHOTRIPSY HOLMIUM LASER, RETROGRADE PYELOGRAM AND INSERTION STENT URETERAL;  Surgeon: Liza Atwood MD;  [facility];  Service: Urology    VA LAP GASTRIC BYPASS/GABBY-EN-Y N/A 2021    Procedure: BYPASS GASTRIC GABBY-EN-Y LAPAROSCOPIC W ROBOTICS AND INTRAOPERATIVE EGD;  Surgeon: Kev Oh MD;  [facility];  Service: Bariatrics    ROTATOR CUFF REPAIR Right       Family History   Problem Relation Age of Onset    Thyroid disease unspecified Mother     Osteoporosis Mother     Hypertension Father     Diabetes type II Sister     Hypertension Sister     Hyperlipidemia Sister     Thyroid disease unspecified Sister     Diabetes type II Brother     Hypertension Brother     Hyperlipidemia Brother      Social History     Tobacco Use    Smoking status: Former Smoker     Years: 30      Types: Cigarettes     Quit date:      Years since quittin 4    Smokeless tobacco: Never Used    Tobacco comment: quit in    Substance Use Topics    Alcohol use:  Yes     Alcohol/week: 2 0 standard drinks     Types: 2 Glasses of wine per week     Comment: social     Allergies   Allergen Reactions    Ibuprofen GI Intolerance    Other Other (See Comments)     Seasonal allergies- pt has congestion         Current Outpatient Medications:     acetone, urine, test (Ketostix) strip, Check ketones if blood sugars > 250, more than twice or in case of nausea or vomiting and abdominal pains, Disp: 100 each, Rfl: 0    azelastine (ASTELIN) 0 1 % nasal spray, 1 spray into each nostril 2 (two) times a day, Disp: 90 mL, Rfl: 1    Blood Glucose Monitoring Suppl (ONE TOUCH ULTRA 2) w/Device KIT, by Does not apply route, Disp: , Rfl:     Continuous Blood Gluc  (DEXCOM G6 ) KIYA, USE AS DIRECTED, Disp: 1 Device, Rfl: 0    Continuous Blood Gluc Sensor (DEXCOM G6 SENSOR) MISC, 1 Device by Does not apply route continuous Use 1 sensor every 10 days, Disp: 12 each, Rfl: 1    Continuous Blood Gluc Transmit (DEXCOM G6 TRANSMITTER) MISC, USE 1 DEVICE WITH SENSOR, Disp: 1 each, Rfl: 3    gabapentin (NEURONTIN) 300 mg capsule, Take 1 capsule (300 mg total) by mouth daily at bedtime, Disp: 90 capsule, Rfl: 1    Glucagon (BAQSIMI TWO PACK) 3 MG/DOSE POWD, Use one dose as needed in case of severe hypoglycemia ( nasal spray) (Patient taking differently: Use one dose as needed in case of severe hypoglycemia), Disp: 1 each, Rfl: 0    glucagon (GLUCAGON EMERGENCY) 1 MG injection, Inject 1 mg under the skin once as needed for low blood sugar for up to 1 dose, Disp: 1 kit, Rfl: 1    insulin aspart (NovoLOG) 100 units/mL injection, use UP to 140 units PER DAY VIA INSULIN PUMP, Disp: 110 mL, Rfl: 2    insulin glargine (Basaglar KwikPen) 100 units/mL injection pen, Inject 60 units in case of pump failure ( incase of emergency), Disp: 15 pen, Rfl: 1    Insulin Pen Needle (BD PEN NEEDLE LYRIC U/F) 32G X 4 MM MISC, by Does not apply route daily Use once daily, Disp: 100 each, Rfl: 2    MULTIPLE VITAMIN PO, Take 1 tablet by mouth daily , Disp: , Rfl:     omeprazole (PriLOSEC) 20 mg delayed release capsule, Take 1 capsule (20 mg total) by mouth daily, Disp: 30 capsule, Rfl: 3    Probiotic Product (PROBIOTIC DAILY PO), Take 1 tablet by mouth daily , Disp: , Rfl:     rosuvastatin (CRESTOR) 10 MG tablet, Take 1 tablet (10 mg total) by mouth daily at bedtime, Disp: 90 tablet, Rfl: 1    Synthroid 175 MCG tablet, take 1 tablet by mouth daily (Patient taking differently: Take 175 mcg by mouth daily in the early morning take 1 tablet by mouth daily), Disp: 90 tablet, Rfl: 1    vitamin B-12 (VITAMIN B-12) 1,000 mcg tablet, Take 1,000 mcg by mouth daily , Disp: , Rfl:     zolpidem (AMBIEN) 10 mg tablet, Take 1 tablet (10 mg total) by mouth daily at bedtime as needed for sleep, Disp: 30 tablet, Rfl: 0    Ascorbic Acid (VITAMIN C PO), Take 2,000 mg by mouth daily  (Patient not taking: Reported on 6/21/2021), Disp: , Rfl:     loratadine (CLARITIN) 10 mg tablet, Take 1 tablet (10 mg total) by mouth daily (Patient not taking: Reported on 6/21/2021), Disp: 90 tablet, Rfl: 1  Review of Systems   Constitutional: Negative for activity change, appetite change, fatigue and unexpected weight change  HENT: Negative for trouble swallowing  Eyes: Negative for visual disturbance  Respiratory: Negative for shortness of breath  Cardiovascular: Negative for chest pain and palpitations  Gastrointestinal: Negative for constipation and diarrhea  Endocrine: Negative for polydipsia and polyuria  Musculoskeletal: Negative  Skin: Negative for wound  Neurological: Positive for numbness  Psychiatric/Behavioral: Negative  Physical Exam:  Body mass index is 35 kg/m²  /90   Pulse 64   Ht 5' 9" (1 753 m)   Wt 108 kg (237 lb)   BMI 35 00 kg/m²    Wt Readings from Last 3 Encounters:   06/21/21 108 kg (237 lb)   05/07/21 112 kg (248 lb)   05/06/21 112 kg (246 lb 3 2 oz)       Physical Exam  Vitals and nursing note reviewed  Constitutional:       Appearance: He is well-developed  HENT:      Head: Normocephalic  Eyes:      General: No scleral icterus  Pupils: Pupils are equal, round, and reactive to light  Neck:      Thyroid: No thyromegaly  Cardiovascular:      Rate and Rhythm: Normal rate and regular rhythm  Pulses:           Radial pulses are 2+ on the right side and 2+ on the left side  Heart sounds: No murmur heard  Pulmonary:      Effort: Pulmonary effort is normal  No respiratory distress  Breath sounds: Normal breath sounds  No wheezing  Musculoskeletal:      Cervical back: Neck supple  Skin:     General: Skin is warm and dry     Neurological:      Mental Status: He is alert  Patient's shoes and socks were not removed  Labs:   Component      Latest Ref Rng & Units 3/4/2021 3/15/2021   Sodium      136 - 145 mmol/L  142   Potassium      3 5 - 5 3 mmol/L  4 0   Chloride      100 - 108 mmol/L  103   CO2      21 - 32 mmol/L  29   Anion Gap      4 - 13 mmol/L  10   BUN      5 - 25 mg/dL  19   Creatinine      0 60 - 1 30 mg/dL  1 19   GLUCOSE FASTING      65 - 99 mg/dL  105 (H)   Calcium      8 3 - 10 1 mg/dL  9 3   AST      5 - 45 U/L  28   ALT      12 - 78 U/L  31   Alkaline Phosphatase      46 - 116 U/L  95   Total Protein      6 4 - 8 2 g/dL  7 9   Albumin      3 5 - 5 0 g/dL  4 0   TOTAL BILIRUBIN      0 20 - 1 00 mg/dL  0 37   eGFR      ml/min/1 73sq m  69   WBC      4 31 - 10 16 Thousand/uL     Red Blood Cell Count      3 88 - 5 62 Million/uL     Hemoglobin      12 0 - 17 0 g/dL     HCT      36 5 - 49 3 %     MCV      82 - 98 fL     MCH      26 8 - 34 3 pg     MCHC      31 4 - 37 4 g/dL     RDW      11 6 - 15 1 %     Platelet Count      433 - 390 Thousands/uL     MPV      8 9 - 12 7 fL     Cholesterol      50 - 200 mg/dL  187   Triglycerides      <=150 mg/dL  209 (H)   HDL      >=40 mg/dL  41   LDL Calculated      0 - 100 mg/dL  104 (H)   Non-HDL Cholesterol      mg/dl  146   Hemoglobin A1C       6 7          Plan:    Diagnoses and all orders for this visit:    Type 1 diabetes mellitus with hyperglycemia, with long-term current use of insulin ([facility])  HGA1C 6 7%  Due now  Treatment regimen: Patient has lost about 40 lbs after bariatric surgery  He is having occasional low BG after food bolus / auto correction and overnight  Change basal rate at midnight and 4 am to 2 00 U/Hr  Change ISF from 12 am to 6 am to 30, from 6 am to 6 pm to 20  Change ICR all day to 4  Download pump again in 2 weeks  Discussed intensive insulin regimen does increase risk for hypoglycemia  Episodes of hypoglycemia can lead to permanent disability and death    Discussed risks/complications associated with uncontrolled diabetes  Advised to adhere to diabetic diet, and recommended staying active/exercising routinely as tolerated  Keep carbohydrates consistent to limit blood glucose fluctuations  Advised to call if blood sugars less than 70 mg/dl or over 300 mg/dl  Check blood glucose 4+ times a day  Discussed symptoms and treatment of hypoglycemia  Discussed use of CGM to collect additional blood glucose data to reveal trends and patterns that can be used to optimize treatment plan  Recommended routine follow-up with podiatry and ophthalmology  Download pump / sensor in 2 weeks  Ordered blood work to complete prior to next visit  -     Hemoglobin A1C; Future  -     Basic metabolic panel; Future  -     Hemoglobin A1C; Future  -     Basic metabolic panel; Future    Acquired hypothyroidism  TSH previously 1 99 and free T4 1 40  Continue current dose of Synthroid for now  Repeat labs now   -     T4, free; Future  -     TSH, 3rd generation; Future    Mixed hyperlipidemia    Continue statin therapy         Discussed with the patient diagnosis and treatment and all questions fully answered  He will call me if any problems arise  Counseled patient on diagnostic results, prognosis, risk and benefit of treatment options, instruction for management, importance of treatment compliance, risk factor reduction and impressions        Alison Charles PA-C

## 2021-07-08 DIAGNOSIS — E78.5 HYPERLIPIDEMIA, UNSPECIFIED HYPERLIPIDEMIA TYPE: ICD-10-CM

## 2021-07-08 RX ORDER — ROSUVASTATIN CALCIUM 10 MG/1
10 TABLET, COATED ORAL
Qty: 90 TABLET | Refills: 1 | Status: SHIPPED | OUTPATIENT
Start: 2021-07-08 | End: 2022-01-17 | Stop reason: ALTCHOICE

## 2021-07-08 RX ORDER — ROSUVASTATIN CALCIUM 10 MG/1
10 TABLET, COATED ORAL
Qty: 90 TABLET | Refills: 1 | OUTPATIENT
Start: 2021-07-08

## 2021-07-13 ENCOUNTER — OFFICE VISIT (OUTPATIENT)
Dept: INTERNAL MEDICINE CLINIC | Facility: CLINIC | Age: 56
End: 2021-07-13
Payer: COMMERCIAL

## 2021-07-13 VITALS
OXYGEN SATURATION: 97 % | SYSTOLIC BLOOD PRESSURE: 134 MMHG | TEMPERATURE: 98.1 F | DIASTOLIC BLOOD PRESSURE: 76 MMHG | HEIGHT: 69 IN | RESPIRATION RATE: 20 BRPM | BODY MASS INDEX: 34.3 KG/M2 | HEART RATE: 61 BPM | WEIGHT: 231.6 LBS

## 2021-07-13 DIAGNOSIS — Z12.11 SCREENING FOR COLON CANCER: ICD-10-CM

## 2021-07-13 DIAGNOSIS — L03.115 CELLULITIS OF RIGHT LOWER EXTREMITY: ICD-10-CM

## 2021-07-13 DIAGNOSIS — Z80.0 FAMILY HISTORY OF COLORECTAL CANCER: ICD-10-CM

## 2021-07-13 DIAGNOSIS — N20.0 RENAL CALCULUS, BILATERAL: Primary | ICD-10-CM

## 2021-07-13 PROCEDURE — 87186 SC STD MICRODIL/AGAR DIL: CPT | Performed by: INTERNAL MEDICINE

## 2021-07-13 PROCEDURE — 3008F BODY MASS INDEX DOCD: CPT | Performed by: INTERNAL MEDICINE

## 2021-07-13 PROCEDURE — 87205 SMEAR GRAM STAIN: CPT | Performed by: INTERNAL MEDICINE

## 2021-07-13 PROCEDURE — 99213 OFFICE O/P EST LOW 20 MIN: CPT | Performed by: INTERNAL MEDICINE

## 2021-07-13 PROCEDURE — 87070 CULTURE OTHR SPECIMN AEROBIC: CPT | Performed by: INTERNAL MEDICINE

## 2021-07-13 RX ORDER — DOXYCYCLINE HYCLATE 100 MG/1
100 CAPSULE ORAL EVERY 12 HOURS SCHEDULED
Qty: 14 CAPSULE | Refills: 0 | Status: SHIPPED | OUTPATIENT
Start: 2021-07-13 | End: 2021-07-20

## 2021-07-13 NOTE — ASSESSMENT & PLAN NOTE
Will treat with doxycycline 100 mg twice a day for 7 days  I also obtained a wound culture today given concern for purulent drainage and spread of cellulitis  He is to contact office for worsening symptoms

## 2021-07-13 NOTE — PROGRESS NOTES
Assessment/Plan:    Cellulitis of right lower extremity  Will treat with doxycycline 100 mg twice a day for 7 days  I also obtained a wound culture today given concern for purulent drainage and spread of cellulitis  He is to contact office for worsening symptoms  Diagnoses and all orders for this visit:    Renal calculus, bilateral    Screening for colon cancer  -     Ambulatory referral to Gastroenterology; Future    Family history of colorectal cancer  -     Ambulatory referral to Gastroenterology; Future    Cellulitis of right lower extremity  -     doxycycline hyclate (VIBRAMYCIN) 100 mg capsule; Take 1 capsule (100 mg total) by mouth every 12 (twelve) hours for 7 days  -     Wound culture and Gram stain    Other orders  -     Calcium Citrate (JORDY-CITRATE PO); Take 1,000 tablets by mouth daily            Depression Screening and Follow-up Plan: Clincally patient does not have depression  No treatment is required  Patient advised to follow-up with PCP for further management  Subjective:      Patient ID: Francisco Seen is a 55 y o  male  Chief Complaint   Patient presents with    red carole     on right knee over the weekend, it was arnulfo and warm  and now on left forearm yesterday    health maintenance     colonoscopy, hep c, annual exam, foot exam        60-year-old male is seen today with concern for 2 lesions, 1 on his left forearm and a 2nd on his right knee  He initiate noticed the lesion 8 days ago to which it had a varghese to it  He popped it and ever since has noticed it increase in size, erythema, and increased warmth  He noticed the lesion on his left forearm yesterday        The following portions of the patient's history were reviewed and updated as appropriate: allergies, current medications, past family history, past medical history, past social history, past surgical history and problem list     Review of Systems   Constitutional: Negative for activity change, appetite change, chills, diaphoresis, fatigue and fever  HENT: Negative for congestion, postnasal drip, rhinorrhea, sinus pressure, sinus pain, sneezing and sore throat  Eyes: Negative for visual disturbance  Respiratory: Negative for apnea, cough, choking, chest tightness, shortness of breath and wheezing  Cardiovascular: Negative for chest pain, palpitations and leg swelling  Gastrointestinal: Negative for abdominal distention, abdominal pain, anal bleeding, blood in stool, constipation, diarrhea, nausea and vomiting  Endocrine: Negative for cold intolerance and heat intolerance  Genitourinary: Negative for difficulty urinating, dysuria and hematuria  Musculoskeletal: Negative  Skin: Negative  Neurological: Negative for dizziness, weakness, light-headedness, numbness and headaches  Hematological: Negative for adenopathy  Psychiatric/Behavioral: Negative for agitation, sleep disturbance and suicidal ideas  All other systems reviewed and are negative          Past Medical History:   Diagnosis Date    Anemia     Diabetes mellitus ([facility] [address] )     Disease of thyroid gland     GERD (gastroesophageal reflux disease)     Hyperlipidemia     Hypertension     Insomnia     Kidney stone     Sleep apnea     Mild sleep apnea    Type 1 diabetes mellitus (HCC)     Uses Insulin Pump    Vitamin D deficiency          Current Outpatient Medications:     acetone, urine, test (Ketostix) strip, Check ketones if blood sugars > 250, more than twice or in case of nausea or vomiting and abdominal pains, Disp: 100 each, Rfl: 0    azelastine (ASTELIN) 0 1 % nasal spray, 1 spray into each nostril 2 (two) times a day, Disp: 90 mL, Rfl: 1    Blood Glucose Monitoring Suppl (ONE TOUCH ULTRA 2) w/Device KIT, by Does not apply route, Disp: , Rfl:     Calcium Citrate (JORDY-CITRATE PO), Take 1,000 tablets by mouth daily, Disp: , Rfl:     Continuous Blood Gluc  (DEXCOM G6 ) KIYA, USE AS DIRECTED, Disp: 1 Device, Rfl: 0    Continuous Blood Gluc Sensor (DEXCOM G6 SENSOR) MISC, 1 Device by Does not apply route continuous Use 1 sensor every 10 days, Disp: 12 each, Rfl: 1    Continuous Blood Gluc Transmit (DEXCOM G6 TRANSMITTER) MISC, USE 1 DEVICE WITH SENSOR, Disp: 1 each, Rfl: 3    gabapentin (NEURONTIN) 300 mg capsule, Take 1 capsule (300 mg total) by mouth daily at bedtime, Disp: 90 capsule, Rfl: 1    glucagon (GLUCAGON EMERGENCY) 1 MG injection, Inject 1 mg under the skin once as needed for low blood sugar for up to 1 dose, Disp: 1 kit, Rfl: 1    insulin aspart (NovoLOG) 100 units/mL injection, use UP to 140 units PER DAY VIA INSULIN PUMP, Disp: 110 mL, Rfl: 2    insulin glargine (Basaglar KwikPen) 100 units/mL injection pen, Inject 60 units in case of pump failure ( incase of emergency), Disp: 15 pen, Rfl: 1    Insulin Pen Needle (BD PEN NEEDLE LYRIC U/F) 32G X 4 MM MISC, by Does not apply route daily Use once daily, Disp: 100 each, Rfl: 2    MULTIPLE VITAMIN PO, Take 1 tablet by mouth daily , Disp: , Rfl:     omeprazole (PriLOSEC) 20 mg delayed release capsule, Take 1 capsule (20 mg total) by mouth daily, Disp: 30 capsule, Rfl: 3    Probiotic Product (PROBIOTIC DAILY PO), Take 1 tablet by mouth daily , Disp: , Rfl:     rosuvastatin (CRESTOR) 10 MG tablet, Take 1 tablet (10 mg total) by mouth daily at bedtime, Disp: 90 tablet, Rfl: 1    Synthroid 175 MCG tablet, take 1 tablet by mouth daily (Patient taking differently: Take 175 mcg by mouth daily in the early morning take 1 tablet by mouth daily), Disp: 90 tablet, Rfl: 1    vitamin B-12 (VITAMIN B-12) 1,000 mcg tablet, Take 1,000 mcg by mouth daily , Disp: , Rfl:     zolpidem (AMBIEN) 10 mg tablet, Take 1 tablet (10 mg total) by mouth daily at bedtime as needed for sleep, Disp: 30 tablet, Rfl: 0    Ascorbic Acid (VITAMIN C PO), Take 2,000 mg by mouth daily  (Patient not taking: Reported on 6/21/2021), Disp: , Rfl:     doxycycline hyclate (VIBRAMYCIN) 100 mg capsule, Take 1 capsule (100 mg total) by mouth every 12 (twelve) hours for 7 days, Disp: 14 capsule, Rfl: 0    Glucagon (BAQSIMI TWO PACK) 3 MG/DOSE POWD, Use one dose as needed in case of severe hypoglycemia ( nasal spray) (Patient not taking: Reported on 7/13/2021), Disp: 1 each, Rfl: 0    loratadine (CLARITIN) 10 mg tablet, Take 1 tablet (10 mg total) by mouth daily (Patient not taking: Reported on 6/21/2021), Disp: 90 tablet, Rfl: 1    Allergies   Allergen Reactions    Ibuprofen GI Intolerance    Other Other (See Comments)     Seasonal allergies- pt has congestion       Social History   Past Surgical History:   Procedure Laterality Date    CARPAL TUNNEL RELEASE Right     COLONOSCOPY      EGD      FL RETROGRADE PYELOGRAM  4/24/2020    LASIK      HI CYSTO/URETERO W/LITHOTRIPSY &INDWELL STENT INSRT Right 4/24/2020    Procedure: CYSTOSCOPY URETEROSCOPY WITH LITHOTRIPSY HOLMIUM LASER, RETROGRADE PYELOGRAM AND INSERTION STENT URETERAL;  Surgeon: Vera Shah MD;  [facility];  Service: Urology    HI LAP GASTRIC BYPASS/GABBY-EN-Y N/A 4/26/2021    Procedure: BYPASS GASTRIC GABBY-EN-Y LAPAROSCOPIC W ROBOTICS AND INTRAOPERATIVE EGD;  Surgeon: Waldo Wright MD;  Location: [facility];  Service: Bariatrics    ROTATOR CUFF REPAIR Right      Family History   Problem Relation Age of Onset    Thyroid disease unspecified Mother     Osteoporosis Mother     Hypertension Father     Diabetes type II Sister     Hypertension Sister     Hyperlipidemia Sister     Thyroid disease unspecified Sister     Diabetes type II Brother     Hypertension Brother     Hyperlipidemia Brother        Objective:  /76 (BP Location: Left arm, Patient Position: Sitting, Cuff Size: Large)   Pulse 61   Temp 98 1 °F (36 7 °C) (Temporal)   Resp 20   Ht 5' 9" (1 753 m)   Wt 105 kg (231 lb 9 6 oz)   SpO2 97%   BMI 34 20 kg/m²     No results found for this or any previous visit (from the past 1344 hour(s))  Physical Exam  Vitals and nursing note reviewed  Constitutional:       General: He is not in acute distress  Appearance: He is well-developed  He is not diaphoretic  HENT:      Head: Normocephalic and atraumatic  Eyes:      General: No scleral icterus  Right eye: No discharge  Left eye: No discharge  Conjunctiva/sclera: Conjunctivae normal       Pupils: Pupils are equal, round, and reactive to light  Neck:      Thyroid: No thyromegaly  Vascular: No JVD  Cardiovascular:      Rate and Rhythm: Normal rate and regular rhythm  Heart sounds: Normal heart sounds  No murmur heard  No friction rub  No gallop  Pulmonary:      Effort: Pulmonary effort is normal  No respiratory distress  Breath sounds: Normal breath sounds  No wheezing or rales  Chest:      Chest wall: No tenderness  Abdominal:      General: Bowel sounds are normal  There is no distension  Palpations: Abdomen is soft  There is no mass  Tenderness: There is no abdominal tenderness  There is no guarding or rebound  Musculoskeletal:         General: No tenderness or deformity  Normal range of motion  Cervical back: Normal range of motion and neck supple  Lymphadenopathy:      Cervical: No cervical adenopathy  Skin:     General: Skin is warm and dry  Coloration: Skin is not pale  Findings: No erythema or rash  Neurological:      Mental Status: He is alert and oriented to person, place, and time  Cranial Nerves: No cranial nerve deficit  Coordination: Coordination normal       Deep Tendon Reflexes: Reflexes are normal and symmetric  Psychiatric:         Behavior: Behavior normal          Thought Content:  Thought content normal          Judgment: Judgment normal

## 2021-07-14 DIAGNOSIS — G47.00 INSOMNIA, UNSPECIFIED TYPE: ICD-10-CM

## 2021-07-14 DIAGNOSIS — E03.9 HYPOTHYROIDISM, UNSPECIFIED TYPE: ICD-10-CM

## 2021-07-14 DIAGNOSIS — Z98.84 GASTRIC BYPASS STATUS FOR OBESITY: ICD-10-CM

## 2021-07-14 RX ORDER — LEVOTHYROXINE SODIUM 175 MCG
175 TABLET ORAL DAILY
Qty: 90 TABLET | Refills: 0 | Status: SHIPPED | OUTPATIENT
Start: 2021-07-14 | End: 2021-12-01

## 2021-07-14 NOTE — TELEPHONE ENCOUNTER
LOV: 5/6/21  NOV: 9/17/21      PDMP checked    Please advise while Tiffany Valdes is out of the office

## 2021-07-15 ENCOUNTER — APPOINTMENT (OUTPATIENT)
Dept: LAB | Facility: CLINIC | Age: 56
End: 2021-07-15
Payer: COMMERCIAL

## 2021-07-15 ENCOUNTER — TELEPHONE (OUTPATIENT)
Dept: ENDOCRINOLOGY | Facility: CLINIC | Age: 56
End: 2021-07-15

## 2021-07-15 DIAGNOSIS — E03.9 ACQUIRED HYPOTHYROIDISM: ICD-10-CM

## 2021-07-15 DIAGNOSIS — E10.65 TYPE 1 DIABETES MELLITUS WITH HYPERGLYCEMIA, WITH LONG-TERM CURRENT USE OF INSULIN (HCC): ICD-10-CM

## 2021-07-15 DIAGNOSIS — E10.8 TYPE 1 DIABETES MELLITUS WITH COMPLICATION (HCC): ICD-10-CM

## 2021-07-15 LAB
ANION GAP SERPL CALCULATED.3IONS-SCNC: 6 MMOL/L (ref 4–13)
BACTERIA WND AEROBE CULT: ABNORMAL
BUN SERPL-MCNC: 5 MG/DL (ref 5–25)
CALCIUM SERPL-MCNC: 9 MG/DL (ref 8.3–10.1)
CHLORIDE SERPL-SCNC: 107 MMOL/L (ref 100–108)
CO2 SERPL-SCNC: 32 MMOL/L (ref 21–32)
CREAT SERPL-MCNC: 1.02 MG/DL (ref 0.6–1.3)
EST. AVERAGE GLUCOSE BLD GHB EST-MCNC: 128 MG/DL
GFR SERPL CREATININE-BSD FRML MDRD: 82 ML/MIN/1.73SQ M
GLUCOSE P FAST SERPL-MCNC: 119 MG/DL (ref 65–99)
GRAM STN SPEC: ABNORMAL
HBA1C MFR BLD: 6.1 %
POTASSIUM SERPL-SCNC: 4.3 MMOL/L (ref 3.5–5.3)
SODIUM SERPL-SCNC: 145 MMOL/L (ref 136–145)
T4 FREE SERPL-MCNC: 0.95 NG/DL (ref 0.76–1.46)
TSH SERPL DL<=0.05 MIU/L-ACNC: 3.3 UIU/ML (ref 0.36–3.74)

## 2021-07-15 PROCEDURE — 36415 COLL VENOUS BLD VENIPUNCTURE: CPT

## 2021-07-15 PROCEDURE — 80048 BASIC METABOLIC PNL TOTAL CA: CPT

## 2021-07-15 PROCEDURE — 83036 HEMOGLOBIN GLYCOSYLATED A1C: CPT

## 2021-07-15 PROCEDURE — 84439 ASSAY OF FREE THYROXINE: CPT

## 2021-07-15 PROCEDURE — 3044F HG A1C LEVEL LT 7.0%: CPT | Performed by: INTERNAL MEDICINE

## 2021-07-15 PROCEDURE — 84443 ASSAY THYROID STIM HORMONE: CPT

## 2021-07-15 RX ORDER — OMEPRAZOLE 20 MG/1
20 CAPSULE, DELAYED RELEASE ORAL DAILY
Qty: 30 CAPSULE | Refills: 0 | Status: SHIPPED | OUTPATIENT
Start: 2021-07-15 | End: 2021-10-04

## 2021-07-15 NOTE — TELEPHONE ENCOUNTER
----- Message from Izzy Dunlap PA-C sent at 7/15/2021  2:22 PM EDT -----  Please call the patient regarding his abnormal result    Thyroid labs normal, continue current treatment  A1C improved to 6 1%, continue current treatment  Kidney functions normal

## 2021-07-19 RX ORDER — ZOLPIDEM TARTRATE 10 MG/1
10 TABLET ORAL
Qty: 30 TABLET | Refills: 0 | Status: SHIPPED | OUTPATIENT
Start: 2021-07-19 | End: 2021-08-19 | Stop reason: SDUPTHER

## 2021-07-22 ENCOUNTER — TELEPHONE (OUTPATIENT)
Dept: ENDOCRINOLOGY | Facility: CLINIC | Age: 56
End: 2021-07-22

## 2021-08-12 ENCOUNTER — TELEPHONE (OUTPATIENT)
Dept: ENDOCRINOLOGY | Facility: CLINIC | Age: 56
End: 2021-08-12

## 2021-08-16 ENCOUNTER — OFFICE VISIT (OUTPATIENT)
Dept: BARIATRICS | Facility: CLINIC | Age: 56
End: 2021-08-16
Payer: COMMERCIAL

## 2021-08-16 VITALS
BODY MASS INDEX: 32.58 KG/M2 | DIASTOLIC BLOOD PRESSURE: 90 MMHG | WEIGHT: 220 LBS | HEART RATE: 61 BPM | TEMPERATURE: 97 F | HEIGHT: 69 IN | SYSTOLIC BLOOD PRESSURE: 110 MMHG

## 2021-08-16 DIAGNOSIS — Z48.815 ENCOUNTER FOR SURGICAL AFTERCARE FOLLOWING SURGERY OF DIGESTIVE SYSTEM: Primary | ICD-10-CM

## 2021-08-16 DIAGNOSIS — Z98.84 BARIATRIC SURGERY STATUS: ICD-10-CM

## 2021-08-16 DIAGNOSIS — E66.9 OBESITY, CLASS I, BMI 30-34.9: ICD-10-CM

## 2021-08-16 DIAGNOSIS — E03.9 ACQUIRED HYPOTHYROIDISM: ICD-10-CM

## 2021-08-16 DIAGNOSIS — E10.65 TYPE 1 DIABETES MELLITUS WITH HYPERGLYCEMIA, WITH LONG-TERM CURRENT USE OF INSULIN (HCC): ICD-10-CM

## 2021-08-16 DIAGNOSIS — K91.2 POSTSURGICAL MALABSORPTION: ICD-10-CM

## 2021-08-16 PROCEDURE — 99214 OFFICE O/P EST MOD 30 MIN: CPT | Performed by: PHYSICIAN ASSISTANT

## 2021-08-16 NOTE — PROGRESS NOTES
Assessment/Plan:     Patient ID: Risa Kern is a 55 y o  male  Bariatric Surgery Status    -s/p Yeimi-En-Y Gastric Bypass with Dr Teodoro Fierro on 4/26/2021  Presents to the office today for 2nd postop  · Continued/Maintain healthy weight loss with good nutrition intakes  · Adequate hydration with at least 64oz  fluid intake  · Follow diet as discussed  · Follow vitamin and mineral recommendations as reviewed with you  · Exercise as tolerated  · Colonoscopy referral made: has referral already    · Follow-up in 3 months  We kindly ask that your arrive 15 minutes before your scheduled appointment time with your provider to allow our staff to room you, get your vital signs and update your chart  · Get lab work done  Please call the office if you need a script  It is recommended to check with your insurance BEFORE getting labs done to make sure they are covered by your policy  · Call our office if you have any problems with abdominal pain especially associated with fever, chills, nausea, vomiting or any other concerns  · All  Post-bariatric surgery patients should be aware that very small quantities of any alcohol can cause impairment and it is very possible not to feel the effect  The effect can be in the system for several hours  It is also a stomach irritant  · It is advised to AVOID alcohol, Nonsteroidal antiinflammatory drugs (NSAIDS) and nicotine of all forms   Any of these can cause stomach irritation/pain  · Discussed the effects of alcohol on a bariatric patient and the increased impairment risk  · Keep up the good work!      Postsurgical Malabsorption   -At risk for malabsorption of vitamins/minerals secondary to malabsorption and restriction of intake from bariatric surgery  -Currently taking adequate postop bariatric surgery vitamin supplementation  -Next set of bariatric labs ordered for approximately 2 weeks  -Patient received education about the importance of adhering to a lifelong supplementation regimen to avoid vitamin/mineral deficiencies      Diagnoses and all orders for this visit:    Encounter for surgical aftercare following surgery of digestive system  -     Zinc; Future  -     Vitamin D 25 hydroxy; Future  -     Vitamin B12; Future  -     Vitamin B1, whole blood; Future  -     Vitamin A; Future  -     CBC and Platelet; Future  -     Comprehensive metabolic panel; Future  -     Ferritin; Future  -     Iron Saturation %; Future  -     PTH, intact; Future    Bariatric surgery status  -     Zinc; Future  -     Vitamin D 25 hydroxy; Future  -     Vitamin B12; Future  -     Vitamin B1, whole blood; Future  -     Vitamin A; Future  -     CBC and Platelet; Future  -     Comprehensive metabolic panel; Future  -     Ferritin; Future  -     Iron Saturation %; Future  -     PTH, intact; Future    Postsurgical malabsorption  -     Zinc; Future  -     Vitamin D 25 hydroxy; Future  -     Vitamin B12; Future  -     Vitamin B1, whole blood; Future  -     Vitamin A; Future  -     CBC and Platelet; Future  -     Comprehensive metabolic panel; Future  -     Ferritin; Future  -     Iron Saturation %; Future  -     PTH, intact; Future    Obesity, Class I, BMI 30-34 9  -     Zinc; Future  -     Vitamin D 25 hydroxy; Future  -     Vitamin B12; Future  -     Vitamin B1, whole blood; Future  -     Vitamin A; Future  -     CBC and Platelet; Future  -     Comprehensive metabolic panel; Future  -     Ferritin; Future  -     Iron Saturation %; Future  -     PTH, intact; Future    Acquired hypothyroidism  -     Zinc; Future  -     Vitamin D 25 hydroxy; Future  -     Vitamin B12; Future  -     Vitamin B1, whole blood; Future  -     Vitamin A; Future  -     CBC and Platelet; Future  -     Comprehensive metabolic panel; Future  -     Ferritin; Future  -     Iron Saturation %; Future  -     PTH, intact;  Future    Type 1 diabetes mellitus with hyperglycemia, with long-term current use of [facility]  - Zinc; Future  -     Vitamin D 25 hydroxy; Future  -     Vitamin B12; Future  -     Vitamin B1, whole blood; Future  -     Vitamin A; Future  -     CBC and Platelet; Future  -     Comprehensive metabolic panel; Future  -     Ferritin; Future  -     Iron Saturation %; Future  -     PTH, intact; Future    Other orders  -     Multiple Vitamins-Minerals (Bariatric Fusion) CHEW; Chew         Subjective:      Patient ID: Gemma Mandel is a 55 y o  male  -s/p Yeimi-En-Y Gastric Bypass with Dr Monika Vega on 4/26/2021  Presents to the office today for 2nd postop Overall doing Well  Anoop Solo  Current:220  EWL: (Weight loss is ahead of schedule at this post surgical period )  Dexter: current  Current BMI is Body mass index is 32 49 kg/m²  · Tolerating a regular diet-yes  · Eating at least 60 grams of protein per day-yes  · Following 30/60 minute rule with liquids-no  · Drinking at least 64 ounces of fluid per day-yes  · Drinking carbonated beverages-no  · Sufficient exercise-yes  · Using NSAIDs regularly-no  · Using nicotine-no  · Using alcohol-social  · Supplements: luis fernando fusion mvi + calcium BID + b12   Still taking daily PPI     · EWL is 47%, which places the patient ahead of schedule for expected post surgical weight loss at this time  The following portions of the patient's history were reviewed and updated as appropriate: allergies, current medications, past family history, past medical history, past social history, past surgical history and problem list     Review of Systems   Constitutional: Negative  Respiratory: Negative  Cardiovascular: Negative  Gastrointestinal: Negative  Musculoskeletal: Negative  Skin: Negative  Neurological: Negative  Psychiatric/Behavioral: Negative            Objective:    /90 (BP Location: Left arm, Patient Position: Sitting, Cuff Size: Standard)   Pulse 61   Temp (!) 97 °F (36 1 °C) (Tympanic)   Ht 5' 9" (1 753 m)   Wt 99 8 kg (220 lb)   BMI 32 49 kg/m²      Physical Exam  Vitals and nursing note reviewed  Constitutional:       Appearance: Normal appearance  He is obese  HENT:      Head: Normocephalic and atraumatic  Eyes:      Extraocular Movements: Extraocular movements intact  Pupils: Pupils are equal, round, and reactive to light  Cardiovascular:      Rate and Rhythm: Normal rate and regular rhythm  Pulmonary:      Effort: Pulmonary effort is normal       Breath sounds: Normal breath sounds  Abdominal:      General: Bowel sounds are normal    Musculoskeletal:         General: Normal range of motion  Cervical back: Normal range of motion  Skin:     General: Skin is warm and dry  Neurological:      General: No focal deficit present  Mental Status: He is alert and oriented to person, place, and time     Psychiatric:         Mood and Affect: Mood normal

## 2021-08-19 DIAGNOSIS — G47.00 INSOMNIA, UNSPECIFIED TYPE: ICD-10-CM

## 2021-08-23 ENCOUNTER — OFFICE VISIT (OUTPATIENT)
Dept: PODIATRY | Facility: CLINIC | Age: 56
End: 2021-08-23
Payer: COMMERCIAL

## 2021-08-23 VITALS
BODY MASS INDEX: 33.06 KG/M2 | WEIGHT: 223.2 LBS | SYSTOLIC BLOOD PRESSURE: 119 MMHG | DIASTOLIC BLOOD PRESSURE: 75 MMHG | HEIGHT: 69 IN | HEART RATE: 58 BPM

## 2021-08-23 DIAGNOSIS — E10.65 TYPE 1 DIABETES MELLITUS WITH HYPERGLYCEMIA, WITH LONG-TERM CURRENT USE OF INSULIN (HCC): Primary | ICD-10-CM

## 2021-08-23 PROCEDURE — 3008F BODY MASS INDEX DOCD: CPT | Performed by: PHYSICIAN ASSISTANT

## 2021-08-23 PROCEDURE — 99203 OFFICE O/P NEW LOW 30 MIN: CPT | Performed by: PODIATRIST

## 2021-08-23 RX ORDER — ZOLPIDEM TARTRATE 10 MG/1
10 TABLET ORAL
Qty: 30 TABLET | Refills: 0 | Status: SHIPPED | OUTPATIENT
Start: 2021-08-23 | End: 2021-10-04 | Stop reason: SDUPTHER

## 2021-08-23 NOTE — PROGRESS NOTES
Assessment/Plan:         Diagnoses and all orders for this visit:    Type 1 diabetes mellitus with hyperglycemia, with long-term current use of insulin ([facility] [address] )      -Educated on DM risk to lower extremities, proper shoe gear, and daily monitoring of feet    -Educated on A1C and the risks of poorly controlled Diabetes   -Discussed weight loss and suitable exercise regiment    -Patient has self reported parasthesia but good pinprick and vibratory snesation  He did not get any relief with gabapentin, consider lyrica or cymbalta with PCP  Can also try capsaicin cream at the pharmacy    Subjective:      Patient ID: Rebecca Germain is a 55 y o  male  PAtient is a type 1 diabetic  Over the past year he has developed numbness and tingling in his feet  They feel swollen even though they're swollen  He has a lot of burning at night  His A1C is 6 1  He had gastric bypass 4 months ago and has lost about 50 pounds  He does not smoke  He is on gabapentin which isn't doing much so he stopped taking it  The following portions of the patient's history were reviewed and updated as appropriate:   He  has a past medical history of Anemia, Diabetes mellitus ([facility] [address] ), Disease of thyroid gland, GERD (gastroesophageal reflux disease), Hyperlipidemia, Hypertension, Insomnia, Kidney stone, Sleep apnea, Type 1 diabetes mellitus ([address] ), and Vitamin D deficiency    He   Patient Active Problem List    Diagnosis Date Noted    Cellulitis of right lower extremity 07/13/2021    Gastric bypass status for obesity 04/26/2021    Elevated BP without diagnosis of hypertension 04/19/2021    Insulin pump in place 03/03/2021    Erectile dysfunction 12/11/2020    LILA (acute kidney injury) ([facility] [address] ) 04/24/2020    Renal calculus, bilateral 04/23/2020    Leukocytosis (leucocytosis) 04/23/2020    FARZANA (obstructive sleep apnea)     Edema 06/07/2019    Snoring 06/07/2019    Intrinsic eczema 06/07/2019    Seasonal allergic rhinitis due to pollen 08/06/2018  Chronic back pain 08/07/2017    [facility] 40 0-44 9, [facility] 07/24/2017    Type 1 diabetes mellitus with hyperglycemia, with long-term current use of insulin ([facility] ) 03/31/2017    Iron deficiency anemia 06/01/2016    Collagenous colitis 01/23/2016    GERD (gastroesophageal reflux disease) 12/31/2015    Vitamin D deficiency 04/24/2015    Hyperlipidemia 08/30/2013    Hypothyroidism 08/30/2013    Insomnia 10/16/2012     He  has a past surgical history that includes Rotator cuff repair (Right); [place] retrograde pyelogram (4/24/2020); pr cysto/uretero w/lithotripsy &indwell stent insrt (Right, 4/24/2020); LASIK; Colonoscopy; EGD; Carpal tunnel release (Right); and pr lap gastric bypass/rima-en-y (N/A, 4/26/2021)  His family history includes Diabetes type II in his brother and sister; Hyperlipidemia in his brother and sister; Hypertension in his brother, father, and sister; Osteoporosis in his mother; Thyroid disease unspecified in his mother and sister  He  reports that he quit smoking about 6 years ago  His smoking use included cigarettes  He quit after 30 00 years of use  He has never used smokeless tobacco  He reports current alcohol use of about 2 0 standard drinks of alcohol per week  He reports that he does not use drugs    Current Outpatient Medications   Medication Sig Dispense Refill    acetone, urine, test (Ketostix) strip Check ketones if blood sugars > 250, more than twice or in case of nausea or vomiting and abdominal pains 100 each 0    azelastine (ASTELIN) 0 1 % nasal spray 1 spray into each nostril 2 (two) times a day 90 mL 1    Blood Glucose Monitoring Suppl (ONE TOUCH ULTRA 2) w/Device KIT by Does not apply route      Calcium Citrate (JORDY-CITRATE PO) Take 1,000 tablets by mouth daily      Continuous Blood Gluc  (DEXCOM G6 ) KIYA USE AS DIRECTED 1 Device 0    Continuous Blood Gluc Sensor (DEXCOM G6 SENSOR) MISC 1 Device by Does not apply route continuous Use 1 sensor every 10 days 12 each 1    Continuous Blood Gluc Transmit (DEXCOM G6 TRANSMITTER) MISC USE 1 DEVICE WITH SENSOR 1 each 3    glucagon (GLUCAGON EMERGENCY) 1 MG injection Inject 1 mg under the skin once as needed for low blood sugar for up to 1 dose 1 kit 1    insulin aspart (NovoLOG) 100 units/mL injection use UP to 140 units PER DAY VIA INSULIN PUMP 110 mL 2    insulin glargine (Basaglar KwikPen) 100 units/mL injection pen Inject 60 units in case of pump failure ( incase of emergency) 15 pen 1    Insulin Pen Needle (BD PEN NEEDLE LYRIC U/F) 32G X 4 MM MISC by Does not apply route daily Use once daily 100 each 2    Multiple Vitamins-Minerals (Bariatric Fusion) CHEW Chew      Probiotic Product (PROBIOTIC DAILY PO) Take 1 tablet by mouth daily       rosuvastatin (CRESTOR) 10 MG tablet Take 1 tablet (10 mg total) by mouth daily at bedtime 90 tablet 1    Synthroid 175 MCG tablet Take 1 tablet (175 mcg total) by mouth daily 90 tablet 0    vitamin B-12 (VITAMIN B-12) 1,000 mcg tablet Take 1,000 mcg by mouth daily       Ascorbic Acid (VITAMIN C PO) Take 2,000 mg by mouth daily  (Patient not taking: Reported on 6/21/2021)      gabapentin (NEURONTIN) 300 mg capsule Take 1 capsule (300 mg total) by mouth daily at bedtime (Patient not taking: Reported on 8/16/2021) 90 capsule 1    Glucagon (BAQSIMI TWO PACK) 3 MG/DOSE POWD Use one dose as needed in case of severe hypoglycemia ( nasal spray) (Patient not taking: Reported on 7/13/2021) 1 each 0    loratadine (CLARITIN) 10 mg tablet Take 1 tablet (10 mg total) by mouth daily (Patient not taking: Reported on 6/21/2021) 90 tablet 1    MULTIPLE VITAMIN PO Take 1 tablet by mouth daily  (Patient not taking: Reported on 8/16/2021)      omeprazole (PriLOSEC) 20 mg delayed release capsule Take 1 capsule (20 mg total) by mouth daily 30 capsule 0    zolpidem (AMBIEN) 10 mg tablet Take 1 tablet (10 mg total) by mouth daily at bedtime as needed for sleep 30 tablet 0     No current facility-administered medications for this visit       Current Outpatient Medications on File Prior to Visit   Medication Sig    acetone, urine, test (Ketostix) strip Check ketones if blood sugars > 250, more than twice or in case of nausea or vomiting and abdominal pains    azelastine (ASTELIN) 0 1 % nasal spray 1 spray into each nostril 2 (two) times a day    Blood Glucose Monitoring Suppl (ONE TOUCH ULTRA 2) w/Device KIT by Does not apply route    Calcium Citrate (JORDY-CITRATE PO) Take 1,000 tablets by mouth daily    Continuous Blood Gluc  (DEXCOM G6 ) KIYA USE AS DIRECTED    Continuous Blood Gluc Sensor (DEXCOM G6 SENSOR) MISC 1 Device by Does not apply route continuous Use 1 sensor every 10 days    Continuous Blood Gluc Transmit (DEXCOM G6 TRANSMITTER) MISC USE 1 DEVICE WITH SENSOR    glucagon (GLUCAGON EMERGENCY) 1 MG injection Inject 1 mg under the skin once as needed for low blood sugar for up to 1 dose    insulin aspart (NovoLOG) 100 units/mL injection use UP to 140 units PER DAY VIA INSULIN PUMP    insulin glargine (Basaglar KwikPen) 100 units/mL injection pen Inject 60 units in case of pump failure ( incase of emergency)    Insulin Pen Needle (BD PEN NEEDLE LYRIC U/F) 32G X 4 MM MISC by Does not apply route daily Use once daily    Multiple Vitamins-Minerals (Bariatric Fusion) CHEW Chew    Probiotic Product (PROBIOTIC DAILY PO) Take 1 tablet by mouth daily     rosuvastatin (CRESTOR) 10 MG tablet Take 1 tablet (10 mg total) by mouth daily at bedtime    Synthroid 175 MCG tablet Take 1 tablet (175 mcg total) by mouth daily    vitamin B-12 (VITAMIN B-12) 1,000 mcg tablet Take 1,000 mcg by mouth daily     Ascorbic Acid (VITAMIN C PO) Take 2,000 mg by mouth daily  (Patient not taking: Reported on 6/21/2021)    gabapentin (NEURONTIN) 300 mg capsule Take 1 capsule (300 mg total) by mouth daily at bedtime (Patient not taking: Reported on 8/16/2021)    Glucagon (BAQSIMI TWO PACK) 3 MG/DOSE POWD Use one dose as needed in case of severe hypoglycemia ( nasal spray) (Patient not taking: Reported on 7/13/2021)    loratadine (CLARITIN) 10 mg tablet Take 1 tablet (10 mg total) by mouth daily (Patient not taking: Reported on 6/21/2021)    MULTIPLE VITAMIN PO Take 1 tablet by mouth daily  (Patient not taking: Reported on 8/16/2021)    omeprazole (PriLOSEC) 20 mg delayed release capsule Take 1 capsule (20 mg total) by mouth daily    zolpidem (AMBIEN) 10 mg tablet Take 1 tablet (10 mg total) by mouth daily at bedtime as needed for sleep     No current facility-administered medications on file prior to visit  He is allergic to ibuprofen and other       Review of Systems   Constitutional: Negative  Negative for unexpected weight change (patient has lost 50 pounds 4 month ss/p gastric bypass surgery)  HENT: Negative  Respiratory: Negative  Musculoskeletal: Negative  Skin: Negative  Neurological: Positive for numbness (burning in feet)  Negative for weakness  Psychiatric/Behavioral: The patient is not nervous/anxious  Objective:      /75   Pulse 58   Ht 5' 9" (1 753 m) Comment: verbal  Wt 101 kg (223 lb 3 2 oz)   BMI 32 96 kg/m²      Ref Range & Units 7/15/21  7:09 AM   Hemoglobin A1C Normal 3 8-5 6%; PreDiabetic 5 7-6 4%; Diabetic >=6 5%; Glycemic control for adults with diabetes <7 0% % 6 1High     EAG mg/dl 128          Specimen Collected: 07/15/21  7:09 AM   Last Resulted: 07/15/21  9:47 AM             Physical Exam  Vitals reviewed  Constitutional:       Appearance: He is obese  He is not ill-appearing or diaphoretic  HENT:      Nose: No congestion or rhinorrhea  Cardiovascular:      Pulses: Normal pulses  no weak pulses          Dorsalis pedis pulses are 2+ on the right side and 2+ on the left side  Posterior tibial pulses are 2+ on the right side and 2+ on the left side     Pulmonary:      Effort: Pulmonary effort is normal  No respiratory distress  Musculoskeletal:         General: Deformity (hammertoe) present  Right lower leg: No edema  Left lower leg: No edema  Right foot: Normal range of motion  No deformity, bunion, Charcot foot or prominent metatarsal heads  Left foot: Normal range of motion  Deformity (2,3 hammertoe, mild) present  No bunion, Charcot foot or prominent metatarsal heads  Feet:    Feet:      Right foot:      Protective Sensation: 10 sites tested  10 sites sensed  Skin integrity: Skin integrity normal  No ulcer, skin breakdown, erythema, warmth, callus or dry skin  Toenail Condition: Right toenails are abnormally thick  Left foot:      Protective Sensation: 10 sites tested  10 sites sensed  Skin integrity: Skin integrity normal  No ulcer, skin breakdown, erythema, warmth, callus or dry skin  Toenail Condition: Left toenails are normal       Comments: Right hallux distal nail thickened  Skin:     Capillary Refill: Capillary refill takes less than 2 seconds  Findings: No erythema, lesion or rash  Neurological:      Mental Status: He is alert  Sensory: No sensory deficit  Motor: No weakness  Gait: Gait normal                Diabetic Foot Exam    Patient's shoes and socks removed  Right Foot/Ankle   Right Foot Inspection  Skin Exam: skin normal and skin intact no dry skin, no warmth, no callus, no erythema, no maceration, no abnormal color, no pre-ulcer, no ulcer and no callus                          Toe Exam: no swelling, no tenderness and erythema  Sensory   Vibration: intact  Proprioception: intact   Monofilament testing: intact  Vascular  Capillary refills: < 3 seconds  The right DP pulse is 2+  The right PT pulse is 2+     Right Toe  - Comprehensive Exam  Arch: normal  Hammertoes: second toe  Hallux valgus: no  Swelling: none   Tenderness: none         Left Foot/Ankle  Left Foot Inspection  Skin Exam: skin normal and skin intactno dry skin, no warmth, no erythema, no maceration, normal color, no pre-ulcer, no ulcer and no callus                         Toe Exam: no swelling, no tenderness and no erythema                   Sensory   Vibration: intact  Proprioception: intact  Monofilament: intact  Vascular  Capillary refills: < 3 seconds  The left DP pulse is 2+  The left PT pulse is 2+  Left Toe  - Comprehensive Exam  Arch: normal  Hammertoes: second toe and third toe  Hallux valgus: no  Swelling: none   Tenderness: none       Assign Risk Category:  Deformity present; No loss of protective sensation;  No weak pulses       Risk: 1

## 2021-08-23 NOTE — PATIENT INSTRUCTIONS
Foot Care for People with Diabetes   WHAT YOU NEED TO KNOW:   · Foot care helps protect your feet and prevent foot ulcers or sores  Long-term high blood sugar levels can damage the blood vessels and nerves in your legs and feet  This damage makes it hard to feel pressure, pain, temperature, and touch  You may not be able to feel a cut or sore, or shoes that are too tight  Foot care is needed to prevent serious problems, such as an infection or amputation  · Diabetes may cause your toes to become crooked or curved under  These changes may affect the way you walk and can lead to increased pressure on your foot  The pressure can decrease blood flow to your feet  Lack of blood flow increases your risk for a foot ulcer  Do not ignore small problems, such as dry skin or small wounds  These can become life-threatening over time without proper care  DISCHARGE INSTRUCTIONS:   Call your care team provider if:   · Your feet become numb, weak, or hard to move  · You have pus draining from a sore on your foot  · You have a wound on your foot that gets bigger, deeper, or does not heal      · You see blisters, cuts, scratches, calluses, or sores on your foot  · You have a fever, and your feet become red, warm, and swollen  · Your toenails become thick, curled, or yellow  · You find it hard to check your feet because your vision is poor  · You have questions or concerns about your condition or care  Foot care:   · Check your feet each day  Look at your whole foot, including the bottom, and between and under your toes  Check for wounds, corns, and calluses  Use a mirror to see the bottom of your feet  The skin on your feet may be shiny, tight, or darker than normal  Your feet may also be cold and pale  Feel your feet by running your hands along the tops, bottoms, sides, and between your toes  Redness, swelling, and warmth are signs of blood flow problems that can lead to a foot ulcer   Do not try to remove corns or calluses yourself  · Wash your feet each day with soap and warm water  Do not use hot water, because this can injure your foot  Dry your feet gently with a towel after you wash them  Dry between and under your toes  · Apply lotion or a moisturizer on your dry feet  Ask your care team provider what lotions are best to use  Do not put lotion or moisturizer between your toes  Moisture between your toes could lead to skin breakdown  · Cut your toenails correctly  File or cut your toenails straight across  Use a soft brush to clean around your toenails  If your toenails are very thick, you may need to have a care team provider or specialist cut them  · Protect your feet  Do not walk barefoot or wear your shoes without socks  Check your shoes for rocks or other objects that can hurt your feet  Wear cotton socks to help keep your feet dry  Wear socks without toe seams, or wear them with the seams inside out  Change your socks each day  Do not wear socks that are dirty or damp  · Wear shoes that fit well  Wear shoes that do not rub against any area of your feet  Your shoes should be ½ to ¾ inch (1 to 2 centimeters) longer than your feet  Your shoes should also have extra space around the widest part of your feet  Walking or athletic shoes with laces or straps that adjust are best  Ask your care team provider for help to choose shoes that fit you best  Ask him or her if you need to wear an insert, orthotic, or bandage on your feet  · Do not smoke  Smoking can damage your blood vessels and put you at increased risk for foot ulcers  Ask your care team provider for information if you currently smoke and need help to quit  E-cigarettes or smokeless tobacco still contain nicotine  Talk to your care team provider before you use these products  Follow up with your diabetes care team provider or foot specialist as directed:   You will need to have your feet checked at least once a ricardo Crenshaw may need a foot exam more often if you have nerve damage, foot deformities, or ulcers  Write down your questions so you remember to ask them during your visits  © Copyright [employer] 2021 Information is for End User's use only and may not be sold, redistributed or otherwise used for commercial purposes  All illustrations and images included in CareNotes® are the copyrighted property of A D A M , Inc  or [facility] Marcos Frazier   The above information is an  only  It is not intended as medical advice for individual conditions or treatments  Talk to your doctor, nurse or pharmacist before following any medical regimen to see if it is safe and effective for you

## 2021-09-01 ENCOUNTER — OFFICE VISIT (OUTPATIENT)
Dept: ENDOCRINOLOGY | Facility: CLINIC | Age: 56
End: 2021-09-01
Payer: COMMERCIAL

## 2021-09-01 VITALS
SYSTOLIC BLOOD PRESSURE: 120 MMHG | DIASTOLIC BLOOD PRESSURE: 78 MMHG | WEIGHT: 221 LBS | HEART RATE: 65 BPM | TEMPERATURE: 97.8 F | BODY MASS INDEX: 32.64 KG/M2

## 2021-09-01 DIAGNOSIS — E03.9 ACQUIRED HYPOTHYROIDISM: ICD-10-CM

## 2021-09-01 DIAGNOSIS — E10.65 TYPE 1 DIABETES MELLITUS WITH HYPERGLYCEMIA, WITH LONG-TERM CURRENT USE OF INSULIN (HCC): Primary | ICD-10-CM

## 2021-09-01 DIAGNOSIS — E78.2 MIXED HYPERLIPIDEMIA: ICD-10-CM

## 2021-09-01 PROCEDURE — 95251 CONT GLUC MNTR ANALYSIS I&R: CPT | Performed by: PHYSICIAN ASSISTANT

## 2021-09-01 PROCEDURE — 1036F TOBACCO NON-USER: CPT | Performed by: PHYSICIAN ASSISTANT

## 2021-09-01 PROCEDURE — 99214 OFFICE O/P EST MOD 30 MIN: CPT | Performed by: PHYSICIAN ASSISTANT

## 2021-09-01 NOTE — PATIENT INSTRUCTIONS
Hypoglycemia instructions   Charlee Garvin  9/1/2021  [id number]    Low Blood Sugar    Steps to treat low blood sugar  1  Test blood sugar if you have symptoms of low blood sugar:   Low Blood Sugar Symptoms:  o Sweaty  o Dizzy  o Rapid heartbeat  o Shaky  o Bad mood  o Hungry      2  Treat blood sugar less than 70 with 15 grams of fast-acting carbohydrate:   Examples of 15 grams Fast-Acting Carbohydrate:  o 4 oz juice  o 4 oz regular soda  o 3-4 glucose tablets (chew)  o 3-4 hard candies (chew)          3  Wait 15 minutes and test your blood sugar again     4   If blood sugar is less than 100, repeat steps 2-3     5  When your blood sugar is 100 or more, eat a snack if it will be longer than one hour until your next meal  The snack should be 15 grams of carbohydrate and a protein:   Examples of snacks:  o ½ sandwich  o 6 crackers with cheese  o Piece of fruit with cheese or peanut butter  o 6 crackers with peanut butter

## 2021-09-01 NOTE — PROGRESS NOTES
Patient Progress Note      CC: DM      Referring Provider  Isaias Luo  [place], [address],  [address]     History of Present Illness:   Nupur Rae is a 55 y o  male with a history of type 1 diabetes with long term use of insulin  Diabetes course has been stable  Complications of DM: none reported  He was admitted in April 2021 for bariatric surgery  Denies any issues with his current regimen  Home glucose monitoring: are performed regularly, has Dexcom sensor  Average glucose 154 mg/dL with SD of 59 mg/dl  In range 68%, above range 28%, below range <4%  Notices low BG after corrections     Current regimen: Novolog via pump  Metformin and Jardiance were stopped after bariatric surgery  compliant most of the time, denies any side effects from medications  Patient is on a TSlim pump  He denies any malfunctioning of the pump  Current Insulin pump settings:  Basal rate:  12:00 a m  = 2 000 units/hour  4:00 a m  = 2 100 units/hour  6:00 a m  = 2 100 units/hour  8:00 a m  2 300 units/hour  12:00 p m  = 2 000 units/hour  6:00 p m  = 1 900 units/hour  9:00 p m  = 1 900 units/hour  Insulin to carb ratio:  12:00 a m  = 4  4:00 a m  = 4  6:00 a m  = 4  8:00 a m  =   12:00 p m  = 4  6:00 p m  = 4  9:00 p m  = 4  Insulin sensitivity factor:  12:00 a m  = 35  4:00 a m  = 35  6:00 a m  = 25  8:00 a m  = 20  12:00 p m  = 20  6:00 p m  = 20  9:00 p m  = 35  BG target:  115 mg/dL      Discussed with patient in case of malfunctioning of the pump to use basal and bolus therapy as backup which is prescribed to the patient  Also notified patient to call clinic if any issues        Hypoglycemic episodes: Yes, occasional (mostly at night)  H/o of hypoglycemia causing hospitalization or Intervention such as glucagon injection  or ambulance call :  No  Hypoglycemia symptoms: weakness/sweating but does not always experience symptoms  Treatment of hypoglycemia: discussed treatment Medic alert tag: recommended: Yes     Diabetes education: Not recently   Diet: 3 meals per day (very small meals), 2 snacks per day  Timing of meals is variable  Diabetic diet compliance:  is complaint most of the time with bolus   Activity: Daily activity is predictable: Yes  He is trying to walk 2 miles at least 3-4 days a week  Ophthamology: sees routinely; Dec 2020 (Dr William Roach), no retinopathy  Podiatry: August 2021     Not on ACE inhibitor/ARB  Has hyperlipidemia: on Crestor - tolerating well, no myalgias  compliant most of the time, denies any side effects from medications  Thyroid disorders: Hypothyroidism  Patient is on Synthroid 175 mcg daily on empty stomach 1 hour before breakfast  Thyroid function tests in normal range    History of pancreatitis: No     Vitamin D deficiency: is currently taking a bariatric multivitamin    Patient Active Problem List   Diagnosis    Chronic back pain    Collagenous colitis    GERD (gastroesophageal reflux disease)    Hyperlipidemia    Hypothyroidism    Insomnia    Iron deficiency anemia    BMI 40 0-44 9, [facility]    Type 1 diabetes mellitus with hyperglycemia, with long-term current use of insulin ([facility])    Vitamin D deficiency    Seasonal allergic rhinitis due to pollen    Edema    Snoring    Intrinsic eczema    FARZANA (obstructive sleep apnea)    Renal calculus, bilateral    Leukocytosis (leucocytosis)    LILA (acute kidney injury) ([address] )    Erectile dysfunction    Insulin pump in place    Elevated BP without diagnosis of hypertension    Gastric bypass status for obesity    Cellulitis of right lower extremity      Past Medical History:   Diagnosis Date    Anemia     Diabetes mellitus ([address] )     Disease of thyroid gland     GERD (gastroesophageal reflux disease)     Hyperlipidemia     Hypertension     Insomnia     Kidney stone     Sleep apnea     Mild sleep apnea    Type 1 diabetes mellitus ([facility])     Uses Insulin Pump    Vitamin D deficiency       Past Surgical History:   Procedure Laterality Date    CARPAL TUNNEL RELEASE Right     COLONOSCOPY      EGD      FL RETROGRADE PYELOGRAM  2020    LASIK      RI CYSTO/URETERO W/LITHOTRIPSY &INDWELL STENT INSRT Right 2020    Procedure: CYSTOSCOPY URETEROSCOPY WITH LITHOTRIPSY HOLMIUM LASER, RETROGRADE PYELOGRAM AND INSERTION STENT URETERAL;  Surgeon: Nat Angel MD;  [facility];  Service: Urology    RI LAP GASTRIC BYPASS/GABBY-EN-Y N/A 2021    Procedure: BYPASS GASTRIC GABBY-EN-Y LAPAROSCOPIC W ROBOTICS AND INTRAOPERATIVE EGD;  Surgeon: Henrik Lopez MD;  [facility];  Service: Bariatrics    ROTATOR CUFF REPAIR Right       Family History   Problem Relation Age of Onset    Thyroid disease unspecified Mother     Osteoporosis Mother     Hypertension Father     Diabetes type II Sister     Hypertension Sister     Hyperlipidemia Sister     Thyroid disease unspecified Sister     Diabetes type II Brother     Hypertension Brother     Hyperlipidemia Brother      Social History     Tobacco Use    Smoking status: Former Smoker     Years: 30 00     Types: Cigarettes     Quit date:      Years since quittin 6    Smokeless tobacco: Never Used    Tobacco comment: quit in    Substance Use Topics    Alcohol use:  Yes     Alcohol/week: 2 0 standard drinks     Types: 2 Glasses of wine per week     Comment: social     Allergies   Allergen Reactions    Ibuprofen GI Intolerance    Other Other (See Comments)     Seasonal allergies- pt has congestion         Current Outpatient Medications:     acetone, urine, test (Ketostix) strip, Check ketones if blood sugars > 250, more than twice or in case of nausea or vomiting and abdominal pains, Disp: 100 each, Rfl: 0    azelastine (ASTELIN) 0 1 % nasal spray, 1 spray into each nostril 2 (two) times a day, Disp: 90 mL, Rfl: 1    Blood Glucose Monitoring Suppl (ONE TOUCH ULTRA 2) w/Device KIT, by Does not apply route, Disp: , Rfl:     Calcium Citrate (JORDY-CITRATE PO), Take 1,000 tablets by mouth daily, Disp: , Rfl:     Continuous Blood Gluc  (DEXCOM G6 ) KIYA, USE AS DIRECTED, Disp: 1 Device, Rfl: 0    Continuous Blood Gluc Sensor (DEXCOM G6 SENSOR) MISC, 1 Device by Does not apply route continuous Use 1 sensor every 10 days, Disp: 12 each, Rfl: 1    Continuous Blood Gluc Transmit (DEXCOM G6 TRANSMITTER) MISC, USE 1 DEVICE WITH SENSOR, Disp: 1 each, Rfl: 3    glucagon (GLUCAGON EMERGENCY) 1 MG injection, Inject 1 mg under the skin once as needed for low blood sugar for up to 1 dose, Disp: 1 kit, Rfl: 1    insulin aspart (NovoLOG) 100 units/mL injection, use UP to 140 units PER DAY VIA INSULIN PUMP, Disp: 110 mL, Rfl: 2    insulin glargine (Basaglar KwikPen) 100 units/mL injection pen, Inject 60 units in case of pump failure ( incase of emergency), Disp: 15 pen, Rfl: 1    Insulin Pen Needle (BD PEN NEEDLE LYRIC U/F) 32G X 4 MM MISC, by Does not apply route daily Use once daily, Disp: 100 each, Rfl: 2    Multiple Vitamins-Minerals (Bariatric Fusion) CHEW, Chew, Disp: , Rfl:     omeprazole (PriLOSEC) 20 mg delayed release capsule, Take 1 capsule (20 mg total) by mouth daily, Disp: 30 capsule, Rfl: 0    Probiotic Product (PROBIOTIC DAILY PO), Take 1 tablet by mouth daily , Disp: , Rfl:     rosuvastatin (CRESTOR) 10 MG tablet, Take 1 tablet (10 mg total) by mouth daily at bedtime, Disp: 90 tablet, Rfl: 1    Synthroid 175 MCG tablet, Take 1 tablet (175 mcg total) by mouth daily, Disp: 90 tablet, Rfl: 0    vitamin B-12 (VITAMIN B-12) 1,000 mcg tablet, Take 1,000 mcg by mouth daily , Disp: , Rfl:     zolpidem (AMBIEN) 10 mg tablet, Take 1 tablet (10 mg total) by mouth daily at bedtime as needed for sleep, Disp: 30 tablet, Rfl: 0    Ascorbic Acid (VITAMIN C PO), Take 2,000 mg by mouth daily  (Patient not taking: Reported on 6/21/2021), Disp: , Rfl:     gabapentin (NEURONTIN) 300 mg capsule, Take 1 capsule (300 mg total) by mouth daily at bedtime (Patient not taking: Reported on 8/16/2021), Disp: 90 capsule, Rfl: 1    Glucagon (BAQSIMI TWO PACK) 3 MG/DOSE POWD, Use one dose as needed in case of severe hypoglycemia ( nasal spray) (Patient not taking: Reported on 7/13/2021), Disp: 1 each, Rfl: 0    loratadine (CLARITIN) 10 mg tablet, Take 1 tablet (10 mg total) by mouth daily (Patient not taking: Reported on 6/21/2021), Disp: 90 tablet, Rfl: 1    MULTIPLE VITAMIN PO, Take 1 tablet by mouth daily  (Patient not taking: Reported on 8/16/2021), Disp: , Rfl:   Review of Systems   Constitutional: Negative for activity change, appetite change, fatigue and unexpected weight change  HENT: Negative for trouble swallowing  Eyes: Negative for visual disturbance  Respiratory: Negative for shortness of breath  Cardiovascular: Negative for chest pain and palpitations  Gastrointestinal: Negative for constipation and diarrhea  Endocrine: Negative for polydipsia and polyuria  Musculoskeletal: Negative  Skin: Negative for wound  Neurological: Positive for numbness  Psychiatric/Behavioral: Negative  Physical Exam:  Body mass index is 32 64 kg/m²  /78   Pulse 65   Temp 97 8 °F (36 6 °C)   Wt 100 kg (221 lb)   BMI 32 64 kg/m²    Wt Readings from Last 3 Encounters:   09/01/21 100 kg (221 lb)   08/23/21 101 kg (223 lb 3 2 oz)   08/16/21 99 8 kg (220 lb)       Physical Exam  Vitals and nursing note reviewed  Constitutional:       Appearance: He is well-developed  HENT:      Head: Normocephalic  Eyes:      General: No scleral icterus  Pupils: Pupils are equal, round, and reactive to light  Neck:      Thyroid: No thyromegaly  Cardiovascular:      Rate and Rhythm: Normal rate and regular rhythm  Pulses:           Radial pulses are 2+ on the right side and 2+ on the left side  Heart sounds: No murmur heard       Pulmonary:      Effort: Pulmonary effort is normal  No respiratory distress  Breath sounds: Normal breath sounds  No wheezing  Musculoskeletal:      Cervical back: Neck supple  Skin:     General: Skin is warm and dry  Neurological:      Mental Status: He is alert  Deep Tendon Reflexes: Reflexes are normal and symmetric  Patient's shoes and socks were not removed  Labs:   Component      Latest Ref Rng & Units 11/25/2020 3/4/2021 3/15/2021   Sodium      136 - 145 mmol/L   142   Potassium      3 5 - 5 3 mmol/L   4 0   Chloride      100 - 108 mmol/L   103   CO2      21 - 32 mmol/L   29   Anion Gap      4 - 13 mmol/L   10   BUN      5 - 25 mg/dL   19   Creatinine      0 60 - 1 30 mg/dL   1 19   GLUCOSE FASTING      65 - 99 mg/dL   105 (H)   Calcium      8 3 - 10 1 mg/dL   9 3   AST      5 - 45 U/L   28   ALT      12 - 78 U/L   31   Alkaline Phosphatase      46 - 116 U/L   95   Total Protein      6 4 - 8 2 g/dL   7 9   Albumin      3 5 - 5 0 g/dL   4 0   TOTAL BILIRUBIN      0 20 - 1 00 mg/dL   0 37   eGFR      ml/min/1 73sq m   69   Cholesterol      50 - 200 mg/dL   187   Triglycerides      <=150 mg/dL   209 (H)   HDL      >=40 mg/dL   41   LDL Calculated      0 - 100 mg/dL   104 (H)   Non-HDL Cholesterol      mg/dl   146   EXT Creatinine Urine        58 7    MICROALBUM ,U,RANDOM            MICROALBUMIN/CREATININE RATIO            Hemoglobin A1C      Normal 3 8-5 6%; PreDiabetic 5 7-6 4%;  Diabetic >=6 5%; Glycemic control for adults with diabetes <7 0% % 6 8 (H) 6 7    eAG, EST AVG Glucose      mg/dl 148     Free T4      0 76 - 1 46 ng/dL      TSH 3RD GENERATON      0 358 - 3 740 uIU/mL        Component      Latest Ref Rng & Units 7/15/2021   Sodium      136 - 145 mmol/L 145   Potassium      3 5 - 5 3 mmol/L 4 3   Chloride      100 - 108 mmol/L 107   CO2      21 - 32 mmol/L 32   Anion Gap      4 - 13 mmol/L 6   BUN      5 - 25 mg/dL 5   Creatinine      0 60 - 1 30 mg/dL 1 02   GLUCOSE FASTING      65 - 99 mg/dL 119 (H)   Calcium      8 3 - 10 1 mg/dL 9 0   AST      5 - 45 U/L    ALT      12 - 78 U/L    Alkaline Phosphatase      46 - 116 U/L    Total Protein      6 4 - 8 2 g/dL    Albumin      3 5 - 5 0 g/dL    TOTAL BILIRUBIN      0 20 - 1 00 mg/dL    eGFR      ml/min/1 73sq m 82   Cholesterol      50 - 200 mg/dL    Triglycerides      <=150 mg/dL    HDL      >=40 mg/dL    LDL Calculated      0 - 100 mg/dL    Non-HDL Cholesterol      mg/dl    EXT Creatinine Urine          MICROALBUM ,U,RANDOM          MICROALBUMIN/CREATININE RATIO          Hemoglobin A1C      Normal 3 8-5 6%; PreDiabetic 5 7-6 4%; Diabetic >=6 5%; Glycemic control for adults with diabetes <7 0% % 6 1 (H)   eAG, EST AVG Glucose      mg/dl 128   Free T4      0 76 - 1 46 ng/dL 0 95   TSH 3RD GENERATON      0 358 - 3 740 uIU/mL 3 301       Plan:    Diagnoses and all orders for this visit:    Type 1 diabetes mellitus with hyperglycemia, with long-term current use of insulin ([facility] )  HGA1C 6 1%  Improved  Treatment regimen: change ISF at 12 am, 4 am and 9 pm to 1:35 due to occasional over night lows  Discussed intensive insulin regimen does increase risk for hypoglycemia  Episodes of hypoglycemia can lead to permanent disability and death  Discussed risks/complications associated with uncontrolled diabetes  Advised to adhere to diabetic diet, and recommended staying active/exercising routinely as tolerated  Keep carbohydrates consistent to limit blood glucose fluctuations  Advised to call if blood sugars less than 70 mg/dl or over 300 mg/dl  Check blood glucose 3+ times a day  Discussed symptoms and treatment of hypoglycemia  Discussed use of CGM to collect additional blood glucose data to reveal trends and patterns that can be used to optimize treatment plan  Referred to diabetes/nutrition education  Recommended routine follow-up with podiatry and ophthalmology  Send pump/CGM download in 2 weeks  Ordered blood work to complete prior to next visit    -     Hemoglobin A1C; Future  -     Basic metabolic panel; Future  -     Microalbumin / creatinine urine ratio; Future    Acquired hypothyroidism  TFTs normal, TSH 3 301 and free T4 0 95  Continue current dose of Synthroid  Repeat TFTs prior to next visit  -     T4, free; Future  -     TSH, 3rd generation; Future    Mixed hyperlipidemia  LDL previously 104  Continue statin therapy      Discussed with the patient diagnosis and treatment and all questions fully answered  He will call me if any problems arise  Counseled patient on diagnostic results, prognosis, risk and benefit of treatment options, instruction for management, importance of treatment compliance, risk factor reduction and impressions        Alison Charles PA-C

## 2021-09-30 ENCOUNTER — TELEMEDICINE (OUTPATIENT)
Dept: INTERNAL MEDICINE CLINIC | Facility: CLINIC | Age: 56
End: 2021-09-30
Payer: COMMERCIAL

## 2021-09-30 DIAGNOSIS — B34.9 VIRAL INFECTION, UNSPECIFIED: Primary | ICD-10-CM

## 2021-09-30 PROCEDURE — U0005 INFEC AGEN DETEC AMPLI PROBE: HCPCS | Performed by: NURSE PRACTITIONER

## 2021-09-30 PROCEDURE — 99213 OFFICE O/P EST LOW 20 MIN: CPT | Performed by: NURSE PRACTITIONER

## 2021-09-30 PROCEDURE — U0003 INFECTIOUS AGENT DETECTION BY NUCLEIC ACID (DNA OR RNA); SEVERE ACUTE RESPIRATORY SYNDROME CORONAVIRUS 2 (SARS-COV-2) (CORONAVIRUS DISEASE [COVID-19]), AMPLIFIED PROBE TECHNIQUE, MAKING USE OF HIGH THROUGHPUT TECHNOLOGIES AS DESCRIBED BY CMS-2020-01-R: HCPCS | Performed by: NURSE PRACTITIONER

## 2021-09-30 NOTE — PROGRESS NOTES
COVID-19 Outpatient Progress Note    Assessment/Plan:    Problem List Items Addressed This Visit     None      Visit Diagnoses     Viral infection, unspecified    -  Primary    Relevant Orders    Novel Coronavirus (Covid-19),PCR [facility] - Collected at [facility] or [facility]         Disposition:     I referred patient to one of our centralized sites for a COVID-19 swab  Sent for covid testing  Recommend OTC cold medication, warm tea/honey   Notify office if symptoms worsen or do not improve     I have spent 5 minutes directly with the patient  Verification of patient location:    Patient is located in the following state in which I hold an active license [place]    Encounter provider ANN Weathers    Provider located at [address] [place] [address] [address] [facility] [id number]    Recent Visits  No visits were found meeting these conditions  Showing recent visits within past 7 days and meeting all other requirements  Today's Visits  Date Type Provider Dept   09/30/21 ANN [facility]   Showing today's visits and meeting all other requirements  Future Appointments  No visits were found meeting these conditions  Showing future appointments within next 150 days and meeting all other requirements     This virtual check-in was done via [email or web address] and patient was informed that this is a secure, HIPAA-compliant platform  He agrees to proceed  Patient agrees to participate in a virtual check in via telephone or video visit instead of presenting to the office to address urgent/immediate medical needs  Patient is aware this is a billable service  After connecting through [facility], the patient was identified by name and date of birth   Anders Moya was informed that this was a telemedicine visit and that the exam was being conducted confidentially over secure lines  My office door was closed  No one else was in the room  Garret King acknowledged consent and understanding of privacy and security of the telemedicine visit  I informed the patient that I have reviewed his record in Epic and presented the opportunity for him to ask any questions regarding the visit today  The patient agreed to participate  Subjective:   Garret King is a 55 y o  male who is concerned about COVID-19  Patient's symptoms include fatigue, nasal congestion, rhinorrhea, sore throat and headache  Patient denies fever, chills, anosmia, loss of taste, cough, shortness of breath, chest tightness, nausea, vomiting, diarrhea and myalgias       Date of symptom onset: 9/29/2021  COVID-19 vaccination status: Fully vaccinated    Exposure:   Contact with a person who is under investigation (PUI) for or who is positive for COVID-19 within the last 14 days?: No    Hospitalized recently for fever and/or lower respiratory symptoms?: No      Currently a healthcare worker that is involved in direct patient care?: No      Works in a special setting where the risk of COVID-19 transmission may be high? (this may include long-term care, correctional and skilled nursing facilities; homeless shelters; assisted-living facilities and group homes ): No      Resident in a special setting where the risk of COVID-19 transmission may be high? (this may include long-term care, correctional and skilled nursing facilities; homeless shelters; assisted-living facilities and group homes ): No      Lab Results   Component Value Date    [address] [facility] [facility] [address] Not Detected 11/20/2020     Past Medical History:   Diagnosis Date    Anemia     Diabetes mellitus ([facility] )     Disease of thyroid gland     GERD (gastroesophageal reflux disease)     Hyperlipidemia     Hypertension     Insomnia     Kidney stone     Sleep apnea     Mild sleep apnea    [facility] )     Uses Insulin Pump    Vitamin D deficiency      Past Surgical History:   Procedure Laterality Date    CARPAL TUNNEL RELEASE Right     COLONOSCOPY      EGD      FL RETROGRADE PYELOGRAM  4/24/2020    LASIK      CT CYSTO/URETERO W/LITHOTRIPSY &INDWELL STENT INSRT Right 4/24/2020    Procedure: CYSTOSCOPY URETEROSCOPY WITH LITHOTRIPSY HOLMIUM LASER, RETROGRADE PYELOGRAM AND INSERTION STENT URETERAL;  Surgeon: Pierre Schroeder MD;  [facility];  Service: Urology    CT LAP GASTRIC BYPASS/GABBY-EN-Y N/A 4/26/2021    Procedure: BYPASS GASTRIC GABBY-EN-Y LAPAROSCOPIC W ROBOTICS AND INTRAOPERATIVE EGD;  Surgeon: Debbi Bejarano MD;  [facility];  Service: Bariatrics    ROTATOR CUFF REPAIR Right      Current Outpatient Medications   Medication Sig Dispense Refill    acetone, urine, test (Ketostix) strip Check ketones if blood sugars > 250, more than twice or in case of nausea or vomiting and abdominal pains 100 each 0    azelastine (ASTELIN) 0 1 % nasal spray 1 spray into each nostril 2 (two) times a day 90 mL 1    Blood Glucose Monitoring Suppl (ONE TOUCH ULTRA 2) w/Device KIT by Does not apply route      Calcium Citrate (JORDY-CITRATE PO) Take 1,000 tablets by mouth daily      Continuous Blood Gluc  (DEXCOM G6 ) KIYA USE AS DIRECTED 1 Device 0    Continuous Blood Gluc Sensor (DEXCOM G6 SENSOR) MISC 1 Device by Does not apply route continuous Use 1 sensor every 10 days 12 each 1    Continuous Blood Gluc Transmit (DEXCOM G6 TRANSMITTER) MISC USE 1 DEVICE WITH SENSOR 1 each 3    glucagon (GLUCAGON EMERGENCY) 1 MG injection Inject 1 mg under the skin once as needed for low blood sugar for up to 1 dose 1 kit 1    insulin aspart (NovoLOG) 100 units/mL injection use UP to 140 units PER DAY VIA INSULIN PUMP 110 mL 2    insulin glargine (Basaglar KwikPen) 100 units/mL injection pen Inject 60 units in case of pump failure ( incase of emergency) 15 pen 1    Insulin Pen Needle (BD PEN NEEDLE LYRIC U/F) 32G X 4 MM MISC by Does not apply route daily Use once daily 100 each 2    loratadine (CLARITIN) 10 mg tablet Take 1 tablet (10 mg total) by mouth daily 90 tablet 1    Multiple Vitamins-Minerals (Bariatric Fusion) CHEW Chew      Probiotic Product (PROBIOTIC DAILY PO) Take 1 tablet by mouth daily       rosuvastatin (CRESTOR) 10 MG tablet Take 1 tablet (10 mg total) by mouth daily at bedtime 90 tablet 1    Synthroid 175 MCG tablet Take 1 tablet (175 mcg total) by mouth daily 90 tablet 0    vitamin B-12 (VITAMIN B-12) 1,000 mcg tablet Take 1,000 mcg by mouth daily       zolpidem (AMBIEN) 10 mg tablet Take 1 tablet (10 mg total) by mouth daily at bedtime as needed for sleep 30 tablet 0    Ascorbic Acid (VITAMIN C PO) Take 2,000 mg by mouth daily  (Patient not taking: Reported on 6/21/2021)      gabapentin (NEURONTIN) 300 mg capsule Take 1 capsule (300 mg total) by mouth daily at bedtime (Patient not taking: Reported on 8/16/2021) 90 capsule 1    Glucagon (BAQSIMI TWO PACK) 3 MG/DOSE POWD Use one dose as needed in case of severe hypoglycemia ( nasal spray) (Patient not taking: Reported on 7/13/2021) 1 each 0    MULTIPLE VITAMIN PO Take 1 tablet by mouth daily  (Patient not taking: Reported on 8/16/2021)      omeprazole (PriLOSEC) 20 mg delayed release capsule Take 1 capsule (20 mg total) by mouth daily 30 capsule 0     No current facility-administered medications for this visit  Allergies   Allergen Reactions    Ibuprofen GI Intolerance    Other Other (See Comments)     Seasonal allergies- pt has congestion       Review of Systems   Constitutional: Positive for fatigue  Negative for chills and fever  HENT: Positive for congestion, rhinorrhea and sore throat  Respiratory: Negative for cough, chest tightness and shortness of breath  Gastrointestinal: Negative for diarrhea, nausea and vomiting  Musculoskeletal: Negative for myalgias  Neurological: Positive for headaches  Objective:     There were no vitals filed for this visit     Physical Exam  Vitals reviewed  Constitutional:       Appearance: Normal appearance  HENT:      Head: Normocephalic and atraumatic  Pulmonary:      Effort: Pulmonary effort is normal  No respiratory distress  Comments: No cough  No conversational dyspnea  Neurological:      Mental Status: He is alert  Mental status is at baseline  Psychiatric:         Mood and Affect: Mood normal          Behavior: Behavior normal          VIRTUAL VISIT DISCLAIMER    Garret King verbally agrees to participate in [employer]  Pt is aware that [employer] could be limited without vital signs or the ability to perform a full hands-on physical exam  Barry Nova understands he or the provider may request at any time to terminate the video visit and request the patient to seek care or treatment in person

## 2021-10-03 DIAGNOSIS — Z98.84 GASTRIC BYPASS STATUS FOR OBESITY: ICD-10-CM

## 2021-10-04 RX ORDER — OMEPRAZOLE 20 MG/1
CAPSULE, DELAYED RELEASE ORAL
Qty: 30 CAPSULE | Refills: 0 | Status: SHIPPED | OUTPATIENT
Start: 2021-10-04 | End: 2021-11-01

## 2021-10-06 ENCOUNTER — TELEPHONE (OUTPATIENT)
Dept: ENDOCRINOLOGY | Facility: CLINIC | Age: 56
End: 2021-10-06

## 2021-10-19 ENCOUNTER — TELEPHONE (OUTPATIENT)
Dept: ENDOCRINOLOGY | Facility: CLINIC | Age: 56
End: 2021-10-19

## 2021-10-31 DIAGNOSIS — Z98.84 GASTRIC BYPASS STATUS FOR OBESITY: ICD-10-CM

## 2021-11-01 RX ORDER — OMEPRAZOLE 20 MG/1
CAPSULE, DELAYED RELEASE ORAL
Qty: 30 CAPSULE | Refills: 0 | Status: SHIPPED | OUTPATIENT
Start: 2021-11-01 | End: 2021-11-15 | Stop reason: SDUPTHER

## 2021-11-08 ENCOUNTER — OFFICE VISIT (OUTPATIENT)
Dept: GASTROENTEROLOGY | Facility: AMBULARY SURGERY CENTER | Age: 56
End: 2021-11-08
Payer: COMMERCIAL

## 2021-11-08 VITALS
HEIGHT: 69 IN | DIASTOLIC BLOOD PRESSURE: 82 MMHG | BODY MASS INDEX: 31.84 KG/M2 | WEIGHT: 215 LBS | SYSTOLIC BLOOD PRESSURE: 142 MMHG

## 2021-11-08 DIAGNOSIS — Z80.0 FAMILY HISTORY OF COLORECTAL CANCER: ICD-10-CM

## 2021-11-08 DIAGNOSIS — Z12.11 SCREENING FOR COLON CANCER: ICD-10-CM

## 2021-11-08 DIAGNOSIS — Z86.010 HISTORY OF COLON POLYPS: Primary | ICD-10-CM

## 2021-11-08 PROCEDURE — 99204 OFFICE O/P NEW MOD 45 MIN: CPT | Performed by: PHYSICIAN ASSISTANT

## 2021-11-08 RX ORDER — SODIUM, POTASSIUM,MAG SULFATES 17.5-3.13G
2 SOLUTION, RECONSTITUTED, ORAL ORAL ONCE
Qty: 354 ML | Refills: 0 | Status: SHIPPED | OUTPATIENT
Start: 2021-11-08 | End: 2022-01-17 | Stop reason: ALTCHOICE

## 2021-11-15 ENCOUNTER — OFFICE VISIT (OUTPATIENT)
Dept: BARIATRICS | Facility: CLINIC | Age: 56
End: 2021-11-15
Payer: COMMERCIAL

## 2021-11-15 VITALS
DIASTOLIC BLOOD PRESSURE: 80 MMHG | WEIGHT: 211 LBS | HEART RATE: 65 BPM | BODY MASS INDEX: 31.25 KG/M2 | TEMPERATURE: 97 F | SYSTOLIC BLOOD PRESSURE: 148 MMHG | HEIGHT: 69 IN

## 2021-11-15 DIAGNOSIS — Z48.815 ENCOUNTER FOR SURGICAL AFTERCARE FOLLOWING SURGERY OF DIGESTIVE SYSTEM: Primary | ICD-10-CM

## 2021-11-15 DIAGNOSIS — Z98.84 BARIATRIC SURGERY STATUS: ICD-10-CM

## 2021-11-15 DIAGNOSIS — Z98.84 GASTRIC BYPASS STATUS FOR OBESITY: ICD-10-CM

## 2021-11-15 DIAGNOSIS — E03.9 ACQUIRED HYPOTHYROIDISM: ICD-10-CM

## 2021-11-15 DIAGNOSIS — K21.9 GASTROESOPHAGEAL REFLUX DISEASE WITHOUT ESOPHAGITIS: ICD-10-CM

## 2021-11-15 DIAGNOSIS — E66.9 OBESITY, CLASS I, BMI 30-34.9: ICD-10-CM

## 2021-11-15 DIAGNOSIS — K91.2 POSTSURGICAL MALABSORPTION: ICD-10-CM

## 2021-11-15 DIAGNOSIS — E10.65 TYPE 1 DIABETES MELLITUS WITH HYPERGLYCEMIA, WITH LONG-TERM CURRENT USE OF INSULIN (HCC): ICD-10-CM

## 2021-11-15 PROCEDURE — 1036F TOBACCO NON-USER: CPT | Performed by: PHYSICIAN ASSISTANT

## 2021-11-15 PROCEDURE — 99214 OFFICE O/P EST MOD 30 MIN: CPT | Performed by: PHYSICIAN ASSISTANT

## 2021-11-15 PROCEDURE — 3008F BODY MASS INDEX DOCD: CPT | Performed by: PHYSICIAN ASSISTANT

## 2021-11-15 RX ORDER — OMEPRAZOLE 20 MG/1
20 CAPSULE, DELAYED RELEASE ORAL 2 TIMES DAILY
Qty: 60 CAPSULE | Refills: 2 | Status: SHIPPED | OUTPATIENT
Start: 2021-11-15 | End: 2022-01-17 | Stop reason: ALTCHOICE

## 2021-11-19 ENCOUNTER — LAB (OUTPATIENT)
Dept: LAB | Facility: CLINIC | Age: 56
End: 2021-11-19
Payer: COMMERCIAL

## 2021-11-19 DIAGNOSIS — E66.9 OBESITY, CLASS I, BMI 30-34.9: ICD-10-CM

## 2021-11-19 DIAGNOSIS — E03.9 ACQUIRED HYPOTHYROIDISM: ICD-10-CM

## 2021-11-19 DIAGNOSIS — E10.65 TYPE 1 DIABETES MELLITUS WITH HYPERGLYCEMIA, WITH LONG-TERM CURRENT USE OF INSULIN (HCC): ICD-10-CM

## 2021-11-19 DIAGNOSIS — Z48.815 ENCOUNTER FOR SURGICAL AFTERCARE FOLLOWING SURGERY OF DIGESTIVE SYSTEM: ICD-10-CM

## 2021-11-19 DIAGNOSIS — Z98.84 BARIATRIC SURGERY STATUS: ICD-10-CM

## 2021-11-19 DIAGNOSIS — K91.2 POSTSURGICAL MALABSORPTION: ICD-10-CM

## 2021-11-19 LAB
25(OH)D3 SERPL-MCNC: 55.3 NG/ML (ref 30–100)
ALBUMIN SERPL BCP-MCNC: 3.6 G/DL (ref 3.5–5)
ALP SERPL-CCNC: 109 U/L (ref 46–116)
ALT SERPL W P-5'-P-CCNC: 31 U/L (ref 12–78)
ANION GAP SERPL CALCULATED.3IONS-SCNC: 9 MMOL/L (ref 4–13)
AST SERPL W P-5'-P-CCNC: 35 U/L (ref 5–45)
BILIRUB SERPL-MCNC: 0.5 MG/DL (ref 0.2–1)
BUN SERPL-MCNC: 17 MG/DL (ref 5–25)
CALCIUM SERPL-MCNC: 8.8 MG/DL (ref 8.3–10.1)
CHLORIDE SERPL-SCNC: 104 MMOL/L (ref 100–108)
CO2 SERPL-SCNC: 28 MMOL/L (ref 21–32)
CREAT SERPL-MCNC: 1.06 MG/DL (ref 0.6–1.3)
CREAT UR-MCNC: 143 MG/DL
ERYTHROCYTE [DISTWIDTH] IN BLOOD BY AUTOMATED COUNT: 12.8 % (ref 11.6–15.1)
EST. AVERAGE GLUCOSE BLD GHB EST-MCNC: 140 MG/DL
FERRITIN SERPL-MCNC: 25 NG/ML (ref 8–388)
GFR SERPL CREATININE-BSD FRML MDRD: 79 ML/MIN/1.73SQ M
GLUCOSE P FAST SERPL-MCNC: 140 MG/DL (ref 65–99)
HBA1C MFR BLD: 6.5 %
HCT VFR BLD AUTO: 43.2 % (ref 36.5–49.3)
HGB BLD-MCNC: 14.5 G/DL (ref 12–17)
IRON SATN MFR SERPL: 27 % (ref 20–50)
IRON SERPL-MCNC: 98 UG/DL (ref 65–175)
MCH RBC QN AUTO: 32.1 PG (ref 26.8–34.3)
MCHC RBC AUTO-ENTMCNC: 33.6 G/DL (ref 31.4–37.4)
MCV RBC AUTO: 96 FL (ref 82–98)
MICROALBUMIN UR-MCNC: 6.8 MG/L (ref 0–20)
MICROALBUMIN/CREAT 24H UR: 5 MG/G CREATININE (ref 0–30)
PLATELET # BLD AUTO: 257 THOUSANDS/UL (ref 149–390)
PMV BLD AUTO: 9.6 FL (ref 8.9–12.7)
POTASSIUM SERPL-SCNC: 4.1 MMOL/L (ref 3.5–5.3)
PROT SERPL-MCNC: 7.2 G/DL (ref 6.4–8.2)
PTH-INTACT SERPL-MCNC: 64.1 PG/ML (ref 18.4–80.1)
RBC # BLD AUTO: 4.52 MILLION/UL (ref 3.88–5.62)
SODIUM SERPL-SCNC: 141 MMOL/L (ref 136–145)
T4 FREE SERPL-MCNC: 0.88 NG/DL (ref 0.76–1.46)
TIBC SERPL-MCNC: 357 UG/DL (ref 250–450)
TSH SERPL DL<=0.05 MIU/L-ACNC: 4.08 UIU/ML (ref 0.36–3.74)
VIT B12 SERPL-MCNC: 3085 PG/ML (ref 100–900)
WBC # BLD AUTO: 7.39 THOUSAND/UL (ref 4.31–10.16)

## 2021-11-19 PROCEDURE — 82043 UR ALBUMIN QUANTITATIVE: CPT

## 2021-11-19 PROCEDURE — 3044F HG A1C LEVEL LT 7.0%: CPT | Performed by: PHYSICIAN ASSISTANT

## 2021-11-19 PROCEDURE — 83970 ASSAY OF PARATHORMONE: CPT

## 2021-11-19 PROCEDURE — 83540 ASSAY OF IRON: CPT

## 2021-11-19 PROCEDURE — 84425 ASSAY OF VITAMIN B-1: CPT

## 2021-11-19 PROCEDURE — 84630 ASSAY OF ZINC: CPT

## 2021-11-19 PROCEDURE — 82306 VITAMIN D 25 HYDROXY: CPT

## 2021-11-19 PROCEDURE — 85027 COMPLETE CBC AUTOMATED: CPT

## 2021-11-19 PROCEDURE — 82607 VITAMIN B-12: CPT

## 2021-11-19 PROCEDURE — 82728 ASSAY OF FERRITIN: CPT

## 2021-11-19 PROCEDURE — 82570 ASSAY OF URINE CREATININE: CPT

## 2021-11-19 PROCEDURE — 80053 COMPREHEN METABOLIC PANEL: CPT

## 2021-11-19 PROCEDURE — 84439 ASSAY OF FREE THYROXINE: CPT

## 2021-11-19 PROCEDURE — 84590 ASSAY OF VITAMIN A: CPT

## 2021-11-19 PROCEDURE — 83550 IRON BINDING TEST: CPT

## 2021-11-19 PROCEDURE — 84443 ASSAY THYROID STIM HORMONE: CPT

## 2021-11-19 PROCEDURE — 36415 COLL VENOUS BLD VENIPUNCTURE: CPT

## 2021-11-19 PROCEDURE — 83036 HEMOGLOBIN GLYCOSYLATED A1C: CPT

## 2021-11-19 PROCEDURE — 3061F NEG MICROALBUMINURIA REV: CPT | Performed by: PHYSICIAN ASSISTANT

## 2021-11-26 LAB
VIT B1 BLD-SCNC: 98.3 NMOL/L (ref 66.5–200)
ZINC SERPL-MCNC: 92 UG/DL (ref 44–115)

## 2021-11-29 LAB — VIT A SERPL-MCNC: 54.1 UG/DL (ref 20.1–62)

## 2021-12-01 DIAGNOSIS — E03.9 HYPOTHYROIDISM, UNSPECIFIED TYPE: ICD-10-CM

## 2021-12-01 RX ORDER — LEVOTHYROXINE SODIUM 175 MCG
TABLET ORAL
Qty: 90 TABLET | Refills: 0 | Status: SHIPPED | OUTPATIENT
Start: 2021-12-01 | End: 2022-03-21

## 2021-12-15 ENCOUNTER — TELEPHONE (OUTPATIENT)
Dept: ENDOCRINOLOGY | Facility: CLINIC | Age: 56
End: 2021-12-15

## 2021-12-16 ENCOUNTER — OFFICE VISIT (OUTPATIENT)
Dept: ENDOCRINOLOGY | Facility: CLINIC | Age: 56
End: 2021-12-16
Payer: COMMERCIAL

## 2021-12-16 VITALS
DIASTOLIC BLOOD PRESSURE: 80 MMHG | SYSTOLIC BLOOD PRESSURE: 126 MMHG | WEIGHT: 212 LBS | HEART RATE: 60 BPM | TEMPERATURE: 96.3 F | BODY MASS INDEX: 31.4 KG/M2 | HEIGHT: 69 IN

## 2021-12-16 DIAGNOSIS — E10.65 TYPE 1 DIABETES MELLITUS WITH HYPERGLYCEMIA, WITH LONG-TERM CURRENT USE OF INSULIN (HCC): Primary | ICD-10-CM

## 2021-12-16 DIAGNOSIS — E78.2 MIXED HYPERLIPIDEMIA: ICD-10-CM

## 2021-12-16 DIAGNOSIS — E03.9 ACQUIRED HYPOTHYROIDISM: ICD-10-CM

## 2021-12-16 PROCEDURE — 99214 OFFICE O/P EST MOD 30 MIN: CPT | Performed by: PHYSICIAN ASSISTANT

## 2021-12-16 PROCEDURE — 3079F DIAST BP 80-89 MM HG: CPT | Performed by: PHYSICIAN ASSISTANT

## 2021-12-16 PROCEDURE — 1036F TOBACCO NON-USER: CPT | Performed by: PHYSICIAN ASSISTANT

## 2021-12-16 PROCEDURE — 3074F SYST BP LT 130 MM HG: CPT | Performed by: PHYSICIAN ASSISTANT

## 2021-12-16 PROCEDURE — 95251 CONT GLUC MNTR ANALYSIS I&R: CPT | Performed by: PHYSICIAN ASSISTANT

## 2021-12-16 PROCEDURE — 3008F BODY MASS INDEX DOCD: CPT | Performed by: PHYSICIAN ASSISTANT

## 2022-01-14 ENCOUNTER — TELEPHONE (OUTPATIENT)
Dept: GASTROENTEROLOGY | Facility: AMBULARY SURGERY CENTER | Age: 57
End: 2022-01-14

## 2022-01-17 ENCOUNTER — HOSPITAL ENCOUNTER (OUTPATIENT)
Dept: GASTROENTEROLOGY | Facility: AMBULARY SURGERY CENTER | Age: 57
Setting detail: OUTPATIENT SURGERY
Discharge: HOME | End: 2022-01-17
Attending: INTERNAL MEDICINE | Admitting: INTERNAL MEDICINE
Payer: COMMERCIAL

## 2022-01-17 ENCOUNTER — ANESTHESIA (OUTPATIENT)
Dept: GASTROENTEROLOGY | Facility: AMBULARY SURGERY CENTER | Age: 57
End: 2022-01-17

## 2022-01-17 ENCOUNTER — ANESTHESIA EVENT (OUTPATIENT)
Dept: GASTROENTEROLOGY | Facility: AMBULARY SURGERY CENTER | Age: 57
End: 2022-01-17

## 2022-01-17 ENCOUNTER — TELEPHONE (OUTPATIENT)
Dept: ENDOCRINOLOGY | Facility: CLINIC | Age: 57
End: 2022-01-17

## 2022-01-17 VITALS
SYSTOLIC BLOOD PRESSURE: 136 MMHG | RESPIRATION RATE: 22 BRPM | BODY MASS INDEX: 30.07 KG/M2 | OXYGEN SATURATION: 96 % | WEIGHT: 203 LBS | DIASTOLIC BLOOD PRESSURE: 74 MMHG | HEART RATE: 55 BPM | HEIGHT: 69 IN | TEMPERATURE: 97.2 F

## 2022-01-17 DIAGNOSIS — Z86.010 HISTORY OF COLON POLYPS: ICD-10-CM

## 2022-01-17 DIAGNOSIS — Z12.11 SCREENING FOR COLON CANCER: ICD-10-CM

## 2022-01-17 DIAGNOSIS — Z80.0 FAMILY HISTORY OF COLORECTAL CANCER: ICD-10-CM

## 2022-01-17 LAB — GLUCOSE SERPL-MCNC: 104 MG/DL (ref 65–140)

## 2022-01-17 PROCEDURE — 88305 TISSUE EXAM BY PATHOLOGIST: CPT | Performed by: PATHOLOGY

## 2022-01-17 PROCEDURE — 45385 COLONOSCOPY W/LESION REMOVAL: CPT | Performed by: INTERNAL MEDICINE

## 2022-01-17 PROCEDURE — 82948 REAGENT STRIP/BLOOD GLUCOSE: CPT

## 2022-01-17 RX ORDER — PROPOFOL 10 MG/ML
INJECTION, EMULSION INTRAVENOUS AS NEEDED
Status: DISCONTINUED | OUTPATIENT
Start: 2022-01-17 | End: 2022-01-17

## 2022-01-17 RX ORDER — SODIUM CHLORIDE, SODIUM LACTATE, POTASSIUM CHLORIDE, CALCIUM CHLORIDE 600; 310; 30; 20 MG/100ML; MG/100ML; MG/100ML; MG/100ML
INJECTION, SOLUTION INTRAVENOUS CONTINUOUS PRN
Status: DISCONTINUED | OUTPATIENT
Start: 2022-01-17 | End: 2022-01-17

## 2022-01-17 RX ORDER — PROPOFOL 10 MG/ML
INJECTION, EMULSION INTRAVENOUS CONTINUOUS PRN
Status: DISCONTINUED | OUTPATIENT
Start: 2022-01-17 | End: 2022-01-17

## 2022-01-17 RX ADMIN — SODIUM CHLORIDE, SODIUM LACTATE, POTASSIUM CHLORIDE, AND CALCIUM CHLORIDE: .6; .31; .03; .02 INJECTION, SOLUTION INTRAVENOUS at 08:02

## 2022-01-17 RX ADMIN — PROPOFOL 50 MG: 10 INJECTION, EMULSION INTRAVENOUS at 08:07

## 2022-01-17 RX ADMIN — PROPOFOL 110 MCG/KG/MIN: 10 INJECTION, EMULSION INTRAVENOUS at 08:05

## 2022-01-17 RX ADMIN — PROPOFOL 50 MG: 10 INJECTION, EMULSION INTRAVENOUS at 08:05

## 2022-01-17 NOTE — DISCHARGE INSTRUCTIONS
Colorectal Polyps   WHAT YOU NEED TO KNOW:   Colorectal polyps are small growths of tissue in the lining of the colon and rectum  Most polyps are hyperplastic polyps and are usually benign (noncancerous)  Certain types of polyps, called adenomatous polyps, may turn into cancer  DISCHARGE INSTRUCTIONS:   Follow up with your healthcare provider or gastroenterologist as directed: You may need to return for more tests, such as another colonoscopy  Write down your questions so you remember to ask them during your visits  Reduce your risk for colorectal polyps:   · Eat a variety of healthy foods:  Healthy foods include fruit, vegetables, whole-grain breads, low-fat dairy products, beans, lean meat, and fish  Ask if you need to be on a special diet  · Maintain a healthy weight:  Ask your healthcare provider if you need to lose weight and how much you need to lose  Ask for help with a weight loss program     · Exercise:  Begin to exercise slowly and do more as you get stronger  Talk with your healthcare provider before you start an exercise program      · Limit alcohol:  Your risk for polyps increases the more you drink  · Do not smoke: If you smoke, it is never too late to quit  Ask for information about how to stop  For support and more information:   · Hardeep Aviles ([facility]) [address], [place] [address]  Phone: [phone]  Web Address: www digestive  niddk nih gov    Contact your healthcare provider or gastroenterologist if:   · You have a fever  · You have chills, a cough, or feel weak and achy  · You have abdominal pain that does not go away or gets worse after you take medicine  · Your abdomen is swollen  · You are losing weight without trying  · You have questions or concerns about your condition or care  Seek care immediately or call 911 if:   · You have sudden shortness of breath       · You have a fast heart rate, fast breathing, or are too dizzy to stand up  · You have severe abdominal pain  · You see blood in your bowel movement  © Copyright [address] Information is for End User's use only and may not be sold, redistributed or otherwise used for commercial purposes  All illustrations and images included in CareNotes® are the copyrighted property of A D A M , Inc  or [address] [place], [place] [address]  The above information is an  only  It is not intended as medical advice for individual conditions or treatments  Talk to your doctor, nurse or pharmacist before following any medical regimen to see if it is safe and effective for you  Colonoscopy   WHAT YOU NEED TO KNOW:   A colonoscopy is a procedure to examine the inside of your colon (intestine) with a scope  Polyps or tissue growths may have been removed during your colonoscopy  It is normal to feel bloated and to have some abdominal discomfort  You should be passing gas  If you have hemorrhoids or you had polyps removed, you may have a small amount of bleeding  DISCHARGE INSTRUCTIONS:   Seek care immediately if:   · You have a large amount of bright red blood in your bowel movements  · Your abdomen is hard and firm and you have severe pain  · You have sudden trouble breathing  Contact your healthcare provider if:   · You develop a rash or hives  · You have a fever within 24 hours of your procedure       · You have not had a bowel movement for 3 days after your procedure  · You have questions or concerns about your condition or care  Activity:   · Do not lift, strain, or run  for 3 days after your procedure  · Rest after your procedure  You have been given medicine to relax you  Do not  drive or make important decisions until the day after your procedure  Return to your normal activity as directed  · Relieve gas and discomfort from bloating  by lying on your right side with a heating pad on your abdomen  You may need to take short walks to help the gas move out  Eat small meals until bloating is relieved  If you had polyps removed: For 7 days after your procedure:  · Do not  take aspirin  · Do not  go on long car rides  Follow up with your healthcare provider as directed:  Write down your questions so you remember to ask them during your visits  © 2017 [address] is for End User's use only and may not be sold, redistributed or otherwise used for commercial purposes  All illustrations and images included in CareNotes® are the copyrighted property of A D A M , Inc  or Lyndon Pascual  The above information is an  only  It is not intended as medical advice for individual conditions or treatments  Talk to your doctor, nurse or pharmacist before following any medical regimen to see if it is safe and effective for you  Diverticulosis   WHAT YOU NEED TO KNOW:   Diverticulosis is a condition that causes small pockets called diverticula to form in your intestine  These pockets make it difficult for bowel movements to pass through your digestive system  DISCHARGE INSTRUCTIONS:   Seek care immediately if:   · You have severe pain on the left side of your lower abdomen  · Your bowel movements are bright or dark red  Call your doctor if:   · You have a fever and chills  · You feel dizzy or lightheaded  · You have nausea, or you are vomiting  · You have a change in your bowel movements  · You have questions or concerns about your condition or care  Medicines:   · Medicines  to soften your bowel movements may be given  You may also need medicines to treat symptoms such as bloating and pain  · Take your medicine as directed  Contact your healthcare provider if you think your medicine is not helping or if you have side effects  Tell him or her if you are allergic to any medicine   Keep a list of the medicines, vitamins, and herbs you take  Include the amounts, and when and why you take them  Bring the list or the pill bottles to follow-up visits  Carry your medicine list with you in case of an emergency  Self-care: The goal of treatment is to manage any symptoms you have and prevent other problems such as diverticulitis  Diverticulitis is swelling or infection of the diverticula  Your healthcare provider may recommend any of the following:  · Eat a variety of high-fiber foods  High-fiber foods help you have regular bowel movements  High-fiber foods include cooked beans, fruits, vegetables, and some cereals  Most adults need 25 to 35 grams of fiber each day  Your healthcare provider may recommend that you have more  Ask your healthcare provider how much fiber you need  Increase fiber slowly  You may have abdominal discomfort, bloating, and gas if you add fiber to your diet too quickly  You may need to take a fiber supplement if you are not getting enough fiber from food  · Drink liquids as directed  You may need to drink 2 to 3 liters (8 to 12 cups) of liquids every day  Ask your healthcare provider how much liquid to drink each day and which liquids are best for you  · Apply heat  on your abdomen for 20 to 30 minutes every 2 hours for as many days as directed  Heat helps decrease pain and muscle spasms  Help prevent diverticulitis or other symptoms: The following may help decrease your risk for diverticulitis or symptoms, such as bleeding  Talk to your provider about these or other things you can do to prevent problems that may occur with diverticulosis  · Exercise regularly  Ask your healthcare provider about the best exercise plan for you  Exercise can help you have regular bowel movements  Get 30 minutes of exercise on most days of the week  · Maintain a healthy weight  Ask your healthcare provider what a healthy weight is for you   Ask him or her to help you create a weight loss plan if you are overweight  · Do not smoke  Nicotine and other chemicals in cigarettes increase your risk for diverticulitis  Ask your healthcare provider for information if you currently smoke and need help to quit  E-cigarettes or smokeless tobacco still contain nicotine  Talk to your healthcare provider before you use these products  · Ask your healthcare provider if it is safe to take NSAIDs  NSAIDs may increase your risk of diverticulitis  Follow up with your doctor as directed:  Write down your questions so you remember to ask them during your visits  © Copyright [employer] 2021 Information is for End User's use only and may not be sold, redistributed or otherwise used for commercial purposes  All illustrations and images included in CareNotes® are the copyrighted property of A D A M , Inc  or [facility] Marcos Frazier   The above information is an  only  It is not intended as medical advice for individual conditions or treatments  Talk to your doctor, nurse or pharmacist before following any medical regimen to see if it is safe and effective for you  Diverticulosis Diet   WHAT YOU NEED TO KNOW:   What is a diverticulosis diet? A diverticulosis diet includes high-fiber foods  High-fiber foods help you have regular bowel movements  Extra fiber may decrease your risk of forming new diverticula (small pockets) in your intestine  A high-fiber diet may also help prevent diverticulitis  Diverticulitis is a painful condition that occurs when diverticula become inflamed or infected  You do not need to avoid nuts, seeds, corn, or popcorn while you are on a diverticulosis diet  How much fiber do I need? You may need 25 to 35 grams of fiber each day  Ask your dietitian or healthcare provider how much fiber you should have  Increase your intake of fiber slowly  When you eat more fiber, you may have gas and feel bloated  You may need to take a fiber supplement if you do not get enough fiber from food  Drink plenty of liquids as you increase the fiber in your diet  Your dietitian or healthcare provider may recommend 8 eight-ounce cups or more each day  Ask which liquids are best for you  Which foods are high in fiber? · Foods with at least 4 grams of fiber per serving:      ? ? to ½ cup of high-fiber cereal (check the nutrition label on the box)    ? ½ cup of blackberries or raspberries    ? 4 dried prunes    ? 1 cooked artichoke    ? ½ cup of cooked legumes, such as lentils, or red, kidney, and wood beans    · Foods with 1 to 3 grams of fiber per serving:      ? 1 slice of whole-wheat, pumpernickel, or rye bread    ? 4 whole-wheat crackers    ? ½ cup of cereal with 1 to 3 grams of fiber per serving (check the nutrition label on the box)    ? 1 piece of fruit, such as an apple, banana, pear, kiwi, or orange    ? 3 dates    ? ½ cup of canned apricots, fruit cocktail, peaches, or pears    ? ½ cup of raw or cooked vegetables, such as carrots, cauliflower, cabbage, spinach, squash, or corn    When should I call my doctor? · You have questions about a high-fiber diet  · You have a change in your bowel movements  · You have an upset stomach  · You have a fever  · You have pain in your lower abdomen on the left side  · You have questions about your condition or care  CARE AGREEMENT:   You have the right to help plan your care  Learn about your health condition and how it may be treated  Discuss treatment options with your healthcare providers to decide what care you want to receive  You always have the right to refuse treatment  The above information is an  only  It is not intended as medical advice for individual conditions or treatments  Talk to your doctor, nurse or pharmacist before following any medical regimen to see if it is safe and effective for you    © Copyright [employer] 2021 Information is for End User's use only and may not be sold, redistributed or otherwise used for commercial purposes  All illustrations and images included in CareNotes® are the copyrighted property of A D A M , Inc  or Osvaldo Kenyon      Hemorrhoids   WHAT YOU NEED TO KNOW:   Hemorrhoids are swollen blood vessels inside your rectum (internal hemorrhoids) or on your anus (external hemorrhoids)  Sometimes a hemorrhoid may prolapse  This means it extends out of your anus  DISCHARGE INSTRUCTIONS:   Seek care immediately if:   · You have severe pain in your rectum or around your anus  · You have severe pain in your abdomen and you are vomiting  · You have bleeding from your anus that soaks through your underwear  Contact your healthcare provider if:   · You have frequent and painful bowel movements  · Your hemorrhoid looks or feels more swollen than usual      · You do not have a bowel movement for 2 days or more  · You see or feel tissue coming through your anus  · You have questions or concerns about your condition or care  Medicines: You may  need any of the following:  · Medicine  may be given to decrease pain, swelling, and itching  The medicine may come as a pad, cream, or ointment  · Stool softeners  help treat or prevent constipation  · NSAIDs , such as ibuprofen, help decrease swelling, pain, and fever  NSAIDs can cause stomach bleeding or kidney problems in certain people  If you take blood thinner medicine, always ask your healthcare provider if NSAIDs are safe for you  Always read the medicine label and follow directions  · Take your medicine as directed  Contact your healthcare provider if you think your medicine is not helping or if you have side effects  Tell him or her if you are allergic to any medicine  Keep a list of the medicines, vitamins, and herbs you take  Include the amounts, and when and why you take them  Bring the list or the pill bottles to follow-up visits  Carry your medicine list with you in case of an emergency      Manage your symptoms:   · Apply ice on your anus for 15 to 20 minutes every hour or as directed  Use an ice pack, or put crushed ice in a plastic bag  Cover it with a towel before you apply it to your anus  Ice helps prevent tissue damage and decreases swelling and pain  · Take a sitz bath  Fill a bathtub with 4 to 6 inches of warm water  You may also use a sitz bath pan that fits inside a toilet bowl  Sit in the sitz bath for 15 minutes  Do this 3 times a day, and after each bowel movement  The warm water can help decrease pain and swelling  · Keep your anal area clean  Gently wash the area with warm water daily  Soap may irritate the area  After a bowel movement, wipe with moist towelettes or wet toilet paper  Dry toilet paper can irritate the area  Prevent hemorrhoids:   · Do not strain to have a bowel movement  Do not sit on the toilet too long  These actions can increase pressure on the tissues in your rectum and anus  · Drink plenty of liquids  Liquids can help prevent constipation  Ask how much liquid to drink each day and which liquids are best for you  · Eat a variety of high-fiber foods  Examples include fruits, vegetables, and whole grains  Ask your healthcare provider how much fiber you need each day  You may need to take a fiber supplement  · Exercise as directed  Exercise, such as walking, may make it easier to have a bowel movement  Ask your healthcare provider to help you create an exercise plan  · Do not have anal sex  Anal sex can weaken the skin around your rectum and anus  · Avoid heavy lifting  This can cause straining and increase your risk for another hemorrhoid  Follow up with your doctor as directed:  Write down your questions so you remember to ask them during your visits  © Copyright [employer] 2021 Information is for End User's use only and may not be sold, redistributed or otherwise used for commercial purposes   All illustrations and images included in Donte are the copyrighted property of [employer]  or [address] [place], [place] [address]sandra   The above information is an  only  It is not intended as medical advice for individual conditions or treatments  Talk to your doctor, nurse or pharmacist before following any medical regimen to see if it is safe and effective for you

## 2022-01-17 NOTE — ANESTHESIA PREPROCEDURE EVALUATION
Procedure:  COLONOSCOPY    Relevant Problems   CARDIO   (+) Hyperlipidemia      ENDO   (+) Hypothyroidism   (+) Type 1 diabetes mellitus with hyperglycemia, with long-term current use of insulin (HCC)      GI/HEPATIC   (+) GERD (gastroesophageal reflux disease)      /RENAL   (+) LILA (acute kidney injury) ([facility] [address] )   (+) Renal calculus, bilateral      HEMATOLOGY   (+) Iron deficiency anemia      MUSCULOSKELETAL   (+) Chronic back pain      NEURO/PSYCH   (+) Chronic back pain      PULMONARY   (+) FARZANA (obstructive sleep apnea)        Physical Exam    Airway    Mallampati score: III  TM Distance: >3 FB  Neck ROM: full     Dental   No notable dental hx     Cardiovascular  Cardiovascular exam normal    Pulmonary  Pulmonary exam normal     Other Findings    DM1 with insulin pump and glucose monitor    Anesthesia Plan  ASA Score- 2     Anesthesia Type- IV sedation with anesthesia with ASA Monitors  Additional Monitors:   Airway Plan:           Plan Factors-Exercise tolerance (METS): >4 METS  Chart reviewed  EKG reviewed  Existing labs reviewed  Patient summary reviewed  Patient is not a current smoker  Induction- intravenous  Postoperative Plan-     Informed Consent- Anesthetic plan and risks discussed with patient

## 2022-01-17 NOTE — H&P
History and Physical - [facility]  Wilbert Leslie 55 y o  male MRN: [id number]    HPI: Wilbert Leslie is a 55y o  year old male who presents with personal history of colon polyps, family history of colon cancer         Review of Systems    Historical Information   Past Medical History:   Diagnosis Date    Anemia     Colon polyp     Diabetes mellitus ([address] )     Disease of thyroid gland     GERD (gastroesophageal reflux disease)     Hyperlipidemia     Hypertension     Insomnia     Kidney stone     Sleep apnea     Mild sleep apnea    Type 1 diabetes mellitus (HCC)     Uses Insulin Pump    Vitamin D deficiency      Past Surgical History:   Procedure Laterality Date    BARIATRIC SURGERY      CARPAL TUNNEL RELEASE Right     COLONOSCOPY      EGD      FL RETROGRADE PYELOGRAM  2020    LASIK      UT CYSTO/URETERO W/LITHOTRIPSY &INDWELL STENT INSRT Right 2020    Procedure: CYSTOSCOPY URETEROSCOPY WITH LITHOTRIPSY HOLMIUM LASER, RETROGRADE PYELOGRAM AND INSERTION STENT URETERAL;  Surgeon: Pricilla Shrestha MD;  Location: [facility];  Service: Urology    UT LAP GASTRIC BYPASS/GABBY-EN-Y N/A 2021    Procedure: BYPASS GASTRIC GABBY-EN-Y LAPAROSCOPIC W ROBOTICS AND INTRAOPERATIVE EGD;  Surgeon: Genny Anderson MD;  Location: [facility];  Service: Bariatrics    ROTATOR CUFF REPAIR Right     UPPER GASTROINTESTINAL ENDOSCOPY       Social History   Social History     Substance and Sexual Activity   Alcohol Use Yes    Alcohol/week: 2 0 standard drinks    Types: 2 Glasses of wine per week    Comment: social     Social History     Substance and Sexual Activity   Drug Use No     Social History     Tobacco Use   Smoking Status Former Smoker    Years: 30 00    Types: Cigarettes    Quit date:     Years since quittin 0   Smokeless Tobacco Never Used   Tobacco Comment    quit in      Family History   Problem Relation Age of Onset    Thyroid disease unspecified Mother  Osteoporosis Mother     Hypertension Father     Colon cancer Father 76    Diabetes type II Sister     Hypertension Sister     Hyperlipidemia Sister     Thyroid disease unspecified Sister     Diabetes type II Brother     Hypertension Brother     Hyperlipidemia Brother     Colon cancer Brother 61       Meds/Allergies     (Not in a hospital admission)      Allergies   Allergen Reactions    Ibuprofen GI Intolerance    Other Other (See Comments)     Seasonal allergies- pt has congestion       Objective     /81   Pulse 58   Resp 16   Ht 5' 9" (1 753 m)   Wt 92 1 kg (203 lb)   SpO2 97%   BMI 29 98 kg/m²       PHYSICAL EXAM    Gen: NAD  CV: RRR  CHEST: Clear  ABD: soft, NT/ND  EXT: no edema  Neuro: AAO      ASSESSMENT/PLAN:  This is a 55y o  year old male here for personal history of colon polyps, family history of colon cancer         PLAN:   Procedure: colonoscopy

## 2022-01-24 ENCOUNTER — TELEMEDICINE (OUTPATIENT)
Dept: INTERNAL MEDICINE CLINIC | Facility: OTHER | Age: 57
End: 2022-01-24
Payer: COMMERCIAL

## 2022-01-24 VITALS — WEIGHT: 199 LBS | TEMPERATURE: 96.8 F | BODY MASS INDEX: 29.47 KG/M2 | HEIGHT: 69 IN

## 2022-01-24 DIAGNOSIS — L03.012 PARONYCHIA OF FINGER, LEFT: Primary | ICD-10-CM

## 2022-01-24 DIAGNOSIS — J02.9 PHARYNGITIS, UNSPECIFIED ETIOLOGY: ICD-10-CM

## 2022-01-24 PROCEDURE — 3008F BODY MASS INDEX DOCD: CPT | Performed by: PHYSICIAN ASSISTANT

## 2022-01-24 PROCEDURE — 99213 OFFICE O/P EST LOW 20 MIN: CPT | Performed by: INTERNAL MEDICINE

## 2022-01-24 RX ORDER — DOXYCYCLINE HYCLATE 100 MG/1
100 CAPSULE ORAL EVERY 12 HOURS SCHEDULED
Qty: 10 CAPSULE | Refills: 0 | Status: SHIPPED | OUTPATIENT
Start: 2022-01-24 | End: 2022-01-29

## 2022-01-24 NOTE — PROGRESS NOTES
[facility] PRIMARY CARE  Virtual Regular Visit    Verification of patient location:    Patient is located in the following state in which I hold an active license [place]      Assessment/Plan:    # Paronchyia of Left Finger  # Pharyngitis - Unspecified etiology, likely viral   Meenakshi Junito is a 55year old, fully vaccinated male who presents today with the complaint of sore throat along with occasional cough and nasal congestion  Sore throat likely secondary to post nasal drip in setting of likely viral infection  However, erythema, swelling, and tenderness of left finger concerning for bacterial infection especially in the setting of DMI  Therefore, will begin on antibiotics at this time  Plan:  · Script sent for Doxycyline 100 mg q12 hours x5 day for left paronychia of finger   · Advised conservative management for sore throat including use of lozenges and salt water mouth rinsing  May continue OTC nasal decongestants   · Continue strict blood glucose monitoring and control in setting of infection  · Instructed to call the office if symptoms worsen or persist     Problem List Items Addressed This Visit     None      Visit Diagnoses     Paronychia of finger, left    -  Primary    Pharyngitis, unspecified etiology                Depression Screening and Follow-up Plan: Patient was screened for depression during today's encounter  They screened negative with a PHQ-2 score of 0  Reason for visit is   Chief Complaint   Patient presents with    Sore Throat    finger infection    Virtual Regular Visit        Encounter provider [employer]    Provider located at [address] [place] [address]      Recent Visits  No visits were found meeting these conditions    Showing recent visits within past 7 days and meeting all other requirements  Today's Visits  Date Type Provider Dept   01/24/22 Telemedicine [employer] [employer], DO [facility]   Showing today's visits and meeting all other requirements  Future Appointments  No visits were found meeting these conditions  Showing future appointments within next 150 days and meeting all other requirements       The patient was identified by name and date of birth  German Goyal was informed that this is a telemedicine visit and that the visit is being conducted through [address] [place] [address] [facility] and patient was informed that this is a secure, HIPAA-compliant platform  He agrees to proceed     My office door was closed  No one else was in the room  He acknowledged consent and understanding of privacy and security of the video platform  The patient has agreed to participate and understands they can discontinue the visit at any time  Patient is aware this is a billable service  Subjective  Mr Hannah Alfaro is a 55year old fully vaccinated and boosted male with a PMHx of DM Type I, hypothyroidism, GERD, FARZANA, and HLD who presents today, 1/24/2022, for a telemedicine today with the complaint of a sore throat and finger pain  Regarding the patient's sore throat, symptoms have been present since 1/20/22  He complains of a minor cough and mild nasal congestion  Sore throat at its worst in the early morning after waking up initially  No additional sick contacts at home and/or work  Patient did take an at home COVID test this morning which was negative  Regarding the patient's finger pain, the patient also noted pain, swelling, and erythema, or his left ring finger on 1/20/2022  Since patient first noted this, the erythema and swelling has progressed  Patient also able to expectorate pus from the site of infection  Patient does admit area is warm to touch as well  No additional associated fever/chills or accompanying systemic symptoms  Patient is a type 1 diabetic and has a continuous glucose monitor   Since start of the infection, the patient notes that his sugars have continued to remain well controlled with no elevations  He does have a history of cellulitis and finger infections  He has been on antibiotics in the past and has now known reactions to these previously prescribed medications          Past Medical History:   Diagnosis Date    Anemia     Colon polyp     Diabetes mellitus ([address] )     Disease of thyroid gland     GERD (gastroesophageal reflux disease)     Hyperlipidemia     Hypertension     Insomnia     Kidney stone     Sleep apnea     Mild sleep apnea    Type 1 diabetes mellitus (HCC)     Uses Insulin Pump    Vitamin D deficiency        Past Surgical History:   Procedure Laterality Date    BARIATRIC SURGERY      CARPAL TUNNEL RELEASE Right     COLONOSCOPY      EGD      FL RETROGRADE PYELOGRAM  4/24/2020    LASIK      [place] CYSTO/URETERO W/LITHOTRIPSY &INDWELL STENT INSRT Right 4/24/2020    Procedure: CYSTOSCOPY URETEROSCOPY WITH LITHOTRIPSY HOLMIUM LASER, RETROGRADE PYELOGRAM AND INSERTION STENT URETERAL;  Surgeon: Abhinav Ware MD;  [facility];  Service: Urology    [place] LAP GASTRIC BYPASS/GABBY-EN-Y N/A 4/26/2021    Procedure: BYPASS GASTRIC GABBY-EN-Y LAPAROSCOPIC W ROBOTICS AND INTRAOPERATIVE EGD;  Surgeon: Aissatou Bruce MD;  [facility];  Service: Bariatrics    ROTATOR CUFF REPAIR Right     UPPER GASTROINTESTINAL ENDOSCOPY         Current Outpatient Medications   Medication Sig Dispense Refill    acetone, urine, test (Ketostix) strip Check ketones if blood sugars > 250, more than twice or in case of nausea or vomiting and abdominal pains 100 each 0    azelastine (ASTELIN) 0 1 % nasal spray 1 spray into each nostril 2 (two) times a day 90 mL 1    Blood Glucose Monitoring Suppl (ONE TOUCH ULTRA 2) w/Device KIT Use        Calcium Citrate (JORDY-CITRATE PO) Take 1,000 tablets by mouth daily      Continuous Blood Gluc  (DEXCOM G6 ) KIYA USE AS DIRECTED 1 Device 0    Continuous Blood Gluc Sensor (DEXCOM G6 SENSOR) MISC 1 Device by Does not apply route continuous Use 1 sensor every 10 days 12 each 1    Continuous Blood Gluc Transmit (DEXCOM G6 TRANSMITTER) MISC USE 1 DEVICE WITH SENSOR 1 each 3    esomeprazole (NexIUM) 20 mg capsule Take 20 mg by mouth 2 (two) times a day before meals      Glucagon (BAQSIMI TWO PACK) 3 MG/DOSE POWD Use one dose as needed in case of severe hypoglycemia ( nasal spray) 1 each 0    glucagon (GLUCAGON EMERGENCY) 1 MG injection Inject 1 mg under the skin once as needed for low blood sugar for up to 1 dose 1 kit 1    insulin aspart (NovoLOG) 100 units/mL injection use UP to 140 units PER DAY VIA INSULIN PUMP 110 mL 2    insulin glargine (Basaglar KwikPen) 100 units/mL injection pen Inject 60 units in case of pump failure ( incase of emergency) 15 pen 1    Insulin Pen Needle (BD PEN NEEDLE LYRIC U/F) 32G X 4 MM MISC by Does not apply route daily Use once daily 100 each 2    loratadine (CLARITIN) 10 mg tablet Take 1 tablet (10 mg total) by mouth daily 90 tablet 1    Multiple Vitamins-Minerals (Bariatric Fusion) CHEW Chew        Probiotic Product (PROBIOTIC DAILY PO) Take 1 tablet by mouth daily       Synthroid 175 MCG tablet take 1 tablet by mouth once daily 90 tablet 0    vitamin B-12 (VITAMIN B-12) 1,000 mcg tablet Take 1,000 mcg by mouth daily       zolpidem (AMBIEN) 10 mg tablet Take 1 tablet (10 mg total) by mouth daily at bedtime as needed for sleep 30 tablet 0     No current facility-administered medications for this visit  Allergies   Allergen Reactions    Ibuprofen GI Intolerance    Other Other (See Comments)     Seasonal allergies- pt has congestion       Review of Systems   Constitutional: Negative for activity change, appetite change, chills, diaphoresis, fatigue and fever  HENT: Positive for congestion, postnasal drip and sore throat  Negative for rhinorrhea, sinus pressure, sinus pain and trouble swallowing      Respiratory: Positive for cough  Negative for shortness of breath  Gastrointestinal: Negative for nausea and vomiting  Musculoskeletal: Negative for gait problem  Skin: Positive for color change and rash  Negative for pallor  Neurological: Negative for dizziness, weakness, light-headedness and numbness  Psychiatric/Behavioral: Negative for agitation and confusion  Video Exam    Vitals:    01/24/22 1013   Temp: (!) 96 8 °F (36 °C)   TempSrc: Temporal   Weight: 90 3 kg (199 lb)   Height: 5' 9" (1 753 m)       Physical Exam  Constitutional:       General: He is not in acute distress  Appearance: Normal appearance  He is not ill-appearing or diaphoretic  HENT:      Nose: Nose normal       Mouth/Throat:      Pharynx: No oropharyngeal exudate  Eyes:      General: No scleral icterus  Neck:      Comments: Patient denies tender lymphadenopathy on self examination  Pulmonary:      Effort: Pulmonary effort is normal    Musculoskeletal:         General: Swelling and tenderness present  Cervical back: No rigidity or tenderness  Comments: Erythema and swelling noted on the left ring finger on the lateral aspect and below the nail bed  Patient appreciates tenderness and warmth with self examination   Lymphadenopathy:      Cervical: No cervical adenopathy  Skin:     Coloration: Skin is not jaundiced or pale  Neurological:      Mental Status: He is alert and oriented to person, place, and time  Psychiatric:         Mood and Affect: Mood normal          Behavior: Behavior normal           I spent 15 minutes directly with the patient during this visit    VIRTUAL VISIT Devang verbally agrees to participate in [employer]   Pt is aware that [employer] could be limited without vital signs or the ability to perform a full hands-on physical exam  Barry Andrea understands he or the provider may request at any time to terminate the video visit and request the patient to seek care or treatment in person

## 2022-01-26 ENCOUNTER — TELEPHONE (OUTPATIENT)
Dept: GASTROENTEROLOGY | Facility: CLINIC | Age: 57
End: 2022-01-26

## 2022-01-26 NOTE — TELEPHONE ENCOUNTER
Pt aware of results and verbalized understanding  Pt states he cannot handle a 2 day prep, he is type 1 diabetic & has had gastric bypass surgery  Pt states he can't handle the fasting for 2 days, and cannot handle a big volume of liquid  If an alternative can be used for pt next time, pt would appreciate that

## 2022-01-26 NOTE — TELEPHONE ENCOUNTER
Repeat colonoscopy due in 3 years  We can discuss at that time as preps are constantly changing   Please inform patient and he can make sure to follow up in office prior to next colonoscopy, thank you

## 2022-03-20 DIAGNOSIS — E03.9 HYPOTHYROIDISM, UNSPECIFIED TYPE: ICD-10-CM

## 2022-03-21 RX ORDER — LEVOTHYROXINE SODIUM 175 MCG
TABLET ORAL
Qty: 90 TABLET | Refills: 0 | Status: SHIPPED | OUTPATIENT
Start: 2022-03-21 | End: 2022-03-28 | Stop reason: SDUPTHER

## 2022-03-24 ENCOUNTER — LAB (OUTPATIENT)
Dept: LAB | Facility: CLINIC | Age: 57
End: 2022-03-24
Payer: COMMERCIAL

## 2022-03-24 DIAGNOSIS — E03.9 ACQUIRED HYPOTHYROIDISM: ICD-10-CM

## 2022-03-24 DIAGNOSIS — E10.65 TYPE 1 DIABETES MELLITUS WITH HYPERGLYCEMIA, WITH LONG-TERM CURRENT USE OF INSULIN (HCC): ICD-10-CM

## 2022-03-24 LAB
ANION GAP SERPL CALCULATED.3IONS-SCNC: 3 MMOL/L (ref 4–13)
BUN SERPL-MCNC: 16 MG/DL (ref 5–25)
CALCIUM SERPL-MCNC: 8.8 MG/DL (ref 8.3–10.1)
CHLORIDE SERPL-SCNC: 103 MMOL/L (ref 100–108)
CO2 SERPL-SCNC: 29 MMOL/L (ref 21–32)
CREAT SERPL-MCNC: 1.01 MG/DL (ref 0.6–1.3)
EST. AVERAGE GLUCOSE BLD GHB EST-MCNC: 137 MG/DL
GFR SERPL CREATININE-BSD FRML MDRD: 83 ML/MIN/1.73SQ M
GLUCOSE P FAST SERPL-MCNC: 112 MG/DL (ref 65–99)
HBA1C MFR BLD: 6.4 %
POTASSIUM SERPL-SCNC: 3.8 MMOL/L (ref 3.5–5.3)
SODIUM SERPL-SCNC: 135 MMOL/L (ref 136–145)
T4 FREE SERPL-MCNC: 0.97 NG/DL (ref 0.76–1.46)
TSH SERPL DL<=0.05 MIU/L-ACNC: 7.01 UIU/ML (ref 0.36–3.74)

## 2022-03-24 PROCEDURE — 84443 ASSAY THYROID STIM HORMONE: CPT

## 2022-03-24 PROCEDURE — 3044F HG A1C LEVEL LT 7.0%: CPT | Performed by: PHYSICIAN ASSISTANT

## 2022-03-24 PROCEDURE — 36415 COLL VENOUS BLD VENIPUNCTURE: CPT

## 2022-03-24 PROCEDURE — 84439 ASSAY OF FREE THYROXINE: CPT

## 2022-03-24 PROCEDURE — 83036 HEMOGLOBIN GLYCOSYLATED A1C: CPT

## 2022-03-24 PROCEDURE — 80048 BASIC METABOLIC PNL TOTAL CA: CPT

## 2022-03-24 RX ORDER — BLOOD-GLUCOSE SENSOR
1 EACH MISCELLANEOUS AS NEEDED
Qty: 9 EACH | Refills: 3 | Status: SHIPPED | OUTPATIENT
Start: 2022-03-24

## 2022-03-24 RX ORDER — BLOOD-GLUCOSE TRANSMITTER
1 EACH MISCELLANEOUS
Qty: 1 EACH | Refills: 3 | Status: SHIPPED | OUTPATIENT
Start: 2022-03-24

## 2022-03-24 NOTE — TELEPHONE ENCOUNTER
Pt lm requesting cb to walk us through what we need to do for CGM due to new insurance  Verified card in chart is correct   Pt states Cgm is a pharmacy benefit and requesting [facility]d [facility]

## 2022-03-28 ENCOUNTER — OFFICE VISIT (OUTPATIENT)
Dept: ENDOCRINOLOGY | Facility: CLINIC | Age: 57
End: 2022-03-28
Payer: COMMERCIAL

## 2022-03-28 ENCOUNTER — TELEPHONE (OUTPATIENT)
Dept: ENDOCRINOLOGY | Facility: CLINIC | Age: 57
End: 2022-03-28

## 2022-03-28 VITALS
TEMPERATURE: 97.6 F | SYSTOLIC BLOOD PRESSURE: 118 MMHG | DIASTOLIC BLOOD PRESSURE: 76 MMHG | HEART RATE: 61 BPM | BODY MASS INDEX: 29.83 KG/M2 | WEIGHT: 202 LBS

## 2022-03-28 DIAGNOSIS — E78.2 MIXED HYPERLIPIDEMIA: ICD-10-CM

## 2022-03-28 DIAGNOSIS — E10.65 TYPE 1 DIABETES MELLITUS WITH HYPERGLYCEMIA, WITH LONG-TERM CURRENT USE OF INSULIN (HCC): Primary | ICD-10-CM

## 2022-03-28 DIAGNOSIS — E03.9 ACQUIRED HYPOTHYROIDISM: ICD-10-CM

## 2022-03-28 PROCEDURE — 1036F TOBACCO NON-USER: CPT | Performed by: PHYSICIAN ASSISTANT

## 2022-03-28 PROCEDURE — 3078F DIAST BP <80 MM HG: CPT | Performed by: PHYSICIAN ASSISTANT

## 2022-03-28 PROCEDURE — 95251 CONT GLUC MNTR ANALYSIS I&R: CPT | Performed by: PHYSICIAN ASSISTANT

## 2022-03-28 PROCEDURE — 3074F SYST BP LT 130 MM HG: CPT | Performed by: PHYSICIAN ASSISTANT

## 2022-03-28 PROCEDURE — 99214 OFFICE O/P EST MOD 30 MIN: CPT | Performed by: PHYSICIAN ASSISTANT

## 2022-03-28 RX ORDER — LEVOTHYROXINE SODIUM 175 MCG
TABLET ORAL
Qty: 90 TABLET | Refills: 0
Start: 2022-03-28 | End: 2022-03-28 | Stop reason: SDUPTHER

## 2022-03-28 RX ORDER — LEVOTHYROXINE SODIUM 175 MCG
TABLET ORAL
Qty: 96 TABLET | Refills: 1 | Status: SHIPPED | OUTPATIENT
Start: 2022-03-28 | End: 2022-07-07 | Stop reason: SDUPTHER

## 2022-03-28 RX ORDER — INSULIN GLARGINE 100 [IU]/ML
INJECTION, SOLUTION SUBCUTANEOUS
Qty: 15 ML | Refills: 0 | Status: SHIPPED | OUTPATIENT
Start: 2022-03-28

## 2022-03-28 NOTE — PROGRESS NOTES
Patient Progress Note      CC: DM      Referring Provider  Debbie Bell Md  [id number] [facility],Third Floor,  [address] [facility]      History of Present Illness:   Claudio Parker is a 55 y o  male with a history of type 1 diabetes with long term use of insulin  Diabetes course has been stable  Complications of DM: none reported  He was admitted in April 2021 for bariatric surgery  Denies any issues with his current regimen  Home glucose monitoring: are performed regularly, has Dexcom sensor  Average glucose 170 mg/dL with SD of 61 mg/dl  In range 58%, above range 41%, below range <1%     Current regimen: Novolog via pump  Metformin and Jardiance were stopped after bariatric surgery  compliant most of the time, denies any side effects from medications  Patient is on a TSlim pump  He denies any malfunctioning of the pump  Current Insulin pump settings:  Basal rate:  12:00 a m  = 1 600 units/hour  4:00 a m  = 1 700 units/hour  6:00 a m  = 1 700 units/hour  8:00 a m  = 1 850 units/hour  12:00 p m  = 1 600 units/hour  6:00 p m  = 1 500 units/hour  9:00 p m  = 1 500 units/hour  Insulin to carb ratio:  12:00 a m  = 4  4:00 a m  = 4  6:00 a m  = 4  8:00 a m  =   12:00 p m  = 4  6:00 p m  = 4  9:00 p m  = 4  Insulin sensitivity factor:  12:00 a m  = 35  4:00 a m  = 35  6:00 a m  = 25  8:00 a m  = 20  12:00 p m  = 20  6:00 p m  = 30  9:00 p m  = 35  BG target:  115 mg/dL      Discussed with patient in case of malfunctioning of the pump to use basal and bolus therapy as backup which is prescribed to the patient  Also notified patient to call clinic if any issues        Hypoglycemic episodes: No, rare  H/o of hypoglycemia causing hospitalization or Intervention such as glucagon injection or ambulance call :  No  Hypoglycemia symptoms: weakness/sweating   Treatment of hypoglycemia: discussed treatment      Medic alert tag: recommended: Yes     Diabetes education: Not recently   Diet: 3 meals per day (small meals), 2 snacks per day  Timing of meals is variable  Diabetic diet compliance:  is complaint with bolus 70% of the time  Activity: Daily activity is predictable: Yes  No routine exercise  Ophthamology: sees routinely; (Dr Danette Olivera), Dec 2021  Podiatry: August 2021     Not on ACE inhibitor/ARB  Has hyperlipidemia: not on Crestor; discussed to consider low dose statin  He previously stated he will discuss with his PCP but has not yet had a chance  Thyroid disorders: Hypothyroidism  Patient is on Synthroid 175 mcg daily on empty stomach 1 hour before breakfast  Thyroid function tests in normal range    History of pancreatitis: No     Vitamin D deficiency: is currently taking a bariatric multivitamin    Patient Active Problem List   Diagnosis    Chronic back pain    Collagenous colitis    GERD (gastroesophageal reflux disease)    Hyperlipidemia    Hypothyroidism    Insomnia    Iron deficiency anemia    BMI 40 0-44 9, adult ([facility])    Type 1 diabetes mellitus with hyperglycemia, with long-term current use of insulin ([facility])    Vitamin D deficiency    Seasonal allergic rhinitis due to pollen    Edema    Snoring    Intrinsic eczema    FARZANA (obstructive sleep apnea)    Renal calculus, bilateral    Leukocytosis (leucocytosis)    [facility]IL[facility]) [facility]    Erectile dysfunction    Insulin pump in place    Elevated BP without diagnosis of hypertension    Gastric bypass status for obesity    Cellulitis of right lower extremity      Past Medical History:   Diagnosis Date    Anemia     Colon polyp     Diabetes mellitus ([address] )     Disease of thyroid gland     GERD (gastroesophageal reflux disease)     Hyperlipidemia     Hypertension     Insomnia     Kidney stone     Sleep apnea     Mild sleep apnea    Type 1 diabetes mellitus ([facility])     Uses Insulin Pump    Vitamin D deficiency       Past Surgical History:   Procedure Laterality Date    BARIATRIC SURGERY      CARPAL TUNNEL RELEASE Right     COLONOSCOPY      EGD      FL RETROGRADE PYELOGRAM  2020    LASIK      [place] CYSTO/URETERO W/LITHOTRIPSY &INDWELL STENT INSRT Right 2020    Procedure: CYSTOSCOPY URETEROSCOPY WITH LITHOTRIPSY HOLMIUM LASER, RETROGRADE PYELOGRAM AND INSERTION STENT URETERAL;  Surgeon: Abhinav Ware MD;  [facility];  Service: Urology    [place] LAP GASTRIC BYPASS/GABBY-EN-Y N/A 2021    Procedure: BYPASS GASTRIC GABBY-EN-Y LAPAROSCOPIC W ROBOTICS AND INTRAOPERATIVE EGD;  Surgeon: Aissatou Bruce MD;  [facility];  Service: Bariatrics    ROTATOR CUFF REPAIR Right     UPPER GASTROINTESTINAL ENDOSCOPY        Family History   Problem Relation Age of Onset    Thyroid disease unspecified Mother     Osteoporosis Mother     Hypertension Father     Colon cancer Father 76    Diabetes type II Sister     Hypertension Sister     Hyperlipidemia Sister     Thyroid disease unspecified Sister     Diabetes type II Brother     Hypertension Brother     Hyperlipidemia Brother     Colon cancer Brother 61     Social History     Tobacco Use    Smoking status: Former Smoker     Years: 30 00     Types: Cigarettes     Quit date:      Years since quittin 2    Smokeless tobacco: Never Used    Tobacco comment: quit in    Substance Use Topics    Alcohol use:  Yes     Alcohol/week: 2 0 standard drinks     Types: 2 Glasses of wine per week     Comment: social     Allergies   Allergen Reactions    Ibuprofen GI Intolerance    Other Other (See Comments)     Seasonal allergies- pt has congestion         Current Outpatient Medications:     acetone, urine, test (Ketostix) strip, Check ketones if blood sugars > 250, more than twice or in case of nausea or vomiting and abdominal pains, Disp: 100 each, Rfl: 0    azelastine (ASTELIN) 0 1 % nasal spray, 1 spray into each nostril 2 (two) times a day, Disp: 90 mL, Rfl: 1    Blood Glucose Monitoring Suppl (ONE TOUCH ULTRA 2) w/Device KIT, Use  , Disp: , Rfl:     Calcium Citrate (JORDY-CITRATE PO), Take 1,000 tablets by mouth daily, Disp: , Rfl:     Continuous Blood Gluc  (DEXCOM G6 ) KIYA, USE AS DIRECTED, Disp: 1 Device, Rfl: 0    Continuous Blood Gluc Sensor (Dexcom G6 Sensor) MISC, Use 1 Device if needed (change Q 10 days) Use 1 sensor every 10 days, Disp: 9 each, Rfl: 3    Continuous Blood Gluc Transmit (Dexcom G6 Transmitter) MISC, Inject 1 Device under the skin every 3 (three) months, Disp: 1 each, Rfl: 3    esomeprazole (NexIUM) 20 mg capsule, Take 20 mg by mouth 2 (two) times a day before meals, Disp: , Rfl:     Glucagon (BAQSIMI TWO PACK) 3 MG/DOSE POWD, Use one dose as needed in case of severe hypoglycemia ( nasal spray), Disp: 1 each, Rfl: 0    glucagon (GLUCAGON EMERGENCY) 1 MG injection, Inject 1 mg under the skin once as needed for low blood sugar for up to 1 dose, Disp: 1 kit, Rfl: 1    insulin aspart (NovoLOG) 100 units/mL injection, use UP to 70 units PER DAY VIA INSULIN PUMP, Disp: 55 mL, Rfl: 2    insulin glargine (Basaglar KwikPen) 100 units/mL injection pen, Inject 32 units in case of pump failure ( incase of emergency), Disp: 15 mL, Rfl: 0    Insulin Pen Needle (BD PEN NEEDLE LYRIC U/F) 32G X 4 MM MISC, by Does not apply route daily Use once daily, Disp: 100 each, Rfl: 2    loratadine (CLARITIN) 10 mg tablet, Take 1 tablet (10 mg total) by mouth daily, Disp: 90 tablet, Rfl: 1    Probiotic Product (PROBIOTIC DAILY PO), Take 1 tablet by mouth daily , Disp: , Rfl:     Synthroid 175 MCG tablet, Take 1 tablet Monday through Saturday and 1 5 tablets on Sunday, Disp: 96 tablet, Rfl: 1    vitamin B-12 (VITAMIN B-12) 1,000 mcg tablet, Take 1,000 mcg by mouth daily , Disp: , Rfl:     zolpidem (AMBIEN) 10 mg tablet, Take 1 tablet (10 mg total) by mouth daily at bedtime as needed for sleep, Disp: 30 tablet, Rfl: 0    Multiple Vitamins-Minerals (Bariatric Fusion) CHEW, Chew  , Disp: , Rfl:   Review of Systems   Constitutional: Positive for fatigue  Negative for activity change, appetite change and unexpected weight change  HENT: Negative for trouble swallowing  Eyes: Negative for visual disturbance  Respiratory: Negative for shortness of breath  Cardiovascular: Negative for chest pain and palpitations  Gastrointestinal: Negative for constipation and diarrhea  Endocrine: Negative for cold intolerance, heat intolerance, polydipsia and polyuria  Musculoskeletal: Negative  Skin: Negative for wound  Neurological: Positive for numbness  Psychiatric/Behavioral: Negative  Physical Exam:  Body mass index is 29 83 kg/m²  /76   Pulse 61   Temp 97 6 °F (36 4 °C)   Wt 91 6 kg (202 lb)   BMI 29 83 kg/m²    Wt Readings from Last 3 Encounters:   03/28/22 91 6 kg (202 lb)   01/24/22 90 3 kg (199 lb)   01/17/22 92 1 kg (203 lb)       Physical Exam  Vitals and nursing note reviewed  Constitutional:       Appearance: He is well-developed  HENT:      Head: Normocephalic  Eyes:      General: No scleral icterus  Pupils: Pupils are equal, round, and reactive to light  Neck:      Thyroid: No thyromegaly  Cardiovascular:      Rate and Rhythm: Normal rate and regular rhythm  Pulses:           Radial pulses are 2+ on the right side and 2+ on the left side  Heart sounds: No murmur heard  Pulmonary:      Effort: Pulmonary effort is normal  No respiratory distress  Breath sounds: Normal breath sounds  No wheezing  Musculoskeletal:      Cervical back: Neck supple  Skin:     General: Skin is warm and dry  Neurological:      Mental Status: He is alert  Deep Tendon Reflexes: Reflexes are normal and symmetric  Patient's shoes and socks were not removed            Labs:   Component      Latest Ref Rng & Units 11/19/2021 3/24/2022   Sodium      136 - 145 mmol/L 141 135 (L)   Potassium      3 5 - 5 3 mmol/L 4 1 3 8   Chloride      100 - 108 mmol/L 104 103   CO2      21 - 32 mmol/L 28 29 Anion Gap      4 - 13 mmol/L 9 3 (L)   BUN      5 - 25 mg/dL 17 16   Creatinine      0 60 - 1 30 mg/dL 1 06 1 01   GLUCOSE FASTING      65 - 99 mg/dL 140 (H) 112 (H)   Calcium      8 3 - 10 1 mg/dL 8 8 8 8   AST      5 - 45 U/L 35    ALT      12 - 78 U/L 31    Alkaline Phosphatase      46 - 116 U/L 109    Total Protein      6 4 - 8 2 g/dL 7 2    Albumin      3 5 - 5 0 g/dL 3 6    TOTAL BILIRUBIN      0 20 - 1 00 mg/dL 0 50    eGFR      ml/min/1 73sq m 79 83   EXT Creatinine Urine      mg/dL 143 0    MICROALBUM ,U,RANDOM      0 0 - 20 0 mg/L 6 8    MICROALBUMIN/CREATININE RATIO      0 - 30 mg/g creatinine 5    Hemoglobin A1C      Normal 3 8-5 6%; PreDiabetic 5 7-6 4%; Diabetic >=6 5%; Glycemic control for adults with diabetes <7 0% % 6 5 (H) 6 4 (H)   eAG, EST AVG Glucose      mg/dl 140 137   Free T4      0 76 - 1 46 ng/dL 0 88 0 97   TSH 3RD GENERATON      0 358 - 3 740 uIU/mL 4 080 (H) 7 010 (H)     Plan:    Diagnoses and all orders for this visit:    Type 1 diabetes mellitus with hyperglycemia, with long-term current use of insulin (HCC)  HGA1C 6 4%  Improved  Treatment regimen: advised to use Dexcom on abdomen  Advised to stay compliant with bolus  Continue current treatment  Discussed intensive insulin regimen does increase risk for hypoglycemia  Episodes of hypoglycemia can lead to permanent disability and death  Discussed risks/complications associated with uncontrolled diabetes  Advised to adhere to diabetic diet, and recommended staying active/exercising routinely as tolerated  Keep carbohydrates consistent to limit blood glucose fluctuations  Advised to call if blood sugars less than 70 mg/dl or over 300 mg/dl  Check blood glucose 3+ times a day  Discussed symptoms and treatment of hypoglycemia  Discussed use of CGM to collect additional blood glucose data to reveal trends and patterns that can be used to optimize treatment plan  Recommended routine follow-up with podiatry and ophthalmology  Download pump/sensor in 2 weeks  Ordered blood work to complete prior to next visit  -     Hemoglobin A1C; Future  -     Basic metabolic panel; Future  -     Microalbumin / creatinine urine ratio; Future  -     insulin aspart (NovoLOG) 100 units/mL injection; use UP to 70 units PER DAY VIA INSULIN PUMP  -     insulin glargine (Basaglar KwikPen) 100 units/mL injection pen; Inject 32 units in case of pump failure ( incase of emergency)    Acquired hypothyroidism  TSH elevated at 7 010  Patient reports mild sluggish feeling  Increase Synthroid by taking an extra 1/2 tablet on Sunday  Repeat TFTs in 6 weeks  Advised to stop Biotin 2 weeks prior to labs and take vitamins at least 4 hours after Synthroid  -     Discontinue: Synthroid 175 MCG tablet; Take 1 tablet Monday through Saturday and 1 5 tablets on Sunday  -     T4, free; Future  -     TSH, 3rd generation; Future  -     Synthroid 175 MCG tablet; Take 1 tablet Monday through Saturday and 1 5 tablets on Sunday    Mixed hyperlipidemia  LDL previously 104  Not statin therapy   Advised to consider lower dose of statin   He will discuss with PCP         Discussed with the patient diagnosis and treatment and all questions fully answered  He will call me if any problems arise  Counseled patient on diagnostic results, prognosis, risk and benefit of treatment options, instruction for management, importance of treatment compliance, risk factor reduction and impressions        Alison Charles PA-C

## 2022-03-28 NOTE — PATIENT INSTRUCTIONS
Hypoglycemia instructions   Courtneyjeni Bruceerbach  3/28/2022  [id number]    Low Blood Sugar    Steps to treat low blood sugar  1  Test blood sugar if you have symptoms of low blood sugar:   Low Blood Sugar Symptoms:  o Sweaty  o Dizzy  o Rapid heartbeat  o Shaky  o Bad mood  o Hungry      2  Treat blood sugar less than 70 with 15 grams of fast-acting carbohydrate:   Examples of 15 grams Fast-Acting Carbohydrate:  o 4 oz juice  o 4 oz regular soda  o 3-4 glucose tablets (chew)  o 3-4 hard candies (chew)          3  Wait 15 minutes and test your blood sugar again     4   If blood sugar is less than 100, repeat steps 2-3     5  When your blood sugar is 100 or more, eat a snack if it will be longer than one hour until your next meal  The snack should be 15 grams of carbohydrate and a protein:   Examples of snacks:  o ½ sandwich  o 6 crackers with cheese  o Piece of fruit with cheese or peanut butter  o 6 crackers with peanut butter

## 2022-03-29 ENCOUNTER — TELEPHONE (OUTPATIENT)
Dept: ENDOCRINOLOGY | Facility: CLINIC | Age: 57
End: 2022-03-29

## 2022-03-29 NOTE — TELEPHONE ENCOUNTER
Did connect with patient and informed him of the status of the [place]    Did let him know that office will reach out as soon as they hear tomorrow of the decision  Pt verbally understands Left message for Dr. Schmitz 0.90NS 500ml Bolus over 2 hours.

## 2022-03-29 NOTE — TELEPHONE ENCOUNTER
Pt called and got Cynthia, she transferred called to me and he immediately started yelling at me  I let him know that the insurance never sent the form to me like I asked  So I did the auth on cover my meds  It was done yesterday and is still waiting for a reply as of this morning  I tried to let the pt know that and he started screaming at me that he knows the insurance company sent me the form  Again I tried to explain to him that no form came so that is why I did it on cover my meds  Since he was yelling at me I stated that he was not being kind and then he asked for a call from the   He also asked for Cynthia again bc he doesn't believe I'm capable of letting the  know  I also made Alison aware of the situation

## 2022-03-29 NOTE — TELEPHONE ENCOUNTER
Called [facility]  b/c I saw on cover my meds that auth was cancelled  They state pts sensors do not need auth they are covered in retail pharmacy 1 box 30 day supply $70 and 90 day supply is $140 a box  Ref # [id number]    called [phone] as directed on cover my meds

## 2022-03-29 NOTE — TELEPHONE ENCOUNTER
Called pt and gave him the info  He states that is wrong and that it needs auth  I stated that I called when I saw it was cancelled  I spoke to serene guo and the info below is what she gave me  Pt is stating that is this not correct and he asked me to talk to you about this  Please advise

## 2022-03-29 NOTE — TELEPHONE ENCOUNTER
Spoke with patient who provided his concerns regarding obtaining an auth for his dexcom sensors/transmitter -pt states his insurance co called on Friday, March 25nd and spoke with [facility] & they indicated they would send a form for the office to complete  Patient had a visit on Monday, March 28th and was inquiring about an update  [facility] wasn't in the office at the time of his appt however staff would have her call with an update  Patient states [facility] never called him and he called back this morning for an update  Employee then proceeded to be rude when he was asking for an update  Patient then asked to speak to [facility] as she was the staff member that originally picked up his call--he wanted the manger to be contacted  I informed patient I would reach out to insurance co as I don't have access to cover my  Meds  Spoke with Angie Sosa at [employer] who did confirm that the auth was started on cover my meds and the determination was due back tomorrow  Office will receive a fax confirmation with either the approval or denial   Tried to contact patient to inform of above   Left message to call me back at [phone]

## 2022-03-30 NOTE — TELEPHONE ENCOUNTER
Do you call pharmacy where he is trying to fill this prescription?  If not call the pharmacy and see what they are being told when they run the script

## 2022-05-16 ENCOUNTER — OFFICE VISIT (OUTPATIENT)
Dept: INTERNAL MEDICINE CLINIC | Facility: OTHER | Age: 57
End: 2022-05-16
Payer: COMMERCIAL

## 2022-05-16 ENCOUNTER — OFFICE VISIT (OUTPATIENT)
Dept: BARIATRICS | Facility: CLINIC | Age: 57
End: 2022-05-16
Payer: COMMERCIAL

## 2022-05-16 VITALS
HEART RATE: 66 BPM | DIASTOLIC BLOOD PRESSURE: 78 MMHG | HEIGHT: 69 IN | SYSTOLIC BLOOD PRESSURE: 130 MMHG | TEMPERATURE: 98 F | OXYGEN SATURATION: 99 % | WEIGHT: 203 LBS | BODY MASS INDEX: 30.07 KG/M2

## 2022-05-16 VITALS
HEART RATE: 74 BPM | HEIGHT: 69 IN | BODY MASS INDEX: 29.55 KG/M2 | WEIGHT: 199.5 LBS | DIASTOLIC BLOOD PRESSURE: 80 MMHG | TEMPERATURE: 97.6 F | SYSTOLIC BLOOD PRESSURE: 140 MMHG

## 2022-05-16 DIAGNOSIS — Z80.42 FAMILY HISTORY OF PROSTATE CANCER IN FATHER: ICD-10-CM

## 2022-05-16 DIAGNOSIS — E03.9 ACQUIRED HYPOTHYROIDISM: ICD-10-CM

## 2022-05-16 DIAGNOSIS — K91.2 POSTSURGICAL MALABSORPTION: ICD-10-CM

## 2022-05-16 DIAGNOSIS — E10.65 TYPE 1 DIABETES MELLITUS WITH HYPERGLYCEMIA, WITH LONG-TERM CURRENT USE OF INSULIN (HCC): ICD-10-CM

## 2022-05-16 DIAGNOSIS — Z12.5 SCREENING FOR PROSTATE CANCER: ICD-10-CM

## 2022-05-16 DIAGNOSIS — Z48.815 ENCOUNTER FOR SURGICAL AFTERCARE FOLLOWING SURGERY OF DIGESTIVE SYSTEM: Primary | ICD-10-CM

## 2022-05-16 DIAGNOSIS — G47.00 INSOMNIA, UNSPECIFIED TYPE: Primary | ICD-10-CM

## 2022-05-16 DIAGNOSIS — E78.2 MIXED HYPERLIPIDEMIA: ICD-10-CM

## 2022-05-16 DIAGNOSIS — Z98.84 BARIATRIC SURGERY STATUS: ICD-10-CM

## 2022-05-16 DIAGNOSIS — E66.3 OVERWEIGHT: ICD-10-CM

## 2022-05-16 PROCEDURE — 99214 OFFICE O/P EST MOD 30 MIN: CPT | Performed by: PHYSICIAN ASSISTANT

## 2022-05-16 PROCEDURE — 3008F BODY MASS INDEX DOCD: CPT | Performed by: PHYSICIAN ASSISTANT

## 2022-05-16 PROCEDURE — 1036F TOBACCO NON-USER: CPT | Performed by: NURSE PRACTITIONER

## 2022-05-16 PROCEDURE — 3075F SYST BP GE 130 - 139MM HG: CPT | Performed by: NURSE PRACTITIONER

## 2022-05-16 PROCEDURE — 99213 OFFICE O/P EST LOW 20 MIN: CPT | Performed by: NURSE PRACTITIONER

## 2022-05-16 PROCEDURE — 3078F DIAST BP <80 MM HG: CPT | Performed by: NURSE PRACTITIONER

## 2022-05-16 RX ORDER — ZOLPIDEM TARTRATE 10 MG/1
10 TABLET ORAL
Qty: 30 TABLET | Refills: 0 | Status: SHIPPED | OUTPATIENT
Start: 2022-05-16 | End: 2022-06-17 | Stop reason: SDUPTHER

## 2022-05-16 NOTE — PROGRESS NOTES
Assessment/Plan:     Patient ID: Mariana Philip is a 55 y o  male  Bariatric Surgery Status    -s/p Yeimi-En-Y Gastric Bypass with Dr Kenton Self on 04/26/2021  Presents to the office today for annual  Overall doing well  He continues to take nexium 20 mg Bid, he has been taking this since prior to his surgery to GERD  At last visit noted some heartburn/epigastric burning however currently this has resolved  Will try to taper down to nexium 20 mg once daily  Can stay on this until his next visit in 6 months, or if feeling well can try to taper off prior to his visit as we discussed  Will let me know if having difficulty tapering off, may then consider testing (UGI vs EGD)  Type 1 diabetes  - on insulin and continuous glucose monitor, sugars have been stable managed by endo    Hypothyroid  - continue synthroid    · Continued/Maintain healthy weight loss with good nutrition intakes  · Adequate hydration with at least 64oz  fluid intake  · Follow diet as discussed  · Follow vitamin and mineral recommendations as reviewed with you  · Exercise as tolerated  · Colonoscopy referral made: utd 2021    · Follow-up in 6 months  We kindly ask that your arrive 15 minutes before your scheduled appointment time with your provider to allow our staff to room you, get your vital signs and update your chart  · Get lab work done prior to annual visit  Please call the office if you need a script  It is recommended to check with your insurance BEFORE getting labs done to make sure they are covered by your policy  · Call our office if you have any problems with abdominal pain especially associated with fever, chills, nausea, vomiting or any other concerns  · All  Post-bariatric surgery patients should be aware that very small quantities of any alcohol can cause impairment and it is very possible not to feel the effect  The effect can be in the system for several hours  It is also a stomach irritant       · It is advised to AVOID alcohol, Nonsteroidal antiinflammatory drugs (NSAIDS) and nicotine of all forms   Any of these can cause stomach irritation/pain  · Discussed the effects of alcohol on a bariatric patient and the increased impairment risk  · Keep up the good work! Postsurgical Malabsorption   -At risk for malabsorption of vitamins/minerals secondary to malabsorption and restriction of intake from bariatric surgery  -Currently taking adequate postop bariatric surgery vitamin supplementation  -Last set of bariatric labs completed 11/2021  -Next set of bariatric labs ordered for approximately 2 weeks  -Patient received education about the importance of adhering to a lifelong supplementation regimen to avoid vitamin/mineral deficiencies      Diagnoses and all orders for this visit:    Encounter for surgical aftercare following surgery of digestive system  -     Zinc; Future  -     Vitamin D 25 hydroxy; Future  -     Vitamin B12; Future  -     Vitamin B1, whole blood; Future  -     Vitamin A; Future  -     PTH, intact; Future  -     CBC and Platelet; Future  -     Ferritin; Future  -     Folate; Future  -     Iron Saturation %; Future    Bariatric surgery status  -     Zinc; Future  -     Vitamin D 25 hydroxy; Future  -     Vitamin B12; Future  -     Vitamin B1, whole blood; Future  -     Vitamin A; Future  -     PTH, intact; Future  -     CBC and Platelet; Future  -     Ferritin; Future  -     Folate; Future  -     Iron Saturation %; Future    Postsurgical malabsorption  -     Zinc; Future  -     Vitamin D 25 hydroxy; Future  -     Vitamin B12; Future  -     Vitamin B1, whole blood; Future  -     Vitamin A; Future  -     PTH, intact; Future  -     CBC and Platelet; Future  -     Ferritin; Future  -     Folate; Future  -     Iron Saturation %; Future    Overweight  -     Zinc; Future  -     Vitamin D 25 hydroxy; Future  -     Vitamin B12; Future  -     Vitamin B1, whole blood;  Future  -     Vitamin A; Future  -     PTH, intact; Future  -     CBC and Platelet; Future  -     Ferritin; Future  -     Folate; Future  -     Iron Saturation %; Future    Acquired hypothyroidism  -     Zinc; Future  -     Vitamin D 25 hydroxy; Future  -     Vitamin B12; Future  -     Vitamin B1, whole blood; Future  -     Vitamin A; Future  -     PTH, intact; Future  -     CBC and Platelet; Future  -     Ferritin; Future  -     Folate; Future  -     Iron Saturation %; Future    Type 1 diabetes mellitus with hyperglycemia, with long-term current use of insulin (HCC)  -     Zinc; Future  -     Vitamin D 25 hydroxy; Future  -     Vitamin B12; Future  -     Vitamin B1, whole blood; Future  -     Vitamin A; Future  -     PTH, intact; Future  -     CBC and Platelet; Future  -     Ferritin; Future  -     Folate; Future  -     Iron Saturation %; Future         Subjective:      Patient ID: Franco Espinoza is a 55 y o  male  -s/p Yeimi-En-Y Gastric Bypass with Dr Francis Quesada on 04/26/2021  Presents to the office today for annual  Overall doing well  Initial: 270  Current:199  EWL: (Weight loss is ahead of schedule at this post surgical period )  Dexter: current   Current BMI is Body mass index is 29 46 kg/m²  · Tolerating a regular diet-yes  · Eating at least 60 grams of protein per day-yes  · Following 30/60 minute rule with liquids-no  · Drinking at least 64 ounces of fluid per day-yes  · Drinking carbonated beverages-no  · Sufficient exercise-no - aware needs to improve   · Using NSAIDs regularly-no  · Using nicotine-no  · Using alcohol-social   · Supplements: luis fernando mvi + b12 + calcium + tumeric + probiotic     · EWL is 68%, which places the patient ahead of schedule for expected post surgical weight loss at this time       The following portions of the patient's history were reviewed and updated as appropriate: allergies, current medications, past family history, past medical history, past social history, past surgical history and problem list     Review of Systems   Constitutional: Negative  Respiratory: Negative  Cardiovascular: Negative  Gastrointestinal: Negative  Musculoskeletal: Negative  Skin: Negative  Neurological: Positive for numbness (neuropathy at baseline related to his T1 DM)  Negative for syncope and headaches  Psychiatric/Behavioral: Positive for sleep disturbance (has trouble sleeping - advsied discussing with PCP )  Objective:    /80 (BP Location: Right arm, Patient Position: Sitting)   Pulse 74   Temp 97 6 °F (36 4 °C) (Tympanic)   Ht 5' 9" (1 753 m)   Wt 90 5 kg (199 lb 8 oz)   BMI 29 46 kg/m²      Physical Exam  Vitals and nursing note reviewed  Constitutional:       Appearance: Normal appearance  HENT:      Head: Normocephalic and atraumatic  Eyes:      Extraocular Movements: Extraocular movements intact  Pupils: Pupils are equal, round, and reactive to light  Cardiovascular:      Rate and Rhythm: Normal rate and regular rhythm  Pulmonary:      Effort: Pulmonary effort is normal       Breath sounds: Normal breath sounds  Abdominal:      General: Bowel sounds are normal       Tenderness: There is no abdominal tenderness  Musculoskeletal:         General: Normal range of motion  Cervical back: Normal range of motion  Skin:     General: Skin is warm and dry  Neurological:      General: No focal deficit present  Mental Status: He is alert and oriented to person, place, and time     Psychiatric:         Mood and Affect: Mood normal

## 2022-05-16 NOTE — PATIENT INSTRUCTIONS
Follow-up in 6 months  We kindly ask that your arrive 15 minutes before your scheduled appointment time with your provider to allow our staff to room you, get your vital signs and update your chart  Get lab work done prior to annual visit  Please call the office if you need a script  It is recommended to check with your insurance BEFORE getting labs done to make sure they are covered by your policy  Call our office if you have any problems with abdominal pain especially associated with fever, chills, nausea, vomiting or any other concerns  All  Post-bariatric surgery patients should be aware that very small quantities of any alcohol can cause impairment and it is very possible not to feel the effect  The effect can be in the system for several hours  It is also a stomach irritant  It is advised to AVOID alcohol, Nonsteroidal antiinflammatory drugs (NSAIDS) and nicotine of all forms   Any of these can cause stomach irritation/pain  Discussed the effects of alcohol on a bariatric patient and the increased impairment risk  Keep up the good work!

## 2022-05-16 NOTE — ASSESSMENT & PLAN NOTE
- his statin was discontinued after his bariatric surgery 1 year ago  - update lipid panel as he is DM type 1

## 2022-05-16 NOTE — PROGRESS NOTES
Assessment/Plan:    Problem List Items Addressed This Visit        Other    Hyperlipidemia     - his statin was discontinued after his bariatric surgery 1 year ago  - update lipid panel as he is DM type 1           Relevant Orders    Lipid Panel with Direct LDL reflex    Insomnia - Primary     - controlled with Ambien 10 mg p r n  Relevant Medications    zolpidem (AMBIEN) 10 mg tablet      Other Visit Diagnoses     Screening for prostate cancer        Relevant Orders    PSA, Total Screen    Family history of prostate cancer in father        Relevant Orders    PSA, Total Screen          M*Modal software was used to dictate this note  It may contain errors with dictating incorrect words or incorrect spelling  Please contact the provider directly with any questions  Subjective:      Patient ID: Mariana Philip is a 55 y o  male  HPI    [facility]  He has a PMH of rou-en-y gastric bypass in April 2021, DM1, hypothyroidism, HLD and HTN  He is followed by dudley who manages his DM1 and his thyroid function  He is on Ambien 10 mg p r n  For insomnia  His sleep is interrupted by frequent travel but the Ambien works well for him  He usually takes only half a tablet  His statin medication was discontinued after his bariatric surgery  The following portions of the patient's history were reviewed and updated as appropriate: allergies, current medications, past family history, past medical history, past social history, past surgical history and problem list     Review of Systems   Constitutional: Negative for activity change, appetite change, chills and fever  Respiratory: Negative for cough, chest tightness and shortness of breath  Cardiovascular: Negative for chest pain  Psychiatric/Behavioral: Positive for sleep disturbance           Past Medical History:   Diagnosis Date    Anemia     Colon polyp     Diabetes mellitus ([address] )     Disease of thyroid gland     GERD (gastroesophageal reflux disease)     Hyperlipidemia     Hypertension     Insomnia     Kidney stone     Sleep apnea     Mild sleep apnea    Type 1 diabetes mellitus (HCC)     Uses Insulin Pump    Vitamin D deficiency          Current Outpatient Medications:     acetone, urine, test (Ketostix) strip, Check ketones if blood sugars > 250, more than twice or in case of nausea or vomiting and abdominal pains, Disp: 100 each, Rfl: 0    azelastine (ASTELIN) 0 1 % nasal spray, 1 spray into each nostril 2 (two) times a day, Disp: 90 mL, Rfl: 1    Blood Glucose Monitoring Suppl (ONE TOUCH ULTRA 2) w/Device KIT, Use  , Disp: , Rfl:     Calcium Citrate (JORDY-CITRATE PO), Take 1,000 tablets by mouth daily, Disp: , Rfl:     Continuous Blood Gluc  (DEXCOM G6 ) KIYA, USE AS DIRECTED, Disp: 1 Device, Rfl: 0    Continuous Blood Gluc Sensor (Dexcom G6 Sensor) MISC, Use 1 Device if needed (change Q 10 days) Use 1 sensor every 10 days, Disp: 9 each, Rfl: 3    esomeprazole (NexIUM) 20 mg capsule, Take 20 mg by mouth 2 (two) times a day before meals, Disp: , Rfl:     Glucagon (BAQSIMI TWO PACK) 3 MG/DOSE POWD, Use one dose as needed in case of severe hypoglycemia ( nasal spray), Disp: 1 each, Rfl: 0    glucagon (GLUCAGON EMERGENCY) 1 MG injection, Inject 1 mg under the skin once as needed for low blood sugar for up to 1 dose, Disp: 1 kit, Rfl: 1    insulin aspart (NovoLOG) 100 units/mL injection, use UP to 70 units PER DAY VIA INSULIN PUMP, Disp: 55 mL, Rfl: 2    insulin glargine (Basaglar KwikPen) 100 units/mL injection pen, Inject 32 units in case of pump failure ( incase of emergency), Disp: 15 mL, Rfl: 0    Insulin Pen Needle (BD PEN NEEDLE LYRIC U/F) 32G X 4 MM MISC, by Does not apply route daily Use once daily, Disp: 100 each, Rfl: 2    loratadine (CLARITIN) 10 mg tablet, Take 1 tablet (10 mg total) by mouth daily, Disp: 90 tablet, Rfl: 1    Multiple Vitamins-Minerals (Bariatric Fusion) CHEW, Chew  , Disp: , Rfl:     Probiotic Product (PROBIOTIC DAILY PO), Take 1 tablet by mouth daily , Disp: , Rfl:     Synthroid 175 MCG tablet, Take 1 tablet Monday through Saturday and 1 5 tablets on Sunday, Disp: 96 tablet, Rfl: 1    vitamin B-12 (VITAMIN B-12) 1,000 mcg tablet, Take 1,000 mcg by mouth daily , Disp: , Rfl:     zolpidem (AMBIEN) 10 mg tablet, Take 1 tablet (10 mg total) by mouth daily at bedtime as needed for sleep, Disp: 30 tablet, Rfl: 0    Continuous Blood Gluc Transmit (Dexcom G6 Transmitter) MISC, Inject 1 Device under the skin every 3 (three) months, Disp: 1 each, Rfl: 3    Allergies   Allergen Reactions    Ibuprofen GI Intolerance    Other Other (See Comments)     Seasonal allergies- pt has congestion       Social History   Past Surgical History:   Procedure Laterality Date    BARIATRIC SURGERY      CARPAL TUNNEL RELEASE Right     COLONOSCOPY      EGD      FL RETROGRADE PYELOGRAM  4/24/2020    LASIK      RI CYSTO/URETERO W/LITHOTRIPSY &INDWELL STENT INSRT Right 4/24/2020    Procedure: CYSTOSCOPY URETEROSCOPY WITH LITHOTRIPSY HOLMIUM LASER, RETROGRADE PYELOGRAM AND INSERTION STENT URETERAL;  Surgeon: Isiah Mar MD;  [facility];  Service: Urology    RI LAP GASTRIC BYPASS/GABBY-EN-Y N/A 4/26/2021    Procedure: BYPASS GASTRIC GABBY-EN-Y LAPAROSCOPIC W ROBOTICS AND INTRAOPERATIVE EGD;  Surgeon: Maira Miller MD;  Location: [facility];  Service: Bariatrics    ROTATOR CUFF REPAIR Right     UPPER GASTROINTESTINAL ENDOSCOPY       Family History   Problem Relation Age of Onset    Thyroid disease unspecified Mother     Osteoporosis Mother     Hypertension Father     Colon cancer Father 76    Diabetes type II Sister     Hypertension Sister     Hyperlipidemia Sister     Thyroid disease unspecified Sister     Diabetes type II Brother     Hypertension Brother     Hyperlipidemia Brother     Colon cancer Brother 60       Objective:  BP 130/78 (BP Location: Left arm, Patient Position: Sitting, Cuff Size: Adult)   Pulse 66   Temp 98 °F (36 7 °C) (Temporal)   Ht 5' 9" (1 753 m)   Wt 92 1 kg (203 lb)   SpO2 99%   BMI 29 98 kg/m²      Physical Exam  Vitals reviewed  Constitutional:       General: He is not in acute distress  Appearance: Normal appearance  He is obese  He is not diaphoretic  HENT:      Head: Normocephalic and atraumatic  Eyes:      Extraocular Movements: Extraocular movements intact  Conjunctiva/sclera: Conjunctivae normal       Pupils: Pupils are equal, round, and reactive to light  Cardiovascular:      Rate and Rhythm: Normal rate and regular rhythm  Heart sounds: Normal heart sounds  No murmur heard  Pulmonary:      Effort: Pulmonary effort is normal  No respiratory distress  Breath sounds: Normal breath sounds  No wheezing, rhonchi or rales  Neurological:      Mental Status: He is alert and oriented to person, place, and time  Mental status is at baseline     Psychiatric:         Mood and Affect: Mood normal          Behavior: Behavior normal

## 2022-06-17 ENCOUNTER — LAB (OUTPATIENT)
Dept: LAB | Facility: CLINIC | Age: 57
End: 2022-06-17
Payer: COMMERCIAL

## 2022-06-17 DIAGNOSIS — Z80.42 FAMILY HISTORY OF PROSTATE CANCER IN FATHER: ICD-10-CM

## 2022-06-17 DIAGNOSIS — Z48.815 ENCOUNTER FOR SURGICAL AFTERCARE FOLLOWING SURGERY OF DIGESTIVE SYSTEM: ICD-10-CM

## 2022-06-17 DIAGNOSIS — E10.65 TYPE 1 DIABETES MELLITUS WITH HYPERGLYCEMIA, WITH LONG-TERM CURRENT USE OF INSULIN (HCC): ICD-10-CM

## 2022-06-17 DIAGNOSIS — G47.00 INSOMNIA, UNSPECIFIED TYPE: ICD-10-CM

## 2022-06-17 DIAGNOSIS — Z12.5 SCREENING FOR PROSTATE CANCER: ICD-10-CM

## 2022-06-17 DIAGNOSIS — E78.2 MIXED HYPERLIPIDEMIA: ICD-10-CM

## 2022-06-17 DIAGNOSIS — K91.2 POSTSURGICAL MALABSORPTION: ICD-10-CM

## 2022-06-17 DIAGNOSIS — E03.9 ACQUIRED HYPOTHYROIDISM: ICD-10-CM

## 2022-06-17 DIAGNOSIS — E66.3 OVERWEIGHT: ICD-10-CM

## 2022-06-17 DIAGNOSIS — Z98.84 BARIATRIC SURGERY STATUS: ICD-10-CM

## 2022-06-17 LAB
25(OH)D3 SERPL-MCNC: 55.3 NG/ML (ref 30–100)
ANION GAP SERPL CALCULATED.3IONS-SCNC: 12 MMOL/L (ref 4–13)
BUN SERPL-MCNC: 17 MG/DL (ref 5–25)
CALCIUM SERPL-MCNC: 9.1 MG/DL (ref 8.4–10.2)
CHLORIDE SERPL-SCNC: 105 MMOL/L (ref 96–108)
CHOLEST SERPL-MCNC: 216 MG/DL
CO2 SERPL-SCNC: 24 MMOL/L (ref 21–32)
CREAT SERPL-MCNC: 0.8 MG/DL (ref 0.6–1.3)
CREAT UR-MCNC: 58.2 MG/DL
ERYTHROCYTE [DISTWIDTH] IN BLOOD BY AUTOMATED COUNT: 12.3 % (ref 11.6–15.1)
EST. AVERAGE GLUCOSE BLD GHB EST-MCNC: 137 MG/DL
FERRITIN SERPL-MCNC: 26 NG/ML (ref 8–388)
FOLATE SERPL-MCNC: 19.8 NG/ML (ref 3.1–17.5)
GFR SERPL CREATININE-BSD FRML MDRD: 99 ML/MIN/1.73SQ M
GLUCOSE P FAST SERPL-MCNC: 130 MG/DL (ref 65–99)
HBA1C MFR BLD: 6.4 %
HCT VFR BLD AUTO: 43.7 % (ref 36.5–49.3)
HDLC SERPL-MCNC: 65 MG/DL
HGB BLD-MCNC: 14.9 G/DL (ref 12–17)
IRON SATN MFR SERPL: 25 % (ref 20–50)
IRON SERPL-MCNC: 90 UG/DL (ref 65–175)
LDLC SERPL CALC-MCNC: 132 MG/DL (ref 0–100)
MCH RBC QN AUTO: 33 PG (ref 26.8–34.3)
MCHC RBC AUTO-ENTMCNC: 34.1 G/DL (ref 31.4–37.4)
MCV RBC AUTO: 97 FL (ref 82–98)
MICROALBUMIN UR-MCNC: 7.1 MG/L (ref 0–20)
MICROALBUMIN/CREAT 24H UR: 12 MG/G CREATININE (ref 0–30)
PLATELET # BLD AUTO: 223 THOUSANDS/UL (ref 149–390)
PMV BLD AUTO: 9.2 FL (ref 8.9–12.7)
POTASSIUM SERPL-SCNC: 4.1 MMOL/L (ref 3.5–5.3)
PSA SERPL-MCNC: 2.2 NG/ML (ref 0–4)
PTH-INTACT SERPL-MCNC: 37.7 PG/ML (ref 18.4–80.1)
RBC # BLD AUTO: 4.52 MILLION/UL (ref 3.88–5.62)
SODIUM SERPL-SCNC: 141 MMOL/L (ref 135–147)
T4 FREE SERPL-MCNC: 0.91 NG/DL (ref 0.76–1.46)
TIBC SERPL-MCNC: 353 UG/DL (ref 250–450)
TRIGL SERPL-MCNC: 95 MG/DL
TSH SERPL DL<=0.05 MIU/L-ACNC: 2.52 UIU/ML (ref 0.45–4.5)
VIT B12 SERPL-MCNC: 2424 PG/ML (ref 100–900)
WBC # BLD AUTO: 6.87 THOUSAND/UL (ref 4.31–10.16)

## 2022-06-17 PROCEDURE — 3061F NEG MICROALBUMINURIA REV: CPT | Performed by: NURSE PRACTITIONER

## 2022-06-17 PROCEDURE — 82746 ASSAY OF FOLIC ACID SERUM: CPT

## 2022-06-17 PROCEDURE — 82728 ASSAY OF FERRITIN: CPT

## 2022-06-17 PROCEDURE — 80061 LIPID PANEL: CPT

## 2022-06-17 PROCEDURE — 82043 UR ALBUMIN QUANTITATIVE: CPT

## 2022-06-17 PROCEDURE — 84443 ASSAY THYROID STIM HORMONE: CPT

## 2022-06-17 PROCEDURE — 82607 VITAMIN B-12: CPT

## 2022-06-17 PROCEDURE — 84439 ASSAY OF FREE THYROXINE: CPT

## 2022-06-17 PROCEDURE — 84425 ASSAY OF VITAMIN B-1: CPT

## 2022-06-17 PROCEDURE — 82306 VITAMIN D 25 HYDROXY: CPT

## 2022-06-17 PROCEDURE — 83540 ASSAY OF IRON: CPT

## 2022-06-17 PROCEDURE — 82570 ASSAY OF URINE CREATININE: CPT

## 2022-06-17 PROCEDURE — 83036 HEMOGLOBIN GLYCOSYLATED A1C: CPT

## 2022-06-17 PROCEDURE — 36415 COLL VENOUS BLD VENIPUNCTURE: CPT

## 2022-06-17 PROCEDURE — G0103 PSA SCREENING: HCPCS

## 2022-06-17 PROCEDURE — 80048 BASIC METABOLIC PNL TOTAL CA: CPT

## 2022-06-17 PROCEDURE — 85027 COMPLETE CBC AUTOMATED: CPT

## 2022-06-17 PROCEDURE — 84630 ASSAY OF ZINC: CPT

## 2022-06-17 PROCEDURE — 84590 ASSAY OF VITAMIN A: CPT

## 2022-06-17 PROCEDURE — 3044F HG A1C LEVEL LT 7.0%: CPT | Performed by: NURSE PRACTITIONER

## 2022-06-17 PROCEDURE — 83970 ASSAY OF PARATHORMONE: CPT

## 2022-06-17 PROCEDURE — 83550 IRON BINDING TEST: CPT

## 2022-06-17 RX ORDER — ZOLPIDEM TARTRATE 10 MG/1
10 TABLET ORAL
Qty: 30 TABLET | Refills: 0 | Status: SHIPPED | OUTPATIENT
Start: 2022-06-17 | End: 2022-08-08 | Stop reason: SDUPTHER

## 2022-06-17 NOTE — TELEPHONE ENCOUNTER
Scripts sent for Ambien, please add to his next office visit note that he is due for a controlled substance agreement

## 2022-06-21 LAB — ZINC SERPL-MCNC: 98 UG/DL (ref 44–115)

## 2022-06-22 ENCOUNTER — TELEPHONE (OUTPATIENT)
Dept: ENDOCRINOLOGY | Facility: CLINIC | Age: 57
End: 2022-06-22

## 2022-06-22 LAB — VIT B1 BLD-SCNC: 76.8 NMOL/L (ref 66.5–200)

## 2022-06-26 LAB — VIT A SERPL-MCNC: 58.3 UG/DL (ref 20.1–62)

## 2022-06-30 ENCOUNTER — TELEPHONE (OUTPATIENT)
Dept: ENDOCRINOLOGY | Facility: CLINIC | Age: 57
End: 2022-06-30

## 2022-07-07 ENCOUNTER — OFFICE VISIT (OUTPATIENT)
Dept: ENDOCRINOLOGY | Facility: CLINIC | Age: 57
End: 2022-07-07
Payer: COMMERCIAL

## 2022-07-07 ENCOUNTER — OFFICE VISIT (OUTPATIENT)
Dept: INTERNAL MEDICINE CLINIC | Facility: OTHER | Age: 57
End: 2022-07-07
Payer: COMMERCIAL

## 2022-07-07 VITALS
HEIGHT: 69 IN | TEMPERATURE: 97 F | DIASTOLIC BLOOD PRESSURE: 90 MMHG | BODY MASS INDEX: 30.21 KG/M2 | SYSTOLIC BLOOD PRESSURE: 140 MMHG | WEIGHT: 204 LBS

## 2022-07-07 VITALS
OXYGEN SATURATION: 96 % | TEMPERATURE: 98.1 F | BODY MASS INDEX: 30.27 KG/M2 | RESPIRATION RATE: 18 BRPM | DIASTOLIC BLOOD PRESSURE: 82 MMHG | SYSTOLIC BLOOD PRESSURE: 142 MMHG | WEIGHT: 204.4 LBS | HEART RATE: 64 BPM | HEIGHT: 69 IN

## 2022-07-07 DIAGNOSIS — E03.9 ACQUIRED HYPOTHYROIDISM: ICD-10-CM

## 2022-07-07 DIAGNOSIS — E10.65 TYPE 1 DIABETES MELLITUS WITH HYPERGLYCEMIA, WITH LONG-TERM CURRENT USE OF INSULIN (HCC): Primary | ICD-10-CM

## 2022-07-07 DIAGNOSIS — E78.2 MIXED HYPERLIPIDEMIA: ICD-10-CM

## 2022-07-07 DIAGNOSIS — K21.9 GASTROESOPHAGEAL REFLUX DISEASE WITHOUT ESOPHAGITIS: ICD-10-CM

## 2022-07-07 DIAGNOSIS — E10.40 TYPE 1 DIABETES MELLITUS WITH DIABETIC NEUROPATHY (HCC): ICD-10-CM

## 2022-07-07 DIAGNOSIS — E55.9 VITAMIN D DEFICIENCY: ICD-10-CM

## 2022-07-07 DIAGNOSIS — G25.2 INTENTION TREMOR: Primary | ICD-10-CM

## 2022-07-07 PROBLEM — L20.84 INTRINSIC ECZEMA: Status: RESOLVED | Noted: 2019-06-07 | Resolved: 2022-07-07

## 2022-07-07 PROBLEM — G25.0 ESSENTIAL TREMOR: Status: ACTIVE | Noted: 2022-07-07

## 2022-07-07 PROBLEM — N17.9 AKI (ACUTE KIDNEY INJURY) (HCC): Status: RESOLVED | Noted: 2020-04-24 | Resolved: 2022-07-07

## 2022-07-07 PROCEDURE — 99215 OFFICE O/P EST HI 40 MIN: CPT | Performed by: INTERNAL MEDICINE

## 2022-07-07 PROCEDURE — 3080F DIAST BP >= 90 MM HG: CPT | Performed by: INTERNAL MEDICINE

## 2022-07-07 PROCEDURE — 99214 OFFICE O/P EST MOD 30 MIN: CPT | Performed by: INTERNAL MEDICINE

## 2022-07-07 PROCEDURE — 3077F SYST BP >= 140 MM HG: CPT | Performed by: INTERNAL MEDICINE

## 2022-07-07 PROCEDURE — 95251 CONT GLUC MNTR ANALYSIS I&R: CPT | Performed by: INTERNAL MEDICINE

## 2022-07-07 RX ORDER — GLUCOSAMINE/D3/BOSWELLIA SERRA 1500MG-400
10000 TABLET ORAL DAILY
COMMUNITY

## 2022-07-07 RX ORDER — GABAPENTIN 300 MG/1
300 CAPSULE ORAL
Qty: 90 CAPSULE | Refills: 1 | Status: SHIPPED | OUTPATIENT
Start: 2022-07-07

## 2022-07-07 RX ORDER — GLUCAGON 3 MG/1
POWDER NASAL
Qty: 1 EACH | Refills: 4 | Status: SHIPPED | OUTPATIENT
Start: 2022-07-07

## 2022-07-07 RX ORDER — FLUTICASONE PROPIONATE 50 MCG
1 SPRAY, SUSPENSION (ML) NASAL DAILY
COMMUNITY

## 2022-07-07 RX ORDER — ROSUVASTATIN CALCIUM 5 MG/1
5 TABLET, COATED ORAL DAILY
Qty: 90 TABLET | Refills: 1 | Status: SHIPPED | OUTPATIENT
Start: 2022-07-07

## 2022-07-07 RX ORDER — LEVOTHYROXINE SODIUM 175 MCG
TABLET ORAL
Qty: 96 TABLET | Refills: 1 | Status: SHIPPED | OUTPATIENT
Start: 2022-07-07

## 2022-07-07 NOTE — PROGRESS NOTES
1  Intention tremor  Assessment & Plan: Will start gabapentin at 300 mg daily  Follow-up in 3 months  2  Type 1 diabetes mellitus with diabetic neuropathy (HCC)  -     gabapentin (NEURONTIN) 300 mg capsule; Take 1 capsule (300 mg total) by mouth daily at bedtime  -     rosuvastatin (CRESTOR) 5 mg tablet; Take 1 tablet (5 mg total) by mouth daily    3  Mixed hyperlipidemia  Assessment & Plan:  Discussed diet and exercise  Will restart rosuvastatin at 5 mg daily  Orders:  -     rosuvastatin (CRESTOR) 5 mg tablet; Take 1 tablet (5 mg total) by mouth daily    4  Gastroesophageal reflux disease without esophagitis  Assessment & Plan:  Stable, controlled  Continue esomeprazole 20 mg daily  5  Acquired hypothyroidism  Assessment & Plan:  Controlled  Continue Synthroid 175 mcg daily  Subjective:      Patient ID: Galen Bhatt is a 65 y o  male  Chief Complaint   Patient presents with    hand tremors     More so the left than right but gets it in both  Has been going on for 2 months but it has been more often   hm     Foot exam ( asked that he bring the paper to endo on Monday they will check his feet ), annual exam, HIV       Patient is a 65year old  male presenting with a three month history of bilateral hand tremors  Patient notes that the tremors occur at both hands, with the left hand being affected worse than the right  Nothing was noted to improve the tremors, while intentional movement seems to exacerbate the tremors  Patient denies any other sites of tremor, difficulty initiating movement, tremor at rest, diplopia, blurred vision, muscle weakness, new sites of numbness or tingling, any sites of pain, cold/heat intolerance, or diaphoresis while endorsing heart palpitations  He has not experienced anything like this in the past, and has not attempted anything to alleviate symptoms at home   The tremors have become noticeable to the patients family, and distract from daily activities  The following portions of the patient's history were reviewed and updated as appropriate: allergies, current medications, past family history, past medical history, past social history, past surgical history and problem list     Review of Systems   Constitutional: Negative for activity change, appetite change, chills, diaphoresis, fatigue and fever  HENT: Negative for congestion, postnasal drip, rhinorrhea, sinus pressure, sinus pain, sneezing and sore throat  Eyes: Negative for visual disturbance  Respiratory: Negative for apnea, cough, choking, chest tightness, shortness of breath and wheezing  Cardiovascular: Negative for chest pain, palpitations and leg swelling  Gastrointestinal: Negative for abdominal distention, abdominal pain, anal bleeding, blood in stool, constipation, diarrhea, nausea and vomiting  Endocrine: Negative for cold intolerance and heat intolerance  Genitourinary: Negative for difficulty urinating, dysuria and hematuria  Musculoskeletal: Negative  Skin: Negative  Neurological: Positive for tremors  Negative for dizziness, weakness, light-headedness, numbness and headaches  Hematological: Negative for adenopathy  Psychiatric/Behavioral: Negative for agitation, sleep disturbance and suicidal ideas  All other systems reviewed and are negative          Past Medical History:   Diagnosis Date    Anemia     Colon polyp     Diabetes mellitus ([facility] [address] )     Disease of thyroid gland     GERD (gastroesophageal reflux disease)     Hyperlipidemia     Hypertension     Insomnia     Kidney stone     Sleep apnea     Mild sleep apnea    Type 1 diabetes mellitus (HCC)     Uses Insulin Pump    Vitamin D deficiency          Current Outpatient Medications:     acetone, urine, test (Ketostix) strip, Check ketones if blood sugars > 250, more than twice or in case of nausea or vomiting and abdominal pains, Disp: 100 each, Rfl: 0    Biotin 19243 MCG TABS, Take 10,000 mcg by mouth in the morning, Disp: , Rfl:     Calcium Citrate (JORDY-CITRATE PO), Take 1,000 tablets by mouth 2 (two) times a day, Disp: , Rfl:     Continuous Blood Gluc  (DEXCOM G6 ) KIYA, USE AS DIRECTED, Disp: 1 Device, Rfl: 0    Continuous Blood Gluc Sensor (Dexcom G6 Sensor) MISC, Use 1 Device if needed (change Q 10 days) Use 1 sensor every 10 days, Disp: 9 each, Rfl: 3    Continuous Blood Gluc Transmit (Dexcom G6 Transmitter) MISC, Inject 1 Device under the skin every 3 (three) months, Disp: 1 each, Rfl: 3    esomeprazole (NexIUM) 20 mg capsule, Take 20 mg by mouth daily in the early morning, Disp: , Rfl:     fluticasone (FLONASE) 50 mcg/act nasal spray, 1 spray into each nostril daily, Disp: , Rfl:     gabapentin (NEURONTIN) 300 mg capsule, Take 1 capsule (300 mg total) by mouth daily at bedtime, Disp: 90 capsule, Rfl: 1    glucagon (GLUCAGON EMERGENCY) 1 MG injection, Inject 1 mg under the skin once as needed for low blood sugar for up to 1 dose, Disp: 1 kit, Rfl: 1    insulin aspart (NovoLOG) 100 units/mL injection, use UP to 70 units PER DAY VIA INSULIN PUMP, Disp: 55 mL, Rfl: 2    insulin glargine (Basaglar KwikPen) 100 units/mL injection pen, Inject 32 units in case of pump failure ( incase of emergency), Disp: 15 mL, Rfl: 0    Insulin Pen Needle (BD PEN NEEDLE LYRIC U/F) 32G X 4 MM MISC, by Does not apply route daily Use once daily, Disp: 100 each, Rfl: 2    loratadine (CLARITIN) 10 mg tablet, Take 1 tablet (10 mg total) by mouth daily, Disp: 90 tablet, Rfl: 1    Multiple Vitamins-Minerals (Bariatric Fusion) CHEW, Chew  , Disp: , Rfl:     Probiotic Product (PROBIOTIC DAILY PO), Take 1 tablet by mouth daily , Disp: , Rfl:     psyllium (METAMUCIL) 0 52 g capsule, Take 0 52 g by mouth 3 (three) times a day Fiber capsules, Disp: , Rfl:     rosuvastatin (CRESTOR) 5 mg tablet, Take 1 tablet (5 mg total) by mouth daily, Disp: 90 tablet, Rfl: 1    Synthroid 175 MCG tablet, Take 1 tablet Monday through Saturday and 1 5 tablets on Sunday, Disp: 96 tablet, Rfl: 1    TURMERIC PO, Take 1,400 capsules by mouth 2 (two) times a day, Disp: , Rfl:     vitamin B-12 (VITAMIN B-12) 1,000 mcg tablet, Take 1,000 mcg by mouth 3 (three) times a week, Disp: , Rfl:     zolpidem (AMBIEN) 10 mg tablet, Take 1 tablet (10 mg total) by mouth daily at bedtime as needed for sleep, Disp: 30 tablet, Rfl: 0    Blood Glucose Monitoring Suppl (ONE TOUCH ULTRA 2) w/Device KIT, Use  , Disp: , Rfl:     Glucagon (BAQSIMI TWO PACK) 3 MG/DOSE POWD, Use one dose as needed in case of severe hypoglycemia ( nasal spray) (Patient not taking: Reported on 7/7/2022), Disp: 1 each, Rfl: 0    Allergies   Allergen Reactions    Ibuprofen GI Intolerance    Other Other (See Comments)     Seasonal allergies- pt has congestion       Social History   Past Surgical History:   Procedure Laterality Date    BARIATRIC SURGERY      CARPAL TUNNEL RELEASE Right     COLONOSCOPY      EGD      FL RETROGRADE PYELOGRAM  4/24/2020    LASIK      AL CYSTO/URETERO W/LITHOTRIPSY &INDWELL STENT INSRT Right 4/24/2020    Procedure: CYSTOSCOPY URETEROSCOPY WITH LITHOTRIPSY HOLMIUM LASER, RETROGRADE PYELOGRAM AND INSERTION STENT URETERAL;  Surgeon: Carlos Alberto Suh MD;  [facility];  Service: Urology    AL LAP GASTRIC BYPASS/GABBY-EN-Y N/A 4/26/2021    Procedure: BYPASS GASTRIC GABBY-EN-Y LAPAROSCOPIC W ROBOTICS AND INTRAOPERATIVE EGD;  Surgeon: Kasandra Gómez MD;  Location: [facility];  Service: Bariatrics    ROTATOR CUFF REPAIR Right     UPPER GASTROINTESTINAL ENDOSCOPY       Family History   Problem Relation Age of Onset    Thyroid disease unspecified Mother     Osteoporosis Mother     Hypertension Father     Colon cancer Father 76    Diabetes type II Sister     Hypertension Sister     Hyperlipidemia Sister     Thyroid disease unspecified Sister     Diabetes type II Brother     Hypertension Brother     Hyperlipidemia Brother     Colon cancer Brother 60       Objective:  /82 (BP Location: Left arm, Patient Position: Sitting, Cuff Size: Standard)   Pulse 64   Temp 98 1 °F (36 7 °C) (Temporal)   Resp 18   Ht 5' 9" (1 753 m)   Wt 92 7 kg (204 lb 6 4 oz)   SpO2 96%   BMI 30 18 kg/m²     Recent Results (from the past 1344 hour(s))   T4, free    Collection Time: 06/17/22  7:36 AM   Result Value Ref Range    Free T4 0 91 0 76 - 1 46 ng/dL   TSH, 3rd generation    Collection Time: 06/17/22  7:36 AM   Result Value Ref Range    TSH 3RD GENERATON 2 522 0 450 - 4 500 uIU/mL   Hemoglobin A1C    Collection Time: 06/17/22  7:36 AM   Result Value Ref Range    Hemoglobin A1C 6 4 (H) Normal 3 8-5 6%; PreDiabetic 5 7-6 4%; Diabetic >=6 5%; Glycemic control for adults with diabetes <7 0% %     mg/dl   Basic metabolic panel    Collection Time: 06/17/22  7:36 AM   Result Value Ref Range    Sodium 141 135 - 147 mmol/L    Potassium 4 1 3 5 - 5 3 mmol/L    Chloride 105 96 - 108 mmol/L    CO2 24 21 - 32 mmol/L    ANION GAP 12 4 - 13 mmol/L    BUN 17 5 - 25 mg/dL    Creatinine 0 80 0 60 - 1 30 mg/dL    Glucose, Fasting 130 (H) 65 - 99 mg/dL    Calcium 9 1 8 4 - 10 2 mg/dL    eGFR 99 ml/min/1 73sq m   Microalbumin / creatinine urine ratio    Collection Time: 06/17/22  7:36 AM   Result Value Ref Range    Creatinine, Ur 58 2 mg/dL    Microalbum  ,U,Random 7 1 0 0 - 20 0 mg/L    Microalb Creat Ratio 12 0 - 30 mg/g creatinine   Zinc    Collection Time: 06/17/22  7:36 AM   Result Value Ref Range    Zinc 98 44 - 115 ug/dL   Vitamin D 25 hydroxy    Collection Time: 06/17/22  7:36 AM   Result Value Ref Range    Vit D, 25-Hydroxy 55 3 30 0 - 100 0 ng/mL   Vitamin B12    Collection Time: 06/17/22  7:36 AM   Result Value Ref Range    Vitamin B-12 2,424 (H) 100 - 900 pg/mL   Vitamin B1, whole blood    Collection Time: 06/17/22  7:36 AM   Result Value Ref Range    Vitamin B1, Whole Blood 76 8 66 5 - 200 0 nmol/L   Vitamin A    Collection Time: 06/17/22  7:36 AM   Result Value Ref Range    Vitamin A 58 3 20 1 - 62 0 ug/dL   PTH, intact    Collection Time: 06/17/22  7:36 AM   Result Value Ref Range    PTH 37 7 18 4 - 80 1 pg/mL   CBC and Platelet    Collection Time: 06/17/22  7:36 AM   Result Value Ref Range    WBC 6 87 4 31 - 10 16 Thousand/uL    RBC 4 52 3 88 - 5 62 Million/uL    Hemoglobin 14 9 12 0 - 17 0 g/dL    Hematocrit 43 7 36 5 - 49 3 %    MCV 97 82 - 98 fL    MCH 33 0 26 8 - 34 3 pg    MCHC 34 1 31 4 - 37 4 g/dL    RDW 12 3 11 6 - 15 1 %    Platelets 809 602 - 733 Thousands/uL    MPV 9 2 8 9 - 12 7 fL   Ferritin    Collection Time: 06/17/22  7:36 AM   Result Value Ref Range    Ferritin 26 8 - 388 ng/mL   Folate    Collection Time: 06/17/22  7:36 AM   Result Value Ref Range    Folate 19 8 (H) 3 1 - 17 5 ng/mL   Iron Saturation %    Collection Time: 06/17/22  7:36 AM   Result Value Ref Range    Iron Saturation 25 20 - 50 %    TIBC 353 250 - 450 ug/dL    Iron 90 65 - 175 ug/dL   Lipid Panel with Direct LDL reflex    Collection Time: 06/17/22  7:36 AM   Result Value Ref Range    Cholesterol 216 (H) See Comment mg/dL    Triglycerides 95 See Comment mg/dL    HDL, Direct 65 >=40 mg/dL    LDL Calculated 132 (H) 0 - 100 mg/dL   PSA, Total Screen    Collection Time: 06/17/22  7:36 AM   Result Value Ref Range    PSA 2 2 0 0 - 4 0 ng/mL            Physical Exam  Vitals and nursing note reviewed  Constitutional:       General: He is not in acute distress  Appearance: He is well-developed  He is not diaphoretic  HENT:      Head: Normocephalic and atraumatic  Eyes:      General: No scleral icterus  Right eye: No discharge  Left eye: No discharge  Conjunctiva/sclera: Conjunctivae normal       Pupils: Pupils are equal, round, and reactive to light  Neck:      Thyroid: No thyromegaly  Vascular: No JVD  Cardiovascular:      Rate and Rhythm: Normal rate and regular rhythm  Heart sounds: Normal heart sounds   No murmur heard     No friction rub  No gallop  Pulmonary:      Effort: Pulmonary effort is normal  No respiratory distress  Breath sounds: Normal breath sounds  No wheezing or rales  Chest:      Chest wall: No tenderness  Abdominal:      General: Bowel sounds are normal  There is no distension  Palpations: Abdomen is soft  There is no mass  Tenderness: There is no abdominal tenderness  There is no guarding or rebound  Musculoskeletal:         General: No tenderness or deformity  Normal range of motion  Cervical back: Normal range of motion and neck supple  Lymphadenopathy:      Cervical: No cervical adenopathy  Skin:     General: Skin is warm and dry  Coloration: Skin is not pale  Findings: No erythema or rash  Neurological:      Mental Status: He is alert and oriented to person, place, and time  Cranial Nerves: No cranial nerve deficit  Motor: Tremor present  Coordination: Coordination normal       Deep Tendon Reflexes: Reflexes are normal and symmetric  Psychiatric:         Behavior: Behavior normal          Thought Content:  Thought content normal          Judgment: Judgment normal

## 2022-07-07 NOTE — PROGRESS NOTES
Arti Panda 65 y o  male MRN: [id number]    Encounter: [id number]      Assessment/Plan     Assessment: This is a 65y o -year-old male with diabetes with hyperglycemia  Plan:  Diagnoses and all orders for this visit:    Type 1 diabetes mellitus with hyperglycemia, with long-term current use of insulin ([facility] [address] )  Lab Results   Component Value Date    HGBA1C 6 4 (H) 06/17/2022   A1c dropped to 6 4%, at goal, however patient has been having hypoglycemic episodes early in the morning as well as after taking bolus wizard with meals  Based on weight and, current total daily insulin requirement will change insulin to carb ratio to 1 unit for 8 g all the times  Change target to 120 mg/dL  Change basal rate to 1 5 unit/hour at midnight, 1 5 unit per hour at 4:00 a m , 1 5 unit/hour at 6:00 a m , 1 5 units/hour at 8:00 a m , 1 5 unit at 12:00 p m  Discussed with patient to take bolus wizard appropriately with each meal to avoid fluctuation in blood sugars  Discussed to use continues glucose monitor sensor all the time  He continues to have early morning hypoglycemia discussed to reduce the basal rate to 1 4 units/hour at midnight and 4:00 a m     Call if blood sugar less than 70 or more than 300 persistently    Patient has emergency plan in case of pump failure  Hypoglycemia symptoms and treatment reviewed  Glucagon injection was prescribed    -     Basic metabolic panel; Future  -     Hemoglobin A1C; Future  -     Microalbumin / creatinine urine ratio; Future  -     Glucagon (Baqsimi One Pack) 3 MG/DOSE POWD; Use one dose in one nostril, 3 mg in case of severe hypoglycemia and unconsciousness    Acquired hypothyroidism  Patient is clinically and biochemically euthyroid  Continue current dose of Synthroid  -     Synthroid 175 MCG tablet; Take 1 tablet Monday through Saturday and 1 5 tablets on Sunday  -     T4, free; Future  -     TSH, 3rd generation;  Future    Vitamin D deficiency  Take vitamin-D 3 supplementation as recommended by bariatric surgery  Mixed hyperlipidemia  Continue statins      CC: Diabetes    History of Present Illness     HPI:    Oksana Renteria is 65year old gentleman with medical history of type 1 diabetes for many years managed on insulin pump, hypothyroidism and vitamin-D deficiency, obesity is here for follow-up  Diabetes course has been stable  Patient has lost 50-60 lb since gastric bypass surgery and insulin requirement has gone down tremendously  He uses continues glucose monitor sensor by dex com    Reviewed continues glucose monitor sensor by dex com, June 24 to July 07/2022  Average glucose is 158 mg/dL  62% blood sugars are in target range  1% blood sugars are low  36% blood sugars are higher than target range  Patient has been having low blood sugars usually early in the morning from 3:00 a m  To 6:00 a m  He has not been taking bolus wizard because of blood sugars dropping after taking bolus  There is of elevation in blood sugars usually after meals      Patient underwent bariatric surgery in April 2021, since then he has lost weight currently off the glucagon like peptide agonist   Uses T slim insulin pump    Current settings are    Basal rate:  12:00 a m  = 1 600 units/hour  4:00 a m  = 1 700 units/hour  6:00 a m  = 1 700 units/hour  8:00 a m  = 1 850 units/hour  12:00 p m  = 1 600 units/hour  6:00 p m  = 1 500 units/hour  9:00 p m  = 1 500 units/hour  Total basal insulin 39 2 units  Insulin to carb ratio:  12:00 a m  = 4  4:00 a m  = 4  6:00 a m  = 4  8:00 a m  =   12:00 p m  = 4  6:00 p m  = 4  9:00 p m  = 4  Insulin sensitivity factor:  12:00 a m  = 35  4:00 a m  = 35  6:00 a m  = 25  8:00 a m  = 20  12:00 p m  = 20  6:00 p m  = 30  9:00 p m  = 35  BG target:  115 mg/dL     He has backup regimen in case of pump failure  He denies hypoglycemic episode requiring hospitalization or hyperglycemic episode requiring hospitalizations    For hypothyroidism he takes levothyroxine, denies missing doses         Review of Systems   Constitutional: Negative for activity change, diaphoresis, fatigue, fever and unexpected weight change  HENT: Negative  Eyes: Negative for visual disturbance  Respiratory: Negative for cough, chest tightness and shortness of breath  Cardiovascular: Negative for chest pain, palpitations and leg swelling  Gastrointestinal: Negative for abdominal pain, blood in stool, constipation, diarrhea, nausea and vomiting  Endocrine: Negative for cold intolerance, heat intolerance, polydipsia, polyphagia and polyuria  Genitourinary: Negative for dysuria, enuresis, frequency and urgency  Musculoskeletal: Negative for arthralgias and myalgias  Skin: Negative for pallor, rash and wound  Allergic/Immunologic: Negative  Neurological: Positive for numbness  Negative for dizziness, tremors and weakness  Hematological: Negative  Psychiatric/Behavioral: Negative          Historical Information   Past Medical History:   Diagnosis Date    Anemia     Colon polyp     Diabetes mellitus ([address] )     Disease of thyroid gland     GERD (gastroesophageal reflux disease)     Hyperlipidemia     Hypertension     Insomnia     Kidney stone     Sleep apnea     Mild sleep apnea    Type 1 diabetes mellitus (HCC)     Uses Insulin Pump    Vitamin D deficiency      Past Surgical History:   Procedure Laterality Date    BARIATRIC SURGERY      CARPAL TUNNEL RELEASE Right     COLONOSCOPY      EGD      FL RETROGRADE PYELOGRAM  4/24/2020    LASIK      IA CYSTO/URETERO W/LITHOTRIPSY &INDWELL STENT INSRT Right 4/24/2020    Procedure: CYSTOSCOPY URETEROSCOPY WITH LITHOTRIPSY HOLMIUM LASER, RETROGRADE PYELOGRAM AND INSERTION STENT URETERAL;  Surgeon: Emily Murphy MD;  Location: [facility];  Service: Urology    IA LAP GASTRIC BYPASS/GABBY-EN-Y N/A 4/26/2021    Procedure: BYPASS GASTRIC GABBY-EN-Y LAPAROSCOPIC W ROBOTICS AND INTRAOPERATIVE EGD;  Surgeon: Hans Thompson MD;  Location: [facility];  Service: Bariatrics    ROTATOR CUFF REPAIR Right     UPPER GASTROINTESTINAL ENDOSCOPY       Social History   Social History     Substance and Sexual Activity   Alcohol Use Yes    Alcohol/week: 2 0 standard drinks    Types: 2 Glasses of wine per week    Comment: social     Social History     Substance and Sexual Activity   Drug Use No     Social History     Tobacco Use   Smoking Status Former Smoker    Years: 30 00    Types: Cigarettes    Quit date:     Years since quittin 5   Smokeless Tobacco Never Used   Tobacco Comment    quit in      Family History:   Family History   Problem Relation Age of Onset    Thyroid disease unspecified Mother     Osteoporosis Mother     Hypertension Father     Colon cancer Father 76    Diabetes type II Sister     Hypertension Sister     Hyperlipidemia Sister     Thyroid disease unspecified Sister     Diabetes type II Brother     Hypertension Brother     Hyperlipidemia Brother     Colon cancer Brother 61       Meds/Allergies   Current Outpatient Medications   Medication Sig Dispense Refill    acetone, urine, test (Ketostix) strip Check ketones if blood sugars > 250, more than twice or in case of nausea or vomiting and abdominal pains 100 each 0    Biotin 86446 MCG TABS Take 10,000 mcg by mouth in the morning      Blood Glucose Monitoring Suppl (ONE TOUCH ULTRA 2) w/Device KIT Use        Calcium Citrate (JORDY-CITRATE PO) Take 1,000 tablets by mouth 2 (two) times a day      Continuous Blood Gluc  (DEXCOM G6 ) KIYA USE AS DIRECTED 1 Device 0    Continuous Blood Gluc Sensor (Dexcom G6 Sensor) MISC Use 1 Device if needed (change Q 10 days) Use 1 sensor every 10 days 9 each 3    Continuous Blood Gluc Transmit (Dexcom G6 Transmitter) MISC Inject 1 Device under the skin every 3 (three) months 1 each 3    esomeprazole (NexIUM) 20 mg capsule Take 20 mg by mouth daily in the early morning      fluticasone (FLONASE) 50 mcg/act nasal spray 1 spray into each nostril daily      gabapentin (NEURONTIN) 300 mg capsule Take 1 capsule (300 mg total) by mouth daily at bedtime 90 capsule 1    Glucagon (Baqsimi One Pack) 3 MG/DOSE POWD Use one dose in one nostril, 3 mg in case of severe hypoglycemia and unconsciousness 1 each 4    insulin aspart (NovoLOG) 100 units/mL injection use UP to 70 units PER DAY VIA INSULIN PUMP 55 mL 2    Insulin Pen Needle (BD PEN NEEDLE LYRIC U/F) 32G X 4 MM MISC by Does not apply route daily Use once daily 100 each 2    loratadine (CLARITIN) 10 mg tablet Take 1 tablet (10 mg total) by mouth daily 90 tablet 1    Multiple Vitamins-Minerals (Bariatric Fusion) CHEW Chew        Probiotic Product (PROBIOTIC DAILY PO) Take 1 tablet by mouth daily       psyllium (METAMUCIL) 0 52 g capsule Take 0 52 g by mouth 3 (three) times a day Fiber capsules      rosuvastatin (CRESTOR) 5 mg tablet Take 1 tablet (5 mg total) by mouth daily 90 tablet 1    Synthroid 175 MCG tablet Take 1 tablet Monday through Saturday and 1 5 tablets on Sunday 96 tablet 1    TURMERIC PO Take 1,400 capsules by mouth 2 (two) times a day      vitamin B-12 (VITAMIN B-12) 1,000 mcg tablet Take 1,000 mcg by mouth 3 (three) times a week      zolpidem (AMBIEN) 10 mg tablet Take 1 tablet (10 mg total) by mouth daily at bedtime as needed for sleep 30 tablet 0    Glucagon (BAQSIMI TWO PACK) 3 MG/DOSE POWD Use one dose as needed in case of severe hypoglycemia ( nasal spray) (Patient not taking: No sig reported) 1 each 0    insulin glargine (Basaglar KwikPen) 100 units/mL injection pen Inject 32 units in case of pump failure ( incase of emergency) (Patient not taking: Reported on 7/7/2022) 15 mL 0     No current facility-administered medications for this visit       Allergies   Allergen Reactions    Ibuprofen GI Intolerance    Other Other (See Comments)     Seasonal allergies- pt has congestion       Objective   Vitals: Blood pressure 140/90, temperature (!) 97 °F (36 1 °C), temperature source Tympanic, height 5' 9" (1 753 m), weight 92 5 kg (204 lb)  Physical Exam  Vitals reviewed  Constitutional:       General: He is not in acute distress  Appearance: Normal appearance  He is well-developed  He is obese  HENT:      Head: Normocephalic and atraumatic  Mouth/Throat:      Mouth: Mucous membranes are moist    Eyes:      Pupils: Pupils are equal, round, and reactive to light  Neck:      Thyroid: No thyromegaly  Cardiovascular:      Rate and Rhythm: Normal rate and regular rhythm  Pulses: no weak pulses          Dorsalis pedis pulses are 2+ on the right side and 2+ on the left side  Posterior tibial pulses are 2+ on the right side and 2+ on the left side  Heart sounds: Normal heart sounds  Pulmonary:      Effort: Pulmonary effort is normal  No respiratory distress  Breath sounds: Normal breath sounds  Abdominal:      General: Bowel sounds are normal       Palpations: Abdomen is soft  Musculoskeletal:         General: No deformity  Normal range of motion  Cervical back: Normal range of motion and neck supple  Feet:      Right foot:      Skin integrity: No ulcer, skin breakdown, erythema, warmth, callus or dry skin  Left foot:      Skin integrity: No ulcer, skin breakdown, erythema, warmth, callus or dry skin  Skin:     General: Skin is warm and dry  Findings: No erythema  Neurological:      General: No focal deficit present  Mental Status: He is alert and oriented to person, place, and time  Psychiatric:         Mood and Affect: Mood normal          Behavior: Behavior normal      Diabetic Foot Exam    Patient's shoes and socks removed  Right Foot/Ankle   Right Foot Inspection  Skin Exam: skin normal and skin intact  No dry skin, no warmth, no callus, no erythema, no maceration, no abnormal color, no pre-ulcer, no ulcer and no callus  Toe Exam: ROM and strength within normal limits       Sensory Vibration: diminished  Monofilament testing: diminished    Vascular  The right DP pulse is 2+  The right PT pulse is 2+  Left Foot/Ankle  Left Foot Inspection  Skin Exam: skin normal and skin intact  No dry skin, no warmth, no erythema, no maceration, normal color, no pre-ulcer, no ulcer and no callus  Toe Exam: ROM and strength within normal limits  Sensory   Vibration: diminished  Monofilament testing: diminished    Vascular  The left DP pulse is 2+  The left PT pulse is 2+  Assign Risk Category  No deformity present  Loss of protective sensation  No weak pulses  Risk: 1      The history was obtained from the review of the chart, patient      Lab Results:   Lab Results   Component Value Date/Time    Hemoglobin A1C 6 4 (H) 06/17/2022 07:36 AM    Hemoglobin A1C 6 4 (H) 03/24/2022 08:21 AM    Hemoglobin A1C 6 5 (H) 11/19/2021 07:01 AM    WBC 6 87 06/17/2022 07:36 AM    WBC 7 39 11/19/2021 07:01 AM    Hemoglobin 14 9 06/17/2022 07:36 AM    Hemoglobin 14 5 11/19/2021 07:01 AM    Hematocrit 43 7 06/17/2022 07:36 AM    Hematocrit 43 2 11/19/2021 07:01 AM    MCV 97 06/17/2022 07:36 AM    MCV 96 11/19/2021 07:01 AM    Platelets 919 68/77/3197 07:36 AM    Platelets 157 90/51/3489 07:01 AM    BUN 17 06/17/2022 07:36 AM    BUN 16 03/24/2022 08:21 AM    BUN 17 11/19/2021 07:01 AM    Potassium 4 1 06/17/2022 07:36 AM    Potassium 3 8 03/24/2022 08:21 AM    Potassium 4 1 11/19/2021 07:01 AM    Chloride 105 06/17/2022 07:36 AM    Chloride 103 03/24/2022 08:21 AM    Chloride 104 11/19/2021 07:01 AM    CO2 24 06/17/2022 07:36 AM    CO2 29 03/24/2022 08:21 AM    CO2 28 11/19/2021 07:01 AM    Creatinine 0 80 06/17/2022 07:36 AM    Creatinine 1 01 03/24/2022 08:21 AM    Creatinine 1 06 11/19/2021 07:01 AM    AST 35 11/19/2021 07:01 AM    ALT 31 11/19/2021 07:01 AM    Albumin 3 6 11/19/2021 07:01 AM    HDL, Direct 65 06/17/2022 07:36 AM    Triglycerides 95 06/17/2022 07:36 AM           Imaging Studies: I have personally reviewed pertinent reports  Portions of the record may have been created with voice recognition software  Occasional wrong word or "sound a like" substitutions may have occurred due to the inherent limitations of voice recognition software  Read the chart carefully and recognize, using context, where substitutions have occurred

## 2022-08-08 ENCOUNTER — TELEPHONE (OUTPATIENT)
Dept: ENDOCRINOLOGY | Facility: CLINIC | Age: 57
End: 2022-08-08

## 2022-08-08 DIAGNOSIS — G47.00 INSOMNIA, UNSPECIFIED TYPE: ICD-10-CM

## 2022-08-08 RX ORDER — ZOLPIDEM TARTRATE 10 MG/1
10 TABLET ORAL
Qty: 30 TABLET | Refills: 0 | Status: SHIPPED | OUTPATIENT
Start: 2022-08-08 | End: 2022-09-26 | Stop reason: SDUPTHER

## 2022-08-08 NOTE — TELEPHONE ENCOUNTER
PROVIDERS PLEASE ADDRESS SINCE ELIN IS ON PTO          NOV 10/10/2022    Next appt notes noted for [facility] AGRMT needed

## 2022-08-09 NOTE — TELEPHONE ENCOUNTER
Spoke to patient  Readings have been running high for the past few weeks since changes were made at last OV  His ICR was changed from 1:8 but his download still shows 1:10  Advised he change ICR to 1:8 all day  BG levels are also not improving after correction boluses by pump  Change ISF from 6 am through 8 pm to 1:25 and change from 9 pm through 5 am to 1:35  Patient agrees with changes  He would like these changes sent to him through [employer] as he currently going into a meeting

## 2022-08-15 ENCOUNTER — OFFICE VISIT (OUTPATIENT)
Dept: INTERNAL MEDICINE CLINIC | Age: 57
End: 2022-08-15
Payer: COMMERCIAL

## 2022-08-15 VITALS
TEMPERATURE: 98.5 F | BODY MASS INDEX: 30.66 KG/M2 | HEIGHT: 69 IN | DIASTOLIC BLOOD PRESSURE: 84 MMHG | OXYGEN SATURATION: 98 % | WEIGHT: 207 LBS | SYSTOLIC BLOOD PRESSURE: 136 MMHG | HEART RATE: 70 BPM

## 2022-08-15 DIAGNOSIS — R59.0 AXILLARY ADENOPATHY: ICD-10-CM

## 2022-08-15 DIAGNOSIS — L02.419 AXILLARY ABSCESS: ICD-10-CM

## 2022-08-15 DIAGNOSIS — E10.65 TYPE 1 DIABETES MELLITUS WITH HYPERGLYCEMIA, WITH LONG-TERM CURRENT USE OF INSULIN (HCC): ICD-10-CM

## 2022-08-15 DIAGNOSIS — R59.1 LYMPHADENOPATHY OF HEAD AND NECK: Primary | ICD-10-CM

## 2022-08-15 DIAGNOSIS — J06.9 URTI (ACUTE UPPER RESPIRATORY INFECTION): ICD-10-CM

## 2022-08-15 PROCEDURE — 99214 OFFICE O/P EST MOD 30 MIN: CPT | Performed by: INTERNAL MEDICINE

## 2022-08-15 PROCEDURE — 3725F SCREEN DEPRESSION PERFORMED: CPT | Performed by: INTERNAL MEDICINE

## 2022-08-15 RX ORDER — AMOXICILLIN AND CLAVULANATE POTASSIUM 875; 125 MG/1; MG/1
1 TABLET, FILM COATED ORAL EVERY 12 HOURS SCHEDULED
Qty: 14 TABLET | Refills: 0 | Status: SHIPPED | OUTPATIENT
Start: 2022-08-15 | End: 2022-08-22

## 2022-08-15 NOTE — PROGRESS NOTES
Assessment/Plan:    Lymphadenopathy of head and neck   -will order CBC, CMP and LDH  -follow-up in 1-2 weeks and if lymphadenopathy is still persistent, will order imaging    Upper respiratory tract infection   - likely viral with bacterial superinfection vs bacterial  - we will start pt on Augmentin 875-125 mg twice daily for 7 days, Flonase nasal spray for rhinitis and Mucinex for any productive cough  - Pt was counseled to use OTC tylenol or ibuprofen with meals for aches and pains, warm salt water gargles for sore throat and was encouraged to drink lots of fluids, get plenty of rest and wash his hands liberally  - pt was also counseled to take antibiotics with food and also to use a probiotic like yogurt daily as long as he is taking antibiotics  - He should return to the clinic if his symptoms worsen or do not improve  Axillary lymphadenopathy with axillary abscess   -will start patient on Augmentin as above  -follow-up in 1-2 weeks and if axillary lymphadenopathy is still persistent, will order an ultrasound    Type 1 diabetes mellitus  -usually well controlled, last hemoglobin A1c done on June 17th, 2022 was 6 4  -however pt has been having worsening blood glucose likely secondary to acute infection  -will treat infection and continue to monitor his blood glucose  -continue with insulin     Diagnoses and all orders for this visit:    Lymphadenopathy of head and neck  -     CBC and differential; Future  -     Comprehensive metabolic panel; Future  -     LD,Blood; Future    Axillary adenopathy  -     CBC and differential; Future  -     Comprehensive metabolic panel; Future  -     LD,Blood; Future    Type 1 diabetes mellitus with hyperglycemia, with long-term current use of insulin ([facility])  -     Comprehensive metabolic panel; Future  -     LD,Blood; Future    Axillary abscess  -     amoxicillin-clavulanate (AUGMENTIN) 875-125 mg per tablet;  Take 1 tablet by mouth every 12 (twelve) hours for 7 days  - CBC and differential; Future  -     Comprehensive metabolic panel; Future  -     LD,Blood; Future    URTI (acute upper respiratory infection)  -     amoxicillin-clavulanate (AUGMENTIN) 875-125 mg per tablet; Take 1 tablet by mouth every 12 (twelve) hours for 7 days  -     CBC and differential; Future  -     Comprehensive metabolic panel; Future  -     LD,Blood; Future            Depression Screening and Follow-up Plan: Patient was screened for depression during today's encounter  They screened negative with a PHQ-2 score of 0  Subjective:      Patient ID: Althea Guerrier is a 65 y o  male  HPI  Pt presents with complains that he has been having pressure in his ears for 3-4 weeks and with associated elevated blood sugar  He has dm 1 and his last hemoglobin a 1 c was 6 4 within the last three months  He also noticed swelling of the glands in his axillae 3 days ago  He has seasonal allergies and takes allergy medications and has been taking more sudafed cos of the ear pressure  He admits to more frequent headaches, nasal congestion, postnasal drip, rhinorrhea, sore throat, constipation, nausea and myalgias  Constipation, nausea and rhinorrhea are chronic and unchanged  He denies fever, chills, night sweats, dizziness, chest pain, cough, shortness of breath, palpitations, diarrhea  He admits to family history of Colon ca - brother and father  Prostate ca - father  Lung ca - brother  Smoked for about 27 years and quit 7 years ago and smoked one pack a day    The following portions of the patient's history were reviewed and updated as appropriate:   He  has a past medical history of Anemia, Colon polyp, Diabetes mellitus ([address] ), Disease of thyroid gland, GERD (gastroesophageal reflux disease), Hyperlipidemia, Hypertension, Insomnia, Kidney stone, Sleep apnea, Type 1 diabetes mellitus ([address] ), and Vitamin D deficiency    He   Patient Active Problem List    Diagnosis Date Noted    Lymphadenopathy of head and neck 08/15/2022    Axillary adenopathy 08/15/2022    Axillary abscess 08/15/2022    URTI (acute upper respiratory infection) 08/15/2022    Intention tremor 07/07/2022    Cellulitis of right lower extremity 07/13/2021    Gastric bypass status for obesity 04/26/2021    Elevated BP without diagnosis of hypertension 04/19/2021    Insulin pump in place 03/03/2021    Erectile dysfunction 12/11/2020    Renal calculus, bilateral 04/23/2020    Leukocytosis (leucocytosis) 04/23/2020    FARZANA (obstructive sleep apnea)     Edema 06/07/2019    Snoring 06/07/2019    Seasonal allergic rhinitis due to pollen 08/06/2018    Chronic back pain 08/07/2017    BMI 40 0-44 9, adult ([facility] ) 07/24/2017    Type 1 diabetes mellitus with hyperglycemia, with long-term current use of insulin ([place] [address] ) 03/31/2017    Iron deficiency anemia 06/01/2016    Collagenous colitis 01/23/2016    GERD (gastroesophageal reflux disease) 12/31/2015    Vitamin D deficiency 04/24/2015    Hyperlipidemia 08/30/2013    Hypothyroidism 08/30/2013    Insomnia 10/16/2012     He  has a past surgical history that includes Rotator cuff repair (Right); [place] retrograde pyelogram (4/24/2020); pr cysto/uretero w/lithotripsy &indwell stent insrt (Right, 4/24/2020); LASIK; Colonoscopy; EGD; Carpal tunnel release (Right); pr lap gastric bypass/rima-en-y (N/A, 4/26/2021); Upper gastrointestinal endoscopy; and Bariatric Surgery  His family history includes Colon cancer (age of onset: 61) in his brother; Colon cancer (age of onset: 76) in his father; Diabetes type II in his brother and sister; Hyperlipidemia in his brother and sister; Hypertension in his brother, father, and sister; Osteoporosis in his mother; Thyroid disease unspecified in his mother and sister  He  reports that he quit smoking about 7 years ago  His smoking use included cigarettes  He quit after 30 00 years of use   He has never used smokeless tobacco  He reports current alcohol use of about 2 0 standard drinks of alcohol per week  He reports that he does not use drugs    Current Outpatient Medications   Medication Sig Dispense Refill    acetone, urine, test (Ketostix) strip Check ketones if blood sugars > 250, more than twice or in case of nausea or vomiting and abdominal pains 100 each 0    amoxicillin-clavulanate (AUGMENTIN) 875-125 mg per tablet Take 1 tablet by mouth every 12 (twelve) hours for 7 days 14 tablet 0    Biotin 44912 MCG TABS Take 10,000 mcg by mouth in the morning      Blood Glucose Monitoring Suppl (ONE TOUCH ULTRA 2) w/Device KIT Use        Calcium Citrate (JORDY-CITRATE PO) Take 1,000 tablets by mouth 2 (two) times a day      Continuous Blood Gluc  (DEXCOM G6 ) KIYA USE AS DIRECTED 1 Device 0    Continuous Blood Gluc Sensor (Dexcom G6 Sensor) MISC Use 1 Device if needed (change Q 10 days) Use 1 sensor every 10 days 9 each 3    Continuous Blood Gluc Transmit (Dexcom G6 Transmitter) MISC Inject 1 Device under the skin every 3 (three) months 1 each 3    esomeprazole (NexIUM) 20 mg capsule Take 20 mg by mouth daily in the early morning      fluticasone (FLONASE) 50 mcg/act nasal spray 1 spray into each nostril daily      gabapentin (NEURONTIN) 300 mg capsule Take 1 capsule (300 mg total) by mouth daily at bedtime 90 capsule 1    Glucagon (Baqsimi One Pack) 3 MG/DOSE POWD Use one dose in one nostril, 3 mg in case of severe hypoglycemia and unconsciousness 1 each 4    Glucagon (BAQSIMI TWO PACK) 3 MG/DOSE POWD Use one dose as needed in case of severe hypoglycemia ( nasal spray) 1 each 0    insulin aspart (NovoLOG) 100 units/mL injection use UP to 70 units PER DAY VIA INSULIN PUMP 55 mL 2    insulin glargine (Basaglar KwikPen) 100 units/mL injection pen Inject 32 units in case of pump failure ( incase of emergency) 15 mL 0    Insulin Pen Needle (BD PEN NEEDLE LYRIC U/F) 32G X 4 MM MISC by Does not apply route daily Use once daily 100 each 2    loratadine (CLARITIN) 10 mg tablet Take 1 tablet (10 mg total) by mouth daily 90 tablet 1    Multiple Vitamins-Minerals (Bariatric Fusion) CHEW Chew        Probiotic Product (PROBIOTIC DAILY PO) Take 1 tablet by mouth daily       psyllium (METAMUCIL) 0 52 g capsule Take 0 52 g by mouth 3 (three) times a day Fiber capsules      rosuvastatin (CRESTOR) 5 mg tablet Take 1 tablet (5 mg total) by mouth daily 90 tablet 1    Synthroid 175 MCG tablet Take 1 tablet Monday through Saturday and 1 5 tablets on Sunday 96 tablet 1    TURMERIC PO Take 1,400 capsules by mouth 2 (two) times a day      vitamin B-12 (VITAMIN B-12) 1,000 mcg tablet Take 1,000 mcg by mouth 3 (three) times a week      zolpidem (AMBIEN) 10 mg tablet Take 1 tablet (10 mg total) by mouth daily at bedtime as needed for sleep 30 tablet 0     No current facility-administered medications for this visit       Current Outpatient Medications on File Prior to Visit   Medication Sig    acetone, urine, test (Ketostix) strip Check ketones if blood sugars > 250, more than twice or in case of nausea or vomiting and abdominal pains    Biotin 61396 MCG TABS Take 10,000 mcg by mouth in the morning    Blood Glucose Monitoring Suppl (ONE TOUCH ULTRA 2) w/Device KIT Use      Calcium Citrate (JORDY-CITRATE PO) Take 1,000 tablets by mouth 2 (two) times a day    Continuous Blood Gluc  (DEXCOM G6 ) KIYA USE AS DIRECTED    Continuous Blood Gluc Sensor (Dexcom G6 Sensor) MISC Use 1 Device if needed (change Q 10 days) Use 1 sensor every 10 days    Continuous Blood Gluc Transmit (Dexcom G6 Transmitter) MISC Inject 1 Device under the skin every 3 (three) months    esomeprazole (NexIUM) 20 mg capsule Take 20 mg by mouth daily in the early morning    fluticasone (FLONASE) 50 mcg/act nasal spray 1 spray into each nostril daily    gabapentin (NEURONTIN) 300 mg capsule Take 1 capsule (300 mg total) by mouth daily at bedtime    Glucagon (Baqsimi One Pack) 3 MG/DOSE POWD Use one dose in one nostril, 3 mg in case of severe hypoglycemia and unconsciousness    Glucagon (BAQSIMI TWO PACK) 3 MG/DOSE POWD Use one dose as needed in case of severe hypoglycemia ( nasal spray)    insulin aspart (NovoLOG) 100 units/mL injection use UP to 70 units PER DAY VIA INSULIN PUMP    insulin glargine (Basaglar KwikPen) 100 units/mL injection pen Inject 32 units in case of pump failure ( incase of emergency)    Insulin Pen Needle (BD PEN NEEDLE LYRIC U/F) 32G X 4 MM MISC by Does not apply route daily Use once daily    loratadine (CLARITIN) 10 mg tablet Take 1 tablet (10 mg total) by mouth daily    Multiple Vitamins-Minerals (Bariatric Fusion) CHEW Chew      Probiotic Product (PROBIOTIC DAILY PO) Take 1 tablet by mouth daily     psyllium (METAMUCIL) 0 52 g capsule Take 0 52 g by mouth 3 (three) times a day Fiber capsules    rosuvastatin (CRESTOR) 5 mg tablet Take 1 tablet (5 mg total) by mouth daily    Synthroid 175 MCG tablet Take 1 tablet Monday through Saturday and 1 5 tablets on Sunday    TURMERIC PO Take 1,400 capsules by mouth 2 (two) times a day    vitamin B-12 (VITAMIN B-12) 1,000 mcg tablet Take 1,000 mcg by mouth 3 (three) times a week    zolpidem (AMBIEN) 10 mg tablet Take 1 tablet (10 mg total) by mouth daily at bedtime as needed for sleep     No current facility-administered medications on file prior to visit  He is allergic to ibuprofen and other       Review of Systems   Constitutional: Negative for activity change, chills, fatigue, fever and unexpected weight change  HENT: Positive for congestion, postnasal drip, rhinorrhea (chronic and unchanged) and sore throat (occasional sore throat, does not think it has been more than usual)  Negative for ear pain (clogged ears) and sinus pressure  Eyes: Negative for pain  Respiratory: Negative for cough, choking, chest tightness, shortness of breath and wheezing      Cardiovascular: Negative for chest pain, palpitations and leg swelling  Gastrointestinal: Positive for constipation (chronic) and nausea (occasionally - has a hx of gastric by pass sx last year)  Negative for abdominal pain, diarrhea and vomiting  Genitourinary: Negative for dysuria and hematuria  Musculoskeletal: Positive for myalgias (swollen lymph nodes in the axillae - tender)  Negative for arthralgias, back pain, gait problem, joint swelling and neck stiffness  Skin: Negative for pallor and rash  Neurological: Positive for headaches (cluster headaches on the left side of the ears , has had more in the past one month)  Negative for dizziness, tremors, seizures, syncope and light-headedness  Hematological: Negative for adenopathy  Psychiatric/Behavioral: Negative for behavioral problems  Objective:      /84 (BP Location: Right arm, Patient Position: Sitting, Cuff Size: Adult)   Pulse 70   Temp 98 5 °F (36 9 °C) (Temporal)   Ht 5' 9" (1 753 m)   Wt 93 9 kg (207 lb)   SpO2 98%   BMI 30 57 kg/m²          Physical Exam  Constitutional:       General: He is not in acute distress  Appearance: He is well-developed  He is not diaphoretic  HENT:      Head: Normocephalic and atraumatic  Right Ear: External ear normal  Tympanic membrane is injected and erythematous  Left Ear: External ear normal  A middle ear effusion is present  Tympanic membrane is erythematous  Nose: Mucosal edema and congestion present  Mouth/Throat:      Pharynx: Posterior oropharyngeal erythema (Severe oropharyngeal erythema with dry mucous membranes and Mallampati class 4 oropharynx) present  No oropharyngeal exudate  Eyes:      General: No scleral icterus  Right eye: No discharge  Left eye: No discharge  Conjunctiva/sclera: Conjunctivae normal       Pupils: Pupils are equal, round, and reactive to light  Neck:      Thyroid: No thyromegaly  Vascular: No JVD  Trachea: No tracheal deviation     Cardiovascular:      Rate and Rhythm: Normal rate and regular rhythm  Heart sounds: Normal heart sounds  No murmur heard  No friction rub  No gallop  Pulmonary:      Effort: Pulmonary effort is normal  No respiratory distress  Breath sounds: Normal breath sounds  No wheezing or rales  Chest:      Chest wall: No tenderness  Breasts:      Right: Axillary adenopathy present  Left: Axillary adenopathy ( axillary lymphadenopathy, multiple with a tender abscess that measures about 9 mm in diameter in the left axilla and another abscess on the right) present  Abdominal:      General: Bowel sounds are normal  There is no distension  Palpations: Abdomen is soft  There is no mass  Tenderness: There is no abdominal tenderness  There is no guarding or rebound  Musculoskeletal:         General: No tenderness or deformity  Normal range of motion  Cervical back: Normal range of motion and neck supple  Lymphadenopathy:      Head:      Right side of head: Submandibular adenopathy present  Left side of head: Submandibular adenopathy present  Cervical: Cervical adenopathy present  Right cervical: Superficial cervical adenopathy and deep cervical adenopathy present  Left cervical: Superficial cervical adenopathy and deep cervical adenopathy present  Upper Body:      Right upper body: Axillary adenopathy present  Left upper body: Axillary adenopathy ( axillary lymphadenopathy, multiple with a tender abscess that measures about 9 mm in diameter in the left axilla and another abscess on the right) present  Lower Body: Right inguinal adenopathy present  Skin:     General: Skin is warm and dry  Coloration: Skin is not pale  Findings: No erythema or rash  Neurological:      Mental Status: He is alert and oriented to person, place, and time  Cranial Nerves: No cranial nerve deficit  Motor: No abnormal muscle tone        Coordination: Coordination normal  Deep Tendon Reflexes: Reflexes are normal and symmetric  Psychiatric:         Behavior: Behavior normal            Lab on 06/17/2022   Component Date Value Ref Range Status    Free T4 06/17/2022 0 91  0 76 - 1 46 ng/dL Final    Specimen collection should occur prior to Sulfasalazine administration due to the potential for falsely elevated results   TSH 3RD GENERATON 06/17/2022 2 522  0 450 - 4 500 uIU/mL Final    Adult TSH (3rd generation) reference range follows the recommended guidelines of the American Thyroid Association, January, 2020   Hemoglobin A1C 06/17/2022 6 4 (A) Normal 3 8-5 6%; PreDiabetic 5 7-6 4%; Diabetic >=6 5%; Glycemic control for adults with diabetes <7 0% % Final    EAG 06/17/2022 137  mg/dl Final    Sodium 06/17/2022 141  135 - 147 mmol/L Final    Potassium 06/17/2022 4 1  3 5 - 5 3 mmol/L Final    Chloride 06/17/2022 105  96 - 108 mmol/L Final    CO2 06/17/2022 24  21 - 32 mmol/L Final    ANION GAP 06/17/2022 12  4 - 13 mmol/L Final    BUN 06/17/2022 17  5 - 25 mg/dL Final    Creatinine 06/17/2022 0 80  0 60 - 1 30 mg/dL Final    Standardized to IDMS reference method    Glucose, Fasting 06/17/2022 130 (A) 65 - 99 mg/dL Final    Specimen collection should occur prior to Sulfasalazine administration due to the potential for falsely depressed results  Specimen collection should occur prior to Sulfapyridine administration due to the potential for falsely elevated results   Calcium 06/17/2022 9 1  8 4 - 10 2 mg/dL Final    eGFR 06/17/2022 99  ml/min/1 73sq m Final    Creatinine, Ur 06/17/2022 58 2  mg/dL Final    Microalbum  ,U,Random 06/17/2022 7 1  0 0 - 20 0 mg/L Final    Microalb Creat Ratio 06/17/2022 12  0 - 30 mg/g creatinine Final    Zinc 06/17/2022 98  44 - 115 ug/dL Final                                    Detection Limit = 5    Vit D, 25-Hydroxy 06/17/2022 55 3  30 0 - 100 0 ng/mL Final    Vitamin B-12 06/17/2022 2,424 (A) 100 - 900 pg/mL Final    Vitamin B1, Whole Blood 06/17/2022 76 8  66 5 - 200 0 nmol/L Final    Vitamin A 06/17/2022 58 3  20 1 - 62 0 ug/dL Final    Reference intervals for vitamin A determined from [facility] internal  studies  Individuals with vitamin A less than 20 ug/dL are considered  vitamin A deficient and those with serum concentrations less than  10 ug/dL are considered severely deficient  This test was developed and its performance characteristics  determined by [facility]  It has not been cleared or approved  by the Food and Drug Administration   PTH 06/17/2022 37 7  18 4 - 80 1 pg/mL Final    WBC 06/17/2022 6 87  4 31 - 10 16 Thousand/uL Final    RBC 06/17/2022 4 52  3 88 - 5 62 Million/uL Final    Hemoglobin 06/17/2022 14 9  12 0 - 17 0 g/dL Final    Hematocrit 06/17/2022 43 7  36 5 - 49 3 % Final    MCV 06/17/2022 97  82 - 98 fL Final    MCH 06/17/2022 33 0  26 8 - 34 3 pg Final    MCHC 06/17/2022 34 1  31 4 - 37 4 g/dL Final    RDW 06/17/2022 12 3  11 6 - 15 1 % Final    Platelets 26/15/4236 223  149 - 390 Thousands/uL Final    MPV 06/17/2022 9 2  8 9 - 12 7 fL Final    Ferritin 06/17/2022 26  8 - 388 ng/mL Final    Folate 06/17/2022 19 8 (A) 3 1 - 17 5 ng/mL Final    Iron Saturation 06/17/2022 25  20 - 50 % Final    TIBC 06/17/2022 353  250 - 450 ug/dL Final    Iron 06/17/2022 90  65 - 175 ug/dL Final    Patients treated with metal-binding drugs (ie  Deferoxamine) may have depressed iron values      Cholesterol 06/17/2022 216 (A) See Comment mg/dL Final    Cholesterol:         Pediatric <18 Years        Desirable          <170 mg/dL      Borderline High    170-199 mg/dL      High               >=200 mg/dL        Adult >=18 Years            Desirable         <200 mg/dL      Borderline High   200-239 mg/dL      High              >239 mg/dL      Triglycerides 06/17/2022 95  See Comment mg/dL Final    Triglyceride:     0-9Y            <75mg/dL     10Y-17Y         <90 mg/dL       >=18Y     Normal          <150 mg/dL Borderline High 150-199 mg/dL     High            200-499 mg/dL        Very High       >499 mg/dL    Specimen collection should occur prior to N-Acetylcysteine or Metamizole administration due to the potential for falsely depressed results   HDL, Direct 06/17/2022 65  >=40 mg/dL Final    LDL Calculated 06/17/2022 132 (A) 0 - 100 mg/dL Final    LDL Cholesterol:     Optimal           <100 mg/dl     Near Optimal      100-129 mg/dl     Above Optimal       Borderline High 130-159 mg/dl       High            160-189 mg/dl       Very High       >189 mg/dl         This screening LDL is a calculated result  It does not have the accuracy of the Direct Measured LDL in the monitoring of patients with hyperlipidemia and/or statin therapy  Direct Measure LDL (LBD765) must be ordered separately in these patients   PSA 06/17/2022 2 2  0 0 - 4 0 ng/mL Final    American Urological Association Guidelines define biochemical recurrence of prostate cancer as a detectable or rising PSA value post-radical prostatectomy that is greater than or equal to 0 2 ng/mL with a second confirmatory level of greater than or equal to 0 2 ng/mL  Lab on 03/24/2022   Component Date Value Ref Range Status    Hemoglobin A1C 03/24/2022 6 4 (A) Normal 3 8-5 6%; PreDiabetic 5 7-6 4%; Diabetic >=6 5%; Glycemic control for adults with diabetes <7 0% % Final    EAG 03/24/2022 137  mg/dl Final    Sodium 03/24/2022 135 (A) 136 - 145 mmol/L Final    Potassium 03/24/2022 3 8  3 5 - 5 3 mmol/L Final    Slightly Hemolyzed; Results May be Affected&XA&Slightly Hemolyzed;  Results May be Affected    Chloride 03/24/2022 103  100 - 108 mmol/L Final    CO2 03/24/2022 29  21 - 32 mmol/L Final    ANION GAP 03/24/2022 3 (A) 4 - 13 mmol/L Final    BUN 03/24/2022 16  5 - 25 mg/dL Final    Creatinine 03/24/2022 1 01  0 60 - 1 30 mg/dL Final    Standardized to IDMS reference method    Glucose, Fasting 03/24/2022 112 (A) 65 - 99 mg/dL Final    Specimen collection should occur prior to Sulfasalazine administration due to the potential for falsely depressed results  Specimen collection should occur prior to Sulfapyridine administration due to the potential for falsely elevated results   Calcium 03/24/2022 8 8  8 3 - 10 1 mg/dL Final    eGFR 03/24/2022 83  ml/min/1 73sq m Final    Free T4 03/24/2022 0 97  0 76 - 1 46 ng/dL Final    Specimen collection should occur prior to Sulfasalazine administration due to the potential for falsely elevated results   TSH 3RD GENERATON 03/24/2022 7 010 (A) 0 358 - 3 740 uIU/mL Final   Hospital Outpatient Visit on 01/17/2022   Component Date Value Ref Range Status    POC Glucose 01/17/2022 104  65 - 140 mg/dl Final    Case Report 01/17/2022    Final                    Value:Surgical Pathology Report                         Case: [id number]                                   Authorizing Provider:  Nadeem Hawk MD            Collected:           01/17/2022 9495              Ordering Location:     [address] [place] [address]        Received:            01/17/2022 [address] [facility]                                                    Pathologist:           Eliu Anaya MD                                                                  Specimen:    Polyp, Colorectal, Cold snare polypectomy sigmoid                                          Final Diagnosis 01/17/2022    Final                    Value: This result contains rich text formatting which cannot be displayed here   Additional Information 01/17/2022    Final                    Value: This result contains rich text formatting which cannot be displayed here      Synoptic Checklist 01/17/2022    Final                    Value:                            COLON/RECTUM POLYP FORM - GI - All Specimens                                                                                     :    Adenoma(s)      Gross Description 01/17/2022    Final Value:This result contains rich text formatting which cannot be displayed here  Orders Only on 12/09/2021   Component Date Value Ref Range Status    Right Eye Diabetic Retinopathy 12/09/2021 None   Final    Left Eye Diabetic Retinopathy 12/09/2021 None   Final   Lab on 11/19/2021   Component Date Value Ref Range Status    Zinc 11/19/2021 92  44 - 115 ug/dL Final                                    Detection Limit = 5    Vit D, 25-Hydroxy 11/19/2021 55 3  30 0 - 100 0 ng/mL Final    Vitamin B-12 11/19/2021 3,085 (A) 100 - 900 pg/mL Final    Vitamin B1, Whole Blood 11/19/2021 98 3  66 5 - 200 0 nmol/L Final    Vitamin A 11/19/2021 54 1  20 1 - 62 0 ug/dL Final    Reference intervals for vitamin A determined from [facility] internal  studies  Individuals with vitamin A less than 20 ug/dL are considered  vitamin A deficient and those with serum concentrations less than  10 ug/dL are considered severely deficient  This test was developed and its performance characteristics  determined by [employer]  It has not been cleared or approved  by the Food and Drug Administration      WBC 11/19/2021 7 39  4 31 - 10 16 Thousand/uL Final    RBC 11/19/2021 4 52  3 88 - 5 62 Million/uL Final    Hemoglobin 11/19/2021 14 5  12 0 - 17 0 g/dL Final    Hematocrit 11/19/2021 43 2  36 5 - 49 3 % Final    MCV 11/19/2021 96  82 - 98 fL Final    MCH 11/19/2021 32 1  26 8 - 34 3 pg Final    MCHC 11/19/2021 33 6  31 4 - 37 4 g/dL Final    RDW 11/19/2021 12 8  11 6 - 15 1 % Final    Platelets 98/78/1444 257  149 - 390 Thousands/uL Final    MPV 11/19/2021 9 6  8 9 - 12 7 fL Final    Sodium 11/19/2021 141  136 - 145 mmol/L Final    Potassium 11/19/2021 4 1  3 5 - 5 3 mmol/L Final    Chloride 11/19/2021 104  100 - 108 mmol/L Final    CO2 11/19/2021 28  21 - 32 mmol/L Final    ANION GAP 11/19/2021 9  4 - 13 mmol/L Final    BUN 11/19/2021 17  5 - 25 mg/dL Final    Creatinine 11/19/2021 1 06  0 60 - 1 30 mg/dL Final    Standardized to IDMS reference method    Glucose, Fasting 11/19/2021 140 (A) 65 - 99 mg/dL Final    Specimen collection should occur prior to Sulfasalazine administration due to the potential for falsely depressed results  Specimen collection should occur prior to Sulfapyridine administration due to the potential for falsely elevated results   Calcium 11/19/2021 8 8  8 3 - 10 1 mg/dL Final    AST 11/19/2021 35  5 - 45 U/L Final    Specimen collection should occur prior to Sulfasalazine administration due to the potential for falsely depressed results   ALT 11/19/2021 31  12 - 78 U/L Final    Specimen collection should occur prior to Sulfasalazine administration due to the potential for falsely depressed results   Alkaline Phosphatase 11/19/2021 109  46 - 116 U/L Final    Total Protein 11/19/2021 7 2  6 4 - 8 2 g/dL Final    Albumin 11/19/2021 3 6  3 5 - 5 0 g/dL Final    Total Bilirubin 11/19/2021 0 50  0 20 - 1 00 mg/dL Final    Use of this assay is not recommended for patients undergoing treatment with eltrombopag due to the potential for falsely elevated results   eGFR 11/19/2021 79  ml/min/1 73sq m Final    Ferritin 11/19/2021 25  8 - 388 ng/mL Final    Iron Saturation 11/19/2021 27  20 - 50 % Final    TIBC 11/19/2021 357  250 - 450 ug/dL Final    Iron 11/19/2021 98  65 - 175 ug/dL Final    Patients treated with metal-binding drugs (ie  Deferoxamine) may have depressed iron values   PTH 11/19/2021 64 1  18 4 - 80 1 pg/mL Final    Hemoglobin A1C 11/19/2021 6 5 (A) Normal 3 8-5 6%; PreDiabetic 5 7-6 4%; Diabetic >=6 5%; Glycemic control for adults with diabetes <7 0% % Final    EAG 11/19/2021 140  mg/dl Final    Creatinine, Ur 11/19/2021 143 0  mg/dL Final    Microalbum  ,U,Random 11/19/2021 6 8  0 0 - 20 0 mg/L Final    Microalb Creat Ratio 11/19/2021 5  0 - 30 mg/g creatinine Final    Free T4 11/19/2021 0 88  0 76 - 1 46 ng/dL Final    Specimen collection should occur prior to Sulfasalazine administration due to the potential for falsely elevated results      TSH 3RD GENERATON 11/19/2021 4 080 (A) 0 358 - 3 740 uIU/mL Final   Orders Only on 09/30/2021   Component Date Value Ref Range Status    SARS-CoV-2 09/30/2021 Negative  Negative Final

## 2022-08-16 ENCOUNTER — APPOINTMENT (OUTPATIENT)
Dept: LAB | Facility: CLINIC | Age: 57
End: 2022-08-16
Payer: COMMERCIAL

## 2022-08-16 DIAGNOSIS — E10.65 TYPE 1 DIABETES MELLITUS WITH HYPERGLYCEMIA, WITH LONG-TERM CURRENT USE OF INSULIN (HCC): ICD-10-CM

## 2022-08-16 DIAGNOSIS — L02.419 AXILLARY ABSCESS: ICD-10-CM

## 2022-08-16 DIAGNOSIS — R59.0 AXILLARY ADENOPATHY: ICD-10-CM

## 2022-08-16 DIAGNOSIS — J06.9 URTI (ACUTE UPPER RESPIRATORY INFECTION): ICD-10-CM

## 2022-08-16 DIAGNOSIS — R59.1 LYMPHADENOPATHY OF HEAD AND NECK: ICD-10-CM

## 2022-08-16 LAB
ALBUMIN SERPL BCP-MCNC: 3.8 G/DL (ref 3.5–5)
ALP SERPL-CCNC: 204 U/L (ref 34–104)
ALT SERPL W P-5'-P-CCNC: 45 U/L (ref 7–52)
ANION GAP SERPL CALCULATED.3IONS-SCNC: 8 MMOL/L (ref 4–13)
AST SERPL W P-5'-P-CCNC: 55 U/L (ref 13–39)
BASOPHILS # BLD AUTO: 0.07 THOUSANDS/ΜL (ref 0–0.1)
BASOPHILS NFR BLD AUTO: 1 % (ref 0–1)
BILIRUB SERPL-MCNC: 0.49 MG/DL (ref 0.2–1)
BUN SERPL-MCNC: 17 MG/DL (ref 5–25)
CALCIUM SERPL-MCNC: 8.9 MG/DL (ref 8.4–10.2)
CHLORIDE SERPL-SCNC: 99 MMOL/L (ref 96–108)
CO2 SERPL-SCNC: 29 MMOL/L (ref 21–32)
CREAT SERPL-MCNC: 0.84 MG/DL (ref 0.6–1.3)
EOSINOPHIL # BLD AUTO: 0.1 THOUSAND/ΜL (ref 0–0.61)
EOSINOPHIL NFR BLD AUTO: 1 % (ref 0–6)
ERYTHROCYTE [DISTWIDTH] IN BLOOD BY AUTOMATED COUNT: 12.6 % (ref 11.6–15.1)
GFR SERPL CREATININE-BSD FRML MDRD: 97 ML/MIN/1.73SQ M
GLUCOSE P FAST SERPL-MCNC: 164 MG/DL (ref 65–99)
HCT VFR BLD AUTO: 43.5 % (ref 36.5–49.3)
HGB BLD-MCNC: 15 G/DL (ref 12–17)
IMM GRANULOCYTES # BLD AUTO: 0.04 THOUSAND/UL (ref 0–0.2)
IMM GRANULOCYTES NFR BLD AUTO: 1 % (ref 0–2)
LDH SERPL-CCNC: 245 U/L (ref 140–271)
LYMPHOCYTES # BLD AUTO: 3.17 THOUSANDS/ΜL (ref 0.6–4.47)
LYMPHOCYTES NFR BLD AUTO: 40 % (ref 14–44)
MCH RBC QN AUTO: 32.6 PG (ref 26.8–34.3)
MCHC RBC AUTO-ENTMCNC: 34.5 G/DL (ref 31.4–37.4)
MCV RBC AUTO: 95 FL (ref 82–98)
MONOCYTES # BLD AUTO: 0.95 THOUSAND/ΜL (ref 0.17–1.22)
MONOCYTES NFR BLD AUTO: 12 % (ref 4–12)
NEUTROPHILS # BLD AUTO: 3.53 THOUSANDS/ΜL (ref 1.85–7.62)
NEUTS SEG NFR BLD AUTO: 45 % (ref 43–75)
NRBC BLD AUTO-RTO: 0 /100 WBCS
PLATELET # BLD AUTO: 258 THOUSANDS/UL (ref 149–390)
PMV BLD AUTO: 8.7 FL (ref 8.9–12.7)
POTASSIUM SERPL-SCNC: 4.5 MMOL/L (ref 3.5–5.3)
PROT SERPL-MCNC: 7.4 G/DL (ref 6.4–8.4)
RBC # BLD AUTO: 4.6 MILLION/UL (ref 3.88–5.62)
SODIUM SERPL-SCNC: 136 MMOL/L (ref 135–147)
WBC # BLD AUTO: 7.86 THOUSAND/UL (ref 4.31–10.16)

## 2022-08-16 PROCEDURE — 85025 COMPLETE CBC W/AUTO DIFF WBC: CPT

## 2022-08-16 PROCEDURE — 83615 LACTATE (LD) (LDH) ENZYME: CPT

## 2022-08-16 PROCEDURE — 36415 COLL VENOUS BLD VENIPUNCTURE: CPT

## 2022-08-16 PROCEDURE — 80053 COMPREHEN METABOLIC PANEL: CPT

## 2022-08-18 ENCOUNTER — APPOINTMENT (OUTPATIENT)
Dept: RADIOLOGY | Facility: HOSPITAL | Age: 57
End: 2022-08-18
Payer: COMMERCIAL

## 2022-08-18 ENCOUNTER — HOSPITAL ENCOUNTER (EMERGENCY)
Facility: HOSPITAL | Age: 57
Discharge: HOME | End: 2022-08-18
Attending: EMERGENCY MEDICINE
Payer: COMMERCIAL

## 2022-08-18 ENCOUNTER — APPOINTMENT (EMERGENCY)
Dept: CT IMAGING | Facility: HOSPITAL | Age: 57
End: 2022-08-18
Payer: COMMERCIAL

## 2022-08-18 VITALS
DIASTOLIC BLOOD PRESSURE: 93 MMHG | RESPIRATION RATE: 14 BRPM | TEMPERATURE: 98.1 F | SYSTOLIC BLOOD PRESSURE: 166 MMHG | HEART RATE: 67 BPM | OXYGEN SATURATION: 95 %

## 2022-08-18 DIAGNOSIS — S22.32XA CLOSED FRACTURE OF ONE RIB OF LEFT SIDE, INITIAL ENCOUNTER: Primary | ICD-10-CM

## 2022-08-18 DIAGNOSIS — T14.8XXA HEMATOMA: ICD-10-CM

## 2022-08-18 LAB
ALBUMIN SERPL BCP-MCNC: 4.3 G/DL (ref 3.5–5)
ALP SERPL-CCNC: 187 U/L (ref 34–104)
ALT SERPL W P-5'-P-CCNC: 40 U/L (ref 7–52)
ANION GAP SERPL CALCULATED.3IONS-SCNC: 6 MMOL/L (ref 4–13)
AST SERPL W P-5'-P-CCNC: 49 U/L (ref 13–39)
BASE EX.OXY STD BLDV CALC-SCNC: 57.5 % (ref 60–80)
BASE EXCESS BLDV CALC-SCNC: 3.4 MMOL/L
BASOPHILS # BLD AUTO: 0.07 THOUSANDS/ΜL (ref 0–0.1)
BASOPHILS NFR BLD AUTO: 1 % (ref 0–1)
BILIRUB SERPL-MCNC: 0.5 MG/DL (ref 0.2–1)
BUN SERPL-MCNC: 16 MG/DL (ref 5–25)
CALCIUM SERPL-MCNC: 9.4 MG/DL (ref 8.4–10.2)
CARDIAC TROPONIN I PNL SERPL HS: 4 NG/L
CHLORIDE SERPL-SCNC: 98 MMOL/L (ref 96–108)
CO2 SERPL-SCNC: 31 MMOL/L (ref 21–32)
CREAT SERPL-MCNC: 0.88 MG/DL (ref 0.6–1.3)
EOSINOPHIL # BLD AUTO: 0 THOUSAND/ΜL (ref 0–0.61)
EOSINOPHIL NFR BLD AUTO: 0 % (ref 0–6)
ERYTHROCYTE [DISTWIDTH] IN BLOOD BY AUTOMATED COUNT: 12.3 % (ref 11.6–15.1)
GFR SERPL CREATININE-BSD FRML MDRD: 96 ML/MIN/1.73SQ M
GLUCOSE SERPL-MCNC: 175 MG/DL (ref 65–140)
GLUCOSE SERPL-MCNC: 189 MG/DL (ref 65–140)
HCO3 BLDV-SCNC: 29.6 MMOL/L (ref 24–30)
HCT VFR BLD AUTO: 44.9 % (ref 36.5–49.3)
HGB BLD-MCNC: 15.1 G/DL (ref 12–17)
IMM GRANULOCYTES # BLD AUTO: 0.06 THOUSAND/UL (ref 0–0.2)
IMM GRANULOCYTES NFR BLD AUTO: 1 % (ref 0–2)
LYMPHOCYTES # BLD AUTO: 2.46 THOUSANDS/ΜL (ref 0.6–4.47)
LYMPHOCYTES NFR BLD AUTO: 24 % (ref 14–44)
MAGNESIUM SERPL-MCNC: 1.9 MG/DL (ref 1.9–2.7)
MCH RBC QN AUTO: 31.9 PG (ref 26.8–34.3)
MCHC RBC AUTO-ENTMCNC: 33.6 G/DL (ref 31.4–37.4)
MCV RBC AUTO: 95 FL (ref 82–98)
MONOCYTES # BLD AUTO: 0.84 THOUSAND/ΜL (ref 0.17–1.22)
MONOCYTES NFR BLD AUTO: 8 % (ref 4–12)
NEUTROPHILS # BLD AUTO: 6.86 THOUSANDS/ΜL (ref 1.85–7.62)
NEUTS SEG NFR BLD AUTO: 66 % (ref 43–75)
NRBC BLD AUTO-RTO: 0 /100 WBCS
O2 CT BLDV-SCNC: 12.5 ML/DL
PCO2 BLDV: 50.8 MM HG (ref 42–50)
PH BLDV: 7.38 [PH] (ref 7.3–7.4)
PLATELET # BLD AUTO: 276 THOUSANDS/UL (ref 149–390)
PMV BLD AUTO: 8.7 FL (ref 8.9–12.7)
PO2 BLDV: 30.3 MM HG (ref 35–45)
POTASSIUM SERPL-SCNC: 4.5 MMOL/L (ref 3.5–5.3)
PROT SERPL-MCNC: 8.2 G/DL (ref 6.4–8.4)
RBC # BLD AUTO: 4.73 MILLION/UL (ref 3.88–5.62)
SODIUM SERPL-SCNC: 135 MMOL/L (ref 135–147)
WBC # BLD AUTO: 10.29 THOUSAND/UL (ref 4.31–10.16)

## 2022-08-18 PROCEDURE — 36415 COLL VENOUS BLD VENIPUNCTURE: CPT | Performed by: EMERGENCY MEDICINE

## 2022-08-18 PROCEDURE — 99285 EMERGENCY DEPT VISIT HI MDM: CPT | Performed by: EMERGENCY MEDICINE

## 2022-08-18 PROCEDURE — 85025 COMPLETE CBC W/AUTO DIFF WBC: CPT | Performed by: EMERGENCY MEDICINE

## 2022-08-18 PROCEDURE — 93005 ELECTROCARDIOGRAM TRACING: CPT

## 2022-08-18 PROCEDURE — 99284 EMERGENCY DEPT VISIT MOD MDM: CPT

## 2022-08-18 PROCEDURE — 71250 CT THORAX DX C-: CPT

## 2022-08-18 PROCEDURE — 82948 REAGENT STRIP/BLOOD GLUCOSE: CPT

## 2022-08-18 PROCEDURE — 84484 ASSAY OF TROPONIN QUANT: CPT | Performed by: EMERGENCY MEDICINE

## 2022-08-18 PROCEDURE — 82805 BLOOD GASES W/O2 SATURATION: CPT | Performed by: EMERGENCY MEDICINE

## 2022-08-18 PROCEDURE — 71101 X-RAY EXAM UNILAT RIBS/CHEST: CPT

## 2022-08-18 PROCEDURE — 83735 ASSAY OF MAGNESIUM: CPT | Performed by: EMERGENCY MEDICINE

## 2022-08-18 PROCEDURE — 80053 COMPREHEN METABOLIC PANEL: CPT | Performed by: EMERGENCY MEDICINE

## 2022-08-18 PROCEDURE — G1004 CDSM NDSC: HCPCS

## 2022-08-18 RX ORDER — ACETAMINOPHEN 325 MG/1
975 TABLET ORAL ONCE
Status: COMPLETED | OUTPATIENT
Start: 2022-08-18 | End: 2022-08-18

## 2022-08-18 RX ORDER — OXYCODONE HYDROCHLORIDE 5 MG/1
5 TABLET ORAL EVERY 6 HOURS PRN
Qty: 9 TABLET | Refills: 0 | Status: SHIPPED | OUTPATIENT
Start: 2022-08-18 | End: 2022-08-21

## 2022-08-18 RX ORDER — LIDOCAINE 50 MG/G
1 PATCH TOPICAL DAILY
Qty: 6 PATCH | Refills: 0 | Status: SHIPPED | OUTPATIENT
Start: 2022-08-18 | End: 2022-08-29

## 2022-08-18 RX ORDER — LIDOCAINE 50 MG/G
1 PATCH TOPICAL ONCE
Status: DISCONTINUED | OUTPATIENT
Start: 2022-08-18 | End: 2022-08-18 | Stop reason: HOSPADM

## 2022-08-18 RX ADMIN — LIDOCAINE 5% 1 PATCH: 700 PATCH TOPICAL at 10:19

## 2022-08-18 RX ADMIN — ACETAMINOPHEN 975 MG: 325 TABLET ORAL at 12:04

## 2022-08-18 NOTE — DISCHARGE INSTRUCTIONS
Please follow-up with family doctor and trauma  Return to ER for worsening pain, difficulty breathing, fevers or feeling worse overall  Please use Tylenol for pain  If you need anything stronger you have prescription of oxycodone at pharmacy  Please make sure to use the incentive spirometer hourly while awake

## 2022-08-20 LAB
ATRIAL RATE: 65 BPM
P AXIS: 70 DEGREES
PR INTERVAL: 174 MS
QRS AXIS: 36 DEGREES
QRSD INTERVAL: 84 MS
QT INTERVAL: 412 MS
QTC INTERVAL: 428 MS
T WAVE AXIS: 60 DEGREES
VENTRICULAR RATE: 65 BPM

## 2022-08-20 PROCEDURE — 93010 ELECTROCARDIOGRAM REPORT: CPT | Performed by: INTERNAL MEDICINE

## 2022-08-20 NOTE — ED PROVIDER NOTES
History  Chief Complaint   Patient presents with    Rib Injury     Pt reports having a diabetic episode last night and fell  Pt has extreme left posterior rib pain  History provided by:  Patient   used: No      Patient is a 61-year-old male presenting to emergency department with left-sided rib injury  States he had episode of hypoglycemia, syncopal episode, fell  Hit chest on toilet  Denies shortness of breath  No nausea vomiting  Not lightheaded or dizzy currently  States wife gave him glucose tablets and his sugar is now high  Denies head injury  Not on blood thinners  No headache or neck pain  No weakness numbness or tingling  MDM will check EKG, with elevated blood sugar will eval for DKA, chest x-ray    Will get CT to evaluate for fracture    Fracture noted  Discussed with Trauma, Dr Tasia Simpson, states as long as can take deep breath patient can follow-up as outpatient    Discussed with patient  He is able to take deep breath  Will follow-up as outpatient  Prior to Admission Medications   Prescriptions Last Dose Informant Patient Reported? Taking?    Biotin 35815 MCG TABS  Self Yes No   Sig: Take 10,000 mcg by mouth in the morning   Blood Glucose Monitoring Suppl (ONE TOUCH ULTRA 2) w/Device KIT  Self Yes No   Sig: Use     Calcium Citrate (JORDY-CITRATE PO)  Self Yes No   Sig: Take 1,000 tablets by mouth 2 (two) times a day   Continuous Blood Gluc  (DEXCOM G6 ) KIYA  Self No No   Sig: USE AS DIRECTED   Continuous Blood Gluc Sensor (Dexcom G6 Sensor) MISC  Self No No   Sig: Use 1 Device if needed (change Q 10 days) Use 1 sensor every 10 days   Continuous Blood Gluc Transmit (Dexcom G6 Transmitter) MISC  Self No No   Sig: Inject 1 Device under the skin every 3 (three) months   Glucagon (BAQSIMI TWO PACK) 3 MG/DOSE POWD  Self No No   Sig: Use one dose as needed in case of severe hypoglycemia ( nasal spray)   Glucagon (Baqsimi One Pack) 3 MG/DOSE POWD Self No No   Sig: Use one dose in one nostril, 3 mg in case of severe hypoglycemia and unconsciousness   Insulin Pen Needle (BD PEN NEEDLE LYRIC U/F) 32G X 4 MM MISC  Self No No   Sig: by Does not apply route daily Use once daily   Multiple Vitamins-Minerals (Bariatric Fusion) CHEW  Self Yes No   Sig: Chew     Probiotic Product (PROBIOTIC DAILY PO)  Self Yes No   Sig: Take 1 tablet by mouth daily    Synthroid 175 MCG tablet  Self No No   Sig: Take 1 tablet Monday through Saturday and 1 5 tablets on    TURMERIC PO  Self Yes No   Sig: Take 1,400 capsules by mouth 2 (two) times a day   acetone, urine, test (Ketostix) strip  Self No No   Sig: Check ketones if blood sugars > 250, more than twice or in case of nausea or vomiting and abdominal pains   amoxicillin-clavulanate (AUGMENTIN) 875-125 mg per tablet   No No   Sig: Take 1 tablet by mouth every 12 (twelve) hours for 7 days   esomeprazole (NexIUM) 20 mg capsule  Self Yes No   Sig: Take 20 mg by mouth daily in the early morning   fluticasone (FLONASE) 50 mcg/act nasal spray  Self Yes No   Si spray into each nostril daily   gabapentin (NEURONTIN) 300 mg capsule  Self No No   Sig: Take 1 capsule (300 mg total) by mouth daily at bedtime   insulin aspart (NovoLOG) 100 units/mL injection  Self No No   Sig: use UP to 70 units PER DAY VIA INSULIN PUMP   insulin glargine (Basaglar KwikPen) 100 units/mL injection pen   No No   Sig: Inject 32 units in case of pump failure ( incase of emergency)   loratadine (CLARITIN) 10 mg tablet  Self No No   Sig: Take 1 tablet (10 mg total) by mouth daily   psyllium (METAMUCIL) 0 52 g capsule  Self Yes No   Sig: Take 0 52 g by mouth 3 (three) times a day Fiber capsules   rosuvastatin (CRESTOR) 5 mg tablet  Self No No   Sig: Take 1 tablet (5 mg total) by mouth daily   vitamin B-12 (VITAMIN B-12) 1,000 mcg tablet  Self Yes No   Sig: Take 1,000 mcg by mouth 3 (three) times a week   zolpidem (AMBIEN) 10 mg tablet  Self No No   Sig: Take 1 tablet (10 mg total) by mouth daily at bedtime as needed for sleep      Facility-Administered Medications: None       Past Medical History:   Diagnosis Date    Anemia     Colon polyp     Diabetes mellitus ([address] )     Disease of thyroid gland     GERD (gastroesophageal reflux disease)     Hyperlipidemia     Hypertension     Insomnia     Kidney stone     Sleep apnea     Mild sleep apnea    Type 1 diabetes mellitus (HCC)     Uses Insulin Pump    Vitamin D deficiency        Past Surgical History:   Procedure Laterality Date    BARIATRIC SURGERY      CARPAL TUNNEL RELEASE Right     COLONOSCOPY      EGD      FL RETROGRADE PYELOGRAM  4/24/2020    LASIK      NH CYSTO/URETERO W/LITHOTRIPSY &INDWELL STENT INSRT Right 4/24/2020    Procedure: CYSTOSCOPY URETEROSCOPY WITH LITHOTRIPSY HOLMIUM LASER, RETROGRADE PYELOGRAM AND INSERTION STENT URETERAL;  Surgeon: Emily Murphy MD;  [facility];  Service: Urology    NH LAP GASTRIC BYPASS/GABBY-EN-Y N/A 4/26/2021    Procedure: BYPASS GASTRIC GABBY-EN-Y LAPAROSCOPIC W ROBOTICS AND INTRAOPERATIVE EGD;  Surgeon: Hans Thompson MD;  Location: [facility];  Service: Bariatrics    ROTATOR CUFF REPAIR Right     UPPER GASTROINTESTINAL ENDOSCOPY         Family History   Problem Relation Age of Onset    Thyroid disease unspecified Mother     Osteoporosis Mother     Hypertension Father     Colon cancer Father 76    Diabetes type II Sister     Hypertension Sister     Hyperlipidemia Sister     Thyroid disease unspecified Sister     Diabetes type II Brother     Hypertension Brother     Hyperlipidemia Brother     Colon cancer Brother 61     I have reviewed and agree with the history as documented      E-Cigarette/Vaping    E-Cigarette Use Never User      E-Cigarette/Vaping Substances    Nicotine No     THC No     CBD No     Flavoring No     Other No     Unknown No      Social History     Tobacco Use    Smoking status: Former Smoker     Years: 30  00     Types: Cigarettes     Quit date:      Years since quittin 6    Smokeless tobacco: Never Used    Tobacco comment: quit in    Vaping Use    Vaping Use: Never used   Substance Use Topics    Alcohol use: Yes     Alcohol/week: 2 0 standard drinks     Types: 2 Glasses of wine per week     Comment: social    Drug use: No       Review of Systems   Constitutional: Negative for chills, diaphoresis and fever  HENT: Negative for congestion and sore throat  Respiratory: Negative for cough, shortness of breath, wheezing and stridor  Cardiovascular: Negative for chest pain, palpitations and leg swelling  Gastrointestinal: Negative for abdominal pain, blood in stool, diarrhea, nausea and vomiting  Genitourinary: Negative for dysuria, frequency and urgency  Musculoskeletal: Negative for neck pain and neck stiffness  Skin: Negative for pallor and rash  Neurological: Positive for syncope  Negative for dizziness, weakness, light-headedness and headaches  All other systems reviewed and are negative  Physical Exam  Physical Exam  Vitals reviewed  Constitutional:       Appearance: Normal appearance  He is well-developed  HENT:      Head: Normocephalic and atraumatic  Eyes:      Extraocular Movements: Extraocular movements intact  Pupils: Pupils are equal, round, and reactive to light  Cardiovascular:      Rate and Rhythm: Normal rate and regular rhythm  Heart sounds: Normal heart sounds  Pulmonary:      Effort: Pulmonary effort is normal  No respiratory distress  Breath sounds: Normal breath sounds  Abdominal:      General: Bowel sounds are normal       Palpations: Abdomen is soft  Tenderness: There is no abdominal tenderness  Musculoskeletal:         General: No swelling or tenderness  Normal range of motion  Cervical back: Normal range of motion and neck supple        Comments: Tenderness over left lower chest over the ribcage   Skin:     General: Skin is warm and dry  Capillary Refill: Capillary refill takes less than 2 seconds  Neurological:      General: No focal deficit present  Mental Status: He is alert and oriented to person, place, and time           Vital Signs  ED Triage Vitals   Temperature Pulse Respirations Blood Pressure SpO2   08/18/22 0951 08/18/22 0951 08/18/22 0951 08/18/22 0951 08/18/22 0951   98 1 °F (36 7 °C) 67 14 166/93 95 %      Temp Source Heart Rate Source Patient Position - Orthostatic VS BP Location FiO2 (%)   08/18/22 0951 -- 08/18/22 0951 08/18/22 0951 --   Oral  Sitting Right arm       Pain Score       08/18/22 1204       4           Vitals:    08/18/22 0951   BP: 166/93   Pulse: 67   Patient Position - Orthostatic VS: Sitting         Visual Acuity      ED Medications  Medications   acetaminophen (TYLENOL) tablet 975 mg (975 mg Oral Given 8/18/22 1204)       Diagnostic Studies  Results Reviewed     Procedure Component Value Units Date/Time    Blood gas, venous [[id number]]  (Abnormal) Collected: 08/18/22 1100    Lab Status: Final result Specimen: Blood from Arm, Right Updated: 08/18/22 1124     pH, Lázaro 7 384     pCO2, Lázaro 50 8 mm Hg      pO2, Lázaro 30 3 mm Hg      HCO3, Lázaro 29 6 mmol/L      Base Excess, Lázaro 3 4 mmol/L      O2 Content, Lázaro 12 5 ml/dL      O2 HGB, VENOUS 57 5 %     Comprehensive metabolic panel [[id number]]  (Abnormal) Collected: 08/18/22 1011    Lab Status: Final result Specimen: Blood from Arm, Right Updated: 08/18/22 1051     Sodium 135 mmol/L      Potassium 4 5 mmol/L      Chloride 98 mmol/L      CO2 31 mmol/L      ANION GAP 6 mmol/L      BUN 16 mg/dL      Creatinine 0 88 mg/dL      Glucose 175 mg/dL      Calcium 9 4 mg/dL      AST 49 U/L      ALT 40 U/L      Alkaline Phosphatase 187 U/L      Total Protein 8 2 g/dL      Albumin 4 3 g/dL      Total Bilirubin 0 50 mg/dL      eGFR 96 ml/min/1 73sq m     Narrative:      [place] guidelines for Chronic Kidney Disease (CKD):    Stage 1 with normal or high GFR (GFR > 90 mL/min/1 73 square meters)    Stage 2 Mild CKD (GFR = 60-89 mL/min/1 73 square meters)    Stage 3A Moderate CKD (GFR = 45-59 mL/min/1 73 square meters)    Stage 3B Moderate CKD (GFR = 30-44 mL/min/1 73 square meters)    Stage 4 Severe CKD (GFR = 15-29 mL/min/1 73 square meters)    Stage 5 End Stage CKD (GFR <15 mL/min/1 73 square meters)  Note: GFR calculation is accurate only with a steady state creatinine    Magnesium [[id number]]  (Normal) Collected: 08/18/22 1011    Lab Status: Final result Specimen: Blood from Arm, Right Updated: 08/18/22 1051     Magnesium 1 9 mg/dL     HS Troponin 0hr (reflex protocol) [[id number]]  (Normal) Collected: 08/18/22 1011    Lab Status: Final result Specimen: Blood from Arm, Right Updated: 08/18/22 1051     hs TnI 0hr 4 ng/L     CBC and differential [[id number]]  (Abnormal) Collected: 08/18/22 1011    Lab Status: Final result Specimen: Blood from Arm, Right Updated: 08/18/22 1030     WBC 10 29 Thousand/uL      RBC 4 73 Million/uL      Hemoglobin 15 1 g/dL      Hematocrit 44 9 %      MCV 95 fL      MCH 31 9 pg      MCHC 33 6 g/dL      RDW 12 3 %      MPV 8 7 fL      Platelets 657 Thousands/uL      nRBC 0 /100 WBCs      Neutrophils Relative 66 %      Immat GRANS % 1 %      Lymphocytes Relative 24 %      Monocytes Relative 8 %      Eosinophils Relative 0 %      Basophils Relative 1 %      Neutrophils Absolute 6 86 Thousands/µL      Immature Grans Absolute 0 06 Thousand/uL      Lymphocytes Absolute 2 46 Thousands/µL      Monocytes Absolute 0 84 Thousand/µL      Eosinophils Absolute 0 00 Thousand/µL      Basophils Absolute 0 07 Thousands/µL     Fingerstick Glucose (POCT) [[id number]]  (Abnormal) Collected: 08/18/22 0959    Lab Status: Final result Updated: 08/18/22 1007     POC Glucose 189 mg/dl                  CT chest without contrast   Final Result by Rosana Balbuena MD (08/18 1203)      Left 10th rib fractured in 2 places; posteriorly the fracture is mildly comminuted, minimally displaced, and associated with a small extrapleural hematoma  Laterally the fracture is nondisplaced  No hemothorax or pneumothorax  The study was marked in [facility] for immediate notification  Workstation performed: [id number]         XR ribs with pa chest min 3 views LEFT   Final Result by Michelle Don MD (08/18 1157)      Subsequent chest CT shows mildly comminuted mildly displaced left posterior 10th rib fracture and small extrapleural hematoma, not visible on radiograph  No acute cardiopulmonary disease  Workstation performed: [id number]                    Procedures  Procedures         ED Course  ED Course as of 08/20/22 1455   Thu Aug 18, 2022   1019 ECG shows rate of 65, sinus, normal axis, normal QRS, significant ST or T-wave changes, normal intervals, independently interpreted by me                                             MDM    Disposition  Final diagnoses:   Closed fracture of one rib of left side, initial encounter   Hematoma - extrapleural hematoma     Time reflects when diagnosis was documented in both MDM as applicable and the Disposition within this note     Time User Action Codes Description Comment    8/18/2022 12:17 PM Josephine Holden Add [S22 32XA] Closed fracture of one rib of left side, initial encounter     8/18/2022 12:19 PM Josephine Holden Add [T14  8XXA] Hematoma     8/18/2022 12:19 PM Josephine Holden Modify [T14  8XXA] Hematoma extrapleural hematoma      ED Disposition     ED Disposition   Discharge    Condition   Stable    Date/Time   u Aug 18, 2022 12:17 PM    Comment   [facility] discharge to home/self care                 Follow-up Information     Follow up With Specialties Details Why Contact Info Additional [id number] [address], [address] Pacheco, Nurse Practitioner In 3 days Re-evaluation [address] [place] [address]  [phone]       [facility] Emergency Department Emergency Medicine  As needed, If symptoms worsen [address] [facility] [address] Emergency Department, [address], [facility], [address] [facility] [address] Trauma Surgery Schedule an appointment as soon as possible for a visit  Rib fracture [address] [facility] [address] [facility] [address]  [address] [place] [address], [address], [address] [facility] [place] [place]          Discharge Medication List as of 8/18/2022 12:23 PM      START taking these medications    Details   lidocaine (Lidoderm) 5 % Apply 1 patch topically daily for 6 days Remove & Discard patch within 12 hours or as directed by MD, Starting Thu 8/18/2022, Until Wed 8/24/2022, Normal      oxyCODONE (Roxicodone) 5 immediate release tablet Take 1 tablet (5 mg total) by mouth every 6 (six) hours as needed for moderate pain for up to 3 days Max Daily Amount: 20 mg, Starting Thu 8/18/2022, Until Sun 8/21/2022 at 2359, Normal         CONTINUE these medications which have NOT CHANGED    Details   acetone, urine, test (Ketostix) strip Check ketones if blood sugars > 250, more than twice or in case of nausea or vomiting and abdominal pains, Normal      amoxicillin-clavulanate (AUGMENTIN) 875-125 mg per tablet Take 1 tablet by mouth every 12 (twelve) hours for 7 days, Starting Mon 8/15/2022, Until Mon 8/22/2022, Normal      Biotin 91409 MCG TABS Take 10,000 mcg by mouth in the morning, Historical Med      Blood Glucose Monitoring Suppl (ONE TOUCH ULTRA 2) w/Device KIT Use  , Starting Fri 12/23/2016, Historical Med      Calcium Citrate (JORDY-CITRATE PO) Take 1,000 tablets by mouth 2 (two) times a day, Historical Med      Continuous Blood Gluc  (DEXCOM G6 ) KIYA USE AS DIRECTED, Normal      Continuous Blood Gluc Sensor (Dexcom G6 Sensor) MISC Use 1 Device if needed (change Q 10 days) Use 1 sensor every 10 days, Starting Thu 3/24/2022, Normal      Continuous Blood Gluc Transmit (Dexcom G6 Transmitter) MISC Inject 1 Device under the skin every 3 (three) months, Starting Thu 3/24/2022, Normal      esomeprazole (NexIUM) 20 mg capsule Take 20 mg by mouth daily in the early morning, Historical Med      fluticasone (FLONASE) 50 mcg/act nasal spray 1 spray into each nostril daily, Historical Med      gabapentin (NEURONTIN) 300 mg capsule Take 1 capsule (300 mg total) by mouth daily at bedtime, Starting Thu 7/7/2022, Normal      !! Glucagon (Baqsimi One Pack) 3 MG/DOSE POWD Use one dose in one nostril, 3 mg in case of severe hypoglycemia and unconsciousness, Normal      !! Glucagon (BAQSIMI TWO PACK) 3 MG/DOSE POWD Use one dose as needed in case of severe hypoglycemia ( nasal spray), Normal      insulin aspart (NovoLOG) 100 units/mL injection use UP to 70 units PER DAY VIA INSULIN PUMP, Normal      insulin glargine (Basaglar KwikPen) 100 units/mL injection pen Inject 32 units in case of pump failure ( incase of emergency), Normal      Insulin Pen Needle (BD PEN NEEDLE LYRIC U/F) 32G X 4 MM MISC by Does not apply route daily Use once daily, Starting Mon 9/23/2019, Normal      loratadine (CLARITIN) 10 mg tablet Take 1 tablet (10 mg total) by mouth daily, Starting Mon 4/19/2021, Normal      Multiple Vitamins-Minerals (Bariatric Fusion) CHEW Chew  , Historical Med      Probiotic Product (PROBIOTIC DAILY PO) Take 1 tablet by mouth daily , Historical Med      psyllium (METAMUCIL) 0 52 g capsule Take 0 52 g by mouth 3 (three) times a day Fiber capsules, Historical Med      rosuvastatin (CRESTOR) 5 mg tablet Take 1 tablet (5 mg total) by mouth daily, Starting Thu 7/7/2022, Normal      Synthroid 175 MCG tablet Take 1 tablet Monday through Saturday and 1 5 tablets on Sunday, Normal      TURMERIC PO Take 1,400 capsules by mouth 2 (two) times a day, Historical Med      vitamin B-12 (VITAMIN B-12) 1,000 mcg tablet Take 1,000 mcg by mouth 3 (three) times a week, Historical Med      zolpidem (AMBIEN) 10 mg tablet Take 1 tablet (10 mg total) by mouth daily at bedtime as needed for sleep, Starting Mon 8/8/2022, Normal       !! - Potential duplicate medications found  Please discuss with provider  No discharge procedures on file      PDMP Review       Value Time User    PDMP Reviewed  Yes 7/19/2021  9:01 AM ANN Wei          ED Provider  Electronically Signed by           Caleb Jacobson MD  08/20/22 [phone]

## 2022-08-22 ENCOUNTER — RA CDI HCC (OUTPATIENT)
Dept: OTHER | Facility: HOSPITAL | Age: 57
End: 2022-08-22

## 2022-08-22 NOTE — PROGRESS NOTES
[facility]  coding opportunities          Chart Reviewed number of suggestions sent to Provider: 1   [phone]    Patients Insurance        Commercial Insurance: [address] [place] [address]

## 2022-08-29 ENCOUNTER — OFFICE VISIT (OUTPATIENT)
Dept: INTERNAL MEDICINE CLINIC | Facility: CLINIC | Age: 57
End: 2022-08-29
Payer: COMMERCIAL

## 2022-08-29 VITALS
WEIGHT: 206.2 LBS | OXYGEN SATURATION: 97 % | SYSTOLIC BLOOD PRESSURE: 152 MMHG | TEMPERATURE: 98.3 F | DIASTOLIC BLOOD PRESSURE: 94 MMHG | HEART RATE: 71 BPM | HEIGHT: 69 IN | BODY MASS INDEX: 30.54 KG/M2

## 2022-08-29 DIAGNOSIS — D72.829 LEUKOCYTOSIS, UNSPECIFIED TYPE: Primary | ICD-10-CM

## 2022-08-29 DIAGNOSIS — S22.32XD CLOSED FRACTURE OF ONE RIB OF LEFT SIDE WITH ROUTINE HEALING: ICD-10-CM

## 2022-08-29 DIAGNOSIS — J06.9 URTI (ACUTE UPPER RESPIRATORY INFECTION): ICD-10-CM

## 2022-08-29 DIAGNOSIS — E10.65 TYPE 1 DIABETES MELLITUS WITH HYPERGLYCEMIA, WITH LONG-TERM CURRENT USE OF INSULIN (HCC): ICD-10-CM

## 2022-08-29 PROCEDURE — 87186 SC STD MICRODIL/AGAR DIL: CPT | Performed by: NURSE PRACTITIONER

## 2022-08-29 PROCEDURE — 99214 OFFICE O/P EST MOD 30 MIN: CPT | Performed by: NURSE PRACTITIONER

## 2022-08-29 PROCEDURE — 3080F DIAST BP >= 90 MM HG: CPT | Performed by: NURSE PRACTITIONER

## 2022-08-29 PROCEDURE — 3077F SYST BP >= 140 MM HG: CPT | Performed by: NURSE PRACTITIONER

## 2022-08-29 PROCEDURE — 87070 CULTURE OTHR SPECIMN AEROBIC: CPT | Performed by: NURSE PRACTITIONER

## 2022-08-29 RX ORDER — SULFAMETHOXAZOLE AND TRIMETHOPRIM 800; 160 MG/1; MG/1
1 TABLET ORAL EVERY 12 HOURS SCHEDULED
Qty: 14 TABLET | Refills: 0 | Status: SHIPPED | OUTPATIENT
Start: 2022-08-29 | End: 2022-09-05

## 2022-08-29 NOTE — ASSESSMENT & PLAN NOTE
Will follow up with CBC, CMP in one week after complete bactrim for abscess of left nares  Continue with warm compresses, will send culture of nare  Start Bactrim  Call if no relief of symptoms or worsening of symptoms

## 2022-08-29 NOTE — ASSESSMENT & PLAN NOTE
Lab Results   Component Value Date    HGBA1C 6 4 (H) 06/17/2022   Follows endocrine  Advised to notify of hypoglycemic event

## 2022-08-29 NOTE — ASSESSMENT & PLAN NOTE
Continue with oxycodone and Lidoderm patch for discomfort  May use extra-strength Tylenol for discomfort  May use heat or ice for discomfort  Continue incentive spirometer use  Lungs clear on exam today

## 2022-08-29 NOTE — PROGRESS NOTES
Assessment/Plan:    URTI (acute upper respiratory infection)  Resolved  Leukocytosis (leucocytosis)  Will follow up with CBC, CMP in one week after complete bactrim for abscess of left nares  Continue with warm compresses, will send culture of nare  Start Bactrim  Call if no relief of symptoms or worsening of symptoms  Closed fracture of one rib of left side with routine healing  Continue with oxycodone and Lidoderm patch for discomfort  May use extra-strength Tylenol for discomfort  May use heat or ice for discomfort  Continue incentive spirometer use  Lungs clear on exam today  Type 1 diabetes mellitus with hyperglycemia, with long-term current use of insulin (HCC)    Lab Results   Component Value Date    HGBA1C 6 4 (H) 06/17/2022   Follows endocrine  Advised to notify of hypoglycemic event  Diagnoses and all orders for this visit:    Leukocytosis, unspecified type  -     sulfamethoxazole-trimethoprim (BACTRIM DS) 800-160 mg per tablet; Take 1 tablet by mouth every 12 (twelve) hours for 7 days  -     CBC and differential; Future  -     Comprehensive metabolic panel; Future    URTI (acute upper respiratory infection)    Closed fracture of one rib of left side with routine healing    Type 1 diabetes mellitus with hyperglycemia, with long-term current use of insulin (HCC)          Subjective:      Patient ID: Fidel Doan is a 65 y o  male  Patient presents today for a one week follow up on URI and to review blood work  He reports URI has resolved  HPI as per Dr Jovita Austin at appointment on 8/15:  "Pt presents with complains that he has been having pressure in his ears for 3-4 weeks and with associated elevated blood sugar      He has dm 1 and his last hemoglobin a 1 c was 6 4 within the last three months  He also noticed swelling of the glands in his axillae 3 days ago  He has seasonal allergies and takes allergy medications and has been taking more sudafed cos of the ear pressure  He admits to more frequent headaches, nasal congestion, postnasal drip, rhinorrhea, sore throat, constipation, nausea and myalgias  Constipation, nausea and rhinorrhea are chronic and unchanged  He denies fever, chills, night sweats, dizziness, chest pain, cough, shortness of breath, palpitations, diarrhea  He admits to family history of Colon ca - brother and father  Prostate ca - father  Lung ca - brother  Smoked for about 30 years and quit 7 years ago and smoked one pack a day"    Patient was ordered blood work and advised to start on Augmentin      Patient was also seen in the ED on 08/18 for hypoglycemia, he reports a syncopal episode hitting his chest on the toilet  Patient noted to have rib fractures on the left side  Results as stated below  Left 10th rib fractured in 2 places; posteriorly the fracture is mildly comminuted, minimally displaced, and associated with a small extrapleural hematoma  Laterally the fracture is nondisplaced      No hemothorax or pneumothorax  Patient was sent home with lidoderm patch and oxycodone for pain control  He reports that his pain is currently well controlled  Patient has been using incentive spirometer without difficulty  Patient also reports infection to left nare, he reports redness, painful to touch and swelling, has also noted some drainage  Has been using warm compresses  The following portions of the patient's history were reviewed and updated as appropriate: allergies, current medications, past family history, past medical history, past social history, past surgical history and problem list     Review of Systems   Constitutional: Negative for activity change, appetite change, chills, diaphoresis and fever  HENT: Negative for congestion, ear discharge, ear pain, postnasal drip, rhinorrhea, sinus pressure, sinus pain and sore throat  Eyes: Negative for pain, discharge, itching and visual disturbance     Respiratory: Negative for cough, chest tightness, shortness of breath and wheezing  Cardiovascular: Negative for chest pain, palpitations and leg swelling  Gastrointestinal: Negative for abdominal pain, constipation, diarrhea, nausea and vomiting  Endocrine: Negative for polydipsia, polyphagia and polyuria  Genitourinary: Negative for difficulty urinating, dysuria and urgency  Musculoskeletal: Negative for arthralgias, back pain and neck pain  Skin: Negative for rash and wound  Neurological: Negative for dizziness, weakness, numbness and headaches           Past Medical History:   Diagnosis Date    Anemia     Colon polyp     Diabetes mellitus ([facility] [address] )     Disease of thyroid gland     GERD (gastroesophageal reflux disease)     Hyperlipidemia     Hypertension     Insomnia     Kidney stone     Sleep apnea     Mild sleep apnea    Type 1 diabetes mellitus (HCC)     Uses Insulin Pump    Vitamin D deficiency          Current Outpatient Medications:     acetone, urine, test (Ketostix) strip, Check ketones if blood sugars > 250, more than twice or in case of nausea or vomiting and abdominal pains, Disp: 100 each, Rfl: 0    Biotin 67098 MCG TABS, Take 10,000 mcg by mouth in the morning, Disp: , Rfl:     Blood Glucose Monitoring Suppl (ONE TOUCH ULTRA 2) w/Device KIT, Use  , Disp: , Rfl:     Calcium Citrate (JORDY-CITRATE PO), Take 1,000 tablets by mouth 2 (two) times a day, Disp: , Rfl:     Continuous Blood Gluc  (DEXCOM G6 ) KIYA, USE AS DIRECTED, Disp: 1 Device, Rfl: 0    Continuous Blood Gluc Sensor (Dexcom G6 Sensor) MISC, Use 1 Device if needed (change Q 10 days) Use 1 sensor every 10 days, Disp: 9 each, Rfl: 3    Continuous Blood Gluc Transmit (Dexcom G6 Transmitter) MISC, Inject 1 Device under the skin every 3 (three) months, Disp: 1 each, Rfl: 3    esomeprazole (NexIUM) 20 mg capsule, Take 20 mg by mouth daily in the early morning, Disp: , Rfl:     fluticasone (FLONASE) 50 mcg/act nasal spray, 1 spray into each nostril daily, Disp: , Rfl:     gabapentin (NEURONTIN) 300 mg capsule, Take 1 capsule (300 mg total) by mouth daily at bedtime, Disp: 90 capsule, Rfl: 1    Glucagon (Baqsimi One Pack) 3 MG/DOSE POWD, Use one dose in one nostril, 3 mg in case of severe hypoglycemia and unconsciousness, Disp: 1 each, Rfl: 4    insulin aspart (NovoLOG) 100 units/mL injection, use UP to 70 units PER DAY VIA INSULIN PUMP, Disp: 55 mL, Rfl: 2    insulin glargine (Basaglar KwikPen) 100 units/mL injection pen, Inject 32 units in case of pump failure ( incase of emergency), Disp: 15 mL, Rfl: 0    Insulin Pen Needle (BD PEN NEEDLE LYRIC U/F) 32G X 4 MM MISC, by Does not apply route daily Use once daily, Disp: 100 each, Rfl: 2    lidocaine (Lidoderm) 5 %, Apply 1 patch topically daily for 6 days Remove & Discard patch within 12 hours or as directed by MD, Disp: 6 patch, Rfl: 0    loratadine (CLARITIN) 10 mg tablet, Take 1 tablet (10 mg total) by mouth daily, Disp: 90 tablet, Rfl: 1    Multiple Vitamins-Minerals (Bariatric Fusion) CHEW, Chew  , Disp: , Rfl:     Probiotic Product (PROBIOTIC DAILY PO), Take 1 tablet by mouth daily , Disp: , Rfl:     psyllium (METAMUCIL) 0 52 g capsule, Take 0 52 g by mouth 3 (three) times a day Fiber capsules, Disp: , Rfl:     rosuvastatin (CRESTOR) 5 mg tablet, Take 1 tablet (5 mg total) by mouth daily, Disp: 90 tablet, Rfl: 1    sulfamethoxazole-trimethoprim (BACTRIM DS) 800-160 mg per tablet, Take 1 tablet by mouth every 12 (twelve) hours for 7 days, Disp: 14 tablet, Rfl: 0    Synthroid 175 MCG tablet, Take 1 tablet Monday through Saturday and 1 5 tablets on Sunday, Disp: 96 tablet, Rfl: 1    TURMERIC PO, Take 1,400 capsules by mouth 2 (two) times a day, Disp: , Rfl:     vitamin B-12 (VITAMIN B-12) 1,000 mcg tablet, Take 1,000 mcg by mouth 3 (three) times a week, Disp: , Rfl:     zolpidem (AMBIEN) 10 mg tablet, Take 1 tablet (10 mg total) by mouth daily at bedtime as needed for sleep, Disp: 30 tablet, Rfl: 0    Glucagon (BAQSIMI TWO PACK) 3 MG/DOSE POWD, Use one dose as needed in case of severe hypoglycemia ( nasal spray) (Patient not taking: Reported on 8/29/2022), Disp: 1 each, Rfl: 0    Allergies   Allergen Reactions    Ibuprofen GI Intolerance    Other Other (See Comments)     Seasonal allergies- pt has congestion       Social History   Past Surgical History:   Procedure Laterality Date    BARIATRIC SURGERY      CARPAL TUNNEL RELEASE Right     COLONOSCOPY      EGD      FL RETROGRADE PYELOGRAM  4/24/2020    LASIK      MT CYSTO/URETERO W/LITHOTRIPSY &INDWELL STENT INSRT Right 4/24/2020    Procedure: CYSTOSCOPY URETEROSCOPY WITH LITHOTRIPSY HOLMIUM LASER, RETROGRADE PYELOGRAM AND INSERTION STENT URETERAL;  Surgeon: Janice Ovalle MD;  Location: [facility];  Service: Urology    MT LAP GASTRIC BYPASS/GABBY-EN-Y N/A 4/26/2021    Procedure: BYPASS GASTRIC GABBY-EN-Y LAPAROSCOPIC W ROBOTICS AND INTRAOPERATIVE EGD;  Surgeon: Jennifer Nobles MD;  Location: [facility];  Service: Bariatrics    ROTATOR CUFF REPAIR Right     UPPER GASTROINTESTINAL ENDOSCOPY       Family History   Problem Relation Age of Onset    Thyroid disease unspecified Mother     Osteoporosis Mother     Hypertension Father     Colon cancer Father 76    Diabetes type II Sister     Hypertension Sister     Hyperlipidemia Sister     Thyroid disease unspecified Sister     Diabetes type II Brother     Hypertension Brother     Hyperlipidemia Brother     Colon cancer Brother 60       Objective:  /94 (BP Location: Left arm, Patient Position: Sitting, Cuff Size: Standard)   Pulse 71   Temp 98 3 °F (36 8 °C) (Temporal)   Ht 5' 9" (1 753 m)   Wt 93 5 kg (206 lb 3 2 oz)   SpO2 97%   BMI 30 45 kg/m²     Recent Results (from the past 1344 hour(s))   CBC and differential    Collection Time: 08/16/22  7:22 AM   Result Value Ref Range    WBC 7 86 4 31 - 10 16 Thousand/uL    RBC 4 60 3 88 - 5 62 Million/uL Hemoglobin 15 0 12 0 - 17 0 g/dL    Hematocrit 43 5 36 5 - 49 3 %    MCV 95 82 - 98 fL    MCH 32 6 26 8 - 34 3 pg    MCHC 34 5 31 4 - 37 4 g/dL    RDW 12 6 11 6 - 15 1 %    MPV 8 7 (L) 8 9 - 12 7 fL    Platelets 633 567 - 272 Thousands/uL    nRBC 0 /100 WBCs    Neutrophils Relative 45 43 - 75 %    Immat GRANS % 1 0 - 2 %    Lymphocytes Relative 40 14 - 44 %    Monocytes Relative 12 4 - 12 %    Eosinophils Relative 1 0 - 6 %    Basophils Relative 1 0 - 1 %    Neutrophils Absolute 3 53 1 85 - 7 62 Thousands/µL    Immature Grans Absolute 0 04 0 00 - 0 20 Thousand/uL    Lymphocytes Absolute 3 17 0 60 - 4 47 Thousands/µL    Monocytes Absolute 0 95 0 17 - 1 22 Thousand/µL    Eosinophils Absolute 0 10 0 00 - 0 61 Thousand/µL    Basophils Absolute 0 07 0 00 - 0 10 Thousands/µL   Comprehensive metabolic panel    Collection Time: 08/16/22  7:22 AM   Result Value Ref Range    Sodium 136 135 - 147 mmol/L    Potassium 4 5 3 5 - 5 3 mmol/L    Chloride 99 96 - 108 mmol/L    CO2 29 21 - 32 mmol/L    ANION GAP 8 4 - 13 mmol/L    BUN 17 5 - 25 mg/dL    Creatinine 0 84 0 60 - 1 30 mg/dL    Glucose, Fasting 164 (H) 65 - 99 mg/dL    Calcium 8 9 8 4 - 10 2 mg/dL    AST 55 (H) 13 - 39 U/L    ALT 45 7 - 52 U/L    Alkaline Phosphatase 204 (H) 34 - 104 U/L    Total Protein 7 4 6 4 - 8 4 g/dL    Albumin 3 8 3 5 - 5 0 g/dL    Total Bilirubin 0 49 0 20 - 1 00 mg/dL    eGFR 97 ml/min/1 73sq m   LD,Blood    Collection Time: 08/16/22  7:22 AM   Result Value Ref Range     140 - 271 U/L   Fingerstick Glucose (POCT)    Collection Time: 08/18/22  9:59 AM   Result Value Ref Range    POC Glucose 189 (H) 65 - 140 mg/dl   ECG 12 lead    Collection Time: 08/18/22 10:05 AM   Result Value Ref Range    Ventricular Rate 65 BPM    Atrial Rate 65 BPM    MS Interval 174 ms    QRSD Interval 84 ms    QT Interval 412 ms    QTC Interval 428 ms    P Axis 70 degrees    QRS Axis 36 degrees    T Wave Axis 60 degrees   CBC and differential    Collection Time: 08/18/22 10:11 AM   Result Value Ref Range    WBC 10 29 (H) 4 31 - 10 16 Thousand/uL    RBC 4 73 3 88 - 5 62 Million/uL    Hemoglobin 15 1 12 0 - 17 0 g/dL    Hematocrit 44 9 36 5 - 49 3 %    MCV 95 82 - 98 fL    MCH 31 9 26 8 - 34 3 pg    MCHC 33 6 31 4 - 37 4 g/dL    RDW 12 3 11 6 - 15 1 %    MPV 8 7 (L) 8 9 - 12 7 fL    Platelets 521 473 - 809 Thousands/uL    nRBC 0 /100 WBCs    Neutrophils Relative 66 43 - 75 %    Immat GRANS % 1 0 - 2 %    Lymphocytes Relative 24 14 - 44 %    Monocytes Relative 8 4 - 12 %    Eosinophils Relative 0 0 - 6 %    Basophils Relative 1 0 - 1 %    Neutrophils Absolute 6 86 1 85 - 7 62 Thousands/µL    Immature Grans Absolute 0 06 0 00 - 0 20 Thousand/uL    Lymphocytes Absolute 2 46 0 60 - 4 47 Thousands/µL    Monocytes Absolute 0 84 0 17 - 1 22 Thousand/µL    Eosinophils Absolute 0 00 0 00 - 0 61 Thousand/µL    Basophils Absolute 0 07 0 00 - 0 10 Thousands/µL   Comprehensive metabolic panel    Collection Time: 08/18/22 10:11 AM   Result Value Ref Range    Sodium 135 135 - 147 mmol/L    Potassium 4 5 3 5 - 5 3 mmol/L    Chloride 98 96 - 108 mmol/L    CO2 31 21 - 32 mmol/L    ANION GAP 6 4 - 13 mmol/L    BUN 16 5 - 25 mg/dL    Creatinine 0 88 0 60 - 1 30 mg/dL    Glucose 175 (H) 65 - 140 mg/dL    Calcium 9 4 8 4 - 10 2 mg/dL    AST 49 (H) 13 - 39 U/L    ALT 40 7 - 52 U/L    Alkaline Phosphatase 187 (H) 34 - 104 U/L    Total Protein 8 2 6 4 - 8 4 g/dL    Albumin 4 3 3 5 - 5 0 g/dL    Total Bilirubin 0 50 0 20 - 1 00 mg/dL    eGFR 96 ml/min/1 73sq m   Magnesium    Collection Time: 08/18/22 10:11 AM   Result Value Ref Range    Magnesium 1 9 1 9 - 2 7 mg/dL   HS Troponin 0hr (reflex protocol)    Collection Time: 08/18/22 10:11 AM   Result Value Ref Range    hs TnI 0hr 4 "Refer to ACS Flowchart"- see link ng/L   Blood gas, venous    Collection Time: 08/18/22 11:00 AM   Result Value Ref Range    pH, Lázaro 7 384 7 300 - 7 400    pCO2, Lázaro 50 8 (H) 42 0 - 50 0 mm Hg    pO2, Lázaro 30 3 (L) 35 0 - 45 0 mm Hg    HCO3, Lázaro 29 6 24 - 30 mmol/L    Base Excess, Lázaro 3 4 mmol/L    O2 Content, Lázaro 12 5 ml/dL    O2 HGB, VENOUS 57 5 (L) 60 0 - 80 0 %            Physical Exam  Constitutional:       General: He is not in acute distress  Appearance: He is well-developed  He is not diaphoretic  HENT:      Head: Normocephalic and atraumatic  Right Ear: External ear normal       Left Ear: External ear normal       Nose: Nose normal         Mouth/Throat:      Pharynx: No oropharyngeal exudate  Eyes:      General:         Right eye: No discharge  Left eye: No discharge  Conjunctiva/sclera: Conjunctivae normal       Pupils: Pupils are equal, round, and reactive to light  Neck:      Thyroid: No thyromegaly  Cardiovascular:      Rate and Rhythm: Normal rate and regular rhythm  Heart sounds: Normal heart sounds  No murmur heard  No friction rub  No gallop  Pulmonary:      Effort: Pulmonary effort is normal  No respiratory distress  Breath sounds: Normal breath sounds  No stridor  No wheezing or rales  Abdominal:      General: Bowel sounds are normal  There is no distension  Palpations: Abdomen is soft  Tenderness: There is no abdominal tenderness  Musculoskeletal:      Cervical back: Normal range of motion and neck supple  Lymphadenopathy:      Cervical: No cervical adenopathy  Skin:     General: Skin is warm and dry  Findings: No erythema or rash  Neurological:      Mental Status: He is alert and oriented to person, place, and time  Psychiatric:         Behavior: Behavior normal          Thought Content:  Thought content normal          Judgment: Judgment normal

## 2022-08-31 LAB — BACTERIA NOSE AEROBE CULT: ABNORMAL

## 2022-09-26 DIAGNOSIS — E10.40 TYPE 1 DIABETES MELLITUS WITH DIABETIC NEUROPATHY (HCC): ICD-10-CM

## 2022-09-26 DIAGNOSIS — G47.00 INSOMNIA, UNSPECIFIED TYPE: ICD-10-CM

## 2022-09-26 RX ORDER — GABAPENTIN 300 MG/1
300 CAPSULE ORAL
Qty: 90 CAPSULE | Refills: 0 | OUTPATIENT
Start: 2022-09-26

## 2022-09-27 RX ORDER — ZOLPIDEM TARTRATE 10 MG/1
10 TABLET ORAL
Qty: 30 TABLET | Refills: 0 | Status: SHIPPED | OUTPATIENT
Start: 2022-09-27

## 2022-09-30 NOTE — TELEPHONE ENCOUNTER
Last appt 8/6/18 (acute visit)    No future appts    Patient needs to schedule a f/u appt 84 year old female with PMHx bipolar disorder and HTN presents to the ED complaining of trouble breathing and SOB that started around noon today. Pt previously had pleural effusion on 9/13 was drained, had pleural cath removed last week. Denies chest pain, cough, fevers, chills, abdominal pain, diarrhea, nausea, vomiting, edema, calf pain, or other symptoms. PCP Dr. Woodard. Never smoker. No allergies to medications.During recent admission CT chest w large right effusion, narrowing right main bronchus, near complete atelectasis, nodularity right ant diaphragm pleura, post thoracentesis  1.9L but cytology negative for malignancy although fluid felt to be exuative, pt was dc home on Friday 9/16  but readm one day later w progressive SOB, tx'd from [place] to [place], this time eval by CT surgery, post pigtail by IR  -clinically improved, pt denies SOB at this time  -findings worrisome for malignancy FAMILY was advised on discharge to bring patient directly to ED of [facility] to see  if sob reoccurs but they came to [place] and we will need to arrange transfer ,d/w dr Calvo and dr Willis , will transfer to [facility] for pleurodesis to be booked early next week

## 2022-10-10 ENCOUNTER — OFFICE VISIT (OUTPATIENT)
Dept: INTERNAL MEDICINE CLINIC | Facility: CLINIC | Age: 57
End: 2022-10-10
Payer: COMMERCIAL

## 2022-10-10 VITALS
SYSTOLIC BLOOD PRESSURE: 150 MMHG | WEIGHT: 211.4 LBS | TEMPERATURE: 98.2 F | DIASTOLIC BLOOD PRESSURE: 94 MMHG | OXYGEN SATURATION: 98 % | BODY MASS INDEX: 31.31 KG/M2 | HEART RATE: 75 BPM | HEIGHT: 69 IN

## 2022-10-10 DIAGNOSIS — G47.09 OTHER INSOMNIA: ICD-10-CM

## 2022-10-10 DIAGNOSIS — B37.0 ORAL THRUSH: ICD-10-CM

## 2022-10-10 DIAGNOSIS — E03.9 ACQUIRED HYPOTHYROIDISM: ICD-10-CM

## 2022-10-10 DIAGNOSIS — K21.9 GASTROESOPHAGEAL REFLUX DISEASE WITHOUT ESOPHAGITIS: ICD-10-CM

## 2022-10-10 DIAGNOSIS — I10 HYPERTENSION, UNSPECIFIED TYPE: Primary | ICD-10-CM

## 2022-10-10 DIAGNOSIS — G25.2 INTENTION TREMOR: ICD-10-CM

## 2022-10-10 DIAGNOSIS — J30.2 SEASONAL ALLERGIES: ICD-10-CM

## 2022-10-10 DIAGNOSIS — E55.9 VITAMIN D DEFICIENCY: ICD-10-CM

## 2022-10-10 DIAGNOSIS — D50.9 IRON DEFICIENCY ANEMIA, UNSPECIFIED IRON DEFICIENCY ANEMIA TYPE: ICD-10-CM

## 2022-10-10 DIAGNOSIS — E78.2 MIXED HYPERLIPIDEMIA: ICD-10-CM

## 2022-10-10 DIAGNOSIS — E10.65 TYPE 1 DIABETES MELLITUS WITH HYPERGLYCEMIA, WITH LONG-TERM CURRENT USE OF INSULIN (HCC): ICD-10-CM

## 2022-10-10 PROBLEM — J06.9 URTI (ACUTE UPPER RESPIRATORY INFECTION): Status: RESOLVED | Noted: 2022-08-15 | Resolved: 2022-10-10

## 2022-10-10 LAB
SARS-COV-2 AG UPPER RESP QL IA: NEGATIVE
VALID CONTROL: NORMAL

## 2022-10-10 PROCEDURE — 99214 OFFICE O/P EST MOD 30 MIN: CPT | Performed by: NURSE PRACTITIONER

## 2022-10-10 PROCEDURE — 87811 SARS-COV-2 COVID19 W/OPTIC: CPT | Performed by: NURSE PRACTITIONER

## 2022-10-10 RX ORDER — LISINOPRIL 5 MG/1
5 TABLET ORAL DAILY
Qty: 90 TABLET | Refills: 1 | Status: SHIPPED | OUTPATIENT
Start: 2022-10-10

## 2022-10-10 RX ORDER — PREDNISONE 20 MG/1
40 TABLET ORAL DAILY
Qty: 10 TABLET | Refills: 0 | Status: SHIPPED | OUTPATIENT
Start: 2022-10-10 | End: 2022-10-15

## 2022-10-10 NOTE — PROGRESS NOTES
Assessment/Plan:    GERD (gastroesophageal reflux disease)  Stable, controlled  Continue esomeprazole 20 mg daily  Hypothyroidism  Controlled  Continue Synthroid 175 mcg daily  Type 1 diabetes mellitus with hyperglycemia, with long-term current use of insulin ([facility])    Lab Results   Component Value Date    HGBA1C 6 4 (H) 06/17/2022   Follows endocrine  Advised to notify of hypoglycemic event  Seasonal allergic rhinitis due to pollen  COVID test negative  Will start short burst of prednisone, patient aware that this will affect his sugars  Stop taking Sudafed and switch to Mucinex as Sudafed is affecting patient's blood pressure  Continue with Flonase, switching antihistamine to Zyretc   May need referral to ENT  Hyperlipidemia  Continue on rosuvastatin  Insomnia  Controlled on Ambien 10 mg as needed, controlled substance agreement signed while in office today    Iron deficiency anemia  Continue with iron supplementation, continue to monitor CBC    Vitamin D deficiency  Vitamin-D monitored by Endocrine, encourage supplementation    Intention tremor  Mild improvement since starting gabapentin, will start metoprolol  May need referral to Neurology  Oral thrush  Start nystatin    Hypertension  Start lisinopril and metoprolol  Follow up in one month or sooner  Continue current regimen -   Continue to monitor blood pressure at home  Goal BP is < 130/80  Contact our office for consistent elevations  Recommend low sodium diet  Exercise 30 minutes 5 times a week as tolerated  Recommend yearly eye exam                  Diagnoses and all orders for this visit:    Hypertension, unspecified type  -     lisinopril (ZESTRIL) 5 mg tablet; Take 1 tablet (5 mg total) by mouth daily  -     metoprolol tartrate (LOPRESSOR) 25 mg tablet; Take 0 5 tablets (12 5 mg total) by mouth every 12 (twelve) hours    Oral thrush  -     nystatin (MYCOSTATIN) 500,000 units/5 mL suspension;  Apply 5 mL (500,000 Units total) to the mouth or throat 4 (four) times a day for 14 days    Seasonal allergies  -     predniSONE 20 mg tablet; Take 2 tablets (40 mg total) by mouth daily for 5 days  -     POCT Rapid Covid Ag    Gastroesophageal reflux disease without esophagitis    Acquired hypothyroidism    Type 1 diabetes mellitus with hyperglycemia, with long-term current use of insulin (HCC)    Mixed hyperlipidemia    Other insomnia    Iron deficiency anemia, unspecified iron deficiency anemia type    Vitamin D deficiency    Intention tremor          Subjective:      Patient ID: Yury Encinas is a 65 y o  male  Patient presents today to follow-up on chronic conditions, patient currently follows endocrine for his type 1 diabetes, hyperlipidemia vitamin-D deficiency and hypothyroidism  Has URI for about 1 week, denies fevers and chills, has been using sudafed with some relief  Patient reports neuropathy at night, he states it "burns at night" to bilateral lower extremities, was started on gabapentin  with minimal relief    Has essential tremor which she was started on gabapentin for which originally had improved his tremor however his tremor has returned    Hyperlipidemia-The 10-year ASCVD risk score (Femi Reed , et al , 2013) is: 15 2%    Values used to calculate the score:      Age: 65 years      Sex: Male      Is Non- : No      Diabetic: Yes      Tobacco smoker: No      Systolic Blood Pressure: 197 mmHg      Is BP treated: Yes      HDL Cholesterol: 65 mg/dL      Total Cholesterol: 216 mg/dL  Continues on Crestor without side effects     HTN- newly diagnosed, patient's blood pressures have been elevated lately, denies headaches, changes in vision or chest pain      Colonoscopy-2022  Eye Doctor[facility]yearly, Dr Kitty Salazar[facility]  Dentist-every 6 months  Podiatrist-not regually           Diabetes  He presents for his initial diabetic visit  He has type 1 diabetes mellitus   His disease course has been improving  There are no hypoglycemic associated symptoms  Pertinent negatives for hypoglycemia include no dizziness or headaches  Pertinent negatives for diabetes include no chest pain, no polydipsia, no polyphagia, no polyuria and no weakness  There are no hypoglycemic complications  (Patients sugars in the 140s) Eye exam is current  Hyperlipidemia  This is a chronic problem  The current episode started more than 1 year ago  Pertinent negatives include no chest pain or shortness of breath  The following portions of the patient's history were reviewed and updated as appropriate: allergies, current medications, past family history, past medical history, past social history, past surgical history and problem list     Review of Systems   Constitutional: Negative for activity change, appetite change, chills, diaphoresis and fever  HENT: Positive for congestion and ear pain  Negative for ear discharge, postnasal drip, rhinorrhea, sinus pressure, sinus pain and sore throat  Eyes: Negative for pain, discharge, itching and visual disturbance  Respiratory: Negative for cough, chest tightness, shortness of breath and wheezing  Cardiovascular: Negative for chest pain, palpitations and leg swelling  Gastrointestinal: Negative for abdominal pain, constipation, diarrhea, nausea and vomiting  Endocrine: Negative for polydipsia, polyphagia and polyuria  Genitourinary: Negative for difficulty urinating, dysuria and urgency  Musculoskeletal: Negative for arthralgias, back pain and neck pain  Skin: Negative for rash and wound  Neurological: Negative for dizziness, weakness, numbness and headaches           Past Medical History:   Diagnosis Date   • Anemia    • Colon polyp    • Diabetes mellitus ([facility] [address] )    • Disease of thyroid gland    • GERD (gastroesophageal reflux disease)    • Hyperlipidemia    • Hypertension    • Insomnia    • Kidney stone    • Sleep apnea     Mild sleep apnea   • Type 1 diabetes mellitus (HCC)     Uses Insulin Pump   • Vitamin D deficiency          Current Outpatient Medications:   •  Biotin 04989 MCG TABS, Take 10,000 mcg by mouth in the morning, Disp: , Rfl:   •  Blood Glucose Monitoring Suppl (ONE TOUCH ULTRA 2) w/Device KIT, Use  , Disp: , Rfl:   •  Calcium Citrate (JORDY-CITRATE PO), Take 1,000 tablets by mouth 2 (two) times a day, Disp: , Rfl:   •  Continuous Blood Gluc  (DEXCOM G6 ) KIYA, USE AS DIRECTED, Disp: 1 Device, Rfl: 0  •  Continuous Blood Gluc Sensor (Dexcom G6 Sensor) MISC, Use 1 Device if needed (change Q 10 days) Use 1 sensor every 10 days, Disp: 9 each, Rfl: 3  •  Continuous Blood Gluc Transmit (Dexcom G6 Transmitter) MISC, Inject 1 Device under the skin every 3 (three) months, Disp: 1 each, Rfl: 3  •  esomeprazole (NexIUM) 20 mg capsule, Take 20 mg by mouth daily in the early morning, Disp: , Rfl:   •  fluticasone (FLONASE) 50 mcg/act nasal spray, 1 spray into each nostril daily, Disp: , Rfl:   •  gabapentin (NEURONTIN) 300 mg capsule, Take 1 capsule (300 mg total) by mouth daily at bedtime, Disp: 90 capsule, Rfl: 1  •  Glucagon (Baqsimi One Pack) 3 MG/DOSE POWD, Use one dose in one nostril, 3 mg in case of severe hypoglycemia and unconsciousness, Disp: 1 each, Rfl: 4  •  insulin aspart (NovoLOG) 100 units/mL injection, use UP to 70 units PER DAY VIA INSULIN PUMP, Disp: 55 mL, Rfl: 2  •  insulin glargine (Basaglar KwikPen) 100 units/mL injection pen, Inject 32 units in case of pump failure ( incase of emergency), Disp: 15 mL, Rfl: 0  •  Insulin Pen Needle (BD PEN NEEDLE LYRIC U/F) 32G X 4 MM MISC, by Does not apply route daily Use once daily, Disp: 100 each, Rfl: 2  •  lisinopril (ZESTRIL) 5 mg tablet, Take 1 tablet (5 mg total) by mouth daily, Disp: 90 tablet, Rfl: 1  •  loratadine (CLARITIN) 10 mg tablet, Take 1 tablet (10 mg total) by mouth daily, Disp: 90 tablet, Rfl: 1  •  metoprolol tartrate (LOPRESSOR) 25 mg tablet, Take 0 5 tablets (12 5 mg total) by mouth every 12 (twelve) hours, Disp: 90 tablet, Rfl: 1  •  Multiple Vitamins-Minerals (Bariatric Fusion) CHEW, Chew  , Disp: , Rfl:   •  nystatin (MYCOSTATIN) 500,000 units/5 mL suspension, Apply 5 mL (500,000 Units total) to the mouth or throat 4 (four) times a day for 14 days, Disp: 473 mL, Rfl: 1  •  predniSONE 20 mg tablet, Take 2 tablets (40 mg total) by mouth daily for 5 days, Disp: 10 tablet, Rfl: 0  •  Probiotic Product (PROBIOTIC DAILY PO), Take 1 tablet by mouth daily , Disp: , Rfl:   •  psyllium (METAMUCIL) 0 52 g capsule, Take 0 52 g by mouth 3 (three) times a day Fiber capsules, Disp: , Rfl:   •  rosuvastatin (CRESTOR) 5 mg tablet, Take 1 tablet (5 mg total) by mouth daily, Disp: 90 tablet, Rfl: 1  •  Synthroid 175 MCG tablet, Take 1 tablet Monday through Saturday and 1 5 tablets on Sunday, Disp: 96 tablet, Rfl: 1  •  TURMERIC PO, Take 1,400 capsules by mouth 2 (two) times a day, Disp: , Rfl:   •  vitamin B-12 (VITAMIN B-12) 1,000 mcg tablet, Take 1,000 mcg by mouth 3 (three) times a week, Disp: , Rfl:   •  zolpidem (AMBIEN) 10 mg tablet, Take 1 tablet (10 mg total) by mouth daily at bedtime as needed for sleep, Disp: 30 tablet, Rfl: 0  •  acetone, urine, test (Ketostix) strip, Check ketones if blood sugars > 250, more than twice or in case of nausea or vomiting and abdominal pains (Patient not taking: Reported on 10/10/2022), Disp: 100 each, Rfl: 0  •  Glucagon (BAQSIMI TWO PACK) 3 MG/DOSE POWD, Use one dose as needed in case of severe hypoglycemia ( nasal spray) (Patient not taking: No sig reported), Disp: 1 each, Rfl: 0  •  lidocaine (Lidoderm) 5 %, Apply 1 patch topically daily for 6 days Remove & Discard patch within 12 hours or as directed by MD (Patient not taking: Reported on 10/10/2022), Disp: 6 patch, Rfl: 0    Allergies   Allergen Reactions   • Ibuprofen GI Intolerance   • Other Other (See Comments)     Seasonal allergies- pt has congestion       Social History   Past Surgical History:   Procedure Laterality Date   • BARIATRIC SURGERY     • CARPAL TUNNEL RELEASE Right    • COLONOSCOPY     • EGD     • FL RETROGRADE PYELOGRAM  4/24/2020   • LASIK     • MN CYSTO/URETERO W/LITHOTRIPSY &INDWELL STENT INSRT Right 4/24/2020    Procedure: CYSTOSCOPY URETEROSCOPY WITH LITHOTRIPSY HOLMIUM LASER, RETROGRADE PYELOGRAM AND INSERTION STENT URETERAL;  Surgeon: Cooper Phillips MD;  Location: [facility];  Service: Urology   • MN LAP GASTRIC BYPASS/GABBY-EN-Y N/A 4/26/2021    Procedure: BYPASS GASTRIC GABBY-EN-Y LAPAROSCOPIC W ROBOTICS AND INTRAOPERATIVE EGD;  Surgeon: Shekhar Barker MD;  Location: [facility];  Service: Bariatrics   • ROTATOR CUFF REPAIR Right    • UPPER GASTROINTESTINAL ENDOSCOPY       Family History   Problem Relation Age of Onset   • Thyroid disease unspecified Mother    • Osteoporosis Mother    • Hypertension Father    • Colon cancer Father 76   • Diabetes type II Sister    • Hypertension Sister    • Hyperlipidemia Sister    • Thyroid disease unspecified Sister    • Diabetes type II Brother    • Hypertension Brother    • Hyperlipidemia Brother    • Colon cancer Brother 60       Objective:  /94 (BP Location: Left arm, Patient Position: Sitting, Cuff Size: Standard)   Pulse 75   Temp 98 2 °F (36 8 °C) (Temporal)   Ht 5' 9 49" (1 765 m)   Wt 95 9 kg (211 lb 6 4 oz)   SpO2 98%   BMI 30 78 kg/m²     Recent Results (from the past 1344 hour(s))   CBC and differential    Collection Time: 08/16/22  7:22 AM   Result Value Ref Range    WBC 7 86 4 31 - 10 16 Thousand/uL    RBC 4 60 3 88 - 5 62 Million/uL    Hemoglobin 15 0 12 0 - 17 0 g/dL    Hematocrit 43 5 36 5 - 49 3 %    MCV 95 82 - 98 fL    MCH 32 6 26 8 - 34 3 pg    MCHC 34 5 31 4 - 37 4 g/dL    RDW 12 6 11 6 - 15 1 %    MPV 8 7 (L) 8 9 - 12 7 fL    Platelets 683 220 - 724 Thousands/uL    nRBC 0 /100 WBCs    Neutrophils Relative 45 43 - 75 %    Immat GRANS % 1 0 - 2 %    Lymphocytes Relative 40 14 - 44 %    Monocytes Relative 12 4 - 12 %    Eosinophils Relative 1 0 - 6 %    Basophils Relative 1 0 - 1 %    Neutrophils Absolute 3 53 1 85 - 7 62 Thousands/µL    Immature Grans Absolute 0 04 0 00 - 0 20 Thousand/uL    Lymphocytes Absolute 3 17 0 60 - 4 47 Thousands/µL    Monocytes Absolute 0 95 0 17 - 1 22 Thousand/µL    Eosinophils Absolute 0 10 0 00 - 0 61 Thousand/µL    Basophils Absolute 0 07 0 00 - 0 10 Thousands/µL   Comprehensive metabolic panel    Collection Time: 08/16/22  7:22 AM   Result Value Ref Range    Sodium 136 135 - 147 mmol/L    Potassium 4 5 3 5 - 5 3 mmol/L    Chloride 99 96 - 108 mmol/L    CO2 29 21 - 32 mmol/L    ANION GAP 8 4 - 13 mmol/L    BUN 17 5 - 25 mg/dL    Creatinine 0 84 0 60 - 1 30 mg/dL    Glucose, Fasting 164 (H) 65 - 99 mg/dL    Calcium 8 9 8 4 - 10 2 mg/dL    AST 55 (H) 13 - 39 U/L    ALT 45 7 - 52 U/L    Alkaline Phosphatase 204 (H) 34 - 104 U/L    Total Protein 7 4 6 4 - 8 4 g/dL    Albumin 3 8 3 5 - 5 0 g/dL    Total Bilirubin 0 49 0 20 - 1 00 mg/dL    eGFR 97 ml/min/1 73sq m   LD,Blood    Collection Time: 08/16/22  7:22 AM   Result Value Ref Range     140 - 271 U/L   Fingerstick Glucose (POCT)    Collection Time: 08/18/22  9:59 AM   Result Value Ref Range    POC Glucose 189 (H) 65 - 140 mg/dl   ECG 12 lead    Collection Time: 08/18/22 10:05 AM   Result Value Ref Range    Ventricular Rate 65 BPM    Atrial Rate 65 BPM    AZ Interval 174 ms    QRSD Interval 84 ms    QT Interval 412 ms    QTC Interval 428 ms    P Axis 70 degrees    QRS Axis 36 degrees    T Wave Axis 60 degrees   CBC and differential    Collection Time: 08/18/22 10:11 AM   Result Value Ref Range    WBC 10 29 (H) 4 31 - 10 16 Thousand/uL    RBC 4 73 3 88 - 5 62 Million/uL    Hemoglobin 15 1 12 0 - 17 0 g/dL    Hematocrit 44 9 36 5 - 49 3 %    MCV 95 82 - 98 fL    MCH 31 9 26 8 - 34 3 pg    MCHC 33 6 31 4 - 37 4 g/dL    RDW 12 3 11 6 - 15 1 %    MPV 8 7 (L) 8 9 - 12 7 fL    Platelets 403 524 - 273 Thousands/uL    nRBC 0 /100 WBCs Neutrophils Relative 66 43 - 75 %    Immat GRANS % 1 0 - 2 %    Lymphocytes Relative 24 14 - 44 %    Monocytes Relative 8 4 - 12 %    Eosinophils Relative 0 0 - 6 %    Basophils Relative 1 0 - 1 %    Neutrophils Absolute 6 86 1 85 - 7 62 Thousands/µL    Immature Grans Absolute 0 06 0 00 - 0 20 Thousand/uL    Lymphocytes Absolute 2 46 0 60 - 4 47 Thousands/µL    Monocytes Absolute 0 84 0 17 - 1 22 Thousand/µL    Eosinophils Absolute 0 00 0 00 - 0 61 Thousand/µL    Basophils Absolute 0 07 0 00 - 0 10 Thousands/µL   Comprehensive metabolic panel    Collection Time: 08/18/22 10:11 AM   Result Value Ref Range    Sodium 135 135 - 147 mmol/L    Potassium 4 5 3 5 - 5 3 mmol/L    Chloride 98 96 - 108 mmol/L    CO2 31 21 - 32 mmol/L    ANION GAP 6 4 - 13 mmol/L    BUN 16 5 - 25 mg/dL    Creatinine 0 88 0 60 - 1 30 mg/dL    Glucose 175 (H) 65 - 140 mg/dL    Calcium 9 4 8 4 - 10 2 mg/dL    AST 49 (H) 13 - 39 U/L    ALT 40 7 - 52 U/L    Alkaline Phosphatase 187 (H) 34 - 104 U/L    Total Protein 8 2 6 4 - 8 4 g/dL    Albumin 4 3 3 5 - 5 0 g/dL    Total Bilirubin 0 50 0 20 - 1 00 mg/dL    eGFR 96 ml/min/1 73sq m   Magnesium    Collection Time: 08/18/22 10:11 AM   Result Value Ref Range    Magnesium 1 9 1 9 - 2 7 mg/dL   HS Troponin 0hr (reflex protocol)    Collection Time: 08/18/22 10:11 AM   Result Value Ref Range    hs TnI 0hr 4 "Refer to ACS Flowchart"- see link ng/L   Blood gas, venous    Collection Time: 08/18/22 11:00 AM   Result Value Ref Range    pH, Lázaro 7 384 7 300 - 7 400    pCO2, Lázaro 50 8 (H) 42 0 - 50 0 mm Hg    pO2, Lázaro 30 3 (L) 35 0 - 45 0 mm Hg    HCO3, Lázaro 29 6 24 - 30 mmol/L    Base Excess, Lázaro 3 4 mmol/L    O2 Content, Lázaro 12 5 ml/dL    O2 HGB, VENOUS 57 5 (L) 60 0 - 80 0 %   Nasal culture    Collection Time: 08/29/22  9:19 AM    Specimen: Nasopharyngeal   Result Value Ref Range    Nasal Culture (A)      2+ Growth of Methicillin Resistant Staphylococcus aureus       Susceptibility    Methicillin Resistant Staphylococcus aureus - CHAD     Ampicillin ($$) >8 00 Resistant ug/ml     Cefazolin ($) <=4 00 Resistant ug/ml     Clindamycin ($) <=0 25 Susceptible ug/ml     Erythromycin ($$$$) >4 00 Resistant ug/ml     Gentamicin ($$) <=1 Susceptible ug/ml     Oxacillin >2 00 Resistant ug/ml     Tetracycline <=2 Susceptible ug/ml     Trimethoprim + Sulfamethoxazole ($$$) <=0 5/9 5 Susceptible ug/ml     Vancomycin ($) 1 00 Susceptible ug/ml   POCT Rapid Covid Ag    Collection Time: 10/10/22  9:12 AM   Result Value Ref Range    POCT SARS-CoV-2 Ag Negative Negative    VALID CONTROL Valid             Physical Exam  Constitutional:       General: He is not in acute distress  Appearance: He is well-developed  He is not diaphoretic  HENT:      Head: Normocephalic and atraumatic  Right Ear: External ear normal  A middle ear effusion is present  Tympanic membrane is bulging  Tympanic membrane is not erythematous  Left Ear: External ear normal  A middle ear effusion is present  Tympanic membrane is bulging  Tympanic membrane is not erythematous  Ears:      Comments: White coating to tongue     Nose: Nose normal       Mouth/Throat:      Pharynx: No oropharyngeal exudate  Eyes:      General:         Right eye: No discharge  Left eye: No discharge  Conjunctiva/sclera: Conjunctivae normal       Pupils: Pupils are equal, round, and reactive to light  Neck:      Thyroid: No thyromegaly  Cardiovascular:      Rate and Rhythm: Normal rate and regular rhythm  Heart sounds: Normal heart sounds  No murmur heard  No friction rub  No gallop  Pulmonary:      Effort: Pulmonary effort is normal  No respiratory distress  Breath sounds: Normal breath sounds  No stridor  No wheezing or rales  Abdominal:      General: Bowel sounds are normal  There is no distension  Palpations: Abdomen is soft  Tenderness: There is no abdominal tenderness     Musculoskeletal:      Cervical back: Normal range of motion and neck supple  Lymphadenopathy:      Cervical: No cervical adenopathy  Skin:     General: Skin is warm and dry  Findings: No erythema or rash  Neurological:      Mental Status: He is alert and oriented to person, place, and time  Psychiatric:         Behavior: Behavior normal          Thought Content:  Thought content normal          Judgment: Judgment normal

## 2022-10-10 NOTE — ASSESSMENT & PLAN NOTE
COVID test negative  Will start short burst of prednisone, patient aware that this will affect his sugars  Stop taking Sudafed and switch to Mucinex as Sudafed is affecting patient's blood pressure  Continue with Flonase, switching antihistamine to Zyretc   May need referral to ENT

## 2022-10-10 NOTE — ASSESSMENT & PLAN NOTE
Start lisinopril and metoprolol  Follow up in one month or sooner  Continue current regimen -   Continue to monitor blood pressure at home  Goal BP is < 130/80  Contact our office for consistent elevations  Recommend low sodium diet  Exercise 30 minutes 5 times a week as tolerated    Recommend yearly eye exam

## 2022-11-01 ENCOUNTER — LAB (OUTPATIENT)
Dept: LAB | Facility: CLINIC | Age: 57
End: 2022-11-01

## 2022-11-01 DIAGNOSIS — E10.65 TYPE 1 DIABETES MELLITUS WITH HYPERGLYCEMIA, WITH LONG-TERM CURRENT USE OF INSULIN (HCC): ICD-10-CM

## 2022-11-01 DIAGNOSIS — D72.829 LEUKOCYTOSIS, UNSPECIFIED TYPE: ICD-10-CM

## 2022-11-01 DIAGNOSIS — E03.9 ACQUIRED HYPOTHYROIDISM: ICD-10-CM

## 2022-11-01 LAB
ALBUMIN SERPL BCP-MCNC: 4.2 G/DL (ref 3.5–5)
ALP SERPL-CCNC: 84 U/L (ref 34–104)
ALT SERPL W P-5'-P-CCNC: 22 U/L (ref 7–52)
ANION GAP SERPL CALCULATED.3IONS-SCNC: 6 MMOL/L (ref 4–13)
AST SERPL W P-5'-P-CCNC: 35 U/L (ref 13–39)
BASOPHILS # BLD AUTO: 0.07 THOUSANDS/ÂΜL (ref 0–0.1)
BASOPHILS NFR BLD AUTO: 1 % (ref 0–1)
BILIRUB SERPL-MCNC: 0.43 MG/DL (ref 0.2–1)
BUN SERPL-MCNC: 15 MG/DL (ref 5–25)
CALCIUM SERPL-MCNC: 9.5 MG/DL (ref 8.4–10.2)
CHLORIDE SERPL-SCNC: 103 MMOL/L (ref 96–108)
CO2 SERPL-SCNC: 30 MMOL/L (ref 21–32)
CREAT SERPL-MCNC: 0.87 MG/DL (ref 0.6–1.3)
CREAT UR-MCNC: 25.5 MG/DL
EOSINOPHIL # BLD AUTO: 0.3 THOUSAND/ÂΜL (ref 0–0.61)
EOSINOPHIL NFR BLD AUTO: 5 % (ref 0–6)
ERYTHROCYTE [DISTWIDTH] IN BLOOD BY AUTOMATED COUNT: 12.6 % (ref 11.6–15.1)
EST. AVERAGE GLUCOSE BLD GHB EST-MCNC: 137 MG/DL
GFR SERPL CREATININE-BSD FRML MDRD: 96 ML/MIN/1.73SQ M
GLUCOSE P FAST SERPL-MCNC: 102 MG/DL (ref 65–99)
HBA1C MFR BLD: 6.4 %
HCT VFR BLD AUTO: 45.8 % (ref 36.5–49.3)
HGB BLD-MCNC: 15.4 G/DL (ref 12–17)
IMM GRANULOCYTES # BLD AUTO: 0.02 THOUSAND/UL (ref 0–0.2)
IMM GRANULOCYTES NFR BLD AUTO: 0 % (ref 0–2)
LYMPHOCYTES # BLD AUTO: 2.06 THOUSANDS/ÂΜL (ref 0.6–4.47)
LYMPHOCYTES NFR BLD AUTO: 37 % (ref 14–44)
MCH RBC QN AUTO: 32.2 PG (ref 26.8–34.3)
MCHC RBC AUTO-ENTMCNC: 33.6 G/DL (ref 31.4–37.4)
MCV RBC AUTO: 96 FL (ref 82–98)
MICROALBUMIN UR-MCNC: <5 MG/L (ref 0–20)
MICROALBUMIN/CREAT 24H UR: <20 MG/G CREATININE (ref 0–30)
MONOCYTES # BLD AUTO: 0.54 THOUSAND/ÂΜL (ref 0.17–1.22)
MONOCYTES NFR BLD AUTO: 10 % (ref 4–12)
NEUTROPHILS # BLD AUTO: 2.61 THOUSANDS/ÂΜL (ref 1.85–7.62)
NEUTS SEG NFR BLD AUTO: 47 % (ref 43–75)
NRBC BLD AUTO-RTO: 0 /100 WBCS
PLATELET # BLD AUTO: 234 THOUSANDS/UL (ref 149–390)
PMV BLD AUTO: 8.7 FL (ref 8.9–12.7)
POTASSIUM SERPL-SCNC: 4.3 MMOL/L (ref 3.5–5.3)
PROT SERPL-MCNC: 7.6 G/DL (ref 6.4–8.4)
RBC # BLD AUTO: 4.79 MILLION/UL (ref 3.88–5.62)
SODIUM SERPL-SCNC: 139 MMOL/L (ref 135–147)
T4 FREE SERPL-MCNC: 0.8 NG/DL (ref 0.76–1.46)
TSH SERPL DL<=0.05 MIU/L-ACNC: 11.79 UIU/ML (ref 0.45–4.5)
WBC # BLD AUTO: 5.6 THOUSAND/UL (ref 4.31–10.16)

## 2022-11-04 ENCOUNTER — TELEPHONE (OUTPATIENT)
Dept: ENDOCRINOLOGY | Facility: CLINIC | Age: 57
End: 2022-11-04

## 2022-11-04 NOTE — TELEPHONE ENCOUNTER
Pt r/s mondays appt  So he had his labs and asking if he needs a pump change or synthroid change please let him know,

## 2022-11-07 NOTE — TELEPHONE ENCOUNTER
His A1C is stable at 6 4%, which is great! Continue current treatment  Urine test is normal    Fasting glucose is 102  Electrolytes are normal  Kidney function is normal     His TSH is elevated at 11 789 and free T4 is normal at 0 80  Has he missed any doses of Synthroid recently?

## 2022-11-08 DIAGNOSIS — E10.65 TYPE 1 DIABETES MELLITUS WITH HYPERGLYCEMIA, WITH LONG-TERM CURRENT USE OF INSULIN (HCC): Primary | ICD-10-CM

## 2022-11-08 NOTE — TELEPHONE ENCOUNTER
Spoke with patient and reviewed lab results  He understood  He confirmed not missing any doses of Synthroid

## 2022-11-08 NOTE — TELEPHONE ENCOUNTER
If currently taking Synthroid 175 mcg 1 tablet daily Mon-Sat and 1 5 tablets on Sunday, then increase dose to Synthroid 200 mcg daily 1 tablet daily Mon-Sun   Order repeat TSH and free T4 to do prior to next visit on 1/9/23

## 2022-11-09 RX ORDER — LEVOTHYROXINE SODIUM 200 MCG
TABLET ORAL
Qty: 60 TABLET | Refills: 0 | Status: SHIPPED | OUTPATIENT
Start: 2022-11-09

## 2022-11-11 ENCOUNTER — OFFICE VISIT (OUTPATIENT)
Dept: INTERNAL MEDICINE CLINIC | Facility: CLINIC | Age: 57
End: 2022-11-11

## 2022-11-11 VITALS
SYSTOLIC BLOOD PRESSURE: 150 MMHG | HEART RATE: 74 BPM | BODY MASS INDEX: 31.37 KG/M2 | HEIGHT: 69 IN | OXYGEN SATURATION: 98 % | WEIGHT: 211.8 LBS | DIASTOLIC BLOOD PRESSURE: 82 MMHG | TEMPERATURE: 98.3 F

## 2022-11-11 DIAGNOSIS — E78.2 MIXED HYPERLIPIDEMIA: ICD-10-CM

## 2022-11-11 DIAGNOSIS — E10.65 TYPE 1 DIABETES MELLITUS WITH HYPERGLYCEMIA, WITH LONG-TERM CURRENT USE OF INSULIN (HCC): ICD-10-CM

## 2022-11-11 DIAGNOSIS — I10 HYPERTENSION, UNSPECIFIED TYPE: ICD-10-CM

## 2022-11-11 DIAGNOSIS — E03.9 ACQUIRED HYPOTHYROIDISM: ICD-10-CM

## 2022-11-11 DIAGNOSIS — B37.0 ORAL THRUSH: Primary | ICD-10-CM

## 2022-11-11 DIAGNOSIS — G47.00 INSOMNIA, UNSPECIFIED TYPE: ICD-10-CM

## 2022-11-11 DIAGNOSIS — Z23 NEED FOR INFLUENZA VACCINATION: ICD-10-CM

## 2022-11-11 RX ORDER — CLOTRIMAZOLE 10 MG/1
10 LOZENGE ORAL; TOPICAL
Qty: 70 TABLET | Refills: 0 | Status: SHIPPED | OUTPATIENT
Start: 2022-11-11 | End: 2022-11-25

## 2022-11-11 RX ORDER — LISINOPRIL 10 MG/1
10 TABLET ORAL DAILY
Qty: 90 TABLET | Refills: 1 | Status: SHIPPED | OUTPATIENT
Start: 2022-11-11 | End: 2023-02-09

## 2022-11-11 RX ORDER — ZOLPIDEM TARTRATE 10 MG/1
10 TABLET ORAL
Qty: 30 TABLET | Refills: 0 | Status: SHIPPED | OUTPATIENT
Start: 2022-11-11

## 2022-11-11 RX ORDER — LISINOPRIL 5 MG/1
5 TABLET ORAL DAILY
Qty: 90 TABLET | Refills: 1
Start: 2022-11-11

## 2022-11-11 NOTE — PROGRESS NOTES
Assessment/Plan:    Oral thrush  Will treat with Clotrimazole  Hypertension  Uncontrolled increase lisiniopril to 15mg  Continue current regimen -   Continue to monitor blood pressure at home  Goal BP is < 130/80  Contact our office for consistent elevations  Recommend low sodium diet  Exercise 30 minutes 5 times a week as tolerated  Recommend yearly eye exam        Hyperlipidemia  Continue on rosuvastatin  Diagnoses and all orders for this visit:    Oral thrush  -     clotrimazole (MYCELEX) 10 mg karrie; Take 1 tablet (10 mg total) by mouth 5 (five) times a day for 14 days    Hypertension, unspecified type  -     lisinopril (ZESTRIL) 10 mg tablet; Take 1 tablet (10 mg total) by mouth daily  -     lisinopril (ZESTRIL) 5 mg tablet; Take 1 tablet (5 mg total) by mouth daily  -     CBC and differential  -     Comprehensive metabolic panel; Future    Type 1 diabetes mellitus with hyperglycemia, with long-term current use of insulin (HCC)  -     Hemoglobin A1C    Mixed hyperlipidemia  -     Lipid panel    Acquired hypothyroidism  -     TSH, 3rd generation with Free T4 reflex    Insomnia, unspecified type  -     zolpidem (AMBIEN) 10 mg tablet; Take 1 tablet (10 mg total) by mouth daily at bedtime as needed for sleep    Need for influenza vaccination  -     influenza vaccine, quadrivalent, recombinant, PF, 0 5 mL, for patients 18 yr+ ([id number])          Subjective:      Patient ID: Rajat Garcia is a 65 y o  male  Patient presents today to follow-up on HTN and oral thrush  Oral thrush- had gotten better after nystatin and then returned  HTN- newly diagnosed, was started on lisinopril 5 mg at last office visit and advised to continue with metoprolol, remains uncontrolled denies side effects with lisinopril , denies headaches, changes in vision or chest pain        Diabetes  He presents for his initial diabetic visit  He has type 1 diabetes mellitus  His disease course has been improving  There are no hypoglycemic associated symptoms  Pertinent negatives for hypoglycemia include no dizziness or headaches  Pertinent negatives for diabetes include no chest pain, no polydipsia, no polyphagia, no polyuria and no weakness  There are no hypoglycemic complications  (Patients sugars in the 140s) Eye exam is current  Hyperlipidemia  This is a chronic problem  The current episode started more than 1 year ago  Pertinent negatives include no chest pain or shortness of breath  The following portions of the patient's history were reviewed and updated as appropriate: allergies, current medications, past family history, past medical history, past social history, past surgical history and problem list     Review of Systems   Constitutional: Negative for activity change, appetite change, chills, diaphoresis and fever  HENT: Negative for congestion, ear discharge, ear pain, postnasal drip, rhinorrhea, sinus pressure, sinus pain and sore throat  Eyes: Negative for pain, discharge, itching and visual disturbance  Respiratory: Negative for cough, chest tightness, shortness of breath and wheezing  Cardiovascular: Negative for chest pain, palpitations and leg swelling  Gastrointestinal: Negative for abdominal pain, constipation, diarrhea, nausea and vomiting  Endocrine: Negative for polydipsia, polyphagia and polyuria  Genitourinary: Negative for difficulty urinating, dysuria and urgency  Musculoskeletal: Negative for arthralgias, back pain and neck pain  Skin: Negative for rash and wound  Neurological: Negative for dizziness, weakness, numbness and headaches           Past Medical History:   Diagnosis Date   • Anemia    • Colon polyp    • Diabetes mellitus ([facility] [address] )    • Disease of thyroid gland    • GERD (gastroesophageal reflux disease)    • Hyperlipidemia    • Hypertension    • Insomnia    • Kidney stone    • Sleep apnea     Mild sleep apnea   • Type 1 diabetes mellitus (HCC)     Uses Insulin Pump   • Vitamin D deficiency          Current Outpatient Medications:   •  Biotin 01192 MCG TABS, Take 10,000 mcg by mouth in the morning, Disp: , Rfl:   •  Blood Glucose Monitoring Suppl (ONE TOUCH ULTRA 2) w/Device KIT, Use  , Disp: , Rfl:   •  Calcium Citrate (JORDY-CITRATE PO), Take 1,000 tablets by mouth 2 (two) times a day, Disp: , Rfl:   •  clotrimazole (MYCELEX) 10 mg karrie, Take 1 tablet (10 mg total) by mouth 5 (five) times a day for 14 days, Disp: 70 tablet, Rfl: 0  •  Continuous Blood Gluc  (DEXCOM G6 ) KIYA, USE AS DIRECTED, Disp: 1 Device, Rfl: 0  •  Continuous Blood Gluc Sensor (Dexcom G6 Sensor) MISC, Use 1 Device if needed (change Q 10 days) Use 1 sensor every 10 days, Disp: 9 each, Rfl: 3  •  Continuous Blood Gluc Transmit (Dexcom G6 Transmitter) MISC, Inject 1 Device under the skin every 3 (three) months, Disp: 1 each, Rfl: 3  •  esomeprazole (NexIUM) 20 mg capsule, Take 20 mg by mouth daily in the early morning, Disp: , Rfl:   •  fluticasone (FLONASE) 50 mcg/act nasal spray, 1 spray into each nostril daily, Disp: , Rfl:   •  gabapentin (NEURONTIN) 300 mg capsule, Take 1 capsule (300 mg total) by mouth daily at bedtime, Disp: 90 capsule, Rfl: 1  •  Glucagon (Baqsimi One Pack) 3 MG/DOSE POWD, Use one dose in one nostril, 3 mg in case of severe hypoglycemia and unconsciousness, Disp: 1 each, Rfl: 4  •  insulin aspart (NovoLOG) 100 units/mL injection, use UP to 70 units PER DAY VIA INSULIN PUMP, Disp: 55 mL, Rfl: 2  •  insulin glargine (Basaglar KwikPen) 100 units/mL injection pen, Inject 32 units in case of pump failure ( incase of emergency), Disp: 15 mL, Rfl: 0  •  Insulin Pen Needle (BD PEN NEEDLE LYRIC U/F) 32G X 4 MM MISC, by Does not apply route daily Use once daily, Disp: 100 each, Rfl: 2  •  lisinopril (ZESTRIL) 10 mg tablet, Take 1 tablet (10 mg total) by mouth daily, Disp: 90 tablet, Rfl: 1  •  lisinopril (ZESTRIL) 5 mg tablet, Take 1 tablet (5 mg total) by mouth daily, Disp: 90 tablet, Rfl: 1  •  loratadine (CLARITIN) 10 mg tablet, Take 1 tablet (10 mg total) by mouth daily, Disp: 90 tablet, Rfl: 1  •  metoprolol tartrate (LOPRESSOR) 25 mg tablet, Take 0 5 tablets (12 5 mg total) by mouth every 12 (twelve) hours, Disp: 90 tablet, Rfl: 1  •  Multiple Vitamins-Minerals (Bariatric Fusion) CHEW, Chew  , Disp: , Rfl:   •  Probiotic Product (PROBIOTIC DAILY PO), Take 1 tablet by mouth daily , Disp: , Rfl:   •  psyllium (METAMUCIL) 0 52 g capsule, Take 0 52 g by mouth 3 (three) times a day Fiber capsules, Disp: , Rfl:   •  rosuvastatin (CRESTOR) 5 mg tablet, Take 1 tablet (5 mg total) by mouth daily, Disp: 90 tablet, Rfl: 1  •  Synthroid 200 MCG tablet, Take one tablet by mouth on an empty stomach in early morning, Disp: 60 tablet, Rfl: 0  •  TURMERIC PO, Take 1,400 capsules by mouth 2 (two) times a day, Disp: , Rfl:   •  vitamin B-12 (VITAMIN B-12) 1,000 mcg tablet, Take 1,000 mcg by mouth 3 (three) times a week, Disp: , Rfl:   •  zolpidem (AMBIEN) 10 mg tablet, Take 1 tablet (10 mg total) by mouth daily at bedtime as needed for sleep, Disp: 30 tablet, Rfl: 0  •  acetone, urine, test (Ketostix) strip, Check ketones if blood sugars > 250, more than twice or in case of nausea or vomiting and abdominal pains (Patient not taking: No sig reported), Disp: 100 each, Rfl: 0  •  Glucagon (BAQSIMI TWO PACK) 3 MG/DOSE POWD, Use one dose as needed in case of severe hypoglycemia ( nasal spray) (Patient not taking: No sig reported), Disp: 1 each, Rfl: 0  •  lidocaine (Lidoderm) 5 %, Apply 1 patch topically daily for 6 days Remove & Discard patch within 12 hours or as directed by MD (Patient not taking: No sig reported), Disp: 6 patch, Rfl: 0    Allergies   Allergen Reactions   • Ibuprofen GI Intolerance   • Other Other (See Comments)     Seasonal allergies- pt has congestion       Social History   Past Surgical History:   Procedure Laterality Date   • BARIATRIC SURGERY     • CARPAL TUNNEL RELEASE Right    • COLONOSCOPY     • EGD     • FL RETROGRADE PYELOGRAM  4/24/2020   • LASIK     • CO CYSTO/URETERO W/LITHOTRIPSY &INDWELL STENT INSRT Right 4/24/2020    Procedure: CYSTOSCOPY URETEROSCOPY WITH LITHOTRIPSY HOLMIUM LASER, RETROGRADE PYELOGRAM AND INSERTION STENT URETERAL;  Surgeon: Adelaide Berg MD;  Location: [facility];  Service: Urology   • CO LAP GASTRIC BYPASS/GABBY-EN-Y N/A 4/26/2021    Procedure: BYPASS GASTRIC GABBY-EN-Y LAPAROSCOPIC W ROBOTICS AND INTRAOPERATIVE EGD;  Surgeon: Rigo Nieto MD;  Location: [facility];  Service: Bariatrics   • ROTATOR CUFF REPAIR Right    • UPPER GASTROINTESTINAL ENDOSCOPY       Family History   Problem Relation Age of Onset   • Thyroid disease unspecified Mother    • Osteoporosis Mother    • Hypertension Father    • Colon cancer Father 76   • Diabetes type II Sister    • Hypertension Sister    • Hyperlipidemia Sister    • Thyroid disease unspecified Sister    • Diabetes type II Brother    • Hypertension Brother    • Hyperlipidemia Brother    • Colon cancer Brother 60       Objective:  /82   Pulse 74   Temp 98 3 °F (36 8 °C) (Temporal)   Ht 5' 9 09" (1 755 m)   Wt 96 1 kg (211 lb 12 8 oz)   SpO2 98%   BMI 31 19 kg/m²     Recent Results (from the past 1344 hour(s))   POCT Rapid Covid Ag    Collection Time: 10/10/22  9:12 AM   Result Value Ref Range    POCT SARS-CoV-2 Ag Negative Negative    VALID CONTROL Valid    Hemoglobin A1C    Collection Time: 11/01/22  8:18 AM   Result Value Ref Range    Hemoglobin A1C 6 4 (H) Normal 3 8-5 6%; PreDiabetic 5 7-6 4%;  Diabetic >=6 5%; Glycemic control for adults with diabetes <7 0% %     mg/dl   T4, free    Collection Time: 11/01/22  8:18 AM   Result Value Ref Range    Free T4 0 80 0 76 - 1 46 ng/dL   TSH, 3rd generation    Collection Time: 11/01/22  8:18 AM   Result Value Ref Range    TSH 3RD GENERATON 11 789 (H) 0 450 - 4 500 uIU/mL   CBC and differential    Collection Time: 11/01/22  8:18 AM   Result Value Ref Range    WBC 5 60 4 31 - 10 16 Thousand/uL    RBC 4 79 3 88 - 5 62 Million/uL    Hemoglobin 15 4 12 0 - 17 0 g/dL    Hematocrit 45 8 36 5 - 49 3 %    MCV 96 82 - 98 fL    MCH 32 2 26 8 - 34 3 pg    MCHC 33 6 31 4 - 37 4 g/dL    RDW 12 6 11 6 - 15 1 %    MPV 8 7 (L) 8 9 - 12 7 fL    Platelets 406 040 - 307 Thousands/uL    nRBC 0 /100 WBCs    Neutrophils Relative 47 43 - 75 %    Immat GRANS % 0 0 - 2 %    Lymphocytes Relative 37 14 - 44 %    Monocytes Relative 10 4 - 12 %    Eosinophils Relative 5 0 - 6 %    Basophils Relative 1 0 - 1 %    Neutrophils Absolute 2 61 1 85 - 7 62 Thousands/µL    Immature Grans Absolute 0 02 0 00 - 0 20 Thousand/uL    Lymphocytes Absolute 2 06 0 60 - 4 47 Thousands/µL    Monocytes Absolute 0 54 0 17 - 1 22 Thousand/µL    Eosinophils Absolute 0 30 0 00 - 0 61 Thousand/µL    Basophils Absolute 0 07 0 00 - 0 10 Thousands/µL   Comprehensive metabolic panel    Collection Time: 11/01/22  8:18 AM   Result Value Ref Range    Sodium 139 135 - 147 mmol/L    Potassium 4 3 3 5 - 5 3 mmol/L    Chloride 103 96 - 108 mmol/L    CO2 30 21 - 32 mmol/L    ANION GAP 6 4 - 13 mmol/L    BUN 15 5 - 25 mg/dL    Creatinine 0 87 0 60 - 1 30 mg/dL    Glucose, Fasting 102 (H) 65 - 99 mg/dL    Calcium 9 5 8 4 - 10 2 mg/dL    AST 35 13 - 39 U/L    ALT 22 7 - 52 U/L    Alkaline Phosphatase 84 34 - 104 U/L    Total Protein 7 6 6 4 - 8 4 g/dL    Albumin 4 2 3 5 - 5 0 g/dL    Total Bilirubin 0 43 0 20 - 1 00 mg/dL    eGFR 96 ml/min/1 73sq m   Microalbumin / creatinine urine ratio    Collection Time: 11/01/22 12:54 PM   Result Value Ref Range    Creatinine, Ur 25 5 mg/dL    Microalbum  ,U,Random <5 0 0 0 - 20 0 mg/L    Microalb Creat Ratio <20 0 - 30 mg/g creatinine            Physical Exam  Constitutional:       General: He is not in acute distress  Appearance: He is well-developed  He is not diaphoretic  HENT:      Head: Normocephalic and atraumatic  Comments:  Thick white coating to tongue      Right Ear: External ear normal       Left Ear: External ear normal       Nose: Nose normal       Mouth/Throat:      Pharynx: No oropharyngeal exudate  Eyes:      General:         Right eye: No discharge  Left eye: No discharge  Conjunctiva/sclera: Conjunctivae normal       Pupils: Pupils are equal, round, and reactive to light  Neck:      Thyroid: No thyromegaly  Cardiovascular:      Rate and Rhythm: Normal rate and regular rhythm  Heart sounds: Normal heart sounds  No murmur heard  No friction rub  No gallop  Pulmonary:      Effort: Pulmonary effort is normal  No respiratory distress  Breath sounds: Normal breath sounds  No stridor  No wheezing or rales  Abdominal:      General: Bowel sounds are normal  There is no distension  Palpations: Abdomen is soft  Tenderness: There is no abdominal tenderness  Musculoskeletal:      Cervical back: Normal range of motion and neck supple  Lymphadenopathy:      Cervical: No cervical adenopathy  Skin:     General: Skin is warm and dry  Findings: No erythema or rash  Neurological:      Mental Status: He is alert and oriented to person, place, and time  Psychiatric:         Behavior: Behavior normal          Thought Content:  Thought content normal          Judgment: Judgment normal

## 2022-11-11 NOTE — ASSESSMENT & PLAN NOTE
Uncontrolled increase lisiniopril to 15mg  Continue current regimen -   Continue to monitor blood pressure at home  Goal BP is < 130/80  Contact our office for consistent elevations  Recommend low sodium diet  Exercise 30 minutes 5 times a week as tolerated    Recommend yearly eye exam

## 2022-11-13 ENCOUNTER — TELEPHONE (OUTPATIENT)
Dept: OTHER | Facility: OTHER | Age: 57
End: 2022-11-13

## 2022-11-14 DIAGNOSIS — E10.65 TYPE 1 DIABETES MELLITUS WITH HYPERGLYCEMIA, WITH LONG-TERM CURRENT USE OF INSULIN (HCC): ICD-10-CM

## 2022-11-14 RX ORDER — INSULIN ASPART 100 [IU]/ML
INJECTION, SOLUTION INTRAVENOUS; SUBCUTANEOUS
Qty: 50 ML | Refills: 4 | Status: SHIPPED | OUTPATIENT
Start: 2022-11-14

## 2022-12-02 ENCOUNTER — RA CDI HCC (OUTPATIENT)
Dept: OTHER | Facility: HOSPITAL | Age: 57
End: 2022-12-02

## 2022-12-02 NOTE — PROGRESS NOTES
[facility]  coding opportunities       Chart reviewed, no opportunity found: CHART REVIEWED, NO OPPORTUNITY FOUND        Patients Insurance        Commercial Insurance: [address] [place] [address]

## 2022-12-09 ENCOUNTER — OFFICE VISIT (OUTPATIENT)
Dept: INTERNAL MEDICINE CLINIC | Facility: CLINIC | Age: 57
End: 2022-12-09

## 2022-12-09 VITALS
SYSTOLIC BLOOD PRESSURE: 140 MMHG | HEIGHT: 70 IN | WEIGHT: 215 LBS | DIASTOLIC BLOOD PRESSURE: 94 MMHG | TEMPERATURE: 97.8 F | HEART RATE: 65 BPM | BODY MASS INDEX: 30.78 KG/M2 | OXYGEN SATURATION: 97 %

## 2022-12-09 DIAGNOSIS — I10 HYPERTENSION, UNSPECIFIED TYPE: Primary | ICD-10-CM

## 2022-12-09 DIAGNOSIS — G47.09 OTHER INSOMNIA: ICD-10-CM

## 2022-12-09 LAB
LEFT EYE DIABETIC RETINOPATHY: NORMAL
RIGHT EYE DIABETIC RETINOPATHY: NORMAL

## 2022-12-09 PROCEDURE — 2023F DILAT RTA XM W/O RTNOPTHY: CPT | Performed by: NURSE PRACTITIONER

## 2022-12-09 RX ORDER — LISINOPRIL AND HYDROCHLOROTHIAZIDE 20; 12.5 MG/1; MG/1
1 TABLET ORAL DAILY
Qty: 90 TABLET | Refills: 1 | Status: SHIPPED | OUTPATIENT
Start: 2022-12-09

## 2022-12-09 RX ORDER — ESZOPICLONE 1 MG/1
1 TABLET, FILM COATED ORAL
Qty: 30 TABLET | Refills: 0 | Status: SHIPPED | OUTPATIENT
Start: 2022-12-09

## 2022-12-09 NOTE — ASSESSMENT & PLAN NOTE
Improving but uncontrolled, will start lisinopril-hydrochlorothiazide  Continue current regimen -   Continue to monitor blood pressure at home  Goal BP is < 130/80  Contact our office for consistent elevations  Recommend low sodium diet  Exercise 30 minutes 5 times a week as tolerated    Recommend yearly eye exam

## 2022-12-09 NOTE — PROGRESS NOTES
Assessment/Plan:    Hypertension  Improving but uncontrolled, will start lisinopril-hydrochlorothiazide  Continue current regimen -   Continue to monitor blood pressure at home  Goal BP is < 130/80  Contact our office for consistent elevations  Recommend low sodium diet  Exercise 30 minutes 5 times a week as tolerated  Recommend yearly eye exam     Insomnia  We will discontinue Ambien, and trial of Lunesta  Diagnoses and all orders for this visit:    Hypertension, unspecified type  -     eszopiclone (LUNESTA) 1 mg tablet; Take 1 tablet (1 mg total) by mouth daily at bedtime as needed for sleep Take immediately before bedtime  -     lisinopril-hydrochlorothiazide (PRINZIDE,ZESTORETIC) 20-12 5 MG per tablet; Take 1 tablet by mouth daily    Other insomnia          Subjective:      Patient ID: Carmina Sen is a 65 y o  male  Patient presents today to follow-up on HTN and insomnia  HTN- newly diagnosed, improved but uncontrolled, lisinopril was increased to 15 mg at last office visit and advised to continue with metoprolol, remains uncontrolled denies side effects with lisinopril , denies headaches, changes in vision or chest pain    Insomnia-patient reports that Ambien helps with insomnia however he wakes up feeling groggy and has desire to try different medication      The following portions of the patient's history were reviewed and updated as appropriate: allergies, current medications, past family history, past medical history, past social history, past surgical history and problem list     Review of Systems   Constitutional: Negative for activity change, appetite change, chills, diaphoresis and fever  HENT: Negative for congestion, ear discharge, ear pain, postnasal drip, rhinorrhea, sinus pressure, sinus pain and sore throat  Eyes: Negative for pain, discharge, itching and visual disturbance  Respiratory: Negative for cough, chest tightness, shortness of breath and wheezing  Cardiovascular: Negative for chest pain, palpitations and leg swelling  Gastrointestinal: Negative for abdominal pain, constipation, diarrhea, nausea and vomiting  Endocrine: Negative for polydipsia, polyphagia and polyuria  Genitourinary: Negative for difficulty urinating, dysuria and urgency  Musculoskeletal: Negative for arthralgias, back pain and neck pain  Skin: Negative for rash and wound  Neurological: Negative for dizziness, weakness, numbness and headaches  Psychiatric/Behavioral: Negative for dysphoric mood and sleep disturbance           Past Medical History:   Diagnosis Date   • Anemia    • Colon polyp    • Diabetes mellitus ([facility] [address] )    • Disease of thyroid gland    • GERD (gastroesophageal reflux disease)    • Hyperlipidemia    • Hypertension    • Insomnia    • Kidney stone    • Sleep apnea     Mild sleep apnea   • Type 1 diabetes mellitus (HCC)     Uses Insulin Pump   • Vitamin D deficiency          Current Outpatient Medications:   •  Biotin 87962 MCG TABS, Take 10,000 mcg by mouth in the morning, Disp: , Rfl:   •  Blood Glucose Monitoring Suppl (ONE TOUCH ULTRA 2) w/Device KIT, Use  , Disp: , Rfl:   •  Calcium Citrate (JORDY-CITRATE PO), Take 1,000 tablets by mouth 2 (two) times a day, Disp: , Rfl:   •  Continuous Blood Gluc  (DEXCOM G6 ) KIYA, USE AS DIRECTED, Disp: 1 Device, Rfl: 0  •  Continuous Blood Gluc Sensor (Dexcom G6 Sensor) MISC, Use 1 Device if needed (change Q 10 days) Use 1 sensor every 10 days, Disp: 9 each, Rfl: 3  •  Continuous Blood Gluc Transmit (Dexcom G6 Transmitter) MISC, Inject 1 Device under the skin every 3 (three) months, Disp: 1 each, Rfl: 3  •  esomeprazole (NexIUM) 20 mg capsule, Take 20 mg by mouth daily in the early morning, Disp: , Rfl:   •  eszopiclone (LUNESTA) 1 mg tablet, Take 1 tablet (1 mg total) by mouth daily at bedtime as needed for sleep Take immediately before bedtime, Disp: 30 tablet, Rfl: 0  •  fluticasone (FLONASE) 50 mcg/act nasal spray, 1 spray into each nostril daily, Disp: , Rfl:   •  gabapentin (NEURONTIN) 300 mg capsule, Take 1 capsule (300 mg total) by mouth daily at bedtime, Disp: 90 capsule, Rfl: 1  •  Glucagon (Baqsimi One Pack) 3 MG/DOSE POWD, Use one dose in one nostril, 3 mg in case of severe hypoglycemia and unconsciousness, Disp: 1 each, Rfl: 4  •  Insulin Aspart (NovoLOG) 100 units/mL injection, USE UP TO 70 UNITS DAILY VIA INSULIN PUMP, Disp: 50 mL, Rfl: 4  •  insulin glargine (Basaglar KwikPen) 100 units/mL injection pen, Inject 32 units in case of pump failure ( incase of emergency), Disp: 15 mL, Rfl: 0  •  Insulin Pen Needle (BD PEN NEEDLE LYRIC U/F) 32G X 4 MM MISC, by Does not apply route daily Use once daily, Disp: 100 each, Rfl: 2  •  lisinopril-hydrochlorothiazide (PRINZIDE,ZESTORETIC) 20-12 5 MG per tablet, Take 1 tablet by mouth daily, Disp: 90 tablet, Rfl: 1  •  loratadine (CLARITIN) 10 mg tablet, Take 1 tablet (10 mg total) by mouth daily, Disp: 90 tablet, Rfl: 1  •  metoprolol tartrate (LOPRESSOR) 25 mg tablet, Take 0 5 tablets (12 5 mg total) by mouth every 12 (twelve) hours, Disp: 90 tablet, Rfl: 1  •  Multiple Vitamins-Minerals (Bariatric Fusion) CHEW, Chew  , Disp: , Rfl:   •  Probiotic Product (PROBIOTIC DAILY PO), Take 1 tablet by mouth daily , Disp: , Rfl:   •  psyllium (METAMUCIL) 0 52 g capsule, Take 0 52 g by mouth 3 (three) times a day Fiber capsules, Disp: , Rfl:   •  rosuvastatin (CRESTOR) 5 mg tablet, Take 1 tablet (5 mg total) by mouth daily, Disp: 90 tablet, Rfl: 1  •  Synthroid 200 MCG tablet, Take one tablet by mouth on an empty stomach in early morning, Disp: 60 tablet, Rfl: 0  •  TURMERIC PO, Take 1,400 capsules by mouth 2 (two) times a day, Disp: , Rfl:   •  vitamin B-12 (VITAMIN B-12) 1,000 mcg tablet, Take 1,000 mcg by mouth 3 (three) times a week, Disp: , Rfl:   •  acetone, urine, test (Ketostix) strip, Check ketones if blood sugars > 250, more than twice or in case of nausea or vomiting and abdominal pains (Patient not taking: Reported on 10/10/2022), Disp: 100 each, Rfl: 0  •  Glucagon (BAQSIMI TWO PACK) 3 MG/DOSE POWD, Use one dose as needed in case of severe hypoglycemia ( nasal spray) (Patient not taking: Reported on 8/29/2022), Disp: 1 each, Rfl: 0  •  lidocaine (Lidoderm) 5 %, Apply 1 patch topically daily for 6 days Remove & Discard patch within 12 hours or as directed by MD (Patient not taking: Reported on 12/9/2022), Disp: 6 patch, Rfl: 0    Allergies   Allergen Reactions   • Ibuprofen GI Intolerance   • Other Other (See Comments)     Seasonal allergies- pt has congestion       Social History   Past Surgical History:   Procedure Laterality Date   • BARIATRIC SURGERY     • CARPAL TUNNEL RELEASE Right    • COLONOSCOPY     • EGD     • [place] RETROGRADE PYELOGRAM  4/24/2020   • LASIK     • [place] CYSTO/URETERO W/LITHOTRIPSY &INDWELL STENT INSRT Right 4/24/2020    Procedure: CYSTOSCOPY URETEROSCOPY WITH LITHOTRIPSY HOLMIUM LASER, RETROGRADE PYELOGRAM AND INSERTION STENT URETERAL;  Surgeon: Liza Atwood MD;  Location: [facility];  Service: Urology   • [place] LAP GASTRIC BYPASS/GABBY-EN-Y N/A 4/26/2021    Procedure: BYPASS GASTRIC GABBY-EN-Y LAPAROSCOPIC W ROBOTICS AND INTRAOPERATIVE EGD;  Surgeon: Kev Oh MD;  Location: [facility];  Service: Bariatrics   • ROTATOR CUFF REPAIR Right    • UPPER GASTROINTESTINAL ENDOSCOPY       Family History   Problem Relation Age of Onset   • Thyroid disease unspecified Mother    • Osteoporosis Mother    • Hypertension Father    • Colon cancer Father 76   • Diabetes type II Sister    • Hypertension Sister    • Hyperlipidemia Sister    • Thyroid disease unspecified Sister    • Diabetes type II Brother    • Hypertension Brother    • Hyperlipidemia Brother    • Colon cancer Brother 60       Objective:  /94 (BP Location: Left arm, Patient Position: Sitting, Cuff Size: Standard)   Pulse 65   Temp 97 8 °F (36 6 °C) (Temporal)   Ht 5' 9 53" (1 766 m) Wt 97 5 kg (215 lb)   SpO2 97%   BMI 31 27 kg/m²     Recent Results (from the past 1344 hour(s))   Hemoglobin A1C    Collection Time: 11/01/22  8:18 AM   Result Value Ref Range    Hemoglobin A1C 6 4 (H) Normal 3 8-5 6%; PreDiabetic 5 7-6 4%;  Diabetic >=6 5%; Glycemic control for adults with diabetes <7 0% %     mg/dl   T4, free    Collection Time: 11/01/22  8:18 AM   Result Value Ref Range    Free T4 0 80 0 76 - 1 46 ng/dL   TSH, 3rd generation    Collection Time: 11/01/22  8:18 AM   Result Value Ref Range    TSH 3RD GENERATON 11 789 (H) 0 450 - 4 500 uIU/mL   CBC and differential    Collection Time: 11/01/22  8:18 AM   Result Value Ref Range    WBC 5 60 4 31 - 10 16 Thousand/uL    RBC 4 79 3 88 - 5 62 Million/uL    Hemoglobin 15 4 12 0 - 17 0 g/dL    Hematocrit 45 8 36 5 - 49 3 %    MCV 96 82 - 98 fL    MCH 32 2 26 8 - 34 3 pg    MCHC 33 6 31 4 - 37 4 g/dL    RDW 12 6 11 6 - 15 1 %    MPV 8 7 (L) 8 9 - 12 7 fL    Platelets 829 354 - 433 Thousands/uL    nRBC 0 /100 WBCs    Neutrophils Relative 47 43 - 75 %    Immat GRANS % 0 0 - 2 %    Lymphocytes Relative 37 14 - 44 %    Monocytes Relative 10 4 - 12 %    Eosinophils Relative 5 0 - 6 %    Basophils Relative 1 0 - 1 %    Neutrophils Absolute 2 61 1 85 - 7 62 Thousands/µL    Immature Grans Absolute 0 02 0 00 - 0 20 Thousand/uL    Lymphocytes Absolute 2 06 0 60 - 4 47 Thousands/µL    Monocytes Absolute 0 54 0 17 - 1 22 Thousand/µL    Eosinophils Absolute 0 30 0 00 - 0 61 Thousand/µL    Basophils Absolute 0 07 0 00 - 0 10 Thousands/µL   Comprehensive metabolic panel    Collection Time: 11/01/22  8:18 AM   Result Value Ref Range    Sodium 139 135 - 147 mmol/L    Potassium 4 3 3 5 - 5 3 mmol/L    Chloride 103 96 - 108 mmol/L    CO2 30 21 - 32 mmol/L    ANION GAP 6 4 - 13 mmol/L    BUN 15 5 - 25 mg/dL    Creatinine 0 87 0 60 - 1 30 mg/dL    Glucose, Fasting 102 (H) 65 - 99 mg/dL    Calcium 9 5 8 4 - 10 2 mg/dL    AST 35 13 - 39 U/L    ALT 22 7 - 52 U/L Alkaline Phosphatase 84 34 - 104 U/L    Total Protein 7 6 6 4 - 8 4 g/dL    Albumin 4 2 3 5 - 5 0 g/dL    Total Bilirubin 0 43 0 20 - 1 00 mg/dL    eGFR 96 ml/min/1 73sq m   Microalbumin / creatinine urine ratio    Collection Time: 11/01/22 12:54 PM   Result Value Ref Range    Creatinine, Ur 25 5 mg/dL    Microalbum  ,U,Random <5 0 0 0 - 20 0 mg/L    Microalb Creat Ratio <20 0 - 30 mg/g creatinine            Physical Exam  Constitutional:       General: He is not in acute distress  Appearance: He is well-developed  He is not diaphoretic  HENT:      Head: Normocephalic and atraumatic  Right Ear: External ear normal       Left Ear: External ear normal       Nose: Nose normal       Mouth/Throat:      Pharynx: No oropharyngeal exudate  Eyes:      General:         Right eye: No discharge  Left eye: No discharge  Conjunctiva/sclera: Conjunctivae normal       Pupils: Pupils are equal, round, and reactive to light  Neck:      Thyroid: No thyromegaly  Cardiovascular:      Rate and Rhythm: Normal rate and regular rhythm  Heart sounds: Normal heart sounds  No murmur heard  No friction rub  No gallop  Pulmonary:      Effort: Pulmonary effort is normal  No respiratory distress  Breath sounds: Normal breath sounds  No stridor  No wheezing or rales  Abdominal:      General: Bowel sounds are normal  There is no distension  Palpations: Abdomen is soft  Tenderness: There is no abdominal tenderness  Musculoskeletal:      Cervical back: Normal range of motion and neck supple  Lymphadenopathy:      Cervical: No cervical adenopathy  Skin:     General: Skin is warm and dry  Findings: No erythema or rash  Neurological:      Mental Status: He is alert and oriented to person, place, and time  Psychiatric:         Behavior: Behavior normal          Thought Content:  Thought content normal          Judgment: Judgment normal

## 2022-12-29 ENCOUNTER — VBI (OUTPATIENT)
Dept: ADMINISTRATIVE | Facility: OTHER | Age: 57
End: 2022-12-29

## 2023-01-02 DIAGNOSIS — I10 HYPERTENSION, UNSPECIFIED TYPE: ICD-10-CM

## 2023-01-02 DIAGNOSIS — E10.65 TYPE 1 DIABETES MELLITUS WITH HYPERGLYCEMIA, WITH LONG-TERM CURRENT USE OF INSULIN (HCC): ICD-10-CM

## 2023-01-03 ENCOUNTER — TELEPHONE (OUTPATIENT)
Dept: INTERNAL MEDICINE CLINIC | Facility: CLINIC | Age: 58
End: 2023-01-03

## 2023-01-03 DIAGNOSIS — E10.40 TYPE 1 DIABETES MELLITUS WITH DIABETIC NEUROPATHY (HCC): ICD-10-CM

## 2023-01-03 RX ORDER — GABAPENTIN 300 MG/1
300 CAPSULE ORAL
Qty: 90 CAPSULE | Refills: 0 | Status: CANCELLED | OUTPATIENT
Start: 2023-01-03

## 2023-01-03 RX ORDER — GABAPENTIN 300 MG/1
300 CAPSULE ORAL
Qty: 90 CAPSULE | Refills: 1 | Status: SHIPPED | OUTPATIENT
Start: 2023-01-03

## 2023-01-03 RX ORDER — LEVOTHYROXINE SODIUM 200 MCG
TABLET ORAL
Qty: 30 TABLET | Refills: 0 | Status: SHIPPED | OUTPATIENT
Start: 2023-01-03

## 2023-01-03 NOTE — TELEPHONE ENCOUNTER
----- Message from Edmar Bear sent at 1/2/2023  8:26 AM EST -----  Regarding: Prescription Refils  Contact: [phone]  Ish, I pu t in a prescription refill request for the generic Sleeping Pill - Is it possible to get a 90 day supply? Also, I had previously out in a reqest for a refill for the gabapentin 300mg - I think you denied this because it was provided for the essential tremors - but, it was also dispensed because of my Diabetic Neuropathy - Could you put in a 90 day refill?

## 2023-01-03 NOTE — TELEPHONE ENCOUNTER
Last ov 12/9/22  Next ov 6/9/23    Lunesta refill will be sent to Jaz[facility] Carmona to sign on 1/6/23

## 2023-01-04 ENCOUNTER — VBI (OUTPATIENT)
Dept: ADMINISTRATIVE | Facility: OTHER | Age: 58
End: 2023-01-04

## 2023-01-06 ENCOUNTER — TELEPHONE (OUTPATIENT)
Dept: ENDOCRINOLOGY | Facility: CLINIC | Age: 58
End: 2023-01-06

## 2023-01-06 RX ORDER — ESZOPICLONE 1 MG/1
1 TABLET, FILM COATED ORAL
Qty: 90 TABLET | Refills: 0 | Status: SHIPPED | OUTPATIENT
Start: 2023-01-06

## 2023-01-09 ENCOUNTER — TELEMEDICINE (OUTPATIENT)
Dept: ENDOCRINOLOGY | Facility: CLINIC | Age: 58
End: 2023-01-09

## 2023-01-09 VITALS — HEIGHT: 69 IN | BODY MASS INDEX: 31.75 KG/M2

## 2023-01-09 DIAGNOSIS — E78.2 MIXED HYPERLIPIDEMIA: ICD-10-CM

## 2023-01-09 DIAGNOSIS — I10 PRIMARY HYPERTENSION: ICD-10-CM

## 2023-01-09 DIAGNOSIS — E03.9 ACQUIRED HYPOTHYROIDISM: ICD-10-CM

## 2023-01-09 DIAGNOSIS — E10.65 TYPE 1 DIABETES MELLITUS WITH HYPERGLYCEMIA, WITH LONG-TERM CURRENT USE OF INSULIN (HCC): Primary | ICD-10-CM

## 2023-01-09 NOTE — PROGRESS NOTES
Virtual Regular Visit    Verification of patient location:    Patient is located in the following state in which I hold an active license [place]      Assessment/Plan:    Problem List Items Addressed This Visit        Endocrine    Hypothyroidism    Type 1 diabetes mellitus with hyperglycemia, with long-term current use of insulin ([facility] [address] ) - Primary       Cardiovascular and Mediastinum    Hypertension       Other    Hyperlipidemia            Reason for visit is DM1  Chief Complaint   Patient presents with   • Virtual Regular Visit        Encounter provider Bibi Skelton PA-C    Provider located at [address] [id number]      Recent Visits  Date Type Provider Dept   01/06/23 Telephone Alison Charles PA-C [facility]   Showing recent visits within past 7 days and meeting all other requirements  Today's Visits  Date Type Provider Dept   01/09/23 Telemedicine Alison Charles PA-C [facility]   Showing today's visits and meeting all other requirements  Future Appointments  No visits were found meeting these conditions  Showing future appointments within next 150 days and meeting all other requirements       The patient was identified by name and date of birth  Alonso Lares was informed that this is a telemedicine visit and that the visit is being conducted through the [facility]  He agrees to proceed     My office door was closed  No one else was in the room  He acknowledged consent and understanding of privacy and security of the video platform  The patient has agreed to participate and understands they can discontinue the visit at any time  Patient is aware this is a billable service  Subjective  Alonso Lares is a 65 y o  male with a history of type 1 diabetes with long term use of insulin  Diabetes course has been stable   Complications of DM: none reported  He was admitted in April 2021 for bariatric surgery  Denies any issues with his current regimen  Home glucose monitoring: are performed regularly, has Dexcom sensor  Average glucose 159 mg/dL with SD of 61 mg/dl  In range 59%, above range 37%, below range <4%  He adjusted his basal rates recently to avoid lows     Current regimen: Novolog via pump  Metformin and Jardiance were stopped after bariatric surgery  compliant most of the time, denies any side effects from medications  Patient is on a TSlim pump  He denies any malfunctioning of the pump  Current Insulin pump settings:  Basal rate:  12:00 a m  = 1 000 units/hour  4:00 a m  = 1 000 units/hour  6:00 a m  = 1 000 units/hour  8:00 a m  = 1 500 units/hour  9:00 p m  = 1 500 units/hour  Insulin to carb ratio:  12:00 a m  = 8  4:00 a m  = 8  6:00 a m  = 8  8:00 a m  = 8  9:00 p m  = 8  Insulin sensitivity factor:  12:00 a m  = 35  4:00 a m  = 35  6:00 a m  = 25  8:00 a m  = 25  9:00 p m  = 35  BG target:    12:00 a m  = 135  4:00 a m  = 135  6:00 a m  = 125  8:00 a m  = 115  9:00 p m  = 135    Discussed with patient in case of malfunctioning of the pump to use basal and bolus therapy as backup which is prescribed to the patient  Also notified patient to call clinic if any issues  Hypoglycemic episodes: No, rare  H/o of hypoglycemia causing hospitalization or Intervention such as glucagon injection or ambulance call :  Yes (did not go to the ER but did have a syncopal episode)  Hypoglycemia symptoms: weakness/sweating   Treatment of hypoglycemia: discussed treatment      Medic alert tag: recommended: Yes     Diabetes education: Not recently   Diet: 3 meals per day (small meals), 2 snacks per day  Timing of meals is variable  Diabetic diet compliance:  is complaint with bolus 65% of the time  Activity: Daily activity is predictable: Yes  No routine exercise        Ophthamology: sees routinely; (Dr Candelaria Mcconnell), Dec 2022  Podiatry: July 2022     Has HTN: on ACE inhibitor/ARB   Has hyperlipidemia: not on Crestor; discussed to consider low dose statin  He previously stated he will discuss with his PCP  Thyroid disorders: Hypothyroidism  Patient is on Synthroid 200 mcg daily on empty stomach 1 hour before breakfast  Thyroid function tests in normal range  History of pancreatitis: No     Vitamin D deficiency: is currently taking a bariatric multivitamin    Component      Latest Ref Rng & Units 6/17/2022 11/1/2022   Sodium      135 - 147 mmol/L 141 139   Potassium      3 5 - 5 3 mmol/L 4 1 4 3   Chloride      96 - 108 mmol/L 105 103   CO2      21 - 32 mmol/L 24 30   Anion Gap      4 - 13 mmol/L 12 6   BUN      5 - 25 mg/dL 17 15   Creatinine      0 60 - 1 30 mg/dL 0 80 0 87   GLUCOSE FASTING      65 - 99 mg/dL 130 (H) 102 (H)   Calcium      8 4 - 10 2 mg/dL 9 1 9 5   AST      13 - 39 U/L  35   ALT      7 - 52 U/L  22   Alkaline Phosphatase      34 - 104 U/L  84   Total Protein      6 4 - 8 4 g/dL  7 6   Albumin      3 5 - 5 0 g/dL  4 2   TOTAL BILIRUBIN      0 20 - 1 00 mg/dL  0 43   eGFR      ml/min/1 73sq m 99 96   Cholesterol      See Comment mg/dL 216 (H)    Triglycerides      See Comment mg/dL 95    HDL      >=40 mg/dL 65    LDL Calculated      0 - 100 mg/dL 132 (H)    EXT Creatinine Urine      mg/dL 58 2 25 5   MICROALBUM ,U,RANDOM      0 0 - 20 0 mg/L 7 1 <5 0   MICROALBUMIN/CREATININE RATIO      0 - 30 mg/g creatinine 12 <20   Hemoglobin A1C      Normal 3 8-5 6%; PreDiabetic 5 7-6 4%;  Diabetic >=6 5%; Glycemic control for adults with diabetes <7 0% % 6 4 (H) 6 4 (H)   eAG, EST AVG Glucose      mg/dl 137 137   Free T4      0 76 - 1 46 ng/dL 0 91 0 80   TSH 3RD GENERATON      0 450 - 4 500 uIU/mL 2 522 11 789 (H)       Past Medical History:   Diagnosis Date   • Anemia    • Colon polyp    • Diabetes mellitus (HCC)    • Disease of thyroid gland    • GERD (gastroesophageal reflux disease)    • Hyperlipidemia    • Hypertension • Insomnia    • Kidney stone    • Sleep apnea     Mild sleep apnea   • Type 1 diabetes mellitus (HCC)     Uses Insulin Pump   • Vitamin D deficiency        Past Surgical History:   Procedure Laterality Date   • BARIATRIC SURGERY     • CARPAL TUNNEL RELEASE Right    • COLONOSCOPY     • EGD     • FL RETROGRADE PYELOGRAM  4/24/2020   • LASIK     • NM CYSTO/URETERO W/LITHOTRIPSY &INDWELL STENT INSRT Right 4/24/2020    Procedure: CYSTOSCOPY URETEROSCOPY WITH LITHOTRIPSY HOLMIUM LASER, RETROGRADE PYELOGRAM AND INSERTION STENT URETERAL;  Surgeon: Keshia Hurtado MD;  Location: [facility];  Service: Urology   • NM LAPS GSTR RSTCV PX W/BYP GABBY-EN-Y LIMB <150 CM N/A 4/26/2021    Procedure: BYPASS GASTRIC GABBY-EN-Y LAPAROSCOPIC W ROBOTICS AND INTRAOPERATIVE EGD;  Surgeon: Monica Larson MD;  Location: [facility];  Service: Bariatrics   • ROTATOR CUFF REPAIR Right    • UPPER GASTROINTESTINAL ENDOSCOPY         Current Outpatient Medications   Medication Sig Dispense Refill   • eszopiclone (LUNESTA) 1 mg tablet Take 1 tablet (1 mg total) by mouth daily at bedtime as needed for sleep Take immediately before bedtime 90 tablet 0   • acetone, urine, test (Ketostix) strip Check ketones if blood sugars > 250, more than twice or in case of nausea or vomiting and abdominal pains (Patient not taking: Reported on 10/10/2022) 100 each 0   • Biotin 16637 MCG TABS Take 10,000 mcg by mouth in the morning     • Blood Glucose Monitoring Suppl (ONE TOUCH ULTRA 2) w/Device KIT Use       • Calcium Citrate (JORDY-CITRATE PO) Take 1,000 tablets by mouth 2 (two) times a day     • Continuous Blood Gluc  (DEXCOM G6 ) KIYA USE AS DIRECTED 1 Device 0   • Continuous Blood Gluc Sensor (Dexcom G6 Sensor) MISC Use 1 Device if needed (change Q 10 days) Use 1 sensor every 10 days 9 each 3   • Continuous Blood Gluc Transmit (Dexcom G6 Transmitter) MISC Inject 1 Device under the skin every 3 (three) months 1 each 3   • esomeprazole (NexIUM) 20 mg capsule Take 20 mg by mouth daily in the early morning     • fluticasone (FLONASE) 50 mcg/act nasal spray 1 spray into each nostril daily     • gabapentin (NEURONTIN) 300 mg capsule Take 1 capsule (300 mg total) by mouth daily at bedtime 90 capsule 1   • Glucagon (Baqsimi One Pack) 3 MG/DOSE POWD Use one dose in one nostril, 3 mg in case of severe hypoglycemia and unconsciousness 1 each 4   • Glucagon (BAQSIMI TWO PACK) 3 MG/DOSE POWD Use one dose as needed in case of severe hypoglycemia ( nasal spray) (Patient not taking: Reported on 8/29/2022) 1 each 0   • Insulin Aspart (NovoLOG) 100 units/mL injection USE UP TO 70 UNITS DAILY VIA INSULIN PUMP 50 mL 4   • insulin glargine (Basaglar KwikPen) 100 units/mL injection pen Inject 32 units in case of pump failure ( incase of emergency) 15 mL 0   • Insulin Pen Needle (BD PEN NEEDLE LYRIC U/F) 32G X 4 MM MISC by Does not apply route daily Use once daily 100 each 2   • lidocaine (Lidoderm) 5 % Apply 1 patch topically daily for 6 days Remove & Discard patch within 12 hours or as directed by MD (Patient not taking: Reported on 12/9/2022) 6 patch 0   • lisinopril-hydrochlorothiazide (PRINZIDE,ZESTORETIC) 20-12 5 MG per tablet Take 1 tablet by mouth daily 90 tablet 1   • loratadine (CLARITIN) 10 mg tablet Take 1 tablet (10 mg total) by mouth daily 90 tablet 1   • metoprolol tartrate (LOPRESSOR) 25 mg tablet Take 0 5 tablets (12 5 mg total) by mouth every 12 (twelve) hours 90 tablet 1   • Multiple Vitamins-Minerals (Bariatric Fusion) CHEW Chew       • Probiotic Product (PROBIOTIC DAILY PO) Take 1 tablet by mouth daily      • psyllium (METAMUCIL) 0 52 g capsule Take 0 52 g by mouth 3 (three) times a day Fiber capsules     • rosuvastatin (CRESTOR) 5 mg tablet Take 1 tablet (5 mg total) by mouth daily 90 tablet 1   • Synthroid 200 MCG tablet take 1 tablet by mouth every morning ON AN EMPTY STOMACH 30 tablet 0   • TURMERIC PO Take 1,400 capsules by mouth 2 (two) times a day • vitamin B-12 (VITAMIN B-12) 1,000 mcg tablet Take 1,000 mcg by mouth 3 (three) times a week       No current facility-administered medications for this visit  Allergies   Allergen Reactions   • Ibuprofen GI Intolerance   • Other Other (See Comments)     Seasonal allergies- pt has congestion       Review of Systems   Constitutional: Negative for activity change, appetite change, fatigue and unexpected weight change  HENT: Negative for trouble swallowing  Eyes: Negative for visual disturbance  Respiratory: Negative for shortness of breath  Cardiovascular: Negative for chest pain and palpitations  Gastrointestinal: Negative for constipation and diarrhea  Endocrine: Negative for polydipsia and polyuria  Musculoskeletal: Negative  Skin: Negative for wound  Neurological: Positive for numbness  Psychiatric/Behavioral: Negative  Video Exam    Vitals:    01/09/23 1345   Height: 5' 9" (1 753 m)       Physical Exam     Physical Exam   Constitutional: He is oriented to person, place, and time  He appears well-developed and well-nourished  No distress  HENT:   Head: Normocephalic and atraumatic  Neck: Normal range of motion  Pulmonary/Chest: Effort normal    Musculoskeletal: Normal range of motion  Neurological: He is alert and oriented to person, place, and time  Skin: He is not diaphoretic  Psychiatric: He has a normal mood and affect  His behavior is normal       PLAN  Type 2 DM  HGA1C 6 4%  Remained the Same  Treatment regimen: decrease basal rate at 9 pm to 1 200 to help avoid overnight lows  Discussed intensive insulin regimen does increase risk for hypoglycemia  Episodes of hypoglycemia can lead to permanent disability and death  Discussed risks/complications associated with uncontrolled diabetes  Advised to adhere to diabetic diet, and recommended staying active/exercising routinely as tolerated    Keep carbohydrates consistent to limit blood glucose fluctuations  Advised to call if blood sugars less than 70 mg/dl or over 300 mg/dl  Check blood glucose 3+ times a day  Discussed symptoms and treatment of hypoglycemia  Discussed use of CGM to collect additional blood glucose data to reveal trends and patterns that can be used to optimize treatment plan  Referred to diabetes/nutrition education  Recommended routine follow-up with podiatry and ophthalmology  Send log in 1-2 weeks  Ordered blood work to complete prior to next visit    Hypothyroidism  TSH elevated at 11 789  Dose of levothyroxine was adjusted previously  Repeat TFTs now and prior to next visit   HTN  Continue current treatment  Managed by PCP  Hyperlipidemia   LDL previously 132  Discussed benefit of starting statin therapy         I spent 30 minutes directly with the patient during this visit    Julieann Bumpers PA-C

## 2023-01-09 NOTE — PATIENT INSTRUCTIONS
Hypoglycemia instructions   [place]  1/9/2023  [id number]    Low Blood Sugar    Steps to treat low blood sugar  1  Test blood sugar if you have symptoms of low blood sugar:   Low Blood Sugar Symptoms:  o Sweaty  o Dizzy  o Rapid heartbeat  o Shaky  o Bad mood  o Hungry      2  Treat blood sugar less than 70 with 15 grams of fast-acting carbohydrate:   Examples of 15 grams Fast-Acting Carbohydrate:  o 4 oz juice  o 4 oz regular soda  o 3-4 glucose tablets (chew)  o 3-4 hard candies (chew)          3  Wait 15 minutes and test your blood sugar again     4   If blood sugar is less than 100, repeat steps 2-3     5  When your blood sugar is 100 or more, eat a snack if it will be longer than one hour until your next meal  The snack should be 15 grams of carbohydrate and a protein:   Examples of snacks:  o ½ sandwich  o 6 crackers with cheese  o Piece of fruit with cheese or peanut butter  o 6 crackers with peanut butter

## 2023-01-15 ENCOUNTER — PATIENT MESSAGE (OUTPATIENT)
Dept: ENDOCRINOLOGY | Facility: CLINIC | Age: 58
End: 2023-01-15

## 2023-01-15 DIAGNOSIS — E10.65 TYPE 1 DIABETES MELLITUS WITH HYPERGLYCEMIA, WITH LONG-TERM CURRENT USE OF INSULIN (HCC): ICD-10-CM

## 2023-01-16 ENCOUNTER — TELEPHONE (OUTPATIENT)
Dept: ENDOCRINOLOGY | Facility: CLINIC | Age: 58
End: 2023-01-16

## 2023-01-16 RX ORDER — INSULIN ASPART 100 [IU]/ML
INJECTION, SOLUTION INTRAVENOUS; SUBCUTANEOUS
Qty: 70 ML | Refills: 1 | Status: SHIPPED | OUTPATIENT
Start: 2023-01-16 | End: 2023-01-20 | Stop reason: SDUPTHER

## 2023-01-20 RX ORDER — INSULIN ASPART 100 [IU]/ML
INJECTION, SOLUTION INTRAVENOUS; SUBCUTANEOUS
Qty: 70 ML | Refills: 1 | Status: SHIPPED | OUTPATIENT
Start: 2023-01-20

## 2023-01-28 DIAGNOSIS — E10.65 TYPE 1 DIABETES MELLITUS WITH HYPERGLYCEMIA, WITH LONG-TERM CURRENT USE OF INSULIN (HCC): ICD-10-CM

## 2023-01-30 RX ORDER — LEVOTHYROXINE SODIUM 200 MCG
TABLET ORAL
Qty: 90 TABLET | Refills: 1 | Status: SHIPPED | OUTPATIENT
Start: 2023-01-30

## 2023-04-03 ENCOUNTER — TELEPHONE (OUTPATIENT)
Dept: ENDOCRINOLOGY | Facility: CLINIC | Age: 58
End: 2023-04-03

## 2023-04-06 ENCOUNTER — TELEPHONE (OUTPATIENT)
Dept: ENDOCRINOLOGY | Facility: CLINIC | Age: 58
End: 2023-04-06

## 2023-04-11 NOTE — PROGRESS NOTES
Pre op labs

## 2023-05-11 ENCOUNTER — LAB (OUTPATIENT)
Dept: LAB | Facility: CLINIC | Age: 58
End: 2023-05-11

## 2023-05-11 DIAGNOSIS — E10.65 TYPE 1 DIABETES MELLITUS WITH HYPERGLYCEMIA, WITH LONG-TERM CURRENT USE OF INSULIN (HCC): ICD-10-CM

## 2023-05-11 LAB
CREAT UR-MCNC: 41.9 MG/DL
MICROALBUMIN UR-MCNC: <5 MG/L (ref 0–20)
MICROALBUMIN/CREAT 24H UR: <12 MG/G CREATININE (ref 0–30)

## 2023-05-12 LAB
EST. AVERAGE GLUCOSE BLD GHB EST-MCNC: 154 MG/DL
HBA1C MFR BLD: 7 %

## 2023-05-15 ENCOUNTER — OFFICE VISIT (OUTPATIENT)
Dept: ENDOCRINOLOGY | Facility: CLINIC | Age: 58
End: 2023-05-15

## 2023-05-15 VITALS
BODY MASS INDEX: 30.21 KG/M2 | DIASTOLIC BLOOD PRESSURE: 82 MMHG | HEART RATE: 68 BPM | WEIGHT: 204 LBS | SYSTOLIC BLOOD PRESSURE: 124 MMHG | HEIGHT: 69 IN

## 2023-05-15 DIAGNOSIS — E03.9 ACQUIRED HYPOTHYROIDISM: ICD-10-CM

## 2023-05-15 DIAGNOSIS — E78.2 MIXED HYPERLIPIDEMIA: ICD-10-CM

## 2023-05-15 DIAGNOSIS — I10 PRIMARY HYPERTENSION: ICD-10-CM

## 2023-05-15 DIAGNOSIS — E10.65 TYPE 1 DIABETES MELLITUS WITH HYPERGLYCEMIA, WITH LONG-TERM CURRENT USE OF INSULIN (HCC): Primary | ICD-10-CM

## 2023-05-15 NOTE — PROGRESS NOTES
Patient Progress Note      CC: DM      Referring Provider  No referring provider defined for this encounter  History of Present Illness:   Joanna Howell is a 65 y o  male with a history of type 1 diabetes with long term use of insulin  Diabetes course has been stable  Complications of DM: none reported  Has history of bariatric surgery  Denies any issues with his current regimen  Home glucose monitoring: are performed regularly, has Dexcom sensor  Average glucose 170 mg/dL   In range 16%, above range 82%, below range <2%  Target range is  mg/dl     Current regimen: Novolog via pump  Metformin and Jardiance were stopped after bariatric surgery  compliant most of the time, denies any side effects from medications  Patient is on a TSlim pump  He denies any malfunctioning of the pump  Current Insulin pump settings:  Basal rate:  12:00 a m  = 0 800 units/hour  4:00 a m  = 1 000 units/hour  6:00 a m  = 1 000 units/hour  8:00 a m  = 1 500 units/hour  9:00 p m  = 1 000 units/hour  Insulin to carb ratio:  12:00 a m  = 8  4:00 a m  = 8  6:00 a m  = 8  8:00 a m  = 8  9:00 p m  = 8  Insulin sensitivity factor:  12:00 a m  = 40  4:00 a m  = 40  6:00 a m  = 25  8:00 a m  = 25  9:00 p m  = 40  BG target:    12:00 a m  = 135  4:00 a m  = 135  6:00 a m  = 125  8:00 a m  = 115  9:00 p m  = 135     Discussed with patient in case of malfunctioning of the pump to use basal and bolus therapy as backup which is prescribed to the patient  Also notified patient to call clinic if any issues        Hypoglycemic episodes: No, rare  H/o of hypoglycemia causing hospitalization or Intervention such as glucagon injection or ambulance call :  Yes (did not go to the ER but did have a syncopal episode)  Hypoglycemia symptoms: weakness/sweating   Treatment of hypoglycemia: discussed treatment      Medic alert tag: recommended: Yes     Diabetes education: Not recently   Diet: 3 meals per day (small meals), 2-3 snacks per day  Timing of meals is variable  Diabetic diet compliance:  is complaint with boluses most of the time  Activity: Daily activity is predictable: Yes  he is walking at least 4 times a week  Ophthamology: sees routinely; (Dr Deepa Rothman), Dec 2022  Podiatry: July 2022     Has HTN: on ACE inhibitor/ARB   Has hyperlipidemia: on Crestor  Thyroid disorders: Hypothyroidism  Patient is on Synthroid 200 mcg daily on empty stomach 1 hour before breakfast  Thyroid function tests in normal range    History of pancreatitis: No     Vitamin D deficiency: is currently taking a bariatric multivitamin    Patient Active Problem List   Diagnosis   • Chronic back pain   • Collagenous colitis   • GERD (gastroesophageal reflux disease)   • Hyperlipidemia   • Hypothyroidism   • Insomnia   • Iron deficiency anemia   • BMI 40 0-44 9, adult ([facility])   • Type 1 diabetes mellitus with hyperglycemia, with long-term current use of insulin ([facility])   • Vitamin D deficiency   • Seasonal allergic rhinitis due to pollen   • Edema   • Snoring   • FARZANA (obstructive sleep apnea)   • Renal calculus, bilateral   • Leukocytosis (leucocytosis)   • Erectile dysfunction   • Insulin pump in place   • Elevated BP without diagnosis of hypertension   • Gastric bypass status for obesity   • Cellulitis of right lower extremity   • Intention tremor   • Lymphadenopathy of head and neck   • Axillary adenopathy   • Axillary abscess   • Closed fracture of one rib of left side with routine healing   • Oral thrush   • Hypertension      Past Medical History:   Diagnosis Date   • Anemia    • Colon polyp    • Diabetes mellitus ([address] )    • Disease of thyroid gland    • GERD (gastroesophageal reflux disease)    • Hyperlipidemia    • Hypertension    • Insomnia    • Kidney stone    • Sleep apnea     Mild sleep apnea   • Type 1 diabetes mellitus ([facility])     Uses Insulin Pump   • Vitamin D deficiency       Past Surgical History:   Procedure Laterality Date   • BARIATRIC SURGERY     • CARPAL TUNNEL RELEASE Right    • COLONOSCOPY     • EGD     • FL RETROGRADE PYELOGRAM  2020   • LASIK     • DC CYSTO/URETERO W/LITHOTRIPSY &INDWELL STENT INSRT Right 2020    Procedure: CYSTOSCOPY URETEROSCOPY WITH LITHOTRIPSY HOLMIUM LASER, RETROGRADE PYELOGRAM AND INSERTION STENT URETERAL;  Surgeon: Nelson Harmon MD;  Location: [facility];  Service: Urology   • DC LAPS GSTR RSTCV [address] W/BYP GABBY-EN-Y LIMB <150 CM N/A 2021    Procedure: BYPASS GASTRIC GABBY-EN-Y LAPAROSCOPIC W ROBOTICS AND INTRAOPERATIVE EGD;  Surgeon: Rudy Akins MD;  Location: [facility];  Service: Bariatrics   • ROTATOR CUFF REPAIR Right    • UPPER GASTROINTESTINAL ENDOSCOPY        Family History   Problem Relation Age of Onset   • Thyroid disease unspecified Mother    • Osteoporosis Mother    • Hypertension Father    • Colon cancer Father 76   • Diabetes type II Sister    • Hypertension Sister    • Hyperlipidemia Sister    • Thyroid disease unspecified Sister    • Diabetes type II Brother    • Hypertension Brother    • Hyperlipidemia Brother    • Colon cancer Brother 61     Social History     Tobacco Use   • Smoking status: Former     Years: 30      Types: Cigarettes     Start date:      Quit date:      Years since quittin 3   • Smokeless tobacco: Never   • Tobacco comments:     quit in    Substance Use Topics   • Alcohol use:  Yes     Alcohol/week: 2 0 standard drinks     Types: 2 Glasses of wine per week     Comment: social     Allergies   Allergen Reactions   • Ibuprofen GI Intolerance and Nausea Only   • Other Other (See Comments)     Seasonal allergies- pt has congestion         Current Outpatient Medications:   •  Biotin 39445 MCG TABS, Take 10,000 mcg by mouth in the morning, Disp: , Rfl:   •  Blood Glucose Monitoring Suppl (ONE TOUCH ULTRA 2) w/Device KIT, Use  , Disp: , Rfl:   •  Calcium Citrate (JORDY-CITRATE PO), Take 1,000 tablets by mouth 2 (two) times a day, Disp: , Rfl:   •  Continuous Blood Gluc  (DEXCOM G6 ) KIYA, USE AS DIRECTED, Disp: 1 Device, Rfl: 0  •  Continuous Blood Gluc Sensor (Dexcom G6 Sensor) MISC, Use 1 Device if needed (change Q 10 days) Use 1 sensor every 10 days, Disp: 9 each, Rfl: 3  •  Continuous Blood Gluc Transmit (Dexcom G6 Transmitter) MISC, Inject 1 Device under the skin every 3 (three) months, Disp: 1 each, Rfl: 3  •  esomeprazole (NexIUM) 20 mg capsule, Take 20 mg by mouth daily in the early morning, Disp: , Rfl:   •  fluticasone (FLONASE) 50 mcg/act nasal spray, 1 spray into each nostril daily, Disp: , Rfl:   •  Glucagon (Baqsimi One Pack) 3 MG/DOSE POWD, Use one dose in one nostril, 3 mg in case of severe hypoglycemia and unconsciousness, Disp: 1 each, Rfl: 4  •  Insulin Aspart (NovoLOG) 100 units/mL injection, USE UP TO 78 UNITS DAILY VIA INSULIN PUMP, Disp: 70 mL, Rfl: 1  •  insulin glargine (Basaglar KwikPen) 100 units/mL injection pen, Inject 32 units in case of pump failure ( incase of emergency), Disp: 15 mL, Rfl: 0  •  Insulin Pen Needle (BD PEN NEEDLE LYRIC U/F) 32G X 4 MM MISC, by Does not apply route daily Use once daily, Disp: 100 each, Rfl: 2  •  lisinopril-hydrochlorothiazide (PRINZIDE,ZESTORETIC) 20-12 5 MG per tablet, Take 1 tablet by mouth daily, Disp: 90 tablet, Rfl: 1  •  loratadine (CLARITIN) 10 mg tablet, Take 1 tablet (10 mg total) by mouth daily, Disp: 90 tablet, Rfl: 1  •  Multiple Vitamins-Minerals (Bariatric Fusion) CHEW, Chew  , Disp: , Rfl:   •  Probiotic Product (PROBIOTIC DAILY PO), Take 1 tablet by mouth daily , Disp: , Rfl:   •  psyllium (METAMUCIL) 0 52 g capsule, Take 0 52 g by mouth 3 (three) times a day Fiber capsules, Disp: , Rfl:   •  rosuvastatin (CRESTOR) 5 mg tablet, Take 1 tablet (5 mg total) by mouth daily, Disp: 90 tablet, Rfl: 1  •  Synthroid 200 MCG tablet, take 1 tablet by mouth every morning ON AN EMPTY STOMACH, Disp: 90 tablet, Rfl: 1  •  TURMERIC PO, Take 1,400 capsules by mouth 2 (two) times a day, Disp: , Rfl: "  •  vitamin B-12 (VITAMIN B-12) 1,000 mcg tablet, Take 1,000 mcg by mouth 3 (three) times a week, Disp: , Rfl:   •  zolpidem (AMBIEN) 10 mg tablet, Take 1 tablet (10 mg total) by mouth daily at bedtime as needed for sleep, Disp: 30 tablet, Rfl: 0  •  acetone, urine, test (Ketostix) strip, Check ketones if blood sugars > 250, more than twice or in case of nausea or vomiting and abdominal pains (Patient not taking: Reported on 10/10/2022), Disp: 100 each, Rfl: 0  •  gabapentin (NEURONTIN) 300 mg capsule, Take 1 capsule (300 mg total) by mouth daily at bedtime (Patient not taking: Reported on 5/15/2023), Disp: 90 capsule, Rfl: 1  •  Glucagon (BAQSIMI TWO PACK) 3 MG/DOSE POWD, Use one dose as needed in case of severe hypoglycemia ( nasal spray) (Patient not taking: Reported on 8/29/2022), Disp: 1 each, Rfl: 0  •  metoprolol tartrate (LOPRESSOR) 25 mg tablet, Take 0 5 tablets (12 5 mg total) by mouth every 12 (twelve) hours (Patient not taking: Reported on 5/15/2023), Disp: 90 tablet, Rfl: 1  Review of Systems   Constitutional: Negative for activity change, appetite change, fatigue and unexpected weight change  HENT: Negative for trouble swallowing  Eyes: Negative for visual disturbance  Respiratory: Negative for shortness of breath  Cardiovascular: Negative for chest pain and palpitations  Gastrointestinal: Positive for constipation  Negative for diarrhea  Endocrine: Negative for polydipsia and polyuria  Musculoskeletal: Positive for arthralgias (shoulder)  Neurological: Positive for numbness  Psychiatric/Behavioral: Negative  Physical Exam:  Body mass index is 30 13 kg/m²    /82 (BP Location: Left arm, Patient Position: Sitting, Cuff Size: Standard)   Pulse 68   Ht 5' 9\" (1 753 m)   Wt 92 5 kg (204 lb)   BMI 30 13 kg/m²    Wt Readings from Last 3 Encounters:   05/15/23 92 5 kg (204 lb)   12/09/22 97 5 kg (215 lb)   11/11/22 96 1 kg (211 lb 12 8 oz)       Physical Exam  Vitals and " nursing note reviewed  Constitutional:       Appearance: He is well-developed  HENT:      Head: Normocephalic  Eyes:      General: No scleral icterus  Pupils: Pupils are equal, round, and reactive to light  Neck:      Thyroid: No thyromegaly  Cardiovascular:      Rate and Rhythm: Normal rate and regular rhythm  Pulses:           Radial pulses are 2+ on the right side and 2+ on the left side  Heart sounds: No murmur heard  Pulmonary:      Effort: Pulmonary effort is normal  No respiratory distress  Breath sounds: Normal breath sounds  No wheezing  Musculoskeletal:      Cervical back: Neck supple  Skin:     General: Skin is warm and dry  Neurological:      Mental Status: He is alert  Patient's shoes and socks were not removed  Labs:   Component      Latest Ref Rng & Units 11/1/2022 5/11/2023   Sodium      135 - 147 mmol/L 139    Potassium      3 5 - 5 3 mmol/L 4 3    Chloride      96 - 108 mmol/L 103    CO2      21 - 32 mmol/L 30    Anion Gap      4 - 13 mmol/L 6    BUN      5 - 25 mg/dL 15    Creatinine      0 60 - 1 30 mg/dL 0 87    GLUCOSE FASTING      65 - 99 mg/dL 102 (H)    Calcium      8 4 - 10 2 mg/dL 9 5    AST      13 - 39 U/L 35    ALT      7 - 52 U/L 22    Alkaline Phosphatase      34 - 104 U/L 84    Total Protein      6 4 - 8 4 g/dL 7 6    Albumin      3 5 - 5 0 g/dL 4 2    TOTAL BILIRUBIN      0 20 - 1 00 mg/dL 0 43    eGFR      ml/min/1 73sq m 96    EXT Creatinine Urine      mg/dL 25 5 41 9   MICROALBUM ,U,RANDOM      0 0 - 20 0 mg/L <5 0 <5 0   MICROALBUMIN/CREATININE RATIO      0 - 30 mg/g creatinine <20 <12   Hemoglobin A1C      Normal 3 8-5 6%; PreDiabetic 5 7-6 4%;  Diabetic >=6 5%; Glycemic control for adults with diabetes <7 0% % 6 4 (H) 7 0 (H)   eAG, EST AVG Glucose      mg/dl 137 154   Free T4      0 76 - 1 46 ng/dL 0 80    TSH 3RD GENERATON      0 450 - 4 500 uIU/mL 11 789 (H)        Plan:    Diagnoses and all orders for this visit:    Type 1 diabetes mellitus with hyperglycemia, with long-term current use of insulin ([facility])  HGA1C 7 0%  Worsened  Treatment regimen: due to occasional hyperglycemia after meals, change ICR at 8 am to 7 5  Discussed intensive insulin regimen does increase risk for hypoglycemia  Episodes of hypoglycemia can lead to permanent disability and death  Discussed risks/complications associated with uncontrolled diabetes  Advised to adhere to diabetic diet, and recommended staying active/exercising routinely  Keep carbohydrates consistent to limit blood glucose fluctuations  Advised to call if blood sugars less than 70 mg/dl or over 300 mg/dl  Check blood glucose 3+ times a day  Discussed symptoms and treatment of hypoglycemia  Discussed use of CGM to collect additional blood glucose data to reveal trends and patterns that can be used to optimize treatment plan  Recommended routine follow-up with podiatry and ophthalmology  Send pump download in 1-2 weeks  Ordered blood work to complete prior to next visit  -     Hemoglobin A1C; Future  -     Basic metabolic panel; Future    Acquired hypothyroidism  TSH previously 11 789  Stop Biotin for 2 weeks and repeat TSH, Free T4   For now continue current dose of levothyroxine   -     T4, free; Future  -     TSH, 3rd generation; Future    Primary hypertension  Blood pressure adequately controlled, continue current treatment    Mixed hyperlipidemia  LDL previously 132  On statin therapy   Managed by PCP  Has repeat lipid panel ordered          Discussed with the patient diagnosis and treatment and all questions fully answered  He will call me if any problems arise  Counseled patient on diagnostic results, prognosis, risk and benefit of treatment options, instruction for management, importance of treatment compliance, risk factor reduction and impressions        Alison Charles PA-C

## 2023-05-15 NOTE — PATIENT INSTRUCTIONS
Hypoglycemia instructions   David Adams  5/15/2023  [id number]    Low Blood Sugar    Steps to treat low blood sugar  1  Test blood sugar if you have symptoms of low blood sugar:   Low Blood Sugar Symptoms:  o Sweaty  o Dizzy  o Rapid heartbeat  o Shaky  o Bad mood  o Hungry      2  Treat blood sugar less than 70 with 15 grams of fast-acting carbohydrate:   Examples of 15 grams Fast-Acting Carbohydrate:  o 4 oz juice  o 4 oz regular soda  o 3-4 glucose tablets (chew)  o 3-4 hard candies (chew)          3  Wait 15 minutes and test your blood sugar again     4   If blood sugar is less than 100, repeat steps 2-3     5  When your blood sugar is 100 or more, eat a snack if it will be longer than one hour until your next meal  The snack should be 15 grams of carbohydrate and a protein:   Examples of snacks:  o ½ sandwich  o 6 crackers with cheese  o Piece of fruit with cheese or peanut butter  o 6 crackers with peanut butter

## 2023-06-07 DIAGNOSIS — G47.09 OTHER INSOMNIA: ICD-10-CM

## 2023-06-07 RX ORDER — ZOLPIDEM TARTRATE 10 MG/1
10 TABLET ORAL
Qty: 30 TABLET | Refills: 0 | Status: SHIPPED | OUTPATIENT
Start: 2023-06-07

## 2023-07-07 NOTE — ED PROVIDER NOTES
History  Chief Complaint   Patient presents with    Shortness of Breath     Patient reports mild sob that started last week, but today has gotten worse  States, "I have a hard time catching my breath "      52-year-old gentleman comes in complaining of shortness of breath  Patient states that last week he started noticing that he had some shortness of breath with activity but today he felt like got much worse  He said feels like he is having a hard time catching his breath  Patient denies any chest pain diaphoresis abdominal pain nausea vomiting diarrhea fever or chills  Patient is concerned because he felt like this when he was anemic in the past         History provided by:  Patient   used: No    Shortness of Breath   Severity:  Moderate  Onset quality:  Gradual  Duration:  1 week  Timing:  Intermittent  Progression:  Waxing and waning  Chronicity:  Recurrent  Context: activity    Context: not occupational exposure and not weather changes    Ineffective treatments:  None tried  Associated symptoms: no abdominal pain, no chest pain, no cough, no fever, no headaches, no hemoptysis, no rash, no sore throat, no swollen glands and no wheezing    Risk factors: no recent alcohol use, no oral contraceptive use and no prolonged immobilization        Prior to Admission Medications   Prescriptions Last Dose Informant Patient Reported? Taking?    Blood Glucose Monitoring Suppl (ONE TOUCH ULTRA 2) w/Device KIT  Self Yes No   Sig: by Does not apply route   Insulin Pen Needle (BD PEN NEEDLE LYRIC U/F) 32G X 4 MM MISC  Self Yes No   Sig: by Does not apply route daily   Liraglutide (VICTOZA) 18 MG/3ML SOPN   No No   Sig: Inject 0 3 mL under the skin daily for 90 days   Patient taking differently: Inject 1 8 mg under the skin daily 1 8 units per day    MULTIPLE VITAMIN PO  Self Yes No   Sig: Take by mouth daily     acetone, urine, test (KETOSTIX) strip  Self Yes No   Sig: by In Vitro route   azelastine (ASTELIN) 0 1 % nasal spray   No No   Si spray into each nostril 2 (two) times a day   celecoxib (CELEBREX) 200 mg capsule  Self Yes No   Sig: Take 1 capsule by mouth 2 (two) times a day as needed   esomeprazole (NexIUM) 40 MG capsule   No No   Sig: Take 1 capsule (40 mg total) by mouth daily   fluticasone (CUTIVATE) 0 05 %   No No   Sig: Apply topically daily   glucagon (GLUCAGON EMERGENCY) 1 MG injection   No No   Sig: Inject 1 mg under the skin once as needed for low blood sugar for up to 1 dose   glucose blood (ONE TOUCH ULTRA TEST) test strip   No No   Si each by Other route 6 (six) times a day for 90 days   insulin aspart (NOVOLOG) 100 units/mL injection  Self Yes No   Sig: Inject under the skin About 100 units per day through pump    levothyroxine (SYNTHROID) 200 mcg tablet  Self Yes No   Sig: Take 1 tablet by mouth daily   metFORMIN (GLUCOPHAGE) 1000 MG tablet   No No   Sig: Take 1 tablet (1,000 mg total) by mouth every 12 (twelve) hours for 90 days   naproxen (NAPROSYN) 500 mg tablet  Self Yes No   Sig: Take 1 tablet by mouth 2 (two) times a day as needed     rosuvastatin (CRESTOR) 10 MG tablet   No No   Sig: Take 1 tablet (10 mg total) by mouth daily   zolpidem (AMBIEN) 10 mg tablet   No No   Sig: Take 1 tablet (10 mg total) by mouth daily at bedtime as needed for sleep      Facility-Administered Medications: None       Past Medical History:   Diagnosis Date    Diabetes mellitus (HCC)     Disease of thyroid gland        Past Surgical History:   Procedure Laterality Date    ROTATOR CUFF REPAIR Right        Family History   Problem Relation Age of Onset    Thyroid disease unspecified Mother     Osteoporosis Mother     Hypertension Father     Diabetes type II Sister     Hypertension Sister     Hyperlipidemia Sister     Thyroid disease unspecified Sister     Diabetes type II Brother     Hypertension Brother     Hyperlipidemia Brother      I have reviewed and agree with the history as documented  Social History   Substance Use Topics    Smoking status: Former Smoker    Smokeless tobacco: Never Used      Comment: quit in 2015    Alcohol use 1 2 oz/week     2 Glasses of wine per week      Comment: social        Review of Systems   Constitutional: Negative for activity change, appetite change and fever  HENT: Negative for congestion, facial swelling and sore throat  Eyes: Negative for discharge and redness  Respiratory: Positive for shortness of breath  Negative for cough, hemoptysis and wheezing  Cardiovascular: Negative for chest pain and leg swelling  Gastrointestinal: Negative for abdominal distention, abdominal pain and blood in stool  Endocrine: Negative for cold intolerance and polydipsia  Genitourinary: Negative for difficulty urinating and hematuria  Musculoskeletal: Negative for arthralgias and gait problem  Skin: Negative for color change and rash  Allergic/Immunologic: Negative for food allergies and immunocompromised state  Neurological: Negative for dizziness, seizures and headaches  Hematological: Negative for adenopathy  Does not bruise/bleed easily  Psychiatric/Behavioral: Negative for agitation, confusion and decreased concentration  All other systems reviewed and are negative  Physical Exam  ED Triage Vitals [04/16/18 1834]   Temperature Pulse Respirations Blood Pressure SpO2   97 5 °F (36 4 °C) 67 18 160/75 97 %      Temp Source Heart Rate Source Patient Position - Orthostatic VS BP Location FiO2 (%)   Oral Monitor Sitting Left arm --      Pain Score       No Pain           Orthostatic Vital Signs  Vitals:    04/16/18 1834 04/16/18 2004   BP: 160/75 130/64   Pulse: 67 70   Patient Position - Orthostatic VS: Sitting Sitting       Physical Exam   Constitutional: He is oriented to person, place, and time  He appears well-developed and well-nourished  Non-toxic appearance  HENT:   Head: Normocephalic and atraumatic     Right Ear: Tympanic membrane normal    Left Ear: Tympanic membrane normal    Nose: Nose normal    Mouth/Throat: Oropharynx is clear and moist    Eyes: Conjunctivae, EOM and lids are normal  Pupils are equal, round, and reactive to light  Neck: Trachea normal and normal range of motion  Neck supple  No JVD present  Carotid bruit is not present  Cardiovascular: Normal rate, regular rhythm, normal heart sounds and intact distal pulses  No extrasystoles are present  Pulmonary/Chest: Effort normal  He has no decreased breath sounds  He has no wheezes  He has no rhonchi  He has no rales  He exhibits no tenderness and no deformity  Abdominal: Soft  Bowel sounds are normal  There is no tenderness  There is no rebound, no guarding and no CVA tenderness  Musculoskeletal:        Right shoulder: He exhibits normal range of motion, no tenderness, no swelling and no deformity  Cervical back: Normal  He exhibits normal range of motion, no tenderness, no bony tenderness and no deformity  Lymphadenopathy:     He has no cervical adenopathy  He has no axillary adenopathy  Neurological: He is alert and oriented to person, place, and time  He has normal strength and normal reflexes  No cranial nerve deficit or sensory deficit  He displays a negative Romberg sign  Skin: Skin is warm and dry  Psychiatric: He has a normal mood and affect  His speech is normal and behavior is normal  Judgment and thought content normal  Cognition and memory are normal    Nursing note and vitals reviewed        ED Medications  Medications - No data to display    Diagnostic Studies  Results Reviewed     Procedure Component Value Units Date/Time    TSH, 3rd generation [[id number]]  (Abnormal) Collected:  04/16/18 1913    Lab Status:  Final result Specimen:  Blood from Arm, Right Updated:  04/16/18 1947     TSH 3RD GENERATON 0 236 (L) uIU/mL     Narrative:         Patients undergoing fluorescein dye angiography may retain small amounts of fluorescein in (1) More than 48 hours/None the body for 48-72 hours post procedure  Samples containing fluorescein can produce falsely depressed TSH values  If the patient had this procedure,a specimen should be resubmitted post fluorescein clearance  Magnesium [[id number]]  (Normal) Collected:  04/16/18 1912    Lab Status:  Final result Specimen:  Blood from Arm, Right Updated:  04/16/18 1946     Magnesium 1 9 mg/dL     B-type natriuretic peptide [[id number]]  (Normal) Collected:  04/16/18 1912    Lab Status:  Final result Specimen:  Blood from Arm, Right Updated:  04/16/18 1946     NT-proBNP 28 pg/mL     Troponin I [[id number]]  (Normal) Collected:  04/16/18 1912    Lab Status:  Final result Specimen:  Blood from Arm, Right Updated:  04/16/18 1941     Troponin I <0 02 ng/mL     Narrative:         [employer] Chemistry analyzer 99% cutoff is > 0 04 ng/mL in network labs    o cTnI 99% cutoff is useful only when applied to patients in the clinical setting of myocardial ischemia  o cTnI 99% cutoff should be interpreted in the context of clinical history, ECG findings and possibly cardiac imaging to establish correct diagnosis  o cTnI 99% cutoff may be suggestive but clearly not indicative of a coronary event without the clinical setting of myocardial ischemia      Comprehensive metabolic panel [[id number]] Collected:  04/16/18 1912    Lab Status:  Final result Specimen:  Blood from Arm, Right Updated:  04/16/18 1940     Sodium 141 mmol/L      Potassium 3 9 mmol/L      Chloride 103 mmol/L      CO2 26 mmol/L      Anion Gap 12 mmol/L      BUN 16 mg/dL      Creatinine 1 09 mg/dL      Glucose 93 mg/dL      Calcium 8 7 mg/dL      AST 25 U/L      ALT 35 U/L      Alkaline Phosphatase 95 U/L      Total Protein 7 6 g/dL      Albumin 3 9 g/dL      Total Bilirubin 0 30 mg/dL      eGFR 78 ml/min/1 73sq m     Narrative:         National Kidney Disease Education Program recommendations are as follows:  GFR calculation is accurate only with a steady state creatinine  Chronic Kidney disease less than 60 ml/min/1 73 sq  meters  Kidney failure less than 15 ml/min/1 73 sq  meters  Protime-INR [[id number]]  (Normal) Collected:  04/16/18 1912    Lab Status:  Final result Specimen:  Blood from Arm, Right Updated:  04/16/18 1932     Protime 12 8 seconds      INR 0 94    APTT [[id number]]  (Normal) Collected:  04/16/18 1912    Lab Status:  Final result Specimen:  Blood from Arm, Right Updated:  04/16/18 1932     PTT 29 seconds     CBC and differential [[id number]]  (Abnormal) Collected:  04/16/18 1912    Lab Status:  Final result Specimen:  Blood from Arm, Right Updated:  04/16/18 1922     WBC 10 67 (H) Thousand/uL      RBC 4 64 Million/uL      Hemoglobin 12 8 g/dL      Hematocrit 38 6 %      MCV 83 fL      MCH 27 6 pg      MCHC 33 2 g/dL      RDW 13 2 %      MPV 9 6 fL      Platelets 078 Thousands/uL      Neutrophils Relative 68 %      Lymphocytes Relative 23 %      Monocytes Relative 6 %      Eosinophils Relative 3 %      Basophils Relative 0 %      Neutrophils Absolute 7 29 Thousands/µL      Lymphocytes Absolute 2 45 Thousands/µL      Monocytes Absolute 0 62 Thousand/µL      Eosinophils Absolute 0 27 Thousand/µL      Basophils Absolute 0 04 Thousands/µL                  XR chest 1 view portable   Final Result by Evaristo Shaw MD (04/16 1919)      No acute cardiopulmonary disease              Workstation performed: [id number]                    Procedures  ECG 12 Lead Documentation  Date/Time: 4/16/2018 7:54 PM  Performed by: Robert Kerns  Authorized by: Aguila SANDERSON     Rate:     ECG rate:  69  Rhythm:     Rhythm: sinus rhythm    Ectopy:     Ectopy: none             Phone Contacts  ED Phone Contact    ED Course  ED Course                                MDM  Number of Diagnoses or Management Options  Dyspnea: new and requires workup  Hypothyroid: new and requires workup     Amount and/or Complexity of Data Reviewed  Clinical lab tests: ordered and reviewed  Tests in the radiology section of CPT®: ordered and reviewed  Tests in the medicine section of CPT®: ordered and reviewed  Independent visualization of images, tracings, or specimens: yes    Risk of Complications, Morbidity, and/or Mortality  General comments: Discussed with patient following up with endocrine and his PCP to discuss his TSH level and see if that is contributing  Patient Progress  Patient progress: stable    CritCare Time    Disposition  Final diagnoses:   Dyspnea   Hypothyroid     Time reflects when diagnosis was documented in both MDM as applicable and the Disposition within this note     Time User Action Codes Description Comment    4/16/2018  7:52 PM Neda SANDERSON Add [R06 00] Dyspnea     4/16/2018  7:52 PM Jeromyalethea Cruz Add [E03 9] Hypothyroid       ED Disposition     ED Disposition Condition Comment    Discharge  Gale Gant discharge to home/self care      Condition at discharge: Good        Follow-up Information     Follow up With Specialties Details Why Contact Info    Bartolo Archuleta MD Internal Medicine Schedule an appointment as soon as possible for a visit  [address] [place] [address]  [phone]          Discharge Medication List as of 4/16/2018  7:54 PM      CONTINUE these medications which have NOT CHANGED    Details   acetone, urine, test (KETOSTIX) strip by In Vitro route, Starting Mon 7/24/2017, Historical Med      azelastine (ASTELIN) 0 1 % nasal spray 1 spray into each nostril 2 (two) times a day, Starting Fri 4/6/2018, Normal      Blood Glucose Monitoring Suppl (ONE TOUCH ULTRA 2) w/Device KIT by Does not apply route, Starting Fri 12/23/2016, Historical Med      celecoxib (CELEBREX) 200 mg capsule Take 1 capsule by mouth 2 (two) times a day as needed, Starting Sat 5/14/2011, Historical Med      esomeprazole (NexIUM) 40 MG capsule Take 1 capsule (40 mg total) by mouth daily, Starting Fri 4/6/2018, Normal      fluticasone (CUTIVATE) 0 05 % Apply topically daily, Starting Fri 4/6/2018, Normal      glucagon (GLUCAGON EMERGENCY) 1 MG injection Inject 1 mg under the skin once as needed for low blood sugar for up to 1 dose, Starting Mon 3/5/2018, Normal      glucose blood (ONE TOUCH ULTRA TEST) test strip 1 each by Other route 6 (six) times a day for 90 days, Starting Mon 3/5/2018, Until Sun 6/3/2018, Normal      insulin aspart (NOVOLOG) 100 units/mL injection Inject under the skin About 100 units per day through pump , Historical Med      Insulin Pen Needle (BD PEN NEEDLE LYRIC U/F) 32G X 4 MM MISC by Does not apply route daily, Starting Thu 10/19/2017, Historical Med      levothyroxine (SYNTHROID) 200 mcg tablet Take 1 tablet by mouth daily, Starting Tue 3/20/2012, Historical Med      Liraglutide (VICTOZA) 18 MG/3ML SOPN Inject 0 3 mL under the skin daily for 90 days, Starting Mon 3/5/2018, Until Sun 6/3/2018, Normal      metFORMIN (GLUCOPHAGE) 1000 MG tablet Take 1 tablet (1,000 mg total) by mouth every 12 (twelve) hours for 90 days, Starting Mon 3/19/2018, Until Sun 6/17/2018, Normal      MULTIPLE VITAMIN PO Take by mouth daily  , Historical Med      naproxen (NAPROSYN) 500 mg tablet Take 1 tablet by mouth 2 (two) times a day as needed  , Historical Med      rosuvastatin (CRESTOR) 10 MG tablet Take 1 tablet (10 mg total) by mouth daily, Starting Mon 3/5/2018, Normal      zolpidem (AMBIEN) 10 mg tablet Take 1 tablet (10 mg total) by mouth daily at bedtime as needed for sleep, Starting Fri 4/6/2018, Print           No discharge procedures on file      ED Provider  Electronically Signed by           Jhonatan Gant DO  04/16/18 2266

## 2023-07-10 ENCOUNTER — APPOINTMENT (OUTPATIENT)
Dept: LAB | Facility: CLINIC | Age: 58
End: 2023-07-10
Payer: COMMERCIAL

## 2023-07-10 DIAGNOSIS — I10 HYPERTENSION, UNSPECIFIED TYPE: ICD-10-CM

## 2023-07-10 LAB
ALBUMIN SERPL BCP-MCNC: 4.3 G/DL (ref 3.5–5)
ALP SERPL-CCNC: 72 U/L (ref 34–104)
ALT SERPL W P-5'-P-CCNC: 31 U/L (ref 7–52)
ANION GAP SERPL CALCULATED.3IONS-SCNC: 8 MMOL/L
AST SERPL W P-5'-P-CCNC: 36 U/L (ref 13–39)
BASOPHILS # BLD AUTO: 0.07 THOUSANDS/ÂΜL (ref 0–0.1)
BASOPHILS NFR BLD AUTO: 1 % (ref 0–1)
BILIRUB SERPL-MCNC: 0.53 MG/DL (ref 0.2–1)
BUN SERPL-MCNC: 18 MG/DL (ref 5–25)
CALCIUM SERPL-MCNC: 9.3 MG/DL (ref 8.4–10.2)
CHLORIDE SERPL-SCNC: 100 MMOL/L (ref 96–108)
CHOLEST SERPL-MCNC: 215 MG/DL
CO2 SERPL-SCNC: 29 MMOL/L (ref 21–32)
CREAT SERPL-MCNC: 0.92 MG/DL (ref 0.6–1.3)
EOSINOPHIL # BLD AUTO: 0.35 THOUSAND/ÂΜL (ref 0–0.61)
EOSINOPHIL NFR BLD AUTO: 5 % (ref 0–6)
ERYTHROCYTE [DISTWIDTH] IN BLOOD BY AUTOMATED COUNT: 11.9 % (ref 11.6–15.1)
GFR SERPL CREATININE-BSD FRML MDRD: 91 ML/MIN/1.73SQ M
GLUCOSE P FAST SERPL-MCNC: 143 MG/DL (ref 65–99)
HCT VFR BLD AUTO: 44.2 % (ref 36.5–49.3)
HDLC SERPL-MCNC: 45 MG/DL
HGB BLD-MCNC: 14.9 G/DL (ref 12–17)
IMM GRANULOCYTES # BLD AUTO: 0.01 THOUSAND/UL (ref 0–0.2)
IMM GRANULOCYTES NFR BLD AUTO: 0 % (ref 0–2)
LDLC SERPL CALC-MCNC: 148 MG/DL (ref 0–100)
LYMPHOCYTES # BLD AUTO: 1.87 THOUSANDS/ÂΜL (ref 0.6–4.47)
LYMPHOCYTES NFR BLD AUTO: 26 % (ref 14–44)
MCH RBC QN AUTO: 31 PG (ref 26.8–34.3)
MCHC RBC AUTO-ENTMCNC: 33.7 G/DL (ref 31.4–37.4)
MCV RBC AUTO: 92 FL (ref 82–98)
MONOCYTES # BLD AUTO: 0.59 THOUSAND/ÂΜL (ref 0.17–1.22)
MONOCYTES NFR BLD AUTO: 8 % (ref 4–12)
NEUTROPHILS # BLD AUTO: 4.28 THOUSANDS/ÂΜL (ref 1.85–7.62)
NEUTS SEG NFR BLD AUTO: 60 % (ref 43–75)
NONHDLC SERPL-MCNC: 170 MG/DL
NRBC BLD AUTO-RTO: 0 /100 WBCS
PLATELET # BLD AUTO: 220 THOUSANDS/UL (ref 149–390)
PMV BLD AUTO: 9 FL (ref 8.9–12.7)
POTASSIUM SERPL-SCNC: 4.1 MMOL/L (ref 3.5–5.3)
PROT SERPL-MCNC: 7.1 G/DL (ref 6.4–8.4)
RBC # BLD AUTO: 4.81 MILLION/UL (ref 3.88–5.62)
SODIUM SERPL-SCNC: 137 MMOL/L (ref 135–147)
T4 FREE SERPL-MCNC: 0.91 NG/DL (ref 0.61–1.12)
TRIGL SERPL-MCNC: 109 MG/DL
TSH SERPL DL<=0.05 MIU/L-ACNC: 12.58 UIU/ML (ref 0.45–4.5)
WBC # BLD AUTO: 7.17 THOUSAND/UL (ref 4.31–10.16)

## 2023-07-10 PROCEDURE — 80053 COMPREHEN METABOLIC PANEL: CPT

## 2023-07-11 LAB
EST. AVERAGE GLUCOSE BLD GHB EST-MCNC: 166 MG/DL
HBA1C MFR BLD: 7.4 %

## 2023-07-17 ENCOUNTER — OFFICE VISIT (OUTPATIENT)
Dept: INTERNAL MEDICINE CLINIC | Facility: CLINIC | Age: 58
End: 2023-07-17
Payer: COMMERCIAL

## 2023-07-17 VITALS
TEMPERATURE: 98.3 F | HEIGHT: 69 IN | SYSTOLIC BLOOD PRESSURE: 130 MMHG | WEIGHT: 204.8 LBS | HEART RATE: 69 BPM | DIASTOLIC BLOOD PRESSURE: 90 MMHG | OXYGEN SATURATION: 98 % | BODY MASS INDEX: 30.33 KG/M2

## 2023-07-17 DIAGNOSIS — E78.2 MIXED HYPERLIPIDEMIA: ICD-10-CM

## 2023-07-17 DIAGNOSIS — E10.40 TYPE 1 DIABETES MELLITUS WITH DIABETIC NEUROPATHY (HCC): ICD-10-CM

## 2023-07-17 DIAGNOSIS — E10.65 TYPE 1 DIABETES MELLITUS WITH HYPERGLYCEMIA, WITH LONG-TERM CURRENT USE OF INSULIN (HCC): ICD-10-CM

## 2023-07-17 DIAGNOSIS — G47.09 OTHER INSOMNIA: ICD-10-CM

## 2023-07-17 DIAGNOSIS — E03.9 ACQUIRED HYPOTHYROIDISM: ICD-10-CM

## 2023-07-17 DIAGNOSIS — I10 HYPERTENSION, UNSPECIFIED TYPE: ICD-10-CM

## 2023-07-17 DIAGNOSIS — G47.33 OSA (OBSTRUCTIVE SLEEP APNEA): ICD-10-CM

## 2023-07-17 DIAGNOSIS — K21.9 GASTROESOPHAGEAL REFLUX DISEASE WITHOUT ESOPHAGITIS: Primary | ICD-10-CM

## 2023-07-17 DIAGNOSIS — D50.9 IRON DEFICIENCY ANEMIA, UNSPECIFIED IRON DEFICIENCY ANEMIA TYPE: ICD-10-CM

## 2023-07-17 DIAGNOSIS — E55.9 VITAMIN D DEFICIENCY: ICD-10-CM

## 2023-07-17 PROBLEM — R59.0 AXILLARY ADENOPATHY: Status: RESOLVED | Noted: 2022-08-15 | Resolved: 2023-07-17

## 2023-07-17 PROBLEM — R59.1 LYMPHADENOPATHY OF HEAD AND NECK: Status: RESOLVED | Noted: 2022-08-15 | Resolved: 2023-07-17

## 2023-07-17 PROBLEM — S22.32XD CLOSED FRACTURE OF ONE RIB OF LEFT SIDE WITH ROUTINE HEALING: Status: RESOLVED | Noted: 2022-08-29 | Resolved: 2023-07-17

## 2023-07-17 PROBLEM — B37.0 ORAL THRUSH: Status: RESOLVED | Noted: 2022-10-10 | Resolved: 2023-07-17

## 2023-07-17 PROCEDURE — 99214 OFFICE O/P EST MOD 30 MIN: CPT | Performed by: NURSE PRACTITIONER

## 2023-07-17 RX ORDER — ROSUVASTATIN CALCIUM 5 MG/1
5 TABLET, COATED ORAL DAILY
Qty: 90 TABLET | Refills: 1 | Status: SHIPPED | OUTPATIENT
Start: 2023-07-17

## 2023-07-17 RX ORDER — ZOLPIDEM TARTRATE 10 MG/1
10 TABLET ORAL
Qty: 30 TABLET | Refills: 0 | Status: SHIPPED | OUTPATIENT
Start: 2023-07-17

## 2023-07-17 RX ORDER — LISINOPRIL AND HYDROCHLOROTHIAZIDE 20; 12.5 MG/1; MG/1
1 TABLET ORAL DAILY
Qty: 90 TABLET | Refills: 1 | Status: SHIPPED | OUTPATIENT
Start: 2023-07-17

## 2023-07-17 NOTE — PROGRESS NOTES
Diabetic Foot Exam    Patient's shoes and socks removed. Right Foot/Ankle   Right Foot Inspection  Skin Exam: skin normal and skin intact. No dry skin, no warmth, no callus, no erythema, no maceration, no abnormal color, no pre-ulcer, no ulcer and no callus. Sensory   Monofilament testing: intact    Vascular  Capillary refills: < 3 seconds  The right DP pulse is 2+. The right PT pulse is 2+. Left Foot/Ankle  Left Foot Inspection  Skin Exam: skin normal and skin intact. No dry skin, no warmth, no erythema, no maceration, normal color, no pre-ulcer, no ulcer and no callus. Sensory   Monofilament testing: intact    Vascular  Capillary refills: < 3 seconds  The left DP pulse is 2+. The left PT pulse is 2+.      Assign Risk Category  No deformity present  No loss of protective sensation  No weak pulses  Risk: 0

## 2023-07-17 NOTE — PROGRESS NOTES
Assessment/Plan:    GERD (gastroesophageal reflux disease)  Stable, controlled. Continue esomeprazole 20 mg daily. Hypothyroidism  Followed by endocrine, TSH elevated, T4 stable. Continue current dose of levothyroxine     Type 1 diabetes mellitus with hyperglycemia, with long-term current use of insulin (Formerly [facility] [place])    Lab Results   Component Value Date    HGBA1C 7.4 (H) 07/10/2023   Follows endocrine. Hypertension  Controlled on lisinopril-HCTZ. Continue current regimen -   Continue to monitor blood pressure at home. Goal BP is < 130/80. Contact our office for consistent elevations. Recommend low sodium diet. Exercise 30 minutes 5 times a week as tolerated. Recommend yearly eye exam.       Hyperlipidemia  Restart Crestor. Recommend healthy lifestyle choices for your cholesterol. Low fat/low cholesterol diet. Limit/avoid red meat. Eat more lean meat - chicken breast, ground turkey, fish. Exercise 30 mins at least 5 times a week as tolerated. Insomnia  Ambien PRN    Iron deficiency anemia  Continue with iron supplementation, continue to monitor CBC    Vitamin D deficiency  Vitamin-D monitored by Endocrine, encourage supplementation      BMI Counseling: Body mass index is 30.47 kg/m². The BMI is above normal. Nutrition recommendations include decreasing portion sizes, encouraging healthy choices of fruits and vegetables, decreasing fast food intake, consuming healthier snacks, limiting drinks that contain sugar, moderation in carbohydrate intake, increasing intake of lean protein, reducing intake of saturated and trans fat and reducing intake of cholesterol. Exercise recommendations include moderate physical activity 150 minutes/week and exercising 3-5 times per week. Rationale for BMI follow-up plan is due to patient being overweight or obese. Depression Screening and Follow-up Plan: Patient was screened for depression during today's encounter.  They screened negative with a PHQ-2 score of 0.           Diagnoses and all orders for this visit:    Gastroesophageal reflux disease without esophagitis    Other insomnia  -     zolpidem (AMBIEN) 10 mg tablet; Take 1 tablet (10 mg total) by mouth daily at bedtime as needed for sleep    Type 1 diabetes mellitus with diabetic neuropathy (HCC)  -     rosuvastatin (CRESTOR) 5 mg tablet; Take 1 tablet (5 mg total) by mouth daily    Mixed hyperlipidemia  -     rosuvastatin (CRESTOR) 5 mg tablet; Take 1 tablet (5 mg total) by mouth daily  -     Lipid panel    Hypertension, unspecified type  -     lisinopril-hydrochlorothiazide (PRINZIDE,ZESTORETIC) 20-12.5 MG per tablet; Take 1 tablet by mouth daily  -     CBC and differential  -     Comprehensive metabolic panel; Future    Acquired hypothyroidism    Type 1 diabetes mellitus with hyperglycemia, with long-term current use of insulin (HCC)    FARZANA (obstructive sleep apnea)    Iron deficiency anemia, unspecified iron deficiency anemia type    Vitamin D deficiency          Subjective:      Patient ID: Shirlene Esquivel is a 63 y.o. male. Patient presents today to follow-up on chronic conditions. .  We reviewed blood work while in the office today. He reports ear fullness, he has been useing sudafed, antihistamine, Flonase and afrin, without much relief. HTN- Well controlled on lisinopril-HCTZ, denies side effects with this medication, denies headaches, changes in vision or chest pain    Hypothyroidism- managed by endocrine, TSH elevated T4 stable, reports fatigue (but chronic), some constipation         Diabetes  He presents for his initial (managed by endocrine ) diabetic visit. He has type 1 diabetes mellitus. His disease course has been worsening. There are no hypoglycemic associated symptoms. Pertinent negatives for hypoglycemia include no dizziness or headaches. Pertinent negatives for diabetes include no chest pain, no polydipsia, no polyphagia, no polyuria and no weakness.  There are no hypoglycemic complications. (Patients sugars in the 140s) Eye exam is current. Hyperlipidemia  This is a chronic problem. The current episode started more than 1 year ago. The problem is uncontrolled (stopped crestor after surgery ). Pertinent negatives include no chest pain or shortness of breath. The following portions of the patient's history were reviewed and updated as appropriate: allergies, current medications, past family history, past medical history, past social history, past surgical history and problem list.    Review of Systems   Constitutional: Negative for activity change, appetite change, chills, diaphoresis and fever. HENT: Negative for congestion, ear discharge, ear pain, postnasal drip, rhinorrhea, sinus pressure, sinus pain and sore throat. B/L ear fullness   Eyes: Negative for pain, discharge, itching and visual disturbance. Respiratory: Negative for cough, chest tightness, shortness of breath and wheezing. Cardiovascular: Negative for chest pain, palpitations and leg swelling. Gastrointestinal: Negative for abdominal pain, constipation, diarrhea, nausea and vomiting. Endocrine: Negative for polydipsia, polyphagia and polyuria. Genitourinary: Negative for difficulty urinating, dysuria and urgency. Musculoskeletal: Negative for arthralgias, back pain and neck pain. Skin: Negative for rash and wound. Neurological: Negative for dizziness, weakness, numbness and headaches.          Past Medical History:   Diagnosis Date   • Anemia    • Axillary adenopathy 8/15/2022   • Closed fracture of one rib of left side with routine healing 8/29/2022   • Colon polyp    • Diabetes mellitus ([address])    • Disease of thyroid gland    • GERD (gastroesophageal reflux disease)    • Hyperlipidemia    • Hypertension    • Insomnia    • Kidney stone    • Lymphadenopathy of head and neck 8/15/2022   • Sleep apnea     Mild sleep apnea   • Type 1 diabetes mellitus (HCC)     Uses Insulin Pump   • Vitamin D deficiency          Current Outpatient Medications:   •  Biotin 60817 MCG TABS, Take 10,000 mcg by mouth in the morning, Disp: , Rfl:   •  Blood Glucose Monitoring Suppl (ONE TOUCH ULTRA 2) w/Device KIT, Use  , Disp: , Rfl:   •  Calcium Citrate (JORDY-CITRATE PO), Take 1,000 tablets by mouth 2 (two) times a day, Disp: , Rfl:   •  Continuous Blood Gluc  (DEXCOM G6 ) KIYA, USE AS DIRECTED, Disp: 1 Device, Rfl: 0  •  Continuous Blood Gluc Sensor (Dexcom G6 Sensor) MISC, Use 1 Device if needed (change Q 10 days) Use 1 sensor every 10 days, Disp: 9 each, Rfl: 3  •  Continuous Blood Gluc Transmit (Dexcom G6 Transmitter) MISC, Inject 1 Device under the skin every 3 (three) months, Disp: 1 each, Rfl: 3  •  esomeprazole (NexIUM) 20 mg capsule, Take 20 mg by mouth daily in the early morning, Disp: , Rfl:   •  fluticasone (FLONASE) 50 mcg/act nasal spray, 1 spray into each nostril daily, Disp: , Rfl:   •  Glucagon (BAQSIMI TWO PACK) 3 MG/DOSE POWD, Use one dose as needed in case of severe hypoglycemia ( nasal spray), Disp: 1 each, Rfl: 0  •  Insulin Aspart (NovoLOG) 100 units/mL injection, USE UP TO 78 UNITS DAILY VIA INSULIN PUMP, Disp: 70 mL, Rfl: 1  •  insulin glargine (Basaglar KwikPen) 100 units/mL injection pen, Inject 32 units in case of pump failure ( incase of emergency), Disp: 15 mL, Rfl: 0  •  Insulin Pen Needle (BD PEN NEEDLE LYRIC U/F) 32G X 4 MM MISC, by Does not apply route daily Use once daily, Disp: 100 each, Rfl: 2  •  lisinopril-hydrochlorothiazide (PRINZIDE,ZESTORETIC) 20-12.5 MG per tablet, Take 1 tablet by mouth daily, Disp: 90 tablet, Rfl: 1  •  loratadine (CLARITIN) 10 mg tablet, Take 1 tablet (10 mg total) by mouth daily, Disp: 90 tablet, Rfl: 1  •  Multiple Vitamins-Minerals (Bariatric Fusion) CHEW, Chew  , Disp: , Rfl:   •  Probiotic Product (PROBIOTIC DAILY PO), Take 1 tablet by mouth daily , Disp: , Rfl:   •  psyllium (METAMUCIL) 0.52 g capsule, Take 0.52 g by mouth 3 (three) times a day Fiber capsules, Disp: , Rfl:   •  rosuvastatin (CRESTOR) 5 mg tablet, Take 1 tablet (5 mg total) by mouth daily, Disp: 90 tablet, Rfl: 1  •  Synthroid 200 MCG tablet, take 1 tablet by mouth every morning ON AN EMPTY STOMACH, Disp: 90 tablet, Rfl: 1  •  TURMERIC PO, Take 1,400 capsules by mouth 2 (two) times a day, Disp: , Rfl:   •  vitamin B-12 (VITAMIN B-12) 1,000 mcg tablet, Take 1,000 mcg by mouth 3 (three) times a week, Disp: , Rfl:   •  zolpidem (AMBIEN) 10 mg tablet, Take 1 tablet (10 mg total) by mouth daily at bedtime as needed for sleep, Disp: 30 tablet, Rfl: 0  •  gabapentin (NEURONTIN) 300 mg capsule, Take 1 capsule (300 mg total) by mouth daily at bedtime, Disp: 90 capsule, Rfl: 1    Allergies   Allergen Reactions   • Ibuprofen GI Intolerance and Nausea Only   • Other Other (See Comments)     Seasonal allergies- pt has congestion       Social History   Past Surgical History:   Procedure Laterality Date   • BARIATRIC SURGERY     • CARPAL TUNNEL RELEASE Right    • COLONOSCOPY     • EGD     • FL RETROGRADE PYELOGRAM  4/24/2020   • LASIK     • OH CYSTO/URETERO W/LITHOTRIPSY &INDWELL STENT INSRT Right 4/24/2020    Procedure: CYSTOSCOPY URETEROSCOPY WITH LITHOTRIPSY HOLMIUM LASER, RETROGRADE PYELOGRAM AND INSERTION STENT URETERAL;  Surgeon: Kim Hector MD;  Location: [facility];  Service: Urology   • OH LAPS GSTR RSTCV PX W/BYP GABBY-EN-Y LIMB <150 CM N/A 4/26/2021    Procedure: BYPASS GASTRIC GABBY-EN-Y LAPAROSCOPIC W ROBOTICS AND INTRAOPERATIVE EGD;  Surgeon: Latha Rubalcava MD;  Location: [facility];  Service: Bariatrics   • ROTATOR CUFF REPAIR Right    • UPPER GASTROINTESTINAL ENDOSCOPY       Family History   Problem Relation Age of Onset   • Thyroid disease unspecified Mother    • Osteoporosis Mother    • Hypertension Father    • Colon cancer Father 76   • Diabetes type II Sister    • Hypertension Sister    • Hyperlipidemia Sister    • Thyroid disease unspecified Sister    • Diabetes type II Brother    • Hypertension Brother    • Hyperlipidemia Brother    • Colon cancer Brother 60       Objective:  /90 (BP Location: Left arm, Patient Position: Sitting, Cuff Size: Standard)   Pulse 69   Temp 98.3 °F (36.8 °C)   Ht 5' 8.74" (1.746 m)   Wt 92.9 kg (204 lb 12.8 oz)   SpO2 98%   BMI 30.47 kg/m²     Recent Results (from the past 1344 hour(s))   CBC and differential    Collection Time: 07/10/23  7:55 AM   Result Value Ref Range    WBC 7.17 4.31 - 10.16 Thousand/uL    RBC 4.81 3.88 - 5.62 Million/uL    Hemoglobin 14.9 12.0 - 17.0 g/dL    Hematocrit 44.2 36.5 - 49.3 %    MCV 92 82 - 98 fL    MCH 31.0 26.8 - 34.3 pg    MCHC 33.7 31.4 - 37.4 g/dL    RDW 11.9 11.6 - 15.1 %    MPV 9.0 8.9 - 12.7 fL    Platelets 031 823 - 329 Thousands/uL    nRBC 0 /100 WBCs    Neutrophils Relative 60 43 - 75 %    Immat GRANS % 0 0 - 2 %    Lymphocytes Relative 26 14 - 44 %    Monocytes Relative 8 4 - 12 %    Eosinophils Relative 5 0 - 6 %    Basophils Relative 1 0 - 1 %    Neutrophils Absolute 4.28 1.85 - 7.62 Thousands/µL    Immature Grans Absolute 0.01 0.00 - 0.20 Thousand/uL    Lymphocytes Absolute 1.87 0.60 - 4.47 Thousands/µL    Monocytes Absolute 0.59 0.17 - 1.22 Thousand/µL    Eosinophils Absolute 0.35 0.00 - 0.61 Thousand/µL    Basophils Absolute 0.07 0.00 - 0.10 Thousands/µL   Hemoglobin A1C    Collection Time: 07/10/23  7:55 AM   Result Value Ref Range    Hemoglobin A1C 7.4 (H) Normal 3.8-5.6%; PreDiabetic 5.7-6.4%;  Diabetic >=6.5%; Glycemic control for adults with diabetes <7.0% %     mg/dl   Lipid panel    Collection Time: 07/10/23  7:55 AM   Result Value Ref Range    Cholesterol 215 (H) See Comment mg/dL    Triglycerides 109 See Comment mg/dL    HDL, Direct 45 >=40 mg/dL    LDL Calculated 148 (H) 0 - 100 mg/dL    Non-HDL-Chol (CHOL-HDL) 170 mg/dl   TSH, 3rd generation with Free T4 reflex    Collection Time: 07/10/23  7:55 AM   Result Value Ref Range    TSH 3RD GENERATON 12.579 (H) 0.450 - 4.500 uIU/mL Comprehensive metabolic panel    Collection Time: 07/10/23  7:55 AM   Result Value Ref Range    Sodium 137 135 - 147 mmol/L    Potassium 4.1 3.5 - 5.3 mmol/L    Chloride 100 96 - 108 mmol/L    CO2 29 21 - 32 mmol/L    ANION GAP 8 mmol/L    BUN 18 5 - 25 mg/dL    Creatinine 0.92 0.60 - 1.30 mg/dL    Glucose, Fasting 143 (H) 65 - 99 mg/dL    Calcium 9.3 8.4 - 10.2 mg/dL    AST 36 13 - 39 U/L    ALT 31 7 - 52 U/L    Alkaline Phosphatase 72 34 - 104 U/L    Total Protein 7.1 6.4 - 8.4 g/dL    Albumin 4.3 3.5 - 5.0 g/dL    Total Bilirubin 0.53 0.20 - 1.00 mg/dL    eGFR 91 ml/min/1.73sq m   T4, free    Collection Time: 07/10/23  7:55 AM   Result Value Ref Range    Free T4 0.91 0.61 - 1.12 ng/dL            Physical Exam  Constitutional:       General: He is not in acute distress. Appearance: He is well-developed. He is not diaphoretic. HENT:      Head: Normocephalic and atraumatic. Right Ear: External ear normal.      Left Ear: External ear normal.      Nose: Nose normal.      Mouth/Throat:      Pharynx: No oropharyngeal exudate. Eyes:      General:         Right eye: No discharge. Left eye: No discharge. Conjunctiva/sclera: Conjunctivae normal.      Pupils: Pupils are equal, round, and reactive to light. Neck:      Thyroid: No thyromegaly. Cardiovascular:      Rate and Rhythm: Normal rate and regular rhythm. Heart sounds: Normal heart sounds. No murmur heard. No friction rub. No gallop. Pulmonary:      Effort: Pulmonary effort is normal. No respiratory distress. Breath sounds: Normal breath sounds. No stridor. No wheezing or rales. Abdominal:      General: Bowel sounds are normal. There is no distension. Palpations: Abdomen is soft. Tenderness: There is no abdominal tenderness. Musculoskeletal:      Cervical back: Normal range of motion and neck supple. Lymphadenopathy:      Cervical: No cervical adenopathy. Skin:     General: Skin is warm and dry. Findings: No erythema or rash. Neurological:      Mental Status: He is alert and oriented to person, place, and time. Psychiatric:         Behavior: Behavior normal.         Thought Content:  Thought content normal.         Judgment: Judgment normal.

## 2023-07-17 NOTE — ASSESSMENT & PLAN NOTE
Controlled on lisinopril-HCTZ. Continue current regimen -   Continue to monitor blood pressure at home. Goal BP is < 130/80. Contact our office for consistent elevations. Recommend low sodium diet. Exercise 30 minutes 5 times a week as tolerated.   Recommend yearly eye exam.

## 2023-07-17 NOTE — ASSESSMENT & PLAN NOTE
Restart Crestor. Recommend healthy lifestyle choices for your cholesterol. Low fat/low cholesterol diet. Limit/avoid red meat. Eat more lean meat - chicken breast, ground turkey, fish. Exercise 30 mins at least 5 times a week as tolerated.

## 2023-09-06 DIAGNOSIS — G47.09 OTHER INSOMNIA: ICD-10-CM

## 2023-09-06 DIAGNOSIS — E10.65 TYPE 1 DIABETES MELLITUS WITH HYPERGLYCEMIA, WITH LONG-TERM CURRENT USE OF INSULIN (HCC): ICD-10-CM

## 2023-09-06 RX ORDER — ZOLPIDEM TARTRATE 10 MG/1
10 TABLET ORAL
Qty: 30 TABLET | Refills: 0 | Status: SHIPPED | OUTPATIENT
Start: 2023-09-06

## 2023-09-06 RX ORDER — INSULIN ASPART 100 [IU]/ML
INJECTION, SOLUTION INTRAVENOUS; SUBCUTANEOUS
Qty: 70 ML | Refills: 3 | Status: SHIPPED | OUTPATIENT
Start: 2023-09-06

## 2023-09-06 RX ORDER — LEVOTHYROXINE SODIUM 200 MCG
200 TABLET ORAL EVERY MORNING
Qty: 90 TABLET | Refills: 0 | Status: SHIPPED | OUTPATIENT
Start: 2023-09-06

## 2023-09-07 ENCOUNTER — OFFICE VISIT (OUTPATIENT)
Dept: BARIATRICS | Facility: CLINIC | Age: 58
End: 2023-09-07
Payer: COMMERCIAL

## 2023-09-07 VITALS
BODY MASS INDEX: 30.14 KG/M2 | HEART RATE: 68 BPM | TEMPERATURE: 97.9 F | DIASTOLIC BLOOD PRESSURE: 64 MMHG | SYSTOLIC BLOOD PRESSURE: 116 MMHG | WEIGHT: 203.5 LBS | HEIGHT: 69 IN

## 2023-09-07 DIAGNOSIS — E66.9 OBESITY, CLASS I, BMI 30-34.9: ICD-10-CM

## 2023-09-07 DIAGNOSIS — Z98.84 BARIATRIC SURGERY STATUS: ICD-10-CM

## 2023-09-07 DIAGNOSIS — E10.65 TYPE 1 DIABETES MELLITUS WITH HYPERGLYCEMIA, WITH LONG-TERM CURRENT USE OF INSULIN (HCC): ICD-10-CM

## 2023-09-07 DIAGNOSIS — K91.2 POSTSURGICAL MALABSORPTION: ICD-10-CM

## 2023-09-07 DIAGNOSIS — Z48.815 ENCOUNTER FOR SURGICAL AFTERCARE FOLLOWING SURGERY OF DIGESTIVE SYSTEM: Primary | ICD-10-CM

## 2023-09-07 DIAGNOSIS — K21.9 GASTROESOPHAGEAL REFLUX DISEASE WITHOUT ESOPHAGITIS: ICD-10-CM

## 2023-09-07 PROCEDURE — 99214 OFFICE O/P EST MOD 30 MIN: CPT | Performed by: NURSE PRACTITIONER

## 2023-09-07 NOTE — PROGRESS NOTES
Assessment/Plan:     Patient ID: Mohit Harris is a 63 y.o. male. Bariatric Surgery Status/GERD    -s/p Yeimi-En-Y Gastric Bypass with Dr. Armstead Duverney on 04/24/2021. Presents to the office today for annual visit. Overall doing well, tolerating a regular diet. Denies having any abdominal pain, N/V/D/C, regurgitation, reflux or dysphagia. Taking his MVI daily. Was able to taper off nexium without any difficulty. PLAN:     - Routine follow up in 1 year for annual visit  - Continue with healthy lifestyle, adequate protein intake of 60 gm, fluid intake of at least 64 oz. - Continue with MVI daily. - Activity as tolerated. - Labs ordered and will adjust accordingly if any deficiency. - Follow up with RD and SW as needed. Type I DM - has continuous pump and glucometer. Sees endocrinology routinely. Continue to monitor and f/u as scheduled. · Continued/Maintain healthy weight loss with good nutrition intakes. · Adequate hydration with at least 64oz. fluid intake. · Follow diet as discussed. · Follow vitamin and mineral recommendations as reviewed with you. · Exercise as tolerated. · Colonoscopy referral made: UTD - due in 2025. · Follow-up in 1 year. We kindly ask that your arrive 15 minutes before your scheduled appointment time with your provider to allow our staff to room you, get your vital signs and update your chart. · Get lab work done prior to annual visit. Please call the office if you need a script. It is recommended to check with your insurance BEFORE getting labs done to make sure they are covered by your policy. · Call our office if you have any problems with abdominal pain especially associated with fever, chills, nausea, vomiting or any other concerns. · All  Post-bariatric surgery patients should be aware that very small quantities of any alcohol can cause impairment and it is very possible not to feel the effect.  The effect can be in the system for several hours. It is also a stomach irritant. · It is advised to AVOID alcohol, Nonsteroidal antiinflammatory drugs (NSAIDS) and nicotine of all forms . Any of these can cause stomach irritation/pain. · Discussed the effects of alcohol on a bariatric patient and the increased impairment risk. · Keep up the good work! Postsurgical Malabsorption   -At risk for malabsorption of vitamins/minerals secondary to malabsorption and restriction of intake from bariatric surgery  - Currently taking adequate postop bariatric surgery vitamin supplementation  -Next set of bariatric labs ordered for approximately 2 weeks  -Patient received education about the importance of adhering to a lifelong supplementation regimen to avoid vitamin/mineral deficiencies      Diagnoses and all orders for this visit:    Encounter for surgical aftercare following surgery of digestive system  -     Zinc; Future  -     Vitamin D 25 hydroxy; Future  -     Vitamin B12; Future  -     Vitamin A; Future  -     Vitamin B1, whole blood; Future  -     TIBC Panel (incl. Iron, TIBC, % Iron Saturation); Future  -     Ferritin; Future  -     PTH, intact; Future  -     Folate; Future    Bariatric surgery status  -     Zinc; Future  -     Vitamin D 25 hydroxy; Future  -     Vitamin B12; Future  -     Vitamin A; Future  -     Vitamin B1, whole blood; Future  -     TIBC Panel (incl. Iron, TIBC, % Iron Saturation); Future  -     Ferritin; Future  -     PTH, intact; Future  -     Folate; Future    Obesity, Class I, BMI 30-34.9  -     Zinc; Future  -     Vitamin D 25 hydroxy; Future  -     Vitamin B12; Future  -     Vitamin A; Future  -     Vitamin B1, whole blood; Future  -     TIBC Panel (incl. Iron, TIBC, % Iron Saturation); Future  -     Ferritin; Future  -     PTH, intact; Future  -     Folate; Future    Postsurgical malabsorption  -     Zinc; Future  -     Vitamin D 25 hydroxy;  Future  -     Vitamin B12; Future  -     Vitamin A; Future  -     Vitamin B1, whole blood; Future  -     TIBC Panel (incl. Iron, TIBC, % Iron Saturation); Future  -     Ferritin; Future  -     PTH, intact; Future  -     Folate; Future    Gastroesophageal reflux disease without esophagitis    Type 1 diabetes mellitus with hyperglycemia, with long-term current use of insulin ([facility])         Subjective:      Patient ID: Candelaria Kessler is a 63 y.o. male. -s/p Yeimi-En-Y Gastric Bypass with Dr. Christina Gonzales on 04/24/2021. Presents to the office today for annual visit. Overall doing well, tolerating a regular diet. Denies having any abdominal pain, N/V/D/C, regurgitation, reflux or dysphagia. Taking his MVI daily. Initial: 270s lbs  Current: : 203 lbs  EWL: (Weight loss is ahead of schedule at this post surgical period.)  Dexter: 199.5 lbs  Current BMI is Body mass index is 30.05 kg/m². · Tolerating a regular diet-yes  · Eating at least 60 grams of protein per day-yes  · Following 30/60 minute rule with liquids-yes  · Drinking at least 64 ounces of fluid per day-yes  · Drinking carbonated beverages-yes  · Sufficient exercise-yes  · Using NSAIDs regularly-no  · Using nicotine-no  · Using alcohol-no  · Supplements: Multivitamins, B-12, Calcium  and tumeric + glucosamine     · EWL is 64%, which places the patient ahead of schedule for expected post surgical weight loss at this time. The following portions of the patient's history were reviewed and updated as appropriate: allergies, current medications, past family history, past medical history, past social history, past surgical history and problem list.    Review of Systems   Constitutional: Negative. Respiratory: Negative. Cardiovascular: Negative. Gastrointestinal: Negative. Musculoskeletal: Positive for arthralgias. Neurological: Negative. Psychiatric/Behavioral: Negative.           Objective:    /64   Pulse 68   Temp 97.9 °F (36.6 °C) (Tympanic)   Ht 5' 9" (1.753 m)   Wt 92.3 kg (203 lb 8 oz)   BMI 30.05 kg/m² Physical Exam  Vitals and nursing note reviewed. Constitutional:       Appearance: Normal appearance. Cardiovascular:      Rate and Rhythm: Normal rate and regular rhythm. Pulses: Normal pulses. Heart sounds: Normal heart sounds. Pulmonary:      Effort: Pulmonary effort is normal.      Breath sounds: Normal breath sounds. Abdominal:      General: Bowel sounds are normal.      Palpations: Abdomen is soft. Tenderness: There is no abdominal tenderness. Musculoskeletal:         General: Normal range of motion. Skin:     General: Skin is warm and dry. Neurological:      General: No focal deficit present. Mental Status: He is alert and oriented to person, place, and time. Mental status is at baseline. Psychiatric:         Mood and Affect: Mood normal.         Behavior: Behavior normal.         Thought Content:  Thought content normal.         Judgment: Judgment normal.

## 2023-09-20 ENCOUNTER — LAB (OUTPATIENT)
Dept: LAB | Facility: CLINIC | Age: 58
End: 2023-09-20
Payer: COMMERCIAL

## 2023-09-20 DIAGNOSIS — E10.65 TYPE 1 DIABETES MELLITUS WITH HYPERGLYCEMIA, WITH LONG-TERM CURRENT USE OF INSULIN (HCC): ICD-10-CM

## 2023-09-20 DIAGNOSIS — Z98.84 BARIATRIC SURGERY STATUS: ICD-10-CM

## 2023-09-20 DIAGNOSIS — I10 HYPERTENSION, UNSPECIFIED TYPE: ICD-10-CM

## 2023-09-20 DIAGNOSIS — Z48.815 ENCOUNTER FOR SURGICAL AFTERCARE FOLLOWING SURGERY OF DIGESTIVE SYSTEM: ICD-10-CM

## 2023-09-20 DIAGNOSIS — E66.9 OBESITY, CLASS I, BMI 30-34.9: ICD-10-CM

## 2023-09-20 DIAGNOSIS — E03.9 ACQUIRED HYPOTHYROIDISM: ICD-10-CM

## 2023-09-20 DIAGNOSIS — K91.2 POSTSURGICAL MALABSORPTION: ICD-10-CM

## 2023-09-20 LAB
25(OH)D3 SERPL-MCNC: 55.6 NG/ML (ref 30–100)
ALBUMIN SERPL BCP-MCNC: 4.2 G/DL (ref 3.5–5)
ALP SERPL-CCNC: 63 U/L (ref 34–104)
ALT SERPL W P-5'-P-CCNC: 36 U/L (ref 7–52)
ANION GAP SERPL CALCULATED.3IONS-SCNC: 6 MMOL/L
AST SERPL W P-5'-P-CCNC: 38 U/L (ref 13–39)
BASOPHILS # BLD AUTO: 0.07 THOUSANDS/ÂΜL (ref 0–0.1)
BASOPHILS NFR BLD AUTO: 1 % (ref 0–1)
BILIRUB SERPL-MCNC: 0.52 MG/DL (ref 0.2–1)
BUN SERPL-MCNC: 20 MG/DL (ref 5–25)
CALCIUM SERPL-MCNC: 9.1 MG/DL (ref 8.4–10.2)
CHLORIDE SERPL-SCNC: 102 MMOL/L (ref 96–108)
CHOLEST SERPL-MCNC: 137 MG/DL
CO2 SERPL-SCNC: 30 MMOL/L (ref 21–32)
CREAT SERPL-MCNC: 0.97 MG/DL (ref 0.6–1.3)
EOSINOPHIL # BLD AUTO: 0.3 THOUSAND/ÂΜL (ref 0–0.61)
EOSINOPHIL NFR BLD AUTO: 4 % (ref 0–6)
ERYTHROCYTE [DISTWIDTH] IN BLOOD BY AUTOMATED COUNT: 12.2 % (ref 11.6–15.1)
EST. AVERAGE GLUCOSE BLD GHB EST-MCNC: 180 MG/DL
FERRITIN SERPL-MCNC: 22 NG/ML (ref 24–336)
FOLATE SERPL-MCNC: >22.3 NG/ML
GFR SERPL CREATININE-BSD FRML MDRD: 86 ML/MIN/1.73SQ M
GLUCOSE P FAST SERPL-MCNC: 133 MG/DL (ref 65–99)
HBA1C MFR BLD: 7.9 %
HCT VFR BLD AUTO: 42.9 % (ref 36.5–49.3)
HDLC SERPL-MCNC: 46 MG/DL
HGB BLD-MCNC: 14.3 G/DL (ref 12–17)
IMM GRANULOCYTES # BLD AUTO: 0.01 THOUSAND/UL (ref 0–0.2)
IMM GRANULOCYTES NFR BLD AUTO: 0 % (ref 0–2)
LDLC SERPL CALC-MCNC: 78 MG/DL (ref 0–100)
LYMPHOCYTES # BLD AUTO: 2.07 THOUSANDS/ÂΜL (ref 0.6–4.47)
LYMPHOCYTES NFR BLD AUTO: 31 % (ref 14–44)
MCH RBC QN AUTO: 30.8 PG (ref 26.8–34.3)
MCHC RBC AUTO-ENTMCNC: 33.3 G/DL (ref 31.4–37.4)
MCV RBC AUTO: 93 FL (ref 82–98)
MONOCYTES # BLD AUTO: 0.56 THOUSAND/ÂΜL (ref 0.17–1.22)
MONOCYTES NFR BLD AUTO: 8 % (ref 4–12)
NEUTROPHILS # BLD AUTO: 3.76 THOUSANDS/ÂΜL (ref 1.85–7.62)
NEUTS SEG NFR BLD AUTO: 56 % (ref 43–75)
NONHDLC SERPL-MCNC: 91 MG/DL
NRBC BLD AUTO-RTO: 0 /100 WBCS
PLATELET # BLD AUTO: 222 THOUSANDS/UL (ref 149–390)
PMV BLD AUTO: 9.9 FL (ref 8.9–12.7)
POTASSIUM SERPL-SCNC: 4.1 MMOL/L (ref 3.5–5.3)
PROT SERPL-MCNC: 6.9 G/DL (ref 6.4–8.4)
PTH-INTACT SERPL-MCNC: 65.1 PG/ML (ref 12–88)
RBC # BLD AUTO: 4.64 MILLION/UL (ref 3.88–5.62)
SODIUM SERPL-SCNC: 138 MMOL/L (ref 135–147)
T4 FREE SERPL-MCNC: 0.9 NG/DL (ref 0.61–1.12)
TRIGL SERPL-MCNC: 63 MG/DL
TSH SERPL DL<=0.05 MIU/L-ACNC: 6.85 UIU/ML (ref 0.45–4.5)
VIT B12 SERPL-MCNC: 1905 PG/ML (ref 180–914)
WBC # BLD AUTO: 6.77 THOUSAND/UL (ref 4.31–10.16)

## 2023-09-20 PROCEDURE — 36415 COLL VENOUS BLD VENIPUNCTURE: CPT

## 2023-09-20 PROCEDURE — 83540 ASSAY OF IRON: CPT

## 2023-09-20 PROCEDURE — 82728 ASSAY OF FERRITIN: CPT

## 2023-09-20 PROCEDURE — 84443 ASSAY THYROID STIM HORMONE: CPT

## 2023-09-20 PROCEDURE — 84630 ASSAY OF ZINC: CPT

## 2023-09-20 PROCEDURE — 84425 ASSAY OF VITAMIN B-1: CPT

## 2023-09-20 PROCEDURE — 82306 VITAMIN D 25 HYDROXY: CPT

## 2023-09-20 PROCEDURE — 82607 VITAMIN B-12: CPT

## 2023-09-20 PROCEDURE — 84590 ASSAY OF VITAMIN A: CPT

## 2023-09-20 PROCEDURE — 83036 HEMOGLOBIN GLYCOSYLATED A1C: CPT

## 2023-09-20 PROCEDURE — 83970 ASSAY OF PARATHORMONE: CPT

## 2023-09-20 PROCEDURE — 80053 COMPREHEN METABOLIC PANEL: CPT

## 2023-09-20 PROCEDURE — 82746 ASSAY OF FOLIC ACID SERUM: CPT

## 2023-09-20 PROCEDURE — 83550 IRON BINDING TEST: CPT

## 2023-09-20 PROCEDURE — 84439 ASSAY OF FREE THYROXINE: CPT

## 2023-09-21 LAB
IRON SATN MFR SERPL: 26 % (ref 15–50)
IRON SERPL-MCNC: 84 UG/DL (ref 50–212)
TIBC SERPL-MCNC: 321 UG/DL (ref 250–450)
UIBC SERPL-MCNC: 237 UG/DL (ref 155–355)

## 2023-09-22 ENCOUNTER — OFFICE VISIT (OUTPATIENT)
Dept: ENDOCRINOLOGY | Facility: CLINIC | Age: 58
End: 2023-09-22
Payer: COMMERCIAL

## 2023-09-22 VITALS
HEART RATE: 62 BPM | DIASTOLIC BLOOD PRESSURE: 72 MMHG | BODY MASS INDEX: 29.98 KG/M2 | WEIGHT: 203 LBS | SYSTOLIC BLOOD PRESSURE: 136 MMHG | OXYGEN SATURATION: 96 %

## 2023-09-22 DIAGNOSIS — E55.9 VITAMIN D DEFICIENCY: ICD-10-CM

## 2023-09-22 DIAGNOSIS — E10.65 TYPE 1 DIABETES MELLITUS WITH HYPERGLYCEMIA, WITH LONG-TERM CURRENT USE OF INSULIN (HCC): Primary | ICD-10-CM

## 2023-09-22 DIAGNOSIS — I10 PRIMARY HYPERTENSION: ICD-10-CM

## 2023-09-22 DIAGNOSIS — E03.9 ACQUIRED HYPOTHYROIDISM: ICD-10-CM

## 2023-09-22 PROCEDURE — 99214 OFFICE O/P EST MOD 30 MIN: CPT | Performed by: INTERNAL MEDICINE

## 2023-09-22 PROCEDURE — 95251 CONT GLUC MNTR ANALYSIS I&R: CPT | Performed by: INTERNAL MEDICINE

## 2023-09-22 RX ORDER — GLUCAGON 3 MG/1
POWDER NASAL
Qty: 1 EACH | Refills: 3 | Status: SHIPPED | OUTPATIENT
Start: 2023-09-22

## 2023-09-22 NOTE — PROGRESS NOTES
Chapin Roman 63 y.o. male MRN: [id number]    Encounter: [id number]      Assessment/Plan     Assessment: This is a 63y.o.-year-old male with diabetes with hyperglycemia. Plan:    Diagnoses and all orders for this visit:    Type 1 diabetes mellitus with hyperglycemia, with long-term current use of insulin ([address])  Lab Results   Component Value Date    HGBA1C 7.9 (H) 09/20/2023   A1c 7.9%, uncontrolled. Blood sugars are elevated usually after lunch and after dinner. Patient admits taking bolus sometimes after meals when the blood sugar is high followed by hypoglycemia. Stated to eat small meals with 15 to 30 g of carbs per meal and taking bolus at least 15 to 20 minutes before to avoid spike in blood sugar. Discussed to eat proteins with each meal.  For now we will continue current insulin pump settings. Patient is currently using basal IQ. Educated about hypoglycemia symptoms and treatment glucagon injection was sent to the pharmacy. We also discussed the long-term complication of uncontrolled diabetes in future including nephropathy, retinopathy, neuropathy, risk of heart disease and stroke      -     Basic metabolic panel; Future  -     Hemoglobin A1C; Future    Acquired hypothyroidism  TSH is slightly elevated, free T4 is normal.  Could be from biotin supplementation. .  Discussed to take levothyroxine on empty stomach at least 3 to 4 hours apart from all supplementation which could affect absorption of levothyroxine. Also discussed to hold biotin at least 1 week before the blood work  [facility] TSH and free T4 before next appointment  -     TSH, 3rd generation; Future  -     T4, free;  Future    Primary hypertension  Blood pressure well controlled, continue current management as per PCP  Vitamin D deficiency  Vitamin D was 55  Currently not taking any vitamin D supplementation continue multivitamins      CC: Diabetes    History of Present Illness     HPI:  Chapin Roman is 60-year-old male with medical history of type 1 diabetes with long-term use of insulin with no known complications is here for follow-up. He underwent bariatric surgery recently, had lost close to 30 to 40 pounds. He is currently managed on t:slim insulin pump. He denies any issues with his current regimen. He checks blood sugars regularly with Dexcom clarity sensor. Reviewed continuous glucose monitor sensor by Dexcom clarity,   14 days overview from September 9, 2023 to September 22, 2023 reviewed   More than 72 hours of data reviewed   average glucose 176 mg per DL, standard deviation is 63 mg per DL,  55% blood sugars are in target range,   Less than 1% blood sugars are low, 0% blood sugars are very low, 30% blood sugars are high and 14% blood sugars are very high  Blood sugars are usually elevated after meals followed by hypoglycemia. Patient stated that sometimes he is taking bolus after meals as he is not sure how much he is going to eat, very small meals because of history of gastric bypass surgery. Patient uses NovoLog via insulin pump. Patient is on t:slim insulin pump denies malfunctioning of the pump  Settings     Basal rate:  12:00 a.m. = 0.800 units/hour  4:00 a.m. = 1.000 units/hour  6:00 a.m. = 1.000 units/hour  8:00 a.m. = 1.500 units/hour  9:00 p.m. = 1.000 units/hour  Insulin to carb ratio:  12:00 a.m. = 8  4:00 a.m. = 8  6:00 a.m. = 8  8:00 a.m. = 8  9:00 p.m. = 8  Insulin sensitivity factor:  12:00 a.m. = 40  4:00 a.m. = 40  6:00 a.m. = 25  8:00 a.m. = 25  9:00 p.m. = 40  BG target:    12:00 a.m. = 110   4:00 a.m. = 110  6:00 a.m. = 110   8:00 a.m. = 110  9:00 p.m. = 110    Also complains of left shoulder pain and is scheduled for shoulder surgery in the next couple of weeks. For hypothyroidism, he takes levothyroxine 200 mcg daily on empty stomach 1 hour before breakfast  He also takes biotin supplementation daily.     For hyperlipidemia, he takes Crestor 5 mg daily LDL is well controlled  Component Latest Ref Rng 9/20/2023   Sodium      135 - 147 mmol/L 138    Potassium      3.5 - 5.3 mmol/L 4.1    Chloride      96 - 108 mmol/L 102    CO2      21 - 32 mmol/L 30    Anion Gap      mmol/L 6    BUN      5 - 25 mg/dL 20    Creatinine      0.60 - 1.30 mg/dL 0.97    GLUCOSE FASTING      65 - 99 mg/dL 133 (H)    Calcium      8.4 - 10.2 mg/dL 9.1    AST      13 - 39 U/L 38    ALT      7 - 52 U/L 36    Alkaline Phosphatase      34 - 104 U/L 63    Total Protein      6.4 - 8.4 g/dL 6.9    Albumin      3.5 - 5.0 g/dL 4.2    TOTAL BILIRUBIN      0.20 - 1.00 mg/dL 0.52    eGFR      ml/min/1.73sq m 86    Cholesterol      See Comment mg/dL 137    Triglycerides      See Comment mg/dL 63    HDL      >=40 mg/dL 46    LDL Calculated      0 - 100 mg/dL 78    Non-HDL Cholesterol      mg/dl 91    Hemoglobin A1C      Normal 4.0-5.6%; PreDiabetic 5.7-6.4%; Diabetic >=6.5%; Glycemic control for adults with diabetes <7.0% % 7.9 (H)    eAG, EST AVG Glucose      mg/dl 180    Free T4      0.61 - 1.12 ng/dL 0.90    TSH 3RD GENERATON      0.450 - 4.500 uIU/mL 6.848 (H)         Lab Results   Component Value Date    LDLCALC 78 09/20/2023       Lab Results   Component Value Date    HGBA1C 7.9 (H) 09/20/2023        Review of Systems   Constitutional: Positive for activity change and fatigue. Negative for diaphoresis, fever and unexpected weight change. HENT: Negative. Eyes: Negative for visual disturbance. Respiratory: Negative for cough, chest tightness and shortness of breath. Cardiovascular: Negative for chest pain, palpitations and leg swelling. Gastrointestinal: Negative for abdominal pain, blood in stool, constipation, diarrhea, nausea and vomiting. Endocrine: Negative for cold intolerance, heat intolerance, polydipsia, polyphagia and polyuria. Genitourinary: Negative for dysuria, enuresis, frequency and urgency. Musculoskeletal: Positive for arthralgias and myalgias. Skin: Negative for pallor, rash and wound. Allergic/Immunologic: Negative. Neurological: Negative for dizziness, tremors, weakness and numbness. Hematological: Negative. Psychiatric/Behavioral: Negative.         Historical Information   Past Medical History:   Diagnosis Date   • Anemia    • Axillary adenopathy 8/15/2022   • Closed fracture of one rib of left side with routine healing 2022   • Colon polyp    • Diabetes mellitus ([address])    • Disease of thyroid gland    • GERD (gastroesophageal reflux disease)    • Hyperlipidemia    • Hypertension    • Insomnia    • Kidney stone    • Lymphadenopathy of head and neck 8/15/2022   • Sleep apnea     Mild sleep apnea   • Type 1 diabetes mellitus (HCC)     Uses Insulin Pump   • Vitamin D deficiency      Past Surgical History:   Procedure Laterality Date   • BARIATRIC SURGERY     • CARPAL TUNNEL RELEASE Right    • COLONOSCOPY     • EGD     • FL RETROGRADE PYELOGRAM  2020   • LASIK     • NV CYSTO/URETERO W/LITHOTRIPSY &INDWELL STENT INSRT Right 2020    Procedure: CYSTOSCOPY URETEROSCOPY WITH LITHOTRIPSY HOLMIUM LASER, RETROGRADE PYELOGRAM AND INSERTION STENT URETERAL;  Surgeon: Cristian Richey MD;  Location: [facility];  Service: Urology   • NV LAPS GSTR TCV [address] [place] [address] W/BYP GABBY-EN-Y LIMB <150 CM N/A 2021    Procedure: BYPASS GASTRIC GABBY-EN-Y LAPAROSCOPIC W ROBOTICS AND INTRAOPERATIVE EGD;  Surgeon: Matt Garay MD;  Location: [facility];  Service: Bariatrics   • ROTATOR CUFF REPAIR Right    • UPPER GASTROINTESTINAL ENDOSCOPY       Social History   Social History     Substance and Sexual Activity   Alcohol Use Yes   • Alcohol/week: 2.0 standard drinks of alcohol   • Types: 2 Glasses of wine per week    Comment: social     Social History     Substance and Sexual Activity   Drug Use No     Social History     Tobacco Use   Smoking Status Former   • Years: 30.00   • Types: Cigarettes   • Start date:    • Quit date:    • Years since quittin.7   Smokeless Tobacco Never   Tobacco Comments    quit in 2015     Family History:   Family History   Problem Relation Age of Onset   • Thyroid disease unspecified Mother    • Osteoporosis Mother    • Hypertension Father    • Colon cancer Father 76   • Diabetes type II Sister    • Hypertension Sister    • Hyperlipidemia Sister    • Thyroid disease unspecified Sister    • Diabetes type II Brother    • Hypertension Brother    • Hyperlipidemia Brother    • Colon cancer Brother 61       Meds/Allergies   Current Outpatient Medications   Medication Sig Dispense Refill   • Biotin 16923 MCG TABS Take 10,000 mcg by mouth in the morning     • Blood Glucose Monitoring Suppl (ONE TOUCH ULTRA 2) w/Device KIT Use       • Calcium Citrate (JORDY-CITRATE PO) Take 1,000 tablets by mouth 2 (two) times a day     • Continuous Blood Gluc  (DEXCOM G6 ) KIYA USE AS DIRECTED 1 Device 0   • Continuous Blood Gluc Sensor (Dexcom G6 Sensor) MISC Use 1 Device if needed (change Q 10 days) Use 1 sensor every 10 days 9 each 3   • Continuous Blood Gluc Transmit (Dexcom G6 Transmitter) MISC Inject 1 Device under the skin every 3 (three) months 1 each 3   • fluticasone (FLONASE) 50 mcg/act nasal spray 1 spray into each nostril daily     • Glucagon (BAQSIMI TWO PACK) 3 MG/DOSE POWD Use one dose as needed in case of severe hypoglycemia ( nasal spray) 1 each 0   • insulin glargine (Basaglar KwikPen) 100 units/mL injection pen Inject 32 units in case of pump failure ( incase of emergency) 15 mL 0   • Insulin Pen Needle (BD PEN NEEDLE LYRIC U/F) 32G X 4 MM MISC by Does not apply route daily Use once daily 100 each 2   • lisinopril-hydrochlorothiazide (PRINZIDE,ZESTORETIC) 20-12.5 MG per tablet Take 1 tablet by mouth daily 90 tablet 1   • loratadine (CLARITIN) 10 mg tablet Take 1 tablet (10 mg total) by mouth daily 90 tablet 1   • Multiple Vitamins-Minerals (Bariatric Fusion) CHEW Chew       • NovoLOG 100 UNIT/ML injection USE UP TO 78 UNITS DAILY VIA INSULIN PUMP 70 mL 3   • Probiotic Product (PROBIOTIC DAILY PO) Take 1 tablet by mouth daily      • psyllium (METAMUCIL) 0.52 g capsule Take 0.52 g by mouth 3 (three) times a day Fiber capsules     • rosuvastatin (CRESTOR) 5 mg tablet Take 1 tablet (5 mg total) by mouth daily 90 tablet 1   • Synthroid 200 MCG tablet Take 1 tablet (200 mcg total) by mouth every morning Take on an empty stomach 90 tablet 0   • TURMERIC PO Take 1,400 capsules by mouth 2 (two) times a day     • vitamin B-12 (VITAMIN B-12) 1,000 mcg tablet Take 1,000 mcg by mouth 3 (three) times a week     • zolpidem (AMBIEN) 10 mg tablet Take 1 tablet (10 mg total) by mouth daily at bedtime as needed for sleep 30 tablet 0   • esomeprazole (NexIUM) 20 mg capsule Take 20 mg by mouth daily in the early morning (Patient not taking: Reported on 9/7/2023)     • gabapentin (NEURONTIN) 300 mg capsule Take 1 capsule (300 mg total) by mouth daily at bedtime (Patient not taking: Reported on 9/7/2023) 90 capsule 1     No current facility-administered medications for this visit. Allergies   Allergen Reactions   • Ibuprofen GI Intolerance and Nausea Only   • Other Other (See Comments)     Seasonal allergies- pt has congestion       Objective   Vitals: Blood pressure 136/72, pulse 62, weight 92.1 kg (203 lb), SpO2 96 %. Physical Exam  Vitals reviewed. Constitutional:       General: He is not in acute distress. Appearance: He is well-developed. HENT:      Head: Normocephalic and atraumatic. Eyes:      Pupils: Pupils are equal, round, and reactive to light. Neck:      Thyroid: No thyromegaly. Cardiovascular:      Rate and Rhythm: Normal rate and regular rhythm. Heart sounds: Normal heart sounds. Pulmonary:      Effort: Pulmonary effort is normal. No respiratory distress. Breath sounds: Normal breath sounds. Musculoskeletal:         General: No deformity. Normal range of motion. Cervical back: Normal range of motion and neck supple.    Skin:     General: Skin is warm and dry. Findings: No erythema. Neurological:      Mental Status: He is alert and oriented to person, place, and time. The history was obtained from the review of the chart, patient.     Lab Results:   Lab Results   Component Value Date/Time    Hemoglobin A1C 7.9 (H) 09/20/2023 07:23 AM    Hemoglobin A1C 7.4 (H) 07/10/2023 07:55 AM    Hemoglobin A1C 7.0 (H) 05/11/2023 03:27 PM    WBC 6.77 09/20/2023 07:23 AM    WBC 7.17 07/10/2023 07:55 AM    WBC 5.60 11/01/2022 08:18 AM    Hemoglobin 14.3 09/20/2023 07:23 AM    Hemoglobin 14.9 07/10/2023 07:55 AM    Hemoglobin 15.4 11/01/2022 08:18 AM    Hematocrit 42.9 09/20/2023 07:23 AM    Hematocrit 44.2 07/10/2023 07:55 AM    Hematocrit 45.8 11/01/2022 08:18 AM    MCV 93 09/20/2023 07:23 AM    MCV 92 07/10/2023 07:55 AM    MCV 96 11/01/2022 08:18 AM    Platelets 184 04/92/8933 07:23 AM    Platelets 098 52/60/9613 07:55 AM    Platelets 243 62/70/1477 08:18 AM    BUN 20 09/20/2023 07:23 AM    BUN 18 07/10/2023 07:55 AM    BUN 15 11/01/2022 08:18 AM    Potassium 4.1 09/20/2023 07:23 AM    Potassium 4.1 07/10/2023 07:55 AM    Potassium 4.3 11/01/2022 08:18 AM    Chloride 102 09/20/2023 07:23 AM    Chloride 100 07/10/2023 07:55 AM    Chloride 103 11/01/2022 08:18 AM    CO2 30 09/20/2023 07:23 AM    CO2 29 07/10/2023 07:55 AM    CO2 30 11/01/2022 08:18 AM    Creatinine 0.97 09/20/2023 07:23 AM    Creatinine 0.92 07/10/2023 07:55 AM    Creatinine 0.87 11/01/2022 08:18 AM    AST 38 09/20/2023 07:23 AM    AST 36 07/10/2023 07:55 AM    AST 35 11/01/2022 08:18 AM    ALT 36 09/20/2023 07:23 AM    ALT 31 07/10/2023 07:55 AM    ALT 22 11/01/2022 08:18 AM    Total Protein 6.9 09/20/2023 07:23 AM    Total Protein 7.1 07/10/2023 07:55 AM    Total Protein 7.6 11/01/2022 08:18 AM    Albumin 4.2 09/20/2023 07:23 AM    Albumin 4.3 07/10/2023 07:55 AM    Albumin 4.2 11/01/2022 08:18 AM    HDL, Direct 46 09/20/2023 07:23 AM    HDL, Direct 45 07/10/2023 07:55 AM Triglycerides 63 09/20/2023 07:23 AM    Triglycerides 109 07/10/2023 07:55 AM           Imaging Studies: I have personally reviewed pertinent reports. Portions of the record may have been created with voice recognition software. Occasional wrong word or "sound a like" substitutions may have occurred due to the inherent limitations of voice recognition software. Read the chart carefully and recognize, using context, where substitutions have occurred.

## 2023-09-25 LAB — VIT A SERPL-MCNC: 26.7 UG/DL (ref 20.1–62)

## 2023-09-26 LAB — VIT B1 BLD-SCNC: 84.9 NMOL/L (ref 66.5–200)

## 2023-09-27 ENCOUNTER — TELEPHONE (OUTPATIENT)
Dept: BARIATRICS | Facility: CLINIC | Age: 58
End: 2023-09-27

## 2023-09-27 DIAGNOSIS — R79.0 LOW FERRITIN: ICD-10-CM

## 2023-09-27 DIAGNOSIS — Z98.84 BARIATRIC SURGERY STATUS: Primary | ICD-10-CM

## 2023-09-27 LAB — ZINC SERPL-MCNC: 65 UG/DL (ref 44–115)

## 2023-09-27 NOTE — TELEPHONE ENCOUNTER
----- Message from Chalo Wright sent at 9/27/2023  9:41 AM EDT -----  Please call patient to let him know his iron levels are low. I would like him to start on Vitron C - an over the counter vitamin C + iron. Please start this once a day and separate this from his calcium intake by at least 2 hours. - vitamin B12 level is high but this is NOT harmful. If you are taking extra vitamin B12 then you have an option to stop or continue this. I would like to repeat his iron level in 3 months.    ANN Manuel

## 2023-09-29 ENCOUNTER — CONSULT (OUTPATIENT)
Age: 58
End: 2023-09-29
Payer: COMMERCIAL

## 2023-09-29 VITALS
TEMPERATURE: 97.8 F | SYSTOLIC BLOOD PRESSURE: 128 MMHG | HEART RATE: 58 BPM | DIASTOLIC BLOOD PRESSURE: 74 MMHG | WEIGHT: 205 LBS | OXYGEN SATURATION: 97 % | HEIGHT: 69 IN | BODY MASS INDEX: 30.36 KG/M2

## 2023-09-29 DIAGNOSIS — Z96.41 INSULIN PUMP IN PLACE: ICD-10-CM

## 2023-09-29 DIAGNOSIS — I10 PRIMARY HYPERTENSION: ICD-10-CM

## 2023-09-29 DIAGNOSIS — M75.111 NONTRAUMATIC INCOMPLETE TEAR OF RIGHT ROTATOR CUFF: ICD-10-CM

## 2023-09-29 DIAGNOSIS — D50.9 IRON DEFICIENCY ANEMIA, UNSPECIFIED IRON DEFICIENCY ANEMIA TYPE: ICD-10-CM

## 2023-09-29 DIAGNOSIS — Z01.818 PRE-OP EXAM: Primary | ICD-10-CM

## 2023-09-29 DIAGNOSIS — E03.9 ACQUIRED HYPOTHYROIDISM: ICD-10-CM

## 2023-09-29 DIAGNOSIS — K21.9 GASTROESOPHAGEAL REFLUX DISEASE WITHOUT ESOPHAGITIS: ICD-10-CM

## 2023-09-29 DIAGNOSIS — E78.2 MIXED HYPERLIPIDEMIA: ICD-10-CM

## 2023-09-29 DIAGNOSIS — E10.65 TYPE 1 DIABETES MELLITUS WITH HYPERGLYCEMIA, WITH LONG-TERM CURRENT USE OF INSULIN (HCC): ICD-10-CM

## 2023-09-29 PROBLEM — L03.115 CELLULITIS OF RIGHT LOWER EXTREMITY: Status: RESOLVED | Noted: 2021-07-13 | Resolved: 2023-09-29

## 2023-09-29 PROBLEM — L02.419 AXILLARY ABSCESS: Status: RESOLVED | Noted: 2022-08-15 | Resolved: 2023-09-29

## 2023-09-29 PROBLEM — R03.0 ELEVATED BP WITHOUT DIAGNOSIS OF HYPERTENSION: Status: RESOLVED | Noted: 2021-04-19 | Resolved: 2023-09-29

## 2023-09-29 PROBLEM — R60.9 EDEMA: Status: RESOLVED | Noted: 2019-06-07 | Resolved: 2023-09-29

## 2023-09-29 PROCEDURE — 99243 OFF/OP CNSLTJ NEW/EST LOW 30: CPT | Performed by: NURSE PRACTITIONER

## 2023-09-29 PROCEDURE — 93000 ELECTROCARDIOGRAM COMPLETE: CPT | Performed by: NURSE PRACTITIONER

## 2023-09-29 NOTE — PROGRESS NOTES
Assessment/Plan:    GERD (gastroesophageal reflux disease)  Stable, controlled. Continue esomeprazole 20 mg daily. Hypothyroidism  Followed by endocrine, TSH elevated, T4 stable. Continue current dose of levothyroxine     Type 1 diabetes mellitus with hyperglycemia, with long-term current use of insulin (HCC)    Lab Results   Component Value Date    HGBA1C 7.9 (H) 09/20/2023   Follows endocrine. Hypertension  Controlled on lisinopril-HCTZ. Continue current regimen -   Continue to monitor blood pressure at home. Goal BP is < 130/80. Contact our office for consistent elevations. Recommend low sodium diet. Exercise 30 minutes 5 times a week as tolerated. Recommend yearly eye exam.       Hyperlipidemia  Continue Crestor    Iron deficiency anemia  Continue with iron supplementation, continue to monitor CBC    Pre-op exam  Cleared for surgery   Will have staff fax consult note       Diagnoses and all orders for this visit:    Pre-op exam  -     POCT ECG    Gastroesophageal reflux disease without esophagitis    Acquired hypothyroidism    Type 1 diabetes mellitus with hyperglycemia, with long-term current use of insulin ([facility])    Primary hypertension    Mixed hyperlipidemia    Insulin pump in place    Iron deficiency anemia, unspecified iron deficiency anemia type          Subjective:    Roxy Sifuentes is a 63y.o. year old male who presents to the office today for a preoperative consultation at the request of surgeon Dr. Elsa Conte who plans on performing right shoulder arthroplasty on October 6. This consultation is requested for the specific conditions prompting preoperative evaluation (i.e. because of potential affect on operative risk): Type 1 Diabetes Mellitus. Planned anesthesia: general. The patient has the following known anesthesia issues: family history of problems with anesthesia; mother had nausea with anesthesia. No person anesthesia issues; no N/V, difficulty waking up.  Patients bleeding risk: no recent abnormal bleeding and no remote history of abnormal bleeding. Patient does not have objections to receiving blood products if needed. Patient is able to walk 4 blocks without symptoms. Patient is able to walk up 2 flights of stairs without symptoms. Patient Active Problem List   Diagnosis   • Chronic back pain   • Collagenous colitis   • GERD (gastroesophageal reflux disease)   • Hyperlipidemia   • Hypothyroidism   • Insomnia   • Iron deficiency anemia   • BMI 40.0-44.9, adult ([facility])   • Type 1 diabetes mellitus with hyperglycemia, with long-term current use of insulin ([facility])   • Vitamin D deficiency   • Seasonal allergic rhinitis due to pollen   • Snoring   • FARZANA (obstructive sleep apnea)   • Renal calculus, bilateral   • Leukocytosis (leucocytosis)   • Erectile dysfunction   • Insulin pump in place   • Gastric bypass status for obesity   • Intention tremor   • Hypertension   • Pre-op exam         Tobacco use: Patient quit 8 years ago. ([id number])  Alcohol use: None  Illicit drug use: None    Symptoms:   Easy bleeding: no  Easy bruising: no  Frequent nose bleeds: no  Chest pain: no  Cough: no  Dyspnea on exertion:no  Edema: no  Palpitations: no  Wheezing: no    Living situation: Patient lives with his wife, Petr Obrien, in a two-story home. The wife, Petr Obrien, will be caring for him after surgery. danilo have post-op concerns. He is concerned about inflammation and pain control after surgery because he can only take Tylenol. The following portions of the patient's history were reviewed and updated as appropriate: allergies, current medications, past family history, past medical history, past social history, past surgical history and problem list.    Review of Systems  Review of Systems   Constitutional: Negative for appetite change, chills, diaphoresis, fatigue, fever and unexpected weight change.    HENT: Negative for congestion, dental problem, nosebleeds, postnasal drip, sinus pressure, sinus pain, sore throat and trouble swallowing. Eyes: Negative for visual disturbance. Respiratory: Negative for cough, chest tightness, shortness of breath and wheezing. Cardiovascular: Negative for chest pain, palpitations and leg swelling. Gastrointestinal: Negative for abdominal pain, constipation, diarrhea, nausea and vomiting. Endocrine: Negative for polydipsia, polyphagia and polyuria. Genitourinary: Negative for difficulty urinating, dysuria and hematuria. Musculoskeletal: Negative for back pain, myalgias, neck pain and neck stiffness. Skin: Negative for color change. Neurological: Positive for numbness (+ neuropathy b/l feet). Negative for dizziness, syncope, weakness, light-headedness and headaches. Hematological: Negative for adenopathy. Does not bruise/bleed easily. Psychiatric/Behavioral: Negative for dysphoric mood. The patient is not nervous/anxious.           Past Medical History:   Diagnosis Date   • Anemia    • Axillary adenopathy 8/15/2022   • Closed fracture of one rib of left side with routine healing 8/29/2022   • Colon polyp    • Diabetes mellitus ([address])    • Disease of thyroid gland    • GERD (gastroesophageal reflux disease)    • Hyperlipidemia    • Hypertension    • Insomnia    • Kidney stone    • Lymphadenopathy of head and neck 8/15/2022   • Sleep apnea     Mild sleep apnea   • Type 1 diabetes mellitus (HCC)     Uses Insulin Pump   • Vitamin D deficiency      Past Surgical History:   Procedure Laterality Date   • BARIATRIC SURGERY     • CARPAL TUNNEL RELEASE Right    • COLONOSCOPY     • EGD     • FL RETROGRADE PYELOGRAM  4/24/2020   • LASIK     • NJ CYSTO/URETERO W/LITHOTRIPSY &INDWELL STENT INSRT Right 4/24/2020    Procedure: CYSTOSCOPY URETEROSCOPY WITH LITHOTRIPSY HOLMIUM LASER, RETROGRADE PYELOGRAM AND INSERTION STENT URETERAL;  Surgeon: Jocelyn Silva MD;  [facility];  Service: Urology   • NJ LAPS GSTR RSTCV PX W/BYP GABBY-EN-Y LIMB <150 CM N/A 2021    Procedure: BYPASS GASTRIC GABBY-EN-Y LAPAROSCOPIC W ROBOTICS AND INTRAOPERATIVE EGD;  Surgeon: Silvino Weston MD;  Location: [facility];  Service: Bariatrics   • ROTATOR CUFF REPAIR Right    • UPPER GASTROINTESTINAL ENDOSCOPY       Social History     Tobacco Use   • Smoking status: Former     Years: 30.00     Types: Cigarettes     Start date:      Quit date:      Years since quittin.7   • Smokeless tobacco: Never   • Tobacco comments:     quit in    Vaping Use   • Vaping Use: Never used   Substance Use Topics   • Alcohol use:  Yes     Alcohol/week: 2.0 standard drinks of alcohol     Types: 2 Glasses of wine per week     Comment: social   • Drug use: No     Family History   Problem Relation Age of Onset   • Thyroid disease unspecified Mother    • Osteoporosis Mother    • Hypertension Father    • Colon cancer Father 76   • Diabetes type II Sister    • Hypertension Sister    • Hyperlipidemia Sister    • Thyroid disease unspecified Sister    • Diabetes type II Brother    • Hypertension Brother    • Hyperlipidemia Brother    • Colon cancer Brother 60     Ibuprofen and Other  Current Outpatient Medications   Medication Sig Dispense Refill   • Biotin 68681 MCG TABS Take 10,000 mcg by mouth in the morning     • Blood Glucose Monitoring Suppl (ONE TOUCH ULTRA 2) w/Device KIT Use       • Calcium Citrate (JORDY-CITRATE PO) Take 1,000 tablets by mouth 2 (two) times a day     • Continuous Blood Gluc  (DEXCOM G6 ) KIYA USE AS DIRECTED 1 Device 0   • Continuous Blood Gluc Sensor (Dexcom G6 Sensor) MISC Use 1 Device if needed (change Q 10 days) Use 1 sensor every 10 days 9 each 3   • Continuous Blood Gluc Transmit (Dexcom G6 Transmitter) MISC Inject 1 Device under the skin every 3 (three) months 1 each 3   • fluticasone (FLONASE) 50 mcg/act nasal spray 1 spray into each nostril daily     • Glucagon (Baqsimi Two Pack) 3 MG/DOSE POWD Use one dose as needed in case of severe hypoglycemia ( nasal spray) 1 each 3   • insulin glargine (Basaglar KwikPen) 100 units/mL injection pen Inject 32 units in case of pump failure ( incase of emergency) 15 mL 0   • Insulin Pen Needle (BD PEN NEEDLE LYRIC U/F) 32G X 4 MM MISC by Does not apply route daily Use once daily 100 each 2   • lisinopril-hydrochlorothiazide (PRINZIDE,ZESTORETIC) 20-12.5 MG per tablet Take 1 tablet by mouth daily 90 tablet 1   • loratadine (CLARITIN) 10 mg tablet Take 1 tablet (10 mg total) by mouth daily 90 tablet 1   • Multiple Vitamins-Minerals (Bariatric Fusion) CHEW Chew       • NovoLOG 100 UNIT/ML injection USE UP TO 78 UNITS DAILY VIA INSULIN PUMP 70 mL 3   • Probiotic Product (PROBIOTIC DAILY PO) Take 1 tablet by mouth daily      • psyllium (METAMUCIL) 0.52 g capsule Take 0.52 g by mouth 3 (three) times a day Fiber capsules     • rosuvastatin (CRESTOR) 5 mg tablet Take 1 tablet (5 mg total) by mouth daily 90 tablet 1   • Synthroid 200 MCG tablet Take 1 tablet (200 mcg total) by mouth every morning Take on an empty stomach 90 tablet 0   • TURMERIC PO Take 1,400 capsules by mouth 2 (two) times a day     • vitamin B-12 (VITAMIN B-12) 1,000 mcg tablet Take 1,000 mcg by mouth 3 (three) times a week     • zolpidem (AMBIEN) 10 mg tablet Take 1 tablet (10 mg total) by mouth daily at bedtime as needed for sleep 30 tablet 0     No current facility-administered medications for this visit. Objective:       /74 (BP Location: Left arm, Patient Position: Sitting, Cuff Size: Large)   Pulse 58   Temp 97.8 °F (36.6 °C) (Temporal)   Ht 5' 9" (1.753 m)   Wt 93 kg (205 lb)   SpO2 97%   BMI 30.27 kg/m²   Physical Exam  Constitutional:       General: He is not in acute distress. Appearance: Normal appearance. HENT:      Head: Normocephalic and atraumatic.       Right Ear: External ear normal.      Left Ear: External ear normal.      Nose: Nose normal.      Mouth/Throat:      Mouth: Mucous membranes are moist.      Pharynx: Oropharynx is clear.   Eyes:      Extraocular Movements: Extraocular movements intact. Pupils: Pupils are equal, round, and reactive to light. Neck:      Vascular: No carotid bruit. Cardiovascular:      Rate and Rhythm: Normal rate and regular rhythm. Pulses: Normal pulses. Heart sounds: Normal heart sounds. No murmur heard. No friction rub. No gallop. Pulmonary:      Effort: Pulmonary effort is normal. No respiratory distress. Breath sounds: Normal breath sounds. No wheezing, rhonchi or rales. Abdominal:      General: Bowel sounds are normal.      Palpations: Abdomen is soft. Musculoskeletal:         General: Normal range of motion. Cervical back: Normal range of motion and neck supple. No rigidity or tenderness. Lymphadenopathy:      Cervical: No cervical adenopathy. Skin:     General: Skin is warm and dry. Capillary Refill: Capillary refill takes less than 2 seconds. Coloration: Skin is not jaundiced or pale. Findings: No bruising, erythema or rash. Neurological:      General: No focal deficit present. Mental Status: He is alert and oriented to person, place, and time. Psychiatric:         Mood and Affect: Mood normal.         Behavior: Behavior normal.              Cardiographics  ECG: see attached      Assessment:     63 y.o. male with planned surgery as above. Known risk factors for perioperative complications: None        RCRI  High Risk surgery? 1 Point  CAD History:         1 Point   MI; Positive Stress Test; CP due to Mi;  Nitrate Usage to control Angina;  Pathologic Q wave on EKG  CHF Active:         1 Point   Pulm Edema; Paroxysmal Nocturnal Dyspnea;  Bibasilar Rales (crackles);S3; CHF on CXR  Cerebrovascular Disease (TIA or CVA):     1 Point  DM on Insulin:        1 Point  Serum Creat >2.0 mg/dl:       1 Point          Total Points: 0     Scorin: Class I, Very Low Risk (0.4%)     1: Class II, Low risk (0.9%)     2: Class III Moderate (6.6%)     3: Class IV High (>11%)    Cardiac Risk Estimation:     Lolly Kruse is cleared from a cardiovascular standpoint to proceed with surgery. Reevaluation needed if he should present with symptoms prior to surgery. Plan:  Preoperative workup as follows none. Change in medication regimen before surgery: Patient is to discuss with surgeon prior to surgery .

## 2023-09-29 NOTE — ASSESSMENT & PLAN NOTE
Agency/Facility Name: [facility]  Spoke To: Rozina  Outcome: No beds today, but should have one tomorrow.     Continue Crestor

## 2023-11-15 DIAGNOSIS — E10.65 TYPE 1 DIABETES MELLITUS WITH HYPERGLYCEMIA, WITH LONG-TERM CURRENT USE OF INSULIN (HCC): ICD-10-CM

## 2023-11-15 RX ORDER — PROCHLORPERAZINE 25 MG/1
1 SUPPOSITORY RECTAL AS NEEDED
Qty: 9 EACH | Refills: 0 | Status: CANCELLED | OUTPATIENT
Start: 2023-11-15

## 2023-11-17 DIAGNOSIS — E10.65 TYPE 1 DIABETES MELLITUS WITH HYPERGLYCEMIA, WITH LONG-TERM CURRENT USE OF INSULIN (HCC): ICD-10-CM

## 2023-11-17 RX ORDER — ACYCLOVIR 400 MG/1
1 TABLET ORAL
Qty: 3 EACH | Refills: 6 | Status: SHIPPED | OUTPATIENT
Start: 2023-11-17 | End: 2023-11-17 | Stop reason: SDUPTHER

## 2023-11-17 RX ORDER — ACYCLOVIR 400 MG/1
1 TABLET ORAL
Qty: 3 EACH | Refills: 6 | Status: SHIPPED | OUTPATIENT
Start: 2023-11-17 | End: 2023-11-22 | Stop reason: SDUPTHER

## 2023-11-17 NOTE — TELEPHONE ENCOUNTER
Prescription was sent to [facility]   For Dexcom G7 sensor, per patient request    We will send [employer] message to the patient

## 2023-11-21 ENCOUNTER — TELEPHONE (OUTPATIENT)
Dept: ENDOCRINOLOGY | Facility: CLINIC | Age: 58
End: 2023-11-21

## 2023-11-22 DIAGNOSIS — G47.09 OTHER INSOMNIA: ICD-10-CM

## 2023-11-22 DIAGNOSIS — E10.65 TYPE 1 DIABETES MELLITUS WITH HYPERGLYCEMIA, WITH LONG-TERM CURRENT USE OF INSULIN (HCC): ICD-10-CM

## 2023-11-22 RX ORDER — ACYCLOVIR 400 MG/1
1 TABLET ORAL
Qty: 9 EACH | Refills: 3 | Status: SHIPPED | OUTPATIENT
Start: 2023-11-22

## 2023-11-22 RX ORDER — ZOLPIDEM TARTRATE 10 MG/1
10 TABLET ORAL
Qty: 30 TABLET | Refills: 0 | Status: SHIPPED | OUTPATIENT
Start: 2023-11-22

## 2023-11-27 DIAGNOSIS — E10.65 TYPE 1 DIABETES MELLITUS WITH HYPERGLYCEMIA, WITH LONG-TERM CURRENT USE OF INSULIN (HCC): ICD-10-CM

## 2023-11-27 RX ORDER — LEVOTHYROXINE SODIUM 200 MCG
200 TABLET ORAL EVERY MORNING
Qty: 90 TABLET | Refills: 0 | Status: SHIPPED | OUTPATIENT
Start: 2023-11-27 | End: 2023-12-04

## 2023-12-02 DIAGNOSIS — E10.65 TYPE 1 DIABETES MELLITUS WITH HYPERGLYCEMIA, WITH LONG-TERM CURRENT USE OF INSULIN (HCC): ICD-10-CM

## 2023-12-04 RX ORDER — LEVOTHYROXINE SODIUM 200 MCG
200 TABLET ORAL EVERY MORNING
Qty: 90 TABLET | Refills: 0 | Status: SHIPPED | OUTPATIENT
Start: 2023-12-04

## 2023-12-19 DIAGNOSIS — G47.09 OTHER INSOMNIA: ICD-10-CM

## 2023-12-19 RX ORDER — ZOLPIDEM TARTRATE 10 MG/1
10 TABLET ORAL
Qty: 30 TABLET | Refills: 0 | Status: SHIPPED | OUTPATIENT
Start: 2023-12-19

## 2024-01-08 DIAGNOSIS — I10 HYPERTENSION, UNSPECIFIED TYPE: ICD-10-CM

## 2024-01-08 DIAGNOSIS — E10.40 TYPE 1 DIABETES MELLITUS WITH DIABETIC NEUROPATHY (HCC): ICD-10-CM

## 2024-01-08 DIAGNOSIS — E78.2 MIXED HYPERLIPIDEMIA: ICD-10-CM

## 2024-01-08 RX ORDER — ROSUVASTATIN CALCIUM 5 MG/1
5 TABLET, COATED ORAL DAILY
Qty: 90 TABLET | Refills: 1 | Status: SHIPPED | OUTPATIENT
Start: 2024-01-08

## 2024-01-08 RX ORDER — LISINOPRIL AND HYDROCHLOROTHIAZIDE 20; 12.5 MG/1; MG/1
1 TABLET ORAL DAILY
Qty: 90 TABLET | Refills: 1 | Status: SHIPPED | OUTPATIENT
Start: 2024-01-08

## 2024-01-12 ENCOUNTER — RA CDI HCC (OUTPATIENT)
Dept: OTHER | Facility: HOSPITAL | Age: 59
End: 2024-01-12

## 2024-01-12 NOTE — PROGRESS NOTES
HCC coding opportunities       Chart reviewed, no opportunity found: CHART REVIEWED, NO OPPORTUNITY FOUND      This is a reminder to address (resolve/update/assess) ALL HCC (risk adjustment) codes as found on active problem list for 2024 as patient scores reset to zero NAHED.  Also, just a reminder to please review and assess all other chronic conditions for 2024  Patients Insurance        Commercial Insurance: [employer]

## 2024-01-19 ENCOUNTER — OFFICE VISIT (OUTPATIENT)
Age: 59
End: 2024-01-19
Payer: COMMERCIAL

## 2024-01-19 VITALS
HEIGHT: 70 IN | DIASTOLIC BLOOD PRESSURE: 68 MMHG | TEMPERATURE: 97.7 F | BODY MASS INDEX: 29.86 KG/M2 | WEIGHT: 208.6 LBS | SYSTOLIC BLOOD PRESSURE: 96 MMHG | HEART RATE: 66 BPM | OXYGEN SATURATION: 95 %

## 2024-01-19 DIAGNOSIS — D50.9 IRON DEFICIENCY ANEMIA, UNSPECIFIED IRON DEFICIENCY ANEMIA TYPE: ICD-10-CM

## 2024-01-19 DIAGNOSIS — E55.9 VITAMIN D DEFICIENCY: ICD-10-CM

## 2024-01-19 DIAGNOSIS — E78.2 MIXED HYPERLIPIDEMIA: ICD-10-CM

## 2024-01-19 DIAGNOSIS — I10 PRIMARY HYPERTENSION: ICD-10-CM

## 2024-01-19 DIAGNOSIS — E10.40 TYPE 1 DIABETES MELLITUS WITH DIABETIC NEUROPATHY (HCC): ICD-10-CM

## 2024-01-19 DIAGNOSIS — E03.9 ACQUIRED HYPOTHYROIDISM: ICD-10-CM

## 2024-01-19 DIAGNOSIS — Z98.84 GASTRIC BYPASS STATUS FOR OBESITY: ICD-10-CM

## 2024-01-19 DIAGNOSIS — Z96.41 INSULIN PUMP IN PLACE: ICD-10-CM

## 2024-01-19 DIAGNOSIS — Z23 ENCOUNTER FOR IMMUNIZATION: Primary | ICD-10-CM

## 2024-01-19 DIAGNOSIS — K21.9 GASTROESOPHAGEAL REFLUX DISEASE WITHOUT ESOPHAGITIS: ICD-10-CM

## 2024-01-19 DIAGNOSIS — G47.09 OTHER INSOMNIA: ICD-10-CM

## 2024-01-19 PROBLEM — Z01.818 PRE-OP EXAM: Status: RESOLVED | Noted: 2023-09-29 | Resolved: 2024-01-19

## 2024-01-19 PROBLEM — R06.83 SNORING: Status: RESOLVED | Noted: 2019-06-07 | Resolved: 2024-01-19

## 2024-01-19 PROCEDURE — 90750 HZV VACC RECOMBINANT IM: CPT

## 2024-01-19 PROCEDURE — 99214 OFFICE O/P EST MOD 30 MIN: CPT | Performed by: NURSE PRACTITIONER

## 2024-01-19 PROCEDURE — 90471 IMMUNIZATION ADMIN: CPT

## 2024-01-19 RX ORDER — ZOLPIDEM TARTRATE 10 MG/1
10 TABLET ORAL
Qty: 30 TABLET | Refills: 0 | Status: SHIPPED | OUTPATIENT
Start: 2024-01-19

## 2024-01-19 NOTE — PROGRESS NOTES
Assessment/Plan:    Type 1 diabetes mellitus with diabetic neuropathy (HCC)    Lab Results   Component Value Date    HGBA1C 7.9 (H) 09/20/2023   Follows endocrine.     GERD (gastroesophageal reflux disease)   You may use OTC tums as needed.  Recommend small frequent meals throughout the day.  Avoid aggravating foods - spicy foods, acidic foods - such as tomato and citrus.  Avoid alcohol.  Do not lay down for at least 30 mins after eating.        Hypothyroidism  Followed by endocrine, due for updated TSH  Continue current dose of levothyroxine     Hypertension  Controlled on lisinopril-HCTZ.   BP on the softer side, advised to continue to monitor at home, call the office if BP remains low, or if patient is symptomatic    Vitamin D deficiency  Vitamin-D monitored by Endocrine, encourage supplementation    Iron deficiency anemia  Continue with iron supplementation, continue to monitor CBC    Insomnia  Ambien PRN  Controlled sub agreement updated while in the office today     Hyperlipidemia  Continue Crestor    Gastric bypass status for obesity  -continue to follow with bariatrics               Diagnoses and all orders for this visit:    Encounter for immunization  -     Zoster Vaccine Recombinant IM    Other insomnia  -     zolpidem (AMBIEN) 10 mg tablet; Take 1 tablet (10 mg total) by mouth daily at bedtime as needed for sleep    Type 1 diabetes mellitus with diabetic neuropathy (HCC)    Gastroesophageal reflux disease without esophagitis    Acquired hypothyroidism    Primary hypertension    Vitamin D deficiency    Iron deficiency anemia, unspecified iron deficiency anemia type    Insulin pump in place    Mixed hyperlipidemia    Gastric bypass status for obesity          Subjective:      Patient ID: Barry Roland is a 58 y.o. male.    Patient presents today to follow-up on chronic conditions..  No blood work to be reviewed at time of office visit.    HTN- Well controlled on lisinopril-HCTZ, denies side effects with  this medication, denies headaches, changes in vision or chest pain  Blood pressure lower then normal retook in office 102/54- pt not symptomatic     Hypothyroidism- managed by endocrine, TSH elevated T4 stable, reports fatigue (but chronic), some constipation. Pt takes mirlax everyday and helps. Occasional bleeding in stool due to hemorrhoids      DM: BS average around 178 higher then normal wants to stay below 170s     Wanting to talk about shingles vaccine- never received in the past       Diabetes  He presents for his initial (managed by endocrine ) diabetic visit. He has type 1 diabetes mellitus. His disease course has been worsening. There are no hypoglycemic associated symptoms. Pertinent negatives for hypoglycemia include no dizziness or headaches. Pertinent negatives for diabetes include no chest pain, no polydipsia, no polyphagia, no polyuria and no weakness. There are no hypoglycemic complications. (Patients sugars in the 140s) Eye exam is current.   Hyperlipidemia  This is a chronic problem. The current episode started more than 1 year ago. The problem is uncontrolled (stopped crestor after surgery ). Pertinent negatives include no chest pain or shortness of breath.       The following portions of the patient's history were reviewed and updated as appropriate: allergies, current medications, past family history, past medical history, past social history, past surgical history, and problem list.    Review of Systems   Constitutional:  Negative for activity change, appetite change, chills, diaphoresis and fever.   HENT:  Negative for congestion, ear discharge, ear pain, postnasal drip, rhinorrhea, sinus pressure, sinus pain and sore throat.    Eyes:  Negative for pain, discharge, itching and visual disturbance.   Respiratory:  Negative for cough, chest tightness, shortness of breath and wheezing.    Cardiovascular:  Negative for chest pain, palpitations and leg swelling.   Gastrointestinal:  Negative for  abdominal pain, constipation, diarrhea, nausea and vomiting.   Endocrine: Negative for polydipsia, polyphagia and polyuria.   Genitourinary:  Negative for difficulty urinating, dysuria and urgency.   Musculoskeletal:  Negative for arthralgias, back pain and neck pain.   Skin:  Negative for rash and wound.   Neurological:  Negative for dizziness, weakness, numbness and headaches.         Past Medical History:   Diagnosis Date    Anemia     Axillary adenopathy 8/15/2022    Closed fracture of one rib of left side with routine healing 8/29/2022    Colon polyp     Diabetes mellitus (HCC)     Disease of thyroid gland     GERD (gastroesophageal reflux disease)     Hyperlipidemia     Hypertension     Insomnia     Kidney stone     Lymphadenopathy of head and neck 8/15/2022    Sleep apnea     Mild sleep apnea    Type 1 diabetes mellitus (HCC)     Uses Insulin Pump    Vitamin D deficiency          Current Outpatient Medications:     Biotin 22641 MCG TABS, Take 10,000 mcg by mouth in the morning, Disp: , Rfl:     Blood Glucose Monitoring Suppl (ONE TOUCH ULTRA 2) w/Device KIT, Use  , Disp: , Rfl:     Calcium Citrate (JORDY-CITRATE PO), Take 1,000 tablets by mouth 2 (two) times a day, Disp: , Rfl:     Continuous Blood Gluc  (DEXCOM G6 ) KIYA, USE AS DIRECTED, Disp: 1 Device, Rfl: 0    Continuous Blood Gluc Sensor (Dexcom G7 Sensor), Use 1 Device every 10 days, Disp: 9 each, Rfl: 3    Continuous Blood Gluc Transmit (Dexcom G6 Transmitter) MISC, Inject 1 Device under the skin every 3 (three) months, Disp: 1 each, Rfl: 3    fluticasone (FLONASE) 50 mcg/act nasal spray, 1 spray into each nostril daily, Disp: , Rfl:     Glucagon (Baqsimi Two Pack) 3 MG/DOSE POWD, Use one dose as needed in case of severe hypoglycemia ( nasal spray), Disp: 1 each, Rfl: 3    insulin glargine (Basaglar KwikPen) 100 units/mL injection pen, Inject 32 units in case of pump failure ( incase of emergency), Disp: 15 mL, Rfl: 0    Insulin Pen  Needle (BD PEN NEEDLE LYRIC U/F) 32G X 4 MM MISC, by Does not apply route daily Use once daily, Disp: 100 each, Rfl: 2    lisinopril-hydrochlorothiazide (PRINZIDE,ZESTORETIC) 20-12.5 MG per tablet, Take 1 tablet by mouth daily, Disp: 90 tablet, Rfl: 1    loratadine (CLARITIN) 10 mg tablet, Take 1 tablet (10 mg total) by mouth daily, Disp: 90 tablet, Rfl: 1    Multiple Vitamins-Minerals (Bariatric Fusion) CHEW, Chew  , Disp: , Rfl:     NovoLOG 100 UNIT/ML injection, USE UP TO 78 UNITS DAILY VIA INSULIN PUMP, Disp: 70 mL, Rfl: 3    Probiotic Product (PROBIOTIC DAILY PO), Take 1 tablet by mouth daily , Disp: , Rfl:     psyllium (METAMUCIL) 0.52 g capsule, Take 0.52 g by mouth 3 (three) times a day Fiber capsules, Disp: , Rfl:     rosuvastatin (CRESTOR) 5 mg tablet, Take 1 tablet (5 mg total) by mouth daily, Disp: 90 tablet, Rfl: 1    Synthroid 200 MCG tablet, take 1 tablet by mouth every morning ON AN EMPTY STOMACH, Disp: 90 tablet, Rfl: 0    TURMERIC PO, Take 1,400 capsules by mouth 2 (two) times a day, Disp: , Rfl:     vitamin B-12 (VITAMIN B-12) 1,000 mcg tablet, Take 1,000 mcg by mouth 3 (three) times a week, Disp: , Rfl:     zolpidem (AMBIEN) 10 mg tablet, Take 1 tablet (10 mg total) by mouth daily at bedtime as needed for sleep, Disp: 30 tablet, Rfl: 0    Allergies   Allergen Reactions    Ibuprofen GI Intolerance and Nausea Only    Other Other (See Comments)     Seasonal allergies- pt has congestion       Social History   Past Surgical History:   Procedure Laterality Date    BARIATRIC SURGERY      CARPAL TUNNEL RELEASE Right     COLONOSCOPY      EGD      FL RETROGRADE PYELOGRAM  4/24/2020    LASIK      SD CYSTO/URETERO W/LITHOTRIPSY &INDWELL STENT INSRT Right 4/24/2020    Procedure: CYSTOSCOPY URETEROSCOPY WITH LITHOTRIPSY HOLMIUM LASER, RETROGRADE PYELOGRAM AND INSERTION STENT URETERAL;  Surgeon: Shabbir Ireland MD;  [facility];  Service: Urology    SD LAPS GSTR RSTCV PX W/BYP GABBY-EN-Y LIMB <150  "CM N/A 4/26/2021    Procedure: BYPASS GASTRIC GABBY-EN-Y LAPAROSCOPIC W ROBOTICS AND INTRAOPERATIVE EGD;  Surgeon: Danny Antonio MD;  Location[facility];  Service: Bariatrics    ROTATOR CUFF REPAIR Right     UPPER GASTROINTESTINAL ENDOSCOPY       Family History   Problem Relation Age of Onset    Thyroid disease unspecified Mother     Osteoporosis Mother     Hypertension Father     Colon cancer Father 68    Diabetes type II Sister     Hypertension Sister     Hyperlipidemia Sister     Thyroid disease unspecified Sister     Diabetes type II Brother     Hypertension Brother     Hyperlipidemia Brother     Colon cancer Brother 60       Objective:  BP 96/68 (BP Location: Left arm, Patient Position: Sitting, Cuff Size: Standard)   Pulse 66   Temp 97.7 °F (36.5 °C) (Temporal)   Ht 5' 9.69\" (1.77 m)   Wt 94.6 kg (208 lb 9.6 oz)   SpO2 95%   BMI 30.20 kg/m²     No results found for this or any previous visit (from the past 1344 hour(s)).         Physical Exam  Constitutional:       General: He is not in acute distress.     Appearance: He is well-developed. He is not diaphoretic.   HENT:      Head: Normocephalic and atraumatic.      Right Ear: External ear normal.      Left Ear: External ear normal.      Nose: Nose normal.      Mouth/Throat:      Mouth: Mucous membranes are moist.      Pharynx: No oropharyngeal exudate or posterior oropharyngeal erythema.   Eyes:      General:         Right eye: No discharge.         Left eye: No discharge.      Conjunctiva/sclera: Conjunctivae normal.      Pupils: Pupils are equal, round, and reactive to light.   Neck:      Thyroid: No thyromegaly.   Cardiovascular:      Rate and Rhythm: Normal rate and regular rhythm.      Heart sounds: Normal heart sounds. No murmur heard.     No friction rub. No gallop.   Pulmonary:      Effort: Pulmonary effort is normal. No respiratory distress.      Breath sounds: Normal breath sounds. No stridor. No wheezing or rales.   Abdominal:      General: " Bowel sounds are normal. There is no distension.      Palpations: Abdomen is soft.      Tenderness: There is no abdominal tenderness.   Musculoskeletal:      Cervical back: Normal range of motion and neck supple.   Lymphadenopathy:      Cervical: No cervical adenopathy.   Skin:     General: Skin is warm and dry.      Findings: No erythema or rash.   Neurological:      Mental Status: He is alert and oriented to person, place, and time.   Psychiatric:         Behavior: Behavior normal.         Thought Content: Thought content normal.         Judgment: Judgment normal.

## 2024-01-19 NOTE — ASSESSMENT & PLAN NOTE
You may use OTC tums as needed.  Recommend small frequent meals throughout the day.  Avoid aggravating foods - spicy foods, acidic foods - such as tomato and citrus.  Avoid alcohol.  Do not lay down for at least 30 mins after eating.

## 2024-01-19 NOTE — ASSESSMENT & PLAN NOTE
Controlled on lisinopril-HCTZ.   BP on the softer side, advised to continue to monitor at home, call the office if BP remains low, or if patient is symptomatic

## 2024-02-12 ENCOUNTER — OFFICE VISIT (OUTPATIENT)
Dept: ENDOCRINOLOGY | Facility: CLINIC | Age: 59
End: 2024-02-12
Payer: COMMERCIAL

## 2024-02-12 VITALS
HEART RATE: 65 BPM | WEIGHT: 208 LBS | BODY MASS INDEX: 30.12 KG/M2 | OXYGEN SATURATION: 97 % | DIASTOLIC BLOOD PRESSURE: 82 MMHG | SYSTOLIC BLOOD PRESSURE: 138 MMHG

## 2024-02-12 DIAGNOSIS — E10.65 TYPE 1 DIABETES MELLITUS WITH HYPERGLYCEMIA, WITH LONG-TERM CURRENT USE OF INSULIN (HCC): Primary | ICD-10-CM

## 2024-02-12 DIAGNOSIS — E55.9 VITAMIN D DEFICIENCY: ICD-10-CM

## 2024-02-12 DIAGNOSIS — E78.2 MIXED HYPERLIPIDEMIA: ICD-10-CM

## 2024-02-12 DIAGNOSIS — E03.9 ACQUIRED HYPOTHYROIDISM: ICD-10-CM

## 2024-02-12 LAB — SL AMB POCT HEMOGLOBIN AIC: 7.5 (ref ?–6.5)

## 2024-02-12 PROCEDURE — 95251 CONT GLUC MNTR ANALYSIS I&R: CPT | Performed by: INTERNAL MEDICINE

## 2024-02-12 PROCEDURE — 99214 OFFICE O/P EST MOD 30 MIN: CPT | Performed by: INTERNAL MEDICINE

## 2024-02-12 PROCEDURE — 83036 HEMOGLOBIN GLYCOSYLATED A1C: CPT | Performed by: INTERNAL MEDICINE

## 2024-02-12 NOTE — PROGRESS NOTES
Barry Roland 58 y.o. male MRN: [id number]    Encounter: [id number]      Assessment/Plan     Assessment:  This is a 58 y.o.-year-old male with diabetes with hyperglycemia.    Plan:    Diagnoses and all orders for this visit:    Type 1 diabetes mellitus with hyperglycemia, with long-term current use of insulin ([facility])  Lab Results   Component Value Date    HGBA1C 7.5 (A) 02/12/2024     A1c 7.5%, uncontrolled  Goal for A1c 6.5%  As postprandial blood sugars are elevated, will change insulin to carb ratio to 1 unit for 6 g from 1 unit for 8 g.  Educated to keep carbohydrate consistent with meals and take bolus at least 10 to 15 minutes before each meal  Educated about hypoglycemia symptoms and treatment.  Discussed the importance of using sleep mode at night to avoid hypoglycemia  -     Comprehensive metabolic panel; Future  -     Albumin / creatinine urine ratio; Future  -     POCT hemoglobin A1c    Acquired hypothyroidism  Last TSH was slightly elevated, there is known recent blood work.  Discussed with patient to get the blood work sooner so we can adjust the dose of levothyroxine if needed  -     TSH + Free T4; Future    Mixed hyperlipidemia  Lab Results   Component Value Date    LDLCALC 78 09/20/2023   DL is at goal.  On statins as per PCP  Vitamin D deficiency  Continue vitamin D3 supplementation 2000 IU daily       CC: Diabetes    History of Present Illness     HPI:    Barry Roland is a 58-year-old male with medical history of type 1 diabetes with long-term insulin use with no known complication is here for follow-up.  He underwent bariatric surgery recently had lost 30 to 40 pounds.  Patient is currently managed on t:slim insulin pump, uses control IQ and uses NovoLog via insulin pump.  He also uses continuous glucose monitor sensor by Dexcom G6    Diabetes course has been stable.  Insulin Pump settings are following     Settings      Basal rate:  12:00 a.m. = 0.800 units/hour  4:00 a.m. = 1.000  units/hour  6:00 a.m. = 1.000 units/hour  8:00 a.m. = 1.500 units/hour  9:00 p.m. = 1.000 units/hour  Insulin to carb ratio:  12:00 a.m. = 8  4:00 a.m. = 8  6:00 a.m. = 8  8:00 a.m. = 8  9:00 p.m. = 8  Insulin sensitivity factor:  12:00 a.m. = 40  4:00 a.m. = 40  6:00 a.m. = 25  8:00 a.m. = 25  9:00 p.m. = 40  BG target:    12:00 a.m. = 135  4:00 a.m. = 135  6:00 a.m. = 125  8:00 a.m. = 115  9:00 p.m. = 135    For hyperlipidemia he takes Crestor 5 mg po daily   For hypothyroidism he takes Synthroid  200 mcg po daily admits to taking on empty stomach 1 hour before breakfast    Uses continuous glucose monitor sensor by Dexcom clarity, BOONE.  14 days of review from January 30, 2024 to February 12, 2024 reviewed.  Average glucose is 187 mg per DL, standard deviation is 72 mg per DL, GMI 7.8%  45% blood sugars are in target range, 2% blood sugars are low, less than 1% blood sugars are very low, 33% blood sugars are high, 19% blood sugars are very high.  Patient has elevated blood sugars in the morning specially after breakfast, lunch and then after dinner.  Sometimes blood sugar is dropping around 3 AM.  Patient has not been using sleeping more at night since he has changed his pump.    Lab Results   Component Value Date    HGBA1C 7.5 (A) 02/12/2024     Lab Results   Component Value Date    [id number] 6.848 (H) 09/20/2023     Wt Readings from Last 3 Encounters:   02/12/24 94.3 kg (208 lb)   01/19/24 94.6 kg (208 lb 9.6 oz)   09/29/23 93 kg (205 lb)      Component 1 yr ago   Right Eye Diabetic Retinopathy None   Left Eye Diabetic Retinopathy None       Component      Latest Ref Rng 9/20/2023   Sodium      135 - 147 mmol/L 138    Potassium      3.5 - 5.3 mmol/L 4.1    Chloride      96 - 108 mmol/L 102    Carbon Dioxide      21 - 32 mmol/L 30    ANION GAP      mmol/L 6    BUN      5 - 25 mg/dL 20    Creatinine      0.60 - 1.30 mg/dL 0.97    GLUCOSE, FASTING      65 - 99 mg/dL 133 (H)    Calcium      8.4 - 10.2 mg/dL 9.1     AST      13 - 39 U/L 38    ALT      7 - 52 U/L 36    ALK PHOS      34 - 104 U/L 63    Total Protein      6.4 - 8.4 g/dL 6.9    Albumin      3.5 - 5.0 g/dL 4.2    Total Bilirubin      0.20 - 1.00 mg/dL 0.52    GFR, Calculated      ml/min/1.73sq m 86        Review of Systems   Constitutional:  Negative for activity change, diaphoresis, fatigue, fever and unexpected weight change.   HENT: Negative.     Eyes:  Negative for visual disturbance.   Respiratory:  Negative for cough, chest tightness and shortness of breath.    Cardiovascular:  Negative for chest pain, palpitations and leg swelling.   Gastrointestinal:  Negative for abdominal pain, blood in stool, constipation, diarrhea, nausea and vomiting.   Endocrine: Negative for cold intolerance, heat intolerance, polydipsia, polyphagia and polyuria.   Genitourinary:  Negative for dysuria, enuresis, frequency and urgency.   Musculoskeletal:  Negative for arthralgias and myalgias.   Skin:  Negative for pallor, rash and wound.   Allergic/Immunologic: Negative.    Neurological:  Negative for dizziness, tremors, weakness and numbness.   Hematological: Negative.    Psychiatric/Behavioral: Negative.         Historical Information   Past Medical History:   Diagnosis Date    Anemia     Axillary adenopathy 8/15/2022    Closed fracture of one rib of left side with routine healing 8/29/2022    Colon polyp     Diabetes mellitus (HCC)     Disease of thyroid gland     GERD (gastroesophageal reflux disease)     Hyperlipidemia     Hypertension     Insomnia     Kidney stone     Lymphadenopathy of head and neck 8/15/2022    Sleep apnea     Mild sleep apnea    Type 1 diabetes mellitus (HCC)     Uses Insulin Pump    Vitamin D deficiency      Past Surgical History:   Procedure Laterality Date    BARIATRIC SURGERY      CARPAL TUNNEL RELEASE Right     COLONOSCOPY      EGD      FL RETROGRADE PYELOGRAM  4/24/2020    LASIK      GA CYSTO/URETERO W/LITHOTRIPSY &INDWELL STENT INSRT Right 4/24/2020     Procedure: CYSTOSCOPY URETEROSCOPY WITH LITHOTRIPSY HOLMIUM LASER, RETROGRADE PYELOGRAM AND INSERTION STENT URETERAL;  Surgeon: Shabbir Ireland MD;  Location: [facility];  Service: Urology    NJ LAPS GSTR RSTCV PX W/BYP GABBY-EN-Y LIMB <150 CM N/A 2021    Procedure: BYPASS GASTRIC GABBY-EN-Y LAPAROSCOPIC W ROBOTICS AND INTRAOPERATIVE EGD;  Surgeon: Danny Antonio MD;  Location: [facility];  Service: Bariatrics    ROTATOR CUFF REPAIR Right     UPPER GASTROINTESTINAL ENDOSCOPY       Social History   Social History     Substance and Sexual Activity   Alcohol Use Yes    Alcohol/week: 2.0 standard drinks of alcohol    Types: 2 Glasses of wine per week    Comment: social     Social History     Substance and Sexual Activity   Drug Use No     Social History     Tobacco Use   Smoking Status Former    Current packs/day: 0.00    Types: Cigarettes    Start date:     Quit date:     Years since quittin.1   Smokeless Tobacco Never   Tobacco Comments    quit in      Family History:   Family History   Problem Relation Age of Onset    Thyroid disease unspecified Mother     Osteoporosis Mother     Hypertension Father     Colon cancer Father 68    Diabetes type II Sister     Hypertension Sister     Hyperlipidemia Sister     Thyroid disease unspecified Sister     Diabetes type II Brother     Hypertension Brother     Hyperlipidemia Brother     Colon cancer Brother 60       Meds/Allergies   Current Outpatient Medications   Medication Sig Dispense Refill    Biotin 85240 MCG TABS Take 10,000 mcg by mouth in the morning      Blood Glucose Monitoring Suppl (ONE TOUCH ULTRA 2) w/Device KIT Use        Calcium Citrate (JORDY-CITRATE PO) Take 1,000 tablets by mouth 2 (two) times a day      Continuous Blood Gluc  (DEXCOM G6 ) KIYA USE AS DIRECTED 1 Device 0    Continuous Blood Gluc Sensor (Dexcom G7 Sensor) Use 1 Device every 10 days 9 each 3    Continuous Blood Gluc Transmit (Dexcom G6 Transmitter) MISC  Inject 1 Device under the skin every 3 (three) months 1 each 3    fluticasone (FLONASE) 50 mcg/act nasal spray 1 spray into each nostril daily      Glucagon (Baqsimi Two Pack) 3 MG/DOSE POWD Use one dose as needed in case of severe hypoglycemia ( nasal spray) 1 each 3    insulin glargine (Basaglar KwikPen) 100 units/mL injection pen Inject 32 units in case of pump failure ( incase of emergency) 15 mL 0    Insulin Pen Needle (BD PEN NEEDLE LYRIC U/F) 32G X 4 MM MISC by Does not apply route daily Use once daily 100 each 2    lisinopril-hydrochlorothiazide (PRINZIDE,ZESTORETIC) 20-12.5 MG per tablet Take 1 tablet by mouth daily 90 tablet 1    loratadine (CLARITIN) 10 mg tablet Take 1 tablet (10 mg total) by mouth daily 90 tablet 1    Multiple Vitamins-Minerals (Bariatric Fusion) CHEW Chew        NovoLOG 100 UNIT/ML injection USE UP TO 78 UNITS DAILY VIA INSULIN PUMP 70 mL 3    Probiotic Product (PROBIOTIC DAILY PO) Take 1 tablet by mouth daily       psyllium (METAMUCIL) 0.52 g capsule Take 0.52 g by mouth 3 (three) times a day Fiber capsules      rosuvastatin (CRESTOR) 5 mg tablet Take 1 tablet (5 mg total) by mouth daily 90 tablet 1    Synthroid 200 MCG tablet take 1 tablet by mouth every morning ON AN EMPTY STOMACH 90 tablet 0    TURMERIC PO Take 1,400 capsules by mouth 2 (two) times a day      vitamin B-12 (VITAMIN B-12) 1,000 mcg tablet Take 1,000 mcg by mouth 3 (three) times a week      zolpidem (AMBIEN) 10 mg tablet Take 1 tablet (10 mg total) by mouth daily at bedtime as needed for sleep 30 tablet 0     No current facility-administered medications for this visit.     Allergies   Allergen Reactions    Ibuprofen GI Intolerance and Nausea Only    Other Other (See Comments)     Seasonal allergies- pt has congestion       Objective   Vitals: Blood pressure 138/82, pulse 65, weight 94.3 kg (208 lb), SpO2 97%.    Physical Exam  Vitals reviewed.   Constitutional:       General: He is not in acute distress.      Appearance: Normal appearance. He is not ill-appearing.   HENT:      Head: Normocephalic and atraumatic.      Nose: Nose normal.   Eyes:      Extraocular Movements: Extraocular movements intact.      Conjunctiva/sclera: Conjunctivae normal.   Pulmonary:      Effort: No respiratory distress.   Musculoskeletal:      Cervical back: Normal range of motion.   Neurological:      General: No focal deficit present.      Mental Status: He is alert and oriented to person, place, and time.   Psychiatric:         Mood and Affect: Mood normal.         Behavior: Behavior normal.         The history was obtained from the review of the chart, patient.    Lab Results:   Lab Results   Component Value Date/Time    Hemoglobin A1C 7.5 (A) 02/12/2024 11:30 AM    Hemoglobin A1C 7.9 (H) 09/20/2023 07:23 AM    Hemoglobin A1C 7.4 (H) 07/10/2023 07:55 AM    Hemoglobin A1C 7.0 (H) 05/11/2023 03:27 PM    WBC 6.77 09/20/2023 07:23 AM    WBC 7.17 07/10/2023 07:55 AM    Hemoglobin 14.3 09/20/2023 07:23 AM    Hemoglobin 14.9 07/10/2023 07:55 AM    Hematocrit 42.9 09/20/2023 07:23 AM    Hematocrit 44.2 07/10/2023 07:55 AM    MCV 93 09/20/2023 07:23 AM    MCV 92 07/10/2023 07:55 AM    Platelets 222 09/20/2023 07:23 AM    Platelets 220 07/10/2023 07:55 AM    BUN 20 09/20/2023 07:23 AM    BUN 18 07/10/2023 07:55 AM    BUN 21 05/01/2023 09:15 AM    Potassium 4.1 09/20/2023 07:23 AM    Potassium 4.1 07/10/2023 07:55 AM    Potassium 4.5 05/01/2023 09:15 AM    Chloride 102 09/20/2023 07:23 AM    Chloride 100 07/10/2023 07:55 AM    Chloride 99 (L) 05/01/2023 09:15 AM    Carbon Dioxide 31 05/01/2023 09:15 AM    CO2 30 09/20/2023 07:23 AM    CO2 29 07/10/2023 07:55 AM    Creatinine 0.97 09/20/2023 07:23 AM    Creatinine 0.92 07/10/2023 07:55 AM    Creatinine 1.09 05/01/2023 09:15 AM    AST 38 09/20/2023 07:23 AM    AST 36 07/10/2023 07:55 AM    ALT 36 09/20/2023 07:23 AM    ALT 31 07/10/2023 07:55 AM    Total Protein 6.9 09/20/2023 07:23 AM    Total Protein  "7.1 07/10/2023 07:55 AM    Albumin 4.2 09/20/2023 07:23 AM    Albumin 4.3 07/10/2023 07:55 AM    HDL, Direct 46 09/20/2023 07:23 AM    HDL, Direct 45 07/10/2023 07:55 AM    Triglycerides 63 09/20/2023 07:23 AM    Triglycerides 109 07/10/2023 07:55 AM           Imaging Studies: I have personally reviewed pertinent reports.      Portions of the record may have been created with voice recognition software. Occasional wrong word or \"sound a like\" substitutions may have occurred due to the inherent limitations of voice recognition software. Read the chart carefully and recognize, using context, where substitutions have occurred.    "

## 2024-02-13 DIAGNOSIS — E10.65 TYPE 1 DIABETES MELLITUS WITH HYPERGLYCEMIA, WITH LONG-TERM CURRENT USE OF INSULIN (HCC): ICD-10-CM

## 2024-02-14 RX ORDER — LEVOTHYROXINE SODIUM 200 MCG
200 TABLET ORAL
Qty: 90 TABLET | Refills: 3 | Status: SHIPPED | OUTPATIENT
Start: 2024-02-14

## 2024-02-15 DIAGNOSIS — G47.09 OTHER INSOMNIA: ICD-10-CM

## 2024-02-15 RX ORDER — ZOLPIDEM TARTRATE 10 MG/1
10 TABLET ORAL
Qty: 30 TABLET | Refills: 0 | Status: SHIPPED | OUTPATIENT
Start: 2024-02-15

## 2024-02-20 ENCOUNTER — DOCUMENTATION (OUTPATIENT)
Dept: ENDOCRINOLOGY | Facility: CLINIC | Age: 59
End: 2024-02-20

## 2024-03-14 DIAGNOSIS — G47.09 OTHER INSOMNIA: ICD-10-CM

## 2024-03-15 RX ORDER — ZOLPIDEM TARTRATE 10 MG/1
10 TABLET ORAL
Qty: 30 TABLET | Refills: 0 | Status: SHIPPED | OUTPATIENT
Start: 2024-03-15

## 2024-04-15 DIAGNOSIS — G47.09 OTHER INSOMNIA: ICD-10-CM

## 2024-04-15 RX ORDER — ZOLPIDEM TARTRATE 10 MG/1
10 TABLET ORAL
Qty: 30 TABLET | Refills: 0 | Status: SHIPPED | OUTPATIENT
Start: 2024-04-15

## 2024-05-02 ENCOUNTER — LAB (OUTPATIENT)
Dept: LAB | Facility: CLINIC | Age: 59
End: 2024-05-02
Payer: COMMERCIAL

## 2024-05-02 DIAGNOSIS — R79.0 LOW FERRITIN: ICD-10-CM

## 2024-05-02 DIAGNOSIS — E10.65 TYPE 1 DIABETES MELLITUS WITH HYPERGLYCEMIA, WITH LONG-TERM CURRENT USE OF INSULIN (HCC): ICD-10-CM

## 2024-05-02 DIAGNOSIS — Z98.84 BARIATRIC SURGERY STATUS: ICD-10-CM

## 2024-05-02 DIAGNOSIS — E03.9 ACQUIRED HYPOTHYROIDISM: ICD-10-CM

## 2024-05-02 LAB
ANION GAP SERPL CALCULATED.3IONS-SCNC: 5 MMOL/L (ref 4–13)
BUN SERPL-MCNC: 17 MG/DL (ref 5–25)
CALCIUM SERPL-MCNC: 9.4 MG/DL (ref 8.4–10.2)
CHLORIDE SERPL-SCNC: 102 MMOL/L (ref 96–108)
CO2 SERPL-SCNC: 30 MMOL/L (ref 21–32)
CREAT SERPL-MCNC: 0.98 MG/DL (ref 0.6–1.3)
ERYTHROCYTE [DISTWIDTH] IN BLOOD BY AUTOMATED COUNT: 12 % (ref 11.6–15.1)
EST. AVERAGE GLUCOSE BLD GHB EST-MCNC: 169 MG/DL
FERRITIN SERPL-MCNC: 15 NG/ML (ref 24–336)
GFR SERPL CREATININE-BSD FRML MDRD: 85 ML/MIN/1.73SQ M
GLUCOSE P FAST SERPL-MCNC: 133 MG/DL (ref 65–99)
HBA1C MFR BLD: 7.5 %
HCT VFR BLD AUTO: 44.7 % (ref 36.5–49.3)
HGB BLD-MCNC: 15 G/DL (ref 12–17)
IRON SATN MFR SERPL: 40 % (ref 15–50)
IRON SERPL-MCNC: 135 UG/DL (ref 50–212)
MCH RBC QN AUTO: 31 PG (ref 26.8–34.3)
MCHC RBC AUTO-ENTMCNC: 33.6 G/DL (ref 31.4–37.4)
MCV RBC AUTO: 92 FL (ref 82–98)
PLATELET # BLD AUTO: 221 THOUSANDS/UL (ref 149–390)
PMV BLD AUTO: 9.7 FL (ref 8.9–12.7)
POTASSIUM SERPL-SCNC: 4.5 MMOL/L (ref 3.5–5.3)
RBC # BLD AUTO: 4.84 MILLION/UL (ref 3.88–5.62)
SODIUM SERPL-SCNC: 137 MMOL/L (ref 135–147)
T4 FREE SERPL-MCNC: 0.81 NG/DL (ref 0.61–1.12)
TIBC SERPL-MCNC: 337 UG/DL (ref 250–450)
TSH SERPL DL<=0.05 MIU/L-ACNC: 5.96 UIU/ML (ref 0.45–4.5)
UIBC SERPL-MCNC: 202 UG/DL (ref 155–355)
WBC # BLD AUTO: 7.52 THOUSAND/UL (ref 4.31–10.16)

## 2024-05-02 PROCEDURE — 83036 HEMOGLOBIN GLYCOSYLATED A1C: CPT

## 2024-05-02 PROCEDURE — 83540 ASSAY OF IRON: CPT

## 2024-05-02 PROCEDURE — 80048 BASIC METABOLIC PNL TOTAL CA: CPT

## 2024-05-02 PROCEDURE — 36415 COLL VENOUS BLD VENIPUNCTURE: CPT

## 2024-05-02 PROCEDURE — 84439 ASSAY OF FREE THYROXINE: CPT

## 2024-05-02 PROCEDURE — 85027 COMPLETE CBC AUTOMATED: CPT

## 2024-05-02 PROCEDURE — 83550 IRON BINDING TEST: CPT

## 2024-05-02 PROCEDURE — 82728 ASSAY OF FERRITIN: CPT

## 2024-05-02 PROCEDURE — 84443 ASSAY THYROID STIM HORMONE: CPT

## 2024-05-06 ENCOUNTER — OFFICE VISIT (OUTPATIENT)
Dept: ENDOCRINOLOGY | Facility: CLINIC | Age: 59
End: 2024-05-06
Payer: COMMERCIAL

## 2024-05-06 VITALS
OXYGEN SATURATION: 96 % | WEIGHT: 208 LBS | HEART RATE: 69 BPM | SYSTOLIC BLOOD PRESSURE: 118 MMHG | DIASTOLIC BLOOD PRESSURE: 84 MMHG | BODY MASS INDEX: 30.81 KG/M2 | HEIGHT: 69 IN

## 2024-05-06 DIAGNOSIS — E10.65 TYPE 1 DIABETES MELLITUS WITH HYPERGLYCEMIA, WITH LONG-TERM CURRENT USE OF INSULIN (HCC): ICD-10-CM

## 2024-05-06 DIAGNOSIS — E03.9 ACQUIRED HYPOTHYROIDISM: ICD-10-CM

## 2024-05-06 DIAGNOSIS — E78.2 MIXED HYPERLIPIDEMIA: ICD-10-CM

## 2024-05-06 DIAGNOSIS — E10.40 TYPE 1 DIABETES MELLITUS WITH DIABETIC NEUROPATHY (HCC): Primary | ICD-10-CM

## 2024-05-06 DIAGNOSIS — I10 PRIMARY HYPERTENSION: ICD-10-CM

## 2024-05-06 PROCEDURE — 99214 OFFICE O/P EST MOD 30 MIN: CPT | Performed by: PHYSICIAN ASSISTANT

## 2024-05-06 PROCEDURE — 95251 CONT GLUC MNTR ANALYSIS I&R: CPT | Performed by: PHYSICIAN ASSISTANT

## 2024-05-06 RX ORDER — LEVOTHYROXINE SODIUM 200 MCG
TABLET ORAL
Start: 2024-05-06

## 2024-05-06 NOTE — PATIENT INSTRUCTIONS
Hypoglycemia instructions   Barry Roland  5/6/2024  [id number]    Low Blood Sugar    Steps to treat low blood sugar.    1. Test blood sugar if you have symptoms of low blood sugar:   Low Blood Sugar Symptoms:  o Sweaty  o Dizzy  o Rapid heartbeat  o Shaky  o Bad mood  o Hungry      2. Treat blood sugar less than 70 with 15 grams of fast-acting carbohydrate:   Examples of 15 grams Fast-Acting Carbohydrate:  o 4 oz juice  o 4 oz regular soda  o 3-4 glucose tablets (chew)  o 3-4 hard candies (chew)          3.  Wait 15 minutes and test your blood sugar again     4. If blood sugar is less than 100, repeat steps 2-3.    5. When your blood sugar is 100 or more, eat a snack if it will be longer than one hour until your next meal. The snack should be 15 grams of carbohydrate and a protein:   Examples of snacks:  o ½ sandwich  o 6 crackers with cheese  o Piece of fruit with cheese or peanut butter  o 6 crackers with peanut butter

## 2024-05-14 DIAGNOSIS — G47.09 OTHER INSOMNIA: ICD-10-CM

## 2024-05-14 RX ORDER — ZOLPIDEM TARTRATE 10 MG/1
10 TABLET ORAL
Qty: 30 TABLET | Refills: 0 | Status: SHIPPED | OUTPATIENT
Start: 2024-05-14

## 2024-06-03 ENCOUNTER — TELEPHONE (OUTPATIENT)
Age: 59
End: 2024-06-03

## 2024-06-03 DIAGNOSIS — E10.65 TYPE 1 DIABETES MELLITUS WITH HYPERGLYCEMIA, WITH LONG-TERM CURRENT USE OF INSULIN (HCC): ICD-10-CM

## 2024-06-03 DIAGNOSIS — E03.9 ACQUIRED HYPOTHYROIDISM: ICD-10-CM

## 2024-06-03 RX ORDER — LEVOTHYROXINE SODIUM 200 MCG
TABLET ORAL
Status: CANCELLED | OUTPATIENT
Start: 2024-06-03

## 2024-06-03 NOTE — TELEPHONE ENCOUNTER
States insurance requires prior authorization for Novolog and Synthroid. [facility] sent forms a week ago for office to fill out.   Patient states he is going to run out of synthroid. Requests a 2 week supply sent to [facility]. Patient asking for a call back when prior authorization is submitted. Thank you.

## 2024-06-04 ENCOUNTER — TELEPHONE (OUTPATIENT)
Age: 59
End: 2024-06-04

## 2024-06-04 DIAGNOSIS — E03.9 ACQUIRED HYPOTHYROIDISM: ICD-10-CM

## 2024-06-04 DIAGNOSIS — E10.65 TYPE 1 DIABETES MELLITUS WITH HYPERGLYCEMIA, WITH LONG-TERM CURRENT USE OF INSULIN (HCC): ICD-10-CM

## 2024-06-04 RX ORDER — LEVOTHYROXINE SODIUM 0.2 MG/1
TABLET ORAL
Qty: 96 TABLET | Refills: 1 | Status: SHIPPED | OUTPATIENT
Start: 2024-06-04 | End: 2024-06-05 | Stop reason: SDUPTHER

## 2024-06-04 NOTE — TELEPHONE ENCOUNTER
Marycruz from [employer] states Novolog is not covered by insurance. Made Marycruz aware prior authorization was submitted to insurance. Marycruz verbalized understanding.

## 2024-06-04 NOTE — TELEPHONE ENCOUNTER
PA for novolog 100    Submitted via    []CMM-KEY    [x][employer]-Case ID # [id number]   []Faxed to plan   []Other website    []Phone call Case ID #      Office notes sent, clinical questions answered. Awaiting determination    Turnaround time for your insurance to make a decision on your Prior Authorization can take 7-21 business days.

## 2024-06-04 NOTE — TELEPHONE ENCOUNTER
I resent the script for Synthroid to [facility] but unclicked the GUILLERMO because it seems like they will dispense brand Synthroid and use a generic copay.     Also there is a message about a PA for Novolog. Seems like it has already been started but if he needs an alternative, we could try Humalog if the patient is agreeable.

## 2024-06-04 NOTE — TELEPHONE ENCOUNTER
Jayda from [facility] was calling in regards to finding out if insurance will cover medication. The insurance will not cover the brand name, but they will cover the generic equivalent which is L-thyroxine tabs 200mcg. [facility] uses the brand name synthroid as the generic, but by waiving the GUILLERMO or by allowing the generic substitution we would be able to dispense the brand name synthroid as the generic product. The generic co pay would be $20. If this is appropriate it can be sent through [employer], but if not can call at [phone] ref # [id number].

## 2024-06-05 DIAGNOSIS — E03.9 ACQUIRED HYPOTHYROIDISM: ICD-10-CM

## 2024-06-05 DIAGNOSIS — E10.65 TYPE 1 DIABETES MELLITUS WITH HYPERGLYCEMIA, WITH LONG-TERM CURRENT USE OF INSULIN (HCC): ICD-10-CM

## 2024-06-05 NOTE — TELEPHONE ENCOUNTER
Patient called in about his prior auth for Novolog (see other note, this was started yesterday, informed patient.)    Also, about his Synthroid (also per other note, was [facility] the generic, but [facility] only uses brand, so he is fine with this.)    However-patient is out of Synthroid! Has been out for 3 days, and he will not receive the mail order until after 6/14.  Asks for 2 week supply, brand Synthroid to go to [facility] [address], today please as he is going out of town tomorrow. He is aware he may have to pay out of pocket and acknowledges he will do so if needed.  Pended. Please call patient when this has been sent.

## 2024-06-06 RX ORDER — LEVOTHYROXINE SODIUM 200 MCG
TABLET ORAL
Qty: 15 TABLET | Refills: 0 | Status: SHIPPED | OUTPATIENT
Start: 2024-06-06 | End: 2024-06-06 | Stop reason: SDUPTHER

## 2024-06-06 RX ORDER — LEVOTHYROXINE SODIUM 200 MCG
TABLET ORAL
Qty: 15 TABLET | Refills: 0 | Status: SHIPPED | OUTPATIENT
Start: 2024-06-06

## 2024-06-06 NOTE — TELEPHONE ENCOUNTER
PA for SYNTHROID    Submitted via    []CMM-KEY    [x][employer]-Case ID # [id number]   []Faxed to plan   []Other website    []Phone call Case ID #      Office notes sent, clinical questions answered. Awaiting determination    Turnaround time for your insurance to make a decision on your Prior Authorization can take 7-21 business days.

## 2024-06-06 NOTE — TELEPHONE ENCOUNTER
Patient calling he is very upset this has not been addressed.  Tried to warm transfer to [facility] clinical staff, still on hold, patient on other line. He wants to speak to a manager. PLEASE address this.

## 2024-06-06 NOTE — TELEPHONE ENCOUNTER
Did speak with clerical staff Alysha who will let manager know.  I also messaged my manager as well. Patient said now [facility] since it was not sent in time.  He is extremely upset and wants a manager to call him at 3:15. Informed patient I can [facility] other [facility] and see if they [facility]  [facility]. Called spoke with Brina she will forward to provider.

## 2024-06-11 NOTE — TELEPHONE ENCOUNTER
PA for NOVOLOG 100 Approved     Date(s) approved June 11, 2024 to June 11, 2025     Case #[id number]     Patient advised by          [x] [employer] Message  [] Phone call   []LMOM  []L/M to call office as no active Communication consent on file  []Unable to leave detailed message as VM not approved on Communication consent       Pharmacy advised by    [x]Fax  []Phone call    Approval letter scanned into Media No

## 2024-06-12 NOTE — TELEPHONE ENCOUNTER
PA for SYNTHOID Denied    Reason:      Message sent to office clinical pool Yes    Denial letter scanned into Media Yes    Appeal started No ( Provider will need to decide if appeal is warranted and send clinical documentation to PA team for initiation.)    **Please follow up with your patient regarding denial and next steps**

## 2024-06-12 NOTE — TELEPHONE ENCOUNTER
I believe this issue with the PA was previously resolved because I was told when the generic was sent the mail order pharmacy, they dispense brand Synthroid. Please confirm.

## 2024-06-21 ENCOUNTER — TELEPHONE (OUTPATIENT)
Dept: ENDOCRINOLOGY | Facility: CLINIC | Age: 59
End: 2024-06-21

## 2024-06-21 ENCOUNTER — OFFICE VISIT (OUTPATIENT)
Age: 59
End: 2024-06-21
Payer: COMMERCIAL

## 2024-06-21 VITALS
WEIGHT: 210.2 LBS | SYSTOLIC BLOOD PRESSURE: 140 MMHG | TEMPERATURE: 98 F | BODY MASS INDEX: 31.13 KG/M2 | OXYGEN SATURATION: 97 % | HEART RATE: 68 BPM | DIASTOLIC BLOOD PRESSURE: 86 MMHG | HEIGHT: 69 IN

## 2024-06-21 DIAGNOSIS — E55.9 VITAMIN D DEFICIENCY: ICD-10-CM

## 2024-06-21 DIAGNOSIS — E10.40 TYPE 1 DIABETES MELLITUS WITH DIABETIC NEUROPATHY (HCC): ICD-10-CM

## 2024-06-21 DIAGNOSIS — K64.9 BLEEDING HEMORRHOID: Primary | ICD-10-CM

## 2024-06-21 DIAGNOSIS — Z13.228 SCREENING FOR METABOLIC DISORDER: ICD-10-CM

## 2024-06-21 DIAGNOSIS — E03.9 ACQUIRED HYPOTHYROIDISM: ICD-10-CM

## 2024-06-21 DIAGNOSIS — D50.9 IRON DEFICIENCY ANEMIA, UNSPECIFIED IRON DEFICIENCY ANEMIA TYPE: ICD-10-CM

## 2024-06-21 DIAGNOSIS — I10 HYPERTENSION, UNSPECIFIED TYPE: ICD-10-CM

## 2024-06-21 DIAGNOSIS — E78.2 MIXED HYPERLIPIDEMIA: ICD-10-CM

## 2024-06-21 DIAGNOSIS — Z23 ENCOUNTER FOR IMMUNIZATION: ICD-10-CM

## 2024-06-21 DIAGNOSIS — Z98.84 GASTRIC BYPASS STATUS FOR OBESITY: ICD-10-CM

## 2024-06-21 DIAGNOSIS — G47.09 OTHER INSOMNIA: ICD-10-CM

## 2024-06-21 DIAGNOSIS — K21.9 GASTROESOPHAGEAL REFLUX DISEASE WITHOUT ESOPHAGITIS: ICD-10-CM

## 2024-06-21 PROCEDURE — 99214 OFFICE O/P EST MOD 30 MIN: CPT | Performed by: NURSE PRACTITIONER

## 2024-06-21 PROCEDURE — 90471 IMMUNIZATION ADMIN: CPT

## 2024-06-21 PROCEDURE — 90750 HZV VACC RECOMBINANT IM: CPT

## 2024-06-21 RX ORDER — HYDROCORTISONE ACETATE 25 MG/1
25 SUPPOSITORY RECTAL 2 TIMES DAILY
Qty: 12 SUPPOSITORY | Refills: 0 | Status: SHIPPED | OUTPATIENT
Start: 2024-06-21

## 2024-06-21 RX ORDER — ZOLPIDEM TARTRATE 10 MG/1
10 TABLET ORAL
Qty: 30 TABLET | Refills: 0 | Status: SHIPPED | OUTPATIENT
Start: 2024-06-21

## 2024-06-21 RX ORDER — LISINOPRIL AND HYDROCHLOROTHIAZIDE 12.5; 1 MG/1; MG/1
1 TABLET ORAL DAILY
Qty: 90 TABLET | Refills: 1 | Status: SHIPPED | OUTPATIENT
Start: 2024-06-21

## 2024-06-21 RX ORDER — HYDROCORTISONE 25 MG/G
CREAM TOPICAL 2 TIMES DAILY
Qty: 28 G | Refills: 0 | Status: SHIPPED | OUTPATIENT
Start: 2024-06-21

## 2024-06-21 NOTE — PROGRESS NOTES
Assessment/Plan:    Hypertension  Reduce lisinopril-HCTZ to 10mg-12.5mg       GERD (gastroesophageal reflux disease)   You may use OTC tums as needed.  Recommend small frequent meals throughout the day.  Avoid aggravating foods - spicy foods, acidic foods - such as tomato and citrus.  Avoid alcohol.  Do not lay down for at least 30 mins after eating.        Type 1 diabetes mellitus with diabetic neuropathy (HCC)    Lab Results   Component Value Date    HGBA1C 7.5 (H) 05/02/2024   Follows endocrine.     Hypothyroidism  Followed by endocrine  Continue current dose of levothyroxine     Iron deficiency anemia  Restart iron supplementation, continue to monitor CBC    Gastric bypass status for obesity  -continue to follow with bariatrics     Insomnia  Ambien PRN  Controlled sub agreement on file    Vitamin D deficiency  Vitamin-D monitored by Endocrine, encourage supplementation    Hyperlipidemia  Continue Crestor              Diagnoses and all orders for this visit:    Bleeding hemorrhoid  -     Ambulatory Referral to Colorectal Surgery; Future  -     hydrocortisone (ANUSOL-HC) 25 mg suppository; Insert 1 suppository (25 mg total) into the rectum 2 (two) times a day  -     hydrocortisone (ANUSOL-HC) 2.5 % rectal cream; Apply topically 2 (two) times a day    Other insomnia  -     zolpidem (AMBIEN) 10 mg tablet; Take 1 tablet (10 mg total) by mouth daily at bedtime as needed for sleep    Screening for metabolic disorder  -     Comprehensive metabolic panel; Future  -     CBC and differential    Hypertension, unspecified type  -     lisinopril-hydrochlorothiazide (PRINZIDE,ZESTORETIC) 10-12.5 MG per tablet; Take 1 tablet by mouth daily    Encounter for immunization  -     Zoster Vaccine Recombinant IM    Gastroesophageal reflux disease without esophagitis    Type 1 diabetes mellitus with diabetic neuropathy (HCC)    Acquired hypothyroidism    Iron deficiency anemia, unspecified iron deficiency anemia type    Gastric bypass  status for obesity    Vitamin D deficiency    Mixed hyperlipidemia          Subjective:      Patient ID: Barry Roland is a 59 y.o. male.    Patient presents today to follow-up on chronic conditions..  No blood work to be reviewed at time of office visit.    HTN- Well controlled on lisinopril-HCTZ, denies side effects with this medication, denies headaches, changes in vision or chest pain  Patient reports that he has been taking his blood pressure medication every other day due to low blood pressures    Hypothyroidism- managed by endocrine, TSH elevated T4 stable, reports fatigue (but chronic), some constipation. Pt takes mirlax everyday and helps. Occasional bleeding in stool due to hemorrhoids      DM: BS average around 178 higher then normal wants to stay below 170s     Received second dose of shingles vaccination today      Diabetes  He presents for his initial (managed by endocrine ) diabetic visit. He has type 1 diabetes mellitus. His disease course has been worsening. There are no hypoglycemic associated symptoms. Pertinent negatives for hypoglycemia include no dizziness or headaches. Pertinent negatives for diabetes include no chest pain, no polydipsia, no polyphagia, no polyuria and no weakness. There are no hypoglycemic complications. (Patients sugars in the 140s) Eye exam is current.   Hyperlipidemia  This is a chronic problem. The current episode started more than 1 year ago. The problem is uncontrolled (stopped crestor after surgery ). Pertinent negatives include no chest pain or shortness of breath.       The following portions of the patient's history were reviewed and updated as appropriate: allergies, current medications, past family history, past medical history, past social history, past surgical history, and problem list.    Review of Systems   Constitutional:  Negative for activity change, appetite change, chills, diaphoresis and fever.   HENT:  Negative for congestion, ear discharge, ear  pain, postnasal drip, rhinorrhea, sinus pressure, sinus pain and sore throat.    Eyes:  Negative for pain, discharge, itching and visual disturbance.   Respiratory:  Negative for cough, chest tightness, shortness of breath and wheezing.    Cardiovascular:  Negative for chest pain, palpitations and leg swelling.   Gastrointestinal:  Negative for abdominal pain, constipation, diarrhea, nausea and vomiting.   Endocrine: Negative for polydipsia, polyphagia and polyuria.   Genitourinary:  Negative for difficulty urinating, dysuria and urgency.   Musculoskeletal:  Negative for arthralgias, back pain and neck pain.   Skin:  Negative for rash and wound.   Neurological:  Negative for dizziness, weakness, numbness and headaches.         Past Medical History:   Diagnosis Date    Anemia     Axillary adenopathy 8/15/2022    Closed fracture of one rib of left side with routine healing 8/29/2022    Colon polyp     Diabetes mellitus (HCC)     Disease of thyroid gland     GERD (gastroesophageal reflux disease)     Hyperlipidemia     Hypertension     Insomnia     Kidney stone     Lymphadenopathy of head and neck 8/15/2022    Sleep apnea     Mild sleep apnea    Type 1 diabetes mellitus (HCC)     Uses Insulin Pump    Vitamin D deficiency          Current Outpatient Medications:     Biotin 20838 MCG TABS, Take 10,000 mcg by mouth in the morning, Disp: , Rfl:     Blood Glucose Monitoring Suppl (ONE TOUCH ULTRA 2) w/Device KIT, Use  , Disp: , Rfl:     Calcium Citrate (JORDY-CITRATE PO), Take 1,000 tablets by mouth 2 (two) times a day, Disp: , Rfl:     Continuous Blood Gluc  (DEXCOM G6 ) KIYA, USE AS DIRECTED, Disp: 1 Device, Rfl: 0    Continuous Blood Gluc Sensor (Dexcom G7 Sensor), Use 1 Device every 10 days, Disp: 9 each, Rfl: 3    Continuous Blood Gluc Transmit (Dexcom G6 Transmitter) MISC, Inject 1 Device under the skin every 3 (three) months, Disp: 1 each, Rfl: 3    fluticasone (FLONASE) 50 mcg/act nasal spray, 1 spray  into each nostril daily, Disp: , Rfl:     Glucagon (Baqsimi Two Pack) 3 MG/DOSE POWD, Use one dose as needed in case of severe hypoglycemia ( nasal spray), Disp: 1 each, Rfl: 3    hydrocortisone (ANUSOL-HC) 2.5 % rectal cream, Apply topically 2 (two) times a day, Disp: 28 g, Rfl: 0    hydrocortisone (ANUSOL-HC) 25 mg suppository, Insert 1 suppository (25 mg total) into the rectum 2 (two) times a day, Disp: 12 suppository, Rfl: 0    insulin glargine (Basaglar KwikPen) 100 units/mL injection pen, Inject 32 units in case of pump failure ( incase of emergency), Disp: 15 mL, Rfl: 0    Insulin Pen Needle (BD PEN NEEDLE LYRIC U/F) 32G X 4 MM MISC, by Does not apply route daily Use once daily, Disp: 100 each, Rfl: 2    lisinopril-hydrochlorothiazide (PRINZIDE,ZESTORETIC) 10-12.5 MG per tablet, Take 1 tablet by mouth daily, Disp: 90 tablet, Rfl: 1    loratadine (CLARITIN) 10 mg tablet, Take 1 tablet (10 mg total) by mouth daily, Disp: 90 tablet, Rfl: 1    Multiple Vitamins-Minerals (Bariatric Fusion) CHEW, Chew  , Disp: , Rfl:     NovoLOG 100 UNIT/ML injection, USE UP TO 78 UNITS DAILY VIA INSULIN PUMP, Disp: 70 mL, Rfl: 3    Probiotic Product (PROBIOTIC DAILY PO), Take 1 tablet by mouth daily , Disp: , Rfl:     psyllium (METAMUCIL) 0.52 g capsule, Take 0.52 g by mouth 3 (three) times a day Fiber capsules, Disp: , Rfl:     rosuvastatin (CRESTOR) 5 mg tablet, Take 1 tablet (5 mg total) by mouth daily, Disp: 90 tablet, Rfl: 1    Synthroid 200 MCG tablet, Take 1 tablet daily Mon-Sat and 1.5 tablets on Sunday., Disp: 15 tablet, Rfl: 0    TURMERIC PO, Take 1,400 capsules by mouth 2 (two) times a day, Disp: , Rfl:     vitamin B-12 (VITAMIN B-12) 1,000 mcg tablet, Take 1,000 mcg by mouth 3 (three) times a week, Disp: , Rfl:     zolpidem (AMBIEN) 10 mg tablet, Take 1 tablet (10 mg total) by mouth daily at bedtime as needed for sleep, Disp: 30 tablet, Rfl: 0    Allergies   Allergen Reactions    Ibuprofen GI Intolerance and Nausea Only  "   Other Other (See Comments)     Seasonal allergies- pt has congestion       Social History   Past Surgical History:   Procedure Laterality Date    BARIATRIC SURGERY      CARPAL TUNNEL RELEASE Right     COLONOSCOPY      EGD      FL RETROGRADE PYELOGRAM  4/24/2020    LASIK      HI CYSTO/URETERO W/LITHOTRIPSY &INDWELL STENT INSRT Right 4/24/2020    Procedure: CYSTOSCOPY URETEROSCOPY WITH LITHOTRIPSY HOLMIUM LASER, RETROGRADE PYELOGRAM AND INSERTION STENT URETERAL;  Surgeon: Shabbir Ireland MD;  Location: [facility];  Service: Urology    HI LAPS GSTR RSTCV PX W/BYP GABBY-EN-Y LIMB <150 CM N/A 4/26/2021    Procedure: BYPASS GASTRIC GABBY-EN-Y LAPAROSCOPIC W ROBOTICS AND INTRAOPERATIVE EGD;  Surgeon: Danny Antonio MD;  Location: [facility];  Service: Bariatrics    ROTATOR CUFF REPAIR Right     UPPER GASTROINTESTINAL ENDOSCOPY       Family History   Problem Relation Age of Onset    Thyroid disease unspecified Mother     Osteoporosis Mother     Hypertension Father     Colon cancer Father 68    Diabetes type II Sister     Hypertension Sister     Hyperlipidemia Sister     Thyroid disease unspecified Sister     Diabetes type II Brother     Hypertension Brother     Hyperlipidemia Brother     Colon cancer Brother 60       Objective:  /86 (BP Location: Left arm, Patient Position: Sitting, Cuff Size: Standard)   Pulse 68   Temp 98 °F (36.7 °C) (Temporal)   Ht 5' 8.98\" (1.752 m)   Wt 95.3 kg (210 lb 3.2 oz)   SpO2 97%   BMI 31.06 kg/m²     Recent Results (from the past 1344 hour(s))   TSH, 3rd generation    Collection Time: 05/02/24  6:54 AM   Result Value Ref Range    TSH 3RD GENERATON 5.957 (H) 0.450 - 4.500 uIU/mL   Basic metabolic panel    Collection Time: 05/02/24  6:54 AM   Result Value Ref Range    Sodium 137 135 - 147 mmol/L    Potassium 4.5 3.5 - 5.3 mmol/L    Chloride 102 96 - 108 mmol/L    CO2 30 21 - 32 mmol/L    ANION GAP 5 4 - 13 mmol/L    BUN 17 5 - 25 mg/dL    Creatinine 0.98 0.60 - 1.30 mg/dL    " Glucose, Fasting 133 (H) 65 - 99 mg/dL    Calcium 9.4 8.4 - 10.2 mg/dL    eGFR 85 ml/min/1.73sq m   Hemoglobin A1C    Collection Time: 05/02/24  6:54 AM   Result Value Ref Range    Hemoglobin A1C 7.5 (H) Normal 4.0-5.6%; PreDiabetic 5.7-6.4%; Diabetic >=6.5%; Glycemic control for adults with diabetes <7.0% %     mg/dl   T4, free    Collection Time: 05/02/24  6:54 AM   Result Value Ref Range    Free T4 0.81 0.61 - 1.12 ng/dL   CBC and Platelet    Collection Time: 05/02/24  6:54 AM   Result Value Ref Range    WBC 7.52 4.31 - 10.16 Thousand/uL    RBC 4.84 3.88 - 5.62 Million/uL    Hemoglobin 15.0 12.0 - 17.0 g/dL    Hematocrit 44.7 36.5 - 49.3 %    MCV 92 82 - 98 fL    MCH 31.0 26.8 - 34.3 pg    MCHC 33.6 31.4 - 37.4 g/dL    RDW 12.0 11.6 - 15.1 %    Platelets 221 149 - 390 Thousands/uL    MPV 9.7 8.9 - 12.7 fL   TIBC Panel (incl. Iron, TIBC, % Iron Saturation)    Collection Time: 05/02/24  6:54 AM   Result Value Ref Range    Iron Saturation 40 15 - 50 %    TIBC 337 250 - 450 ug/dL    Iron 135 50 - 212 ug/dL    UIBC 202 155 - 355 ug/dL   Ferritin    Collection Time: 05/02/24  6:54 AM   Result Value Ref Range    Ferritin 15 (L) 24 - 336 ng/mL            Physical Exam  Constitutional:       General: He is not in acute distress.     Appearance: He is well-developed. He is not diaphoretic.   HENT:      Head: Normocephalic and atraumatic.      Right Ear: External ear normal.      Left Ear: External ear normal.      Nose: Nose normal.      Mouth/Throat:      Mouth: Mucous membranes are moist.      Pharynx: No oropharyngeal exudate or posterior oropharyngeal erythema.   Eyes:      General:         Right eye: No discharge.         Left eye: No discharge.      Conjunctiva/sclera: Conjunctivae normal.      Pupils: Pupils are equal, round, and reactive to light.   Neck:      Thyroid: No thyromegaly.   Cardiovascular:      Rate and Rhythm: Normal rate and regular rhythm.      Pulses: no weak pulses.           Dorsalis pedis  pulses are 2+ on the right side and 2+ on the left side.      Heart sounds: Normal heart sounds. No murmur heard.     No friction rub. No gallop.   Pulmonary:      Effort: Pulmonary effort is normal. No respiratory distress.      Breath sounds: Normal breath sounds. No stridor. No wheezing or rales.   Abdominal:      General: Bowel sounds are normal. There is no distension.      Palpations: Abdomen is soft.      Tenderness: There is no abdominal tenderness.   Musculoskeletal:      Cervical back: Normal range of motion and neck supple.   Feet:      Right foot:      Skin integrity: No ulcer, skin breakdown, erythema, warmth, callus or dry skin.      Left foot:      Skin integrity: No ulcer, skin breakdown, erythema, warmth, callus or dry skin.   Lymphadenopathy:      Cervical: No cervical adenopathy.   Skin:     General: Skin is warm and dry.      Findings: No erythema or rash.   Neurological:      Mental Status: He is alert and oriented to person, place, and time.   Psychiatric:         Behavior: Behavior normal.         Thought Content: Thought content normal.         Judgment: Judgment normal.         Diabetic Foot Exam    Patient's shoes and socks removed.    Right Foot/Ankle   Right Foot Inspection  Skin Exam: skin normal and skin intact. No dry skin, no warmth, no callus, no erythema, no maceration, no abnormal color, no pre-ulcer, no ulcer and no callus.     Toe Exam: ROM and strength within normal limits.     Sensory   Monofilament testing: intact    Vascular  Capillary refills: < 3 seconds  The right DP pulse is 2+.     Left Foot/Ankle  Left Foot Inspection  Skin Exam: skin normal and skin intact. No dry skin, no warmth, no erythema, no maceration, normal color, no pre-ulcer, no ulcer and no callus.     Toe Exam: ROM and strength within normal limits.     Sensory   Monofilament testing: intact    Vascular  Capillary refills: < 3 seconds  The left DP pulse is 2+.     Assign Risk Category  No deformity  present  No loss of protective sensation  No weak pulses  Risk: 0

## 2024-07-10 DIAGNOSIS — E10.65 TYPE 1 DIABETES MELLITUS WITH HYPERGLYCEMIA, WITH LONG-TERM CURRENT USE OF INSULIN (HCC): ICD-10-CM

## 2024-07-10 DIAGNOSIS — E03.9 ACQUIRED HYPOTHYROIDISM: ICD-10-CM

## 2024-07-10 RX ORDER — LEVOTHYROXINE SODIUM 200 MCG
TABLET ORAL
Qty: 15 TABLET | Refills: 0 | Status: SHIPPED | OUTPATIENT
Start: 2024-07-10

## 2024-07-14 DIAGNOSIS — G47.09 OTHER INSOMNIA: ICD-10-CM

## 2024-07-15 DIAGNOSIS — Z00.6 ENCOUNTER FOR EXAMINATION FOR NORMAL COMPARISON OR CONTROL IN CLINICAL RESEARCH PROGRAM: ICD-10-CM

## 2024-07-15 DIAGNOSIS — E78.2 MIXED HYPERLIPIDEMIA: ICD-10-CM

## 2024-07-15 DIAGNOSIS — E10.40 TYPE 1 DIABETES MELLITUS WITH DIABETIC NEUROPATHY (HCC): ICD-10-CM

## 2024-07-16 RX ORDER — ROSUVASTATIN CALCIUM 5 MG/1
5 TABLET, COATED ORAL DAILY
Qty: 100 TABLET | Refills: 1 | Status: SHIPPED | OUTPATIENT
Start: 2024-07-16

## 2024-07-17 RX ORDER — ZOLPIDEM TARTRATE 10 MG/1
10 TABLET ORAL
Qty: 30 TABLET | Refills: 0 | Status: SHIPPED | OUTPATIENT
Start: 2024-07-17

## 2024-07-31 ENCOUNTER — TELEPHONE (OUTPATIENT)
Dept: ENDOCRINOLOGY | Facility: CLINIC | Age: 59
End: 2024-07-31

## 2024-08-20 DIAGNOSIS — G47.09 OTHER INSOMNIA: ICD-10-CM

## 2024-08-20 DIAGNOSIS — E10.65 TYPE 1 DIABETES MELLITUS WITH HYPERGLYCEMIA, WITH LONG-TERM CURRENT USE OF INSULIN (HCC): ICD-10-CM

## 2024-08-21 RX ORDER — INSULIN ASPART 100 [IU]/ML
INJECTION, SOLUTION INTRAVENOUS; SUBCUTANEOUS
Qty: 70 ML | Refills: 1 | Status: SHIPPED | OUTPATIENT
Start: 2024-08-21

## 2024-08-21 RX ORDER — ZOLPIDEM TARTRATE 10 MG/1
10 TABLET ORAL
Qty: 30 TABLET | Refills: 0 | Status: SHIPPED | OUTPATIENT
Start: 2024-08-21

## 2024-09-06 ENCOUNTER — TELEPHONE (OUTPATIENT)
Age: 59
End: 2024-09-06

## 2024-09-06 DIAGNOSIS — E03.9 ACQUIRED HYPOTHYROIDISM: ICD-10-CM

## 2024-09-06 DIAGNOSIS — E10.65 TYPE 1 DIABETES MELLITUS WITH HYPERGLYCEMIA, WITH LONG-TERM CURRENT USE OF INSULIN (HCC): ICD-10-CM

## 2024-09-06 RX ORDER — LEVOTHYROXINE SODIUM 200 MCG
TABLET ORAL
Qty: 105 TABLET | Refills: 2 | Status: SHIPPED | OUTPATIENT
Start: 2024-09-06 | End: 2024-09-06 | Stop reason: SDUPTHER

## 2024-09-06 RX ORDER — LEVOTHYROXINE SODIUM 200 UG/1
TABLET ORAL
Qty: 105 TABLET | Refills: 2 | Status: SHIPPED | OUTPATIENT
Start: 2024-09-06

## 2024-09-06 NOTE — TELEPHONE ENCOUNTER
Brand name not covered. Levothyroxine is covered but they use brand as generic. Pharmacy needs a new rx or verbal ok. Please advise, thank you.

## 2024-09-24 ENCOUNTER — LAB (OUTPATIENT)
Dept: LAB | Age: 59
End: 2024-09-24
Payer: COMMERCIAL

## 2024-09-24 DIAGNOSIS — Z00.6 ENCOUNTER FOR EXAMINATION FOR NORMAL COMPARISON OR CONTROL IN CLINICAL RESEARCH PROGRAM: ICD-10-CM

## 2024-09-24 DIAGNOSIS — Z13.228 SCREENING FOR METABOLIC DISORDER: ICD-10-CM

## 2024-09-24 DIAGNOSIS — E10.40 TYPE 1 DIABETES MELLITUS WITH DIABETIC NEUROPATHY (HCC): ICD-10-CM

## 2024-09-24 LAB
ALBUMIN SERPL BCG-MCNC: 4 G/DL (ref 3.5–5)
ALP SERPL-CCNC: 64 U/L (ref 34–104)
ALT SERPL W P-5'-P-CCNC: 27 U/L (ref 7–52)
ANION GAP SERPL CALCULATED.3IONS-SCNC: 9 MMOL/L (ref 4–13)
AST SERPL W P-5'-P-CCNC: 38 U/L (ref 13–39)
BASOPHILS # BLD AUTO: 0.08 THOUSANDS/ΜL (ref 0–0.1)
BASOPHILS NFR BLD AUTO: 1 % (ref 0–1)
BILIRUB SERPL-MCNC: 0.47 MG/DL (ref 0.2–1)
BUN SERPL-MCNC: 19 MG/DL (ref 5–25)
CALCIUM SERPL-MCNC: 8.6 MG/DL (ref 8.4–10.2)
CHLORIDE SERPL-SCNC: 103 MMOL/L (ref 96–108)
CO2 SERPL-SCNC: 27 MMOL/L (ref 21–32)
CREAT SERPL-MCNC: 0.84 MG/DL (ref 0.6–1.3)
CREAT UR-MCNC: 96 MG/DL
EOSINOPHIL # BLD AUTO: 0.37 THOUSAND/ΜL (ref 0–0.61)
EOSINOPHIL NFR BLD AUTO: 5 % (ref 0–6)
ERYTHROCYTE [DISTWIDTH] IN BLOOD BY AUTOMATED COUNT: 12.5 % (ref 11.6–15.1)
EST. AVERAGE GLUCOSE BLD GHB EST-MCNC: 169 MG/DL
GFR SERPL CREATININE-BSD FRML MDRD: 96 ML/MIN/1.73SQ M
GLUCOSE P FAST SERPL-MCNC: 164 MG/DL (ref 65–99)
HBA1C MFR BLD: 7.5 %
HCT VFR BLD AUTO: 43.3 % (ref 36.5–49.3)
HGB BLD-MCNC: 14.2 G/DL (ref 12–17)
IMM GRANULOCYTES # BLD AUTO: 0.02 THOUSAND/UL (ref 0–0.2)
IMM GRANULOCYTES NFR BLD AUTO: 0 % (ref 0–2)
LYMPHOCYTES # BLD AUTO: 1.93 THOUSANDS/ΜL (ref 0.6–4.47)
LYMPHOCYTES NFR BLD AUTO: 28 % (ref 14–44)
MCH RBC QN AUTO: 30.2 PG (ref 26.8–34.3)
MCHC RBC AUTO-ENTMCNC: 32.8 G/DL (ref 31.4–37.4)
MCV RBC AUTO: 92 FL (ref 82–98)
MICROALBUMIN UR-MCNC: <7 MG/L
MONOCYTES # BLD AUTO: 0.65 THOUSAND/ΜL (ref 0.17–1.22)
MONOCYTES NFR BLD AUTO: 9 % (ref 4–12)
NEUTROPHILS # BLD AUTO: 3.9 THOUSANDS/ΜL (ref 1.85–7.62)
NEUTS SEG NFR BLD AUTO: 57 % (ref 43–75)
NRBC BLD AUTO-RTO: 0 /100 WBCS
PLATELET # BLD AUTO: 240 THOUSANDS/UL (ref 149–390)
PMV BLD AUTO: 10.1 FL (ref 8.9–12.7)
POTASSIUM SERPL-SCNC: 4.1 MMOL/L (ref 3.5–5.3)
PROT SERPL-MCNC: 6.6 G/DL (ref 6.4–8.4)
RBC # BLD AUTO: 4.7 MILLION/UL (ref 3.88–5.62)
SODIUM SERPL-SCNC: 139 MMOL/L (ref 135–147)
WBC # BLD AUTO: 6.95 THOUSAND/UL (ref 4.31–10.16)

## 2024-09-24 PROCEDURE — 82043 UR ALBUMIN QUANTITATIVE: CPT

## 2024-09-24 PROCEDURE — 83036 HEMOGLOBIN GLYCOSYLATED A1C: CPT

## 2024-09-24 PROCEDURE — 80053 COMPREHEN METABOLIC PANEL: CPT

## 2024-09-24 PROCEDURE — 36415 COLL VENOUS BLD VENIPUNCTURE: CPT

## 2024-09-24 PROCEDURE — 82570 ASSAY OF URINE CREATININE: CPT

## 2024-09-30 ENCOUNTER — TELEPHONE (OUTPATIENT)
Dept: ENDOCRINOLOGY | Facility: CLINIC | Age: 59
End: 2024-09-30

## 2024-09-30 ENCOUNTER — TELEPHONE (OUTPATIENT)
Age: 59
End: 2024-09-30

## 2024-09-30 ENCOUNTER — OFFICE VISIT (OUTPATIENT)
Dept: BARIATRICS | Facility: CLINIC | Age: 59
End: 2024-09-30
Payer: COMMERCIAL

## 2024-09-30 VITALS
BODY MASS INDEX: 32.07 KG/M2 | DIASTOLIC BLOOD PRESSURE: 70 MMHG | SYSTOLIC BLOOD PRESSURE: 138 MMHG | WEIGHT: 216.5 LBS | HEART RATE: 68 BPM | HEIGHT: 69 IN | TEMPERATURE: 97.5 F | OXYGEN SATURATION: 97 %

## 2024-09-30 DIAGNOSIS — Z48.815 ENCOUNTER FOR SURGICAL AFTERCARE FOLLOWING SURGERY OF DIGESTIVE SYSTEM: Primary | ICD-10-CM

## 2024-09-30 DIAGNOSIS — K59.00 CONSTIPATION: ICD-10-CM

## 2024-09-30 DIAGNOSIS — E61.1 IRON DEFICIENCY: ICD-10-CM

## 2024-09-30 DIAGNOSIS — E66.811 OBESITY, CLASS I, BMI 30-34.9: ICD-10-CM

## 2024-09-30 DIAGNOSIS — E03.9 ACQUIRED HYPOTHYROIDISM: Primary | ICD-10-CM

## 2024-09-30 DIAGNOSIS — Z98.84 BARIATRIC SURGERY STATUS: ICD-10-CM

## 2024-09-30 DIAGNOSIS — K91.2 POSTSURGICAL MALABSORPTION: ICD-10-CM

## 2024-09-30 PROCEDURE — 99214 OFFICE O/P EST MOD 30 MIN: CPT | Performed by: NURSE PRACTITIONER

## 2024-09-30 NOTE — TELEPHONE ENCOUNTER
Patient calling, upset that he went to get labs done the other day and there was no thyroid labs ordered. He would like the orders for the thyroid labs placed by then end of the day so he can go tomorrow am, otherwise he states he will not be able to get them done. Please call patient when labs ordered.

## 2024-09-30 NOTE — PROGRESS NOTES
Date of surgery: 4/36/2021  Procedure: RNY   Performing surgeon: Dr. Bam Frost     Initial Weight - 266.0 lb   Current Weight - 216.5lb   Dexter Weight - 199.5 lb   Total Body Weight Loss (EWL)- 49.6  EWL% - 51%  TWB % - 19%

## 2024-09-30 NOTE — PATIENT INSTRUCTIONS
- please talk to your endocrinologist to see if you are eligible to be on GLP-1 injectables to help weight weight loss and your blood sugar.     - labs ordered - please make sure you are fasting.

## 2024-09-30 NOTE — PROGRESS NOTES
Assessment/Plan:     Patient ID: Barry Roland is a 59 y.o. male.     Bariatric Surgery Status/BMI 31  -s/p Yeimi-En-Y Gastric Bypass with Dr. Bam Antonio on 04/24/2021. Presents to the office today for annual visit. Overall doing fair - has gained some weight since last seen - about 13 lbs. Denies any changes to his diet or activity level. Job is sedentary - tries to get walks in 3-4 times per week of 3.5 miles. Currently does not track his caloric intake. Tolerating a regular diet. Denies having any abdominal pain, N/V/D, regurgitation, reflux or dysphagia. Taking his MVI daily.  Interested in medications to help with weight loss.     Type I DM - has continuous pump and glucometer. On insulin which is a weight gaining medication. Sees endocrinology routinely. Continue to monitor and f/u as scheduled.     Iron deficiency/constipation - noted in labs in May. Takes iron PRN only due to constipation. He was taking miralax daily but was getting rectal leakage from daily use. This stopped since stopping miralax. Taking stool softeners twice daily but not as effective.      PLAN:      - recommended to track his caloric goal of 1600 calories - focus on proteins to prevent hypoglycemia episodes. Glucose tabs as needed.   - recommended to follow up with endocrinology as scheduled and discuss if GLP-1 injectables can be an option for him. If not, follow up in 3 months to discuss oral options for MWM consult. List of medications provided to call insurance.  - can try smooth move tea, miralax PRN or QOD,  prunes. If adding iron supplement, may improve with leakage. Will check iron panel first and reevaluate need of iron supplements.   - Routine follow up in 1 year for annual visit  - Continue with healthy lifestyle, adequate protein intake of 60 gm, fluid intake of at least 64 oz.   - Continue with MVI daily.   - Activity as tolerated.   - Labs ordered and will adjust accordingly if any deficiency.   - Follow up with [facility] and [facility]  as needed.           Continued/Maintain healthy weight loss with good nutrition intakes.  Adequate hydration with at least 64oz. fluid intake.  Follow diet as discussed.  Follow vitamin and mineral recommendations as reviewed with you.  Exercise as tolerated.    Colonoscopy referral made: UTD - due in 2025    Follow-up in 3 months for MWM, 1 year for annual visit. We kindly ask that your arrive 15 minutes before your scheduled appointment time with your provider to allow our staff to room you, get your vital signs and update your chart.    Get lab work done prior to annual visit. Please call the office if you need a script.  It is recommended to check with your insurance BEFORE getting labs done to make sure they are covered by your policy.      Call our office if you have any problems with abdominal pain especially associated with fever, chills, nausea, vomiting or any other concerns.    All  Post-bariatric surgery patients should be aware that very small quantities of any alcohol can cause impairment and it is very possible not to feel the effect. The effect can be in the system for several hours.  It is also a stomach irritant.     It is advised to AVOID alcohol, Nonsteroidal antiinflammatory drugs (NSAIDS) and nicotine of all forms . Any of these can cause stomach irritation/pain.    Discussed the effects of alcohol on a bariatric patient and the increased impairment risk.     Keep up the good work!     Postsurgical Malabsorption   -At risk for malabsorption of vitamins/minerals secondary to malabsorption and restriction of intake from bariatric surgery  -Currently taking adequate postop bariatric surgery vitamin supplementation  -Last set of bariatric labs completed on 09/2023 and showed low iron -   -Next set of bariatric labs ordered for approximately 2 weeks  -Patient received education about the importance of adhering to a lifelong supplementation regimen to avoid vitamin/mineral deficiencies      Diagnoses  and all orders for this visit:    Encounter for surgical aftercare following surgery of digestive system  -     CBC; Future  -     Comprehensive metabolic panel; Future  -     Folate; Future  -     Iron Panel (Includes Ferritin, Iron Sat%, Iron, and TIBC); Future  -     PTH, intact; Future  -     Vitamin A; Future  -     Vitamin B1, whole blood; Future  -     Vitamin B12; Future  -     Vitamin D 25 hydroxy; Future  -     Zinc; Future    Bariatric surgery status  -     CBC; Future  -     Comprehensive metabolic panel; Future  -     Folate; Future  -     Iron Panel (Includes Ferritin, Iron Sat%, Iron, and TIBC); Future  -     PTH, intact; Future  -     Vitamin A; Future  -     Vitamin B1, whole blood; Future  -     Vitamin B12; Future  -     Vitamin D 25 hydroxy; Future  -     Zinc; Future    Postsurgical malabsorption  -     CBC; Future  -     Comprehensive metabolic panel; Future  -     Folate; Future  -     Iron Panel (Includes Ferritin, Iron Sat%, Iron, and TIBC); Future  -     PTH, intact; Future  -     Vitamin A; Future  -     Vitamin B1, whole blood; Future  -     Vitamin B12; Future  -     Vitamin D 25 hydroxy; Future  -     Zinc; Future    Obesity, Class I, BMI 30-34.9  -     CBC; Future  -     Comprehensive metabolic panel; Future  -     Folate; Future  -     Iron Panel (Includes Ferritin, Iron Sat%, Iron, and TIBC); Future  -     PTH, intact; Future  -     Vitamin A; Future  -     Vitamin B1, whole blood; Future  -     Vitamin B12; Future  -     Vitamin D 25 hydroxy; Future  -     Zinc; Future    BMI 31.0-31.9,adult  -     CBC; Future  -     Comprehensive metabolic panel; Future  -     Folate; Future  -     Iron Panel (Includes Ferritin, Iron Sat%, Iron, and TIBC); Future  -     PTH, intact; Future  -     Vitamin A; Future  -     Vitamin B1, whole blood; Future  -     Vitamin B12; Future  -     Vitamin D 25 hydroxy; Future  -     Zinc; Future    Iron deficiency  -     CBC; Future  -     Comprehensive  metabolic panel; Future  -     Folate; Future  -     Iron Panel (Includes Ferritin, Iron Sat%, Iron, and TIBC); Future  -     PTH, intact; Future  -     Vitamin A; Future  -     Vitamin B1, whole blood; Future  -     Vitamin B12; Future  -     Vitamin D 25 hydroxy; Future  -     Zinc; Future    Constipation         Subjective:      Patient ID: Barry Roland is a 59 y.o. male.     Bariatric Surgery Status/BMI 31  -s/p Yeimi-En-Y Gastric Bypass with Dr. Bam Antonio on 04/24/2021. Presents to the office today for annual visit. Overall doing fair - has gained some weight since last seen - about 13 lbs. Denies any changes to his diet or activity level. Job is sedentary - tries to get walks in 3-4 times per week of 3.5 miles. Currently does not track his caloric intake. Tolerating a regular diet. Denies having any abdominal pain, N/V/D, regurgitation, reflux or dysphagia. Taking his MVI daily.  Interested in medications to help with weight loss.     Type I DM - has continuous pump and glucometer. On insulin which is a weight gaining medication. Sees endocrinology routinely. Continue to monitor and f/u as scheduled.     Iron deficiency/constipation - noted in labs in May. Takes iron PRN only due to constipation. He was taking miralax daily but was getting rectal leakage from daily use. This stopped since stopping miralax. Taking stool softeners twice daily but not as effective.      Initial: 270 lbs   Current: 216.5 lbs  EWL: (Weight loss is ahead of schedule at this post surgical period.)  Dexter: 199.5 lbs   Goal - 180 lbs.   Current BMI is Body mass index is 31.97 kg/m².    Tolerating a regular diet-yes  Eating at least 60 grams of protein per day-yes  Following 30/60 minute rule with liquids-yes  Drinking at least 64 ounces of fluid per day-yes  Drinking carbonated beverages-occasionally   Sufficient exercise-yes - walking 3.5 LBS   Using NSAIDs regularly-no  Using nicotine-no  Using alcohol-yes - socially.  Supplements:  "Multivitamins, B-12, Calcium , and tumeric, probiotic    EWL is 51%, which places the patient ahead of schedule for expected post surgical weight loss at this time.     The following portions of the patient's history were reviewed and updated as appropriate: allergies, current medications, past family history, past medical history, past social history, past surgical history and problem list.    Review of Systems   Constitutional:  Positive for unexpected weight change.   Respiratory: Negative.     Cardiovascular: Negative.    Gastrointestinal:  Positive for constipation.   Musculoskeletal: Negative.    Neurological: Negative.    Psychiatric/Behavioral: Negative.           Objective:    /70 (BP Location: Left arm, Patient Position: Sitting, Cuff Size: Adult)   Pulse 68   Temp 97.5 °F (36.4 °C) (Tympanic)   Ht 5' 9\" (1.753 m)   Wt 98.2 kg (216 lb 8 oz)   SpO2 97%   BMI 31.97 kg/m²      Physical Exam  Vitals and nursing note reviewed.   Constitutional:       Appearance: Normal appearance. He is obese.   Cardiovascular:      Rate and Rhythm: Normal rate and regular rhythm.      Pulses: Normal pulses.      Heart sounds: Normal heart sounds.   Pulmonary:      Effort: Pulmonary effort is normal.      Breath sounds: Normal breath sounds.   Abdominal:      General: Bowel sounds are normal.      Palpations: Abdomen is soft.      Tenderness: There is no abdominal tenderness.   Musculoskeletal:         General: Normal range of motion.   Skin:     General: Skin is warm and dry.   Neurological:      General: No focal deficit present.      Mental Status: He is alert and oriented to person, place, and time. Mental status is at baseline.   Psychiatric:         Mood and Affect: Mood normal.         Behavior: Behavior normal.         Thought Content: Thought content normal.         Judgment: Judgment normal.             "

## 2024-10-01 ENCOUNTER — APPOINTMENT (OUTPATIENT)
Dept: LAB | Age: 59
End: 2024-10-01
Payer: COMMERCIAL

## 2024-10-01 DIAGNOSIS — Z98.84 BARIATRIC SURGERY STATUS: ICD-10-CM

## 2024-10-01 DIAGNOSIS — Z48.815 ENCOUNTER FOR SURGICAL AFTERCARE FOLLOWING SURGERY OF DIGESTIVE SYSTEM: ICD-10-CM

## 2024-10-01 DIAGNOSIS — E61.1 IRON DEFICIENCY: ICD-10-CM

## 2024-10-01 DIAGNOSIS — K91.2 POSTSURGICAL MALABSORPTION: ICD-10-CM

## 2024-10-01 DIAGNOSIS — E66.811 OBESITY, CLASS I, BMI 30-34.9: ICD-10-CM

## 2024-10-01 DIAGNOSIS — E03.9 ACQUIRED HYPOTHYROIDISM: ICD-10-CM

## 2024-10-01 LAB
25(OH)D3 SERPL-MCNC: 45.6 NG/ML (ref 30–100)
ALBUMIN SERPL BCG-MCNC: 4.3 G/DL (ref 3.5–5)
ALP SERPL-CCNC: 65 U/L (ref 34–104)
ALT SERPL W P-5'-P-CCNC: 29 U/L (ref 7–52)
ANION GAP SERPL CALCULATED.3IONS-SCNC: 8 MMOL/L (ref 4–13)
AST SERPL W P-5'-P-CCNC: 43 U/L (ref 13–39)
BILIRUB SERPL-MCNC: 0.42 MG/DL (ref 0.2–1)
BUN SERPL-MCNC: 15 MG/DL (ref 5–25)
CALCIUM SERPL-MCNC: 9.4 MG/DL (ref 8.4–10.2)
CHLORIDE SERPL-SCNC: 103 MMOL/L (ref 96–108)
CO2 SERPL-SCNC: 29 MMOL/L (ref 21–32)
CREAT SERPL-MCNC: 0.99 MG/DL (ref 0.6–1.3)
ERYTHROCYTE [DISTWIDTH] IN BLOOD BY AUTOMATED COUNT: 12.6 % (ref 11.6–15.1)
FERRITIN SERPL-MCNC: 10 NG/ML (ref 24–336)
FOLATE SERPL-MCNC: >22.3 NG/ML
GFR SERPL CREATININE-BSD FRML MDRD: 83 ML/MIN/1.73SQ M
GLUCOSE P FAST SERPL-MCNC: 113 MG/DL (ref 65–99)
HCT VFR BLD AUTO: 44.4 % (ref 36.5–49.3)
HGB BLD-MCNC: 14.8 G/DL (ref 12–17)
IRON SATN MFR SERPL: 15 % (ref 15–50)
IRON SERPL-MCNC: 60 UG/DL (ref 50–212)
MCH RBC QN AUTO: 30.5 PG (ref 26.8–34.3)
MCHC RBC AUTO-ENTMCNC: 33.3 G/DL (ref 31.4–37.4)
MCV RBC AUTO: 91 FL (ref 82–98)
PLATELET # BLD AUTO: 234 THOUSANDS/UL (ref 149–390)
PMV BLD AUTO: 10.2 FL (ref 8.9–12.7)
POTASSIUM SERPL-SCNC: 4.7 MMOL/L (ref 3.5–5.3)
PROT SERPL-MCNC: 7.2 G/DL (ref 6.4–8.4)
PTH-INTACT SERPL-MCNC: 51 PG/ML (ref 12–88)
RBC # BLD AUTO: 4.86 MILLION/UL (ref 3.88–5.62)
SODIUM SERPL-SCNC: 140 MMOL/L (ref 135–147)
T3FREE SERPL-MCNC: 2.79 PG/ML (ref 2.5–3.9)
T4 FREE SERPL-MCNC: 0.63 NG/DL (ref 0.61–1.12)
TIBC SERPL-MCNC: 402 UG/DL (ref 250–450)
TSH SERPL DL<=0.05 MIU/L-ACNC: 16.86 UIU/ML (ref 0.45–4.5)
UIBC SERPL-MCNC: 342 UG/DL (ref 155–355)
VIT B12 SERPL-MCNC: 1312 PG/ML (ref 180–914)
WBC # BLD AUTO: 6.35 THOUSAND/UL (ref 4.31–10.16)

## 2024-10-01 PROCEDURE — 84439 ASSAY OF FREE THYROXINE: CPT

## 2024-10-01 PROCEDURE — 82306 VITAMIN D 25 HYDROXY: CPT

## 2024-10-01 PROCEDURE — 84443 ASSAY THYROID STIM HORMONE: CPT

## 2024-10-01 PROCEDURE — 84630 ASSAY OF ZINC: CPT

## 2024-10-01 PROCEDURE — 85027 COMPLETE CBC AUTOMATED: CPT

## 2024-10-01 PROCEDURE — 36415 COLL VENOUS BLD VENIPUNCTURE: CPT

## 2024-10-01 PROCEDURE — 84425 ASSAY OF VITAMIN B-1: CPT

## 2024-10-01 PROCEDURE — 83970 ASSAY OF PARATHORMONE: CPT

## 2024-10-01 PROCEDURE — 84590 ASSAY OF VITAMIN A: CPT

## 2024-10-01 PROCEDURE — 82728 ASSAY OF FERRITIN: CPT

## 2024-10-01 PROCEDURE — 80053 COMPREHEN METABOLIC PANEL: CPT

## 2024-10-01 PROCEDURE — 82746 ASSAY OF FOLIC ACID SERUM: CPT

## 2024-10-01 PROCEDURE — 83540 ASSAY OF IRON: CPT

## 2024-10-01 PROCEDURE — 82607 VITAMIN B-12: CPT

## 2024-10-01 PROCEDURE — 84481 FREE ASSAY (FT-3): CPT

## 2024-10-01 PROCEDURE — 83550 IRON BINDING TEST: CPT

## 2024-10-02 ENCOUNTER — TELEPHONE (OUTPATIENT)
Dept: ENDOCRINOLOGY | Facility: CLINIC | Age: 59
End: 2024-10-02

## 2024-10-03 DIAGNOSIS — G47.09 OTHER INSOMNIA: ICD-10-CM

## 2024-10-03 LAB — ZINC SERPL-MCNC: 69 UG/DL (ref 44–115)

## 2024-10-04 RX ORDER — ZOLPIDEM TARTRATE 10 MG/1
10 TABLET ORAL
Qty: 30 TABLET | Refills: 0 | Status: SHIPPED | OUTPATIENT
Start: 2024-10-04

## 2024-10-05 LAB — VIT B1 BLD-SCNC: 89.5 NMOL/L (ref 66.5–200)

## 2024-10-10 DIAGNOSIS — E61.1 IRON DEFICIENCY: ICD-10-CM

## 2024-10-10 DIAGNOSIS — Z98.84 BARIATRIC SURGERY STATUS: Primary | ICD-10-CM

## 2024-10-10 LAB — VIT A SERPL-MCNC: 46.6 UG/DL (ref 20.1–62)

## 2024-10-22 ENCOUNTER — OFFICE VISIT (OUTPATIENT)
Dept: GASTROENTEROLOGY | Facility: AMBULARY SURGERY CENTER | Age: 59
End: 2024-10-22
Payer: COMMERCIAL

## 2024-10-22 VITALS
OXYGEN SATURATION: 97 % | DIASTOLIC BLOOD PRESSURE: 92 MMHG | HEART RATE: 77 BPM | HEIGHT: 69 IN | WEIGHT: 223.6 LBS | SYSTOLIC BLOOD PRESSURE: 150 MMHG | BODY MASS INDEX: 33.12 KG/M2

## 2024-10-22 DIAGNOSIS — K62.5 BRBPR (BRIGHT RED BLOOD PER RECTUM): Primary | ICD-10-CM

## 2024-10-22 DIAGNOSIS — K64.9 BLEEDING HEMORRHOID: ICD-10-CM

## 2024-10-22 DIAGNOSIS — K59.09 OTHER CONSTIPATION: ICD-10-CM

## 2024-10-22 PROCEDURE — 99214 OFFICE O/P EST MOD 30 MIN: CPT | Performed by: INTERNAL MEDICINE

## 2024-10-22 RX ORDER — BISACODYL 5 MG/1
10 TABLET, DELAYED RELEASE ORAL ONCE
Qty: 2 TABLET | Refills: 0 | Status: SHIPPED | OUTPATIENT
Start: 2024-10-22 | End: 2024-10-22

## 2024-10-22 NOTE — ASSESSMENT & PLAN NOTE
Rectal bleeding appears to be most likely from bleeding hemorrhoids.  Rule out colorectal lesions including polyps or malignancy.    -Advised to avoid straining during defecation    -Fiber supplements and advised to take MiraLAX or stool softeners regularly    -Increase oral hydration    -Schedule for colonoscopy.    -High-fiber diet.    -Patient was given instructions about the colonoscopy prep.    -Patient was explained about the risks and benefits of the procedure. Risks including but not limited to bleeding, infection, perforation were explained in detail. Also explained about less than 100% sensitivity with the exam and other alternatives.

## 2024-10-22 NOTE — PATIENT INSTRUCTIONS
Scheduled date of colonoscopy (as of today): 12/16/24  Physician performing colonoscopy: Dr. Harding  Location of colonoscopy: [facility]  Bowel prep reviewed with patient: [email or web address]  Instructions reviewed with patient by: tristan  Clearances: na

## 2024-10-22 NOTE — PROGRESS NOTES
Follow-up Note -  [facility]  Barry LORI Roland 1965 male         ASSESSMENT & PLAN:    BRBPR (bright red blood per rectum)  Rectal bleeding appears to be most likely from bleeding hemorrhoids.  Rule out colorectal lesions including polyps or malignancy.    -Advised to avoid straining during defecation    -Fiber supplements and advised to take MiraLAX or stool softeners regularly    -Increase oral hydration    -Schedule for colonoscopy.    -High-fiber diet.    -Patient was given instructions about the colonoscopy prep.    -Patient was explained about the risks and benefits of the procedure. Risks including but not limited to bleeding, infection, perforation were explained in detail. Also explained about less than 100% sensitivity with the exam and other alternatives.      Reason: Rectal bleeding    HPI:  Mr. Garcia came in because of rectal bleeding issues for a while.  Complaining about fresh bleeding during bowel movements.  He was prescribed on suppositories but he did not use them.  Has problems. with constipation and straining.  His father and brother had colon cancer.  He had a colonoscopy in January 2022 and had polyps removed.  He was advised to come back in 3 years because of bowel prep reasons.  Denies any abdominal pain, nausea or vomiting.  Good appetite, no recent weight loss.  Denies any heartburn or acid reflux.  Denies any failed swallowing.    Chaperon: Ms. Amato    REVIEW OF SYSTEMS: Review of Systems   Constitutional:  Negative for activity change, appetite change, chills, diaphoresis, fatigue, fever and unexpected weight change.   HENT:  Negative for ear discharge, ear pain, facial swelling, hearing loss, nosebleeds, sore throat, tinnitus and voice change.    Eyes:  Negative for pain, discharge, redness, itching and visual disturbance.   Respiratory:  Negative for apnea, cough, chest tightness, shortness of breath and wheezing.    Cardiovascular:  Negative for chest pain  and palpitations.   Gastrointestinal:         As noted in HPI   Endocrine: Negative for cold intolerance, heat intolerance and polyuria.   Genitourinary:  Negative for difficulty urinating, dysuria, flank pain, hematuria and urgency.   Musculoskeletal:  Negative for arthralgias, back pain, gait problem, joint swelling and myalgias.   Skin:  Negative for rash and wound.   Neurological:  Negative for dizziness, tremors, seizures, speech difficulty, light-headedness, numbness and headaches.   Hematological:  Negative for adenopathy. Does not bruise/bleed easily.   Psychiatric/Behavioral:  Negative for agitation, behavioral problems and confusion. The patient is not nervous/anxious.         Past Medical History:   Diagnosis Date    Anemia     Axillary adenopathy 8/15/2022    Closed fracture of one rib of left side with routine healing 8/29/2022    Colon polyp     Diabetes mellitus (HCC)     Disease of thyroid gland     GERD (gastroesophageal reflux disease)     Hyperlipidemia     Hypertension     Insomnia     Kidney stone     Lymphadenopathy of head and neck 8/15/2022    Sleep apnea     Mild sleep apnea    Type 1 diabetes mellitus (HCC)     Uses Insulin Pump    Vitamin D deficiency       Past Surgical History:   Procedure Laterality Date    BARIATRIC SURGERY      CARPAL TUNNEL RELEASE Right     COLONOSCOPY      EGD      FL RETROGRADE PYELOGRAM  4/24/2020    LASIK      AL CYSTO/URETERO W/LITHOTRIPSY &INDWELL STENT INSRT Right 4/24/2020    Procedure: CYSTOSCOPY URETEROSCOPY WITH LITHOTRIPSY HOLMIUM LASER, RETROGRADE PYELOGRAM AND INSERTION STENT URETERAL;  Surgeon: Shabbir Ireland MD;  Location: [facility];  Service: Urology    AL LAPS GSTR RSTCV PX W/BYP GABBY-EN-Y LIMB <150 CM N/A 4/26/2021    Procedure: BYPASS GASTRIC GABBY-EN-Y LAPAROSCOPIC W ROBOTICS AND INTRAOPERATIVE EGD;  Surgeon: Danny Antonio MD;  Location: [facility];  Service: Bariatrics    ROTATOR CUFF REPAIR Right     UPPER GASTROINTESTINAL ENDOSCOPY        Social History     Socioeconomic History    Marital status: /Civil Union     Spouse name: Not on file    Number of children: Not on file    Years of education: Not on file    Highest education level: Not on file   Occupational History    Not on file   Tobacco Use    Smoking status: Former     Current packs/day: 0.00     Types: Cigarettes     Start date:      Quit date:      Years since quittin.8    Smokeless tobacco: Never    Tobacco comments:     quit in    Vaping Use    Vaping status: Never Used   Substance and Sexual Activity    Alcohol use: Yes     Alcohol/week: 2.0 standard drinks of alcohol     Types: 2 Glasses of wine per week     Comment: social    Drug use: No    Sexual activity: Yes     Partners: Female     Birth control/protection: None   Other Topics Concern    Not on file   Social History Narrative    Not on file     Social Determinants of Health     Financial Resource Strain: Not on file   Food Insecurity: Not on file   Transportation Needs: Not on file   Physical Activity: Not on file   Stress: Not on file   Social Connections: Not on file   Intimate Partner Violence: Not on file   Housing Stability: Not on file     Family History   Problem Relation Age of Onset    Thyroid disease unspecified Mother     Osteoporosis Mother     Hypertension Father     Colon cancer Father 68    Diabetes type II Sister     Hypertension Sister     Hyperlipidemia Sister     Thyroid disease unspecified Sister     Diabetes type II Brother     Hypertension Brother     Hyperlipidemia Brother     Colon cancer Brother 60     Ibuprofen and Other  Current Outpatient Medications   Medication Sig Dispense Refill    bisacodyl (DULCOLAX) 5 mg EC tablet Take 2 tablets (10 mg total) by mouth once for 1 dose 2 tablet 0    Blood Glucose Monitoring Suppl (ONE TOUCH ULTRA 2) w/Device KIT Use        Calcium Citrate (JORDY-CITRATE PO) Take 1,000 tablets by mouth 2 (two) times a day      Continuous Blood Gluc   (DEXCOM G6 ) KIYA USE AS DIRECTED 1 Device 0    Continuous Blood Gluc Sensor (Dexcom G7 Sensor) Use 1 Device every 10 days 9 each 3    Continuous Blood Gluc Transmit (Dexcom G6 Transmitter) MISC Inject 1 Device under the skin every 3 (three) months 1 each 3    fluticasone (FLONASE) 50 mcg/act nasal spray 1 spray into each nostril daily      Glucagon (Baqsimi Two Pack) 3 MG/DOSE POWD Use one dose as needed in case of severe hypoglycemia ( nasal spray) 1 each 3    hydrocortisone (ANUSOL-HC) 2.5 % rectal cream Apply topically 2 (two) times a day 28 g 0    hydrocortisone (ANUSOL-HC) 25 mg suppository Insert 1 suppository (25 mg total) into the rectum 2 (two) times a day 12 suppository 0    Insulin Aspart (NovoLOG) 100 units/mL injection USE UP TO 78 UNITS DAILY VIA INSULIN PUMP 70 mL 1    insulin glargine (Basaglar KwikPen) 100 units/mL injection pen Inject 32 units in case of pump failure ( incase of emergency) 15 mL 0    Insulin Pen Needle (BD PEN NEEDLE LYRIC U/F) 32G X 4 MM MISC by Does not apply route daily Use once daily 100 each 2    levothyroxine (Synthroid) 200 mcg tablet Take 1 tablet daily Mon-Sat and 1.5 tablets on Sunday. 105 tablet 2    lisinopril-hydrochlorothiazide (PRINZIDE,ZESTORETIC) 10-12.5 MG per tablet Take 1 tablet by mouth daily 90 tablet 1    loratadine (CLARITIN) 10 mg tablet Take 1 tablet (10 mg total) by mouth daily 90 tablet 1    Multiple Vitamins-Minerals (Bariatric Fusion) CHEW Chew        polyethylene glycol (GOLYTELY) 4000 mL solution Take 4,000 mL by mouth once for 1 dose 4000 mL 0    Probiotic Product (PROBIOTIC DAILY PO) Take 1 tablet by mouth daily       psyllium (METAMUCIL) 0.52 g capsule Take 0.52 g by mouth 3 (three) times a day Fiber capsules      rosuvastatin (CRESTOR) 5 mg tablet Take 1 tablet (5 mg total) by mouth daily 100 tablet 1    TURMERIC PO Take 1,400 capsules by mouth 2 (two) times a day      vitamin B-12 (VITAMIN B-12) 1,000 mcg tablet Take 1,000 mcg by mouth 3  "(three) times a week      zolpidem (AMBIEN) 10 mg tablet Take 1 tablet (10 mg total) by mouth daily at bedtime as needed for sleep 30 tablet 0     No current facility-administered medications for this visit.       Blood pressure 150/92, pulse 77, height 5' 9\" (1.753 m), weight 101 kg (223 lb 9.6 oz), SpO2 97%.    PHYSICAL EXAM: Physical Exam  Constitutional:       Appearance: He is well-developed.   HENT:      Head: Normocephalic and atraumatic.   Eyes:      General: No scleral icterus.        Right eye: No discharge.         Left eye: No discharge.      Conjunctiva/sclera: Conjunctivae normal.      Pupils: Pupils are equal, round, and reactive to light.   Neck:      Thyroid: No thyromegaly.      Vascular: No JVD.      Trachea: No tracheal deviation.   Cardiovascular:      Rate and Rhythm: Normal rate and regular rhythm.      Heart sounds: Normal heart sounds. No murmur heard.     No friction rub. No gallop.   Pulmonary:      Effort: Pulmonary effort is normal. No respiratory distress.      Breath sounds: Normal breath sounds. No wheezing or rales.   Chest:      Chest wall: No tenderness.   Abdominal:      General: Bowel sounds are normal. There is no distension.      Palpations: Abdomen is soft. There is no mass.      Tenderness: There is no abdominal tenderness. There is no guarding or rebound.      Hernia: No hernia is present.   Musculoskeletal:      Cervical back: Neck supple.   Lymphadenopathy:      Cervical: No cervical adenopathy.   Skin:     General: Skin is warm and dry.      Findings: No erythema or rash.   Neurological:      Mental Status: He is alert and oriented to person, place, and time.   Psychiatric:         Behavior: Behavior normal.         Thought Content: Thought content normal.          Lab Results   Component Value Date    WBC 6.35 10/01/2024    HGB 14.8 10/01/2024    HCT 44.4 10/01/2024    MCV 91 10/01/2024     10/01/2024     Lab Results   Component Value Date    GLUCOSE 181 (H) " 11/03/2015    CALCIUM 9.4 10/01/2024     11/03/2015    K 4.7 10/01/2024    CO2 29 10/01/2024     10/01/2024    BUN 15 10/01/2024    CREATININE 0.99 10/01/2024     Lab Results   Component Value Date    ALT 29 10/01/2024    AST 43 (H) 10/01/2024    ALKPHOS 65 10/01/2024    BILITOT 0.28 07/15/2015     Lab Results   Component Value Date    INR 0.94 04/16/2018    PROTIME 12.8 04/16/2018       CT chest without contrast    Result Date: 8/18/2022  Impression: Left 10th rib fractured in 2 places; posteriorly the fracture is mildly comminuted, minimally displaced, and associated with a small extrapleural hematoma.  Laterally the fracture is nondisplaced. No hemothorax or pneumothorax. The study was marked in EPIC for immediate notification. Workstation performed: [id number]     XR ribs with pa chest min 3 views LEFT    Result Date: 8/18/2022  Impression: Subsequent chest CT shows mildly comminuted mildly displaced left posterior 10th rib fracture and small extrapleural hematoma, not visible on radiograph. No acute cardiopulmonary disease. Workstation performed: [id number]

## 2024-10-25 LAB
APOB+LDLR+PCSK9 GENE MUT ANL BLD/T: NOT DETECTED
BRCA1+BRCA2 DEL+DUP + FULL MUT ANL BLD/T: NOT DETECTED
MLH1+MSH2+MSH6+PMS2 GN DEL+DUP+FUL M: NOT DETECTED

## 2024-11-08 DIAGNOSIS — G47.09 OTHER INSOMNIA: ICD-10-CM

## 2024-11-08 RX ORDER — ZOLPIDEM TARTRATE 10 MG/1
10 TABLET ORAL
Qty: 30 TABLET | Refills: 0 | Status: SHIPPED | OUTPATIENT
Start: 2024-11-08

## 2024-11-14 ENCOUNTER — TELEPHONE (OUTPATIENT)
Age: 59
End: 2024-11-14

## 2024-11-14 NOTE — TELEPHONE ENCOUNTER
Pt is upset. He rescheduled his appt for 11/20/24 he declined 11/26/24, he stated he can't wait til January for a visit. He wants a sooner visit.     Pt is on wait list for . please advise.

## 2024-11-29 ENCOUNTER — OFFICE VISIT (OUTPATIENT)
Dept: ENDOCRINOLOGY | Facility: CLINIC | Age: 59
End: 2024-11-29
Payer: COMMERCIAL

## 2024-11-29 VITALS
OXYGEN SATURATION: 95 % | BODY MASS INDEX: 33 KG/M2 | DIASTOLIC BLOOD PRESSURE: 76 MMHG | HEART RATE: 70 BPM | SYSTOLIC BLOOD PRESSURE: 118 MMHG | WEIGHT: 222.8 LBS | HEIGHT: 69 IN

## 2024-11-29 DIAGNOSIS — E10.65 TYPE 1 DIABETES MELLITUS WITH HYPERGLYCEMIA, WITH LONG-TERM CURRENT USE OF INSULIN (HCC): ICD-10-CM

## 2024-11-29 DIAGNOSIS — E03.9 ACQUIRED HYPOTHYROIDISM: ICD-10-CM

## 2024-11-29 DIAGNOSIS — E66.09 CLASS 1 OBESITY DUE TO EXCESS CALORIES WITH BODY MASS INDEX (BMI) OF 32.0 TO 32.9 IN ADULT, UNSPECIFIED WHETHER SERIOUS COMORBIDITY PRESENT: ICD-10-CM

## 2024-11-29 DIAGNOSIS — E78.2 MIXED HYPERLIPIDEMIA: ICD-10-CM

## 2024-11-29 DIAGNOSIS — E10.40 TYPE 1 DIABETES MELLITUS WITH DIABETIC NEUROPATHY (HCC): Primary | ICD-10-CM

## 2024-11-29 DIAGNOSIS — I10 PRIMARY HYPERTENSION: ICD-10-CM

## 2024-11-29 DIAGNOSIS — E66.811 CLASS 1 OBESITY DUE TO EXCESS CALORIES WITH BODY MASS INDEX (BMI) OF 32.0 TO 32.9 IN ADULT, UNSPECIFIED WHETHER SERIOUS COMORBIDITY PRESENT: ICD-10-CM

## 2024-11-29 PROCEDURE — 99214 OFFICE O/P EST MOD 30 MIN: CPT

## 2024-11-29 PROCEDURE — 95251 CONT GLUC MNTR ANALYSIS I&R: CPT

## 2024-11-29 RX ORDER — INSULIN GLARGINE 100 [IU]/ML
INJECTION, SOLUTION SUBCUTANEOUS
Qty: 15 ML | Refills: 0 | Status: SHIPPED | OUTPATIENT
Start: 2024-11-29

## 2024-11-29 RX ORDER — INSULIN ASPART 100 [IU]/ML
INJECTION, SOLUTION INTRAVENOUS; SUBCUTANEOUS
Qty: 70 ML | Refills: 1 | Status: SHIPPED | OUTPATIENT
Start: 2024-11-29

## 2024-11-29 NOTE — ASSESSMENT & PLAN NOTE
Presents clinically euthyroid.  Due for thyroid function tests following dose adjustment beginning of October.    At this time, continue Synthroid 200 mcg Monday-Saturday with 400 mcg on Sunday.  He is not taking this independent of calcium supplementation.  Reviewed appropriate Synthroid administration.  Await thyroid function tests prior to making dose adjustments.  Orders:    TSH, 3rd generation    T4, free    TSH, 3rd generation; Future    T4, free; Future

## 2024-11-29 NOTE — ASSESSMENT & PLAN NOTE
BMI 32.90    Okay from endocrinology standpoint to start weight loss medications Wegovy/Zepbound with weight management clinic.  Discussed need to decrease insulin should he begin GLP-1 agonist class of medications.    Continue to decrease caloric intake, follow balanced diet, avoid processed foods and sweetened beverages, and stay well-hydrated.

## 2024-11-29 NOTE — ASSESSMENT & PLAN NOTE
/76 in the office.  Continue current regimen including lisinopril-hydrochlorothiazide 10-12.5 mg daily.

## 2024-11-29 NOTE — PROGRESS NOTES
Name: Barry Roland      : 1965      MRN: [id number]  Encounter Provider: ANN Quinonez  Encounter Date: 2024   Encounter department: [facility] [place]  :  Assessment & Plan  Type 1 diabetes mellitus with diabetic neuropathy (HCC)  A1c unchanged from previous visit.    Due to complaint of frequent episodes of hypoglycemia overnight, will decrease basal rate at 0000 to 0.72 and loosen carb ratio to 1: 7.5.    Frequent episodes of hypoglycemia due to insulin stacking and late boluses.  Reviewed signs and symptoms of hypoglycemia as well as appropriate treatment.  Call the office for blood glucose levels less than 70 mg/dL or persistent patterns over 250 mg/dL.    Continue diet and lifestyle modification including attention to the amount type of carbohydrates consumed as well as increased physical activity as tolerated.    Follow-up in 4 months.  Labs prior to next visit.  Lab Results   Component Value Date    HGBA1C 7.5 (H) 2024       Orders:    Ambulatory Referral to Ophthalmology    Comprehensive metabolic panel; Future    Hemoglobin A1C; Future    Mixed hyperlipidemia  Continue rosuvastatin 5 mg daily.         Primary hypertension  /76 in the office.  Continue current regimen including lisinopril-hydrochlorothiazide 10-12.5 mg daily.         Acquired hypothyroidism  Presents clinically euthyroid.  Due for thyroid function tests following dose adjustment beginning of October.    At this time, continue Synthroid 200 mcg Monday-Saturday with 400 mcg on .  He is not taking this independent of calcium supplementation.  Reviewed appropriate Synthroid administration.  Await thyroid function tests prior to making dose adjustments.  Orders:    TSH, 3rd generation    T4, free    TSH, 3rd generation; Future    T4, free; Future    Class 1 obesity due to excess calories with body mass index (BMI) of 32.0 to 32.9 in adult, unspecified  whether serious comorbidity present  BMI 32.90    Okay from endocrinology standpoint to start weight loss medications Wegovy/Zepbound with weight management clinic.  Discussed need to decrease insulin should he begin GLP-1 agonist class of medications.    Continue to decrease caloric intake, follow balanced diet, avoid processed foods and sweetened beverages, and stay well-hydrated.             History of Present Illness     HPI  Barry Roland is a 59 y.o. male who presents to the office today for follow-up of type 1 diabetes, ROBBY. Diabetes course has been stable. Complications of diabetes include: neuropathy.  He was last seen in the office 5/6/2024 by SAEED Rosas at which time his A1c was 7.5% and there were no changes made to his regimen.  His most recent A1c remains 7.5%.    Frequent recent episodes of hypoglycemia.  Symptoms of hypoglycemia include: Weakness, perspiration    Patient is on a Tandem Mobi  pump.  Current Problems with Pump: No    Current Insulin pump settings:  See Scanned Pump Downloads.  Basal rate:  12:00 a.m. = 0.800 units/hour  4:00 a.m. = 1.000 units/hour  6:00 a.m. = 1.000 units/hour  8:00 a.m. = 1.500 units/hour  9:00 p.m. = 1.000 units/hour  Insulin to carb ratio:  12:00 a.m. = 7  4:00 a.m. = 7  6:00 a.m. = 6  8:00 a.m. = 7.5  9:00 p.m. = 7  Insulin sensitivity factor:  12:00 a.m. = 40  4:00 a.m. = 40  6:00 a.m. = 25  8:00 a.m. = 25  9:00 p.m. = 40  BG target:    12:00 a.m. = 110  4:00 a.m. = 110  6:00 a.m. = 110  8:00 a.m. = 110  9:00 p.m. = 110    Type of insulin:Novolog  Active Insulin Time: 5 hr    Use of Glucagon: Never    Diet: Consistent, needs improvement    Discussed with patient in case of malfunctioning of the pump to use basal and bolus therapy as backup which is prescribed to the patient. Also notified patient to call clinic if any issues.  He has Basaglar prescription at the pharmacy.    CGM REVIEW:  Device used : Dexcom, Home use   Indication: Type 1 Diabetes  Date Range:  11/16/2024 - 11/29/2024  More than 72 hours of data was reviewed. Report to be scanned to chart.     Analysis of data:   Average Glucose: 178 mg/dL  Coefficient of Variation: 33% %  SD : 59 mg/dL  Time in Target Range: 56%  Time Above Range: 31% high; 12% very high  Time Below Range: 0.4% low; 0.1% very low    Interpretation of data: Blood glucose levels suboptimally controlled.  Postprandial hyperglycemia observed following meals.  Episodes of hypoglycemia and near hypoglycemia observed frequently due to insulin stacking.  Occasional episodes of hypoglycemia overnight which may be explained by insulin stacking earlier in the day.  GMI 7.6%.    Diabetic Eye Exam: Scheduled, Dr. Shaffer, 12/20/2024  Follows with Podiatry: Needs to schedule  Influenza Vaccine: Needs to schedule  Has Thyroid Disorder: Synthroid 200 mcg Monday-Saturday and 400 mcg on Sunday  Hypertension, followed by PCP, taking: Lisinopril-hydrochlorothiazide 10-12.5 daily  Hyperlipidemia, followed by PCP, taking: Rosuvastatin 5 mg daily, Tolerating well with no myalgias  History of Pancreatitis: No  Medic Alert Tag: Recommended  Diabetes Education: Attended    History obtained from: patient    Review of Systems   Constitutional:  Negative for appetite change, fatigue and unexpected weight change.   HENT:  Negative for sore throat, trouble swallowing and voice change.    Respiratory:  Negative for cough.    Cardiovascular:  Negative for chest pain and palpitations.   Gastrointestinal:  Positive for constipation. Negative for abdominal pain, diarrhea, nausea and vomiting.   Endocrine: Negative for cold intolerance, heat intolerance, polydipsia and polyuria.   Musculoskeletal:  Negative for arthralgias and back pain.   Neurological:  Negative for dizziness and weakness.   Psychiatric/Behavioral:  Negative for sleep disturbance.      Current Outpatient Medications on File Prior to Visit   Medication Sig Dispense Refill    Blood Glucose Monitoring Suppl  (ONE TOUCH ULTRA 2) w/Device KIT Use        Calcium Citrate (JORDY-CITRATE PO) Take 1,000 tablets by mouth 2 (two) times a day      Continuous Blood Gluc  (DEXCOM G6 ) KIYA USE AS DIRECTED 1 Device 0    Continuous Blood Gluc Sensor (Dexcom G7 Sensor) Use 1 Device every 10 days 9 each 3    Continuous Blood Gluc Transmit (Dexcom G6 Transmitter) MISC Inject 1 Device under the skin every 3 (three) months 1 each 3    fluticasone (FLONASE) 50 mcg/act nasal spray 1 spray into each nostril daily      Glucagon (Baqsimi Two Pack) 3 MG/DOSE POWD Use one dose as needed in case of severe hypoglycemia ( nasal spray) 1 each 3    hydrocortisone (ANUSOL-HC) 2.5 % rectal cream Apply topically 2 (two) times a day 28 g 0    hydrocortisone (ANUSOL-HC) 25 mg suppository Insert 1 suppository (25 mg total) into the rectum 2 (two) times a day 12 suppository 0    Insulin Pen Needle (BD PEN NEEDLE LYRIC U/F) 32G X 4 MM MISC by Does not apply route daily Use once daily 100 each 2    levothyroxine (Synthroid) 200 mcg tablet Take 1 tablet daily Mon-Sat and 1.5 tablets on Sunday. 105 tablet 2    lisinopril-hydrochlorothiazide (PRINZIDE,ZESTORETIC) 10-12.5 MG per tablet Take 1 tablet by mouth daily 90 tablet 1    loratadine (CLARITIN) 10 mg tablet Take 1 tablet (10 mg total) by mouth daily 90 tablet 1    Multiple Vitamins-Minerals (Bariatric Fusion) CHEW Chew        Probiotic Product (PROBIOTIC DAILY PO) Take 1 tablet by mouth daily       psyllium (METAMUCIL) 0.52 g capsule Take 0.52 g by mouth 3 (three) times a day Fiber capsules      rosuvastatin (CRESTOR) 5 mg tablet Take 1 tablet (5 mg total) by mouth daily 100 tablet 1    TURMERIC PO Take 1,400 capsules by mouth 2 (two) times a day      vitamin B-12 (VITAMIN B-12) 1,000 mcg tablet Take 1,000 mcg by mouth 3 (three) times a week      zolpidem (AMBIEN) 10 mg tablet Take 1 tablet (10 mg total) by mouth daily at bedtime as needed for sleep 30 tablet 0    [DISCONTINUED] Insulin Aspart  "(NovoLOG) 100 units/mL injection USE UP TO 78 UNITS DAILY VIA INSULIN PUMP 70 mL 1    [DISCONTINUED] insulin glargine (Basaglar KwikPen) 100 units/mL injection pen Inject 32 units in case of pump failure ( incase of emergency) 15 mL 0    bisacodyl (DULCOLAX) 5 mg EC tablet Take 2 tablets (10 mg total) by mouth once for 1 dose 2 tablet 0    polyethylene glycol (GOLYTELY) 4000 mL solution Take 4,000 mL by mouth once for 1 dose 4000 mL 0     No current facility-administered medications on file prior to visit.      Social History     Tobacco Use    Smoking status: Former     Current packs/day: 0.00     Types: Cigarettes     Start date:      Quit date:      Years since quittin.9    Smokeless tobacco: Never    Tobacco comments:     quit in    Vaping Use    Vaping status: Never Used   Substance and Sexual Activity    Alcohol use: Yes     Alcohol/week: 2.0 standard drinks of alcohol     Types: 2 Glasses of wine per week     Comment: social    Drug use: No    Sexual activity: Yes     Partners: Female     Birth control/protection: None        Objective   /76   Pulse 70   Ht 5' 9\" (1.753 m)   Wt 101 kg (222 lb 12.8 oz)   SpO2 95%   BMI 32.90 kg/m²      Physical Exam  Vitals reviewed.   Constitutional:       General: He is not in acute distress.     Appearance: Normal appearance. He is well-groomed. He is obese.   HENT:      Head: Normocephalic and atraumatic.      Mouth/Throat:      Mouth: Mucous membranes are moist.   Eyes:      Conjunctiva/sclera: Conjunctivae normal.   Cardiovascular:      Rate and Rhythm: Normal rate.   Pulmonary:      Effort: Pulmonary effort is normal. No respiratory distress.   Abdominal:      General: There is no distension.      Palpations: Abdomen is soft.      Tenderness: There is no abdominal tenderness.   Musculoskeletal:         General: No swelling.      Cervical back: Normal range of motion.   Skin:     General: Skin is warm and dry.   Neurological:      Mental " Status: He is alert and oriented to person, place, and time.   Psychiatric:         Mood and Affect: Mood normal.         Behavior: Behavior normal. Behavior is cooperative.         Thought Content: Thought content normal.         Administrative Statements   I have spent a total time of 39 minutes in caring for this patient on the day of the visit/encounter including Diagnostic results, Prognosis, Risks and benefits of tx options, Instructions for management, Patient and family education, Importance of tx compliance, Risk factor reductions, Impressions, Counseling / Coordination of care, Documenting in the medical record, Reviewing / ordering tests, medicine, procedures  , and Obtaining or reviewing history  .

## 2024-11-29 NOTE — ASSESSMENT & PLAN NOTE
A1c unchanged from previous visit.    Due to complaint of frequent episodes of hypoglycemia overnight, will decrease basal rate at 0000 to 0.72 and loosen carb ratio to 1: 7.5.    Frequent episodes of hypoglycemia due to insulin stacking and late boluses.  Reviewed signs and symptoms of hypoglycemia as well as appropriate treatment.  Call the office for blood glucose levels less than 70 mg/dL or persistent patterns over 250 mg/dL.    Continue diet and lifestyle modification including attention to the amount type of carbohydrates consumed as well as increased physical activity as tolerated.    Follow-up in 4 months.  Labs prior to next visit.  Lab Results   Component Value Date    HGBA1C 7.5 (H) 09/24/2024       Orders:    Ambulatory Referral to Ophthalmology    Comprehensive metabolic panel; Future    Hemoglobin A1C; Future

## 2024-12-02 ENCOUNTER — ANESTHESIA EVENT (OUTPATIENT)
Dept: ANESTHESIOLOGY | Facility: HOSPITAL | Age: 59
End: 2024-12-02

## 2024-12-02 ENCOUNTER — ANESTHESIA (OUTPATIENT)
Dept: ANESTHESIOLOGY | Facility: HOSPITAL | Age: 59
End: 2024-12-02

## 2024-12-09 DIAGNOSIS — K62.5 BRBPR (BRIGHT RED BLOOD PER RECTUM): ICD-10-CM

## 2024-12-09 DIAGNOSIS — G47.09 OTHER INSOMNIA: ICD-10-CM

## 2024-12-09 DIAGNOSIS — E10.65 TYPE 1 DIABETES MELLITUS WITH HYPERGLYCEMIA, WITH LONG-TERM CURRENT USE OF INSULIN (HCC): ICD-10-CM

## 2024-12-09 DIAGNOSIS — K59.09 OTHER CONSTIPATION: ICD-10-CM

## 2024-12-09 NOTE — TELEPHONE ENCOUNTER
Please resend Golytely into [facility] ON [address] IN [place]. Thank you!

## 2024-12-10 RX ORDER — ZOLPIDEM TARTRATE 10 MG/1
10 TABLET ORAL
Qty: 30 TABLET | Refills: 0 | Status: SHIPPED | OUTPATIENT
Start: 2024-12-10

## 2024-12-10 RX ORDER — ACYCLOVIR 400 MG/1
1 TABLET ORAL
Qty: 9 EACH | Refills: 1 | Status: SHIPPED | OUTPATIENT
Start: 2024-12-10

## 2024-12-12 ENCOUNTER — TELEPHONE (OUTPATIENT)
Age: 59
End: 2024-12-12

## 2024-12-12 NOTE — TELEPHONE ENCOUNTER
[facility] calling to discuss script for Golytely that was prescribed, asking if  PEG 3350 is ok as it was written generic but it was also written that it needed to be brand name Golytely, pharmacy ran through with insurance and Millie G is what's covered under insurance, gave permission for auto sub, pharmacy to include note on bottle for pt. Stating it's the generic for Golytely

## 2024-12-12 NOTE — TELEPHONE ENCOUNTER
[facility] calling that they were unable to get PEG 3350 in time for colonoscopy on 12/16/24 but can sub for Gavilyte G, advised [facility] called earlier and was already working on getting prescription filled, pt. Would not have script in time for colonoscopy but can get Gavilyte G at [facility] today, gave permission to auto sub and called [facility] to cancel current script for home delivery

## 2024-12-12 NOTE — TELEPHONE ENCOUNTER
Jean-Paul from [employer] contacted office with with questions regarding Golytely RX. Call transferred to nurse triage to further assist.

## 2024-12-13 DIAGNOSIS — K62.5 BRBPR (BRIGHT RED BLOOD PER RECTUM): Primary | ICD-10-CM

## 2024-12-16 ENCOUNTER — ANESTHESIA (OUTPATIENT)
Dept: GASTROENTEROLOGY | Facility: AMBULARY SURGERY CENTER | Age: 59
End: 2024-12-16
Payer: COMMERCIAL

## 2024-12-16 ENCOUNTER — HOSPITAL ENCOUNTER (OUTPATIENT)
Dept: GASTROENTEROLOGY | Facility: AMBULARY SURGERY CENTER | Age: 59
Setting detail: OUTPATIENT SURGERY
Discharge: HOME | End: 2024-12-16
Attending: INTERNAL MEDICINE
Payer: COMMERCIAL

## 2024-12-16 VITALS
WEIGHT: 210 LBS | HEART RATE: 56 BPM | OXYGEN SATURATION: 94 % | DIASTOLIC BLOOD PRESSURE: 83 MMHG | HEIGHT: 69 IN | RESPIRATION RATE: 16 BRPM | TEMPERATURE: 97.7 F | BODY MASS INDEX: 31.1 KG/M2 | SYSTOLIC BLOOD PRESSURE: 132 MMHG

## 2024-12-16 DIAGNOSIS — K64.9 BLEEDING HEMORRHOID: Primary | ICD-10-CM

## 2024-12-16 DIAGNOSIS — K62.5 BRBPR (BRIGHT RED BLOOD PER RECTUM): ICD-10-CM

## 2024-12-16 DIAGNOSIS — K59.09 OTHER CONSTIPATION: ICD-10-CM

## 2024-12-16 PROCEDURE — 88305 TISSUE EXAM BY PATHOLOGIST: CPT | Performed by: STUDENT IN AN ORGANIZED HEALTH CARE EDUCATION/TRAINING PROGRAM

## 2024-12-16 PROCEDURE — 45385 COLONOSCOPY W/LESION REMOVAL: CPT | Performed by: INTERNAL MEDICINE

## 2024-12-16 RX ORDER — PROPOFOL 10 MG/ML
INJECTION, EMULSION INTRAVENOUS AS NEEDED
Status: DISCONTINUED | OUTPATIENT
Start: 2024-12-16 | End: 2024-12-16

## 2024-12-16 RX ORDER — LIDOCAINE HYDROCHLORIDE 10 MG/ML
INJECTION, SOLUTION EPIDURAL; INFILTRATION; INTRACAUDAL; PERINEURAL AS NEEDED
Status: DISCONTINUED | OUTPATIENT
Start: 2024-12-16 | End: 2024-12-16

## 2024-12-16 RX ORDER — SODIUM CHLORIDE, SODIUM LACTATE, POTASSIUM CHLORIDE, CALCIUM CHLORIDE 600; 310; 30; 20 MG/100ML; MG/100ML; MG/100ML; MG/100ML
INJECTION, SOLUTION INTRAVENOUS CONTINUOUS PRN
Status: DISCONTINUED | OUTPATIENT
Start: 2024-12-16 | End: 2024-12-16

## 2024-12-16 RX ADMIN — PROPOFOL 30 MG: 10 INJECTION, EMULSION INTRAVENOUS at 07:40

## 2024-12-16 RX ADMIN — LIDOCAINE HYDROCHLORIDE 50 MG: 10 INJECTION, SOLUTION EPIDURAL; INFILTRATION; INTRACAUDAL at 07:34

## 2024-12-16 RX ADMIN — PROPOFOL 130 MG: 10 INJECTION, EMULSION INTRAVENOUS at 07:34

## 2024-12-16 RX ADMIN — Medication 40 MG: at 07:38

## 2024-12-16 RX ADMIN — PROPOFOL 20 MG: 10 INJECTION, EMULSION INTRAVENOUS at 07:39

## 2024-12-16 RX ADMIN — PROPOFOL 20 MG: 10 INJECTION, EMULSION INTRAVENOUS at 07:37

## 2024-12-16 RX ADMIN — SODIUM CHLORIDE, SODIUM LACTATE, POTASSIUM CHLORIDE, AND CALCIUM CHLORIDE: .6; .31; .03; .02 INJECTION, SOLUTION INTRAVENOUS at 07:30

## 2024-12-16 RX ADMIN — PROPOFOL 50 MG: 10 INJECTION, EMULSION INTRAVENOUS at 07:43

## 2024-12-16 NOTE — H&P
History and Physical - [facility]  Barry Roland 59 y.o. male MRN: [id number]        HPI: Mr. Garcia came in because of rectal bleeding issues for a while.  Complaining about fresh bleeding during bowel movements.     Historical Information   Past Medical History:   Diagnosis Date    Anemia     Axillary adenopathy 8/15/2022    Closed fracture of one rib of left side with routine healing 8/29/2022    Colon polyp     Diabetes mellitus (HCC)     Disease of thyroid gland     GERD (gastroesophageal reflux disease)     Hyperlipidemia     Hypertension     Insomnia     Kidney stone     Lymphadenopathy of head and neck 8/15/2022    Sleep apnea     Mild sleep apnea    Type 1 diabetes mellitus (HCC)     Uses Insulin Pump    Vitamin D deficiency      Past Surgical History:   Procedure Laterality Date    BARIATRIC SURGERY      CARPAL TUNNEL RELEASE Right     COLONOSCOPY      EGD      FL RETROGRADE PYELOGRAM  4/24/2020    LASIK      IL CYSTO/URETERO W/LITHOTRIPSY &INDWELL STENT INSRT Right 4/24/2020    Procedure: CYSTOSCOPY URETEROSCOPY WITH LITHOTRIPSY HOLMIUM LASER, RETROGRADE PYELOGRAM AND INSERTION STENT URETERAL;  Surgeon: Shabbir Ireland MD;  Location: [facility];  Service: Urology    IL LAPS GSTR RSTCV PX W/BYP GABBY-EN-Y LIMB <150 CM N/A 4/26/2021    Procedure: BYPASS GASTRIC GABBY-EN-Y LAPAROSCOPIC W ROBOTICS AND INTRAOPERATIVE EGD;  Surgeon: Danny Antonio MD;  Location: [facility];  Service: Bariatrics    ROTATOR CUFF REPAIR Right     UPPER GASTROINTESTINAL ENDOSCOPY       Social History   Social History     Substance and Sexual Activity   Alcohol Use Yes    Alcohol/week: 2.0 standard drinks of alcohol    Types: 2 Glasses of wine per week    Comment: social     Social History     Substance and Sexual Activity   Drug Use No     Social History     Tobacco Use   Smoking Status Former    Current packs/day: 0.00    Types: Cigarettes    Start date: 1982    Quit date: 2015    Years since quitting:  "9.9   Smokeless Tobacco Never   Tobacco Comments    quit in 2015     Family History   Problem Relation Age of Onset    Thyroid disease unspecified Mother     Osteoporosis Mother     Hypertension Father     Colon cancer Father 68    Diabetes type II Sister     Hypertension Sister     Hyperlipidemia Sister     Thyroid disease unspecified Sister     Diabetes type II Brother     Hypertension Brother     Hyperlipidemia Brother     Colon cancer Brother 60       Meds/Allergies     Not in a hospital admission.    Allergies   Allergen Reactions    Ibuprofen GI Intolerance and Nausea Only    Other Other (See Comments)     Seasonal allergies- pt has congestion       Objective     Blood pressure 120/85, pulse 58, temperature 97.5 °F (36.4 °C), temperature source Temporal, resp. rate 16, height 5' 9\" (1.753 m), weight 95.3 kg (210 lb), SpO2 96%.    Physical Exam:    Chest- CTA  Heart- RRR  Abdomen- NT/ND  Extremities- No edema    ASSESSMENT:     Rectal bleeding    PLAN:    Colonoscopy              "

## 2024-12-16 NOTE — ANESTHESIA PREPROCEDURE EVALUATION
Procedure:  COLONOSCOPY    Relevant Problems   ANESTHESIA (within normal limits)      CARDIO   (+) Bleeding hemorrhoid   (+) Hyperlipidemia   (+) Hypertension      ENDO   (+) Hypothyroidism   (+) Type 1 diabetes mellitus with diabetic neuropathy (HCC)      GI/HEPATIC   (+) BRBPR (bright red blood per rectum)   (+) Bleeding hemorrhoid   (+) GERD (gastroesophageal reflux disease)      /RENAL   (+) Renal calculus, bilateral      HEMATOLOGY   (+) Iron deficiency anemia      MUSCULOSKELETAL   (+) Chronic back pain      NEURO/PSYCH   (+) Chronic back pain   (+) Type 1 diabetes mellitus with diabetic neuropathy (HCC)      PULMONARY   (+) FARZANA (obstructive sleep apnea)        Physical Exam    Airway    Mallampati score: III  TM Distance: >3 FB  Neck ROM: full     Dental   No notable dental hx     Cardiovascular  Rhythm: regular, Rate: normal    Pulmonary   Breath sounds clear to auscultation    Other Findings        Anesthesia Plan  ASA Score- 2     Anesthesia Type- IV sedation with anesthesia with ASA Monitors.         Additional Monitors:     Airway Plan:            Plan Factors-Exercise tolerance (METS): >4 METS.    Chart reviewed.   Existing labs reviewed. Patient summary reviewed.    Patient is not a current smoker.              Induction-     Postoperative Plan-         Informed Consent- Anesthetic plan and risks discussed with patient.  I personally reviewed this patient with the CRNA. Discussed and agreed on the Anesthesia Plan with the CRNA..

## 2024-12-16 NOTE — ANESTHESIA POSTPROCEDURE EVALUATION
Post-Op Assessment Note    CV Status:  Stable    Pain management: adequate       Mental Status:  Awake   Hydration Status:  Euvolemic   PONV Controlled:  Controlled   Airway Patency:  Patent     Post Op Vitals Reviewed: Yes    No anethesia notable event occurred.    Staff: CRNA           Last Filed PACU Vitals:  Vitals Value Taken Time   Temp 97.7 °F (36.5 °C) 12/16/24 0751   Pulse 62 12/16/24 0751   /75 12/16/24 0751   Resp 18 12/16/24 0751   SpO2 97 % 12/16/24 0751       Modified Galilea:  Activity: 2 (12/16/2024  7:52 AM)  Respiration: 2 (12/16/2024  7:52 AM)  Circulation: 2 (12/16/2024  7:52 AM)  Consciousness: 1 (12/16/2024  7:52 AM)  Oxygen Saturation: 2 (12/16/2024  7:52 AM)  Modified Galilea Score: 9 (12/16/2024  7:52 AM)

## 2024-12-19 PROCEDURE — 88305 TISSUE EXAM BY PATHOLOGIST: CPT | Performed by: STUDENT IN AN ORGANIZED HEALTH CARE EDUCATION/TRAINING PROGRAM

## 2024-12-20 ENCOUNTER — RESULTS FOLLOW-UP (OUTPATIENT)
Dept: GASTROENTEROLOGY | Facility: AMBULARY SURGERY CENTER | Age: 59
End: 2024-12-20

## 2024-12-20 LAB
LEFT EYE DIABETIC RETINOPATHY: NORMAL
RIGHT EYE DIABETIC RETINOPATHY: NORMAL

## 2024-12-23 ENCOUNTER — OFFICE VISIT (OUTPATIENT)
Age: 59
End: 2024-12-23
Payer: COMMERCIAL

## 2024-12-23 VITALS
DIASTOLIC BLOOD PRESSURE: 82 MMHG | BODY MASS INDEX: 32.3 KG/M2 | TEMPERATURE: 98.2 F | HEART RATE: 63 BPM | OXYGEN SATURATION: 97 % | HEIGHT: 70 IN | SYSTOLIC BLOOD PRESSURE: 124 MMHG | WEIGHT: 225.6 LBS

## 2024-12-23 DIAGNOSIS — Z98.84 GASTRIC BYPASS STATUS FOR OBESITY: ICD-10-CM

## 2024-12-23 DIAGNOSIS — Z96.41 INSULIN PUMP IN PLACE: ICD-10-CM

## 2024-12-23 DIAGNOSIS — E10.40 TYPE 1 DIABETES MELLITUS WITH DIABETIC NEUROPATHY (HCC): Primary | ICD-10-CM

## 2024-12-23 DIAGNOSIS — D50.9 IRON DEFICIENCY ANEMIA, UNSPECIFIED IRON DEFICIENCY ANEMIA TYPE: ICD-10-CM

## 2024-12-23 DIAGNOSIS — E55.9 VITAMIN D DEFICIENCY: ICD-10-CM

## 2024-12-23 DIAGNOSIS — I10 PRIMARY HYPERTENSION: ICD-10-CM

## 2024-12-23 DIAGNOSIS — E66.811 CLASS 1 OBESITY DUE TO EXCESS CALORIES WITH BODY MASS INDEX (BMI) OF 32.0 TO 32.9 IN ADULT, UNSPECIFIED WHETHER SERIOUS COMORBIDITY PRESENT: ICD-10-CM

## 2024-12-23 DIAGNOSIS — K21.9 GASTROESOPHAGEAL REFLUX DISEASE WITHOUT ESOPHAGITIS: ICD-10-CM

## 2024-12-23 DIAGNOSIS — G47.09 OTHER INSOMNIA: ICD-10-CM

## 2024-12-23 DIAGNOSIS — E03.9 ACQUIRED HYPOTHYROIDISM: ICD-10-CM

## 2024-12-23 DIAGNOSIS — K64.9 BLEEDING HEMORRHOID: ICD-10-CM

## 2024-12-23 DIAGNOSIS — E66.09 CLASS 1 OBESITY DUE TO EXCESS CALORIES WITH BODY MASS INDEX (BMI) OF 32.0 TO 32.9 IN ADULT, UNSPECIFIED WHETHER SERIOUS COMORBIDITY PRESENT: ICD-10-CM

## 2024-12-23 PROCEDURE — 99214 OFFICE O/P EST MOD 30 MIN: CPT | Performed by: NURSE PRACTITIONER

## 2024-12-23 RX ORDER — INSULIN GLARGINE-YFGN 100 [IU]/ML
INJECTION, SOLUTION SUBCUTANEOUS
COMMUNITY
Start: 2024-12-02

## 2024-12-23 NOTE — ASSESSMENT & PLAN NOTE
Lab Results   Component Value Date    HGBA1C 7.5 (H) 09/24/2024   -Currently managed by endocrine  -Will start Wegovy    Orders:    Semaglutide-Weight Management (WEGOVY) 0.25 MG/0.5ML; Inject 0.5 mL (0.25 mg total) under the skin once a week for 28 days    Semaglutide-Weight Management (WEGOVY) 0.5 MG/0.5ML; Inject 0.5 mL (0.5 mg total) under the skin once a week for 28 days Do not start before January 18, 2025.    Semaglutide-Weight Management (WEGOVY) 1 MG/0.5ML; Inject 0.5 mL (1 mg total) under the skin once a week for 28 days Do not start before February 15, 2025.    Semaglutide-Weight Management (WEGOVY) 1.7 MG/0.75ML; Inject 0.75 mL (1.7 mg total) under the skin once a week for 28 days Do not start before March 15, 2025.    Semaglutide-Weight Management (WEGOVY) 2.4 MG/0.75ML; Inject 0.75 mL (2.4 mg total) under the skin once a week for 28 days Do not start before April 12, 2025.

## 2024-12-23 NOTE — PROGRESS NOTES
Name: Barry Roland      : 1965      MRN: [id number]  Encounter Provider: ANN Carroll  Encounter Date: 2024   Encounter department: [facility]  :  Assessment & Plan  Type 1 diabetes mellitus with diabetic neuropathy (HCC)    Lab Results   Component Value Date    HGBA1C 7.5 (H) 2024   -Currently managed by endocrine  -Will start Wegovy    Orders:    Semaglutide-Weight Management (WEGOVY) 0.25 MG/0.5ML; Inject 0.5 mL (0.25 mg total) under the skin once a week for 28 days    Semaglutide-Weight Management (WEGOVY) 0.5 MG/0.5ML; Inject 0.5 mL (0.5 mg total) under the skin once a week for 28 days Do not start before 2025.    Semaglutide-Weight Management (WEGOVY) 1 MG/0.5ML; Inject 0.5 mL (1 mg total) under the skin once a week for 28 days Do not start before February 15, 2025.    Semaglutide-Weight Management (WEGOVY) 1.7 MG/0.75ML; Inject 0.75 mL (1.7 mg total) under the skin once a week for 28 days Do not start before March 15, 2025.    Semaglutide-Weight Management (WEGOVY) 2.4 MG/0.75ML; Inject 0.75 mL (2.4 mg total) under the skin once a week for 28 days Do not start before 2025.    Primary hypertension  -Controlled off of lisinopril-hydrochlorothiazide  Continue to monitor blood pressure at home.  Goal BP is < 130/80.  Contact our office for consistent elevations.  Recommend low sodium diet.  Exercise 30 minutes 5 times a week as tolerated.  Recommend yearly eye exam.           Bleeding hemorrhoid  -has referral to colorectal surgery         Gastroesophageal reflux disease without esophagitis   You may use OTC tums as needed.  Recommend small frequent meals throughout the day.  Avoid aggravating foods - spicy foods, acidic foods - such as tomato and citrus.  Avoid alcohol.  Do not lay down for at least 30 mins after eating.             Acquired hypothyroidism  Followed by endocrine  Continue current dose of  levothyroxine          Iron deficiency anemia, unspecified iron deficiency anemia type  -Continues to follow bariatric medicine  -Continue with iron supplementation         Gastric bypass status for obesity  -continue to follow with bariatrics          Other insomnia  Ambien PRN  Recommend trial of magnesium  Controlled sub agreement on file         Vitamin D deficiency  Vitamin-D monitored by Endocrine, encourage supplementation         Insulin pump in place         Class 1 obesity due to excess calories with body mass index (BMI) of 32.0 to 32.9 in adult, unspecified whether serious comorbidity present                  History of Present Illness     Patient presents today to follow-up on chronic conditions..  Reviewed most recent blood work    HTN-reports that he stopped lisinopril-hydrochlorothiazide due to low blood pressure reading, denies side effects with this medication, denies headaches, changes in vision or chest pain  Continues to monitor his blood pressures at home    Hypothyroidism- managed by endocrine, TSH elevated T4 stable (dose adjusted), reports fatigue (but chronic), some constipation. Pt takes mirlax everyday and helps. Occasional bleeding in stool due to hemorrhoids (will be seeing colorectal surgery)    DM: BS average around 170    Will be seeing weight management and may start Wegovy     Discussed vaccinations, patient defers at this time       Diabetes  He presents for his follow-up (managed by endocrine ) diabetic visit. He has type 1 diabetes mellitus. His disease course has been worsening. There are no hypoglycemic associated symptoms. Pertinent negatives for hypoglycemia include no dizziness or headaches. Pertinent negatives for diabetes include no chest pain, no polydipsia, no polyphagia, no polyuria and no weakness. There are no hypoglycemic complications. (Patients sugars in the 140s) Eye exam is current.   Hyperlipidemia  This is a chronic problem. The current episode started more  "than 1 year ago. The problem is uncontrolled (stopped crestor after surgery ). Pertinent negatives include no chest pain or shortness of breath.     Review of Systems   Constitutional:  Negative for activity change, appetite change, chills, diaphoresis and fever.   HENT:  Negative for congestion, ear discharge, ear pain, postnasal drip, rhinorrhea, sinus pressure, sinus pain and sore throat.    Eyes:  Negative for pain, discharge, itching and visual disturbance.   Respiratory:  Negative for cough, chest tightness, shortness of breath and wheezing.    Cardiovascular:  Negative for chest pain, palpitations and leg swelling.   Gastrointestinal:  Negative for abdominal pain, constipation, diarrhea, nausea and vomiting.   Endocrine: Negative for polydipsia, polyphagia and polyuria.   Genitourinary:  Negative for difficulty urinating, dysuria and urgency.   Musculoskeletal:  Negative for arthralgias, back pain and neck pain.   Skin:  Negative for rash and wound.   Neurological:  Negative for dizziness, weakness, numbness and headaches.       Objective   /82 (BP Location: Left arm, Patient Position: Sitting, Cuff Size: Standard)   Pulse 63   Temp 98.2 °F (36.8 °C) (Temporal)   Ht 5' 9.57\" (1.767 m)   Wt 102 kg (225 lb 9.6 oz)   SpO2 97%   BMI 32.77 kg/m²      Physical Exam  Constitutional:       General: He is not in acute distress.     Appearance: He is well-developed. He is not diaphoretic.   HENT:      Head: Normocephalic and atraumatic.      Right Ear: External ear normal.      Left Ear: External ear normal.      Nose: Nose normal.      Mouth/Throat:      Mouth: Mucous membranes are moist.      Pharynx: No oropharyngeal exudate or posterior oropharyngeal erythema.   Eyes:      General:         Right eye: No discharge.         Left eye: No discharge.      Conjunctiva/sclera: Conjunctivae normal.      Pupils: Pupils are equal, round, and reactive to light.   Neck:      Thyroid: No thyromegaly. "   Cardiovascular:      Rate and Rhythm: Normal rate and regular rhythm.      Heart sounds: Normal heart sounds. No murmur heard.     No friction rub. No gallop.   Pulmonary:      Effort: Pulmonary effort is normal. No respiratory distress.      Breath sounds: Normal breath sounds. No stridor. No wheezing or rales.   Abdominal:      General: Bowel sounds are normal. There is no distension.      Palpations: Abdomen is soft.      Tenderness: There is no abdominal tenderness.   Musculoskeletal:      Cervical back: Normal range of motion and neck supple.   Lymphadenopathy:      Cervical: No cervical adenopathy.   Skin:     General: Skin is warm and dry.      Findings: No erythema or rash.   Neurological:      Mental Status: He is alert and oriented to person, place, and time.   Psychiatric:         Behavior: Behavior normal.         Thought Content: Thought content normal.         Judgment: Judgment normal.

## 2024-12-23 NOTE — ASSESSMENT & PLAN NOTE
-Controlled off of lisinopril-hydrochlorothiazide  Continue to monitor blood pressure at home.  Goal BP is < 130/80.  Contact our office for consistent elevations.  Recommend low sodium diet.  Exercise 30 minutes 5 times a week as tolerated.  Recommend yearly eye exam.

## 2024-12-24 ENCOUNTER — TELEPHONE (OUTPATIENT)
Dept: ADMINISTRATIVE | Facility: OTHER | Age: 59
End: 2024-12-24

## 2024-12-24 NOTE — TELEPHONE ENCOUNTER
Upon review of the In Basket request and the patient's chart, initial outreach has been made via fax to facility. Please see Contacts section for details.     Thank you  Katya Arango

## 2024-12-24 NOTE — TELEPHONE ENCOUNTER
----- Message from Carina BAUTISTA sent at 12/23/2024 12:56 PM EST -----  Regarding: diabetic eye exam  ..12/23/24 12:57 PM    Hello, our patient Barry Roland has had Diabetic Eye Exam completed/performed. Please assist in updating the patient chart by making an External outreach to Dr. Shaffer facility located in . The date of service is 12/20/2023.    Thank you,  Carina Oconnor MA  [facility] [place]

## 2024-12-24 NOTE — LETTER
Diabetic Eye Exam Form  Second Request    Date Requested: 24  Patient: Barry Roland  Patient : 1965   Referring Provider: ANN Carroll      DIABETIC Eye Exam Date _______________________________      Type of Exam MUST be documented for Diabetic Eye Exams. Please CHECK ONE.     Retinal Exam       Dilated Retinal Exam       OCT       Optomap-Iris Exam      Fundus Photography       Left Eye - Please check Retinopathy or No Retinopathy        Exam did show retinopathy    Exam did not show retinopathy       Right Eye - Please check Retinopathy or No Retinopathy       Exam did show retinopathy    Exam did not show retinopathy       Comments __________________________________________________________    Practice Providing Exam ______________________________________________    Exam Performed By (print name) _______________________________________      Provider Signature ___________________________________________________      These reports are needed for  compliance.  Please fax this completed form and a copy of the Diabetic Eye Exam report to our office located at [address] [place] [address] as soon as possible via Fax [phone] attention Katya: Phone [phone]  We thank you for your assistance in treating our mutual patient.

## 2024-12-24 NOTE — LETTER
Diabetic Eye Exam Form    Date Requested: 24  Patient: Barry Roland  Patient : 1965   Referring Provider: ANN Carroll      DIABETIC Eye Exam Date _______________________________      Type of Exam MUST be documented for Diabetic Eye Exams. Please CHECK ONE.     Retinal Exam       Dilated Retinal Exam       OCT       Optomap-Iris Exam      Fundus Photography       Left Eye - Please check Retinopathy or No Retinopathy        Exam did show retinopathy    Exam did not show retinopathy       Right Eye - Please check Retinopathy or No Retinopathy       Exam did show retinopathy    Exam did not show retinopathy       Comments __________________________________________________________    Practice Providing Exam ______________________________________________    Exam Performed By (print name) _______________________________________      Provider Signature ___________________________________________________      These reports are needed for  compliance.  Please fax this completed form and a copy of the Diabetic Eye Exam report to our office located at [address] [place] [address] as soon as possible via Fax [phone] attention Katya: Phone [phone]  We thank you for your assistance in treating our mutual patient.

## 2024-12-30 NOTE — TELEPHONE ENCOUNTER
As a follow-up, a second attempt has been made for outreach via fax to facility. Please see Contacts section for details.    Thank you  Katya Arango

## 2025-01-02 NOTE — TELEPHONE ENCOUNTER
As a final attempt, a third outreach has been made via telephone call to facility. Please see Contacts section for details. This encounter will be closed and completed by end of day. Should we receive the requested information because of previous outreach attempts, the requested patient's chart will be updated appropriately.     Thank you  Katya Arango

## 2025-01-03 ENCOUNTER — OFFICE VISIT (OUTPATIENT)
Dept: BARIATRICS | Facility: CLINIC | Age: 60
End: 2025-01-03
Payer: COMMERCIAL

## 2025-01-03 VITALS
HEART RATE: 74 BPM | OXYGEN SATURATION: 99 % | TEMPERATURE: 98.8 F | SYSTOLIC BLOOD PRESSURE: 126 MMHG | DIASTOLIC BLOOD PRESSURE: 76 MMHG | BODY MASS INDEX: 33.25 KG/M2 | HEIGHT: 69 IN | WEIGHT: 224.5 LBS

## 2025-01-03 DIAGNOSIS — E10.40 TYPE 1 DIABETES MELLITUS WITH DIABETIC NEUROPATHY (HCC): ICD-10-CM

## 2025-01-03 DIAGNOSIS — I10 PRIMARY HYPERTENSION: ICD-10-CM

## 2025-01-03 DIAGNOSIS — E78.5 HLD (HYPERLIPIDEMIA): ICD-10-CM

## 2025-01-03 DIAGNOSIS — Z98.84 BARIATRIC SURGERY STATUS: ICD-10-CM

## 2025-01-03 DIAGNOSIS — Z48.815 ENCOUNTER FOR SURGICAL AFTERCARE FOLLOWING SURGERY OF DIGESTIVE SYSTEM: Primary | ICD-10-CM

## 2025-01-03 DIAGNOSIS — E66.811 OBESITY, CLASS I, BMI 30-34.9: ICD-10-CM

## 2025-01-03 DIAGNOSIS — G47.33 OSA (OBSTRUCTIVE SLEEP APNEA): ICD-10-CM

## 2025-01-03 PROCEDURE — 99214 OFFICE O/P EST MOD 30 MIN: CPT | Performed by: NURSE PRACTITIONER

## 2025-01-03 RX ORDER — TIRZEPATIDE 2.5 MG/.5ML
2.5 INJECTION, SOLUTION SUBCUTANEOUS WEEKLY
Qty: 2 ML | Refills: 0 | Status: SHIPPED | OUTPATIENT
Start: 2025-01-03 | End: 2025-01-31

## 2025-01-03 NOTE — PROGRESS NOTES
Assessment/Plan:    OBESITY I/BMI 33  -Discussed role of weight loss medications.  -Initial weight loss goal of 5-10% weight loss for improved overall health  -Reviewed Screening labs - TSH levels increased - working with endocrinology for this, lipid panel in 2023 - controlled on rosuvastatin 5 mg PO QD - last lipid done 2023 - will repeat; HGBA1C - 7.5% - on continuous insulin therapy due to type I DM.   -Patient is interested in pursuing Zepbound  Discussed medication options  Recommend treatment with Zepbound  Medication Contract Signed   Discussed expected weight loss of approximately 20% along with lifestyle modifications  Discussed risks/side effects of medication and demonstrated pen device  Recommend small/low fat meals and stop eating when full to avoid side effects.  Discussed importance of adequate protein intake and strength training to reduce risk of muscle loss.   Discussed need to stop medication for at least 1 week prior to planned surgery/endoscopy  Denies hx Pancreatitis or FH of Medullary cell Thyroid CA/MEN2 syndrome    Start Zepbound  2.5mg weekly x 4 weeks and titrate  Advised to contact office in 2 weeks with update regarding tolerability and at that time will increase to 5mg dose if tolerating medication with no significant side effects.         Follow up in approximately 3 months with Surgical Advanced Practitioner.  Goals:  Food log (ie.) www.[email or web address],sparkpeople.com,loseit.com,calorieking.com,etc. baritastic  No sugary beverages. At least 64oz of water daily.  Increase physical activity by 10 minutes daily. Gradually increase physical activity to a goal of 5 days per week for 30 minutes of MODERATE intensity PLUS 2 days per week of FULL BODY resistance training  5-10 servings of fruits and vegetables per day and 25-35 grams of dietary fiber per day, gradually increasing  Food log (ie.) www.[email or web address].[email or web address],sparkpeople.com,loseit.com,calorieking.com,etc. , No sugary beverages. At  least 64oz of water daily., Increase physical activity by 10 minutes daily, Practice lesson plans 1-6 in bariatric manual , Practice 30/60 rule, Gradually increase physical activity to a goal of 5 days per week for 30 minutes of MODERATE intensity PLUS 2 days per week of FULL BODY resistance training, Continue with dietary and behavioral recommendations outlined in bariatric manual, Continue to take recommended bariatric vitamins as directed, Goal protein intake of 60-80 grams per day, 5-10 servings of fruits and vegetables per day, 25-35 grams of dietary fiber per day, and 1147-8978 calories per day    Type I DM - monitors blood sugar through continuous glucometer and has smart insulin pumps. He continues to f/u with endocrinology closely and was recommended to start either with wegovy or zepbound. Endocrine discussed to reduce his insulin requirements while on GLP-1 injections. Reiterated plan of care with him    FARZANA - Was on cpap machine but improved after bariatric surgery. Continue to monitor for now. Encouraged continuing weight loss to prevent reoccurrence of sleep apnea.     HTN - on combination HCTZ/lisinopril. Continue to monitor BP at home. F/U with PCP as scheduled.     Bariatric surgery status - follows with our surgical team annually. Continue to f/u as schedule.     HLD - on crestor 5 mg with improvement of lipid profile last year. Will repeat. Anticipate further improvement and possible d/c of crestor with further weight loss.     Diagnoses and all orders for this visit:    Encounter for surgical aftercare following surgery of digestive system    Bariatric surgery status    Obesity, Class I, BMI 30-34.9    BMI 33.0-33.9,adult    FARZANA (obstructive sleep apnea)    Primary hypertension    Type 1 diabetes mellitus with diabetic neuropathy (HCC)    HLD (hyperlipidemia)        Subjective:   Chief Complaint   Patient presents with    Follow-up     PO SUPPORT Weight Gain     Patient ID: Barry Roland  is a  59 y.o. male with excess weight/obesity here to pursue medical weight management. -s/p Yeimi-En-Y Gastric Bypass with Dr. Bam Antonio on 04/24/2021 Here for MWM consult - interested in wegovy/zepbound. Ok from endocrine standpoint.   Past Medical History:   Diagnosis Date    Allergic     Seasonal    Anemia     Axillary adenopathy 08/15/2022    Closed fracture of one rib of left side with routine healing 08/29/2022    Colon polyp     Diabetes mellitus (HCC)     Disease of thyroid gland     GERD (gastroesophageal reflux disease)     Hyperlipidemia     Hypertension     Insomnia     Kidney stone     Lymphadenopathy of head and neck 08/15/2022    Sleep apnea     Mild sleep apnea    Type 1 diabetes mellitus (HCC)     Uses Insulin Pump    Vitamin D deficiency        HPI:  Obesity/Excess Weight:   BMI 33.15  Severity: Moderate  Onset:  lifelong     Modifiers: Diet and Exercise and bariatric surgery  Contributing factors: Medications - believes this is related to insulin use  Associated symptoms: comorbid conditions, fatigue, increased joint pain, body image issues, decreased self esteem, increased shortness of breath, depression, and clothes do not fit  Colonoscopy-Completed    Highest weight  270 lbs   Current Weight 224.5 LBS  Dexter 199.5 LBS  Goal - 180 lbs  5% weight loss - 11.2 LBS (213.3 lbs)  15% WEIGHT LOSS 33.7 lbs (190.8 lbs)  20% weight loss - 44.9 lbs (179.6 lbs)      Hydration: >64 oz of water per day  Alcohol:  rarely   Exercise: WALKING 3-4 MILES PER DAY.   Dining out: 3-4 days a week due to work but tries to pick healthier options when he does pick his meals.   Occupation: manage career [employer]  Sleep: 6 hour  STOPBANG: has a hx of FARZANA    B: EGG and cheese omelet  S: Banana but sometimes no snack  L: salad with a type of protein  S: none, might have a protein shake  D: protein and starch  S: Small bag of doritos.       Patient is not pregnant/breastfeeding (metformin only)  Patient denies personal and family  history of  pancreatitis, thyroid cancer, MEN-2 tumors. (Consideration for GLP-1 Agonists)  Has a hx of kidney stones. Denies any hx of glaucoma, seizures, gallstones. (Contraindicated for topamax)  Has a hx of HTN - would caution use of phentermine. Denies Hx of CAD, PAD, palpitations, arrhythmia hyperthyroidism or use of stimulant medications   Denies uncontrolled anxiety or depression, suicidal behavior or thinking , insomnia or sleep disturbance.  Takes ambien for sleep which is effective.   Denies use of opioids.   Can consider wellbutrin and naltrexone combination to mimic contrave.       The following portions of the patient's history were reviewed and updated as appropriate: allergies, current medications, past family history, past medical history, past social history, past surgical history, and problem list.    Past Medical History:   Diagnosis Date    Allergic     Seasonal    Anemia     Axillary adenopathy 08/15/2022    Closed fracture of one rib of left side with routine healing 08/29/2022    Colon polyp     Diabetes mellitus (HCC)     Disease of thyroid gland     GERD (gastroesophageal reflux disease)     Hyperlipidemia     Hypertension     Insomnia     Kidney stone     Lymphadenopathy of head and neck 08/15/2022    Sleep apnea     Mild sleep apnea    Type 1 diabetes mellitus (HCC)     Uses Insulin Pump    Vitamin D deficiency      Past Surgical History:   Procedure Laterality Date    BARIATRIC SURGERY      CARPAL TUNNEL RELEASE Right     COLONOSCOPY      EGD      FL RETROGRADE PYELOGRAM  4/24/2020    LASIK      NC CYSTO/URETERO W/LITHOTRIPSY &INDWELL STENT INSRT Right 4/24/2020    Procedure: CYSTOSCOPY URETEROSCOPY WITH LITHOTRIPSY HOLMIUM LASER, RETROGRADE PYELOGRAM AND INSERTION STENT URETERAL;  Surgeon: Shabbir Ireland MD;  [facility];  Service: Urology    NC LAPS GSTR RSTCV PX W/BYP GABBY-EN-Y LIMB <150 CM N/A 4/26/2021    Procedure: BYPASS GASTRIC GABBY-EN-Y LAPAROSCOPIC W ROBOTICS AND  INTRAOPERATIVE EGD;  Surgeon: Danny Antonio MD;  Location: [facility];  Service: Bariatrics    ROTATOR CUFF REPAIR Right     UPPER GASTROINTESTINAL ENDOSCOPY         Current Outpatient Medications:     Blood Glucose Monitoring Suppl (ONE TOUCH ULTRA 2) w/Device KIT, Use  , Disp: , Rfl:     Calcium Citrate (JORDY-CITRATE PO), Take 1,000 tablets by mouth 2 (two) times a day, Disp: , Rfl:     Continuous Glucose Sensor (Dexcom G7 Sensor), Use 1 Device every 10 days, Disp: 9 each, Rfl: 1    fluticasone (FLONASE) 50 mcg/act nasal spray, 1 spray into each nostril daily, Disp: , Rfl:     Glucagon (Baqsimi Two Pack) 3 MG/DOSE POWD, Use one dose as needed in case of severe hypoglycemia ( nasal spray), Disp: 1 each, Rfl: 3    hydrocortisone (ANUSOL-HC) 2.5 % rectal cream, Apply topically 2 (two) times a day, Disp: 28 g, Rfl: 0    hydrocortisone (ANUSOL-HC) 25 mg suppository, Insert 1 suppository (25 mg total) into the rectum 2 (two) times a day, Disp: 12 suppository, Rfl: 0    Insulin Aspart (NovoLOG) 100 units/mL injection, USE UP TO 78 UNITS DAILY VIA INSULIN PUMP, Disp: 70 mL, Rfl: 1    Insulin Glargine Solostar (Basaglar KwikPen) 100 UNIT/ML SOPN, Inject 30 units in case of pump failure ( incase of emergency), Disp: 15 mL, Rfl: 0    Insulin Glargine-yfgn 100 UNIT/ML SOPN, INJECT 30 UNITS IN CASE OF PUMP FAILURE ( INCASE OF EMERGENCY), Disp: , Rfl:     Insulin Pen Needle (BD PEN NEEDLE LYRIC U/F) 32G X 4 MM MISC, by Does not apply route daily Use once daily, Disp: 100 each, Rfl: 2    levothyroxine (Synthroid) 200 mcg tablet, Take 1 tablet daily Mon-Sat and 1.5 tablets on Sunday., Disp: 105 tablet, Rfl: 2    loratadine (CLARITIN) 10 mg tablet, Take 1 tablet (10 mg total) by mouth daily, Disp: 90 tablet, Rfl: 1    Multiple Vitamins-Minerals (Bariatric Fusion) CHEW, Chew  , Disp: , Rfl:     Probiotic Product (PROBIOTIC DAILY PO), Take 1 tablet by mouth daily , Disp: , Rfl:     rosuvastatin (CRESTOR) 5 mg tablet, Take 1 tablet  (5 mg total) by mouth daily, Disp: 100 tablet, Rfl: 1    TURMERIC PO, Take 1,400 capsules by mouth 2 (two) times a day, Disp: , Rfl:     vitamin B-12 (VITAMIN B-12) 1,000 mcg tablet, Take 1,000 mcg by mouth 3 (three) times a week, Disp: , Rfl:     zolpidem (AMBIEN) 10 mg tablet, Take 1 tablet (10 mg total) by mouth daily at bedtime as needed for sleep, Disp: 30 tablet, Rfl: 0    Continuous Blood Gluc  (DEXCOM G6 ) KIYA, USE AS DIRECTED (Patient not taking: Reported on 12/23/2024), Disp: 1 Device, Rfl: 0    Continuous Blood Gluc Transmit (Dexcom G6 Transmitter) MISC, Inject 1 Device under the skin every 3 (three) months (Patient not taking: Reported on 12/23/2024), Disp: 1 each, Rfl: 3    psyllium (METAMUCIL) 0.52 g capsule, Take 0.52 g by mouth 3 (three) times a day Fiber capsules, Disp: , Rfl:     Semaglutide-Weight Management (WEGOVY) 0.25 MG/0.5ML, Inject 0.5 mL (0.25 mg total) under the skin once a week for 28 days (Patient not taking: Reported on 1/3/2025), Disp: 2 mL, Rfl: 0    [START ON 1/18/2025] Semaglutide-Weight Management (WEGOVY) 0.5 MG/0.5ML, Inject 0.5 mL (0.5 mg total) under the skin once a week for 28 days Do not start before January 18, 2025. (Patient not taking: Reported on 1/3/2025 Do not start before January 18, 2025.), Disp: 2 mL, Rfl: 0    [START ON 2/15/2025] Semaglutide-Weight Management (WEGOVY) 1 MG/0.5ML, Inject 0.5 mL (1 mg total) under the skin once a week for 28 days Do not start before February 15, 2025. (Patient not taking: Reported on 1/3/2025 Do not start before February 15, 2025.), Disp: 2 mL, Rfl: 0    [START ON 3/15/2025] Semaglutide-Weight Management (WEGOVY) 1.7 MG/0.75ML, Inject 0.75 mL (1.7 mg total) under the skin once a week for 28 days Do not start before March 15, 2025. (Patient not taking: Reported on 1/3/2025 Do not start before March 15, 2025.), Disp: 3 mL, Rfl: 0    [START ON 4/12/2025] Semaglutide-Weight Management (WEGOVY) 2.4 MG/0.75ML, Inject 0.75  "mL (2.4 mg total) under the skin once a week for 28 days Do not start before April 12, 2025. (Patient not taking: Reported on 1/3/2025 Do not start before April 12, 2025.), Disp: 3 mL, Rfl: 0    Review of Systems   Constitutional:  Positive for unexpected weight change.   Respiratory: Negative.     Cardiovascular: Negative.    Gastrointestinal: Negative.    Musculoskeletal: Negative.    Neurological: Negative.    Psychiatric/Behavioral: Negative.         Objective:    /76 (BP Location: Left arm, Patient Position: Sitting, Cuff Size: Adult)   Pulse 74   Temp 98.8 °F (37.1 °C) (Tympanic)   Ht 5' 9\" (1.753 m)   Wt 102 kg (224 lb 8 oz)   SpO2 99%   BMI 33.15 kg/m²     Physical Exam  Vitals and nursing note reviewed.   Constitutional:       Appearance: Normal appearance. He is obese.   Cardiovascular:      Rate and Rhythm: Normal rate and regular rhythm.      Pulses: Normal pulses.      Heart sounds: Normal heart sounds.   Pulmonary:      Effort: Pulmonary effort is normal.      Breath sounds: Normal breath sounds.   Abdominal:      General: Bowel sounds are normal.      Palpations: Abdomen is soft.      Tenderness: There is no abdominal tenderness.   Musculoskeletal:         General: Normal range of motion.   Skin:     General: Skin is warm and dry.   Neurological:      General: No focal deficit present.      Mental Status: He is alert and oriented to person, place, and time. Mental status is at baseline.   Psychiatric:         Mood and Affect: Mood normal.         Behavior: Behavior normal.         Thought Content: Thought content normal.         Judgment: Judgment normal.         "

## 2025-01-03 NOTE — PROGRESS NOTES
Date of surgery: 4/26/2021  Procedure: RNY   Performing surgeon: Dr. Bam Antonio     Initial Weight -  266.0 lb   Current Weight - 224.5 lb   Dexter Weight -  199.5 lb   Total Body Weight Loss (EWL)-  41.5  EWL% - 43%  TWB % - 16%

## 2025-01-03 NOTE — LETTER
Zepbound - you might need to get a denial from [employer] if [employer] doesn't cover and try to go through secondary insurance.     ANN Dean

## 2025-01-07 ENCOUNTER — TELEPHONE (OUTPATIENT)
Dept: BARIATRICS | Facility: CLINIC | Age: 60
End: 2025-01-07

## 2025-01-07 NOTE — TELEPHONE ENCOUNTER
PA for Zepbound 2.6 mg  [facility]    Reason:(Screenshot if applicable)        Message sent to office clinical pool Yes    Denial letter scanned into Media Yes    Appeal started No/ (Provider will need to decide if appeal is warranted and send clinical documentation to Prior Authorization Team for initiation.)    **Please follow up with your patient regarding denial and next steps**      Denied on January 6 by [facility]  Request Reference Number: [id number]. ZEPBOUND INJ 2.5MG is denied for not meeting the prior authorization requirement(s). Details of this decision are in the notice attached below or have been faxed to you..    Message from [facility]: Drug is not covered by plan .

## 2025-01-13 DIAGNOSIS — E66.811 OBESITY, CLASS I, BMI 30-34.9: Primary | ICD-10-CM

## 2025-01-13 DIAGNOSIS — E78.5 HLD (HYPERLIPIDEMIA): ICD-10-CM

## 2025-01-13 DIAGNOSIS — G47.33 OSA (OBSTRUCTIVE SLEEP APNEA): ICD-10-CM

## 2025-01-13 DIAGNOSIS — I10 PRIMARY HYPERTENSION: ICD-10-CM

## 2025-01-13 RX ORDER — BUPROPION HYDROCHLORIDE 150 MG/1
150 TABLET, EXTENDED RELEASE ORAL 2 TIMES DAILY
Qty: 60 TABLET | Refills: 2 | Status: SHIPPED | OUTPATIENT
Start: 2025-01-13

## 2025-01-13 NOTE — PROGRESS NOTES
Zepbound not covered under insurance drug coverage plan. Discussed to possibly start on wellbutrin with eventual addition of naltrexone. Instructed to take one tablet once daily for the first week and increase to twice daily after if tolerating well. Follow up as scheduled.

## 2025-01-18 DIAGNOSIS — E10.65 TYPE 1 DIABETES MELLITUS WITH HYPERGLYCEMIA, WITH LONG-TERM CURRENT USE OF INSULIN (HCC): ICD-10-CM

## 2025-01-20 RX ORDER — INSULIN GLARGINE-YFGN 100 [IU]/ML
INJECTION, SOLUTION SUBCUTANEOUS
Qty: 15 ML | Refills: 1 | Status: SHIPPED | OUTPATIENT
Start: 2025-01-20

## 2025-01-27 NOTE — PROGRESS NOTES
Colon and Rectal Surgery   Barry Roland 59 y.o. male MRN [id number]  Encounter: [id number]  01/30/25 9:03 AM            Assessment: Barry Roland is a 59 y.o. male who has rectal bleeding.      Plan:   Bleeding hemorrhoid  The patient has a history of rectal bleeding from hemorrhoids.  This has persisted despite his efforts with high-fiber intake and avoidance of wipe trauma.  Examination shows large internal hemorrhoids with excoriation and prolapse.  The external hemorrhoids are relatively unremarkable.    We discussed the options for therapy.  Nonsurgical management seems to have failed.  Rubber banding would probably fail due to the large size and circumferential nature of the large engorged hemorrhoids.  I think that transanal hemorrhoidal pexy with dearterialization is the more likely procedure to benefit him permanently.  We discussed the procedure and he agreed to THD performance.  Risks, not limited to infection, bleeding, pain, persistent disease, need for future surgery, fecal incontinence, urinary retention, or nonhealing wounds were reviewed at length.  Questions were answered.      Hemorrhoidectomy seems to be unnecessary.    He has had colonoscopy recently.      Subjective     HPI    Barry Roland is a 59 y.o. male who is referred today by Dr. Bethany Harding for bleeding hemorrhoids.     The patient notes anal lump/s , causing occasional itching, burning and pain, as well as worsening bright red rectal bleeding in the toilet bowl and tissue paper. Having 1 bowel movement per day; taking Metamucil.     History of gastric bypass surgery.     Last colonoscopy performed on 12/16/2024 by Dr. Harding, showed: Indication: Constipation, BRBPR.  Impression:   1) Subcentimeter polyp in the proximal ascending colon; performed cold snare.  2) Couple of scattered diverticula seen at the hepatic flexure and sigmoid colon.  3) Internal hemorrhoids.  4) The cecum appeared normal.  Recommendations: 7 year recall.  Anusol HC suppositories as needed; refer to colorectal surgery.     Colonoscopy pathology:  A. Polyp, Colorectal, Cold snare polypectomy ascending:      - Tubular adenoma      - No high-grade dysplasia and no evidence of malignancy     Family history of colon cancer in father and brother.     Historical Information   Past Medical History:   Diagnosis Date    Allergic     Seasonal    Anemia     Axillary adenopathy 08/15/2022    Closed fracture of one rib of left side with routine healing 08/29/2022    Colon polyp     Diabetes mellitus (HCC)     Disease of thyroid gland     GERD (gastroesophageal reflux disease)     Hyperlipidemia     Hypertension     Insomnia     Kidney stone     Lymphadenopathy of head and neck 08/15/2022    Sleep apnea     Mild sleep apnea    Type 1 diabetes mellitus (HCC)     Uses Insulin Pump    Vitamin D deficiency      Past Surgical History:   Procedure Laterality Date    BARIATRIC SURGERY      CARPAL TUNNEL RELEASE Right     COLONOSCOPY      EGD      FL RETROGRADE PYELOGRAM  4/24/2020    LASIK      ND CYSTO/URETERO W/LITHOTRIPSY &INDWELL STENT INSRT Right 4/24/2020    Procedure: CYSTOSCOPY URETEROSCOPY WITH LITHOTRIPSY HOLMIUM LASER, RETROGRADE PYELOGRAM AND INSERTION STENT URETERAL;  Surgeon: Shabbir Ireland MD;  [facility];  Service: Urology    ND LAPS GSTR RSTCV PX W/BYP GABBY-EN-Y LIMB <150 CM N/A 4/26/2021    Procedure: BYPASS GASTRIC GABBY-EN-Y LAPAROSCOPIC W ROBOTICS AND INTRAOPERATIVE EGD;  Surgeon: Danny Antonio MD;  Location[facility];  Service: Bariatrics    ROTATOR CUFF REPAIR Right     UPPER GASTROINTESTINAL ENDOSCOPY         Meds/Allergies       Current Outpatient Medications:     Blood Glucose Monitoring Suppl (ONE TOUCH ULTRA 2) w/Device KIT, Use  , Disp: , Rfl:     buPROPion (Wellbutrin SR) 150 mg 12 hr tablet, Take 1 tablet (150 mg total) by mouth 2 (two) times a day, Disp: 60 tablet, Rfl: 2    Calcium Citrate (JORDY-CITRATE PO), Take 1,000 tablets by mouth 2  (two) times a day, Disp: , Rfl:     Continuous Glucose Sensor (Dexcom G7 Sensor), Use 1 Device every 10 days, Disp: 9 each, Rfl: 1    fluticasone (FLONASE) 50 mcg/act nasal spray, 1 spray into each nostril daily, Disp: , Rfl:     Insulin Aspart (NovoLOG) 100 units/mL injection, USE UP TO 78 UNITS DAILY VIA INSULIN PUMP, Disp: 70 mL, Rfl: 1    Insulin Glargine-yfgn 100 UNIT/ML SOPN, INJECT 30 UNITS IN CASE OF PUMP FAILURE ( INCASE OF EMERGENCY), Disp: , Rfl:     Insulin Glargine-yfgn 100 UNIT/ML SOPN, INJECT 30 UNITS IN CASE OF PUMP FAILURE ( INCASE OF EMERGENCY), Disp: 15 mL, Rfl: 1    Insulin Pen Needle (BD PEN NEEDLE LYRIC U/F) 32G X 4 MM MISC, by Does not apply route daily Use once daily, Disp: 100 each, Rfl: 2    levothyroxine (Synthroid) 200 mcg tablet, Take 1 tablet daily Mon-Sat and 1.5 tablets on Sunday., Disp: 105 tablet, Rfl: 2    loratadine (CLARITIN) 10 mg tablet, Take 1 tablet (10 mg total) by mouth daily, Disp: 90 tablet, Rfl: 1    Multiple Vitamins-Minerals (Bariatric Fusion) CHEW, Chew  , Disp: , Rfl:     Probiotic Product (PROBIOTIC DAILY PO), Take 1 tablet by mouth daily , Disp: , Rfl:     psyllium (METAMUCIL) 0.52 g capsule, Take 0.52 g by mouth 3 (three) times a day Fiber capsules, Disp: , Rfl:     rosuvastatin (CRESTOR) 5 mg tablet, Take 1 tablet (5 mg total) by mouth daily, Disp: 100 tablet, Rfl: 1    TURMERIC PO, Take 1,400 capsules by mouth 2 (two) times a day, Disp: , Rfl:     vitamin B-12 (VITAMIN B-12) 1,000 mcg tablet, Take 1,000 mcg by mouth 3 (three) times a week, Disp: , Rfl:     zolpidem (AMBIEN) 10 mg tablet, Take 1 tablet (10 mg total) by mouth daily at bedtime as needed for sleep, Disp: 30 tablet, Rfl: 0    Continuous Blood Gluc  (DEXCOM G6 ) KIYA, USE AS DIRECTED (Patient not taking: Reported on 12/23/2024), Disp: 1 Device, Rfl: 0    Continuous Blood Gluc Transmit (Dexcom G6 Transmitter) MISC, Inject 1 Device under the skin every 3 (three) months (Patient not  taking: Reported on 2024), Disp: 1 each, Rfl: 3    Glucagon (Baqsimi Two Pack) 3 MG/DOSE POWD, Use one dose as needed in case of severe hypoglycemia ( nasal spray) (Patient not taking: Reported on 2025), Disp: 1 each, Rfl: 3    hydrocortisone (ANUSOL-HC) 2.5 % rectal cream, Apply topically 2 (two) times a day (Patient not taking: Reported on 2025), Disp: 28 g, Rfl: 0    hydrocortisone (ANUSOL-HC) 25 mg suppository, Insert 1 suppository (25 mg total) into the rectum 2 (two) times a day (Patient not taking: Reported on 2025), Disp: 12 suppository, Rfl: 0  Allergies   Allergen Reactions    Ibuprofen GI Intolerance and Nausea Only    Other Other (See Comments)     Seasonal allergies- pt has congestion       Social History   Social History     Substance and Sexual Activity   Drug Use No     Social History     Tobacco Use   Smoking Status Former    Current packs/day: 0.00    Average packs/day: 1 pack/day for 30.0 years (30.0 ttl pk-yrs)    Types: Cigarettes    Start date:     Quit date: 5/15/2015    Years since quittin.7   Smokeless Tobacco Never   Tobacco Comments    quit in          Family History   Problem Relation Age of Onset    Thyroid disease unspecified Mother     Osteoporosis Mother     Arthritis Mother     Dementia Mother     Hearing loss Mother     Thyroid disease Mother     Hypertension Father     Colon cancer Father 68    Cancer Father     Diabetes type II Sister     Hypertension Sister     Hyperlipidemia Sister     Thyroid disease unspecified Sister     Stroke Sister     Diabetes Sister     Thyroid disease Sister     Diabetes type II Brother     Hypertension Brother     Hyperlipidemia Brother     Colon cancer Brother 60         Review of Systems   Constitutional: Negative.    Respiratory: Negative.     Cardiovascular: Negative.    Gastrointestinal:  Positive for anal bleeding and rectal pain.       Objective   Current Vitals:  Vitals:    25 0831   BP: 126/78   Weight:  "101 kg (223 lb)   Height: 5' 9\" (1.753 m)         Physical Exam  Constitutional:       Appearance: Normal appearance.   Cardiovascular:      Rate and Rhythm: Normal rate and regular rhythm.   Pulmonary:      Effort: Pulmonary effort is normal.      Breath sounds: Normal breath sounds.   Genitourinary:     Prostate: Normal.      Comments: Digital exam shows redundant and thick internal hemorrhoids.  Anoscopy shows these hemorrhoids to have excoriation, engorgement, and prolapse to the distal anal canal which reduces spontaneously.  Too large to hope for a good result from rubber banding.  Neurological:      General: No focal deficit present.      Mental Status: He is alert and oriented to person, place, and time.     Lower Endoscopy    Date/Time: 1/30/2025 8:40 AM    Performed by: JULIANNA Jung MD  Authorized by: JULIANNA Jung MD    Verbal consent obtained?: Yes    Consent given by:  Patient  Scope type:  Anoscope   Digital exam shows redundant and thick internal hemorrhoids.  Anoscopy shows these hemorrhoids to have excoriation, engorgement, and prolapse to the distal anal canal which reduces spontaneously.  Too large to hope for a good result from rubber banding.              "

## 2025-01-29 DIAGNOSIS — G47.09 OTHER INSOMNIA: ICD-10-CM

## 2025-01-29 RX ORDER — ZOLPIDEM TARTRATE 10 MG/1
10 TABLET ORAL
Qty: 30 TABLET | Refills: 0 | Status: SHIPPED | OUTPATIENT
Start: 2025-01-29

## 2025-01-30 ENCOUNTER — TELEPHONE (OUTPATIENT)
Age: 60
End: 2025-01-30

## 2025-01-30 ENCOUNTER — OFFICE VISIT (OUTPATIENT)
Age: 60
End: 2025-01-30
Payer: COMMERCIAL

## 2025-01-30 VITALS
SYSTOLIC BLOOD PRESSURE: 126 MMHG | BODY MASS INDEX: 33.03 KG/M2 | HEIGHT: 69 IN | DIASTOLIC BLOOD PRESSURE: 78 MMHG | WEIGHT: 223 LBS

## 2025-01-30 DIAGNOSIS — K64.9 BLEEDING HEMORRHOID: ICD-10-CM

## 2025-01-30 PROCEDURE — 99204 OFFICE O/P NEW MOD 45 MIN: CPT | Performed by: COLON & RECTAL SURGERY

## 2025-01-30 PROCEDURE — 46600 DIAGNOSTIC ANOSCOPY SPX: CPT | Performed by: COLON & RECTAL SURGERY

## 2025-01-30 NOTE — LETTER
Barry Roland  [address] [place] [address]        2/7/2025    Dear Barry Roland,    Your surgery is scheduled for: 3/17/2025  The hospital will call the evening prior to your surgery with your expected arrival time.   Location:[facility] [address] [facility] [address]    CHECK LIST PRIOR TO OUTPATIENT SURGERY  It is your responsibility to obtain any/all referrals needed for your surgery if required by your insurance. Our office will contact you to discuss your insurance coverage for this procedure.     Special instructions required if you are taking any blood thinners. Please verify with the prescribing physician. Examples include Coumadin, Plavix, Xarelto, Eliquis, Pradaxa, etc.     Please check with your family physician if you are taking the following medications: Aspirin or any Aspirin containing medication, Gingko biloba, Ginseng, Feverfew, and/or Charito’s Wort. We suggest stopping these for 3 days.     The night before and the day of your surgery, wash from your neck to groin with chlorhexidine soap. This soap is available at most retail pharmacies under such brand names as Hibiclens, Endure or Aplicare.     Pre-admission testing Required: YES      NO X     Other Clearances/ Additional Testing: NONE     NOTHING TO EAT OR DRINK AFTER MIDNIGHT PRIOR TO SURGERY.     Take ONE FLEET ENEMA one hour prior to leaving for the hospital.     Please do not hesitate to call our office with any questions regarding your surgery.                      Medicine Instructions for Adults with Diabetes (NO Bowel Prep)      Follow these instructions when a BOWEL PREP is NOT required for your procedure or surgery!    NOTE:  GLP-1 Agonists taken weekly: do not take in the 7 days before your procedure  SGLT-2 Inhibitors: do not take in the 4 days before your procedure    On the Day Before Surgery/Procedure  If you are having a procedure (e.g. Colonoscopy) or surgery which DOES NOT  require a bowel prep, follow the directions below based on the type of medicine you take for your diabetes.    Type of Medicine You Take Examples What to Do   Pre-Mixed Insulin - Intermediate Acting Humalog® 75/25, Humulin® 70/30, Novolog® 70/30, Regular Insulin Take ½ your regular dose the evening before your procedure.   Rapid/Fast Acting Insulin/Long-Acting Insulin Humalog® U200, NovoLog®, Apidra®, Lantus®, Levemir®, Tresiba®, Toujeo®, Fiasp®, Basaglar® Take your FULL regular dose the day before procedure.   Oral Diabetic Medicines (sulfonylurea) Glipizide/Glimepiride/Glucotrol® Take your regular dose with dinner the evening before your procedure.   Other Oral Diabetic Medicines   Metformin®, Glucophage®, Glucophage XR®, Riomet®, Glumetza®), Actose®, Avandia®, Glyset®, Prandin® Take your regular dose with dinner the evening before your procedure   GLP-1 Agonists Adlyxin®, Byetta®, Bydureon®, Ozempic®, Soliqua®, Tanzeum®, Trulicity®, Victoza®, Saxenda®, Rybelsus® If taken daily, take as normal    If taken weekly, do not take this medicine for 7 days before your procedure including the day of the procedure (resume taking after the procedure)   SGLT-2 Inhibitors Jardiance®, Invokana®, Farxiga®,   Steglatro®, Brenzavvy®, Qtern®, Segluromet®, Glyxambi®, Synjardy®, Synjardy XR®, Invokamet®, Invokamet XR®, Trijary XR®, Xigduo XR®, Steglujan® Do not take for 4 days before your procedure including the day of the procedure (resume taking after the procedure)                 More information continued on back                  Medicine Instructions for Adults with Diabetes (No Bowel Prep)   Page 2      On the Day of Surgery/Procedure  Follow the directions below based on the type of medicine you take for your diabetes.    Type of Medicine You Take Examples What to Do   Long-Acting Insulin Lantus®, Levemir®, Tresiba®, Toujeo®, Basaglar®, Semglee® If you normally take your Long-Acting Insulin in the morning, take the full dose  as scheduled.   GLP-1 Agonists AdlyxinÒ, ByettaÒ, BydureonÒ, OzempicÒ, SoliquaÒ, TanzeumÒ, TrulicityÒ, VictozaÒ, Saxenda®, Rybelsus® Do NOT take this medicine on the day of your procedure (resume taking after the procedure)       On the Day of Surgery/Procedure (continued)  Except for the morning Long-Acting Insulin, DO NOT take ANY diabetic medicine on the day of your procedure unless you were instructed by the doctor who manages your diabetes medicines.    Continue to check your blood sugars.  If you have an insulin pump, ask your endocrinologist for instructions at least 3 days before your procedure. NOTE: If you are not able to ask your endocrinologist in advance, on the day of the procedure set your insulin pump to your basal rate only. Bring your insulin pump supplies to the hospital.     If you have any questions about taking your diabetes medicines prior to your procedure, please contact the doctor who manages your diabetes medicines.                                                 Post-Operative Care Information   Hemorrhoidectomy, EUA, Fissurectomy, Fistulotomy, Sphincterotomy      Resume high fiber diet. Fiber supplementation with Metamucil or Konsyl also recommended daily.       Your first bowel movement may take up to 3 days after surgery. THIS IS OFTEN PAINFUL.      While taking narcotic pain medication, you should remain on an over-the-counter stool softener such as Colace (one tablet twice daily). For constipation Miralax can be taken up to three times daily.     Pain is to be expected after hemorrhoid surgery. Ibuprofen (400? 600MG) every 6?8 hours is an excellent alternative in addition or as a substitute to your narcotic pain medication.     Rectal bleeding or drainage is normal after surgery.       Nausea is a common complaint post op. This can be associated with the narcotic pain medications, anesthesia as well as with severe constipation.     Driving may be resumed when all off all narcotic  pain medications and you can turn or twist your body without hesitation.    If you are unable to urinate within 8 hours after surgery, please call the office at [phone]    Frequent warm tub soaks or warm showers are often soothing, we suggest 3 to 4 times daily.    After bowel movements, you may wash with a hand shower or warm tub soak.  No soap is okay.     No strenuous activity (i.e., Running, jogging, swimming, or weightlifting) for up to one week after surgery.     When will I receive follow-up care?      You should be scheduled for a post-op appointment within 6-8 weeks after surgery, unless otherwise instructed on your discharge summary. If you do not have an existing appointment at the time of discharge, please call our office at [phone].    Call the office at [phone] immediately if you have a fever, chills, excessive sweating, difficulty urinating, or excessive/profuse bleeding with clots.

## 2025-01-30 NOTE — LETTER
Barry Roland  [address] [place] [address]        1/30/2025    Dear Barry Roland,    Your surgery is scheduled for: 3/7/2025  The hospital will call the evening prior to your surgery with your expected arrival time.   Location:[facility] [address] [facility] [address]    CHECK LIST PRIOR TO OUTPATIENT SURGERY  It is your responsibility to obtain any/all referrals needed for your surgery if required by your insurance. Our office will contact you to discuss your insurance coverage for this procedure.     Special instructions required if you are taking any blood thinners. Please verify with the prescribing physician. Examples include Coumadin, Plavix, Xarelto, Eliquis, Pradaxa, etc.     Please check with your family physician if you are taking the following medications: Aspirin or any Aspirin containing medication, Gingko biloba, Ginseng, Feverfew, and/or Charito’s Wort. We suggest stopping these for 3 days.     The night before and the day of your surgery, wash from your neck to groin with chlorhexidine soap. This soap is available at most retail pharmacies under such brand names as Hibiclens, Endure or Aplicare.     Pre-admission testing Required: YES      NO X        Other Clearances/ Additional Testing: NONE     NOTHING TO EAT OR DRINK AFTER MIDNIGHT PRIOR TO SURGERY.     Take ONE FLEET ENEMA one hour prior to leaving for the hospital.     Please do not hesitate to call our office with any questions regarding your surgery.                    Medicine Instructions for Adults with Diabetes (NO Bowel Prep)      Follow these instructions when a BOWEL PREP is NOT required for your procedure or surgery!    NOTE:  GLP-1 Agonists taken weekly: do not take in the 7 days before your procedure  SGLT-2 Inhibitors: do not take in the 4 days before your procedure    On the Day Before Surgery/Procedure  If you are having a procedure (e.g. Colonoscopy) or surgery which DOES NOT  require a bowel prep, follow the directions below based on the type of medicine you take for your diabetes.    Type of Medicine You Take Examples What to Do   Pre-Mixed Insulin - Intermediate Acting Humalog® 75/25, Humulin® 70/30, Novolog® 70/30, Regular Insulin Take ½ your regular dose the evening before your procedure.   Rapid/Fast Acting Insulin/Long-Acting Insulin Humalog® U200, NovoLog®, Apidra®, Lantus®, Levemir®, Tresiba®, Toujeo®, Fiasp®, Basaglar® Take your FULL regular dose the day before procedure.   Oral Diabetic Medicines (sulfonylurea) Glipizide/Glimepiride/Glucotrol® Take your regular dose with dinner the evening before your procedure.   Other Oral Diabetic Medicines   Metformin®, Glucophage®, Glucophage XR®, Riomet®, Glumetza®), Actose®, Avandia®, Glyset®, Prandin® Take your regular dose with dinner the evening before your procedure   GLP-1 Agonists Adlyxin®, Byetta®, Bydureon®, Ozempic®, Soliqua®, Tanzeum®, Trulicity®, Victoza®, Saxenda®, Rybelsus® If taken daily, take as normal    If taken weekly, do not take this medicine for 7 days before your procedure including the day of the procedure (resume taking after the procedure)   SGLT-2 Inhibitors Jardiance®, Invokana®, Farxiga®,   Steglatro®, Brenzavvy®, Qtern®, Segluromet®, Glyxambi®, Synjardy®, Synjardy XR®, Invokamet®, Invokamet XR®, Trijary XR®, Xigduo XR®, Steglujan® Do not take for 4 days before your procedure including the day of the procedure (resume taking after the procedure)                 More information continued on back                  Medicine Instructions for Adults with Diabetes (No Bowel Prep)   Page 2      On the Day of Surgery/Procedure  Follow the directions below based on the type of medicine you take for your diabetes.    Type of Medicine You Take Examples What to Do   Long-Acting Insulin Lantus®, Levemir®, Tresiba®, Toujeo®, Basaglar®, Semglee® If you normally take your Long-Acting Insulin in the morning, take the full dose  as scheduled.   GLP-1 Agonists AdlyxinÒ, ByettaÒ, BydureonÒ, OzempicÒ, SoliquaÒ, TanzeumÒ, TrulicityÒ, VictozaÒ, Saxenda®, Rybelsus® Do NOT take this medicine on the day of your procedure (resume taking after the procedure)       On the Day of Surgery/Procedure (continued)  Except for the morning Long-Acting Insulin, DO NOT take ANY diabetic medicine on the day of your procedure unless you were instructed by the doctor who manages your diabetes medicines.    Continue to check your blood sugars.  If you have an insulin pump, ask your endocrinologist for instructions at least 3 days before your procedure. NOTE: If you are not able to ask your endocrinologist in advance, on the day of the procedure set your insulin pump to your basal rate only. Bring your insulin pump supplies to the hospital.     If you have any questions about taking your diabetes medicines prior to your procedure, please contact the doctor who manages your diabetes medicines.                                                 Post-Operative Care Information   Hemorrhoidectomy, EUA, Fissurectomy, Fistulotomy, Sphincterotomy      Resume high fiber diet. Fiber supplementation with Metamucil or Konsyl also recommended daily.       Your first bowel movement may take up to 3 days after surgery. THIS IS OFTEN PAINFUL.      While taking narcotic pain medication, you should remain on an over-the-counter stool softener such as Colace (one tablet twice daily). For constipation Miralax can be taken up to three times daily.     Pain is to be expected after hemorrhoid surgery. Ibuprofen (400? 600MG) every 6?8 hours is an excellent alternative in addition or as a substitute to your narcotic pain medication.     Rectal bleeding or drainage is normal after surgery.       Nausea is a common complaint post op. This can be associated with the narcotic pain medications, anesthesia as well as with severe constipation.     Driving may be resumed when all off all narcotic  pain medications and you can turn or twist your body without hesitation.    If you are unable to urinate within 8 hours after surgery, please call the office at [phone]    Frequent warm tub soaks or warm showers are often soothing, we suggest 3 to 4 times daily.    After bowel movements, you may wash with a hand shower or warm tub soak.  No soap is okay.     No strenuous activity (i.e., Running, jogging, swimming, or weightlifting) for up to one week after surgery.     When will I receive follow-up care?      You should be scheduled for a post-op appointment within 6-8 weeks after surgery, unless otherwise instructed on your discharge summary. If you do not have an existing appointment at the time of discharge, please call our office at [phone].    Call the office at [phone] immediately if you have a fever, chills, excessive sweating, difficulty urinating, or excessive/profuse bleeding with clots.

## 2025-01-30 NOTE — ASSESSMENT & PLAN NOTE
The patient has a history of rectal bleeding from hemorrhoids.  This has persisted despite his efforts with high-fiber intake and avoidance of wipe trauma.  Examination shows large internal hemorrhoids with excoriation and prolapse.  The external hemorrhoids are relatively unremarkable.    We discussed the options for therapy.  Nonsurgical management seems to have failed.  Rubber banding would probably fail due to the large size and circumferential nature of the large engorged hemorrhoids.  I think that transanal hemorrhoidal pexy with dearterialization is the more likely procedure to benefit him permanently.  We discussed the procedure and he agreed to THD performance.  Risks, not limited to infection, bleeding, pain, persistent disease, need for future surgery, fecal incontinence, urinary retention, or nonhealing wounds were reviewed at length.  Questions were answered.      Hemorrhoidectomy seems to be unnecessary.    He has had colonoscopy recently.

## 2025-01-30 NOTE — TELEPHONE ENCOUNTER
Patient scheduled for surgery  3/7/2025  at [facility]. Instructions and PAT's gone over with and handed to the patient.   Post Op scheduled.

## 2025-02-04 NOTE — TELEPHONE ENCOUNTER
Left patient message to reschedule surgery date to 3/17/25 due to change in provider's schedule. Advised to return my call to confirm new surgery date.

## 2025-02-06 DIAGNOSIS — E66.811 OBESITY, CLASS I, BMI 30-34.9: ICD-10-CM

## 2025-02-06 DIAGNOSIS — E78.5 HLD (HYPERLIPIDEMIA): ICD-10-CM

## 2025-02-06 DIAGNOSIS — E66.811 OBESITY, CLASS I, BMI 30-34.9: Primary | ICD-10-CM

## 2025-02-06 DIAGNOSIS — G47.33 OSA (OBSTRUCTIVE SLEEP APNEA): ICD-10-CM

## 2025-02-06 DIAGNOSIS — I10 PRIMARY HYPERTENSION: ICD-10-CM

## 2025-02-06 RX ORDER — TIRZEPATIDE 2.5 MG/.5ML
2.5 INJECTION, SOLUTION SUBCUTANEOUS WEEKLY
Qty: 2 ML | Refills: 2 | Status: SHIPPED | OUTPATIENT
Start: 2025-02-06 | End: 2025-02-07 | Stop reason: RX

## 2025-02-07 RX ORDER — TIRZEPATIDE 2.5 MG/.5ML
INJECTION, SOLUTION SUBCUTANEOUS
Refills: 0 | OUTPATIENT
Start: 2025-02-07

## 2025-02-07 RX ORDER — TIRZEPATIDE 2.5 MG/.5ML
2.5 INJECTION, SOLUTION SUBCUTANEOUS WEEKLY
Qty: 2 ML | Refills: 2 | Status: SHIPPED | OUTPATIENT
Start: 2025-02-07 | End: 2025-02-10 | Stop reason: SDUPTHER

## 2025-02-07 NOTE — TELEPHONE ENCOUNTER
Surgery rescheduled to 3/17, [facility].

## 2025-02-10 ENCOUNTER — TELEPHONE (OUTPATIENT)
Age: 60
End: 2025-02-10

## 2025-02-10 DIAGNOSIS — E78.5 HLD (HYPERLIPIDEMIA): ICD-10-CM

## 2025-02-10 DIAGNOSIS — G47.33 OSA (OBSTRUCTIVE SLEEP APNEA): ICD-10-CM

## 2025-02-10 DIAGNOSIS — E66.811 OBESITY, CLASS I, BMI 30-34.9: ICD-10-CM

## 2025-02-10 DIAGNOSIS — I10 PRIMARY HYPERTENSION: ICD-10-CM

## 2025-02-10 RX ORDER — TIRZEPATIDE 2.5 MG/.5ML
2.5 INJECTION, SOLUTION SUBCUTANEOUS WEEKLY
Qty: 2 ML | Refills: 2 | Status: SHIPPED | OUTPATIENT
Start: 2025-02-10

## 2025-02-10 NOTE — TELEPHONE ENCOUNTER
PT called requesting to speak with Dianne.  He wanted me to put him on hold until I could locate her. Pt did not want to speak to me or tell me why he was calling, he only wanted t speak to Dianne. While putting him on hold we were disconnected by accident. I tried calling the number back he called from but I was not able to reach him.

## 2025-02-10 NOTE — PROGRESS NOTES
Called patient and left VM. Left voicemail stating his script was sent to "[facility] As of 02/06/25. Will contact [facility] to confirm prescription being process.

## 2025-02-20 DIAGNOSIS — E78.2 MIXED HYPERLIPIDEMIA: ICD-10-CM

## 2025-02-20 DIAGNOSIS — E10.40 TYPE 1 DIABETES MELLITUS WITH DIABETIC NEUROPATHY (HCC): ICD-10-CM

## 2025-02-20 DIAGNOSIS — G47.09 OTHER INSOMNIA: ICD-10-CM

## 2025-02-21 RX ORDER — ROSUVASTATIN CALCIUM 5 MG/1
5 TABLET, COATED ORAL DAILY
Qty: 30 TABLET | Refills: 0 | Status: SHIPPED | OUTPATIENT
Start: 2025-02-21

## 2025-02-25 RX ORDER — ZOLPIDEM TARTRATE 10 MG/1
10 TABLET ORAL
Qty: 30 TABLET | Refills: 0 | Status: SHIPPED | OUTPATIENT
Start: 2025-02-25

## 2025-03-03 ENCOUNTER — ANESTHESIA (OUTPATIENT)
Dept: ANESTHESIOLOGY | Facility: HOSPITAL | Age: 60
End: 2025-03-03

## 2025-03-03 ENCOUNTER — ANESTHESIA EVENT (OUTPATIENT)
Dept: ANESTHESIOLOGY | Facility: HOSPITAL | Age: 60
End: 2025-03-03

## 2025-03-12 ENCOUNTER — TELEPHONE (OUTPATIENT)
Age: 60
End: 2025-03-12

## 2025-03-12 NOTE — TELEPHONE ENCOUNTER
Patients GI provider:  Dr. Jung    Number to return call: [phone]    Reason for call: Pt calling to cancel surgery for 3/17/25. Pt states he does not wish to reschedule at this time.     Scheduled procedure/appointment date if applicable:

## 2025-03-21 DIAGNOSIS — E78.2 MIXED HYPERLIPIDEMIA: ICD-10-CM

## 2025-03-21 DIAGNOSIS — E10.40 TYPE 1 DIABETES MELLITUS WITH DIABETIC NEUROPATHY (HCC): ICD-10-CM

## 2025-03-21 RX ORDER — ROSUVASTATIN CALCIUM 5 MG/1
5 TABLET, COATED ORAL DAILY
Qty: 90 TABLET | Refills: 0 | Status: SHIPPED | OUTPATIENT
Start: 2025-03-21

## 2025-03-24 DIAGNOSIS — G47.09 OTHER INSOMNIA: ICD-10-CM

## 2025-03-25 RX ORDER — ZOLPIDEM TARTRATE 10 MG/1
10 TABLET ORAL
Qty: 30 TABLET | Refills: 0 | Status: SHIPPED | OUTPATIENT
Start: 2025-03-25

## 2025-03-31 ENCOUNTER — RESULTS FOLLOW-UP (OUTPATIENT)
Dept: BARIATRICS | Facility: CLINIC | Age: 60
End: 2025-03-31

## 2025-03-31 ENCOUNTER — OFFICE VISIT (OUTPATIENT)
Age: 60
End: 2025-03-31
Payer: COMMERCIAL

## 2025-03-31 ENCOUNTER — APPOINTMENT (OUTPATIENT)
Dept: LAB | Age: 60
End: 2025-03-31
Payer: COMMERCIAL

## 2025-03-31 ENCOUNTER — RESULTS FOLLOW-UP (OUTPATIENT)
Age: 60
End: 2025-03-31

## 2025-03-31 VITALS
BODY MASS INDEX: 32.58 KG/M2 | DIASTOLIC BLOOD PRESSURE: 82 MMHG | TEMPERATURE: 97.3 F | HEART RATE: 62 BPM | OXYGEN SATURATION: 97 % | SYSTOLIC BLOOD PRESSURE: 128 MMHG | HEIGHT: 69 IN | WEIGHT: 220 LBS

## 2025-03-31 DIAGNOSIS — E03.9 ACQUIRED HYPOTHYROIDISM: ICD-10-CM

## 2025-03-31 DIAGNOSIS — Z98.84 BARIATRIC SURGERY STATUS: ICD-10-CM

## 2025-03-31 DIAGNOSIS — R10.12 LUQ PAIN: Primary | ICD-10-CM

## 2025-03-31 DIAGNOSIS — R10.12 LUQ PAIN: ICD-10-CM

## 2025-03-31 DIAGNOSIS — Z72.0 TOBACCO ABUSE: ICD-10-CM

## 2025-03-31 DIAGNOSIS — E78.5 HLD (HYPERLIPIDEMIA): ICD-10-CM

## 2025-03-31 DIAGNOSIS — E61.1 IRON DEFICIENCY: ICD-10-CM

## 2025-03-31 DIAGNOSIS — E10.40 TYPE 1 DIABETES MELLITUS WITH DIABETIC NEUROPATHY (HCC): ICD-10-CM

## 2025-03-31 LAB
ALBUMIN SERPL BCG-MCNC: 4.2 G/DL (ref 3.5–5)
ALP SERPL-CCNC: 77 U/L (ref 34–104)
ALT SERPL W P-5'-P-CCNC: 16 U/L (ref 7–52)
AMYLASE SERPL-CCNC: 25 IU/L (ref 29–103)
ANION GAP SERPL CALCULATED.3IONS-SCNC: 10 MMOL/L (ref 4–13)
AST SERPL W P-5'-P-CCNC: 28 U/L (ref 13–39)
BILIRUB SERPL-MCNC: 0.53 MG/DL (ref 0.2–1)
BUN SERPL-MCNC: 16 MG/DL (ref 5–25)
CALCIUM SERPL-MCNC: 9.1 MG/DL (ref 8.4–10.2)
CHLORIDE SERPL-SCNC: 100 MMOL/L (ref 96–108)
CHOLEST SERPL-MCNC: 156 MG/DL (ref ?–200)
CO2 SERPL-SCNC: 26 MMOL/L (ref 21–32)
CREAT SERPL-MCNC: 0.94 MG/DL (ref 0.6–1.3)
ERYTHROCYTE [DISTWIDTH] IN BLOOD BY AUTOMATED COUNT: 12.8 % (ref 11.6–15.1)
EST. AVERAGE GLUCOSE BLD GHB EST-MCNC: 166 MG/DL
FERRITIN SERPL-MCNC: 9 NG/ML (ref 24–336)
GFR SERPL CREATININE-BSD FRML MDRD: 89 ML/MIN/1.73SQ M
GLUCOSE P FAST SERPL-MCNC: 335 MG/DL (ref 65–99)
HBA1C MFR BLD: 7.4 %
HCT VFR BLD AUTO: 42.5 % (ref 36.5–49.3)
HDLC SERPL-MCNC: 48 MG/DL
HGB BLD-MCNC: 14.3 G/DL (ref 12–17)
IRON SATN MFR SERPL: 25 % (ref 15–50)
IRON SERPL-MCNC: 96 UG/DL (ref 50–212)
LDLC SERPL CALC-MCNC: 84 MG/DL (ref 0–100)
LIPASE SERPL-CCNC: 7 U/L (ref 11–82)
MCH RBC QN AUTO: 30.7 PG (ref 26.8–34.3)
MCHC RBC AUTO-ENTMCNC: 33.6 G/DL (ref 31.4–37.4)
MCV RBC AUTO: 91 FL (ref 82–98)
NONHDLC SERPL-MCNC: 108 MG/DL
PLATELET # BLD AUTO: 253 THOUSANDS/UL (ref 149–390)
PMV BLD AUTO: 9.9 FL (ref 8.9–12.7)
POTASSIUM SERPL-SCNC: 4.7 MMOL/L (ref 3.5–5.3)
PROT SERPL-MCNC: 7 G/DL (ref 6.4–8.4)
RBC # BLD AUTO: 4.66 MILLION/UL (ref 3.88–5.62)
SODIUM SERPL-SCNC: 136 MMOL/L (ref 135–147)
T4 FREE SERPL-MCNC: 1.11 NG/DL (ref 0.61–1.12)
TIBC SERPL-MCNC: 389.2 UG/DL (ref 250–450)
TRANSFERRIN SERPL-MCNC: 278 MG/DL (ref 203–362)
TRIGL SERPL-MCNC: 121 MG/DL (ref ?–150)
TSH SERPL DL<=0.05 MIU/L-ACNC: 3.54 UIU/ML (ref 0.45–4.5)
UIBC SERPL-MCNC: 293 UG/DL (ref 155–355)
WBC # BLD AUTO: 6.78 THOUSAND/UL (ref 4.31–10.16)

## 2025-03-31 PROCEDURE — 82150 ASSAY OF AMYLASE: CPT

## 2025-03-31 PROCEDURE — 99213 OFFICE O/P EST LOW 20 MIN: CPT | Performed by: NURSE PRACTITIONER

## 2025-03-31 PROCEDURE — 80061 LIPID PANEL: CPT

## 2025-03-31 PROCEDURE — 83550 IRON BINDING TEST: CPT

## 2025-03-31 PROCEDURE — 36415 COLL VENOUS BLD VENIPUNCTURE: CPT

## 2025-03-31 PROCEDURE — 83690 ASSAY OF LIPASE: CPT

## 2025-03-31 PROCEDURE — 83036 HEMOGLOBIN GLYCOSYLATED A1C: CPT

## 2025-03-31 PROCEDURE — 80053 COMPREHEN METABOLIC PANEL: CPT

## 2025-03-31 PROCEDURE — 82728 ASSAY OF FERRITIN: CPT

## 2025-03-31 PROCEDURE — 85027 COMPLETE CBC AUTOMATED: CPT

## 2025-03-31 PROCEDURE — 83540 ASSAY OF IRON: CPT

## 2025-03-31 NOTE — ASSESSMENT & PLAN NOTE
-will  get stat US   -will get xray of ribs   -add amylase and lipase to blood work  -Tylenol and heat for discomfort    Orders:    Lipase; Future    Amylase; Future    XR ribs 2 vw left; Future    US abdomen complete; Future

## 2025-03-31 NOTE — PROGRESS NOTES
Name: Barry Roland      : 1965      MRN: [id number]  Encounter Provider: ANN Carroll  Encounter Date: 3/31/2025   [facility]  :  Assessment & Plan  LUQ pain  -will  get stat US   -will get xray of ribs   -add amylase and lipase to blood work  -Tylenol and heat for discomfort    Orders:    Lipase; Future    Amylase; Future    XR ribs 2 vw left; Future    US abdomen complete; Future    Tobacco abuse    Orders:    CT lung screening program; Future           History of Present Illness   Patient presents today with left sided abdominal pain.    Has history of hiatal hernia  History of rib fracture left side, denies recent trama     Reports constipation, but reports at baseline     He reports that it hurts with a deep breath   Denies     Abdominal Pain  This is a new problem. The current episode started 1 to 4 weeks ago. The onset quality is sudden. The problem occurs 2 to 4 times per day. The problem has been unchanged. The pain is located in the LUQ. The pain is at a severity of 4/10. The quality of the pain is sharp. The abdominal pain does not radiate. Associated symptoms include arthralgias. Pertinent negatives include no anorexia, belching, constipation, diarrhea, dysuria, fever, flatus, frequency, headaches, hematochezia, hematuria, melena, myalgias, nausea, vomiting or weight loss. The pain is aggravated by certain positions and coughing. The pain is relieved by Being still and certain positions. Prior diagnostic workup includes lower endoscopy.     Review of Systems   Constitutional:  Negative for activity change, appetite change, chills, diaphoresis, fever and weight loss.   HENT:  Negative for congestion, ear discharge, ear pain, postnasal drip, rhinorrhea, sinus pressure, sinus pain and sore throat.    Eyes:  Negative for pain, discharge, itching and visual disturbance.   Respiratory:  Negative for cough, chest tightness,  "shortness of breath and wheezing.    Cardiovascular:  Negative for chest pain, palpitations and leg swelling.   Gastrointestinal:  Positive for abdominal pain. Negative for anorexia, constipation, diarrhea, flatus, hematochezia, melena, nausea and vomiting.   Endocrine: Negative for polydipsia, polyphagia and polyuria.   Genitourinary:  Negative for difficulty urinating, dysuria, frequency, hematuria and urgency.   Musculoskeletal:  Positive for arthralgias. Negative for back pain, myalgias and neck pain.   Skin:  Negative for rash and wound.   Neurological:  Negative for dizziness, weakness, numbness and headaches.       Objective   /82 (BP Location: Left arm, Patient Position: Sitting, Cuff Size: Standard)   Pulse 62   Temp (!) 97.3 °F (36.3 °C) (Temporal)   Ht 5' 9\" (1.753 m)   Wt 99.8 kg (220 lb)   SpO2 97%   BMI 32.49 kg/m²      Physical Exam  Constitutional:       General: He is not in acute distress.     Appearance: He is well-developed. He is not diaphoretic.   HENT:      Head: Normocephalic and atraumatic.      Right Ear: External ear normal.      Left Ear: External ear normal.      Nose: Nose normal.      Mouth/Throat:      Mouth: Mucous membranes are moist.      Pharynx: No oropharyngeal exudate or posterior oropharyngeal erythema.   Eyes:      General:         Right eye: No discharge.         Left eye: No discharge.      Conjunctiva/sclera: Conjunctivae normal.      Pupils: Pupils are equal, round, and reactive to light.   Neck:      Thyroid: No thyromegaly.   Cardiovascular:      Rate and Rhythm: Normal rate and regular rhythm.      Heart sounds: Normal heart sounds. No murmur heard.     No friction rub. No gallop.   Pulmonary:      Effort: Pulmonary effort is normal. No respiratory distress.      Breath sounds: Normal breath sounds. No stridor. No wheezing or rales.   Abdominal:      General: Bowel sounds are normal. There is no distension.      Palpations: Abdomen is soft.      Tenderness: " There is no abdominal tenderness.       Musculoskeletal:      Cervical back: Normal range of motion and neck supple.   Lymphadenopathy:      Cervical: No cervical adenopathy.   Skin:     General: Skin is warm and dry.      Findings: No erythema or rash.   Neurological:      Mental Status: He is alert and oriented to person, place, and time.   Psychiatric:         Behavior: Behavior normal.         Thought Content: Thought content normal.         Judgment: Judgment normal.

## 2025-04-01 ENCOUNTER — HOSPITAL ENCOUNTER (OUTPATIENT)
Dept: RADIOLOGY | Age: 60
Discharge: HOME | End: 2025-04-01
Payer: COMMERCIAL

## 2025-04-01 ENCOUNTER — RESULTS FOLLOW-UP (OUTPATIENT)
Age: 60
End: 2025-04-01

## 2025-04-01 DIAGNOSIS — R10.12 LUQ PAIN: ICD-10-CM

## 2025-04-01 PROCEDURE — 76700 US EXAM ABDOM COMPLETE: CPT

## 2025-04-04 ENCOUNTER — OFFICE VISIT (OUTPATIENT)
Dept: BARIATRICS | Facility: CLINIC | Age: 60
End: 2025-04-04
Payer: COMMERCIAL

## 2025-04-04 VITALS
SYSTOLIC BLOOD PRESSURE: 126 MMHG | WEIGHT: 218.5 LBS | BODY MASS INDEX: 32.36 KG/M2 | OXYGEN SATURATION: 98 % | HEART RATE: 69 BPM | DIASTOLIC BLOOD PRESSURE: 72 MMHG | HEIGHT: 69 IN | TEMPERATURE: 97.8 F

## 2025-04-04 DIAGNOSIS — Z48.815 ENCOUNTER FOR SURGICAL AFTERCARE FOLLOWING SURGERY OF DIGESTIVE SYSTEM: Primary | ICD-10-CM

## 2025-04-04 DIAGNOSIS — E66.811 OBESITY, CLASS I, BMI 30-34.9: ICD-10-CM

## 2025-04-04 DIAGNOSIS — E10.40 TYPE 1 DIABETES MELLITUS WITH DIABETIC NEUROPATHY (HCC): ICD-10-CM

## 2025-04-04 DIAGNOSIS — Z98.84 BARIATRIC SURGERY STATUS: ICD-10-CM

## 2025-04-04 DIAGNOSIS — G47.33 OSA (OBSTRUCTIVE SLEEP APNEA): ICD-10-CM

## 2025-04-04 PROCEDURE — 99214 OFFICE O/P EST MOD 30 MIN: CPT | Performed by: NURSE PRACTITIONER

## 2025-04-04 RX ORDER — TIRZEPATIDE 5 MG/.5ML
5 INJECTION, SOLUTION SUBCUTANEOUS WEEKLY
Qty: 2 ML | Refills: 2 | Status: SHIPPED | OUTPATIENT
Start: 2025-04-04

## 2025-04-04 NOTE — PROGRESS NOTES
Assessment/Plan:    OBESITY I/BMI 32  - on zepbound 2.5 mg vial option but noticed weight has slowed down with current dose.   -NOT AT GOAL WEIGHT. Will titrate to 5 mg/week. He will let us know how he's doing or if he's having any issues.     Highest weight  270 lbs   Initial weight on zepbound 224.5 lbs  Current Weight 218.5 LBS (6 lbs = 2.6% total weight loss)  Dexter 199.5 LBS  Goal - 180 lbs  5% weight loss - 11.2 LBS (213.3 lbs)  15% WEIGHT LOSS 33.7 lbs (190.8 lbs)  20% weight loss - 44.9 lbs (179.6 lbs)      Follow up in approximately 3 months with Surgical Advanced Practitioner.  Goals:  Food log (ie.) [email or web address],etc. baritastic  No sugary beverages. At least 64oz of water daily.  Increase physical activity by 10 minutes daily. Gradually increase physical activity to a goal of 5 days per week for 30 minutes of MODERATE intensity PLUS 2 days per week of FULL BODY resistance training  5-10 servings of fruits and vegetables per day and 25-35 grams of dietary fiber per day, gradually increasing  No sugary beverages. At least 64oz of water daily., Increase physical activity by 10 minutes daily, Practice lesson plans 1-6 in bariatric manual , Practice 30/60 rule, Gradually increase physical activity to a goal of 5 days per week for 30 minutes of MODERATE intensity PLUS 2 days per week of FULL BODY resistance training, Continue with dietary and behavioral recommendations outlined in bariatric manual, Continue to take recommended bariatric vitamins as directed, Goal protein intake of 60-80 grams per day, 5-10 servings of fruits and vegetables per day, 25-35 grams of dietary fiber per day, and 1197-2325 calories per day      Diagnoses and all orders for this visit:    Encounter for surgical aftercare following surgery of digestive system    Bariatric surgery status    Obesity, Class I, BMI 30-34.9  -     Tirzepatide-Weight Management (Zepbound) 5 mg/0.5 mL  subcutaneous solution (vial); Inject 0.5 mL (5 mg total) under the skin once a week    BMI 32.0-32.9,adult  -     Tirzepatide-Weight Management (Zepbound) 5 mg/0.5 mL subcutaneous solution (vial); Inject 0.5 mL (5 mg total) under the skin once a week      Bariatric surgery status - follows with our surgical team annually. Continue to f/u as schedule. We discussed about his ferritin levels. Advised should try to take iron supplements more consistently. continue with stool softeners. If unable to tolerate can start iron infusions. He will let us know.     Type I DM - monitors blood sugar through continuous glucometer and has smart insulin pumps. He continues to f/u with endocrinology closely and was recommended to start either with wegovy or zepbound. Endocrine discussed to reduce his insulin requirements while on GLP-1 injections. Reiterated plan of care with him       FARZANA - Was on cpap machine but improved after bariatric surgery. Continue to monitor for now. Encouraged continuing weight loss to prevent reoccurrence of sleep apnea.          Subjective:   Chief Complaint   Patient presents with    Follow-up     3 MO PO WEIGHT GAIN F/U      Patient ID: Barry Roland  is a 59 y.o. male with excess weight/obesity here to pursue medical weight management. -s/p RNYGB with Dr. Bam Antonio on 04/24/2021 here for [facility] follow up.   Past Medical History:   Diagnosis Date    Allergic     Seasonal    Anemia     Axillary adenopathy 08/15/2022    Closed fracture of one rib of left side with routine healing 08/29/2022    Colon polyp     Diabetes mellitus (HCC)     Disease of thyroid gland     GERD (gastroesophageal reflux disease)     Hyperlipidemia     Hypertension     Insomnia     Kidney stone     Lymphadenopathy of head and neck 08/15/2022    Sleep apnea     Type 1 diabetes mellitus (HCC)     Uses Insulin Pump    Vitamin D deficiency        HPI:  Obesity/Excess Weight:   Was started on zepbound vial about 6-7 weeks ago. Tolerating  this medication well. Admits to decrease portion sizes, decreased cravings. Insulin requirements has been less as well. He does eat and tries to get his proteins in.    Highest weight  270 lbs   Initial weight on zepbound 224.5 lbs  Current Weight 218.5 LBS (6 lbs = 2.6% total weight loss)  Dexter 199.5 LBS  Goal - 180 lbs  5% weight loss - 11.2 LBS (213.3 lbs)  15% WEIGHT LOSS 33.7 lbs (190.8 lbs)  20% weight loss - 44.9 lbs (179.6 lbs)        Hydration: >64 oz of water per day  Alcohol:  rarely   Exercise:had slowed down but is plannig to get back to it.   Dining out: once a week   Occupation: manage [employer]  Sleep: 6 hour  STOPBANG: has a hx of FARZANA    B: ham and egg ritttoto  S: protein shake   L: slice of pizza at times  S: NONE  D: FISH- protein primarily  S: 1 bag of doritos     Wt Readings from Last 3 Encounters:   04/04/25 99.1 kg (218 lb 8 oz)   03/31/25 99.8 kg (220 lb)   01/30/25 101 kg (223 lb)             Patient is not pregnant/breastfeeding (metformin only)  Patient denies personal and family history of  pancreatitis, thyroid cancer, MEN-2 tumors. (Consideration for GLP-1 Agonists)  Has a hx of kidney stones. Denies any hx of glaucoma, seizures, gallstones. (Contraindicated for topamax)  Has a hx of HTN - would caution use of phentermine. Denies Hx of CAD, PAD, palpitations, arrhythmia hyperthyroidism or use of stimulant medications   Denies uncontrolled anxiety or depression, suicidal behavior or thinking , insomnia or sleep disturbance.  Takes ambien for sleep which is effective.   Denies use of opioids.   Can consider wellbutrin and naltrexone combination to mimic contrave.       The following portions of the patient's history were reviewed and updated as appropriate: allergies, current medications, past family history, past medical history, past social history, past surgical history, and problem list.    Past Medical History:   Diagnosis Date    Allergic     Seasonal    Anemia     Axillary  adenopathy 08/15/2022    Closed fracture of one rib of left side with routine healing 08/29/2022    Colon polyp     Diabetes mellitus (HCC)     Disease of thyroid gland     GERD (gastroesophageal reflux disease)     Hyperlipidemia     Hypertension     Insomnia     Kidney stone     Lymphadenopathy of head and neck 08/15/2022    Sleep apnea     Type 1 diabetes mellitus (HCC)     Uses Insulin Pump    Vitamin D deficiency      Past Surgical History:   Procedure Laterality Date    BARIATRIC SURGERY      CARPAL TUNNEL RELEASE Right     COLONOSCOPY      EGD      FL RETROGRADE PYELOGRAM  4/24/2020    LASIK      CO CYSTO/URETERO W/LITHOTRIPSY &INDWELL STENT INSRT Right 4/24/2020    Procedure: CYSTOSCOPY URETEROSCOPY WITH LITHOTRIPSY HOLMIUM LASER, RETROGRADE PYELOGRAM AND INSERTION STENT URETERAL;  Surgeon: Shabbir Ireland MD;  Location: [facility];  Service: Urology    CO LAPS GSTR RSTCV PX W/BYP GABBY-EN-Y LIMB <150 CM N/A 4/26/2021    Procedure: BYPASS GASTRIC GABBY-EN-Y LAPAROSCOPIC W ROBOTICS AND INTRAOPERATIVE EGD;  Surgeon: Danny Antonio MD;  Location: [facility];  Service: Bariatrics    ROTATOR CUFF REPAIR Right     UPPER GASTROINTESTINAL ENDOSCOPY         Current Outpatient Medications:     ACETAMINOPHEN PO, Take 80 mg by mouth every 4 (four) hours as needed for mild pain, Disp: , Rfl:     Blood Glucose Monitoring Suppl (ONE TOUCH ULTRA 2) w/Device KIT, Use  , Disp: , Rfl:     Calcium Citrate (JORDY-CITRATE PO), Take 1,000 tablets by mouth 2 (two) times a day, Disp: , Rfl:     Continuous Blood Gluc  (DEXCOM G6 ) KIYA, USE AS DIRECTED, Disp: 1 Device, Rfl: 0    Continuous Blood Gluc Transmit (Dexcom G6 Transmitter) MISC, Inject 1 Device under the skin every 3 (three) months, Disp: 1 each, Rfl: 3    Continuous Glucose Sensor (Dexcom G7 Sensor), Use 1 Device every 10 days, Disp: 9 each, Rfl: 1    fluticasone (FLONASE) 50 mcg/act nasal spray, 1 spray into each nostril daily, Disp: , Rfl:     Glucagon  (Baqsimi Two Pack) 3 MG/DOSE POWD, Use one dose as needed in case of severe hypoglycemia ( nasal spray), Disp: 1 each, Rfl: 3    hydrocortisone (ANUSOL-HC) 2.5 % rectal cream, Apply topically 2 (two) times a day, Disp: 28 g, Rfl: 0    hydrocortisone (ANUSOL-HC) 25 mg suppository, Insert 1 suppository (25 mg total) into the rectum 2 (two) times a day, Disp: 12 suppository, Rfl: 0    Insulin Glargine-yfgn 100 UNIT/ML SOPN, INJECT 30 UNITS IN CASE OF PUMP FAILURE ( INCASE OF EMERGENCY), Disp: 15 mL, Rfl: 1    Insulin Pen Needle (BD PEN NEEDLE LYRIC U/F) 32G X 4 MM MISC, by Does not apply route daily Use once daily, Disp: 100 each, Rfl: 2    levothyroxine (Synthroid) 200 mcg tablet, Take 1 tablet daily Mon-Sat and 1.5 tablets on Sunday., Disp: 105 tablet, Rfl: 2    loratadine (CLARITIN) 10 mg tablet, Take 1 tablet (10 mg total) by mouth daily, Disp: 90 tablet, Rfl: 1    Multiple Vitamins-Minerals (Bariatric Fusion) CHEW, Chew  , Disp: , Rfl:     Probiotic Product (PROBIOTIC DAILY PO), Take 1 tablet by mouth daily , Disp: , Rfl:     psyllium (METAMUCIL) 0.52 g capsule, Take 0.52 g by mouth 3 (three) times a day Fiber capsules, Disp: , Rfl:     rosuvastatin (CRESTOR) 5 mg tablet, TAKE 1 TABLET (5 MG TOTAL) BY MOUTH DAILY., Disp: 90 tablet, Rfl: 0    Tirzepatide-Weight Management (Zepbound) 5 mg/0.5 mL subcutaneous solution (vial), Inject 0.5 mL (5 mg total) under the skin once a week, Disp: 2 mL, Rfl: 2    TURMERIC PO, Take 1,400 capsules by mouth 2 (two) times a day, Disp: , Rfl:     vitamin B-12 (VITAMIN B-12) 1,000 mcg tablet, Take 1,000 mcg by mouth 3 (three) times a week, Disp: , Rfl:     zolpidem (AMBIEN) 10 mg tablet, Take 1 tablet (10 mg total) by mouth daily at bedtime as needed for sleep, Disp: 30 tablet, Rfl: 0    Insulin Aspart (NovoLOG) 100 units/mL injection, USE UP TO 78 UNITS DAILY VIA INSULIN PUMP, Disp: 70 mL, Rfl: 1    Insulin Glargine-yfgn 100 UNIT/ML SOPN, INJECT 30 UNITS IN CASE OF PUMP FAILURE (  "INCASE OF EMERGENCY), Disp: , Rfl:     Review of Systems   Constitutional: Negative.    Respiratory: Negative.     Cardiovascular: Negative.    Gastrointestinal: Negative.    Musculoskeletal: Negative.    Neurological: Negative.    Psychiatric/Behavioral: Negative.         Objective:    /72 (BP Location: Left arm, Patient Position: Sitting, Cuff Size: Adult)   Pulse 69   Temp 97.8 °F (36.6 °C) (Tympanic)   Ht 5' 9\" (1.753 m)   Wt 99.1 kg (218 lb 8 oz)   SpO2 98%   BMI 32.27 kg/m²     Physical Exam  Vitals and nursing note reviewed.   Constitutional:       Appearance: Normal appearance. He is obese.   Cardiovascular:      Rate and Rhythm: Normal rate and regular rhythm.      Pulses: Normal pulses.      Heart sounds: Normal heart sounds.   Pulmonary:      Effort: Pulmonary effort is normal.      Breath sounds: Normal breath sounds.   Abdominal:      General: Bowel sounds are normal.      Palpations: Abdomen is soft.      Tenderness: There is no abdominal tenderness.   Musculoskeletal:         General: Normal range of motion.   Skin:     General: Skin is warm and dry.   Neurological:      General: No focal deficit present.      Mental Status: He is alert and oriented to person, place, and time. Mental status is at baseline.   Psychiatric:         Mood and Affect: Mood normal.         Behavior: Behavior normal.         Thought Content: Thought content normal.         Judgment: Judgment normal.         "

## 2025-04-04 NOTE — PROGRESS NOTES
Date of surgery: 4/26/2021  Procedure: RNY   Performing surgeon: Dr. DINO Antonio     Initial Weight - 266.0 lb   Current Weight - 218.5 lb   Dexter Weight - 199.5 lb   Total Body Weight Loss (EWL)-  47.6  EWL% - 49%  TWB % -  18%

## 2025-04-08 NOTE — TELEPHONE ENCOUNTER
Patient needs controlled sub agreement please add to next appointment note  Results finalized. Will review results with Dr. Fink.

## 2025-04-09 NOTE — PROGRESS NOTES
Name: Barry Roland      : 1965      MRN: [id number]  Encounter Provider: ANN Quinonez  Encounter Date: 2025   Encounter department: [facility] [place]  :  Assessment & Plan  Type 1 diabetes mellitus with hyperglycemia, with long-term current use of insulin (HCC)  A1c above goal of less than 7%.    We decreased his basal rate at midnight to 0.72 units/h.  We loosened his carb ratio at 4 AM to 7.5 and 6 AM to 6.5.  Encouraged to interact with the pump more often and bolus closer to the beginning of his meals rather than in the middle/after.    Continue Zepbound as per weight management.  Discussed we can anticipate reduced insulin needs once Zepbound is increased and he should call the office for adjustment in his settings if he continues to notice hypoglycemia.    Reviewed risks as well as signs and symptoms of hypoglycemia.  Rule of 15's provided in AVS for appropriate treatment.  Call the office for blood glucose levels less than 70 mg/dL or persistent patterns over 250 mg/dL.  Continue Dexcom G7 continuous glucose monitor.    Continue to improve diet and lifestyle including attention to the amount and type of carbohydrates consumed as well as increased physical activity as tolerated.  Stay well-hydrated.    Follow-up in 4 months.  Labs prior to next visit.  Lab Results   Component Value Date    HGBA1C 7.4 (H) 2025     Orders:  •  Insulin Aspart (NovoLOG) 100 units/mL injection; USE UP TO 75 UNITS DAILY VIA INSULIN PUMP  •  Basic metabolic panel; Future  •  Hemoglobin A1C; Future    Type 1 diabetes mellitus with diabetic neuropathy (HCC)  Stable. Continue B12 supplementation.         Primary hypertension  /82 in the office today.  Continue current regimen.  Not on ACEi/ARB       Acquired hypothyroidism  Presents clinically and biochemically euthyroid.  Continue levothyroxine 200 mcg daily Monday-Saturday and 2 tablets on  Sunday.    Component      Latest Ref Rng 3/31/2025   TSH 3RD GENERATON      0.450 - 4.500 uIU/mL 3.536    FREE T4      0.61 - 1.12 ng/dL 1.11        Orders:  •  levothyroxine (Synthroid) 200 mcg tablet; Take 1 tablet daily Mon-Sat and 2 tablets on Sunday.  •  TSH, 3rd generation; Future  •  T4, free; Future        History of Present Illness {?Quick Links Encounters * My Last Note * Last Note in Specialty * Snapshot * Since Last Visit * History :69018}  HPI  Barry Roland is a 59 y.o. male who presents  to the office today for follow-up of hypothyroidism and type 1 diabetes, ROBBY. Diabetes course has been stable. Complications of diabetes include: neuropathy.  He was last seen in the office 11/29/2024 at which time his A1c was 7.5% and there was hypoglycemia overnight prompting decrease of his basal rate and carb ratio at midnight.  His most recent A1c is improved to 7.4%    For hypothyroidism, he is taking Synthroid 200 mcg Monday-Saturday and 400 mcg on Sunday     Infrequent recent episodes of hypoglycemia.  Symptoms of hypoglycemia include: Weakness, perspiration     Patient is on a Tandem Mobi  pump.  Current Problems with Pump: No     Current Insulin pump settings:  See Scanned Pump Downloads.  Basal rate:  12:00 a.m. = 0.8 units/hour  4:00 a.m. = 1.00 units/hour  6:00 a.m. = 1.00 units/hour  8:00 a.m. = 1.50 units/hour  9:00 p.m. = 1.00 units/hour  Insulin to carb ratio:  12:00 a.m. = 7.5  4:00 a.m. = 7  6:00 a.m. = 6  8:00 a.m. = 7.5  9:00 p.m. = 7  Insulin sensitivity factor:  12:00 a.m. = 40  4:00 a.m. = 40  6:00 a.m. = 25  8:00 a.m. = 25  9:00 p.m. = 40    BG target:  110     Type of insulin:Novolog  Active Insulin Time: 5 hr     Use of Glucagon: Never     Diet: Consistent, needs improvement     Discussed with patient in case of malfunctioning of the pump to use basal and bolus therapy as backup which is prescribed to the patient. Also notified patient to call clinic if any issues.  He has Basaglar  prescription at the pharmacy.     CGM REVIEW:  Device used : Dexcom G7  Indication: Type 1 Diabetes  Date Range: 3/29/2025 - 4/11/2025  More than 72 hours of data was reviewed. Report to be scanned to chart.     Analysis of data:   Average Glucose: 160 mg/dL  Coefficient of Variation: 34%  SD : 57 mg/dL  Time in Target Range: 62%  Time Above Range: 29% high; 8.9% very high  Time Below Range: 0.2% low; 0% very low    Interpretation of data: Blood glucose levels suboptimally controlled.  Episodes of sustained hyperglycemia which appear to be due to poor insulin absorption/administration.  Spot episodes of hypoglycemia, typically seen in the early morning.  Occasional episodes of hypoglycemia seen throughout the day due to overcorrection.  GMI 7.3%    History obtained from: patient    Review of Systems   Constitutional:  Negative for appetite change, fatigue and unexpected weight change.   HENT:  Negative for congestion, hearing loss and sore throat.    Respiratory:  Negative for cough.    Cardiovascular:  Negative for chest pain and palpitations.   Gastrointestinal:  Positive for constipation. Negative for abdominal pain, diarrhea, nausea and vomiting.   Endocrine: Negative for cold intolerance, heat intolerance, polydipsia and polyuria.   Musculoskeletal:  Negative for myalgias.   Neurological:  Negative for dizziness and weakness.   Psychiatric/Behavioral:  Negative for sleep disturbance.      Current Outpatient Medications on File Prior to Visit   Medication Sig Dispense Refill   • ACETAMINOPHEN PO Take 80 mg by mouth every 4 (four) hours as needed for mild pain     • Blood Glucose Monitoring Suppl (ONE TOUCH ULTRA 2) w/Device KIT Use       • Calcium Citrate (JORDY-CITRATE PO) Take 1,000 tablets by mouth 2 (two) times a day     • Continuous Glucose Sensor (Dexcom G7 Sensor) Use 1 Device every 10 days 9 each 1   • fluticasone (FLONASE) 50 mcg/act nasal spray 1 spray into each nostril daily     • Glucagon (Baqsimi Two  Pack) 3 MG/DOSE POWD Use one dose as needed in case of severe hypoglycemia ( nasal spray) 1 each 3   • hydrocortisone (ANUSOL-HC) 2.5 % rectal cream Apply topically 2 (two) times a day 28 g 0   • hydrocortisone (ANUSOL-HC) 25 mg suppository Insert 1 suppository (25 mg total) into the rectum 2 (two) times a day 12 suppository 0   • Insulin Glargine-yfgn 100 UNIT/ML SOPN INJECT 30 UNITS IN CASE OF PUMP FAILURE ( INCASE OF EMERGENCY) 15 mL 1   • Insulin Pen Needle (BD PEN NEEDLE LYRIC U/F) 32G X 4 MM MISC by Does not apply route daily Use once daily 100 each 2   • loratadine (CLARITIN) 10 mg tablet Take 1 tablet (10 mg total) by mouth daily 90 tablet 1   • Multiple Vitamins-Minerals (Bariatric Fusion) CHEW Chew       • Probiotic Product (PROBIOTIC DAILY PO) Take 1 tablet by mouth daily      • psyllium (METAMUCIL) 0.52 g capsule Take 0.52 g by mouth 3 (three) times a day Fiber capsules     • rosuvastatin (CRESTOR) 5 mg tablet TAKE 1 TABLET (5 MG TOTAL) BY MOUTH DAILY. 90 tablet 0   • Tirzepatide-Weight Management (Zepbound) 5 mg/0.5 mL subcutaneous solution (vial) Inject 0.5 mL (5 mg total) under the skin once a week 2 mL 2   • TURMERIC PO Take 1,400 capsules by mouth 2 (two) times a day     • vitamin B-12 (VITAMIN B-12) 1,000 mcg tablet Take 1,000 mcg by mouth 3 (three) times a week     • zolpidem (AMBIEN) 10 mg tablet Take 1 tablet (10 mg total) by mouth daily at bedtime as needed for sleep 30 tablet 0   • [DISCONTINUED] Continuous Blood Gluc  (DEXCOM G6 ) KIYA USE AS DIRECTED 1 Device 0   • [DISCONTINUED] Continuous Blood Gluc Transmit (Dexcom G6 Transmitter) MISC Inject 1 Device under the skin every 3 (three) months 1 each 3   • [DISCONTINUED] Insulin Aspart (NovoLOG) 100 units/mL injection USE UP TO 78 UNITS DAILY VIA INSULIN PUMP 70 mL 1   • [DISCONTINUED] Insulin Glargine-yfgn 100 UNIT/ML SOPN INJECT 30 UNITS IN CASE OF PUMP FAILURE ( INCASE OF EMERGENCY)     • [DISCONTINUED] levothyroxine  "(Synthroid) 200 mcg tablet Take 1 tablet daily Mon-Sat and 1.5 tablets on . 105 tablet 2     No current facility-administered medications on file prior to visit.      Social History     Tobacco Use   • Smoking status: Former     Current packs/day: 0.00     Average packs/day: 1 pack/day for 30.0 years (30.0 ttl pk-yrs)     Types: Cigarettes     Start date:      Quit date: 5/15/2015     Years since quittin.9   • Smokeless tobacco: Never   • Tobacco comments:     quit in    Vaping Use   • Vaping status: Never Used   Substance and Sexual Activity   • Alcohol use: Yes     Alcohol/week: 2.0 standard drinks of alcohol     Types: 2 Standard drinks or equivalent per week     Comment: social   • Drug use: No   • Sexual activity: Yes     Partners: Female     Birth control/protection: None        Objective {?Quick Links Trend Vitals * Enter New Vitals * Results Review * Timeline (Adult) * Labs * Imaging * Cardiology * Procedures * Lung Cancer Screening * Surgical eConsent :69114}  /82   Pulse 71   Ht 5' 9\" (1.753 m)   Wt 98.9 kg (218 lb)   SpO2 95%   BMI 32.19 kg/m²      Physical Exam  Vitals reviewed.   Constitutional:       General: He is not in acute distress.     Appearance: Normal appearance. He is well-groomed. He is obese.   HENT:      Head: Normocephalic and atraumatic.      Mouth/Throat:      Mouth: Mucous membranes are moist.   Eyes:      Conjunctiva/sclera: Conjunctivae normal.   Cardiovascular:      Rate and Rhythm: Normal rate.   Pulmonary:      Effort: Pulmonary effort is normal. No respiratory distress.   Abdominal:      General: There is no distension.      Palpations: Abdomen is soft.   Musculoskeletal:         General: No swelling.      Cervical back: Normal range of motion.   Skin:     General: Skin is warm and dry.   Neurological:      Mental Status: He is alert and oriented to person, place, and time.   Psychiatric:         Mood and Affect: Mood normal.         Behavior: " Behavior normal. Behavior is cooperative.         Thought Content: Thought content normal.         Judgment: Judgment normal.         Administrative Statements {?Quick Links Full Problem List * Level of Service * [facility]:65692}  I have spent a total time of 35 minutes in caring for this patient on the day of the visit/encounter including Diagnostic results, Prognosis, Risks and benefits of tx options, Instructions for management, Patient and family education, Importance of tx compliance, Risk factor reductions, Impressions, Counseling / Coordination of care, Documenting in the medical record, Reviewing/placing orders in the medical record (including tests, medications, and/or procedures), and Obtaining or reviewing history  .

## 2025-04-11 ENCOUNTER — OFFICE VISIT (OUTPATIENT)
Dept: ENDOCRINOLOGY | Facility: CLINIC | Age: 60
End: 2025-04-11
Payer: COMMERCIAL

## 2025-04-11 ENCOUNTER — TELEPHONE (OUTPATIENT)
Dept: ENDOCRINOLOGY | Facility: CLINIC | Age: 60
End: 2025-04-11

## 2025-04-11 VITALS
DIASTOLIC BLOOD PRESSURE: 82 MMHG | SYSTOLIC BLOOD PRESSURE: 122 MMHG | WEIGHT: 218 LBS | BODY MASS INDEX: 32.29 KG/M2 | HEIGHT: 69 IN | OXYGEN SATURATION: 95 % | HEART RATE: 71 BPM

## 2025-04-11 DIAGNOSIS — E10.40 TYPE 1 DIABETES MELLITUS WITH DIABETIC NEUROPATHY (HCC): ICD-10-CM

## 2025-04-11 DIAGNOSIS — E03.9 ACQUIRED HYPOTHYROIDISM: ICD-10-CM

## 2025-04-11 DIAGNOSIS — I10 PRIMARY HYPERTENSION: ICD-10-CM

## 2025-04-11 DIAGNOSIS — E10.65 TYPE 1 DIABETES MELLITUS WITH HYPERGLYCEMIA, WITH LONG-TERM CURRENT USE OF INSULIN (HCC): Primary | ICD-10-CM

## 2025-04-11 PROCEDURE — 99214 OFFICE O/P EST MOD 30 MIN: CPT

## 2025-04-11 PROCEDURE — 95251 CONT GLUC MNTR ANALYSIS I&R: CPT

## 2025-04-11 RX ORDER — LEVOTHYROXINE SODIUM 200 UG/1
TABLET ORAL
Qty: 102 TABLET | Refills: 2 | Status: SHIPPED | OUTPATIENT
Start: 2025-04-11

## 2025-04-11 RX ORDER — INSULIN ASPART 100 [IU]/ML
INJECTION, SOLUTION INTRAVENOUS; SUBCUTANEOUS
Qty: 70 ML | Refills: 1 | Status: SHIPPED | OUTPATIENT
Start: 2025-04-11

## 2025-04-11 NOTE — ASSESSMENT & PLAN NOTE
Presents clinically and biochemically euthyroid.  Continue levothyroxine 200 mcg daily Monday-Saturday and 2 tablets on Sunday.    Component      Latest Ref Rng 3/31/2025   TSH 3RD GENERATON      0.450 - 4.500 uIU/mL 3.536    FREE T4      0.61 - 1.12 ng/dL 1.11        Orders:  •  levothyroxine (Synthroid) 200 mcg tablet; Take 1 tablet daily Mon-Sat and 2 tablets on Sunday.  •  TSH, 3rd generation; Future  •  T4, free; Future

## 2025-04-11 NOTE — TELEPHONE ENCOUNTER
PAULO with patient informing him that Guerda's schedule has opened up for September if he would like to reschedule apt to [place].

## 2025-04-11 NOTE — PATIENT INSTRUCTIONS
Call the office for blood glucose levels less than 70 mg/dL or persistent patterns over 250 mg/dL.    Call the office after you start zepbound 7.5 mg weekly as we may need to decrease your insulin.    Low Blood Sugar  Steps to treat low blood sugar.    1. Test blood sugar if you have symptoms of low blood sugar:   Low Blood Sugar Symptoms:  o Sweaty  o Dizzy  o Rapid heartbeat  o Shaky  o Bad mood  o Hungry    2. Treat blood sugar less than 70 with 15 grams of fast-acting carbohydrate:   Examples of 15 grams Fast-Acting Carbohydrate:  o 4 oz juice/ 4 oz regular soda  o 1 tablespoon honey  o 1 pack of fun size skittles (about 15 individual skittles)  o 12 gummy bears  o 4 starburst   o 3-4 hard candies (chew)  o 3-4 glucose tablets (chew)            3.   Wait 15 minutes and test your blood sugar again         4.   If blood sugar is less than 100, repeat steps 2-3.    5.   When your blood sugar is 100 or more, eat a snack if it will be longer than one hour until your next meal. The snack should be 15 grams of carbohydrate and a protein:   Examples of snacks:  o ½ sandwich  o 6 crackers with cheese or with peanut butter  o Piece of fruit with cheese or peanut butter

## 2025-04-28 DIAGNOSIS — G47.09 OTHER INSOMNIA: ICD-10-CM

## 2025-04-29 RX ORDER — ZOLPIDEM TARTRATE 10 MG/1
10 TABLET ORAL
Qty: 30 TABLET | Refills: 0 | Status: SHIPPED | OUTPATIENT
Start: 2025-04-29

## 2025-05-03 ENCOUNTER — OFFICE VISIT (OUTPATIENT)
Dept: URGENT CARE | Age: 60
End: 2025-05-03
Payer: COMMERCIAL

## 2025-05-03 ENCOUNTER — HOSPITAL ENCOUNTER (EMERGENCY)
Facility: HOSPITAL | Age: 60
Discharge: HOME | End: 2025-05-03
Attending: EMERGENCY MEDICINE
Payer: COMMERCIAL

## 2025-05-03 VITALS
HEIGHT: 69 IN | SYSTOLIC BLOOD PRESSURE: 142 MMHG | HEART RATE: 75 BPM | WEIGHT: 216 LBS | TEMPERATURE: 98.7 F | BODY MASS INDEX: 31.99 KG/M2 | RESPIRATION RATE: 18 BRPM | OXYGEN SATURATION: 96 % | DIASTOLIC BLOOD PRESSURE: 80 MMHG

## 2025-05-03 VITALS
TEMPERATURE: 97.7 F | DIASTOLIC BLOOD PRESSURE: 86 MMHG | SYSTOLIC BLOOD PRESSURE: 141 MMHG | HEART RATE: 70 BPM | OXYGEN SATURATION: 97 % | RESPIRATION RATE: 18 BRPM

## 2025-05-03 DIAGNOSIS — K62.3 RECTAL PROLAPSE: ICD-10-CM

## 2025-05-03 DIAGNOSIS — K64.8 HEMORRHOIDS WITH COMPLICATION: Primary | ICD-10-CM

## 2025-05-03 DIAGNOSIS — K64.8 PROLAPSED HEMORRHOIDS: Primary | ICD-10-CM

## 2025-05-03 PROCEDURE — 99284 EMERGENCY DEPT VISIT MOD MDM: CPT | Performed by: EMERGENCY MEDICINE

## 2025-05-03 PROCEDURE — 99282 EMERGENCY DEPT VISIT SF MDM: CPT

## 2025-05-03 PROCEDURE — 99213 OFFICE O/P EST LOW 20 MIN: CPT | Performed by: STUDENT IN AN ORGANIZED HEALTH CARE EDUCATION/TRAINING PROGRAM

## 2025-05-03 RX ORDER — LIDOCAINE HYDROCHLORIDE 20 MG/ML
JELLY TOPICAL 3 TIMES DAILY
Qty: 30 ML | Refills: 0 | Status: SHIPPED | OUTPATIENT
Start: 2025-05-03 | End: 2025-05-08

## 2025-05-03 RX ORDER — HYDROCORTISONE 25 MG/G
CREAM TOPICAL 4 TIMES DAILY PRN
Status: DISCONTINUED | OUTPATIENT
Start: 2025-05-03 | End: 2025-05-03 | Stop reason: HOSPADM

## 2025-05-03 RX ORDER — FERROUS SULFATE 325(65) MG
325 TABLET, DELAYED RELEASE (ENTERIC COATED) ORAL
COMMUNITY

## 2025-05-03 RX ORDER — LIDOCAINE HYDROCHLORIDE 20 MG/ML
1 JELLY TOPICAL ONCE
Status: COMPLETED | OUTPATIENT
Start: 2025-05-03 | End: 2025-05-03

## 2025-05-03 RX ORDER — HYDROCORTISONE ACETATE 25 MG/1
25 SUPPOSITORY RECTAL 2 TIMES DAILY
Qty: 12 SUPPOSITORY | Refills: 0 | Status: SHIPPED | OUTPATIENT
Start: 2025-05-03 | End: 2025-05-19 | Stop reason: HOSPADM

## 2025-05-03 RX ORDER — HYDROCORTISONE ACETATE 25 MG/1
25 SUPPOSITORY RECTAL 2 TIMES DAILY
Qty: 12 SUPPOSITORY | Refills: 0 | Status: CANCELLED | OUTPATIENT
Start: 2025-05-03

## 2025-05-03 RX ORDER — LIDOCAINE HCL AND HYDROCORTISONE ACETATE 20; 20 MG/G; MG/G
1 CREAM RECTAL 2 TIMES DAILY
Qty: 1 KIT | Refills: 0 | Status: SHIPPED | OUTPATIENT
Start: 2025-05-03 | End: 2025-05-03 | Stop reason: ALTCHOICE

## 2025-05-03 RX ORDER — LIDOCAINE HYDROCHLORIDE 20 MG/ML
JELLY TOPICAL 3 TIMES DAILY
Qty: 30 ML | Refills: 0 | Status: SHIPPED | OUTPATIENT
Start: 2025-05-03 | End: 2025-05-03

## 2025-05-03 RX ORDER — HYDROCORTISONE 25 MG/G
CREAM TOPICAL 2 TIMES DAILY
Qty: 28 G | Refills: 0 | Status: CANCELLED | OUTPATIENT
Start: 2025-05-03

## 2025-05-03 RX ORDER — HYDROCORTISONE ACETATE 25 MG/1
25 SUPPOSITORY RECTAL 2 TIMES DAILY
Qty: 12 SUPPOSITORY | Refills: 0 | Status: SHIPPED | OUTPATIENT
Start: 2025-05-03 | End: 2025-05-03

## 2025-05-03 RX ORDER — LIDOCAINE HYDROCHLORIDE 20 MG/ML
1 JELLY TOPICAL ONCE
Status: DISCONTINUED | OUTPATIENT
Start: 2025-05-03 | End: 2025-05-03

## 2025-05-03 RX ADMIN — LIDOCAINE HYDROCHLORIDE 1 APPLICATION: 20 JELLY TOPICAL at 12:26

## 2025-05-03 RX ADMIN — HYDROCORTISONE 2.5% 1 APPLICATION: 25 CREAM TOPICAL at 12:22

## 2025-05-03 NOTE — ED PROVIDER NOTES
Time reflects when diagnosis was documented in both MDM as applicable and the Disposition within this note       Time User Action Codes Description Comment    5/3/2025 12:36 PM Jax Kidd [K64.8] Hemorrhoids with complication     5/3/2025 12:36 PM Jax Kidd [K62.3] Rectal prolapse           ED Disposition       ED Disposition   Discharge    Condition   Stable    Date/Time   Sat May 3, 2025 12:35 PM    Comment   Barry Roland discharge to home/self care.                   Assessment & Plan       Medical Decision Making  59-year-old male presents with rectal pain with swollen hemorrhoids, physical exam shows a small rectal prolapse, I discussed the patient with surgery who reports that using sugar to automatically reduce the rectal prolapse is the first line of treatment, as well as lidocaine and hydrocortisone cream, and likely follow-up with colorectal surgery as an outpatient.    The patient's rectal prolapse was reduced somewhat successfully with sugar, his hemorrhoids have decreased in severity of their swelling, the patient is safe for discharge home with colorectal surgery as an outpatient, and will be given a hydrocortisone lidocaine cream for management, instructed another care of hemorrhoids including sitz bath's, with return indications given.    Disposition: Patient stable for outpatient management. Discussed need for follow up with their primary doctor or specialist to review all results, including incidental findings as below. Patient discharged with explanation of ED workup and diagnosis, instructions on how to obtain outpatient follow up, care instructions at home, and strict return precautions if patient develops new or worsening symptoms. Patients questions answered and agreeable with discharge plan.        PLEASE NOTE:  This encounter was completed utilizing the [employer] Direct Speech Voice Recognition Software. Grammatical errors, random word insertions, pronoun  errors and incomplete sentences are occasional inherent consequences of the system due to software limitations, ambient noise and hardware issues.These may be missed by proof reading prior to affixing electronic signature. Any questions or concerns about the content, text or information contained within the body of this dictation should be directly addressed to the physician for clarification. Please do not hesitate to call me directly if you have any questions or concerns.      Risk  Prescription drug management.             Medications   hydrocortisone (ANUSOL-HC) 2.5 % rectal cream (1 Application Topical Given 5/3/25 1222)   lidocaine (URO-JET) 2 % urethral/mucosal gel 1 Application (1 Application Topical Given 5/3/25 1226)       ED Risk Strat Scores                    No data recorded        History of Present Illness       Chief Complaint   Patient presents with    Hemorrhoids     Patient to ED with c/o prolapsed hemorrhoid that has been progressively worsening since Wednesday. Patient reports discomfort that started Monday into Tuesday and the pain has not improved. Patient was seen at dr office this morning and was informed he had a prolapse and came to ED for further evaluation.        Past Medical History:   Diagnosis Date    Allergic     Seasonal    Anemia     Axillary adenopathy 08/15/2022    Closed fracture of one rib of left side with routine healing 08/29/2022    Colon polyp     Diabetes mellitus (HCC)     Disease of thyroid gland     GERD (gastroesophageal reflux disease)     Hyperlipidemia     Hypertension     Insomnia     Kidney stone     Lymphadenopathy of head and neck 08/15/2022    Sleep apnea     Type 1 diabetes mellitus (HCC)     Uses Insulin Pump    Vitamin D deficiency       Past Surgical History:   Procedure Laterality Date    BARIATRIC SURGERY      CARPAL TUNNEL RELEASE Right     COLONOSCOPY      EGD      FL RETROGRADE PYELOGRAM  4/24/2020    LASIK      [place] CYSTO/URETERO W/LITHOTRIPSY  &INDWELL STENT INSRT Right 2020    Procedure: CYSTOSCOPY URETEROSCOPY WITH LITHOTRIPSY HOLMIUM LASER, RETROGRADE PYELOGRAM AND INSERTION STENT URETERAL;  Surgeon: Shabbir Ireland MD;  [facility];  Service: Urology    OK LAPS GSTR RSTCV PX W/BYP GABBY-EN-Y LIMB <150 CM N/A 2021    Procedure: BYPASS GASTRIC GABBY-EN-Y LAPAROSCOPIC W ROBOTICS AND INTRAOPERATIVE EGD;  Surgeon: Danny Antonio MD;  [facility];  Service: Bariatrics    ROTATOR CUFF REPAIR Right     UPPER GASTROINTESTINAL ENDOSCOPY        Family History   Problem Relation Age of Onset    Thyroid disease unspecified Mother     Osteoporosis Mother     Arthritis Mother     Dementia Mother     Hearing loss Mother     Thyroid disease Mother     Hypertension Father     Colon cancer Father 68    Cancer Father     Diabetes type II Sister     Hypertension Sister     Hyperlipidemia Sister     Thyroid disease unspecified Sister     Stroke Sister     Diabetes Sister     Thyroid disease Sister     Diabetes type II Brother     Hypertension Brother     Hyperlipidemia Brother     Colon cancer Brother 60    Hypertension Sister     Hyperlipidemia Sister     Diabetes Sister     Thyroid disease Sister     Diabetes type II Sister     Thyroid disease unspecified Sister     Stroke Sister     Diabetes type II Brother     Hypertension Brother     Hyperlipidemia Brother     Diabetes type II Brother       Social History     Tobacco Use    Smoking status: Former     Current packs/day: 0.00     Average packs/day: 1 pack/day for 30.0 years (30.0 ttl pk-yrs)     Types: Cigarettes     Start date:      Quit date: 5/15/2015     Years since quittin.9    Smokeless tobacco: Never    Tobacco comments:     quit in    Vaping Use    Vaping status: Never Used   Substance Use Topics    Alcohol use: Yes     Alcohol/week: 2.0 standard drinks of alcohol     Types: 2 Standard drinks or equivalent per week     Comment: social    Drug use: No       E-Cigarette/Vaping    E-Cigarette Use Never User       E-Cigarette/Vaping Substances    Nicotine No     THC No     CBD No     Flavoring No     Other No     Unknown No       I have reviewed and agree with the history as documented.     51-year-old male presents with multiple days of significant pain and swelling in his rectum, has a history of hemorrhoids, reports that he has been attempting to reduce them manually, with limited success.  Reports that he has had some slight pinkish discoloration of his stools but no gross hematochezia, and no fever, headache, dizziness, nausea, vomiting, no cough or shortness of breath, no dysuria, hematuria, he does report that he takes multiple laxatives to make sure that his stools are soft, and has not had any constipation or diarrhea recently.  He reports previously he attempted to follow-up with a colorectal surgeon but could not get an appointment for 6 months.        Review of Systems   Constitutional:  Negative for fever.   Respiratory:  Negative for cough and shortness of breath.    Cardiovascular:  Negative for chest pain.   Gastrointestinal:  Negative for abdominal pain, diarrhea, nausea and vomiting.   Genitourinary:  Negative for dysuria and hematuria.   Neurological:  Negative for light-headedness and headaches.           Objective       ED Triage Vitals [05/03/25 1027]   Temperature Pulse Blood Pressure Respirations SpO2 Patient Position - Orthostatic VS   97.7 °F (36.5 °C) 70 141/86 18 97 % Sitting      Temp Source Heart Rate Source BP Location FiO2 (%) Pain Score    Oral Monitor Left arm -- 8      Vitals      Date and Time Temp Pulse SpO2 Resp BP Pain Score FACES Pain Rating User   05/03/25 1027 97.7 °F (36.5 °C) 70 97 % 18 141/86 8 -- AP            Physical Exam  Vitals and nursing note reviewed.   Constitutional:       General: He is not in acute distress.     Appearance: Normal appearance.   HENT:      Head: Normocephalic and atraumatic.      Right Ear:  External ear normal.      Left Ear: External ear normal.      Mouth/Throat:      Mouth: Mucous membranes are moist.      Pharynx: Oropharynx is clear.   Eyes:      Conjunctiva/sclera: Conjunctivae normal.      Pupils: Pupils are equal, round, and reactive to light.   Cardiovascular:      Rate and Rhythm: Normal rate and regular rhythm.   Pulmonary:      Effort: Pulmonary effort is normal. No respiratory distress.   Abdominal:      General: Abdomen is flat. There is no distension.   Genitourinary:     Comments: 2 very large nonthrombosed hemorrhoids that appear edematous,, with a small amount of rectal prolapse between the enlarged hemorrhoids.  Musculoskeletal:         General: No deformity. Normal range of motion.      Cervical back: Normal range of motion.   Skin:     General: Skin is warm and dry.   Neurological:      General: No focal deficit present.      Mental Status: He is alert and oriented to person, place, and time.   Psychiatric:         Mood and Affect: Mood normal.         Behavior: Behavior normal.         Results Reviewed       None            No orders to display       Procedures    ED Medication and Procedure Management   Prior to Admission Medications   Prescriptions Last Dose Informant Patient Reported? Taking?   ACETAMINOPHEN PO   Yes No   Sig: Take 80 mg by mouth every 4 (four) hours as needed for mild pain   Blood Glucose Monitoring Suppl (ONE TOUCH ULTRA 2) w/Device KIT  Self Yes No   Sig: Use     Calcium Citrate (JORDY-CITRATE PO)  Self Yes No   Sig: Take 1,000 tablets by mouth 2 (two) times a day   Continuous Glucose Sensor (Dexcom G7 Sensor)  Self No No   Sig: Use 1 Device every 10 days   Glucagon (Baqsimi Two Pack) 3 MG/DOSE POWD  Self No No   Sig: Use one dose as needed in case of severe hypoglycemia ( nasal spray)   Insulin Aspart (NovoLOG) 100 units/mL injection   No No   Sig: USE UP TO 75 UNITS DAILY VIA INSULIN PUMP   Insulin Glargine-yfgn 100 UNIT/ML SOPN   No No   Sig: INJECT 30  UNITS IN CASE OF PUMP FAILURE ( INCASE OF EMERGENCY)   Insulin Pen Needle (BD PEN NEEDLE LYRIC U/F) 32G X 4 MM MISC  Self No No   Sig: by Does not apply route daily Use once daily   Multiple Vitamins-Minerals (Bariatric Fusion) CHEW  Self Yes No   Sig: Chew     Probiotic Product (PROBIOTIC DAILY PO)  Self Yes No   Sig: Take 1 tablet by mouth daily    TURMERIC PO  Self Yes No   Sig: Take 1,400 capsules by mouth 2 (two) times a day   Tirzepatide-Weight Management (Zepbound) 5 mg/0.5 mL subcutaneous solution (vial)   No No   Sig: Inject 0.5 mL (5 mg total) under the skin once a week   Patient taking differently: Inject 5 mg under the skin once a week Held this week's dose   ferrous sulfate 325 (65 FE) MG EC tablet   Yes No   Sig: Take 325 mg by mouth 3 (three) times a day with meals   fluticasone (FLONASE) 50 mcg/act nasal spray  Self Yes No   Si spray into each nostril daily   levothyroxine (Synthroid) 200 mcg tablet   No No   Sig: Take 1 tablet daily Mon-Sat and 2 tablets on .   loratadine (CLARITIN) 10 mg tablet  Self No No   Sig: Take 1 tablet (10 mg total) by mouth daily   psyllium (METAMUCIL) 0.52 g capsule  Self Yes No   Sig: Take 0.52 g by mouth 3 (three) times a day Fiber capsules   rosuvastatin (CRESTOR) 5 mg tablet   No No   Sig: TAKE 1 TABLET (5 MG TOTAL) BY MOUTH DAILY.   vitamin B-12 (VITAMIN B-12) 1,000 mcg tablet  Self Yes No   Sig: Take 1,000 mcg by mouth 3 (three) times a week   zolpidem (AMBIEN) 10 mg tablet   No No   Sig: Take 1 tablet (10 mg total) by mouth daily at bedtime as needed for sleep      Facility-Administered Medications: None     Discharge Medication List as of 5/3/2025 12:40 PM        START taking these medications    Details   Lidocaine-Hydrocortisone Ace (Shirlene-Mitchel) 2-2 % KIT Insert 1 Application into the rectum 2 (two) times a day, Starting Sat 5/3/2025, Normal           CONTINUE these medications which have NOT CHANGED    Details   ACETAMINOPHEN PO Take 80 mg by mouth every  4 (four) hours as needed for mild pain, Historical Med      Blood Glucose Monitoring Suppl (ONE TOUCH ULTRA 2) w/Device KIT Use  , Starting Fri 12/23/2016, Historical Med      Calcium Citrate (JORDY-CITRATE PO) Take 1,000 tablets by mouth 2 (two) times a day, Historical Med      Continuous Glucose Sensor (Dexcom G7 Sensor) Use 1 Device every 10 days, Starting Tue 12/10/2024, Normal      ferrous sulfate 325 (65 FE) MG EC tablet Take 325 mg by mouth 3 (three) times a day with meals, Historical Med      fluticasone (FLONASE) 50 mcg/act nasal spray 1 spray into each nostril daily, Historical Med      Glucagon (Baqsimi Two Pack) 3 MG/DOSE POWD Use one dose as needed in case of severe hypoglycemia ( nasal spray), Normal      Insulin Aspart (NovoLOG) 100 units/mL injection USE UP TO 75 UNITS DAILY VIA INSULIN PUMP, Normal      Insulin Glargine-yfgn 100 UNIT/ML SOPN INJECT 30 UNITS IN CASE OF PUMP FAILURE ( INCASE OF EMERGENCY), Normal      Insulin Pen Needle (BD PEN NEEDLE LYRIC U/F) 32G X 4 MM MISC by Does not apply route daily Use once daily, Starting Mon 9/23/2019, Normal      levothyroxine (Synthroid) 200 mcg tablet Take 1 tablet daily Mon-Sat and 2 tablets on Sunday., Normal      loratadine (CLARITIN) 10 mg tablet Take 1 tablet (10 mg total) by mouth daily, Starting Mon 4/19/2021, Normal      Multiple Vitamins-Minerals (Bariatric Fusion) CHEW Chew  , Historical Med      Probiotic Product (PROBIOTIC DAILY PO) Take 1 tablet by mouth daily , Historical Med      psyllium (METAMUCIL) 0.52 g capsule Take 0.52 g by mouth 3 (three) times a day Fiber capsules, Historical Med      rosuvastatin (CRESTOR) 5 mg tablet TAKE 1 TABLET (5 MG TOTAL) BY MOUTH DAILY., Starting Fri 3/21/2025, Normal      Tirzepatide-Weight Management (Zepbound) 5 mg/0.5 mL subcutaneous solution (vial) Inject 0.5 mL (5 mg total) under the skin once a week, Starting Fri 4/4/2025, Normal      TURMERIC PO Take 1,400 capsules by mouth 2 (two) times a day,  Historical Med      vitamin B-12 (VITAMIN B-12) 1,000 mcg tablet Take 1,000 mcg by mouth 3 (three) times a week, Historical Med      zolpidem (AMBIEN) 10 mg tablet Take 1 tablet (10 mg total) by mouth daily at bedtime as needed for sleep, Starting Tue 4/29/2025, Normal             ED SEPSIS DOCUMENTATION   Time reflects when diagnosis was documented in both MDM as applicable and the Disposition within this note       Time User Action Codes Description Comment    5/3/2025 12:36 PM Jax Kidd [K64.8] Hemorrhoids with complication     5/3/2025 12:36 PM Jax Kidd [K62.3] Rectal prolapse                  Jax Kdid MD  05/03/25 1061

## 2025-05-03 NOTE — PROGRESS NOTES
[facility]        NAME: Barry Roland is a 59 y.o. male  : 1965    MRN: [id number]  DATE: May 3, 2025  TIME: 9:46 AM    Assessment and Plan   Prolapsed hemorrhoids [K64.8]  1. Prolapsed hemorrhoids  Transfer to other facility      We discussed treatment at home with close colorectal follow-up.  He is in significant pain, unable to take NSAIDs, in need of further pain management, he will proceed to ER now.        Patient Instructions       Follow up with PCP in 3-5 days.  Proceed to  ER if symptoms worsen.    If tests have been performed at [facility], our office will contact you with results if changes need to be made to the care plan discussed with you at the visit.  [facility]    Chief Complaint     Chief Complaint   Patient presents with    Hemorrhoids     Pt experiencing hemorrhoids since Monday. Has previous hx.          History of Present Illness       Patient presents for evaluation of significant rectal pain and swelling.  Over this past week, he has been having increasing pain and swelling about the anus.  He has history of previous internal hemorrhoids which are usually easily reduced.  He has been having increasing pain as well as swelling that he is unable to reduce.  No diarrhea, constipation.  He has felt a bit feverish but no measured fevers.            Review of Systems   Review of Systems   All other systems reviewed and are negative.        Current Medications       Current Outpatient Medications:     Blood Glucose Monitoring Suppl (ONE TOUCH ULTRA 2) w/Device KIT, Use  , Disp: , Rfl:     Calcium Citrate (JORDY-CITRATE PO), Take 1,000 tablets by mouth 2 (two) times a day, Disp: , Rfl:     Continuous Glucose Sensor (Dexcom G7 Sensor), Use 1 Device every 10 days, Disp: 9 each, Rfl: 1    ferrous sulfate 325 (65 FE) MG EC tablet, Take 325 mg by mouth 3 (three) times a day with meals, Disp: , Rfl:     fluticasone (FLONASE) 50 mcg/act nasal  spray, 1 spray into each nostril daily, Disp: , Rfl:     hydrocortisone (ANUSOL-HC) 2.5 % rectal cream, Apply topically 2 (two) times a day, Disp: 28 g, Rfl: 0    hydrocortisone (ANUSOL-HC) 25 mg suppository, Insert 1 suppository (25 mg total) into the rectum 2 (two) times a day, Disp: 12 suppository, Rfl: 0    Insulin Aspart (NovoLOG) 100 units/mL injection, USE UP TO 75 UNITS DAILY VIA INSULIN PUMP, Disp: 70 mL, Rfl: 1    Insulin Glargine-yfgn 100 UNIT/ML SOPN, INJECT 30 UNITS IN CASE OF PUMP FAILURE ( INCASE OF EMERGENCY), Disp: 15 mL, Rfl: 1    Insulin Pen Needle (BD PEN NEEDLE LYRIC U/F) 32G X 4 MM MISC, by Does not apply route daily Use once daily, Disp: 100 each, Rfl: 2    levothyroxine (Synthroid) 200 mcg tablet, Take 1 tablet daily Mon-Sat and 2 tablets on Sunday., Disp: 102 tablet, Rfl: 2    loratadine (CLARITIN) 10 mg tablet, Take 1 tablet (10 mg total) by mouth daily, Disp: 90 tablet, Rfl: 1    Multiple Vitamins-Minerals (Bariatric Fusion) CHEW, Chew  , Disp: , Rfl:     Probiotic Product (PROBIOTIC DAILY PO), Take 1 tablet by mouth daily , Disp: , Rfl:     psyllium (METAMUCIL) 0.52 g capsule, Take 0.52 g by mouth 3 (three) times a day Fiber capsules, Disp: , Rfl:     rosuvastatin (CRESTOR) 5 mg tablet, TAKE 1 TABLET (5 MG TOTAL) BY MOUTH DAILY., Disp: 90 tablet, Rfl: 0    Tirzepatide-Weight Management (Zepbound) 5 mg/0.5 mL subcutaneous solution (vial), Inject 0.5 mL (5 mg total) under the skin once a week (Patient taking differently: Inject 5 mg under the skin once a week Held this week's dose), Disp: 2 mL, Rfl: 2    TURMERIC PO, Take 1,400 capsules by mouth 2 (two) times a day, Disp: , Rfl:     vitamin B-12 (VITAMIN B-12) 1,000 mcg tablet, Take 1,000 mcg by mouth 3 (three) times a week, Disp: , Rfl:     zolpidem (AMBIEN) 10 mg tablet, Take 1 tablet (10 mg total) by mouth daily at bedtime as needed for sleep, Disp: 30 tablet, Rfl: 0    ACETAMINOPHEN PO, Take 80 mg by mouth every 4 (four) hours as needed  for mild pain, Disp: , Rfl:     Glucagon (Baqsimi Two Pack) 3 MG/DOSE POWD, Use one dose as needed in case of severe hypoglycemia ( nasal spray), Disp: 1 each, Rfl: 3    Current Allergies     Allergies as of 05/03/2025 - Reviewed 05/03/2025   Allergen Reaction Noted    Ibuprofen GI Intolerance and Nausea Only 11/09/2017    Other Other (See Comments) 05/27/2016            The following portions of the patient's history were reviewed and updated as appropriate: allergies, current medications, past family history, past medical history, past social history, past surgical history and problem list.     Past Medical History:   Diagnosis Date    Allergic     Seasonal    Anemia     Axillary adenopathy 08/15/2022    Closed fracture of one rib of left side with routine healing 08/29/2022    Colon polyp     Diabetes mellitus (HCC)     Disease of thyroid gland     GERD (gastroesophageal reflux disease)     Hyperlipidemia     Hypertension     Insomnia     Kidney stone     Lymphadenopathy of head and neck 08/15/2022    Sleep apnea     Type 1 diabetes mellitus (HCC)     Uses Insulin Pump    Vitamin D deficiency        Past Surgical History:   Procedure Laterality Date    BARIATRIC SURGERY      CARPAL TUNNEL RELEASE Right     COLONOSCOPY      EGD      FL RETROGRADE PYELOGRAM  4/24/2020    LASIK      [place] CYSTO/URETERO W/LITHOTRIPSY &INDWELL STENT INSRT Right 4/24/2020    Procedure: CYSTOSCOPY URETEROSCOPY WITH LITHOTRIPSY HOLMIUM LASER, RETROGRADE PYELOGRAM AND INSERTION STENT URETERAL;  Surgeon: Shabbir Ireland MD;  [facility];  Service: Urology    OH LAPS GSTR RSTCV PX W/BYP GABBY-EN-Y LIMB <150 CM N/A 4/26/2021    Procedure: BYPASS GASTRIC GABBY-EN-Y LAPAROSCOPIC W ROBOTICS AND INTRAOPERATIVE EGD;  Surgeon: Danny Antonio MD;  [facility];  Service: Bariatrics    ROTATOR CUFF REPAIR Right     UPPER GASTROINTESTINAL ENDOSCOPY         Family History   Problem Relation Age of Onset    Thyroid disease unspecified  "Mother     Osteoporosis Mother     Arthritis Mother     Dementia Mother     Hearing loss Mother     Thyroid disease Mother     Hypertension Father     Colon cancer Father 68    Cancer Father     Diabetes type II Sister     Hypertension Sister     Hyperlipidemia Sister     Thyroid disease unspecified Sister     Stroke Sister     Diabetes Sister     Thyroid disease Sister     Diabetes type II Brother     Hypertension Brother     Hyperlipidemia Brother     Colon cancer Brother 60    Hypertension Sister     Hyperlipidemia Sister     Diabetes Sister     Thyroid disease Sister     Diabetes type II Sister     Thyroid disease unspecified Sister     Stroke Sister     Diabetes type II Brother     Hypertension Brother     Hyperlipidemia Brother     Diabetes type II Brother          Medications have been verified.        Objective   /80   Pulse 75   Temp 98.7 °F (37.1 °C)   Resp 18   Ht 5' 9\" (1.753 m)   Wt 98 kg (216 lb)   SpO2 96%   BMI 31.90 kg/m²   No LMP for male patient.       Physical Exam     Physical Exam  Vitals and nursing note reviewed. Exam conducted with a chaperone present.   Constitutional:       Appearance: He is not ill-appearing, toxic-appearing or diaphoretic.   HENT:      Head: Normocephalic and atraumatic.      Right Ear: External ear normal.      Left Ear: External ear normal.      Nose: Nose normal.   Eyes:      Extraocular Movements: Extraocular movements intact.      Conjunctiva/sclera: Conjunctivae normal.   Genitourinary:         Comments: Significantly edematous prolapsed internal hemorrhoids  Skin:     General: Skin is warm and dry.      Findings: No rash.   Neurological:      Mental Status: He is alert.   Psychiatric:         Mood and Affect: Mood normal.                     "

## 2025-05-05 ENCOUNTER — TELEPHONE (OUTPATIENT)
Age: 60
End: 2025-05-05

## 2025-05-05 NOTE — TELEPHONE ENCOUNTER
This patient was scheduled for hemorrhoidectomy with Dr. Jung which was canceled in March 2025. Patient should see Dr. Jung for follow up. Please call patient to reschedule this appointment.

## 2025-05-05 NOTE — TELEPHONE ENCOUNTER
Patient contacted office requesting ED follow up for prolapsed hemorrhoid. Call transferred to nurse triage to further assist.  Patient requesting anyone but Dr. Jung.

## 2025-05-05 NOTE — TELEPHONE ENCOUNTER
SPOKE WITH PT, SEEN IN ER 5/3/25 FOR RECTAL PROLAPSE, RECTAL PAIN, SWOLLEN HEMORRHOIDS. OFFICE VISIT SCHEDULED FOR 5/8/25.

## 2025-05-06 ENCOUNTER — TELEPHONE (OUTPATIENT)
Age: 60
End: 2025-05-06

## 2025-05-06 ENCOUNTER — OFFICE VISIT (OUTPATIENT)
Age: 60
End: 2025-05-06
Payer: COMMERCIAL

## 2025-05-06 VITALS
HEIGHT: 69 IN | SYSTOLIC BLOOD PRESSURE: 140 MMHG | BODY MASS INDEX: 31.99 KG/M2 | DIASTOLIC BLOOD PRESSURE: 78 MMHG | WEIGHT: 216 LBS

## 2025-05-06 DIAGNOSIS — K64.5 THROMBOSED HEMORRHOIDS: Primary | ICD-10-CM

## 2025-05-06 PROCEDURE — 99213 OFFICE O/P EST LOW 20 MIN: CPT | Performed by: SURGERY

## 2025-05-06 RX ORDER — SODIUM CHLORIDE, SODIUM LACTATE, POTASSIUM CHLORIDE, CALCIUM CHLORIDE 600; 310; 30; 20 MG/100ML; MG/100ML; MG/100ML; MG/100ML
20 INJECTION, SOLUTION INTRAVENOUS CONTINUOUS
OUTPATIENT
Start: 2025-05-06

## 2025-05-06 RX ORDER — METHOCARBAMOL 500 MG/1
500 TABLET, FILM COATED ORAL 4 TIMES DAILY
Qty: 30 TABLET | Refills: 0 | Status: SHIPPED | OUTPATIENT
Start: 2025-05-06 | End: 2025-05-15 | Stop reason: SDUPTHER

## 2025-05-06 NOTE — PROGRESS NOTES
:  Assessment & Plan  Thrombosed hemorrhoids    Orders:    methocarbamol (ROBAXIN) 500 mg tablet; Take 1 tablet (500 mg total) by mouth 4 (four) times a day    Case request operating room: HEMORRHOIDECTOMY EXCISION; Standing      Assessment & Plan  1. Prolapsing internal hemorrhoids.  The patient has a history of hemorrhoids for 30 years and currently presents with prolapsing internal hemorrhoids that are too large to rubber band. The patient was informed that the only way to treat both internal and external hemorrhoids simultaneously is through surgery. The patient was advised to avoid donut pillows as they can exacerbate the condition by mimicking the sitting position on a toilet, which can cause the hemorrhoids to prolapse. The patient was advised to take Aleve once daily for pain management and to avoid narcotics due to their potential to cause constipation. A muscle relaxant will be prescribed to alleviate pain from spasms. The patient was instructed to soak in warm water with soap and Epsom salts, and to sit on ice when unable to soak. The patient was also advised to stop taking all stool softeners and laxatives, and to continue with fiber supplements and probiotics. The patient should aim to drink at least 64 ounces of water daily. The patient was advised to take 1 tablespoon of mineral oil at night to aid in bowel movements. The patient was informed about the risks associated with hemorrhoid surgery, including pain, bleeding for up to a month post-surgery, stenosis, and incontinence. The patient was provided with postoperative instructions and was advised to take at least a week off work following the surgery.    2. Thrombosed external hemorrhoids.  The patient also presents with thrombosed external hemorrhoids that are too large to excise in the office. The patient was informed that these can become very painful and that the only way to treat them effectively is through surgery. The patient was advised to  take Aleve once daily for pain management and to avoid narcotics due to their potential to cause constipation. A muscle relaxant will be prescribed to alleviate pain from spasms. The patient was instructed to soak in warm water with soap and Epsom salts, and to sit on ice when unable to soak. The patient was also advised to stop taking all stool softeners and laxatives, and to continue with fiber supplements and probiotics. The patient should aim to drink at least 64 ounces of water daily. The patient was advised to take 1 tablespoon of mineral oil at night to aid in bowel movements. The patient was informed about the risks associated with hemorrhoid surgery, including pain, bleeding for up to a month post-surgery, stenosis, and incontinence. The patient was provided with postoperative instructions and was advised to take at least a week off work following the surgery.    3. Urinary retention.  The patient reported difficulty urinating, which may be related to the pain and muscle spasms from the hemorrhoids. The patient was advised that a muscle relaxant might help alleviate this symptom. The patient was also instructed to continue soaking in warm water to facilitate urination.    Follow-up  The patient will follow up in 4 to 6 weeks.    PROCEDURE  The patient underwent gastric bypass surgery in 2021.      History of Present Illness     Barry Roland is a 59 y.o. male   History of Present Illness  The patient is a 59-year-old male who presents for consultation on hemorrhoids.    He has been experiencing a persistent prolapse of his internal hemorrhoids since the previous Friday, which he is unable to manually reduce as he typically would during his shower. The size of the hemorrhoid has significantly increased, causing severe discomfort. He first observed this condition last week, with mild pain on Monday and Tuesday, which he initially dismissed as typical hemorrhoidal discomfort. However, the pain escalated over  the week, particularly due to prolonged periods of sitting during meetings and driving. By Friday, the pain had become intolerable, necessitating an emergency room visit on Saturday. This is the first instance where he has been unable to manually reduce the prolapsed hemorrhoid. The pain is exacerbated by sitting. The emergency room physician diagnosed him with prolapsed internal hemorrhoids and rectal prolapse, and applied sugar to the area. He reports no worsening of the pain since the weekend. He has a 30-year history of hemorrhoids, which he has managed by manually reducing them during showers. He has a history of bleeding hemorrhoids, which he has learned to control by manually reducing them, thus preventing further bleeding. 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 He has been prescribed OxyContin in the past, but prefers Tylenol as it provides equivalent relief. He has been using Aleve once daily for pain management, despite being advised against it post-gastric bypass surgery in 2021. He has been using Epsom salts for relief.    He also reports incontinence, which he attributes to the hemorrhoids. Over the past three days, his stools have been loose, although they have slightly firmed up today. He has been taking stool softeners, MiraLAX, and fiber supplements to prevent constipation and subsequent bleeding.    Additionally, he has been experiencing difficulty urinating for the past two days, which he believes may be due to muscle tension from the pain or potential prostate enlargement. He has been able to urinate, but only with difficulty, often requiring a warm bath to facilitate urination.    Supplemental Information  He is a type 1 diabetic.    SOCIAL HISTORY  He does not drink alcohol. 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    MEDICATIONS  Current: Aleve, Tylenol,  "MiraLAX, fiber supplements.  Past: OxyContin  Review of Systems   Constitutional:  Negative for chills and fever.   HENT:  Negative for ear pain and sore throat.    Eyes:  Negative for pain and visual disturbance.   Respiratory:  Negative for cough and shortness of breath.    Cardiovascular:  Negative for chest pain and palpitations.   Gastrointestinal:  Positive for anal bleeding and rectal pain. Negative for abdominal pain and vomiting.   Genitourinary:  Negative for dysuria and hematuria.   Musculoskeletal:  Negative for arthralgias and back pain.   Skin:  Negative for color change and rash.   Neurological:  Negative for seizures and syncope.   All other systems reviewed and are negative.    Objective   /78   Ht 5' 9\" (1.753 m)   Wt 98 kg (216 lb)   BMI 31.90 kg/m²      Physical Exam  Vitals and nursing note reviewed.   Constitutional:       General: He is not in acute distress.     Appearance: He is well-developed.   HENT:      Head: Normocephalic and atraumatic.   Eyes:      Conjunctiva/sclera: Conjunctivae normal.   Cardiovascular:      Rate and Rhythm: Normal rate and regular rhythm.      Heart sounds: No murmur heard.  Pulmonary:      Effort: Pulmonary effort is normal. No respiratory distress.      Breath sounds: Normal breath sounds.   Abdominal:      Palpations: Abdomen is soft.      Tenderness: There is no abdominal tenderness.   Musculoskeletal:         General: No swelling.      Cervical back: Neck supple.   Skin:     General: Skin is warm and dry.      Capillary Refill: Capillary refill takes less than 2 seconds.   Neurological:      Mental Status: He is alert.   Psychiatric:         Mood and Affect: Mood normal.       Physical Exam  Prolapsing internal hemorrhoids and thrombosed external hemorrhoids are present in the gastrointestinal system.    Results    Administrative Statements   I have spent a total time of 26 minutes in caring for this patient on the day of the visit/encounter " including Diagnostic results, Prognosis, Risks and benefits of tx options, Instructions for management, Patient and family education, Importance of tx compliance, Risk factor reductions, Impressions, Counseling / Coordination of care, Documenting in the medical record, Reviewing/placing orders in the medical record (including tests, medications, and/or procedures), Obtaining or reviewing history  , and Communicating with other healthcare professionals .

## 2025-05-06 NOTE — PATIENT INSTRUCTIONS
Take 1 tablespoon of mineral oil at night    Continue metamucil    Stop everything else (miralax, laxatives, dulcolax, suppositories)    Post-Operative Care Information      Resume high fiber diet. Fiber supplementation with Metamucil or Konsyl also recommended daily.       Your first bowel movement may take up to 3 days after surgery. THIS IS OFTEN PAINFUL.      While taking narcotic pain medication, you should remain on an over-the-counter stool softener such as Colace (one tablet twice daily). For constipation Miralax can be taken up to three times daily.     Keep a clean, dry gauze over the wound if you have bleeding or drainage. It is OK to shower. Clean the area with only water and avoid soaps, lotions, and ointments in the area.    Pain is to be expected after surgery. Ibuprofen (400?600 mg) every 6?8 hours and Tylenol (650 mg) every 6 hours is an excellent alternative in addition or as a substitute to your narcotic pain medication.     Rectal bleeding or drainage is normal after surgery.       Nausea is a common complaint post op. This can be associated with the narcotic pain medications, anesthesia as well as with severe constipation.     Driving may be resumed when all off all narcotic pain medications and you can turn or twist your body without hesitation.    If you are unable to urinate within 8 hours after surgery, please call the office at [phone]    Frequent warm tub soaks or warm showers are often soothing, we suggest 3 to 4 times daily.    After bowel movements, you may wash with a hand shower or warm tub soak.    No strenuous activity (i.e., Running, jogging, swimming, or weightlifting) for up to one week after surgery.     When will I receive follow-up care?      You should be scheduled for a post-op appointment within 6-8 weeks after surgery, unless otherwise instructed on your discharge summary. If you do not have an existing appointment at the time of discharge, please call our office at  [phone].    Call the office at [phone] immediately if you have a fever, chills, excessive sweating, difficulty urinating, or excessive/profuse bleeding with clots.

## 2025-05-06 NOTE — TELEPHONE ENCOUNTER
Patient scheduled for surgery 5/19/25 at [facility]. Instructions, including diabetic instructions, and PAT's gone over with and handed to the patient.     Zepbound, hold 7 days prior    Sent pt message with instructions for clears morning of surgery, due to Type 1 diabetes.

## 2025-05-06 NOTE — PROGRESS NOTES
Name: Barry Roland      : 1965      MRN: [id number]  Encounter Provider: Maria Del Carmen Mc MD  Encounter Date: 2025   Encounter department: [facility]  :  Assessment & Plan           History of Present Illness {?Quick Links Encounters * My Last Note * Last Note in Specialty * Snapshot * Since Last Visit * History :80603}  HPI    Barry Roland presents with concerns of rectal prolapse.     The patient was seen at the emergency room on 5/3/2025 with pain and swelling in his rectum; exam showed:  2 very large nonthrombosed hemorrhoids that appear edematous,, with a small amount of rectal prolapse between the enlarged hemorrhoids.     Previously seen in the office by Dr. Jung on 2025 with bleeding hemorrhoids; Examination showed large internal hemorrhoids with excoriation and prolapse; hemorrhoidal pexy with dearterialization  was recommended at the time.     Last colonoscopy performed on 2024 by Dr. Harding, with a 7 year recall.     Family history of colon cancer in father and brother.     Review of Systems    Objective {?Quick Links Trend Vitals * Enter New Vitals * Results Review * Timeline (Adult) * Labs * Imaging * Cardiology * Procedures * Lung Cancer Screening * Surgical eConsent :88463}  There were no vitals taken for this visit.     Physical Exam  {Administrative / Billing Section (Optional):25416}

## 2025-05-06 NOTE — LETTER
Barry Roland  [address] [place] [address]        MIRALAX/GATORADE SURGERY PREPARATION INSTRUCTIONS  DAY BEFORE SURGERY:  Have a normal breakfast and clear liquids thereafter. It is important that you drink plenty of clear liquids throughout the day to prevent dehydration. Nothing red, orange or purple    You MAY have:  Soda  Water  Broth Gatorade  Jell-O  Popsicles Coffee/Tea without milk/creamer     YOU MAY NOT HAVE:  Solid Foods  Milk and milk products  Juice with pulp    BOWEL PREPARATION: Includes: One (1) 238-gram container of Miralax (polyethylene glycol 3350), four (4) 5 mg Dulcolax (bisacodyl) tablets, and 64-ounces of Gatorade (sports drink). Preparation may be refrigerated. Entire bowel prep should be completed.    **REMEMBER** A prescription for antibiotics has been called into your pharmacy for  prior to your surgery.    At 1:00 pm, take (4) Dulcolax Tablets with 8 oz. of water.  Swallow the tablets whole with a full glass of water.  The package may direct you not to exceed (2) tablets at any time but for the purpose of this examination, you should take (4).    At 1:00 pm, Mix the 238g bottle of MiraLax in 64 oz. of Gatorade and shake the solution until the MiraLax is dissolved.  Drink 8 oz. glassfuls at your own pace.  It may take 3-4 hours to drink all the solution.    At 1:00 pm, take your first dose of antibiotic as prescribed.    At 2:00 pm, take your second dose of antibiotic as prescribed.    At 10:00 pm, take your last dose of antibiotic as prescribed.  REMEMBER TO STAY CLOSE TO TOILET FACILITIES.    DAY OF SURGERY  NOTHING TO EAT OR DRINK (INCLUDING CHEWING GUM) AFTER 12 MIDNIGHT PRIOR TO YOUR SURGERY EXCEPT YOUR PRE-SURGERY ENSURE DRINK (IF APPLICABLE).  For any questions or concerns related to your bowel preparation or pre-procedure instructions, please contact our office. Thank you for choosing [facility]!

## 2025-05-06 NOTE — LETTER
Barry Roland  [address] [place] [address]        5/6/2025    Dear Barry Roland,    Your surgery is scheduled for: TBD  The hospital will call the evening prior to your surgery with your expected arrival time.   Location:[facility] [address] [facility] [address]    CHECK LIST PRIOR TO OUTPATIENT SURGERY  It is your responsibility to obtain any/all referrals needed for your surgery if required by your insurance. Our office will contact you to discuss your insurance coverage for this procedure.     Special instructions required if you are taking any blood thinners. Please verify with the prescribing physician. Examples include Coumadin, Plavix, Xarelto, Eliquis, Pradaxa, etc.     Please check with your family physician if you are taking the following medications: Aspirin or any Aspirin containing medication, Gingko biloba, Ginseng, Feverfew, and/or Charito’s Wort. We suggest stopping these for 3 days.     The night before and the day of your surgery, wash from your neck to groin with chlorhexidine soap. This soap is available at most retail pharmacies under such brand names as Hibiclens, Endure or Aplicare.     Pre-admission testing Required: YES      NO X       Other Clearances/ Additional Testing: NONE     NOTHING TO EAT OR DRINK AFTER MIDNIGHT PRIOR TO SURGERY.     Take ONE FLEET ENEMA one hour prior to leaving for the hospital.     Please do not hesitate to call our office with any questions regarding your surgery.                Medicine Instructions for Adults with Diabetes who Need a Bowel Prep       Follow these instructions when a BOWEL PREP is required for your procedure or surgery!    NOTE:   GLP-1 Agonists taken weekly: do not take in the 7 days before your procedure   SGLT-2 Inhibitors: do not take in the 4 days before your procedure     On the Day Before Surgery/Procedure  If you are having a procedure (e.g. Colonoscopy) or surgery that requires a bowel  prep and you may have at least a clear liquid diet, follow the directions below based on the type of medicine you take for your diabetes.     Type of Medicine You Take Examples What to do   Pre-Mixed Insulin - Intermediate Acting Humalog® 75/25, Humulin® 70/30, Novolog® 70/30, Regular Insulin Take ½ your regular dose the evening before your procedure   Rapid/Fast Acting Insulin Humalog® U200, NovoLog®, Apidra®, Fiasp® Take ½ your regular dose the evening before your procedure.   Long-Acting Insulin Lantus®, Levemir®, Tresiba®, Toujeo®, Basaglar® Take your FULL regular dose the day before procedure   Oral Sulfonylurea Glipizide/Glimepiride/Glucotrol® Take ½ your regular dose the evening before your procedure   Other Oral Diabetes Medicines Metformin®, Glucophage®, Glucophage XR®, Riomet®, Glumetza®), Actose®, Avandia®, Glyset®, Prandin® Take your regular dose with dinner in the evening before your procedure   GLP-1 Agonists AdlyxinÒ, ByettaÒ, BydureonÒ, OzempicÒ, SoliquaÒ, TanzeumÒ, TrulicityÒ, VictozaÒ, Saxenda®, Rybelsus® If taken daily, take as normal    If taken weekly, do not take this medicine for 7 days before your procedure including the day of the procedure (resume taking after the procedure)   SGLT-2 Inhibitors Jardiance®, Invokana®, Farxiga®,   Steglatro®, Brenzavvy®, Qtern®, Segluromet®, Glyxambi®, Synjardy®, Synjardy XR®, Invokamet®, Invokamet XR®, Trijary XR®, Xigduo XR®, Steglujan® Do not take for 4 days before your procedure including the day of the procedure (resume taking after the procedure)          More information continued on back              Medicine Instructions for Adults with Diabetes who Need a Bowel Prep  Page 2      On the Day of Surgery/Procedure  Follow the directions below based on the type of medicine you take for your diabetes.     Type of Medicine You Take Examples What to do   Long-Acting Insulin Lantus®, Levemir®, Tresiba®, Toujeo®, Basaglar®, Semglee®   If you usually take your  Long-Acting Insulin in the morning, take the full dose as scheduled.   GLP-1 Agonists AdlyxinÒ, ByettaÒ, BydureonÒ, OzempicÒ, SoliquaÒ, TanzeumÒ, TrulicityÒ, VictozaÒ, Saxenda®, Rybelsus® Do NOT take this medicine on the day of your procedure (resume taking after the procedure)       On the Day of Surgery/Procedure (continued)  Except for the morning Long-Acting Insulin, DO NOT take ANY diabetic medicine on the day of your procedure unless you were instructed by the doctor who manages your diabetes medicines.    Continue to check your blood sugars.  If you have an insulin pump, ask your endocrinologist for instructions at least 3 days before your procedure. NOTE: If you are not able to ask your endocrinologist in advance, on the day of the procedure set your insulin pump to your basal rate only. Bring your insulin pump supplies to the hospital.     If you have any questions about taking your diabetes medicines prior to your procedure, please contact the doctor who manages your diabetes medicines.                                                  Post-Operative Care Information   Hemorrhoidectomy, EUA, Fissurectomy, Fistulotomy, Sphincterotomy      Resume high fiber diet. Fiber supplementation with Metamucil or Konsyl also recommended daily.       Your first bowel movement may take up to 3 days after surgery. THIS IS OFTEN PAINFUL.      While taking narcotic pain medication, you should remain on an over-the-counter stool softener such as Colace (one tablet twice daily). For constipation Miralax can be taken up to three times daily.     Pain is to be expected after hemorrhoid surgery. Ibuprofen (400? 600MG) every 6?8 hours is an excellent alternative in addition or as a substitute to your narcotic pain medication.     Rectal bleeding or drainage is normal after surgery.       Nausea is a common complaint post op. This can be associated with the narcotic pain medications, anesthesia as well as with severe constipation.      Driving may be resumed when all off all narcotic pain medications and you can turn or twist your body without hesitation.    If you are unable to urinate within 8 hours after surgery, please call the office at [phone]    Frequent warm tub soaks or warm showers are often soothing, we suggest 3 to 4 times daily.    After bowel movements, you may wash with a hand shower or warm tub soak.  No soap is okay.     No strenuous activity (i.e., Running, jogging, swimming, or weightlifting) for up to one week after surgery.     When will I receive follow-up care?      You should be scheduled for a post-op appointment within 6-8 weeks after surgery, unless otherwise instructed on your discharge summary. If you do not have an existing appointment at the time of discharge, please call our office at [phone].    Call the office at [phone] immediately if you have a fever, chills, excessive sweating, difficulty urinating, or excessive/profuse bleeding with clots.

## 2025-05-08 NOTE — PRE-PROCEDURE INSTRUCTIONS
Pre-Surgery Instructions:   Medication Instructions    ACETAMINOPHEN PO Uses PRN- OK to take day of surgery    Calcium Citrate (JORDY-CITRATE PO) Stop taking 7 days prior to surgery.    Continuous Glucose Sensor (Dexcom G7 Sensor) Take day of surgery.    ferrous sulfate 325 (65 FE) MG EC tablet Hold day of surgery.    fluticasone (FLONASE) 50 mcg/act nasal spray Take day of surgery.    Glucagon (Baqsimi Two Pack) 3 MG/DOSE POWD Uses PRN- OK to take day of surgery    hydrocortisone (Anucort-HC) 25 mg suppository Uses PRN- DO NOT take day of surgery    Insulin Aspart (NovoLOG) 100 units/mL injection *If you have an insulin pump- contact your endocrinologist for instructions at least 3 days prior to your procedure; if you are unable to receive instructions in advance, set your pump to your BASAL RATE ONLY     Insulin Glargine-yfgn 100 UNIT/ML SOPN *If you have an insulin pump- contact your endocrinologist for instructions at least 3 days prior to your procedure; if you are unable to receive instructions in advance, set your pump to your BASAL RATE ONLY     levothyroxine (Synthroid) 200 mcg tablet Take day of surgery.    lidocaine (XYLOCAINE) 2 % topical gel Uses PRN- DO NOT take day of surgery    loratadine (CLARITIN) 10 mg tablet Hold day of surgery.    methocarbamol (ROBAXIN) 500 mg tablet Take day of surgery.    Multiple Vitamins-Minerals (Bariatric Fusion) CHEW Stop taking 7 days prior to surgery.    Probiotic Product (PROBIOTIC DAILY PO) Stop taking 7 days prior to surgery.    psyllium (METAMUCIL) 0.52 g capsule Hold day of surgery.    rosuvastatin (CRESTOR) 5 mg tablet Take night before surgery    TURMERIC PO Stop taking 7 days prior to surgery.    vitamin B-12 (VITAMIN B-12) 1,000 mcg tablet Stop taking 7 days prior to surgery.    zolpidem (AMBIEN) 10 mg tablet Uses PRN- DO NOT take day of surgery    Medication instructions for day of surgery reviewed. Please take all instructed medications with only a sip of  water.       You will receive a call one business day prior to surgery with an arrival time and hospital directions. If your surgery is scheduled on a Monday, the hospital will be calling you on the Friday prior to your surgery. If you have not heard from anyone by 8pm, please call the hospital supervisor through the hospital  at [phone]. ([place] [phone] or [place] [phone]).    Do not eat or drink anything after midnight the night before your surgery, including candy, mints, lifesavers, or chewing gum. Do not drink alcohol 24hrs before your surgery. Try not to smoke at least 24hrs before your surgery.       Follow the pre surgery showering instructions as listed in the “My Surgical Experience Booklet” or otherwise provided by your surgeon's office. Do not use a blade to shave the surgical area 1 week before surgery. It is okay to use a clean electric clippers up to 24 hours before surgery. Do not apply any lotions, creams, including makeup, cologne, deodorant, or perfumes after showering on the day of your surgery. Do not use dry shampoo, hair spray, hair gel, or any type of hair products.     No contact lenses, eye make-up, or artificial eyelashes. Remove nail polish, including gel polish, and any artificial, gel, or acrylic nails if possible. Remove all jewelry including rings and body piercing jewelry.     Wear causal clothing that is easy to take on and off. Consider your type of surgery.    Keep any valuables, jewelry, piercings at home. Please bring any specially ordered equipment (sling, braces) if indicated.    Arrange for a responsible person to drive you to and from the hospital on the day of your surgery. Please confirm the visitor policy for the day of your procedure when you receive your phone call with an arrival time.     Call the surgeon's office with any new illnesses, exposures, or additional questions prior to surgery.    Please reference your “My Surgical Experience  Booklet” for additional information to prepare for your upcoming surgery.

## 2025-05-12 ENCOUNTER — ANESTHESIA EVENT (OUTPATIENT)
Dept: PERIOP | Facility: AMBULARY SURGERY CENTER | Age: 60
End: 2025-05-12
Payer: COMMERCIAL

## 2025-05-15 DIAGNOSIS — K64.5 THROMBOSED HEMORRHOIDS: ICD-10-CM

## 2025-05-16 RX ORDER — METHOCARBAMOL 500 MG/1
500 TABLET, FILM COATED ORAL 4 TIMES DAILY
Qty: 30 TABLET | Refills: 0 | Status: SHIPPED | OUTPATIENT
Start: 2025-05-16

## 2025-05-18 NOTE — ANESTHESIA PREPROCEDURE EVALUATION
Procedure:  HEMORRHOIDECTOMY EXCISION (Anus)    EKG 2022:  NSR    Relevant Problems   CARDIO   (+) Bleeding hemorrhoid   (+) Hyperlipidemia   (+) Hypertension      ENDO   (+) Hypothyroidism   (+) Type 1 diabetes mellitus with diabetic neuropathy (HCC)      GI/HEPATIC   (+) BRBPR (bright red blood per rectum)   (+) Bleeding hemorrhoid   (+) GERD (gastroesophageal reflux disease)      /RENAL   (+) Renal calculus, bilateral      HEMATOLOGY   (+) Iron deficiency anemia      MUSCULOSKELETAL   (+) Chronic back pain      NEURO/PSYCH   (+) Chronic back pain   (+) Type 1 diabetes mellitus with diabetic neuropathy (HCC)      PULMONARY   (+) FARZANA (obstructive sleep apnea)     Glucose monitor and insulin pump in place. Will continue devices in place and bring patient's cellphone into OR.     Meds  Insulin for DM1  Levothyroxine yesterday  Zolpidem (ambien) almost nightly    METS  Can walk 2 blocks    NPO?:  Foods last night  Liquids this morning water  Physical Exam    Airway     Mallampati score: II    Neck ROM: full      Cardiovascular      Dental   Comment: Some missing       Pulmonary      Neurological    He appears awake and alert.      Other Findings        Anesthesia Plan  ASA Score- 2     Anesthesia Type- IV sedation with anesthesia and general with ASA Monitors.         Additional Monitors:     Airway Plan: natural airway.    Comment: GA Backup.       Plan Factors-Exercise tolerance (METS): >4 METS.    Chart reviewed. EKG reviewed. Imaging results reviewed.  Patient summary reviewed.    Patient is not a current smoker.              Induction- intravenous.    Postoperative Plan- Plan for postoperative opioid use.   Monitoring Plan - Monitoring plan - standard ASA monitoring  Post Operative Pain Plan - non-opiod analgesics and plan for postoperative opioid use        Informed Consent- Anesthetic plan and risks discussed with patient and spouse.  I personally reviewed this patient with the CRNA. Discussed and agreed on  the Anesthesia Plan with the CRNA..      NPO Status:  No vitals data found for the desired time range.

## 2025-05-19 ENCOUNTER — NURSE TRIAGE (OUTPATIENT)
Dept: OTHER | Facility: OTHER | Age: 60
End: 2025-05-19

## 2025-05-19 ENCOUNTER — HOSPITAL ENCOUNTER (EMERGENCY)
Facility: HOSPITAL | Age: 60
Discharge: HOME | End: 2025-05-19
Attending: EMERGENCY MEDICINE
Payer: COMMERCIAL

## 2025-05-19 ENCOUNTER — ANESTHESIA (OUTPATIENT)
Dept: PERIOP | Facility: AMBULARY SURGERY CENTER | Age: 60
End: 2025-05-19
Payer: COMMERCIAL

## 2025-05-19 ENCOUNTER — HOSPITAL ENCOUNTER (OUTPATIENT)
Facility: AMBULARY SURGERY CENTER | Age: 60
Setting detail: OUTPATIENT SURGERY
Discharge: HOME | End: 2025-05-19
Attending: SURGERY | Admitting: SURGERY
Payer: COMMERCIAL

## 2025-05-19 VITALS
SYSTOLIC BLOOD PRESSURE: 177 MMHG | DIASTOLIC BLOOD PRESSURE: 97 MMHG | RESPIRATION RATE: 18 BRPM | HEART RATE: 79 BPM | OXYGEN SATURATION: 96 % | TEMPERATURE: 98.1 F

## 2025-05-19 VITALS
TEMPERATURE: 97.8 F | HEART RATE: 63 BPM | SYSTOLIC BLOOD PRESSURE: 144 MMHG | BODY MASS INDEX: 31.84 KG/M2 | OXYGEN SATURATION: 100 % | WEIGHT: 215 LBS | HEIGHT: 69 IN | DIASTOLIC BLOOD PRESSURE: 70 MMHG | RESPIRATION RATE: 20 BRPM

## 2025-05-19 DIAGNOSIS — K64.9 BLEEDING HEMORRHOID: Primary | ICD-10-CM

## 2025-05-19 DIAGNOSIS — R33.9 URINARY RETENTION: Primary | ICD-10-CM

## 2025-05-19 DIAGNOSIS — K64.5 THROMBOSED HEMORRHOIDS: ICD-10-CM

## 2025-05-19 LAB
ANION GAP SERPL CALCULATED.3IONS-SCNC: 8 MMOL/L (ref 4–13)
B-OH-BUTYR SERPL-MCNC: 0.15 MMOL/L (ref 0.2–0.6)
BASE EX.OXY STD BLDV CALC-SCNC: 56.8 % (ref 60–80)
BASE EXCESS BLDV CALC-SCNC: 2.3 MMOL/L
BASOPHILS # BLD AUTO: 0.04 THOUSANDS/ÂΜL (ref 0–0.1)
BASOPHILS NFR BLD AUTO: 0 % (ref 0–1)
BILIRUB UR QL STRIP: NEGATIVE
BUN SERPL-MCNC: 16 MG/DL (ref 5–25)
CALCIUM SERPL-MCNC: 9.1 MG/DL (ref 8.4–10.2)
CHLORIDE SERPL-SCNC: 99 MMOL/L (ref 96–108)
CLARITY UR: CLEAR
CO2 SERPL-SCNC: 28 MMOL/L (ref 21–32)
COLOR UR: YELLOW
CREAT SERPL-MCNC: 0.9 MG/DL (ref 0.6–1.3)
EOSINOPHIL # BLD AUTO: 0.01 THOUSAND/ÂΜL (ref 0–0.61)
EOSINOPHIL NFR BLD AUTO: 0 % (ref 0–6)
ERYTHROCYTE [DISTWIDTH] IN BLOOD BY AUTOMATED COUNT: 12.7 % (ref 11.6–15.1)
GFR SERPL CREATININE-BSD FRML MDRD: 93 ML/MIN/1.73SQ M
GLUCOSE SERPL-MCNC: 120 MG/DL (ref 65–140)
GLUCOSE SERPL-MCNC: 194 MG/DL (ref 65–140)
GLUCOSE SERPL-MCNC: 194 MG/DL (ref 65–140)
GLUCOSE UR STRIP-MCNC: ABNORMAL MG/DL
HCO3 BLDV-SCNC: 27.7 MMOL/L (ref 24–30)
HCT VFR BLD AUTO: 43.5 % (ref 36.5–49.3)
HGB BLD-MCNC: 14.4 G/DL (ref 12–17)
HGB UR QL STRIP.AUTO: NEGATIVE
IMM GRANULOCYTES # BLD AUTO: 0.05 THOUSAND/UL (ref 0–0.2)
IMM GRANULOCYTES NFR BLD AUTO: 1 % (ref 0–2)
KETONES UR STRIP-MCNC: NEGATIVE MG/DL
LEUKOCYTE ESTERASE UR QL STRIP: NEGATIVE
LYMPHOCYTES # BLD AUTO: 1.45 THOUSANDS/ÂΜL (ref 0.6–4.47)
LYMPHOCYTES NFR BLD AUTO: 14 % (ref 14–44)
MCH RBC QN AUTO: 30.5 PG (ref 26.8–34.3)
MCHC RBC AUTO-ENTMCNC: 33.1 G/DL (ref 31.4–37.4)
MCV RBC AUTO: 92 FL (ref 82–98)
MONOCYTES # BLD AUTO: 0.65 THOUSAND/ÂΜL (ref 0.17–1.22)
MONOCYTES NFR BLD AUTO: 7 % (ref 4–12)
NEUTROPHILS # BLD AUTO: 7.86 THOUSANDS/ÂΜL (ref 1.85–7.62)
NEUTS SEG NFR BLD AUTO: 78 % (ref 43–75)
NITRITE UR QL STRIP: NEGATIVE
NRBC BLD AUTO-RTO: 0 /100 WBCS
O2 CT BLDV-SCNC: 11.7 ML/DL
PCO2 BLDV: 46.1 MM HG (ref 42–50)
PH BLDV: 7.4 [PH] (ref 7.3–7.4)
PH UR STRIP.AUTO: 6 [PH]
PLATELET # BLD AUTO: 218 THOUSANDS/UL (ref 149–390)
PMV BLD AUTO: 9.5 FL (ref 8.9–12.7)
PO2 BLDV: 29.1 MM HG (ref 35–45)
POTASSIUM SERPL-SCNC: 4.2 MMOL/L (ref 3.5–5.3)
PROT UR STRIP-MCNC: NEGATIVE MG/DL
RBC # BLD AUTO: 4.72 MILLION/UL (ref 3.88–5.62)
SODIUM SERPL-SCNC: 135 MMOL/L (ref 135–147)
SP GR UR STRIP.AUTO: 1.02 (ref 1–1.03)
UROBILINOGEN UR STRIP-ACNC: 2 MG/DL
WBC # BLD AUTO: 10.06 THOUSAND/UL (ref 4.31–10.16)

## 2025-05-19 PROCEDURE — 80048 BASIC METABOLIC PNL TOTAL CA: CPT | Performed by: EMERGENCY MEDICINE

## 2025-05-19 PROCEDURE — 82010 KETONE BODYS QUAN: CPT | Performed by: EMERGENCY MEDICINE

## 2025-05-19 PROCEDURE — 82805 BLOOD GASES W/O2 SATURATION: CPT | Performed by: EMERGENCY MEDICINE

## 2025-05-19 PROCEDURE — 36415 COLL VENOUS BLD VENIPUNCTURE: CPT | Performed by: EMERGENCY MEDICINE

## 2025-05-19 PROCEDURE — 85025 COMPLETE CBC W/AUTO DIFF WBC: CPT | Performed by: EMERGENCY MEDICINE

## 2025-05-19 PROCEDURE — 82948 REAGENT STRIP/BLOOD GLUCOSE: CPT

## 2025-05-19 PROCEDURE — 99284 EMERGENCY DEPT VISIT MOD MDM: CPT | Performed by: EMERGENCY MEDICINE

## 2025-05-19 PROCEDURE — 96360 HYDRATION IV INFUSION INIT: CPT

## 2025-05-19 PROCEDURE — 99283 EMERGENCY DEPT VISIT LOW MDM: CPT

## 2025-05-19 PROCEDURE — 88304 TISSUE EXAM BY PATHOLOGIST: CPT | Performed by: PATHOLOGY

## 2025-05-19 RX ORDER — FENTANYL CITRATE/PF 50 MCG/ML
25 SYRINGE (ML) INJECTION
Status: DISCONTINUED | OUTPATIENT
Start: 2025-05-19 | End: 2025-05-19 | Stop reason: HOSPADM

## 2025-05-19 RX ORDER — LIDOCAINE HYDROCHLORIDE 10 MG/ML
INJECTION, SOLUTION EPIDURAL; INFILTRATION; INTRACAUDAL; PERINEURAL AS NEEDED
Status: DISCONTINUED | OUTPATIENT
Start: 2025-05-19 | End: 2025-05-19 | Stop reason: HOSPADM

## 2025-05-19 RX ORDER — PROPOFOL 10 MG/ML
INJECTION, EMULSION INTRAVENOUS AS NEEDED
Status: DISCONTINUED | OUTPATIENT
Start: 2025-05-19 | End: 2025-05-19

## 2025-05-19 RX ORDER — LIDOCAINE HYDROCHLORIDE 20 MG/ML
1 JELLY TOPICAL ONCE
Status: COMPLETED | OUTPATIENT
Start: 2025-05-19 | End: 2025-05-19

## 2025-05-19 RX ORDER — MIDAZOLAM HYDROCHLORIDE 2 MG/2ML
INJECTION, SOLUTION INTRAMUSCULAR; INTRAVENOUS AS NEEDED
Status: DISCONTINUED | OUTPATIENT
Start: 2025-05-19 | End: 2025-05-19

## 2025-05-19 RX ORDER — FENTANYL CITRATE 50 UG/ML
INJECTION, SOLUTION INTRAMUSCULAR; INTRAVENOUS AS NEEDED
Status: DISCONTINUED | OUTPATIENT
Start: 2025-05-19 | End: 2025-05-19

## 2025-05-19 RX ORDER — SODIUM CHLORIDE, SODIUM LACTATE, POTASSIUM CHLORIDE, CALCIUM CHLORIDE 600; 310; 30; 20 MG/100ML; MG/100ML; MG/100ML; MG/100ML
20 INJECTION, SOLUTION INTRAVENOUS CONTINUOUS
Status: DISCONTINUED | OUTPATIENT
Start: 2025-05-19 | End: 2025-05-19 | Stop reason: HOSPADM

## 2025-05-19 RX ORDER — MAGNESIUM HYDROXIDE 1200 MG/15ML
LIQUID ORAL AS NEEDED
Status: DISCONTINUED | OUTPATIENT
Start: 2025-05-19 | End: 2025-05-19 | Stop reason: HOSPADM

## 2025-05-19 RX ORDER — HYDROMORPHONE HCL/PF 1 MG/ML
0.5 SYRINGE (ML) INJECTION
Status: DISCONTINUED | OUTPATIENT
Start: 2025-05-19 | End: 2025-05-19 | Stop reason: HOSPADM

## 2025-05-19 RX ORDER — SODIUM CHLORIDE, SODIUM LACTATE, POTASSIUM CHLORIDE, CALCIUM CHLORIDE 600; 310; 30; 20 MG/100ML; MG/100ML; MG/100ML; MG/100ML
INJECTION, SOLUTION INTRAVENOUS CONTINUOUS PRN
Status: DISCONTINUED | OUTPATIENT
Start: 2025-05-19 | End: 2025-05-19

## 2025-05-19 RX ORDER — OXYCODONE HYDROCHLORIDE 5 MG/1
5 TABLET ORAL EVERY 4 HOURS PRN
Qty: 15 TABLET | Refills: 0 | Status: SHIPPED | OUTPATIENT
Start: 2025-05-19 | End: 2025-05-29

## 2025-05-19 RX ORDER — LIDOCAINE HYDROCHLORIDE 10 MG/ML
INJECTION, SOLUTION EPIDURAL; INFILTRATION; INTRACAUDAL; PERINEURAL AS NEEDED
Status: DISCONTINUED | OUTPATIENT
Start: 2025-05-19 | End: 2025-05-19

## 2025-05-19 RX ORDER — DIPHENHYDRAMINE HYDROCHLORIDE 50 MG/ML
12.5 INJECTION, SOLUTION INTRAMUSCULAR; INTRAVENOUS ONCE AS NEEDED
Status: DISCONTINUED | OUTPATIENT
Start: 2025-05-19 | End: 2025-05-19 | Stop reason: HOSPADM

## 2025-05-19 RX ORDER — ONDANSETRON 2 MG/ML
4 INJECTION INTRAMUSCULAR; INTRAVENOUS ONCE AS NEEDED
Status: DISCONTINUED | OUTPATIENT
Start: 2025-05-19 | End: 2025-05-19 | Stop reason: HOSPADM

## 2025-05-19 RX ORDER — OXYCODONE HYDROCHLORIDE 5 MG/1
5 TABLET ORAL ONCE
Refills: 0 | Status: COMPLETED | OUTPATIENT
Start: 2025-05-19 | End: 2025-05-19

## 2025-05-19 RX ORDER — ACETAMINOPHEN 325 MG/1
975 TABLET ORAL ONCE
Status: COMPLETED | OUTPATIENT
Start: 2025-05-19 | End: 2025-05-19

## 2025-05-19 RX ADMIN — PROPOFOL 80 MG: 10 INJECTION, EMULSION INTRAVENOUS at 12:39

## 2025-05-19 RX ADMIN — SODIUM CHLORIDE 1000 ML: 0.9 INJECTION, SOLUTION INTRAVENOUS at 22:57

## 2025-05-19 RX ADMIN — FENTANYL CITRATE 25 MCG: 50 INJECTION INTRAMUSCULAR; INTRAVENOUS at 12:39

## 2025-05-19 RX ADMIN — LIDOCAINE HYDROCHLORIDE 50 MG: 10 INJECTION, SOLUTION EPIDURAL; INFILTRATION; INTRACAUDAL; PERINEURAL at 12:39

## 2025-05-19 RX ADMIN — LIDOCAINE HYDROCHLORIDE 1 APPLICATION: 20 JELLY TOPICAL at 22:40

## 2025-05-19 RX ADMIN — ACETAMINOPHEN 975 MG: 325 TABLET ORAL at 22:39

## 2025-05-19 RX ADMIN — FENTANYL CITRATE 25 MCG: 50 INJECTION INTRAMUSCULAR; INTRAVENOUS at 12:43

## 2025-05-19 RX ADMIN — OXYCODONE HYDROCHLORIDE 5 MG: 5 TABLET ORAL at 23:24

## 2025-05-19 RX ADMIN — PROPOFOL 120 MCG/KG/MIN: 10 INJECTION, EMULSION INTRAVENOUS at 12:41

## 2025-05-19 RX ADMIN — FENTANYL CITRATE 25 MCG: 50 INJECTION INTRAMUSCULAR; INTRAVENOUS at 12:57

## 2025-05-19 RX ADMIN — MIDAZOLAM HYDROCHLORIDE 2 MG: 1 INJECTION, SOLUTION INTRAMUSCULAR; INTRAVENOUS at 12:30

## 2025-05-19 RX ADMIN — SODIUM CHLORIDE, SODIUM LACTATE, POTASSIUM CHLORIDE, AND CALCIUM CHLORIDE: .6; .31; .03; .02 INJECTION, SOLUTION INTRAVENOUS at 12:30

## 2025-05-19 RX ADMIN — FENTANYL CITRATE 25 MCG: 50 INJECTION INTRAMUSCULAR; INTRAVENOUS at 13:01

## 2025-05-19 NOTE — H&P
History and Physical   Colon and Rectal Surgery   Barry Roland 59 y.o. male MRN: [id number]  Unit/Bed#: OR POOL Encounter: [id number]  05/19/25   12:10 PM      ASSESSMENT:  Barry Roland is a 59 y.o. male who presents for excisional hemorrhoidectomy.  Discussed in a face-to-face, personal, informed consent process, the benefits, alternatives, risks including not limited to bleeding, infection, thromboembolic disease discussed/understood, signed consent.    PLAN:  To OR for excisional hemorrhoidectomy    CHIEF COMPLAINT:  hemorrhoids      History of Present Illness   HPI:  Barry Roland is a 59 y.o. male who presents for surgical treatment of hemorrhoids.  Denies nausea/vomiting/abdominal pain, change in bowel habits, rectal bleeding, or other constitutional symptoms.       Historical Information   Past Medical History:   Diagnosis Date    Allergic     Seasonal    Anemia     Axillary adenopathy 08/15/2022    Closed fracture of one rib of left side with routine healing 08/29/2022    Colon polyp     Diabetes mellitus (HCC)     Disease of thyroid gland     GERD (gastroesophageal reflux disease)     Hyperlipidemia     Hypertension     Insomnia     Kidney stone     Lymphadenopathy of head and neck 08/15/2022    Sleep apnea     Type 1 diabetes mellitus (HCC)     Uses Insulin Pump    Vitamin D deficiency      Past Surgical History:   Procedure Laterality Date    BARIATRIC SURGERY      CARPAL TUNNEL RELEASE Right     COLONOSCOPY      EGD      FL RETROGRADE PYELOGRAM  4/24/2020    LASIK      MI CYSTO/URETERO W/LITHOTRIPSY &INDWELL STENT INSRT Right 4/24/2020    Procedure: CYSTOSCOPY URETEROSCOPY WITH LITHOTRIPSY HOLMIUM LASER, RETROGRADE PYELOGRAM AND INSERTION STENT URETERAL;  Surgeon: Shabbir Ireland MD;  Location: [facility];  Service: Urology    MI LAPS GSTR RSTCV PX W/BYP GABBY-EN-Y LIMB <150 CM N/A 4/26/2021    Procedure: BYPASS GASTRIC GABBY-EN-Y LAPAROSCOPIC W ROBOTICS AND INTRAOPERATIVE EGD;  Surgeon: Danny  Bam Antonio MD;  Location: [facility];  Service: Bariatrics    ROTATOR CUFF REPAIR Right     UPPER GASTROINTESTINAL ENDOSCOPY         Meds/Allergies       Medications Prior to Admission:     ACETAMINOPHEN PO    Calcium Citrate (JORDY-CITRATE PO)    Continuous Glucose Sensor (Dexcom G7 Sensor)    ferrous sulfate 325 (65 FE) MG EC tablet    fluticasone (FLONASE) 50 mcg/act nasal spray    Glucagon (Baqsimi Two Pack) 3 MG/DOSE POWD    hydrocortisone (Anucort-HC) 25 mg suppository    Insulin Aspart (NovoLOG) 100 units/mL injection    levothyroxine (Synthroid) 200 mcg tablet    lidocaine (XYLOCAINE) 2 % topical gel    loratadine (CLARITIN) 10 mg tablet    methocarbamol (ROBAXIN) 500 mg tablet    Multiple Vitamins-Minerals (Bariatric Fusion) CHEW    Probiotic Product (PROBIOTIC DAILY PO)    psyllium (METAMUCIL) 0.52 g capsule    rosuvastatin (CRESTOR) 5 mg tablet    Tirzepatide-Weight Management (Zepbound) 5 mg/0.5 mL subcutaneous solution (vial)    TURMERIC PO    vitamin B-12 (VITAMIN B-12) 1,000 mcg tablet    zolpidem (AMBIEN) 10 mg tablet    Blood Glucose Monitoring Suppl (ONE TOUCH ULTRA 2) w/Device KIT    Insulin Glargine-yfgn 100 UNIT/ML SOPN    Insulin Pen Needle (BD PEN NEEDLE LYRIC U/F) 32G X 4 MM MISC    Current Medications[1]    Allergies[2]      Social History   Social History     Substance and Sexual Activity   Alcohol Use Yes    Alcohol/week: 2.0 standard drinks of alcohol    Types: 2 Standard drinks or equivalent per week    Comment: social     Social History     Substance and Sexual Activity   Drug Use No     Tobacco Use History[3]      Family History:   Family History   Problem Relation Age of Onset    Thyroid disease unspecified Mother     Osteoporosis Mother     Arthritis Mother     Dementia Mother     Hearing loss Mother     Thyroid disease Mother     Hypertension Father     Colon cancer Father 68    Cancer Father     Diabetes type II Sister     Hypertension Sister     Hyperlipidemia Sister     Thyroid  "disease unspecified Sister     Stroke Sister     Diabetes Sister     Thyroid disease Sister     Diabetes type II Brother     Hypertension Brother     Hyperlipidemia Brother     Colon cancer Brother 60    Hypertension Sister     Hyperlipidemia Sister     Diabetes Sister     Thyroid disease Sister     Diabetes type II Sister     Thyroid disease unspecified Sister     Stroke Sister     Diabetes type II Brother     Hypertension Brother     Hyperlipidemia Brother     Diabetes type II Brother          Objective     Current Vitals:   Blood Pressure: 141/76 (05/19/25 1204)  Pulse: 60 (05/19/25 1204)  Temperature: 97.9 °F (36.6 °C) (05/19/25 1204)  Temp Source: Temporal (05/19/25 1204)  Respirations: 16 (05/19/25 1204)  Height: 5' 9\" (175.3 cm) (05/19/25 1150)  Weight - Scale: 97.5 kg (215 lb) (05/19/25 1150)  SpO2: 95 % (05/19/25 1204)  No intake or output data in the 24 hours ending 05/19/25 1210    Physical Exam:  General:no distress  Eyes:perrla/eomi  ENT:moist mucus membranes  Neck:supple  Pulm:no increased work of breathing, clear to auscultation bilaterally  CV:sinus  Abdomen:soft,nontender  Extremities:no edema         [1]   Current Facility-Administered Medications:     lactated ringers infusion, 20 mL/hr, Intravenous, Continuous, Maria Del Carmen Mc MD  [2]   Allergies  Allergen Reactions    Ibuprofen GI Intolerance and Nausea Only    Other Other (See Comments)     Seasonal allergies- pt has congestion   [3]   Social History  Tobacco Use   Smoking Status Former    Current packs/day: 0.00    Average packs/day: 1 pack/day for 30.0 years (30.0 ttl pk-yrs)    Types: Cigarettes    Start date: 1982    Quit date: 5/15/2015    Years since quitting: 10.0   Smokeless Tobacco Never   Tobacco Comments    quit in 2015     "

## 2025-05-19 NOTE — DISCHARGE INSTR - AVS FIRST PAGE
Post-Operative Care Information      Resume high fiber diet. Fiber supplementation with Metamucil or Konsyl also recommended daily.       Your first bowel movement may take up to 3 days after surgery. THIS IS OFTEN PAINFUL.        Keep a clean, dry gauze over the wound if you have bleeding or drainage. It is OK to shower. Clean the area with only water and avoid soaps, lotions, and ointments in the area.    Pain is to be expected after surgery. Tylenol (650 mg) every 6 hours is an excellent alternative in addition or as a substitute to your narcotic pain medication.     Rectal bleeding or drainage is normal after surgery.       Nausea is a common complaint post op. This can be associated with the narcotic pain medications, anesthesia as well as with severe constipation.     Driving may be resumed when all off all narcotic pain medications and you can turn or twist your body without hesitation.    If you are unable to urinate within 8 hours after surgery, please call the office at [phone]    Frequent warm tub soaks or warm showers are often soothing, we suggest 3 to 4 times daily.    After bowel movements, you may wash with a hand shower or warm tub soak.    No strenuous activity (i.e., Running, jogging, swimming, or weightlifting) for up to one week after surgery.     When will I receive follow-up care?      You should be scheduled for a post-op appointment within 6-8 weeks after surgery, unless otherwise instructed on your discharge summary. If you do not have an existing appointment at the time of discharge, please call our office at [phone].    Call the office at [phone] immediately if you have a fever, chills, excessive sweating, difficulty urinating, or excessive/profuse bleeding with clots.

## 2025-05-19 NOTE — ANESTHESIA POSTPROCEDURE EVALUATION
Post-Op Assessment Note    CV Status:  Stable    Pain management: adequate       Mental Status:  Arousable   Hydration Status:  Euvolemic   PONV Controlled:  Controlled   Airway Patency:  Patent     Post Op Vitals Reviewed: Yes    No anethesia notable event occurred.            Last Filed PACU Vitals:  Vitals Value Taken Time   Temp 98.2    Pulse 61 05/19/25 13:32   /79    Resp 16    SpO2 100 % 05/19/25 13:32   Vitals shown include unfiled device data.

## 2025-05-19 NOTE — OP NOTE
OPERATIVE REPORT  PATIENT NAME: Barry Roland    :  1965  MRN: [id number]  Pt Location: [facility]OM 05    SURGERY DATE: 2025    Surgeons and Role:     * Maria Del Carmen Mc MD - Primary    Preop Diagnosis:  Thrombosed hemorrhoids [K64.5]    Post-Op Diagnosis Codes:     * Thrombosed hemorrhoids [K64.5]    Procedure(s):  HEMORRHOIDECTOMY EXCISION    Specimen(s):  ID Type Source Tests Collected by Time Destination   1 : Hemorrhoids Tissue Hemorrhoids TISSUE EXAM Maria Del Carmen Mc MD 2025 1253        Estimated Blood Loss:   Minimal    Drains:  * No LDAs found *    Anesthesia Type:   General    Operative Indications:  Thrombosed hemorrhoids [K64.5]      Operative Findings:  Large left posterior and right lateral internal/external hemorrhoids       Complications:   None    Procedure and Technique:  The patient was identified by name and armband in the preoperative holding area. Informed consent was reviewed and confirmed. He was then taken to the operating room, where sedation was administered by the anesthesiology team. He was placed in the prone position with all pressure points padded. His perianal skin was then prepped and draped in the usual sterile fashion. Preoperative doses of subcutaneous heparin and antibiotics were not indicated. A brief timeout was called. All in the room were in agreement.    A bilateral perianal and pudendal nerve block was performed using 20 cc each of 1% plain lidocaine and plain Exparel.  On external exam, there was a large left posterior external hemorrhoid with evidence of recent thrombosis. There was also a large right lateral external hemorrhoid with prolapsing internal component.  The anal canal was inspected with a medium Hill-Meier retractor.  There were large left posterior and right lateral internal hemorrhoids.  Attention was turned to the left posterior hemorrhoid.  An Allis clamp was placed at the inner apex of the hemorrhoid.  A  figure-of-eight suture was placed with a 3-0 Vicryl at the internal hemorrhoidal column.  The hemorrhoid was then incised in an elliptical fashion.  Starting from the outer apex, it was  from the sphincter muscle complex using Metzenbaum scissors.  Hemostasis was carefully ensured.  Once completely freed from the muscle, the hemorrhoidal tissue was excised using electrocautery.  The wound was then closed by running the previously placed Vicryl suture towards the outer apex at the skin.  An additional 3-0 Vicryl suture was used to oversew the wound to ensure further hemostasis.    The right lateral internal/external hemorrhoid was excised in the same manner.  The area was then cleansed and patted dry.  A Gelfoam was placed in the anal canal.  The area was then dressed with gauze and mesh underwear.    All sponge, instrument, and needle counts were correct x 2.     I was present for the entire procedure.    Patient Disposition:  PACU          SIGNATURE: Maria Del Carmen Mc MD  DATE: May 19, 2025  TIME: 1:26 PM

## 2025-05-20 ENCOUNTER — OFFICE VISIT (OUTPATIENT)
Dept: UROLOGY | Facility: AMBULATORY SURGERY CENTER | Age: 60
End: 2025-05-20
Attending: EMERGENCY MEDICINE
Payer: COMMERCIAL

## 2025-05-20 VITALS
WEIGHT: 215 LBS | BODY MASS INDEX: 31.84 KG/M2 | HEIGHT: 69 IN | OXYGEN SATURATION: 95 % | HEART RATE: 89 BPM | SYSTOLIC BLOOD PRESSURE: 134 MMHG | DIASTOLIC BLOOD PRESSURE: 64 MMHG

## 2025-05-20 DIAGNOSIS — R33.8 ACUTE URINARY RETENTION: Primary | ICD-10-CM

## 2025-05-20 PROCEDURE — 99204 OFFICE O/P NEW MOD 45 MIN: CPT

## 2025-05-20 RX ORDER — TAMSULOSIN HYDROCHLORIDE 0.4 MG/1
0.4 CAPSULE ORAL
Qty: 90 CAPSULE | Refills: 0 | Status: SHIPPED | OUTPATIENT
Start: 2025-05-20

## 2025-05-20 NOTE — PROGRESS NOTES
Name: Barry Roland      : 1965      MRN: [id number]  Encounter Provider: ANN Asif  Encounter Date: 2025   Encounter department: [facility]OLOG[facility] [place]  :  Assessment & Plan  Acute urinary retention  Patient underwent hemorrhoidectomy surgery yesterday on 2025 and then developed an episode of acute urinary retention the same day.  This prompted him to report to the ER in which they placed an indwelling urethral catheter.    I recommend that he initiate Flomax 0.4 mg and report back to the office next week for a trial of void.  We did discuss that if he is able to pass the trial void I would recommend that he continue on the Flomax and follow-up in the office in 4 to 6 weeks.    We discussed that if he is not able to pass the trial of void I would recommend continuing with the Flomax and performing an additional trial of void in 7 to 10 days.  If he is still not able to pass a trial of void at that point in time I would recommend further workup by physician with a cystoscopy for assessment of bladder outlet obstruction.    Catheter teaching provided by the nursing staff today in the office.    Orders:    tamsulosin (FLOMAX) 0.4 mg; Take 1 capsule (0.4 mg total) by mouth daily with dinner        History of Present Illness   Barry Roland is a 59 y.o. male who presents today to the office as a new patient for further evaluation of an episode of acute urinary retention.  He was last seen in the office in  for history of ureterolithiasis and nephrolithiasis.    On  he underwent a hemorrhoidectomy and developed urinary retention following his procedure.  He presented to the ER that same night for not being able to urinate for approximately 8 hours.  He had an indwelling urethral catheter placed at this time and was then instructed to follow-up with the urology office.    Today in the office he states that he is overall doing well.  He states that after he had  "the catheter inserted his pain and discomfort in his abdomen completely resolved.  He states that he has never had an issue with acute urinary retention in the past.  He states that he has been having significant issues with his hemorrhoids which prompted a emergent hemorrhoidectomy.  He states that the hemorrhoids were causing some issues with urination.  He states that prior to this he was not having any issues urinating and denies any urinary urgency/frequency, weaker urinary stream, postvoid dribbling, or nocturia.    He is here today in the office with his wife.  AUA SYMPTOM SCORE      Flowsheet Row Most Recent Value   AUA SYMPTOM SCORE    How often have you had a sensation of not emptying your bladder completely after you finished urinating? 2 (P)     How often have you had to urinate again less than two hours after you finished urinating? 2 (P)     How often have you found you stopped and started again several times when you urinate? 2 (P)     How often have you found it difficult to postpone urination? 1 (P)     How often have you had a weak urinary stream? 2 (P)     How often have you had to push or strain to begin urination? 2 (P)     How many times did you most typically get up to urinate from the time you went to bed at night until the time you got up in the morning? 2 (P)     Quality of Life: If you were to spend the rest of your life with your urinary condition just the way it is now, how would you feel about that? 6 (P)     AUA SYMPTOM SCORE 13 (P)            Review of Systems   Constitutional:  Negative for chills.   Gastrointestinal:  Negative for nausea and vomiting.   Genitourinary:  Positive for dysuria and urgency. Negative for flank pain, frequency and hematuria.          Objective   /64 (BP Location: Left arm, Patient Position: Sitting, Cuff Size: Standard)   Pulse 89   Ht 5' 9\" (1.753 m)   Wt 97.5 kg (215 lb)   SpO2 95%   BMI 31.75 kg/m²     Physical Exam  Vitals reviewed. "   Constitutional:       Appearance: Normal appearance.   HENT:      Head: Normocephalic and atraumatic.     Eyes:      Pupils: Pupils are equal, round, and reactive to light.       Cardiovascular:      Rate and Rhythm: Normal rate.   Pulmonary:      Effort: Pulmonary effort is normal.   Abdominal:      General: Abdomen is flat.      Palpations: Abdomen is soft.   Genitourinary:     Comments: Saab catheter in place draining clear yellow urine    Musculoskeletal:      Cervical back: Normal range of motion.     Skin:     General: Skin is warm and dry.     Neurological:      General: No focal deficit present.      Mental Status: He is alert and oriented to person, place, and time.     Psychiatric:         Mood and Affect: Mood normal.         Behavior: Behavior normal.         Thought Content: Thought content normal.         Judgment: Judgment normal.           Results   Lab Results   Component Value Date    PSA 2.2 06/17/2022    PSA 0.8 07/15/2015     Lab Results   Component Value Date    GLUCOSE 181 (H) 11/03/2015    CALCIUM 9.1 05/19/2025     11/03/2015    K 4.2 05/19/2025    CO2 28 05/19/2025    CL 99 05/19/2025    BUN 16 05/19/2025    CREATININE 0.90 05/19/2025     Lab Results   Component Value Date    WBC 10.06 05/19/2025    HGB 14.4 05/19/2025    HCT 43.5 05/19/2025    MCV 92 05/19/2025     05/19/2025       Office Urine Dip  No results found for this or any previous visit (from the past hour).

## 2025-05-20 NOTE — ED PROVIDER NOTES
Time reflects when diagnosis was documented in both MDM as applicable and the Disposition within this note       Time User Action Codes Description Comment    5/19/2025 11:14 PM Terri Mayes Add [R33.9] Urinary retention           ED Disposition       ED Disposition   Discharge    Condition   Stable    Date/Time   Mon May 19, 2025 11:35 PM    Comment   Barry Roland discharge to home/self care.                   Assessment & Plan       Medical Decision Making  Amount and/or Complexity of Data Reviewed  Labs: ordered.    Risk  OTC drugs.  Prescription drug management.      Patient is a 59-year-old male presenting for urinary retention after surgery.  Suspect urinary retention is likely due to reaction to anesthesia.  Saab cath placed, no UTI on UA.  Will send patient home with leg bag and instructions on Saab care.  He will need to follow-up with urology for further management and removal of his Saab cath.  Return precautions discussed, such as fevers, flank pain.  Patient voices understanding with the plan.  All questions and concerns addressed at this time.    ED Course as of 05/19/25 2337   Mon May 19, 2025   2336 DDX including but not limited to: urinary retention, reaction to anesthesia, BPH, hematuria, UTI       Medications   sodium chloride 0.9 % bolus 1,000 mL (1,000 mL Intravenous New Bag 5/19/25 2257)   lidocaine (URO-JET) 2 % urethral/mucosal gel 1 Application (1 Application Urethral Given 5/19/25 2240)   acetaminophen (TYLENOL) tablet 975 mg (975 mg Oral Given 5/19/25 2239)   oxyCODONE (ROXICODONE) IR tablet 5 mg (5 mg Oral Given 5/19/25 2324)       ED Risk Strat Scores                    No data recorded                            History of Present Illness       Chief Complaint   Patient presents with    Post-op Problem     Unable to urinate for about 8 hours. Hemmhoroid surgery today around 1300. +pain.       Past Medical History:   Diagnosis Date    Allergic     Seasonal    Anemia     Axillary  adenopathy 08/15/2022    Closed fracture of one rib of left side with routine healing 08/29/2022    Colon polyp     Diabetes mellitus (HCC)     Disease of thyroid gland     GERD (gastroesophageal reflux disease)     Hyperlipidemia     Hypertension     Insomnia     Kidney stone     Lymphadenopathy of head and neck 08/15/2022    Sleep apnea     Type 1 diabetes mellitus (HCC)     Uses Insulin Pump    Vitamin D deficiency       Past Surgical History:   Procedure Laterality Date    BARIATRIC SURGERY      CARPAL TUNNEL RELEASE Right     COLONOSCOPY      EGD      FL RETROGRADE PYELOGRAM  4/24/2020    LASIK      MO CYSTO/URETERO W/LITHOTRIPSY &INDWELL STENT INSRT Right 4/24/2020    Procedure: CYSTOSCOPY URETEROSCOPY WITH LITHOTRIPSY HOLMIUM LASER, RETROGRADE PYELOGRAM AND INSERTION STENT URETERAL;  Surgeon: Shabbir Ireland MD;  Location: [facility];  Service: Urology    MO LAPS GSTR RSTCV PX W/BYP GABBY-EN-Y LIMB <150 CM N/A 4/26/2021    Procedure: BYPASS GASTRIC GABBY-EN-Y LAPAROSCOPIC W ROBOTICS AND INTRAOPERATIVE EGD;  Surgeon: Danny Antonio MD;  Location: [facility];  Service: Bariatrics    ROTATOR CUFF REPAIR Right     UPPER GASTROINTESTINAL ENDOSCOPY        Family History   Problem Relation Age of Onset    Thyroid disease unspecified Mother     Osteoporosis Mother     Arthritis Mother     Dementia Mother     Hearing loss Mother     Thyroid disease Mother     Hypertension Father     Colon cancer Father 68    Cancer Father     Diabetes type II Sister     Hypertension Sister     Hyperlipidemia Sister     Thyroid disease unspecified Sister     Stroke Sister     Diabetes Sister     Thyroid disease Sister     Diabetes type II Brother     Hypertension Brother     Hyperlipidemia Brother     Colon cancer Brother 60    Hypertension Sister     Hyperlipidemia Sister     Diabetes Sister     Thyroid disease Sister     Diabetes type II Sister     Thyroid disease unspecified Sister     Stroke Sister     Diabetes type II Brother      Hypertension Brother     Hyperlipidemia Brother     Diabetes type II Brother       Social History[1]   E-Cigarette/Vaping    E-Cigarette Use Never User       E-Cigarette/Vaping Substances    Nicotine No     THC No     CBD No     Flavoring No     Other No     Unknown No       I have reviewed and agree with the history as documented.     [facility]  Patient is a 59-year-old male presenting for difficulty urinating for the past 8 hours.  Patient had an hemorrhoidectomy today.  Denies fevers, chills.  Reports elevated blood sugars in the 200s, type I diabetic.  Denies nausea, vomiting, abdominal pain, chest pain, shortness of breath or any other symptoms at this time.    Review of Systems  As per [facility]      Objective       ED Triage Vitals   Temperature Pulse Blood Pressure Respirations SpO2 Patient Position - Orthostatic VS   05/19/25 2145 05/19/25 2145 05/19/25 2145 05/19/25 2145 05/19/25 2145 05/19/25 2145   98.1 °F (36.7 °C) 79 (!) 177/97 18 96 % Sitting      Temp Source Heart Rate Source BP Location FiO2 (%) Pain Score    05/19/25 2145 05/19/25 2145 05/19/25 2145 -- 05/19/25 2239    Oral Monitor Right arm  8      Vitals      Date and Time Temp Pulse SpO2 Resp BP Pain Score FACES Pain Rating User   05/19/25 2324 -- -- -- -- -- 8 -- KB   05/19/25 2239 -- -- -- -- -- 8 -- KB   05/19/25 2145 98.1 °F (36.7 °C) 79 96 % 18 177/97 -- -- MD            Physical Exam  Vitals and nursing note reviewed.   Constitutional:       General: He is not in acute distress.     Appearance: Normal appearance. He is not ill-appearing, toxic-appearing or diaphoretic.   HENT:      Head: Normocephalic and atraumatic.      Nose: Nose normal.     Eyes:      Extraocular Movements: Extraocular movements intact.      Conjunctiva/sclera: Conjunctivae normal.       Cardiovascular:      Rate and Rhythm: Normal rate and regular rhythm.      Pulses: Normal pulses.      Heart sounds: No murmur heard.     No friction rub. No gallop.   Pulmonary:      Effort:  Pulmonary effort is normal. No respiratory distress.      Breath sounds: Normal breath sounds.   Abdominal:      General: Bowel sounds are normal.      Palpations: Abdomen is soft.      Tenderness: There is abdominal tenderness in the suprapubic area. There is no right CVA tenderness, left CVA tenderness, guarding or rebound.     Musculoskeletal:         General: No swelling or tenderness. Normal range of motion.      Cervical back: Normal range of motion. No rigidity or tenderness.     Skin:     General: Skin is warm and dry.      Capillary Refill: Capillary refill takes less than 2 seconds.     Neurological:      Mental Status: He is alert and oriented to person, place, and time.      Cranial Nerves: No cranial nerve deficit.      Sensory: No sensory deficit.      Motor: No weakness.         Results Reviewed       Procedure Component Value Units Date/Time    Basic metabolic panel [[id number]]  (Abnormal) Collected: 05/19/25 2253    Lab Status: Final result Specimen: Blood from Arm, Right Updated: 05/19/25 2321     Sodium 135 mmol/L      Potassium 4.2 mmol/L      Chloride 99 mmol/L      CO2 28 mmol/L      ANION GAP 8 mmol/L      BUN 16 mg/dL      Creatinine 0.90 mg/dL      Glucose 194 mg/dL      Calcium 9.1 mg/dL      eGFR 93 ml/min/1.73sq m     Narrative:      National Kid[employer] Foundation guidelines for Chronic Kidney Disease (CKD):     Stage 1 with normal or high GFR (GFR > 90 mL/min/1.73 square meters)    Stage 2 Mild CKD (GFR = 60-89 mL/min/1.73 square meters)    Stage 3A Moderate CKD (GFR = 45-59 mL/min/1.73 square meters)    Stage 3B Moderate CKD (GFR = 30-44 mL/min/1.73 square meters)    Stage 4 Severe CKD (GFR = 15-29 mL/min/1.73 square meters)    Stage 5 End Stage CKD (GFR <15 mL/min/1.73 square meters)  Note: GFR calculation is accurate only with a steady state creatinine    Beta Hydroxybutyrate [[id number]]  (Abnormal) Collected: 05/19/25 2253    Lab Status: Final result Specimen: Blood from Arm,  Right Updated: 05/19/25 2321     Beta- Hydroxybutyrate 0.15 mmol/L     Blood gas, venous [[id number]]  (Abnormal) Collected: 05/19/25 2253    Lab Status: Final result Specimen: Blood from Arm, Right Updated: 05/19/25 2311     pH, Lázaro 7.397     pCO2, Lázaro 46.1 mm Hg      pO2, Lázaro 29.1 mm Hg      HCO3, Lázaro 27.7 mmol/L      Base Excess, Lázaro 2.3 mmol/L      O2 Content, Lázaro 11.7 ml/dL      O2 HGB, VENOUS 56.8 %     UA w Reflex to Microscopic w Reflex to Culture [[id number]]  (Abnormal) Collected: 05/19/25 2253    Lab Status: Final result Specimen: Urine, Clean Catch Updated: 05/19/25 2310     Color, UA Yellow     Clarity, UA Clear     Specific Gravity, UA 1.022     pH, UA 6.0     Leukocytes, UA Negative     Nitrite, UA Negative     Protein, UA Negative mg/dl      Glucose, UA 1000 (1%) mg/dl      Ketones, UA Negative mg/dl      Urobilinogen, UA 2.0 mg/dl      Bilirubin, UA Negative     Occult Blood, UA Negative    CBC and differential [[id number]]  (Abnormal) Collected: 05/19/25 2253    Lab Status: Final result Specimen: Blood from Arm, Right Updated: 05/19/25 2308     WBC 10.06 Thousand/uL      RBC 4.72 Million/uL      Hemoglobin 14.4 g/dL      Hematocrit 43.5 %      MCV 92 fL      MCH 30.5 pg      MCHC 33.1 g/dL      RDW 12.7 %      MPV 9.5 fL      Platelets 218 Thousands/uL      nRBC 0 /100 WBCs      Segmented % 78 %      Immature Grans % 1 %      Lymphocytes % 14 %      Monocytes % 7 %      Eosinophils Relative 0 %      Basophils Relative 0 %      Absolute Neutrophils 7.86 Thousands/µL      Absolute Immature Grans 0.05 Thousand/uL      Absolute Lymphocytes 1.45 Thousands/µL      Absolute Monocytes 0.65 Thousand/µL      Eosinophils Absolute 0.01 Thousand/µL      Basophils Absolute 0.04 Thousands/µL     Fingerstick Glucose (POCT) [[id number]]  (Abnormal) Collected: 05/19/25 2252    Lab Status: Final result Specimen: Blood Updated: 05/19/25 2253     POC Glucose 194 mg/dl             No orders to display       Procedures    ED  Medication and Procedure Management   Prior to Admission Medications   Prescriptions Last Dose Informant Patient Reported? Taking?   ACETAMINOPHEN PO   Yes No   Sig: Take 80 mg by mouth every 4 (four) hours as needed for mild pain   Blood Glucose Monitoring Suppl (ONE TOUCH ULTRA 2) w/Device KIT  Self Yes No   Sig: Use     Calcium Citrate (JORDY-CITRATE PO)  Self Yes No   Sig: Take 1,000 tablets by mouth in the morning and 1,000 tablets in the evening.   Continuous Glucose Sensor (Dexcom G7 Sensor)  Self No No   Sig: Use 1 Device every 10 days   Glucagon (Baqsimi Two Pack) 3 MG/DOSE POWD  Self No No   Sig: Use one dose as needed in case of severe hypoglycemia ( nasal spray)   Insulin Aspart (NovoLOG) 100 units/mL injection   No No   Sig: USE UP TO 75 UNITS DAILY VIA INSULIN PUMP   Insulin Glargine-yfgn 100 UNIT/ML SOPN   No No   Sig: INJECT 30 UNITS IN CASE OF PUMP FAILURE ( INCASE OF EMERGENCY)   Insulin Pen Needle (BD PEN NEEDLE LYRIC U/F) 32G X 4 MM MISC  Self No No   Sig: by Does not apply route daily Use once daily   Multiple Vitamins-Minerals (Bariatric Fusion) CHEW  Self Yes No   Sig: Chew   Probiotic Product (PROBIOTIC DAILY PO)  Self Yes No   Sig: Take 1 tablet by mouth in the morning.   TURMERIC PO  Self Yes No   Sig: Take 1,400 capsules by mouth in the morning and 1,400 capsules in the evening.   Tirzepatide-Weight Management (Zepbound) 5 mg/0.5 mL subcutaneous solution (vial)   No No   Sig: Inject 0.5 mL (5 mg total) under the skin once a week   Patient taking differently: Inject 5 mg under the skin once a week Starting after surgery on    ferrous sulfate 325 (65 FE) MG EC tablet   Yes No   Sig: Take 325 mg by mouth daily with breakfast   fluticasone (FLONASE) 50 mcg/act nasal spray  Self Yes No   Si spray into each nostril in the morning.   levothyroxine (Synthroid) 200 mcg tablet   No No   Sig: Take 1 tablet daily Mon-Sat and 2 tablets on .   lidocaine (XYLOCAINE) 2 % topical gel   No No    Sig: Apply topically 3 (three) times a day for 3 days   Patient taking differently: Apply topically if needed   loratadine (CLARITIN) 10 mg tablet  Self No No   Sig: Take 1 tablet (10 mg total) by mouth daily   methocarbamol (ROBAXIN) 500 mg tablet   No No   Sig: Take 1 tablet (500 mg total) by mouth 4 (four) times a day   oxyCODONE (Roxicodone) 5 immediate release tablet   No No   Sig: Take 1 tablet (5 mg total) by mouth every 4 (four) hours as needed for severe pain for up to 10 days Max Daily Amount: 30 mg   psyllium (METAMUCIL) 0.52 g capsule  Self Yes No   Sig: Take 0.52 g by mouth in the morning and 0.52 g in the evening and 0.52 g before bedtime. Fiber capsules.   rosuvastatin (CRESTOR) 5 mg tablet   No No   Sig: TAKE 1 TABLET (5 MG TOTAL) BY MOUTH DAILY.   Patient taking differently: Take 5 mg by mouth daily at bedtime   vitamin B-12 (VITAMIN B-12) 1,000 mcg tablet  Self Yes No   Sig: Take 1,000 mcg by mouth 3 (three) times a week   zolpidem (AMBIEN) 10 mg tablet   No No   Sig: Take 1 tablet (10 mg total) by mouth daily at bedtime as needed for sleep      Facility-Administered Medications: None     Patient's Medications   Discharge Prescriptions    No medications on file       ED SEPSIS DOCUMENTATION   Time reflects when diagnosis was documented in both MDM as applicable and the Disposition within this note       Time User Action Codes Description Comment    5/19/2025 11:14 PM Terri Mayes [R33.9] Urinary retention                    [1]   Social History  Tobacco Use    Smoking status: Former     Current packs/day: 0.00     Average packs/day: 1 pack/day for 30.0 years (30.0 ttl pk-yrs)     Types: Cigarettes     Start date: 1982     Quit date: 5/15/2015     Years since quitting: 10.0    Smokeless tobacco: Never    Tobacco comments:     quit in 2015   Vaping Use    Vaping status: Never Used   Substance Use Topics    Alcohol use: Yes     Alcohol/week: 2.0 standard drinks of alcohol     Types: 2 Standard  drinks or equivalent per week     Comment: social    Drug use: No        Terri Mayes MD  05/19/25 9868

## 2025-05-20 NOTE — ASSESSMENT & PLAN NOTE
Patient underwent hemorrhoidectomy surgery yesterday on 5/19/2025 and then developed an episode of acute urinary retention the same day.  This prompted him to report to the ER in which they placed an indwelling urethral catheter.    I recommend that he initiate Flomax 0.4 mg and report back to the office next week for a trial of void.  We did discuss that if he is able to pass the trial void I would recommend that he continue on the Flomax and follow-up in the office in 4 to 6 weeks.    We discussed that if he is not able to pass the trial of void I would recommend continuing with the Flomax and performing an additional trial of void in 7 to 10 days.  If he is still not able to pass a trial of void at that point in time I would recommend further workup by physician with a cystoscopy for assessment of bladder outlet obstruction.    Catheter teaching provided by the nursing staff today in the office.    Orders:    tamsulosin (FLOMAX) 0.4 mg; Take 1 capsule (0.4 mg total) by mouth daily with dinner

## 2025-05-20 NOTE — TELEPHONE ENCOUNTER
"FOLLOW UP: None    REASON FOR CONVERSATION: Post-op Problem    SYMPTOMS: Last urine output about 10am today. Experiencing abdominal distention and discomfort. Unable to urinate after pushing fluids and several attempts at standing under running water    OTHER: Hemorrhoidectomy today with Dr. Mc    DISPOSITION: Call PCP Now      Reason for Disposition   [1] Caller has URGENT question AND [2] triager unable to answer question    Answer Assessment - Initial Assessment Questions  1. SYMPTOM: \"What's the main symptom you're concerned about?\" (e.g., pain, fever, vomiting)      Unable to urinate despite pushing fluids and standing under running water (several times)    2. ONSET: \"When did symptoms  start?\"      Last urine output was about 10am today    3. SURGERY: \"What surgery did you have?\"      Hemorrhoidectomy    4. DATE of SURGERY: \"When was the surgery?\"       Today 5/19    7. PAIN: \"Is there any pain?\" If Yes, ask: \"How bad is it?\"  (Scale 1-10; or mild, moderate, severe)      Abdominal discomfort    8. FEVER: \"Do you have a fever?\" If Yes, ask: \"What is your temperature, how was it measured, and when did it start?\"      Denies    9. VOMITING: \"Is there any vomiting?\" If Yes, ask: \"How many times?\"      Denies    10. BLEEDING: \"Is there any bleeding?\" If Yes, ask: \"How much?\" and \"Where?\"        Denies    11. OTHER SYMPTOMS: \"Do you have any other symptoms?\" (e.g., drainage from wound, painful urination, constipation)        Abdominal distention    Protocols used: Post-Op Symptoms and Questions-Adult-    "

## 2025-05-20 NOTE — ANESTHESIA POSTPROCEDURE EVALUATION
Post-Op Assessment Note    CV Status:  Stable    Pain management: adequate       Mental Status:  Alert and awake   Hydration Status:  Euvolemic   PONV Controlled:  Controlled   Airway Patency:  Patent     Post Op Vitals Reviewed: Yes    No anethesia notable event occurred.    Staff: Anesthesiologist, CRNA           Last Filed PACU Vitals:  Vitals Value Taken Time   Temp 98.1 °F (36.7 °C) 05/19/25 13:45   Pulse 53 05/19/25 13:47   /68 05/19/25 13:45   Resp 8 05/19/25 13:47   SpO2 97 % 05/19/25 13:47   Vitals shown include unfiled device data.    Modified Galilea:     Vitals Value Taken Time   Activity 2 05/19/25 13:45   Respiration 2 05/19/25 13:45   Circulation 2 05/19/25 13:45   Consciousness 2 05/19/25 13:45   Oxygen Saturation 2 05/19/25 13:45     Modified Galilea Score: 10

## 2025-05-20 NOTE — TELEPHONE ENCOUNTER
"Regarding: post surgery / not able to urinate  ----- Message from Mela DURÁN sent at 5/19/2025  8:56 PM EDT -----  \"I had surgery earlier today and it says on here if I'm unable urinate after 8 hours to call, and I am unable to urinate.\"    "

## 2025-05-20 NOTE — ED ATTENDING ATTESTATION
5/19/2025  I, Kranthi Hannon MD, saw and evaluated the patient. I have discussed the patient with the resident/non-physician practitioner and agree with the resident's/non-physician practitioner's findings, Plan of Care, and MDM as documented in the resident's/non-physician practitioner's note, except where noted. All available labs and Radiology studies were reviewed.  I was present for key portions of any procedure(s) performed by the resident/non-physician practitioner and I was immediately available to provide assistance.       At this point I agree with the current assessment done in the Emergency Department.  I have conducted an independent evaluation of this patient a history and physical is as follows: Patient is a 59 year old male with inability to urinate since having hemorrhoidectomy today. Was last seen at [facility] OR today for hemorrhoidectomy. Has had difficulty urinating for past 2 weeks. No fever. No N/V/D. Patient is a type 1 DM and blood sugars have been in the 200s. [employer] website checked on this patient and last Rx filled was on 4/29/25 for ambien for 30 day supply. NCAT. No scleral icterus. Mucous membranes somewhat moist. Lungs clear. Heart regular without murmur. Abdomen soft and nontender. Good bowel sounds. No edema. No rash. (+) ferrell placed in ED with over 800 mL of clear yellow urine noted. No gross focal deficits. DDX including but not limited to: urinary retention, reaction to anesthesia, BPH, hematuria, UTI, tumor, neurologic etiology. Differential diagnosis including but not limited to: DKA, hyperglycemia, metabolic abnormality, dehydration; doubt cardiac etiology, intracranial process, noncompliance with medications. Ferrell was placed with improvement. Will check labs and give IVFs.     ED Course         Critical Care Time  Procedures

## 2025-05-23 PROCEDURE — 88304 TISSUE EXAM BY PATHOLOGIST: CPT | Performed by: PATHOLOGY

## 2025-05-27 ENCOUNTER — PROCEDURE VISIT (OUTPATIENT)
Dept: UROLOGY | Facility: AMBULATORY SURGERY CENTER | Age: 60
End: 2025-05-27
Payer: COMMERCIAL

## 2025-05-27 VITALS
BODY MASS INDEX: 32.53 KG/M2 | DIASTOLIC BLOOD PRESSURE: 70 MMHG | SYSTOLIC BLOOD PRESSURE: 120 MMHG | WEIGHT: 219.6 LBS | HEIGHT: 69 IN | HEART RATE: 74 BPM

## 2025-05-27 DIAGNOSIS — R33.8 ACUTE URINARY RETENTION: Primary | ICD-10-CM

## 2025-05-27 DIAGNOSIS — G47.09 OTHER INSOMNIA: ICD-10-CM

## 2025-05-27 LAB — POST-VOID RESIDUAL VOLUME, ML POC: 229 ML

## 2025-05-27 PROCEDURE — 51798 US URINE CAPACITY MEASURE: CPT

## 2025-05-27 NOTE — PROGRESS NOTES
"5/27/2025    Barry Roland  1965  [id number]    Diagnosis  Chief Complaint    Void trial       Patient presents for void trial managed by our office    Plan  Follow up as scheduled with [facility] in June  Educational handout provided and reviewed of s.s he may experience after catheter removal  ER precautions reviewed  Advised to contact the office in the meantime with questions or concerns.    Procedure Saab removal/voiding trial    Saab catheter removed after deflation of an intact balloon. Patient tolerated well. Encouraged patient to hydrate well and return this afternoon for post void residual. he knows he may return early if uncomfortable and unable to urinate. Patient agrees to this plan.    Patient returned this afternoon. Patient states able to void. Patient voided while in office. Bladder ultrasound performed and PVR measured 229 ml.    Recent Results (from the past 4 hours)   POCT Measure PVR    Collection Time: 05/27/25  2:59 PM   Result Value Ref Range    POST-VOID RESIDUAL VOLUME, ML  mL       Vitals:    05/27/25 0838   BP: 120/70   BP Location: Left arm   Patient Position: Sitting   Cuff Size: Adult   Pulse: 74   Weight: 99.6 kg (219 lb 9.6 oz)   Height: 5' 9\" (1.753 m)             Samantha Mahmood, CANDIDAN, RN  "

## 2025-05-28 DIAGNOSIS — R53.83 OTHER FATIGUE: ICD-10-CM

## 2025-05-28 DIAGNOSIS — Z13.228 SCREENING FOR METABOLIC DISORDER: Primary | ICD-10-CM

## 2025-05-28 RX ORDER — ZOLPIDEM TARTRATE 10 MG/1
10 TABLET ORAL
Qty: 30 TABLET | Refills: 0 | Status: SHIPPED | OUTPATIENT
Start: 2025-05-28

## 2025-05-29 ENCOUNTER — APPOINTMENT (OUTPATIENT)
Dept: LAB | Age: 60
End: 2025-05-29
Payer: COMMERCIAL

## 2025-05-29 LAB
ALBUMIN SERPL BCG-MCNC: 3.3 G/DL (ref 3.5–5)
ALP SERPL-CCNC: 107 U/L (ref 34–104)
ALT SERPL W P-5'-P-CCNC: 45 U/L (ref 7–52)
ANION GAP SERPL CALCULATED.3IONS-SCNC: 11 MMOL/L (ref 4–13)
AST SERPL W P-5'-P-CCNC: 69 U/L (ref 13–39)
BASOPHILS # BLD MANUAL: 0 THOUSAND/UL (ref 0–0.1)
BASOPHILS NFR MAR MANUAL: 0 % (ref 0–1)
BILIRUB SERPL-MCNC: 0.29 MG/DL (ref 0.2–1)
BUN SERPL-MCNC: 13 MG/DL (ref 5–25)
CALCIUM ALBUM COR SERPL-MCNC: 9.3 MG/DL (ref 8.3–10.1)
CALCIUM SERPL-MCNC: 8.7 MG/DL (ref 8.4–10.2)
CHLORIDE SERPL-SCNC: 100 MMOL/L (ref 96–108)
CO2 SERPL-SCNC: 24 MMOL/L (ref 21–32)
CREAT SERPL-MCNC: 0.81 MG/DL (ref 0.6–1.3)
EOSINOPHIL # BLD MANUAL: 0 THOUSAND/UL (ref 0–0.4)
EOSINOPHIL NFR BLD MANUAL: 0 % (ref 0–6)
ERYTHROCYTE [DISTWIDTH] IN BLOOD BY AUTOMATED COUNT: 13.2 % (ref 11.6–15.1)
FERRITIN SERPL-MCNC: 195 NG/ML (ref 30–336)
GFR SERPL CREATININE-BSD FRML MDRD: 97 ML/MIN/1.73SQ M
GLUCOSE SERPL-MCNC: 196 MG/DL (ref 65–140)
HCT VFR BLD AUTO: 36.8 % (ref 36.5–49.3)
HGB BLD-MCNC: 12.2 G/DL (ref 12–17)
IRON SATN MFR SERPL: 8 % (ref 15–50)
IRON SERPL-MCNC: 23 UG/DL (ref 50–212)
LYMPHOCYTES # BLD AUTO: 49 % (ref 14–44)
LYMPHOCYTES # BLD AUTO: 7.28 THOUSAND/UL (ref 0.6–4.47)
MCH RBC QN AUTO: 30 PG (ref 26.8–34.3)
MCHC RBC AUTO-ENTMCNC: 33.2 G/DL (ref 31.4–37.4)
MCV RBC AUTO: 91 FL (ref 82–98)
MONOCYTES # BLD AUTO: 0.44 THOUSAND/UL (ref 0–1.22)
MONOCYTES NFR BLD: 3 % (ref 4–12)
NEUTROPHILS # BLD MANUAL: 6.84 THOUSAND/UL (ref 1.85–7.62)
NEUTS SEG NFR BLD AUTO: 47 % (ref 43–75)
PLATELET # BLD AUTO: 206 THOUSANDS/UL (ref 149–390)
PLATELET BLD QL SMEAR: ADEQUATE
PLATELET CLUMP BLD QL SMEAR: PRESENT
PMV BLD AUTO: 10.2 FL (ref 8.9–12.7)
POTASSIUM SERPL-SCNC: 4.1 MMOL/L (ref 3.5–5.3)
PROT SERPL-MCNC: 6.7 G/DL (ref 6.4–8.4)
RBC # BLD AUTO: 4.06 MILLION/UL (ref 3.88–5.62)
RBC MORPH BLD: NORMAL
SMUDGE CELLS BLD QL SMEAR: PRESENT
SODIUM SERPL-SCNC: 135 MMOL/L (ref 135–147)
TIBC SERPL-MCNC: 299.6 UG/DL (ref 250–450)
TRANSFERRIN SERPL-MCNC: 214 MG/DL (ref 203–362)
UIBC SERPL-MCNC: 277 UG/DL (ref 155–355)
VARIANT LYMPHS # BLD AUTO: 1 %
VIT B12 SERPL-MCNC: 2040 PG/ML (ref 180–914)
WBC # BLD AUTO: 14.55 THOUSAND/UL (ref 4.31–10.16)

## 2025-05-29 PROCEDURE — 83550 IRON BINDING TEST: CPT | Performed by: NURSE PRACTITIONER

## 2025-05-29 PROCEDURE — 83540 ASSAY OF IRON: CPT | Performed by: NURSE PRACTITIONER

## 2025-05-29 PROCEDURE — 85007 BL SMEAR W/DIFF WBC COUNT: CPT | Performed by: NURSE PRACTITIONER

## 2025-05-29 PROCEDURE — 82728 ASSAY OF FERRITIN: CPT | Performed by: NURSE PRACTITIONER

## 2025-05-29 PROCEDURE — 85027 COMPLETE CBC AUTOMATED: CPT | Performed by: NURSE PRACTITIONER

## 2025-05-29 PROCEDURE — 80053 COMPREHEN METABOLIC PANEL: CPT | Performed by: NURSE PRACTITIONER

## 2025-05-29 PROCEDURE — 36415 COLL VENOUS BLD VENIPUNCTURE: CPT | Performed by: NURSE PRACTITIONER

## 2025-05-29 PROCEDURE — 82607 VITAMIN B-12: CPT | Performed by: NURSE PRACTITIONER

## 2025-05-30 ENCOUNTER — LAB (OUTPATIENT)
Dept: LAB | Age: 60
End: 2025-05-30
Attending: NURSE PRACTITIONER
Payer: COMMERCIAL

## 2025-05-30 ENCOUNTER — OFFICE VISIT (OUTPATIENT)
Age: 60
End: 2025-05-30
Payer: COMMERCIAL

## 2025-05-30 ENCOUNTER — RESULTS FOLLOW-UP (OUTPATIENT)
Age: 60
End: 2025-05-30

## 2025-05-30 VITALS
OXYGEN SATURATION: 96 % | SYSTOLIC BLOOD PRESSURE: 130 MMHG | HEIGHT: 69 IN | HEART RATE: 95 BPM | BODY MASS INDEX: 32.43 KG/M2 | TEMPERATURE: 97.8 F | DIASTOLIC BLOOD PRESSURE: 80 MMHG

## 2025-05-30 DIAGNOSIS — D50.9 IRON DEFICIENCY ANEMIA, UNSPECIFIED IRON DEFICIENCY ANEMIA TYPE: ICD-10-CM

## 2025-05-30 DIAGNOSIS — R33.8 ACUTE URINARY RETENTION: ICD-10-CM

## 2025-05-30 DIAGNOSIS — D72.829 LEUKOCYTOSIS, UNSPECIFIED TYPE: ICD-10-CM

## 2025-05-30 DIAGNOSIS — R00.2 PALPITATIONS: Primary | ICD-10-CM

## 2025-05-30 LAB
BACTERIA UR QL AUTO: ABNORMAL /HPF
BILIRUB UR QL STRIP: NEGATIVE
CLARITY UR: ABNORMAL
COLOR UR: YELLOW
GLUCOSE UR STRIP-MCNC: NEGATIVE MG/DL
HGB UR QL STRIP.AUTO: NEGATIVE
KETONES UR STRIP-MCNC: NEGATIVE MG/DL
LEUKOCYTE ESTERASE UR QL STRIP: ABNORMAL
MUCOUS THREADS UR QL AUTO: ABNORMAL
NITRITE UR QL STRIP: NEGATIVE
NON-SQ EPI CELLS URNS QL MICRO: ABNORMAL /HPF
PH UR STRIP.AUTO: 6.5 [PH]
PROT UR STRIP-MCNC: ABNORMAL MG/DL
RBC #/AREA URNS AUTO: ABNORMAL /HPF
SP GR UR STRIP.AUTO: 1.01 (ref 1–1.03)
UROBILINOGEN UR STRIP-ACNC: 3 MG/DL
WBC #/AREA URNS AUTO: ABNORMAL /HPF
WBC CLUMPS # UR AUTO: PRESENT /UL

## 2025-05-30 PROCEDURE — 81001 URINALYSIS AUTO W/SCOPE: CPT

## 2025-05-30 PROCEDURE — 99214 OFFICE O/P EST MOD 30 MIN: CPT | Performed by: NURSE PRACTITIONER

## 2025-05-30 PROCEDURE — 93000 ELECTROCARDIOGRAM COMPLETE: CPT | Performed by: NURSE PRACTITIONER

## 2025-05-30 PROCEDURE — 87086 URINE CULTURE/COLONY COUNT: CPT

## 2025-05-30 PROCEDURE — 87186 SC STD MICRODIL/AGAR DIL: CPT

## 2025-05-30 PROCEDURE — 87077 CULTURE AEROBIC IDENTIFY: CPT

## 2025-05-30 RX ORDER — SULFAMETHOXAZOLE AND TRIMETHOPRIM 800; 160 MG/1; MG/1
1 TABLET ORAL 2 TIMES DAILY
Qty: 6 TABLET | Refills: 0 | Status: SHIPPED | OUTPATIENT
Start: 2025-05-30 | End: 2025-06-02

## 2025-05-30 NOTE — PROGRESS NOTES
Name: Barry Roland      : 1965      MRN: [id number]  Encounter Provider: ANN Carroll  Encounter Date: 2025   [facility]  :  Assessment & Plan  Palpitations  - EKG normal sinus rhythm  -If palpitations persist recommend sooner follow-up  Orders:    POCT ECG    Leukocytosis, unspecified type  - Patient unable to provide urine while in office today  -Discussed with Dr. Puente will start Bactrim DS  -Recommend that he takes urine to lab prior to starting Bactrim discussed red flag warning signs with patient  -Will follow-up with patient on Tuesday  Orders:    UA (URINE) with reflex to Scope; Future    Urine culture; Future    sulfamethoxazole-trimethoprim (BACTRIM DS) 800-160 mg per tablet; Take 1 tablet by mouth 2 (two) times a day for 3 days    Iron deficiency anemia, unspecified iron deficiency anemia type  -Continues to follow bariatric medicine  -Continue with iron supplementation             Acute urinary retention  -follows urology   -continues flomax   -recently had ferrell cath removed                History of Present Illness   Patient presents today for abnormal blood work.    Patient had hemorrhoidectomy on May 19  Some continued discomfort, and reports that he has been very fatigued since the surgery     He reports that he is unable to sit up right at his desk for more than 1 hour due to fatigue    Work did show increased white blood cell count at 14.55  Fasting glucose was elevated at 196  AST elevated at 69  Ferritin 195  B12 patient     Has been having heart palpitations, SOB and fatigue      was seen in ER for urinary retention, he had ferrell cath, which was removed by urology on     Reports some constipation, no diarrhea, no fevers or chills, no URI like symptoms, no abdominal pain       Review of Systems   Constitutional:  Positive for fatigue. Negative for activity change, appetite change,  "chills, diaphoresis and fever.   HENT:  Negative for congestion, ear discharge, ear pain, postnasal drip, rhinorrhea, sinus pressure, sinus pain and sore throat.    Eyes:  Negative for pain, discharge, itching and visual disturbance.   Respiratory:  Positive for shortness of breath. Negative for cough, chest tightness and wheezing.    Cardiovascular:  Positive for palpitations. Negative for chest pain and leg swelling.   Gastrointestinal:  Negative for abdominal pain, constipation, diarrhea, nausea and vomiting.   Endocrine: Negative for polydipsia, polyphagia and polyuria.   Genitourinary:  Negative for difficulty urinating, dysuria and urgency.   Musculoskeletal:  Negative for arthralgias, back pain and neck pain.   Skin:  Negative for rash and wound.   Neurological:  Negative for dizziness, weakness, numbness and headaches.       Objective   /80 (BP Location: Left arm, Patient Position: Sitting, Cuff Size: Standard)   Pulse 95   Temp 97.8 °F (36.6 °C) (Temporal)   Ht 5' 9\" (1.753 m)   SpO2 96%   BMI 32.43 kg/m²      Physical Exam  Constitutional:       General: He is not in acute distress.     Appearance: He is well-developed. He is not diaphoretic.   HENT:      Head: Normocephalic and atraumatic.      Right Ear: External ear normal.      Left Ear: External ear normal.      Nose: Nose normal.      Mouth/Throat:      Mouth: Mucous membranes are moist.      Pharynx: No oropharyngeal exudate or posterior oropharyngeal erythema.     Eyes:      General:         Right eye: No discharge.         Left eye: No discharge.      Conjunctiva/sclera: Conjunctivae normal.      Pupils: Pupils are equal, round, and reactive to light.     Neck:      Thyroid: No thyromegaly.     Cardiovascular:      Rate and Rhythm: Normal rate and regular rhythm.      Heart sounds: Normal heart sounds. No murmur heard.     No friction rub. No gallop.   Pulmonary:      Effort: Pulmonary effort is normal. No respiratory distress.      " Breath sounds: Normal breath sounds. No stridor. No wheezing or rales.   Abdominal:      General: Bowel sounds are normal. There is no distension.      Palpations: Abdomen is soft.      Tenderness: There is no abdominal tenderness.     Musculoskeletal:      Cervical back: Normal range of motion and neck supple.   Lymphadenopathy:      Cervical: No cervical adenopathy.     Skin:     General: Skin is warm and dry.      Findings: No erythema or rash.     Neurological:      Mental Status: He is alert and oriented to person, place, and time.     Psychiatric:         Behavior: Behavior normal.         Thought Content: Thought content normal.         Judgment: Judgment normal.       Administrative Statements   I have spent a total time of 30 minutes in caring for this patient on the day of the visit/encounter including Diagnostic results, Risks and benefits of tx options, Instructions for management, Counseling / Coordination of care, Documenting in the medical record, Reviewing/placing orders in the medical record (including tests, medications, and/or procedures), and Communicating with other healthcare professionals .

## 2025-05-30 NOTE — ASSESSMENT & PLAN NOTE
- Patient unable to provide urine while in office today  -Discussed with Dr. Puente will start Bactrim DS  -Recommend that he takes urine to lab prior to starting Bactrim discussed red flag warning signs with patient  -Will follow-up with patient on Tuesday  Orders:    UA (URINE) with reflex to Scope; Future    Urine culture; Future    sulfamethoxazole-trimethoprim (BACTRIM DS) 800-160 mg per tablet; Take 1 tablet by mouth 2 (two) times a day for 3 days

## 2025-05-30 NOTE — ASSESSMENT & PLAN NOTE
- EKG normal sinus rhythm  -If palpitations persist recommend sooner follow-up  Orders:    POCT ECG

## 2025-06-01 LAB — BACTERIA UR CULT: ABNORMAL

## 2025-06-03 ENCOUNTER — OFFICE VISIT (OUTPATIENT)
Age: 60
End: 2025-06-03
Payer: COMMERCIAL

## 2025-06-03 ENCOUNTER — HOSPITAL ENCOUNTER (EMERGENCY)
Facility: HOSPITAL | Age: 60
Discharge: HOME | End: 2025-06-03
Attending: EMERGENCY MEDICINE
Payer: COMMERCIAL

## 2025-06-03 ENCOUNTER — APPOINTMENT (EMERGENCY)
Dept: CT IMAGING | Facility: HOSPITAL | Age: 60
End: 2025-06-03
Payer: COMMERCIAL

## 2025-06-03 ENCOUNTER — APPOINTMENT (EMERGENCY)
Dept: RADIOLOGY | Facility: HOSPITAL | Age: 60
End: 2025-06-03
Payer: COMMERCIAL

## 2025-06-03 VITALS
DIASTOLIC BLOOD PRESSURE: 70 MMHG | BODY MASS INDEX: 32.38 KG/M2 | SYSTOLIC BLOOD PRESSURE: 118 MMHG | HEART RATE: 91 BPM | TEMPERATURE: 97.3 F | OXYGEN SATURATION: 95 % | WEIGHT: 218.6 LBS | HEIGHT: 69 IN

## 2025-06-03 VITALS
TEMPERATURE: 97.8 F | RESPIRATION RATE: 20 BRPM | OXYGEN SATURATION: 97 % | SYSTOLIC BLOOD PRESSURE: 134 MMHG | HEART RATE: 87 BPM | DIASTOLIC BLOOD PRESSURE: 82 MMHG

## 2025-06-03 DIAGNOSIS — R53.83 FATIGUE: Primary | ICD-10-CM

## 2025-06-03 DIAGNOSIS — R06.02 SHORTNESS OF BREATH: ICD-10-CM

## 2025-06-03 DIAGNOSIS — R50.9 FEVER, UNSPECIFIED FEVER CAUSE: Primary | ICD-10-CM

## 2025-06-03 LAB
ALBUMIN SERPL BCG-MCNC: 3.4 G/DL (ref 3.5–5)
ALP SERPL-CCNC: 126 U/L (ref 34–104)
ALT SERPL W P-5'-P-CCNC: 32 U/L (ref 7–52)
ANION GAP SERPL CALCULATED.3IONS-SCNC: 11 MMOL/L (ref 4–13)
AST SERPL W P-5'-P-CCNC: 71 U/L (ref 13–39)
BASOPHILS # BLD MANUAL: 0 THOUSAND/UL (ref 0–0.1)
BASOPHILS NFR MAR MANUAL: 0 % (ref 0–1)
BILIRUB SERPL-MCNC: 0.32 MG/DL (ref 0.2–1)
BILIRUB UR QL STRIP: NEGATIVE
BUN SERPL-MCNC: 14 MG/DL (ref 5–25)
CALCIUM ALBUM COR SERPL-MCNC: 9.2 MG/DL (ref 8.3–10.1)
CALCIUM SERPL-MCNC: 8.7 MG/DL (ref 8.4–10.2)
CHLORIDE SERPL-SCNC: 100 MMOL/L (ref 96–108)
CLARITY UR: CLEAR
CO2 SERPL-SCNC: 21 MMOL/L (ref 21–32)
COLOR UR: NORMAL
CREAT SERPL-MCNC: 0.83 MG/DL (ref 0.6–1.3)
D DIMER PPP FEU-MCNC: 1.83 UG/ML FEU
EOSINOPHIL # BLD MANUAL: 0 THOUSAND/UL (ref 0–0.4)
EOSINOPHIL NFR BLD MANUAL: 0 % (ref 0–6)
ERYTHROCYTE [DISTWIDTH] IN BLOOD BY AUTOMATED COUNT: 13.7 % (ref 11.6–15.1)
GFR SERPL CREATININE-BSD FRML MDRD: 96 ML/MIN/1.73SQ M
GLUCOSE SERPL-MCNC: 142 MG/DL (ref 65–140)
GLUCOSE SERPL-MCNC: 214 MG/DL (ref 65–140)
GLUCOSE UR STRIP-MCNC: NEGATIVE MG/DL
HCT VFR BLD AUTO: 37.8 % (ref 36.5–49.3)
HGB BLD-MCNC: 12.6 G/DL (ref 12–17)
HGB UR QL STRIP.AUTO: NEGATIVE
KETONES UR STRIP-MCNC: NEGATIVE MG/DL
LEUKOCYTE ESTERASE UR QL STRIP: NEGATIVE
LIPASE SERPL-CCNC: 15 U/L (ref 11–82)
LYMPHOCYTES # BLD AUTO: 32 % (ref 14–44)
LYMPHOCYTES # BLD AUTO: 4.13 THOUSAND/UL (ref 0.6–4.47)
MCH RBC QN AUTO: 30.2 PG (ref 26.8–34.3)
MCHC RBC AUTO-ENTMCNC: 33.3 G/DL (ref 31.4–37.4)
MCV RBC AUTO: 91 FL (ref 82–98)
MONOCYTES # BLD AUTO: 1.55 THOUSAND/UL (ref 0–1.22)
MONOCYTES NFR BLD: 12 % (ref 4–12)
NEUTROPHILS # BLD MANUAL: 7.22 THOUSAND/UL (ref 1.85–7.62)
NEUTS BAND NFR BLD MANUAL: 2 % (ref 0–8)
NEUTS SEG NFR BLD AUTO: 54 % (ref 43–75)
NITRITE UR QL STRIP: NEGATIVE
PH UR STRIP.AUTO: 5.5 [PH]
PLATELET # BLD AUTO: 309 THOUSANDS/UL (ref 149–390)
PLATELET BLD QL SMEAR: ADEQUATE
PLATELET CLUMP BLD QL SMEAR: PRESENT
PMV BLD AUTO: 9.1 FL (ref 8.9–12.7)
POLYCHROMASIA BLD QL SMEAR: PRESENT
POTASSIUM SERPL-SCNC: 4.1 MMOL/L (ref 3.5–5.3)
PROT SERPL-MCNC: 7 G/DL (ref 6.4–8.4)
PROT UR STRIP-MCNC: NEGATIVE MG/DL
RBC # BLD AUTO: 4.17 MILLION/UL (ref 3.88–5.62)
RBC MORPH BLD: PRESENT
SMUDGE CELLS BLD QL SMEAR: PRESENT
SODIUM SERPL-SCNC: 132 MMOL/L (ref 135–147)
SP GR UR STRIP.AUTO: 1.01 (ref 1–1.03)
UROBILINOGEN UR STRIP-ACNC: <2 MG/DL
WBC # BLD AUTO: 12.9 THOUSAND/UL (ref 4.31–10.16)

## 2025-06-03 PROCEDURE — 71275 CT ANGIOGRAPHY CHEST: CPT

## 2025-06-03 PROCEDURE — 99284 EMERGENCY DEPT VISIT MOD MDM: CPT

## 2025-06-03 PROCEDURE — 36415 COLL VENOUS BLD VENIPUNCTURE: CPT

## 2025-06-03 PROCEDURE — 85007 BL SMEAR W/DIFF WBC COUNT: CPT

## 2025-06-03 PROCEDURE — 83690 ASSAY OF LIPASE: CPT

## 2025-06-03 PROCEDURE — 99285 EMERGENCY DEPT VISIT HI MDM: CPT | Performed by: EMERGENCY MEDICINE

## 2025-06-03 PROCEDURE — 71046 X-RAY EXAM CHEST 2 VIEWS: CPT

## 2025-06-03 PROCEDURE — 85027 COMPLETE CBC AUTOMATED: CPT

## 2025-06-03 PROCEDURE — 82948 REAGENT STRIP/BLOOD GLUCOSE: CPT

## 2025-06-03 PROCEDURE — 81003 URINALYSIS AUTO W/O SCOPE: CPT

## 2025-06-03 PROCEDURE — 85379 FIBRIN DEGRADATION QUANT: CPT

## 2025-06-03 PROCEDURE — 99213 OFFICE O/P EST LOW 20 MIN: CPT | Performed by: NURSE PRACTITIONER

## 2025-06-03 PROCEDURE — 80053 COMPREHEN METABOLIC PANEL: CPT

## 2025-06-03 RX ADMIN — IOHEXOL 85 ML: 350 INJECTION, SOLUTION INTRAVENOUS at 17:24

## 2025-06-03 NOTE — LETTER
Dorothy 3, 2025     Patient: Barry Roland  YOB: 1965  Date of Visit: 6/3/2025      To Whom it May Concern:    Barry Roland is under my professional care. Barry was seen in my office on 6/3/2025. Barry needs to be out of work until otherwise specified. Please excuse him from work from 5/19 until otherwise specified.     If you have any questions or concerns, please don't hesitate to call.         Sincerely,          ANN Carroll        CC: No Recipients

## 2025-06-03 NOTE — PROGRESS NOTES
Name: Barry Roland      : 1965      MRN: [id number]  Encounter Provider: ANN Carroll  Encounter Date: 6/3/2025   Encounter department: [facility]  :  Assessment & Plan  Fever, unspecified fever cause  -discussed with Dr. Puente, I would strongly suggest that patient is evaluated for sepsis  -In agreement and will proceed to ER    Orders:    Transfer to other facility           History of Present Illness   Patient presents today with ongoing fatigue.  At last office visit EKG was performed and had showed normal sinus rhythm.    Patient was started on Bactrim DS for UTI which was susceptible as indicated on urine culture    He continues with fevers and extreme fatigues   Denies abdominal pain   Does reports some constipation     Note from last office visit:  Patient presents today for abnormal blood work.    Patient had hemorrhoidectomy on May 19  Some continued discomfort, and reports that he has been very fatigued since the surgery     He reports that he is unable to sit up right at his desk for more than 1 hour due to fatigue    Work did show increased white blood cell count at 14.55  Fasting glucose was elevated at 196  AST elevated at 69  Ferritin 195  B12 patient     Has been having heart palpitations, SOB and fatigue      was seen in ER for urinary retention, he had ferrell cath, which was removed by urology on     Reports some constipation, no diarrhea, no fevers or chills, no URI like symptoms, no abdominal pain     Review of Systems   Constitutional:  Negative for activity change, appetite change, chills, diaphoresis and fever.   HENT:  Negative for congestion, ear discharge, ear pain, postnasal drip, rhinorrhea, sinus pressure, sinus pain and sore throat.    Eyes:  Negative for pain, discharge, itching and visual disturbance.   Respiratory:  Negative for cough, chest tightness, shortness of breath and wheezing.   "  Cardiovascular:  Negative for chest pain, palpitations and leg swelling.   Gastrointestinal:  Positive for constipation. Negative for abdominal pain, diarrhea, nausea and vomiting.   Endocrine: Negative for polydipsia, polyphagia and polyuria.   Genitourinary:  Negative for difficulty urinating, dysuria and urgency.   Musculoskeletal:  Negative for arthralgias, back pain and neck pain.   Skin:  Negative for rash and wound.   Neurological:  Negative for dizziness, weakness, numbness and headaches.       Objective   /70 (BP Location: Left arm, Patient Position: Sitting, Cuff Size: Standard)   Pulse 91   Temp (!) 97.3 °F (36.3 °C) (Temporal)   Ht 5' 9\" (1.753 m)   Wt 99.2 kg (218 lb 9.6 oz)   SpO2 95%   BMI 32.28 kg/m²      Physical Exam  Constitutional:       General: He is not in acute distress.     Appearance: He is well-developed. He is ill-appearing. He is not diaphoretic.   HENT:      Head: Normocephalic and atraumatic.      Right Ear: External ear normal.      Left Ear: External ear normal.      Nose: Nose normal.      Mouth/Throat:      Mouth: Mucous membranes are moist.      Pharynx: No oropharyngeal exudate or posterior oropharyngeal erythema.     Eyes:      General:         Right eye: No discharge.         Left eye: No discharge.      Conjunctiva/sclera: Conjunctivae normal.      Pupils: Pupils are equal, round, and reactive to light.     Neck:      Thyroid: No thyromegaly.     Cardiovascular:      Rate and Rhythm: Normal rate and regular rhythm.      Heart sounds: Normal heart sounds. No murmur heard.     No friction rub. No gallop.   Pulmonary:      Effort: Pulmonary effort is normal. No respiratory distress.      Breath sounds: Normal breath sounds. No stridor. No wheezing or rales.   Abdominal:      General: Bowel sounds are normal. There is no distension.      Palpations: Abdomen is soft.      Tenderness: There is no abdominal tenderness.     Musculoskeletal:      Cervical back: Normal " range of motion and neck supple.   Lymphadenopathy:      Cervical: No cervical adenopathy.     Skin:     General: Skin is warm and dry.      Findings: No erythema or rash.     Neurological:      Mental Status: He is alert and oriented to person, place, and time.     Psychiatric:         Behavior: Behavior normal.         Thought Content: Thought content normal.         Judgment: Judgment normal.

## 2025-06-03 NOTE — ED PROVIDER NOTES
Time reflects when diagnosis was documented in both MDM as applicable and the Disposition within this note       Time User Action Codes Description Comment    6/3/2025  6:23 PM Awosika, Afopefoluwa Add [R53.83] Fatigue     6/3/2025  6:23 PM Awosika, Afopefoluwa Add [R50.9] Fever     6/3/2025  6:31 PM Awosika, Afopefoluwa Remove [R50.9] Fever     6/3/2025  6:31 PM Awosika, Afopefoluwa Add [R06.02] Shortness of breath           ED Disposition       ED Disposition   Discharge    Condition   Stable    Date/Time   Tue Nura 3, 2025  6:23 PM    Comment   Barry Roland discharge to home/self care.                   Assessment & Plan       Medical Decision Making  60-year-old male presents for evaluation of fever and fatigue.  On initial evaluation, patient in no acute distress, resting comfortably in bed.  Afebrile, and vital signs stable.  Physical exam was unremarkable.  Differentials include but not limited to PE, pneumonia, UTI, antibiotic failure, deconditioning.  Will obtain labs, CXR.  D-dimer was positive, so CTA PE study was ordered.  WBC was mildly elevated, but improved from previous.  CT study was negative for PE, or pneumonia.  Suspect that patient's symptoms are more from deconditioning given recent illnesses.  On further discussion with him, suspect that his temperature measurements were inaccurate due to using the forehead probe, which is known to be inaccurate.  Recommended using oral, or axillary thermometer.  Will have him follow-up with his PCP if symptoms do not improve.  Patient is amenable to this plan.  Stable for dispo at this time.    Amount and/or Complexity of Data Reviewed  Labs: ordered. Decision-making details documented in ED Course.  Radiology: ordered and independent interpretation performed. Decision-making details documented in ED Course.    Risk  Prescription drug management.        ED Course as of 06/04/25 2021 Tue Jun 03, 2025   1611 D-Dimer, Quant(!): 1.83   1641 D-Dimer, Quant(!):  1.83  CTA PE study ordered   1644 UA w Reflex to Microscopic w Reflex to Culture  nagative   1644 WBC(!): 12.90   1644 Sodium(!): 132   1822 CTA chest pe study  IMPRESSION:  1.  Negative for PE.  2.  Small hiatal hernia with mild lower esophageal circumferential wall thickening, which can be seen in the setting of esophagitis.  3.  Scattered pulmonary nodules measuring up to 4 mm. Based on current Fleischner Society 2017 Guidelines on incidental pulmonary nodule, no routine follow-up is needed if the patient is low risk. If the patient is high risk, optional follow-up chest CT   at 12 months can be considered.         Medications   iohexol (OMNIPAQUE) 350 MG/ML injection (MULTI-DOSE) 85 mL (85 mL Intravenous Given 6/3/25 1724)       ED Risk Strat Scores                    No data recorded                            History of Present Illness       Chief Complaint   Patient presents with    Fever     Pt states he had a hemorrhoidectomy 2 weeks ago. Was seen here last week for urinary retention and  had a ferrell placed and since removed. For a week now pt has had intermittent fevers (last medicated with  tylenol an hr ago). +Fatigue        Past Medical History[1]   Past Surgical History[2]   Family History[3]   Social History[4]   E-Cigarette/Vaping    E-Cigarette Use Never User       E-Cigarette/Vaping Substances    Nicotine No     THC No     CBD No     Flavoring No     Other No     Unknown No       I have reviewed and agree with the history as documented.     60-year-old male presents for evaluation of fatigue, fever and shortness of breath.  He is 2 weeks postop from hemorrhoidectomy, complicated by acute urinary retention requiring Ferrell placement, and UTI which was diagnosed last week.  Reports since finishing treatment for his UTI, he is continue to feel fatigued, SOB and has had intermittent fevers, Tmax 101.  He also reports feeling flushed.  He has been taking Tylenol which has helped with the fever, but  states that when the Tylenol wears off his fever returns.  He denies urinary symptoms, abdominal pain, blood in his stool.  He has no other complaints at this time.          Review of Systems   Constitutional:  Positive for fatigue and fever.   Respiratory:  Positive for shortness of breath.    Cardiovascular:  Negative for chest pain.   Gastrointestinal:  Negative for abdominal pain, diarrhea, nausea and vomiting.   Skin:  Negative for pallor.   All other systems reviewed and are negative.          Objective       ED Triage Vitals   Temperature Pulse Blood Pressure Respirations SpO2 Patient Position - Orthostatic VS   06/03/25 1208 06/03/25 1208 06/03/25 1208 06/03/25 1209 06/03/25 1208 06/03/25 1442   98.1 °F (36.7 °C) 98 122/66 20 97 % Sitting      Temp Source Heart Rate Source BP Location FiO2 (%) Pain Score    06/03/25 1208 06/03/25 1208 06/03/25 1442 -- --    Oral Monitor Right arm        Vitals      Date and Time Temp Pulse SpO2 Resp BP Pain Score FACES Pain Rating User   06/03/25 1442 97.8 °F (36.6 °C) 87 97 % 20 134/82 -- -- RI   06/03/25 1209 -- -- -- 20 -- -- -- RI   06/03/25 1208 98.1 °F (36.7 °C) 98 97 % -- 122/66 -- -- RI            Physical Exam  Vitals and nursing note reviewed.   Constitutional:       General: He is not in acute distress.     Appearance: He is well-developed.   HENT:      Head: Normocephalic and atraumatic.      Mouth/Throat:      Mouth: Mucous membranes are moist.     Eyes:      Conjunctiva/sclera: Conjunctivae normal.       Cardiovascular:      Rate and Rhythm: Normal rate and regular rhythm.      Heart sounds: No murmur heard.  Pulmonary:      Effort: Pulmonary effort is normal. No respiratory distress.      Breath sounds: Normal breath sounds.   Abdominal:      Palpations: Abdomen is soft.      Tenderness: There is no abdominal tenderness.     Musculoskeletal:         General: No swelling.      Cervical back: Neck supple.     Skin:     General: Skin is warm and dry.       Capillary Refill: Capillary refill takes less than 2 seconds.     Neurological:      General: No focal deficit present.      Mental Status: He is alert.     Psychiatric:         Mood and Affect: Mood normal.         Results Reviewed       Procedure Component Value Units Date/Time    Fingerstick Glucose (POCT) [[id number]]  (Abnormal) Collected: 06/03/25 1630    Lab Status: Final result Specimen: Blood Updated: 06/03/25 1631     POC Glucose 142 mg/dl     D-dimer, quantitative [[id number]]  (Abnormal) Collected: 06/03/25 1540    Lab Status: Final result Specimen: Blood from Arm, Right Updated: 06/03/25 1606     D-Dimer, Quant 1.83 ug/ml FEU     Narrative:      In the evaluation for possible pulmonary embolism, in the appropriate (Well's Score of 4 or less) patient, the age adjusted d-dimer cutoff for this patient can be calculated as:    Age x 0.01 (in ug/mL) for Age-adjusted D-dimer exclusion threshold for a patient over 50 years.    UA w Reflex to Microscopic w Reflex to Culture [[id number]] Collected: 06/03/25 1421    Lab Status: Final result Specimen: Urine, Clean Catch Updated: 06/03/25 1450     Color, UA Light Yellow     Clarity, UA Clear     Specific Gravity, UA 1.012     pH, UA 5.5     Leukocytes, UA Negative     Nitrite, UA Negative     Protein, UA Negative mg/dl      Glucose, UA Negative mg/dl      Ketones, UA Negative mg/dl      Urobilinogen, UA <2.0 mg/dl      Bilirubin, UA Negative     Occult Blood, UA Negative    RBC Morphology Reflex Test [[id number]] Collected: 06/03/25 1213    Lab Status: Final result Specimen: Blood from Arm, Right Updated: 06/03/25 1402    CBC and differential [[id number]]  (Abnormal) Collected: 06/03/25 1213    Lab Status: Final result Specimen: Blood from Arm, Right Updated: 06/03/25 1340     WBC 12.90 Thousand/uL      RBC 4.17 Million/uL      Hemoglobin 12.6 g/dL      Hematocrit 37.8 %      MCV 91 fL      MCH 30.2 pg      MCHC 33.3 g/dL      RDW 13.7 %      MPV 9.1 fL      Platelets  309 Thousands/uL     Narrative:      This is an appended report.  These results have been appended to a previously verified report.    Manual Differential(PHLEBS Do Not Order) [[id number]]  (Abnormal) Collected: 06/03/25 1213    Lab Status: Final result Specimen: Blood from Arm, Right Updated: 06/03/25 1340     Segmented % 54 %      Bands % 2 %      Lymphocytes % 32 %      Monocytes % 12 %      Eosinophils % 0 %      Basophils % 0 %      Absolute Neutrophils 7.22 Thousand/uL      Absolute Lymphocytes 4.13 Thousand/uL      Absolute Monocytes 1.55 Thousand/uL      Absolute Eosinophils 0.00 Thousand/uL      Absolute Basophils 0.00 Thousand/uL      Total Counted --     Smudge Cells Present     RBC Morphology Present     Platelet Estimate Adequate     Clumped Platelets Present     Polychromasia Present    Comprehensive metabolic panel [[id number]]  (Abnormal) Collected: 06/03/25 1213    Lab Status: Final result Specimen: Blood from Arm, Right Updated: 06/03/25 1330     Sodium 132 mmol/L      Potassium 4.1 mmol/L      Chloride 100 mmol/L      CO2 21 mmol/L      ANION GAP 11 mmol/L      BUN 14 mg/dL      Creatinine 0.83 mg/dL      Glucose 214 mg/dL      Calcium 8.7 mg/dL      Corrected Calcium 9.2 mg/dL      AST 71 U/L      ALT 32 U/L      Alkaline Phosphatase 126 U/L      Total Protein 7.0 g/dL      Albumin 3.4 g/dL      Total Bilirubin 0.32 mg/dL      eGFR 96 ml/min/1.73sq m     Narrative:      National Kidney Disease Foundation guidelines for Chronic Kidney Disease (CKD):     Stage 1 with normal or high GFR (GFR > 90 mL/min/1.73 square meters)    Stage 2 Mild CKD (GFR = 60-89 mL/min/1.73 square meters)    Stage 3A Moderate CKD (GFR = 45-59 mL/min/1.73 square meters)    Stage 3B Moderate CKD (GFR = 30-44 mL/min/1.73 square meters)    Stage 4 Severe CKD (GFR = 15-29 mL/min/1.73 square meters)    Stage 5 End Stage CKD (GFR <15 mL/min/1.73 square meters)  Note: GFR calculation is accurate only with a steady state creatinine     Lipase [[id number]]  (Normal) Collected: 06/03/25 1213    Lab Status: Final result Specimen: Blood from Arm, Right Updated: 06/03/25 1330     Lipase 15 u/L             CTA chest pe study   Final Interpretation by Tobias Minor MD (06/03 1815)   1.  Negative for PE.   2.  Small hiatal hernia with mild lower esophageal circumferential wall thickening, which can be seen in the setting of esophagitis.   3.  Scattered pulmonary nodules measuring up to 4 mm. Based on current Fleischner Society 2017 Guidelines on incidental pulmonary nodule, no routine follow-up is needed if the patient is low risk. If the patient is high risk, optional follow-up chest CT    at 12 months can be considered.         Workstation performed: [id number]         XR chest 2 views   ED Interpretation by Jeanna Leonardo MD (06/03 2409)   ED interpretation: No acute cardiopulmonary disease.      Final Interpretation by Jay Venegas MD (06/03 3920)      No acute cardiopulmonary disease.            Workstation performed: [id number]             Procedures    ED Medication and Procedure Management   Prior to Admission Medications   Prescriptions Last Dose Informant Patient Reported? Taking?   ACETAMINOPHEN PO  Self Yes No   Sig: Take 80 mg by mouth every 4 (four) hours as needed for mild pain   Blood Glucose Monitoring Suppl (ONE TOUCH ULTRA 2) w/Device KIT  Self Yes No   Sig: Use   Calcium Citrate (JORDY-CITRATE PO)  Self Yes No   Sig: Take 1,000 tablets by mouth in the morning and 1,000 tablets in the evening.   Continuous Glucose Sensor (Dexcom G7 Sensor)  Self No No   Sig: Use 1 Device every 10 days   Glucagon (Baqsimi Two Pack) 3 MG/DOSE POWD  Self No No   Sig: Use one dose as needed in case of severe hypoglycemia ( nasal spray)   Insulin Aspart (NovoLOG) 100 units/mL injection  Self No No   Sig: USE UP TO 75 UNITS DAILY VIA INSULIN PUMP   Insulin Glargine-yfgn 100 UNIT/ML SOPN  Self No No   Sig: INJECT 30 UNITS IN CASE OF PUMP FAILURE  ( INCASE OF EMERGENCY)   Insulin Pen Needle (BD PEN NEEDLE LYRIC U/F) 32G X 4 MM MISC  Self No No   Sig: by Does not apply route daily Use once daily   Multiple Vitamins-Minerals (Bariatric Fusion) CHEW  Self Yes No   Sig: Chew   Probiotic Product (PROBIOTIC DAILY PO)  Self Yes No   Sig: Take 1 tablet by mouth in the morning.   TURMERIC PO  Self Yes No   Sig: Take 1,400 capsules by mouth in the morning and 1,400 capsules in the evening.   Tirzepatide-Weight Management (Zepbound) 5 mg/0.5 mL subcutaneous solution (vial)  Self No No   Sig: Inject 0.5 mL (5 mg total) under the skin once a week   Patient not taking: Reported on 6/3/2025   ferrous sulfate 325 (65 FE) MG EC tablet  Self Yes No   Sig: Take 325 mg by mouth daily with breakfast   fluticasone (FLONASE) 50 mcg/act nasal spray  Self Yes No   Si spray into each nostril in the morning.   levothyroxine (Synthroid) 200 mcg tablet  Self No No   Sig: Take 1 tablet daily Mon-Sat and 2 tablets on .   lidocaine (XYLOCAINE) 2 % topical gel   No No   Sig: Apply topically 3 (three) times a day for 3 days   Patient not taking: Reported on 6/3/2025   loratadine (CLARITIN) 10 mg tablet  Self No No   Sig: Take 1 tablet (10 mg total) by mouth daily   methocarbamol (ROBAXIN) 500 mg tablet  Self No No   Sig: Take 1 tablet (500 mg total) by mouth 4 (four) times a day   Patient taking differently: Take 500 mg by mouth if needed for muscle spasms   psyllium (METAMUCIL) 0.52 g capsule  Self Yes No   Sig: Take 0.52 g by mouth in the morning and 0.52 g in the evening and 0.52 g before bedtime. Fiber capsules.   rosuvastatin (CRESTOR) 5 mg tablet  Self No No   Sig: TAKE 1 TABLET (5 MG TOTAL) BY MOUTH DAILY.   sulfamethoxazole-trimethoprim (BACTRIM DS) 800-160 mg per tablet   No No   Sig: Take 1 tablet by mouth 2 (two) times a day for 3 days   Patient not taking: Reported on 6/3/2025   tamsulosin (FLOMAX) 0.4 mg   No No   Sig: Take 1 capsule (0.4 mg total) by mouth daily with  dinner   vitamin B-12 (VITAMIN B-12) 1,000 mcg tablet  Self Yes No   Sig: Take 1,000 mcg by mouth 3 (three) times a week   zolpidem (AMBIEN) 10 mg tablet   No No   Sig: Take 1 tablet (10 mg total) by mouth daily at bedtime as needed for sleep      Facility-Administered Medications: None     Discharge Medication List as of 6/3/2025  6:31 PM        CONTINUE these medications which have NOT CHANGED    Details   ACETAMINOPHEN PO Take 80 mg by mouth every 4 (four) hours as needed for mild pain, Historical Med      Blood Glucose Monitoring Suppl (ONE TOUCH ULTRA 2) w/Device KIT Use, Starting Fri 12/23/2016, Historical Med      Calcium Citrate (JORDY-CITRATE PO) Take 1,000 tablets by mouth in the morning and 1,000 tablets in the evening., Historical Med      Continuous Glucose Sensor (Dexcom G7 Sensor) Use 1 Device every 10 days, Starting Tue 12/10/2024, Normal      ferrous sulfate 325 (65 FE) MG EC tablet Take 325 mg by mouth daily with breakfast, Historical Med      fluticasone (FLONASE) 50 mcg/act nasal spray 1 spray into each nostril in the morning., Historical Med      Glucagon (Baqsimi Two Pack) 3 MG/DOSE POWD Use one dose as needed in case of severe hypoglycemia ( nasal spray), Normal      Insulin Aspart (NovoLOG) 100 units/mL injection USE UP TO 75 UNITS DAILY VIA INSULIN PUMP, Normal      Insulin Glargine-yfgn 100 UNIT/ML SOPN INJECT 30 UNITS IN CASE OF PUMP FAILURE ( INCASE OF EMERGENCY), Normal      Insulin Pen Needle (BD PEN NEEDLE LYRIC U/F) 32G X 4 MM MISC by Does not apply route daily Use once daily, Starting Mon 9/23/2019, Normal      levothyroxine (Synthroid) 200 mcg tablet Take 1 tablet daily Mon-Sat and 2 tablets on Sunday., Normal      lidocaine (XYLOCAINE) 2 % topical gel Apply topically 3 (three) times a day for 3 days, Starting Sat 5/3/2025, Until Thu 5/8/2025, Normal      loratadine (CLARITIN) 10 mg tablet Take 1 tablet (10 mg total) by mouth daily, Starting Mon 4/19/2021, Normal      methocarbamol  (ROBAXIN) 500 mg tablet Take 1 tablet (500 mg total) by mouth 4 (four) times a day, Starting Fri 5/16/2025, Normal      Multiple Vitamins-Minerals (Bariatric Fusion) CHEW Chew, Historical Med      Probiotic Product (PROBIOTIC DAILY PO) Take 1 tablet by mouth in the morning., Historical Med      psyllium (METAMUCIL) 0.52 g capsule Take 0.52 g by mouth in the morning and 0.52 g in the evening and 0.52 g before bedtime. Fiber capsules., Historical Med      rosuvastatin (CRESTOR) 5 mg tablet TAKE 1 TABLET (5 MG TOTAL) BY MOUTH DAILY., Starting Fri 3/21/2025, Normal      tamsulosin (FLOMAX) 0.4 mg Take 1 capsule (0.4 mg total) by mouth daily with dinner, Starting Tue 5/20/2025, Normal      Tirzepatide-Weight Management (Zepbound) 5 mg/0.5 mL subcutaneous solution (vial) Inject 0.5 mL (5 mg total) under the skin once a week, Starting Fri 4/4/2025, Normal      TURMERIC PO Take 1,400 capsules by mouth in the morning and 1,400 capsules in the evening., Historical Med      vitamin B-12 (VITAMIN B-12) 1,000 mcg tablet Take 1,000 mcg by mouth 3 (three) times a week, Historical Med      zolpidem (AMBIEN) 10 mg tablet Take 1 tablet (10 mg total) by mouth daily at bedtime as needed for sleep, Starting Wed 5/28/2025, Normal           STOP taking these medications       sulfamethoxazole-trimethoprim (BACTRIM DS) 800-160 mg per tablet Comments:   Reason for Stopping:             No discharge procedures on file.  ED SEPSIS DOCUMENTATION   Time reflects when diagnosis was documented in both MDM as applicable and the Disposition within this note       Time User Action Codes Description Comment    6/3/2025  6:23 PM Awosika, Afopefoluwa Add [R53.83] Fatigue     6/3/2025  6:23 PM Awosika, Afopefol[place]wa Add [R50.9] Fever     6/3/2025  6:31 PM Awosika, Afopefol[place]wa Remove [R50.9] Fever     6/3/2025  6:31 PM Awosika, Afopefol[place]wa Add [R06.02] Shortness of breath                    [1]   Past Medical History:  Diagnosis Date    Allergic      Seasonal    Anemia     Axillary adenopathy 08/15/2022    Closed fracture of one rib of left side with routine healing 08/29/2022    Colon polyp     Diabetes mellitus (HCC)     Disease of thyroid gland     GERD (gastroesophageal reflux disease)     Hyperlipidemia     Hypertension     Insomnia     Kidney stone     Lymphadenopathy of head and neck 08/15/2022    Sleep apnea     Type 1 diabetes mellitus (HCC)     Uses Insulin Pump    Vitamin D deficiency    [2]   Past Surgical History:  Procedure Laterality Date    BARIATRIC SURGERY      CARPAL TUNNEL RELEASE Right     COLONOSCOPY      EGD      FL RETROGRADE PYELOGRAM  4/24/2020    LASIK      SD CYSTO/URETERO W/LITHOTRIPSY &INDWELL STENT INSRT Right 4/24/2020    Procedure: CYSTOSCOPY URETEROSCOPY WITH LITHOTRIPSY HOLMIUM LASER, RETROGRADE PYELOGRAM AND INSERTION STENT URETERAL;  Surgeon: Shabbir Ireland MD;  Location: [facility];  Service: Urology    SD HEMORRHOIDECTOMY INT & XTRNL 2/> COLUMN/PHAM N/A 5/19/2025    Procedure: HEMORRHOIDECTOMY EXCISION;  Surgeon: Maria Del Carmen Mc MD;  Location: [facility];  Service: Colorectal    SD LAPS GSTR RSTCV PX W/BYP GABBY-EN-Y LIMB <150 CM N/A 4/26/2021    Procedure: BYPASS GASTRIC GABBY-EN-Y LAPAROSCOPIC W ROBOTICS AND INTRAOPERATIVE EGD;  Surgeon: Danny Antonio MD;  Location: [facility];  Service: Bariatrics    ROTATOR CUFF REPAIR Right     UPPER GASTROINTESTINAL ENDOSCOPY     [3]   Family History  Problem Relation Name Age of Onset    Thyroid disease unspecified Mother Gabrielle Roland     Osteoporosis Mother Gabrielle Roland     Arthritis Mother Gabrielle Roland     Dementia Mother Gabrielle Roland     Hearing loss Mother Gabrielle Roland     Thyroid disease Mother Gabrielle Roland     Hypertension Father Cas Roland     Colon cancer Father Cas Roland 68    Cancer Father Cas Roland     Diabetes type II Sister Cookie Hermansader     Hypertension Sister Cookie Hermansader     Hyperlipidemia Sister Cookie Hermansader     Thyroid  disease unspecified Sister Cookie Hermansader     Stroke Sister Cookie Hermansader     Diabetes Sister Cookie Hermansader     Thyroid disease Sister Cookie Hermansader     Diabetes type II Brother Kirk Roland     Hypertension Brother Kirk Roland     Hyperlipidemia Brother Kirk Roland     Colon cancer Brother Kirk Roland 60    Hypertension Sister Cookie     Hyperlipidemia Sister Cookie     Diabetes Sister Cookie     Thyroid disease Sister Cookie     Diabetes type II Sister Cookie     Thyroid disease unspecified Sister Cookie     Stroke Sister Cookie     Diabetes type II Brother Gasper Roland     Hypertension Brother Waldo     Hyperlipidemia Brother Waldo     Diabetes type II Brother Waldo    [4]   Social History  Tobacco Use    Smoking status: Former     Current packs/day: 0.00     Average packs/day: 1 pack/day for 30.0 years (30.0 ttl pk-yrs)     Types: Cigarettes     Start date: 1982     Quit date: 5/15/2015     Years since quitting: 10.0    Smokeless tobacco: Never    Tobacco comments:     quit in 2015   Vaping Use    Vaping status: Never Used   Substance Use Topics    Alcohol use: Yes     Alcohol/week: 2.0 standard drinks of alcohol     Types: 2 Standard drinks or equivalent per week     Comment: social    Drug use: No        Jeanna Leonardo MD  06/04/25 2021

## 2025-06-03 NOTE — ED ATTENDING ATTESTATION
6/3/2025  ISusu MD, saw and evaluated the patient. I have discussed the patient with the resident/non-physician practitioner and agree with the resident's/non-physician practitioner's findings, Plan of Care, and MDM as documented in the resident's/non-physician practitioner's note, except where noted. All available labs and Radiology studies were reviewed.  I was present for key portions of any procedure(s) performed by the resident/non-physician practitioner and I was immediately available to provide assistance.       At this point I agree with the current assessment done in the Emergency Department.  I have conducted an independent evaluation of this patient a history and physical is as follows:    This is a 60-year-old male patient with a relevant past medical history of hypertension, hyperlipidemia, diabetes, presented to the ED today for complaint of fever and generalized fatigue.  At home he is measuring his temperature up to 101 °F.  He denies any nausea or vomiting.  He has had some fatigue, which is nonspecific.  He has not had any cough or congestion.  He recently had a bout of urinary tract infection for which he was on antibiotics, recently stopped 2 days ago.  Patient denies any current dysuria frequency or hesitancy.  He denies any cough or congestion.  He denies any chest pain pressure discomfort.  He denies any other significantly associated symptoms.  His exam for the most part is unremarkable.  His vitals are stable, he is afebrile here in the ED.  He does not have any adventitious lung sounds, and otherwise does not have any abnormalities on his exam.  His differential diagnosis includes: Pneumonia versus urinary tract infection versus VTE versus other.  He is recently postsurgical, did have a minor procedure, and hemorrhoidectomy 2 weeks ago.  He also could have some deconditioning since he has not been very active since his surgery.  Patient has CBC, metabolic panel, urinalysis,  "chest x-ray, which were positive for slight hyponatremia, likely volume depletion, as well as slight leukocytosis.  Patient had an elevated D-dimer, with a normal troponin.  He was then ordered CT PE, which showed multiple lung nodules, without any other significant acute abnormalities.  No evidence of VTE.  No evidence of significant pneumonia.  Based on Fleischner Society guidelines he does not necessarily warrant any urgent follow-up, and will follow-up with his PCP for further recommendations on what to do.  Patient was reassured about his findings of his testing and was happy to be able to go home.  The management plan was discussed in detail with the patient at bedside and all questions were answered. Strict ED return instructions were discussed at bedside. Prior to discharge, both verbal and written instructions were provided. We discussed the signs and symptoms that should prompt the patient to return to the ED. All questions were answered and the patient was comfortable with the plan of care and discharged home. The patient agrees to return to the Emergency Department for concerns and/or progression of illness.    Portions of the above record have been created with voice recognition software.  Occasional wrong word or \"sound alike\" substitutions may have occurred due to the inherent limitations of voice recognition software.  Read the chart carefully and recognize, using context, where substitutions may have occurred.        ED Course  ED Course as of 06/03/25 1835 Tue Jun 03, 2025   1556 Sodium(!): 132   1557 WBC(!): 12.90         Critical Care Time  Procedures      "

## 2025-06-03 NOTE — ASSESSMENT & PLAN NOTE
-discussed with Dr. Puente, I would strongly suggest that patient is evaluated for sepsis  -In agreement and will proceed to ER    Orders:    Transfer to other facility

## 2025-06-04 ENCOUNTER — PATIENT MESSAGE (OUTPATIENT)
Age: 60
End: 2025-06-04

## 2025-06-04 ENCOUNTER — DOCUMENTATION (OUTPATIENT)
Age: 60
End: 2025-06-04

## 2025-06-10 PROBLEM — N30.01 ACUTE CYSTITIS WITH HEMATURIA: Status: ACTIVE | Noted: 2025-06-10

## 2025-06-10 NOTE — PATIENT COMMUNICATION
Forms received, completed, scanned and [facility]ed [facility]. Patient made aware.

## 2025-06-14 DIAGNOSIS — E10.65 TYPE 1 DIABETES MELLITUS WITH HYPERGLYCEMIA, WITH LONG-TERM CURRENT USE OF INSULIN (HCC): ICD-10-CM

## 2025-06-16 RX ORDER — ACYCLOVIR 400 MG/1
1 TABLET ORAL
Qty: 9 EACH | Refills: 1 | Status: SHIPPED | OUTPATIENT
Start: 2025-06-16

## 2025-06-17 ENCOUNTER — TELEPHONE (OUTPATIENT)
Dept: ENDOCRINOLOGY | Facility: CLINIC | Age: 60
End: 2025-06-17

## 2025-06-17 NOTE — PROGRESS NOTES
Name: Barry Roland      : 1965      MRN: [id number]  Encounter Provider: ANN Asif  Encounter Date: 2025   Encounter department: [facility] [place]  :  Assessment & Plan  Acute urinary retention  Patient underwent hemorrhoidectomy surgery yesterday on 2025 and then developed an episode of acute urinary retention the same day.  This prompted him to report to the ER in which they placed an indwelling urethral catheter.    Patient was initiated on Flomax 0.4 mg and was able to pass a void trial.  PVR today in the office 30 mL.    Patient wishes to discontinue the Flomax.  He reports that he was not having any bothersome lower urinary tract symptoms prior to this episode.  We did discuss that we can follow on an as-needed basis and he can call the office if he is having any urinary concerns/complaints.           History of Present Illness   Barry Roland is a 60 y.o. male who presents today to the office for follow-up of acute urinary retention.  He was last seen in May 2025.    On  he underwent a hemorrhoidectomy and developed urinary retention following his procedure. He presented to the ER that same night for not being able to urinate for approximately 8 hours. He had an indwelling urethral catheter placed at this time and was then instructed to follow-up with the urology office.     Today in the office he states that he is overall doing very well.  He denies any urinary/urological complaints.  He states that he wants to discontinue the Flomax as he was not having any bothersome urinary symptoms prior to this episode.    Review of Systems   Constitutional:  Negative for chills and fever.   Respiratory: Negative.  Negative for cough and shortness of breath.    Cardiovascular: Negative.  Negative for chest pain.   Gastrointestinal: Negative.  Negative for abdominal distention, abdominal pain, nausea and vomiting.   Genitourinary:  Negative for decreased  urine volume, difficulty urinating, dysuria, flank pain, frequency, hematuria, penile discharge, penile pain, penile swelling, scrotal swelling, testicular pain and urgency.   Skin: Negative.  Negative for rash.   Neurological: Negative.    Hematological:  Negative for adenopathy. Does not bruise/bleed easily.          Objective   There were no vitals taken for this visit.    Physical Exam  Vitals reviewed.   Constitutional:       Appearance: Normal appearance.   HENT:      Head: Normocephalic and atraumatic.     Eyes:      Pupils: Pupils are equal, round, and reactive to light.       Cardiovascular:      Rate and Rhythm: Normal rate.   Pulmonary:      Effort: Pulmonary effort is normal.   Abdominal:      General: Abdomen is flat.      Palpations: Abdomen is soft.     Musculoskeletal:      Cervical back: Normal range of motion.     Skin:     General: Skin is warm and dry.     Neurological:      General: No focal deficit present.      Mental Status: He is alert and oriented to person, place, and time.     Psychiatric:         Mood and Affect: Mood normal.         Behavior: Behavior normal.         Thought Content: Thought content normal.         Judgment: Judgment normal.           Results   Lab Results   Component Value Date    PSA 2.2 06/17/2022    PSA 0.8 07/15/2015     Lab Results   Component Value Date    GLUCOSE 181 (H) 11/03/2015    CALCIUM 8.7 06/03/2025     11/03/2015    K 4.1 06/03/2025    CO2 21 06/03/2025     06/03/2025    BUN 14 06/03/2025    CREATININE 0.83 06/03/2025     Lab Results   Component Value Date    WBC 12.90 (H) 06/03/2025    HGB 12.6 06/03/2025    HCT 37.8 06/03/2025    MCV 91 06/03/2025     06/03/2025       Office Urine Dip  No results found for this or any previous visit (from the past hour).

## 2025-06-17 NOTE — ASSESSMENT & PLAN NOTE
Patient underwent hemorrhoidectomy surgery yesterday on 5/19/2025 and then developed an episode of acute urinary retention the same day.  This prompted him to report to the ER in which they placed an indwelling urethral catheter.    Patient was initiated on Flomax 0.4 mg and was able to pass a void trial.  PVR today in the office 30 mL.    Patient wishes to discontinue the Flomax.  He reports that he was not having any bothersome lower urinary tract symptoms prior to this episode.  We did discuss that we can follow on an as-needed basis and he can call the office if he is having any urinary concerns/complaints.

## 2025-06-24 ENCOUNTER — OFFICE VISIT (OUTPATIENT)
Age: 60
End: 2025-06-24

## 2025-06-24 VITALS — HEIGHT: 69 IN | BODY MASS INDEX: 31.99 KG/M2 | WEIGHT: 216 LBS

## 2025-06-24 DIAGNOSIS — K64.5 THROMBOSED HEMORRHOIDS: Primary | ICD-10-CM

## 2025-06-24 PROCEDURE — 99024 POSTOP FOLLOW-UP VISIT: CPT | Performed by: SURGERY

## 2025-06-24 NOTE — PROGRESS NOTES
Name: Barry Roland      : 1965      MRN: [id number]  Encounter Provider: Maria Del Carmen Mc MD  Encounter Date: 2025   Encounter department: [facility] [place]  :  Assessment & Plan           History of Present Illness {?Quick Links Encounters * My Last Note * Last Note in Specialty * Snapshot * Since Last Visit * History :14981}  HPI    Barry Roland presents for a post operative evaluation.     The patient is status post hemorrhoidectomy excision on 2025.     Surgical pathology:  A.  Hemorrhoids (hemorrhoidectomy):     - Portions of squamocolumnar junction with inflammation and focal surface erosion.     - Underlying dilated / partially thrombosed vasculature, compatible with hemorrhoids.     - Negative for malignancy.    Review of Systems    Objective {?Quick Links Trend Vitals * Enter New Vitals * Results Review * Timeline (Adult) * Labs * Imaging * Cardiology * Procedures * Lung Cancer Screening * Surgical eConsent :34795}    There were no vitals taken for this visit.     Physical Exam  {Administrative / Billing Section (Optional):86871}

## 2025-06-24 NOTE — PROGRESS NOTES
":  Assessment & Plan  Thrombosed hemorrhoids  Patient is status post excisional hemorrhoidectomy on 5/19/2025  He did experience postoperative urinary retention requiring Saab catheter placement and subsequent UTI  He has recovered fairly well from his urinary symptoms and has follow-up with urology tomorrow  He does have continued pain with bowel movements, although this has improved since surgery  Silver nitrate was applied to his external perianal wound today to aid in healing  He will follow-up in 1 month for wound check         Assessment & Plan        History of Present Illness     Barry Roland is a 60 y.o. male who underwent excisional hemorrhoidectomy on 5/19/2025.  He reports occasional pain with bowel movements and some mucus discharge.  He suffered from postoperative urinary retention requiring Saab catheter placement after surgery.  He also had been treated for a UTI.  He reports that he is urinary well.  He denies constipation and bleeding symptoms.  History of Present Illness    Review of Systems   Constitutional:  Negative for chills and fever.   HENT:  Negative for ear pain and sore throat.    Eyes:  Negative for pain and visual disturbance.   Respiratory:  Negative for cough and shortness of breath.    Cardiovascular:  Negative for chest pain and palpitations.   Gastrointestinal:  Negative for abdominal pain and vomiting.   Genitourinary:  Negative for dysuria and hematuria.   Musculoskeletal:  Negative for arthralgias and back pain.   Skin:  Negative for color change and rash.   Neurological:  Negative for seizures and syncope.   All other systems reviewed and are negative.    Objective   Ht 5' 9\" (1.753 m)   Wt 98 kg (216 lb)   BMI 31.90 kg/m²      Physical Exam  Vitals and nursing note reviewed.   Constitutional:       General: He is not in acute distress.     Appearance: He is well-developed.   HENT:      Head: Normocephalic and atraumatic.     Eyes:      Conjunctiva/sclera: Conjunctivae " normal.       Cardiovascular:      Rate and Rhythm: Normal rate and regular rhythm.      Heart sounds: No murmur heard.  Pulmonary:      Effort: Pulmonary effort is normal. No respiratory distress.      Breath sounds: Normal breath sounds.   Abdominal:      Palpations: Abdomen is soft.      Tenderness: There is no abdominal tenderness.   Genitourinary:     Comments: Healing left lateral hemorrhoidectomy site with granulation tissue present, silver nitrate applied    Musculoskeletal:         General: No swelling.      Cervical back: Neck supple.     Skin:     General: Skin is warm and dry.      Capillary Refill: Capillary refill takes less than 2 seconds.     Neurological:      Mental Status: He is alert.     Psychiatric:         Mood and Affect: Mood normal.       Physical Exam      Results    Administrative Statements   I have spent a total time of 10 minutes in caring for this patient on the day of the visit/encounter including Diagnostic results, Prognosis, Risks and benefits of tx options, Instructions for management, Patient and family education, Importance of tx compliance, Risk factor reductions, Impressions, Counseling / Coordination of care, Documenting in the medical record, Reviewing/placing orders in the medical record (including tests, medications, and/or procedures), Obtaining or reviewing history  , and Communicating with other healthcare professionals .

## 2025-06-24 NOTE — PATIENT INSTRUCTIONS
High fiber diet/increased hydration, 20-30 grams fiber per day, increased fruits/vegetables    Start fiber supplementation with benefiber. You may put this in your coffee/tea/juice in the morning. Start with 2 tsp once per day. If you experience bloating or gassiness, you may start with 1 tsp once per day. You may increase fiber supplementation to UP TO 2 tsp three times per day in order to achieve 1 formed bowel movement once per day.    Aim to drink at least 64 oz of water per day      High Fiber Diet   AMBULATORY CARE:   A high-fiber diet  includes foods that have a high amount of fiber. Fiber is the part of fruits, vegetables, and grains that is not broken down by your body. Fiber keeps your bowel movements regular. Fiber can also help lower your cholesterol level, control blood sugar in people with diabetes, and relieve constipation. Fiber can also help you control your weight because it helps you feel full faster. Most adults should eat 25 to 35 grams of fiber each day. Talk to your dietitian or healthcare provider about the amount of fiber you need.  Good sources of fiber:       Foods with at least 4 grams of fiber per serving:      ? to ½ cup of high-fiber cereal (check the nutrition label on the box)    ½ cup of blackberries or raspberries    4 dried prunes    1 cooked artichoke    ½ cup of cooked legumes, such as lentils, or red, kidney, and wood beans    Foods with 1 to 3 grams of fiber per servin slice of whole-wheat, pumpernickel, or rye bread    ½ cup of cooked brown rice    4 whole-wheat crackers    1 cup of oatmeal    ½ cup of cereal with 1 to 3 grams of fiber per serving (check the nutrition label on the box)    1 small piece of fruit, such as an apple, banana, pear, kiwi, or orange    3 dates    ½ cup of canned apricots, fruit cocktail, peaches, or pears    ½ cup of raw or cooked vegetables, such as carrots, cauliflower, cabbage, spinach, squash, or corn  Ways that you can increase fiber  in your diet:   Choose brown or wild rice instead of white rice.     Use whole wheat flour in recipes instead of white or all-purpose flour.     Add beans and peas to casseroles or soups.     Choose fresh fruit and vegetables with peels or skins on instead of juices.    Other diet guidelines to follow:   Add fiber to your diet slowly.  You may have abdominal discomfort, bloating, and gas if you add fiber to your diet too quickly.     Drink plenty of liquids as you add fiber to your diet.  You may have nausea or develop constipation if you do not drink enough water. Ask how much liquid to drink each day and which liquids are best for you.    © Copyright [employer] 2023 Information is for End User's use only and may not be sold, redistributed or otherwise used for commercial purposes.  The above information is an  only. It is not intended as medical advice for individual conditions or treatments. Talk to your doctor, nurse or pharmacist before following any medical regimen to see if it is safe and effective for you.

## 2025-06-25 ENCOUNTER — OFFICE VISIT (OUTPATIENT)
Dept: UROLOGY | Facility: AMBULATORY SURGERY CENTER | Age: 60
End: 2025-06-25
Payer: COMMERCIAL

## 2025-06-25 VITALS
DIASTOLIC BLOOD PRESSURE: 90 MMHG | OXYGEN SATURATION: 94 % | HEART RATE: 80 BPM | BODY MASS INDEX: 31.4 KG/M2 | HEIGHT: 69 IN | WEIGHT: 212 LBS | SYSTOLIC BLOOD PRESSURE: 130 MMHG

## 2025-06-25 DIAGNOSIS — R33.8 ACUTE URINARY RETENTION: Primary | ICD-10-CM

## 2025-06-25 LAB — POST-VOID RESIDUAL VOLUME, ML POC: 30 ML

## 2025-06-25 PROCEDURE — 99213 OFFICE O/P EST LOW 20 MIN: CPT

## 2025-06-25 PROCEDURE — 51798 US URINE CAPACITY MEASURE: CPT

## 2025-06-26 ENCOUNTER — OFFICE VISIT (OUTPATIENT)
Age: 60
End: 2025-06-26
Payer: COMMERCIAL

## 2025-06-26 VITALS
OXYGEN SATURATION: 98 % | DIASTOLIC BLOOD PRESSURE: 96 MMHG | SYSTOLIC BLOOD PRESSURE: 150 MMHG | HEART RATE: 77 BPM | WEIGHT: 214.2 LBS | TEMPERATURE: 98.1 F | HEIGHT: 69 IN | BODY MASS INDEX: 31.73 KG/M2

## 2025-06-26 DIAGNOSIS — Z00.00 ANNUAL PHYSICAL EXAM: Primary | ICD-10-CM

## 2025-06-26 DIAGNOSIS — R79.89 ABNORMAL TSH: ICD-10-CM

## 2025-06-26 DIAGNOSIS — E10.40 TYPE 1 DIABETES MELLITUS WITH DIABETIC NEUROPATHY (HCC): ICD-10-CM

## 2025-06-26 DIAGNOSIS — Z12.5 SCREENING FOR PROSTATE CANCER: ICD-10-CM

## 2025-06-26 DIAGNOSIS — I10 PRIMARY HYPERTENSION: ICD-10-CM

## 2025-06-26 DIAGNOSIS — K21.9 GASTROESOPHAGEAL REFLUX DISEASE WITHOUT ESOPHAGITIS: ICD-10-CM

## 2025-06-26 DIAGNOSIS — E78.5 HYPERLIPIDEMIA, UNSPECIFIED HYPERLIPIDEMIA TYPE: ICD-10-CM

## 2025-06-26 DIAGNOSIS — E66.811 CLASS 1 OBESITY DUE TO EXCESS CALORIES WITH BODY MASS INDEX (BMI) OF 32.0 TO 32.9 IN ADULT, UNSPECIFIED WHETHER SERIOUS COMORBIDITY PRESENT: ICD-10-CM

## 2025-06-26 DIAGNOSIS — Z13.228 SCREENING FOR METABOLIC DISORDER: ICD-10-CM

## 2025-06-26 DIAGNOSIS — R33.8 ACUTE URINARY RETENTION: ICD-10-CM

## 2025-06-26 DIAGNOSIS — R73.01 ELEVATED FASTING GLUCOSE: ICD-10-CM

## 2025-06-26 DIAGNOSIS — D50.9 IRON DEFICIENCY ANEMIA, UNSPECIFIED IRON DEFICIENCY ANEMIA TYPE: ICD-10-CM

## 2025-06-26 DIAGNOSIS — E03.9 HYPOTHYROIDISM, UNSPECIFIED TYPE: ICD-10-CM

## 2025-06-26 DIAGNOSIS — E66.09 CLASS 1 OBESITY DUE TO EXCESS CALORIES WITH BODY MASS INDEX (BMI) OF 32.0 TO 32.9 IN ADULT, UNSPECIFIED WHETHER SERIOUS COMORBIDITY PRESENT: ICD-10-CM

## 2025-06-26 DIAGNOSIS — G47.33 OSA (OBSTRUCTIVE SLEEP APNEA): ICD-10-CM

## 2025-06-26 DIAGNOSIS — Z98.84 GASTRIC BYPASS STATUS FOR OBESITY: ICD-10-CM

## 2025-06-26 DIAGNOSIS — K64.9 BLEEDING HEMORRHOID: ICD-10-CM

## 2025-06-26 DIAGNOSIS — G47.09 OTHER INSOMNIA: ICD-10-CM

## 2025-06-26 DIAGNOSIS — E55.9 VITAMIN D DEFICIENCY: ICD-10-CM

## 2025-06-26 PROBLEM — R10.12 LUQ PAIN: Status: RESOLVED | Noted: 2025-03-31 | Resolved: 2025-06-26

## 2025-06-26 PROBLEM — K62.5 BRBPR (BRIGHT RED BLOOD PER RECTUM): Status: RESOLVED | Noted: 2024-10-22 | Resolved: 2025-06-26

## 2025-06-26 PROBLEM — N30.01 ACUTE CYSTITIS WITH HEMATURIA: Status: RESOLVED | Noted: 2025-06-10 | Resolved: 2025-06-26

## 2025-06-26 PROCEDURE — 99214 OFFICE O/P EST MOD 30 MIN: CPT | Performed by: NURSE PRACTITIONER

## 2025-06-26 PROCEDURE — 99396 PREV VISIT EST AGE 40-64: CPT | Performed by: NURSE PRACTITIONER

## 2025-06-26 RX ORDER — LISINOPRIL 2.5 MG/1
2.5 TABLET ORAL DAILY
Qty: 30 TABLET | Refills: 5 | Status: SHIPPED | OUTPATIENT
Start: 2025-06-26

## 2025-06-26 RX ORDER — ZOLPIDEM TARTRATE 10 MG/1
10 TABLET ORAL
Qty: 30 TABLET | Refills: 3 | Status: SHIPPED | OUTPATIENT
Start: 2025-06-26

## 2025-06-26 NOTE — PROGRESS NOTES
Diabetic Foot Exam    Patient's shoes and socks removed.    Right Foot/Ankle   Right Foot Inspection  Skin Exam: skin normal and skin intact. No dry skin, no warmth, no callus, no erythema, no maceration, no abnormal color, no pre-ulcer, no ulcer and no callus.     Toe Exam: ROM and strength within normal limits.     Sensory   Monofilament testing: intact    Vascular  Capillary refills: < 3 seconds  The right DP pulse is 2+.     Left Foot/Ankle  Left Foot Inspection  Skin Exam: skin normal and skin intact. No dry skin, no warmth, no erythema, no maceration, normal color, no pre-ulcer, no ulcer and no callus.     Toe Exam: ROM and strength within normal limits.     Sensory   Monofilament testing: intact    Vascular  Capillary refills: < 3 seconds  The left DP pulse is 2+.     Assign Risk Category  No deformity present  No loss of protective sensation  No weak pulses  Risk: 0  Adult Annual Physical  Name: Barry Roland      : 1965      MRN: [id number]  Encounter Provider: ANN Carroll  Encounter Date: 2025   Encounter department: [facility]    :  Assessment & Plan  Other insomnia  Ambien PRN  Recommend trial of magnesium  Controlled sub agreement on file    Orders:    zolpidem (AMBIEN) 10 mg tablet; Take 1 tablet (10 mg total) by mouth daily at bedtime as needed for sleep      Primary hypertension  -uncontrolled while in the office today   - Will start lisinopril 2.5 mg tablet daily    Continue to monitor blood pressure at home.  Goal BP is < 130/80.  Contact our office for consistent elevations.  Recommend low sodium diet.  Exercise 30 minutes 5 times a week as tolerated.  Recommend yearly eye exam.       Orders:    lisinopril (ZESTRIL) 2.5 mg tablet; Take 1 tablet (2.5 mg total) by mouth daily    Screening for metabolic disorder    Orders:    Comprehensive metabolic panel; Future    CBC and differential    Lipid panel    Hyperlipidemia, unspecified  hyperlipidemia type  Continue Crestor    Orders:    Lipid panel    Elevated fasting glucose    Orders:    Hemoglobin A1C    Type 1 diabetes mellitus with diabetic neuropathy (HCC)    Lab Results   Component Value Date    HGBA1C 7.4 (H) 03/31/2025   -Currently managed by endocrine  - Continues B12 supplementation    Orders:    Albumin / creatinine urine ratio; Future      Bleeding hemorrhoid  - Continue to follow with colorectal surgery         FARZANA (obstructive sleep apnea)         Gastroesophageal reflux disease without esophagitis   You may use OTC tums as needed.  Recommend small frequent meals throughout the day.  Avoid aggravating foods - spicy foods, acidic foods - such as tomato and citrus.  Avoid alcohol.  Do not lay down for at least 30 mins after eating.               Abnormal TSH    Orders:    TSH, 3rd generation with Free T4 reflex    Hypothyroidism, unspecified type  Followed by endocrine  Continue current dose of levothyroxine     Orders:    TSH, 3rd generation with Free T4 reflex      Acute urinary retention  - Follows urology  -Uses Flomax as needed         Iron deficiency anemia, unspecified iron deficiency anemia type  -Continues to follow bariatric medicine  -Continue with iron supplementation               Gastric bypass status for obesity           Vitamin D deficiency  Vitamin-D monitored by Endocrine, encourage supplementation           Class 1 obesity due to excess calories with body mass index (BMI) of 32.0 to 32.9 in adult, unspecified whether serious comorbidity present  Encourage continued lifestyle modification         Annual physical exam  - Up-to-date with fasting blood work  -Continue with well-balanced diet, encouraged daily exercise  - He is up-to-date with prostate and colon cancer screening  -He defers any additional vaccinations while in office  -Follow-up in 1 year for annual physical or sooner if needed           Screening for prostate cancer    Orders:    PSA, Total Screen;  Future        Preventive Screenings:  - Diabetes Screening: screening not indicated, has diabetes, risks/benefits discussed and screening up-to-date  - Cholesterol Screening: screening not indicated, has hyperlipidemia, risks/benefits discussed and screening up-to-date   - Hepatitis C screening: risks/benefits discussed   - HIV screening: risks/benefits discussed   - Colon cancer screening: screening up-to-date   - Lung cancer screening: screening up-to-date   - Prostate cancer screening: risks/benefits discussed, screening up-to-date and orders placed       Depression Screening and Follow-up Plan: Patient was screened for depression during today's encounter. They screened negative with a PHQ-2 score of 2.          History of Present Illness     Adult Annual Physical:  Patient presents for annual physical. Patient presents today to follow-up on chronic conditions and annual physical.   Reviewed most recent blood work     HTN-blood pressure elevated while in office today he reports that he drank 3 cups of tea this morning, he reports that he monitors his blood pressures at home and they are typically 120/70 , denies headaches, changes in vision or chest pain       Hypothyroidism- managed by endocrine, TSH elevated T4 stable (dose adjusted), reports fatigue (but chronic), some constipation. Pt takes mirlax everyday and helps. Occasional bleeding in stool due to hemorrhoids (will be seeing colorectal surgery)     DM: BS average around 170 -180  Currently managed by endocrine    Hyperlipidemia-maintained on Crestor     Discussed vaccinations, patient defers at this time   .     Diet and Physical Activity:  - Diet/Nutrition: no special diet, portion control and limited junk food.  - Exercise: walking, moderate cardiovascular exercise and 3-4 times a week on average.    Depression Screening:  - PHQ-2 Score: 2    General Health:  - Sleep: sleeps poorly.  - Hearing: tinnitus.  - Vision: most recent eye exam < 1 year ago and  "previous LASIK surgery.  - Dental: no dental visits for > 1 year, brushes teeth once daily and floss regularly.    /GYN Health:  - Follows with GYN: no.   - History of STDs: no     Health:  - History of STDs: no.   - Urinary symptoms: none.     Advanced Care Planning:  - Has an advanced directive?: no    - Has a durable medical POA?: no      Review of Systems   Constitutional:  Negative for activity change, appetite change, chills, diaphoresis and fever.   HENT:  Negative for congestion, ear discharge, ear pain, postnasal drip, rhinorrhea, sinus pressure, sinus pain and sore throat.    Eyes:  Negative for pain, discharge, itching and visual disturbance.   Respiratory:  Negative for cough, chest tightness, shortness of breath and wheezing.    Cardiovascular:  Negative for chest pain, palpitations and leg swelling.   Gastrointestinal:  Negative for abdominal pain, constipation, diarrhea, nausea and vomiting.   Endocrine: Negative for polydipsia, polyphagia and polyuria.   Genitourinary:  Negative for difficulty urinating, dysuria and urgency.   Musculoskeletal:  Negative for arthralgias, back pain and neck pain.   Skin:  Negative for rash and wound.   Neurological:  Negative for dizziness, weakness, numbness and headaches.         Objective   /96 (BP Location: Left arm, Patient Position: Sitting, Cuff Size: Standard)   Pulse 77   Temp 98.1 °F (36.7 °C) (Temporal)   Ht 5' 9\" (1.753 m)   Wt 97.2 kg (214 lb 3.2 oz)   SpO2 98%   BMI 31.63 kg/m²     Physical Exam  Constitutional:       General: He is not in acute distress.     Appearance: He is well-developed. He is not diaphoretic.   HENT:      Head: Normocephalic and atraumatic.      Right Ear: External ear normal.      Left Ear: External ear normal.      Nose: Nose normal.      Mouth/Throat:      Mouth: Mucous membranes are moist.      Pharynx: No oropharyngeal exudate or posterior oropharyngeal erythema.     Eyes:      General:         Right eye: No " discharge.         Left eye: No discharge.      Conjunctiva/sclera: Conjunctivae normal.      Pupils: Pupils are equal, round, and reactive to light.     Neck:      Thyroid: No thyromegaly.     Cardiovascular:      Rate and Rhythm: Normal rate and regular rhythm.      Pulses: no weak pulses.           Dorsalis pedis pulses are 2+ on the right side and 2+ on the left side.      Heart sounds: Normal heart sounds. No murmur heard.     No friction rub. No gallop.   Pulmonary:      Effort: Pulmonary effort is normal. No respiratory distress.      Breath sounds: Normal breath sounds. No stridor. No wheezing or rales.   Abdominal:      General: Bowel sounds are normal. There is no distension.      Palpations: Abdomen is soft.      Tenderness: There is no abdominal tenderness.     Musculoskeletal:      Cervical back: Normal range of motion and neck supple.   Feet:      Right foot:      Skin integrity: No ulcer, skin breakdown, erythema, warmth, callus or dry skin.      Left foot:      Skin integrity: No ulcer, skin breakdown, erythema, warmth, callus or dry skin.   Lymphadenopathy:      Cervical: No cervical adenopathy.     Skin:     General: Skin is warm and dry.      Findings: No erythema or rash.     Neurological:      Mental Status: He is alert and oriented to person, place, and time.     Psychiatric:         Behavior: Behavior normal.         Thought Content: Thought content normal.         Judgment: Judgment normal.

## 2025-06-26 NOTE — ASSESSMENT & PLAN NOTE
Ambien PRN  Recommend trial of magnesium  Controlled sub agreement on file    Orders:    zolpidem (AMBIEN) 10 mg tablet; Take 1 tablet (10 mg total) by mouth daily at bedtime as needed for sleep

## 2025-06-26 NOTE — ASSESSMENT & PLAN NOTE
- Up-to-date with fasting blood work  -Continue with well-balanced diet, encouraged daily exercise  - He is up-to-date with prostate and colon cancer screening  -He defers any additional vaccinations while in office  -Follow-up in 1 year for annual physical or sooner if needed

## 2025-06-26 NOTE — ASSESSMENT & PLAN NOTE
Followed by endocrine  Continue current dose of levothyroxine     Orders:    TSH, 3rd generation with Free T4 reflex

## 2025-06-26 NOTE — ASSESSMENT & PLAN NOTE
-uncontrolled while in the office today   - Will start lisinopril 2.5 mg tablet daily    Continue to monitor blood pressure at home.  Goal BP is < 130/80.  Contact our office for consistent elevations.  Recommend low sodium diet.  Exercise 30 minutes 5 times a week as tolerated.  Recommend yearly eye exam.       Orders:    lisinopril (ZESTRIL) 2.5 mg tablet; Take 1 tablet (2.5 mg total) by mouth daily

## 2025-06-26 NOTE — ASSESSMENT & PLAN NOTE
Lab Results   Component Value Date    HGBA1C 7.4 (H) 03/31/2025   -Currently managed by endocrine  - Continues B12 supplementation    Orders:    Albumin / creatinine urine ratio; Future

## 2025-06-26 NOTE — PATIENT INSTRUCTIONS
"Patient Education     Routine physical for adults   The Basics   Written by the doctors and editors at [facility]   What is a physical? -- A physical is a routine visit, or \"check-up,\" with your doctor. You might also hear it called a \"wellness visit\" or \"preventive visit.\"  During each visit, the doctor will:   Ask about your physical and mental health   Ask about your habits, behaviors, and lifestyle   Do an exam   Give you vaccines if needed   Talk to you about any medicines you take   Give advice about your health   Answer your questions  Getting regular check-ups is an important part of taking care of your health. It can help your doctor find and treat any problems you have. But it's also important for preventing health problems.  A routine physical is different from a \"sick visit.\" A sick visit is when you see a doctor because of a health concern or problem. Since physicals are scheduled ahead of time, you can think about what you want to ask the doctor.  How often should I get a physical? -- It depends on your age and health. In general, for people age 21 years and older:   If you are younger than 50 years, you might be able to get a physical every 3 years.   If you are 50 years or older, your doctor might recommend a physical every year.  If you have an ongoing health condition, like diabetes or high blood pressure, your doctor will probably want to see you more often.  What happens during a physical? -- In general, each visit will include:   Physical exam - The doctor or nurse will check your height, weight, heart rate, and blood pressure. They will also look at your eyes and ears. They will ask about how you are feeling and whether you have any symptoms that bother you.   Medicines - It's a good idea to bring a list of all the medicines you take to each doctor visit. Your doctor will talk to you about your medicines and answer any questions. Tell them if you are having any side effects that bother you. You " "should also tell them if you are having trouble paying for any of your medicines.   Habits and behaviors - This includes:   Your diet   Your exercise habits   Whether you smoke, drink alcohol, or use drugs   Whether you are sexually active   Whether you feel safe at home  Your doctor will talk to you about things you can do to improve your health and lower your risk of health problems. They will also offer help and support. For example, if you want to quit smoking, they can give you advice and might prescribe medicines. If you want to improve your diet or get more physical activity, they can help you with this, too.   Lab tests, if needed - The tests you get will depend on your age and situation. For example, your doctor might want to check your:   Cholesterol   Blood sugar   Iron level   Vaccines - The recommended vaccines will depend on your age, health, and what vaccines you already had. Vaccines are very important because they can prevent certain serious or deadly infections.   Discussion of screening - \"Screening\" means checking for diseases or other health problems before they cause symptoms. Your doctor can recommend screening based on your age, risk, and preferences. This might include tests to check for:   Cancer, such as breast, prostate, cervical, ovarian, colorectal, prostate, lung, or skin cancer   Sexually transmitted infections, such as chlamydia and gonorrhea   Mental health conditions like depression and anxiety  Your doctor will talk to you about the different types of screening tests. They can help you decide which screenings to have. They can also explain what the results might mean.   Answering questions - The physical is a good time to ask the doctor or nurse questions about your health. If needed, they can refer you to other doctors or specialists, too.  Adults older than 65 years often need other care, too. As you get older, your doctor will talk to you about:   How to prevent falling at " home   Hearing or vision tests   Memory testing   How to take your medicines safely   Making sure that you have the help and support you need at home  All topics are updated as new evidence becomes available and our peer review process is complete.  This topic retrieved from [employer] on: May 02, 2024.  Topic 107956 Version 1.0  Release: 32.4.3 - C32.122  © 2024 UpToDate, Inc. and/or its affiliates. All rights reserved.  Consumer Information Use and Disclaimer   Disclaimer: This generalized information is a limited summary of diagnosis, treatment, and/or medication information. It is not meant to be comprehensive and should be used as a tool to help the user understand and/or assess potential diagnostic and treatment options. It does NOT include all information about conditions, treatments, medications, side effects, or risks that may apply to a specific patient. It is not intended to be medical advice or a substitute for the medical advice, diagnosis, or treatment of a health care provider based on the health care provider's examination and assessment of a patient's specific and unique circumstances. Patients must speak with a health care provider for complete information about their health, medical questions, and treatment options, including any risks or benefits regarding use of medications. This information does not endorse any treatments or medications as safe, effective, or approved for treating a specific patient. UpToDate, Inc. and its affiliates disclaim any warranty or liability relating to this information or the use thereof.The use of this information is governed by the Terms of Use, available at https://www.woltersNoteleafuwer.com/en/know/clinical-effectiveness-terms. 2024© UpToDate, Inc. and its affiliates and/or licensors. All rights reserved.  Copyright   © 2024 UpToDate, Inc. and/or its affiliates. All rights reserved.

## 2025-07-03 PROBLEM — R50.9 FEVER: Status: RESOLVED | Noted: 2025-06-03 | Resolved: 2025-07-03

## 2025-07-11 ENCOUNTER — TELEPHONE (OUTPATIENT)
Dept: ENDOCRINOLOGY | Facility: CLINIC | Age: 60
End: 2025-07-11

## 2025-07-11 DIAGNOSIS — E10.65 TYPE 1 DIABETES MELLITUS WITH HYPERGLYCEMIA, WITH LONG-TERM CURRENT USE OF INSULIN (HCC): ICD-10-CM

## 2025-07-11 RX ORDER — INSULIN ASPART 100 [IU]/ML
INJECTION, SOLUTION INTRAVENOUS; SUBCUTANEOUS
Qty: 70 ML | Refills: 1 | Status: SHIPPED | OUTPATIENT
Start: 2025-07-11

## 2025-07-21 DIAGNOSIS — I10 PRIMARY HYPERTENSION: ICD-10-CM

## 2025-07-23 RX ORDER — LISINOPRIL 2.5 MG/1
2.5 TABLET ORAL DAILY
Qty: 90 TABLET | Refills: 1 | Status: SHIPPED | OUTPATIENT
Start: 2025-07-23

## 2025-07-24 ENCOUNTER — OFFICE VISIT (OUTPATIENT)
Age: 60
End: 2025-07-24

## 2025-07-24 VITALS — HEIGHT: 69 IN | WEIGHT: 219 LBS | BODY MASS INDEX: 32.44 KG/M2

## 2025-07-24 DIAGNOSIS — K62.89 HYPERTROPHIED ANAL PAPILLA: Primary | ICD-10-CM

## 2025-07-24 PROCEDURE — 99024 POSTOP FOLLOW-UP VISIT: CPT | Performed by: SURGERY

## 2025-07-24 PROCEDURE — 88304 TISSUE EXAM BY PATHOLOGIST: CPT | Performed by: STUDENT IN AN ORGANIZED HEALTH CARE EDUCATION/TRAINING PROGRAM

## 2025-07-24 RX ORDER — INSULIN LISPRO 100 [IU]/ML
INJECTION, SOLUTION INTRAVENOUS; SUBCUTANEOUS
COMMUNITY
Start: 2025-07-11

## 2025-07-24 NOTE — PROGRESS NOTES
:  Assessment & Plan  Hypertrophied anal papilla    Orders:    Tissue Exam      Assessment & Plan  1. Post-hemorrhoidectomy follow-up.  Perianal skin and swelling appear to have increased in size since the last visit. The possibility of a condyloma was discussed, given its wart-like appearance. However, the previous pathology report from the hemorrhoid excision did not indicate any condyloma or wart-like tissue. It was explained that while type 1 diabetes can prolong healing, the fleshy area protruding from the bottom resembles a wart. A biopsy of the skin tag on the left buttock was performed today. Silver nitrate was applied to the area. He was advised to use water for cleaning. The results of the biopsy will be communicated once available. If the biopsy confirms a condyloma, Aldara ointment will be prescribed. If not, further investigation will be conducted to identify the underlying issue and devise an appropriate treatment plan.    Follow-up  A follow-up appointment is scheduled for 1 month from now.    PROCEDURE  Procedure: Skin tag removal on left perianal skin  - Procedural Discussion: Discussed the removal of a small skin tag on the left butt cheek to obtain a tissue sample and determine if it is a condyloma, which might explain delayed healing. Explained that the procedure involves a needle stick and minimal bleeding. Patient consented to the procedure.  - Site Preparation: Cleaned the area with soap.  - Technique: Performed a needle stick followed by snipping the skin tag. Applied silver nitrate to the area.  - Hemostasis: Minimal bleeding, controlled with pressure.  - Post-Procedural Discussion: Explained that the biopsy results will be communicated and if it is a condyloma, an ointment (Aldara) will be prescribed. Patient was advised that the area might feel sore but not as painful as the previous hemorrhoid surgery.          History of Present Illness     Barry Roland is a 60 y.o. male   History of  "Present Illness  The patient is a 60-year-old male who presents for follow-up after a hemorrhoidectomy.    He reports an improvement in his condition, although he still experiences some discomfort. The pain is described as dull, and he occasionally notices minor bleeding during bowel movements. He also mentions the presence of a skin tag, which he finds difficult to clean. He has a history of warts on his hands and legs, but not in the current area of concern. He also has skin tags. He is curious if his type 1 diabetes could be contributing to the prolonged healing process.  Review of Systems   Constitutional:  Negative for chills and fever.   HENT:  Negative for ear pain and sore throat.    Eyes:  Negative for pain and visual disturbance.   Respiratory:  Negative for cough and shortness of breath.    Cardiovascular:  Negative for chest pain and palpitations.   Gastrointestinal:  Negative for abdominal pain and vomiting.   Genitourinary:  Negative for dysuria and hematuria.   Musculoskeletal:  Negative for arthralgias and back pain.   Skin:  Negative for color change and rash.   Neurological:  Negative for seizures and syncope.   All other systems reviewed and are negative.    Objective   Ht 5' 9\" (1.753 m)   Wt 99.3 kg (219 lb)   BMI 32.34 kg/m²      Physical Exam  Vitals and nursing note reviewed.   Constitutional:       General: He is not in acute distress.     Appearance: He is well-developed.   HENT:      Head: Normocephalic and atraumatic.     Eyes:      Conjunctiva/sclera: Conjunctivae normal.       Cardiovascular:      Rate and Rhythm: Normal rate and regular rhythm.      Heart sounds: No murmur heard.  Pulmonary:      Effort: Pulmonary effort is normal. No respiratory distress.      Breath sounds: Normal breath sounds.   Abdominal:      Palpations: Abdomen is soft.      Tenderness: There is no abdominal tenderness.   Genitourinary:     Comments: Bilateral perianal condylomatous tissue  Bilateral " hemorrhoidectomy sites with mucosal changes suspicious for condyloma    Musculoskeletal:         General: No swelling.      Cervical back: Neck supple.     Skin:     General: Skin is warm and dry.      Capillary Refill: Capillary refill takes less than 2 seconds.     Neurological:      Mental Status: He is alert.     Psychiatric:         Mood and Affect: Mood normal.       Physical Exam  Extra skin noted on the left side of the genitourinary area. A small growth is also present on the buttock.    Results    Administrative Statements   I have spent a total time of 16 minutes in caring for this patient on the day of the visit/encounter including Diagnostic results, Prognosis, Risks and benefits of tx options, Instructions for management, Patient and family education, Importance of tx compliance, Risk factor reductions, Impressions, Counseling / Coordination of care, Documenting in the medical record, Reviewing/placing orders in the medical record (including tests, medications, and/or procedures), Obtaining or reviewing history  , and Communicating with other healthcare professionals .

## 2025-07-28 PROCEDURE — 88305 TISSUE EXAM BY PATHOLOGIST: CPT | Performed by: STUDENT IN AN ORGANIZED HEALTH CARE EDUCATION/TRAINING PROGRAM

## 2025-07-29 DIAGNOSIS — A63.0 CONDYLOMA ACUMINATA: Primary | ICD-10-CM

## 2025-07-29 RX ORDER — IMIQUIMOD 12.5 MG/.25G
1 CREAM TOPICAL 3 TIMES WEEKLY
Qty: 12 EACH | Refills: 0 | Status: SHIPPED | OUTPATIENT
Start: 2025-07-30

## 2025-08-08 ENCOUNTER — TELEPHONE (OUTPATIENT)
Dept: BARIATRICS | Facility: CLINIC | Age: 60
End: 2025-08-08

## 2025-08-18 ENCOUNTER — TELEPHONE (OUTPATIENT)
Dept: BARIATRICS | Facility: CLINIC | Age: 60
End: 2025-08-18

## (undated) DEVICE — DRAPE TOWEL: Brand: CONVERTORS

## (undated) DEVICE — COLUMN DRAPE

## (undated) DEVICE — ASTOUND STANDARD SURGICAL GOWN, XL: Brand: CONVERTORS

## (undated) DEVICE — GLOVE INDICATOR UNDERGLOVE SZ 6 BLUE

## (undated) DEVICE — SYRINGE 10ML LL

## (undated) DEVICE — 3000CC GUARDIAN II: Brand: GUARDIAN

## (undated) DEVICE — PBT NON ABSORBABLE WOUND CLOSURE DEVICE: Brand: V-LOC

## (undated) DEVICE — BASKET SPECIMEN RETRIVAL 1.9FR 120CM

## (undated) DEVICE — SUT ETHIBOND 0 CT-1 30 IN X424H

## (undated) DEVICE — 10 MM BABCOCKS WITH RATCHET HANDLES: Brand: ENDOPATH

## (undated) DEVICE — CHLORHEXIDINE 4PCT 4 OZ

## (undated) DEVICE — DRAPE EQUIPMENT RF WAND

## (undated) DEVICE — PREMIUM DRY TRAY LF: Brand: MEDLINE INDUSTRIES, INC.

## (undated) DEVICE — REDUCER: Brand: ENDOWRIST

## (undated) DEVICE — 2000CC GUARDIAN II: Brand: GUARDIAN

## (undated) DEVICE — STAPLER 60 RELOAD BLUE: Brand: SUREFORM

## (undated) DEVICE — IRRIG ENDO FLO TUBING

## (undated) DEVICE — IV CATH INTROCAN 18G X 1 1/4 SAFETY

## (undated) DEVICE — INTENDED FOR TISSUE SEPARATION, AND OTHER PROCEDURES THAT REQUIRE A SHARP SURGICAL BLADE TO PUNCTURE OR CUT.: Brand: BARD-PARKER SAFETY BLADES SIZE 15, STERILE

## (undated) DEVICE — TIBURON LAPAROSCOPIC ABDOMINAL DRAPE: Brand: CONVERTORS

## (undated) DEVICE — SUT VICRYL 2-0 SH 27 IN UNDYED J417H

## (undated) DEVICE — CANNULA SEAL

## (undated) DEVICE — NEEDLE 25G X 1 1/2

## (undated) DEVICE — SYRINGE 30ML LL

## (undated) DEVICE — CHLORAPREP HI-LITE 26ML ORANGE

## (undated) DEVICE — BETHLEHEM UNIVERSAL MINOR GEN: Brand: CARDINAL HEALTH

## (undated) DEVICE — STAPLER CANNULA SEAL: Brand: ENDOWRIST

## (undated) DEVICE — DECANTER: Brand: UNBRANDED

## (undated) DEVICE — ABSORBABLE WOUND CLOSURE DEVICE: Brand: V-LOC 90

## (undated) DEVICE — SUT MONOCRYL 4-0 PS-2 27 IN Y426H

## (undated) DEVICE — CATH URETERAL 5FR X 70 CM FLEX TIP POLYUR BARD

## (undated) DEVICE — WEBRIL 6 IN UNSTERILE

## (undated) DEVICE — SYRINGE 20ML LL

## (undated) DEVICE — 3M™ TEGADERM™ TRANSPARENT FILM DRESSING FRAME STYLE, 1624W, 2-3/8 IN X 2-3/4 IN (6 CM X 7 CM), 100/CT 4CT/CASE: Brand: 3M™ TEGADERM™

## (undated) DEVICE — VIOLET BRAIDED (POLYGLACTIN 910), SYNTHETIC ABSORBABLE SUTURE: Brand: COATED VICRYL

## (undated) DEVICE — STAPLER 60 RELOAD GREEN: Brand: SUREFORM

## (undated) DEVICE — BASIC SINGLE BASIN 2-LF: Brand: MEDLINE INDUSTRIES, INC.

## (undated) DEVICE — SEALANT FIBRIN VISTASEAL 10ML

## (undated) DEVICE — LAPAROSCOPIC DUAL RIGID APPLICATOR: Brand: ETHICON

## (undated) DEVICE — BAG DECANTER

## (undated) DEVICE — CADIERE FORCEPS: Brand: ENDOWRIST

## (undated) DEVICE — TUBING SUCTION 5MM X 12 FT

## (undated) DEVICE — NEEDLE SPINAL18G X 3.5 IN QUINCKE

## (undated) DEVICE — PACK TUR

## (undated) DEVICE — COTTON TIP APPLICTOR 2 PK

## (undated) DEVICE — GUIDEWIRE STRGHT TIP 0.035 IN  SOLO PLUS

## (undated) DEVICE — BLUE HEAT SCOPE WARMER

## (undated) DEVICE — BLADELESS OBTURATOR: Brand: WECK VISTA

## (undated) DEVICE — STERILE SURGICAL LUBRICANT,  TUBE: Brand: SURGILUBE

## (undated) DEVICE — PENCIL ELECTROSURG E-Z CLEAN -0035H

## (undated) DEVICE — TROCAR: Brand: KII FIOS FIRST ENTRY

## (undated) DEVICE — SCD SEQUENTIAL COMPRESSION COMFORT SLEEVE MEDIUM KNEE LENGTH: Brand: KENDALL SCD

## (undated) DEVICE — ARM DRAPE

## (undated) DEVICE — INVIEW CLEAR LEGGINGS: Brand: CONVERTORS

## (undated) DEVICE — DISPOSABLE BRIEF/UNDERWEAR

## (undated) DEVICE — TRAVELKIT CONTAINS FIRST STEP KIT (200ML EP-4 KIT) AND SOILED SCOPE BAG - 1 KIT: Brand: TRAVELKIT CONTAINS FIRST STEP KIT AND SOILED SCOPE BAG

## (undated) DEVICE — ANTI-FOG SOLUTION WITH FOAM PAD: Brand: DEVON

## (undated) DEVICE — Device: Brand: DEFENDO AIR/WATER/SUCTION AND BIOPSY VALVE

## (undated) DEVICE — STAPLER 60 RELOAD WHITE: Brand: SUREFORM

## (undated) DEVICE — ADHESIVE SKIN CLSR DERMABOND NX

## (undated) DEVICE — SURGIFOAM 8.5 X 12.5

## (undated) DEVICE — SEAL

## (undated) DEVICE — MONOPOLAR CURVED SCISSORS: Brand: ENDOWRIST

## (undated) DEVICE — NEPTUNE E-SEP SMOKE EVACUATION PENCIL, COATED, 70MM BLADE, PUSH BUTTON SWITCH: Brand: NEPTUNE E-SEP

## (undated) DEVICE — MEGA SUTURECUT ND: Brand: ENDOWRIST

## (undated) DEVICE — ANTIBACTERIAL UNDYED BRAIDED (POLYGLACTIN 910), SYNTHETIC ABSORBABLE SUTURE: Brand: COATED VICRYL

## (undated) DEVICE — ENDOPATH PNEUMONEEDLE INSUFFLATION NEEDLES WITH LUER LOCK CONNECTORS 120MM: Brand: ENDOPATH

## (undated) DEVICE — GLOVE SRG BIOGEL 7

## (undated) DEVICE — VESSEL SEALER EXTEND: Brand: ENDOWRIST

## (undated) DEVICE — [HIGH FLOW INSUFFLATOR,  DO NOT USE IF PACKAGE IS DAMAGED,  KEEP DRY,  KEEP AWAY FROM SUNLIGHT,  PROTECT FROM HEAT AND RADIOACTIVE SOURCES.]: Brand: PNEUMOSURE

## (undated) DEVICE — TUBING SMOKE EVAC W/FILTRATION DEVICE PLUMEPORT ACTIV

## (undated) DEVICE — GLOVE SRG BIOGEL ECLIPSE 5.5

## (undated) DEVICE — TIP COVER ACCESSORY

## (undated) DEVICE — LASER HOLMIUM FIBER 365 MIC

## (undated) DEVICE — VISIGI 3D®  CALIBRATION SYSTEM  SIZE 36FR BYPASS/SHORT: Brand: BOEHRINGER® VISIGI 3D®  CALIBRATION SYSTEM  SIZE 36FR BYPASS/SHORT

## (undated) DEVICE — UROCATCH BAG

## (undated) DEVICE — MEDI-VAC YANK SUCT HNDL W/TPRD BULBOUS TIP: Brand: CARDINAL HEALTH

## (undated) DEVICE — URETERAL CATHETER ADAPTOR TIP

## (undated) DEVICE — STERILE LUBRICATING JELLY, TUBE: Brand: HR LUBRICATING JELLY

## (undated) DEVICE — GLOVE SRG BIOGEL 7.5

## (undated) DEVICE — INSULATED NEEDLE ELECTRODE: Brand: EDGE